# Patient Record
Sex: MALE | Race: WHITE | Employment: OTHER | URBAN - METROPOLITAN AREA
[De-identification: names, ages, dates, MRNs, and addresses within clinical notes are randomized per-mention and may not be internally consistent; named-entity substitution may affect disease eponyms.]

---

## 2017-03-15 ENCOUNTER — HOSPITAL ENCOUNTER (OUTPATIENT)
Dept: CT IMAGING | Facility: CLINIC | Age: 67
Discharge: HOME | End: 2017-03-15
Attending: INTERNAL MEDICINE | Admitting: INTERNAL MEDICINE
Payer: MEDICARE

## 2017-03-15 DIAGNOSIS — R91.1 SOLITARY PULMONARY NODULE: ICD-10-CM

## 2017-03-15 PROCEDURE — 71250 CT THORAX DX C-: CPT

## 2017-03-20 ENCOUNTER — OFFICE VISIT (OUTPATIENT)
Dept: FAMILY MEDICINE | Facility: CLINIC | Age: 67
End: 2017-03-20
Payer: COMMERCIAL

## 2017-03-20 VITALS
OXYGEN SATURATION: 97 % | WEIGHT: 184 LBS | HEART RATE: 73 BPM | BODY MASS INDEX: 24.92 KG/M2 | HEIGHT: 72 IN | TEMPERATURE: 98.2 F | DIASTOLIC BLOOD PRESSURE: 62 MMHG | SYSTOLIC BLOOD PRESSURE: 108 MMHG

## 2017-03-20 DIAGNOSIS — M67.40 GANGLION CYST: Primary | ICD-10-CM

## 2017-03-20 DIAGNOSIS — Z12.5 ENCOUNTER FOR SCREENING FOR MALIGNANT NEOPLASM OF PROSTATE: ICD-10-CM

## 2017-03-20 DIAGNOSIS — H91.93 DECREASED HEARING OF BOTH EARS: ICD-10-CM

## 2017-03-20 DIAGNOSIS — F69 BEHAVIOR PROBLEM, ADULT: ICD-10-CM

## 2017-03-20 DIAGNOSIS — N40.1 BENIGN PROSTATIC HYPERPLASIA WITH LOWER URINARY TRACT SYMPTOMS, UNSPECIFIED MORPHOLOGY: ICD-10-CM

## 2017-03-20 LAB — PSA SERPL-ACNC: 3.51 UG/L (ref 0–4)

## 2017-03-20 PROCEDURE — 99214 OFFICE O/P EST MOD 30 MIN: CPT | Performed by: FAMILY MEDICINE

## 2017-03-20 PROCEDURE — G0103 PSA SCREENING: HCPCS | Performed by: FAMILY MEDICINE

## 2017-03-20 RX ORDER — CALCIUM POLYCARBOPHIL 625 MG 625 MG/1
2 TABLET ORAL DAILY
COMMUNITY

## 2017-03-20 NOTE — PROGRESS NOTES
Mr. Kirkpatrick,    -PSA (prostate specific antigen) test is normal.    If you have further questions about the interpretation of your labs, [email or web address].[email or web address] is a good website to check out for further information.    Please contact the clinic if you have additional questions.  Thank you.    Sincerely,    Yoni Sarmiento MD

## 2017-03-20 NOTE — MR AVS SNAPSHOT
After Visit Summary   3/20/2017    Kentrell Kirkpatrick    MRN: [id number]           Patient Information     Date Of Birth          1950        Visit Information        Provider Department      3/20/2017 10:00 AM Yoni Sarmiento Jr., [facility]        Today's Diagnoses     Ganglion cyst    -  1    Decreased hearing of both ears        Behavior problem, adult           Follow-ups after your visit        Additional Services     MENTAL HEALTH REFERRAL       Your provider has referred you to: [facility] Providers Intake Scheduling [phone]   [email or web address]    All scheduling is subject to the client's specific insurance plan & benefits, provider/location availability, and provider clinical specialities.  Please arrive 15 minutes early for your first appointment and bring your completed paperwork.    Please be aware that coverage of these services is subject to the terms and limitations of your health insurance plan.  Call member services at your health plan with any benefit or coverage questions.            OTOLARYNGOLOGY REFERRAL       Your provider has referred you to: [facility]: [place] [facility] [phone]   [email or web address]    Please be aware that coverage of these services is subject to the terms and limitations of your health insurance plan.  Call member services at your health plan with any benefit or coverage questions.      Please bring the following with you to your appointment:    (1) Any X-Rays, CTs or MRIs which have been performed.  Contact the facility where they were done to arrange for  prior to your scheduled appointment.   (2) List of current medications  (3) This referral request   (4) Any documents/labs given to you for this referral                  Follow-up notes from your care team     Return if symptoms worsen or fail to improve.      Who to contact     If you have questions or need  follow up information about today's clinic visit or your schedule please contact [facility] directly at [phone].  Normal or non-critical lab and imaging results will be communicated to you by MyChart, letter or phone within 4 business days after the clinic has received the results. If you do not hear from us within 7 days, please contact the clinic through [employer] or phone. If you have a critical or abnormal lab result, we will notify you by phone as soon as possible.  Submit refill requests through [employer] or call your pharmacy and they will forward the refill request to us. Please allow 3 business days for your refill to be completed.          Additional Information About Your Visit        [employer] Information     [employer] gives you secure access to your electronic health record. If you see a primary care provider, you can also send messages to your care team and make appointments. If you have questions, please call your primary care clinic.  If you do not have a primary care provider, please call [phone] and they will assist you.        Care EveryWhere ID     This is your Care EveryWhere ID. This could be used by other organizations to access your [place] medical records  [id number]        Your Vitals Were     Pulse Temperature Height Pulse Oximetry BMI (Body Mass Index)       73 98.2  F (36.8  C) (Oral) 6' (1.829 m) 97% 24.95 kg/m2        Blood Pressure from Last 3 Encounters:   03/20/17 108/62   12/22/16 95/69   12/05/16 96/60    Weight from Last 3 Encounters:   03/20/17 184 lb (83.5 kg)   12/05/16 183 lb (83 kg)   03/14/16 184 lb (83.5 kg)              We Performed the Following     MENTAL HEALTH REFERRAL     OTOLARYNGOLOGY REFERRAL        Primary Care Provider Office Phone # Fax #    Yoni Sarmiento Jr., [phone]       [facility] [address]  [place] [address]        Thank you!     Thank you for choosing [facility]  for your care. Our  goal is always to provide you with excellent care. Hearing back from our patients is one way we can continue to improve our services. Please take a few minutes to complete the written survey that you may receive in the mail after your visit with us. Thank you!             Your Updated Medication List - Protect others around you: Learn how to safely use, store and throw away your medicines at www.disposemymeds.org.          This list is accurate as of: 3/20/17 10:56 AM.  Always use your most recent med list.                   Brand Name Dispense Instructions for use    ASPIRIN PO      Take 81 mg by mouth       calcium polycarbophil 625 MG tablet    FIBERCON     Take 2 tablets by mouth daily       MULTI vitamin  MENS Tabs      1 TABLET DAILY       tamsulosin 0.4 MG capsule    FLOMAX    90 capsule    Take 1 capsule (0.4 mg) by mouth daily

## 2017-03-20 NOTE — PROGRESS NOTES
SUBJECTIVE:                                                    Kentrell Kirkpatrick is a 66 year old male who presents to clinic today for the following health issues:    Pt would like referral for hearing     Concern - mass     Onset: 12 wks     Description:   Lump on the top of rt foot     Intensity: mild    Progression of Symptoms:  same    Accompanying Signs & Symptoms: none       Previous history of similar problem:   none    Precipitating factors:   Worsened by: walking occasionally     Alleviating factors:  Improved by: nothing tried        Therapies Tried and outcome: nothing tried    Pain is primarily on dorsum of 1st MTP joint        Notes decreased hearing in both ears.  Specifically, he notes if he is in an environment with a lot of ambient noise (like a restaurant) or women's voices on TV he really struggles to hear.    Lastly, he reports some behavioral issues of getting easily agitated/short fused.  He recently retired and reports he's really noticed his irritability has increased since that time.  He tells a story of a recent vacation to [place] with another couple where most of the party wanted to do wine tasting and he wanted to go to a classic car museum and how much it irritated him that no one wanted to go to the car museum.    Lastly, is due for check of PSA.  Discussed controversies surrounding PSA.  We've elected to do PSA this year after discussing these controversies.     Problem list and histories reviewed & adjusted, as indicated.  Additional history: as documented    Labs reviewed in EPIC    Reviewed and updated as needed this visit by clinical staff  Tobacco  Allergies  Meds  Med Hx  Surg Hx  Fam Hx  Soc Hx      Reviewed and updated as needed this visit by Provider         ROS:  Constitutional, HEENT, cardiovascular, pulmonary, gi and gu systems are negative, except as otherwise noted.    OBJECTIVE:                                                    /62  Pulse 73  Temp  98.2  F (36.8  C) (Oral)  Ht 6' (1.829 m)  Wt 184 lb (83.5 kg)  SpO2 97%  BMI 24.95 kg/m2  Body mass index is 24.95 kg/(m^2).  GENERAL: healthy, alert and no distress  EYES: Eyes grossly normal to inspection, PERRL and conjunctivae and sclerae normal  HENT: ear canals and TM's normal, nose and mouth without ulcers or lesions  NECK: no adenopathy, no asymmetry, masses, or scars and thyroid normal to palpation  MS: RLE exam shows normal strength and muscle mass and compressible 1 cm mass that is non-tender to palpation over the dorsum of the 1st MTP joint.     Diagnostic Test Results:  none      ASSESSMENT/PLAN:                                                            1. Ganglion cyst  Advised that this is the likely diagnosis of the mass on his right foot.  Advised that this typically is not visible on x-ray.  Discussed that we could refer to podiatry for excision or that this may resolve on it's own.  He'd prefer to observe this for now.    2. Decreased hearing of both ears  Referred to ENT for further evaluation.  - OTOLARYNGOLOGY REFERRAL    3. Behavior problem, adult  Advised that his symptoms do not sound like overt depression or anxiety.  As such, I advised that he would not likely see meaningful improvement with medications.  I instead encouraged meeting with a psychologist with which he agrees.  - MENTAL HEALTH REFERRAL    4. Benign prostatic hyperplasia with lower urinary tract symptoms, unspecified morphology  See above.  Check PSA.  - PSA, screen    5. Encounter for screening for malignant neoplasm of prostate   As above.  - PSA, screen    See Patient Instructions.  Over 25 minutes spent with patient today, greater than 50% in face to face counseling.     Jr Yoni Sarmiento MD  [facility]

## 2017-03-20 NOTE — NURSING NOTE
Chief Complaint   Patient presents with     Mass       Initial /62  Pulse 73  Temp 98.2  F (36.8  C) (Oral)  Ht 6' (1.829 m)  Wt 184 lb (83.5 kg)  SpO2 97%  BMI 24.95 kg/m2 Estimated body mass index is 24.95 kg/(m^2) as calculated from the following:    Height as of this encounter: 6' (1.829 m).    Weight as of this encounter: 184 lb (83.5 kg).  Medication Reconciliation: complete

## 2017-03-27 ENCOUNTER — TRANSFERRED RECORDS (OUTPATIENT)
Dept: HEALTH INFORMATION MANAGEMENT | Facility: CLINIC | Age: 67
End: 2017-03-27

## 2017-05-08 ENCOUNTER — OFFICE VISIT (OUTPATIENT)
Dept: PSYCHOLOGY | Facility: CLINIC | Age: 67
End: 2017-05-08
Attending: FAMILY MEDICINE
Payer: COMMERCIAL

## 2017-05-08 DIAGNOSIS — F43.23 ADJUSTMENT DISORDER WITH MIXED ANXIETY AND DEPRESSED MOOD: Primary | ICD-10-CM

## 2017-05-08 PROCEDURE — 90791 PSYCH DIAGNOSTIC EVALUATION: CPT | Performed by: MARRIAGE & FAMILY THERAPIST

## 2017-05-08 ASSESSMENT — ANXIETY QUESTIONNAIRES
GAD7 TOTAL SCORE: 5
7. FEELING AFRAID AS IF SOMETHING AWFUL MIGHT HAPPEN: NOT AT ALL
1. FEELING NERVOUS, ANXIOUS, OR ON EDGE: NOT AT ALL
6. BECOMING EASILY ANNOYED OR IRRITABLE: SEVERAL DAYS
IF YOU CHECKED OFF ANY PROBLEMS ON THIS QUESTIONNAIRE, HOW DIFFICULT HAVE THESE PROBLEMS MADE IT FOR YOU TO DO YOUR WORK, TAKE CARE OF THINGS AT HOME, OR GET ALONG WITH OTHER PEOPLE: NOT DIFFICULT AT ALL
2. NOT BEING ABLE TO STOP OR CONTROL WORRYING: SEVERAL DAYS
5. BEING SO RESTLESS THAT IT IS HARD TO SIT STILL: MORE THAN HALF THE DAYS
3. WORRYING TOO MUCH ABOUT DIFFERENT THINGS: SEVERAL DAYS

## 2017-05-08 ASSESSMENT — PATIENT HEALTH QUESTIONNAIRE - PHQ9: 5. POOR APPETITE OR OVEREATING: NOT AT ALL

## 2017-05-08 NOTE — MR AVS SNAPSHOT
MRN:[id number]                      After Visit Summary   5/8/2017    Kentrell Kirkpatrick    MRN: [id number]           Visit Information        Provider Department      5/8/2017 10:30 AM Cary Harsha, [facility]      Your next 10 appointments already scheduled     May 24, 2017  9:00 AM CDT   Return Visit with Harsha Townsend [facility]    [address] [facility] [address] [address] [phone]            Jun 01, 2017  8:00 AM CDT   Return Visit with Harsha Townsend [facility]    [address] [facility] [address] [phone]              [employer] Information     [employer] gives you secure access to your electronic health record. If you see a primary care provider, you can also send messages to your care team and make appointments. If you have questions, please call your primary care clinic.  If you do not have a primary care provider, please call [phone] and they will assist you.        Care EveryWhere ID     This is your Care EveryWhere ID. This could be used by other organizations to access your [place] medical records  [id number]

## 2017-05-08 NOTE — Clinical Note
Hi,  Pt attended his intake therapy session today at [facility]. Dx of Adjustment Disorder with anxiety and depressed mood as a result of struggles transitioning to long-term life. His next scheduled session is for 5/24/17.  Lets collaborate throughout treatment as needed. Regards Harsha Townsend MA, LMFT, LICSW

## 2017-05-08 NOTE — PROGRESS NOTES
"                                                                                                                                                                        Adult Intake Structured Interview  Standard Diagnostic Assessment      CLIENT'S NAME: Kentrell Kirkpatrick  MRN:   [id number]  :   1950  ACCT. NUMBER: [id number]  DATE OF SERVICE: 17      Identifying Information:  Client is a 66 year old, ,  male. Client was referred for counseling by self, Yoni Sarmiento at [facility] and family. Client is currently retired. Client attended the session alone.       Client's Statement of Presenting Concern:  Client reports the reason for seeking therapy at this time for his recent problems having a \"short temper, holding in anger, not happy.\" He reports having increased irritability since retiring last year. Client describes himself as a \"workaholic all my life.\" Now he has \"too much time on my hands\" and has been experiencing boredom. He feels happiest when he is working with his tools. He is struggling adjusting to alf. 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 Client stated that his symptoms have resulted in the following functional impairments: relationship(s), self-care and social interactions.      History of Presenting Concern:  Client reports that these problem(s) began 1 year ago, 2016, when he retired from [employer] . Client has attempted to resolve these concerns in the past through self-control. Client reports that other professional(s) are involved in providing support / services: talking with PCP.      Social History:  Client reported he grew up in [place], [place]. They were the third born of 3 children. This is an intact family and parents remain . Client reported that his childhood was \"Fantastic! Parents encouraged all of us to " "explore most anything. Great family camping vacations.\" Client described his current relationships with family of origin as \"great\" with wife. \"Good\" with oldest brother. Brother Cipriano does not stay in touch.    Client reported a history of 2 committed relationships or marriages. Client has been  for 10 years. Client reported having no children. Client identified some stable and meaningful social connections. Client reported that he has not been involved with the legal system. Client's highest education level was some college. Client did identify the following learning problems: hearing. There are no ethnic, cultural or Caodaism factors that may be relevant for therapy. Client identified his preferred language to be English. Client reported he does not need the assistance of an  or other support involved in therapy. Modifications will not be used to assist communication in therapy. Client did not serve in the .     Client reports family history includes HEART DISEASE in his father; Prostate Cancer in his brother and another family member.    Mental Health History:  Client reported no family history of mental health issues.  Client has not been previously diagnosed with a mental health diagnosis.  Client has not received mental health services in the past.  Hospitalizations: None.  Client is not currently receiving any mental health services.      Chemical Health History:  Client reported no family history of chemical health issues. Client has not received chemical dependency treatment in the past. Client is not currently receiving any chemical dependency treatment. Client reports no problems as a result of their drinking / drug use.      Client Reports:  Client reports using alcohol 1 times per day and has 1 beers and mixed drinks at a time. Client first started drinking at age \"20+ yrs ago\".  Client denies using tobacco.  Client denies using marijuana.  Client reports using caffeine 1 times " "per day and drinks 8 at a time. Client started using caffeine at age \"29+ years\".  Client denies using street drugs.  Client denies the non-medical use of prescription or over the counter drugs.    CAGE: None of the patient's responses to the CAGE screening were positive / Negative CAGE score   Based on the negative Cage-Aid score and clinical interview there  are not indications of drug or alcohol abuse.    Discussed the general effects of drugs and alcohol on health and well-being. Therapist gave client printed information about the effects of chemical use on his health and well being.      Significant Losses / Trauma / Abuse / Neglect Issues:  There are indications or report of significant loss, trauma, abuse or neglect issues related to: death of 1st wife of 28 years due to brain aneurysm .    Issues of possible neglect are not present.      Medical Issues:  Client has had a physical exam to rule out medical causes for current symptoms. Date of last physical exam was within the past year. Client was encouraged to follow up with PCP if symptoms were to develop. The client has a [facility] Primary Care Provider, who is named Yoni Sarmiento Jr... The client reports not having a psychiatrist. Client reports no current medical concerns. The client denies the presence of chronic or episodic pain. There are not significant nutritional concerns.     Client reports current meds as:   Current Outpatient Prescriptions   Medication Sig     calcium polycarbophil (FIBERCON) 625 MG tablet Take 2 tablets by mouth daily     ASPIRIN PO Take 81 mg by mouth     tamsulosin (FLOMAX) 0.4 MG capsule Take 1 capsule (0.4 mg) by mouth daily     MULTI VITAMIN MENS OR TABS 1 TABLET DAILY     No current facility-administered medications for this visit.        Client Allergies:  No Known Allergies      Medical History:  Past Medical History:   Diagnosis Date     Colon polyps          Medication Adherence:  Client reports taking prescribed " medications as prescribed.    Client was provided recommendation to follow-up with prescribing physician.    Mental Status Assessment:  Appearance:   Appropriate   Eye Contact:   Good   Psychomotor Behavior: Normal   Attitude:   Cooperative   Orientation:   All  Speech   Rate / Production: Normal    Volume:  Normal   Mood:    Irritable  Sad   Affect:    Appropriate  Bright  Worrisome   Thought Content:  Clear  Rumination   Thought Form:  Coherent  Goal Directed  Logical   Insight:    Fair       Review of Symptoms:  Depression: Interest Energy Psychomotor slowing or agitation Ruminations Irritability  Ladan:  No symptoms  Psychosis: No symptoms  Anxiety: Worries Restless  Panic:  No symptoms  Post Traumatic Stress Disorder: Trauma (Death of 1st wife)  Obsessive Compulsive Disorder: No symptoms  Eating Disorder: No symptoms  Oppositional Defiant Disorder: No symptoms  ADD / ADHD: No symptoms  Conduct Disorder: No symptoms        Safety Issues and Plan for Safety and Risk Management:  Client denies a history of suicidal ideation, suicide attempts, self-injurious behavior, homicidal ideation, homicidal behavior and and other safety concerns  Client denies current fears or concerns for personal safety.  Client denies current or recent suicidal ideation or behaviors.  Client denies current or recent homicidal ideation or behaviors.  Client denies current or recent self injurious behavior or ideation.  Client denies other safety concerns.  Client reports there are no firearms in the house.  A safety and risk management plan has not been developed at this time, however client was given the after-hours number / 911 should there be a change in any of these risk factors.    Client's Strengths and Limitations:  Client identified the following strengths or resources that will help her succeed in counseling: commitment to health and well being, friends / good social support and intelligence. Client identified the following  supports: wife. Things that may interfere with the clients success in counseling include: none reported.      Diagnostic Criteria:  A. The development of emotional or behavioral symptoms in response to an identifiable stressor(s) occurring within 3 months of the onset of the stressor(s)  B. These symptoms or behaviors are clinically significant, as evidenced by one or both of the following:       - Marked distress that is out of proportion to the severity/intensity of the stressor (with consideration for external context & culture)       - Significant impairment in social, occupational, or other important areas of functioning  C. The stress-related disturbance does not meet criteria for another disorder & is not not an exacerbation of another mental disorder  D. The symptoms do not represent normal bereavement  E. Once the stressor or its consequences have terminated, the symptoms do not persist for more than an additional 6 months       * Adjustment Disorder with Mixed Anxiety and Depressed Mood: The predominant manifestation is a combination of depression and anxiety      Functional Status:  Client's symptoms are causing reduced functional status in the following areas: Activities of Daily Living - worry, loss of interest, low energy, self-care, boredom, irritability, lost sense of purpose/identity  Social / Relational - irritability, resentment towards others      DSM5 Diagnoses: (Sustained by DSM5 Criteria Listed Above)  Diagnoses: Adjustment Disorders  309.28 (F43.23) With mixed anxiety and depressed mood  Psychosocial & Contextual Factors: Client is experiencing symptoms of anxiety and depression as he struggles adjusting to his life in [facility]. Client has always found gerardo in his work and has yet to find something to fill that need in [facility]. It has started to affect his mood and affecting his social functioning.   WHODAS 2.0 (12 item) 2.08% on 5/8/2017            This questionnaire asks about  difficulties due to health conditions. Health conditions  include  disease or illnesses, other health problems that may be short or long lasting,  injuries, mental health or emotional problems, and problems with alcohol or drugs.                     Think back over the past 30 days and answer these questions, thinking about how much  difficulty you had doing the following activities. For each question, please [place] only  one response.    S1 Standing for long periods such as 30 minutes? None =         1   S2 Taking care of household responsibilities? None =         1   S3 Learning a new task, for example, learning how to get to a new place? None =         1   S4 How much of a problem do you have joining community activities (for example, festivals, Zoroastrianism or other activities) in the same way as anyone else can? None =         1   S5 How much have you been emotionally affected by your health problems? None =         1     In the past 30 days, how much difficulty did you have in:   S6 Concentrating on doing something for ten minutes? None =         1   S7 Walking a long distance such as a kilometer (or equivalent)? None =         1   S8 Washing your whole body? None =         1   S9 Getting dressed? None =         1   S10 Dealing with people you do not know? Mild =           2   S11 Maintaining a friendship? None =         1   S12 Your day to day work? None =         1     H1 Overall, in the past 30 days, how many days were these difficulties present? Record number of days 1   H2 In the past 30 days, for how many days were you totally unable to carry out your usual activities or work because of any health condition? Record number of days  0   H3 In the past 30 days, not counting the days that you were totally unable, for how many days did you cut back or reduce your usual activities or work because of any health condition? Record number of days 0     Attendance Agreement:  Client has signed Attendance  Agreement:Yes      Preliminary Treatment Plan:  The client reports no currently identified Holiness, ethnic or cultural issues relevant to therapy.     services are not indicated.    Modifications to assist communication are not indicated.    The concerns identified by the client will be addressed in therapy.    Initial Treatment will focus on: Adjustment Difficulties related to: snf  Relational Problems related to: Conflict or difficulties with others in situations client has been passive.    As a preliminary treatment goal, client will develop coping/problem-solving skills to facilitate more adaptive adjustment and will address relationship difficulties in a more adaptive manner.    The focus of initial interventions will be to alleviate anxiety, alleviate depressed mood, increase ability to function adaptively, increase coping skills, process losses, provide homework to reinforce skill development, teach CBT skills, teach mindfulness skills and teach social skills.    The client is receiving treatment / structured support from the following professional(s) / service and treatment. Collaboration will be initiated with: primary care physician.    Referral to another professional/service is not indicated at this time..    A Release of Information is not needed at this time.    Report to child / adult protection services was NA.    Client will have access to their [facility]' medical record.    BROOKE Lopez, LICSW  May 8, 2017

## 2017-05-09 ASSESSMENT — PATIENT HEALTH QUESTIONNAIRE - PHQ9: SUM OF ALL RESPONSES TO PHQ QUESTIONS 1-9: 4

## 2017-05-09 ASSESSMENT — ANXIETY QUESTIONNAIRES: GAD7 TOTAL SCORE: 5

## 2017-05-24 ENCOUNTER — OFFICE VISIT (OUTPATIENT)
Dept: PSYCHOLOGY | Facility: CLINIC | Age: 67
End: 2017-05-24
Payer: COMMERCIAL

## 2017-05-24 DIAGNOSIS — F43.23 ADJUSTMENT DISORDER WITH MIXED ANXIETY AND DEPRESSED MOOD: Primary | ICD-10-CM

## 2017-05-24 PROCEDURE — 90834 PSYTX W PT 45 MINUTES: CPT | Performed by: MARRIAGE & FAMILY THERAPIST

## 2017-05-24 NOTE — PROGRESS NOTES
Progress Note    Client Name: Kentrell Kirkpatrick  Date: 5/24/2017         Service Type: Individual      Session Start Time: 9am  Session End Time: 9:45am      Session Length: 45 minutes     Session #: 2     Attendees: Client attended alone    Treatment Plan Last Reviewed: developing Tx plan  PHQ-9 / TONI-7 : 5/8/17     DATA      Progress Since Last Session (Related to Symptoms / Goals / Homework):   Symptoms: Stable    Homework: Achieved / completed to satisfaction      Episode of Care Goals: Satisfactory progress - PREPARATION (Decided to change - considering how); Intervened by negotiating a change plan and determining options / strategies for behavior change, identifying triggers, exploring social supports, and working towards setting a date to begin behavior change     Current / Ongoing Stressors and Concerns:   - Sx of anxiety adjusting to life in [facility], finding new purpose/identity, irritability     - Sx of depression adjusting to [facility], low sense of self-worth without work identity     Treatment Objective(s) Addressed in This Session:   identify 2 stressors which contribute to feelings of depression  identify 2 initial signs or symptoms of anxiety  compile a list of boundaries that they would like to set with others. friend, family and self     Intervention:   CBT: behavioral chain analysis  Solution Focused: miracle question, finding gerardo in [facility], pleasant social connections  Psycho-education: mindfulness, living in the present   Role play: effective communication skills        ASSESSMENT: Current Emotional / Mental Status (status of significant symptoms):   Risk status (Self / Other harm or suicidal ideation)   Client denies current fears or concerns for personal safety.   Client denies current or recent suicidal ideation or behaviors.   Client denies current or recent homicidal ideation or behaviors.   Client denies current or recent self injurious  behavior or ideation.   Client denies other safety concerns.   A safety and risk management plan has not been developed at this time, however client was given the after-hours number / 911 should there be a change in any of these risk factors.     Appearance:   Appropriate    Eye Contact:   Good    Psychomotor Behavior: Normal    Attitude:   Cooperative    Orientation:   All   Speech    Rate / Production: Normal     Volume:  Normal    Mood:    Anxious  Irritable  Sad    Affect:    Appropriate  Bright  Worrisome    Thought Content:  Clear  Rumination    Thought Form:  Coherent  Goal Directed  Logical    Insight:    Fair      Medication Review:   No current psychiatric medications prescribed     Medication Compliance:   Yes     Changes in Health Issues:   None reported     Chemical Use Review:   Substance Use: Chemical use reviewed, no active concerns identified      Tobacco Use: No current tobacco use.       Collateral Reports Completed:   Not Applicable    PLAN: (Client Tasks / Therapist Tasks / Other)  Client will increase self awareness of escalation in social interactions and start to utilize communication skills and patience for harmony. He will have trust in himself regarding decisions from the past to help him be in the present.         Harsha Townsend LMFT, LICSW                                                         ________________________________________________________________________    Treatment Plan    Client's Name: Kentrell Kirkpatrick  YOB: 1950    Date: 5/24/2017    DSM-V Diagnoses: Adjustment Disorders  309.28 (F43.23) With mixed anxiety and depressed mood  Psychosocial & Contextual Factors: Client is experiencing symptoms of anxiety and depression as he struggles adjusting to his life in [place]. Client has always found gerardo in his work and has yet to find something to fill that need in [place]. It has started to affect his mood and affecting his social functioning.   WHODAS 2.0 (12  "item) 2.08% on 5/8/2017    Referral / Collaboration:  Referral to another professional/service is not indicated at this time.    Anticipated number of session or this episode of care: 8      MeasurableTreatment Goal(s) related to diagnosis / functional impairment(s)  Goal 1: Client's symptoms of anxiety and depression will reduce from above a 5 out of 10 to below a 4 out of 10 for a minimum of 4 weeks.   Goal: \"Short temper. Give up on obsessing about past wrongs.\"     Objective #A (Client Action)   Client will decrease frequency and intensity of feeling down, depressed, hopeless  Client will increase self-awareness of symptom onset/escalation  Status: New - Date: TBD    Intervention(s)  Therapist will assign homework track symptoms, patterns and triggers  provide psycho-education on depression  Therapist will teach CBT strategies for attending to negative self-talk and cognitive distortions.  ACT: experiential exercises and mindfulness practices, how to live with life barriers    Objective #B  Client will Increase interest, engagement, and pleasure in doing things  Identify negative self-talk and behaviors: challenge core beliefs, myths, and actions  Status: New - Date: TBD    Intervention(s)  Therapist will assign homework to practice using coping skills outside the therapy encounter, worksheets to challenge negative thoughts  teach distraction skills. explore and tailor enjoyable activities, increase social activities, strengthen social supports  ACT: life values and being mindful of values for goals and daily living    Objective #B  Client will use at least 4 coping skills for anxiety management in the next 8 weeks.   Status: New - Date: TBD    Intervention(s)  Therapist will assign homework worksheet to challenge anxious thoughts, values and barriers, rational counter-statements  teach emotional regulation skills. deep breathing, grounding, progressive muscle relaxation, TIP skills    Objective #C  Client will " "use thought-stopping strategy daily to reduce intrusive thoughts.  Status: New - Date: TBD    Intervention(s)  provide safe space to address hx of presenting problem and root cause  Teach mindfulness practices, silent observer, ACT metaphors and managing stressors  [place]tray: exercise to stage presenting problem, reflect, process and problem solve.      Goal 2: Client will improve interactions in his inter-personal relationships establishing healthy and respectful communication to be kept 90% of the time for a minimum of 4 weeks.     Goal: \"Give more input when making decisions with others.\"    Objective #A (Client Action)      Client will compile a list of boundaries that they would like to set with others.              Status: New - Date: TBD     Intervention(s)  Therapist will teach about healthy boundaries. structure, saying \"no\", advocating, identifying and establishing appropriate roles, rules, expectations.    Objective #B  Client will practice setting boundaries daily times in the next 8 weeks.                    Status: New - Date: TBD    Intervention(s)  Therapist will assign homework to track the use of positive interactions and outcomes to establishing desired relationships   role-play effective communication skills and conflict management    Objective #C  Client will learn & utilize at least 3 assertive communication skills weekly.  Status: New - Date: TBD    Intervention(s)  Therapist will role-play assertiveness skills  teach assertiveness skills. aggressive/passive/assertive, impulsive control, reactive vs sensitive, exposure to uncomfortable conversation.      Client has not reviewed nor agreed to the above plan.      BROOKE Lopez, Cary Medical CenterSW  May 24, 2017  "

## 2017-06-01 ENCOUNTER — OFFICE VISIT (OUTPATIENT)
Dept: PSYCHOLOGY | Facility: CLINIC | Age: 67
End: 2017-06-01
Payer: COMMERCIAL

## 2017-06-01 DIAGNOSIS — F43.23 ADJUSTMENT DISORDER WITH MIXED ANXIETY AND DEPRESSED MOOD: Primary | ICD-10-CM

## 2017-06-01 PROCEDURE — 90834 PSYTX W PT 45 MINUTES: CPT | Performed by: MARRIAGE & FAMILY THERAPIST

## 2017-06-01 ASSESSMENT — ANXIETY QUESTIONNAIRES
3. WORRYING TOO MUCH ABOUT DIFFERENT THINGS: SEVERAL DAYS
7. FEELING AFRAID AS IF SOMETHING AWFUL MIGHT HAPPEN: NOT AT ALL
2. NOT BEING ABLE TO STOP OR CONTROL WORRYING: SEVERAL DAYS
5. BEING SO RESTLESS THAT IT IS HARD TO SIT STILL: SEVERAL DAYS
GAD7 TOTAL SCORE: 7
6. BECOMING EASILY ANNOYED OR IRRITABLE: NOT AT ALL
1. FEELING NERVOUS, ANXIOUS, OR ON EDGE: NEARLY EVERY DAY
IF YOU CHECKED OFF ANY PROBLEMS ON THIS QUESTIONNAIRE, HOW DIFFICULT HAVE THESE PROBLEMS MADE IT FOR YOU TO DO YOUR WORK, TAKE CARE OF THINGS AT HOME, OR GET ALONG WITH OTHER PEOPLE: NOT DIFFICULT AT ALL

## 2017-06-01 ASSESSMENT — PATIENT HEALTH QUESTIONNAIRE - PHQ9: 5. POOR APPETITE OR OVEREATING: SEVERAL DAYS

## 2017-06-01 NOTE — PROGRESS NOTES
Progress Note    Client Name: Kentrell Kirkpatrick  Date: 6/1/2017         Service Type: Individual      Session Start Time: 8am  Session End Time: 8:45am      Session Length: 45 minutes     Session #: 3     Attendees: Client attended alone    Treatment Plan Last Reviewed: 6/1/2017  PHQ-9 / TONI-7 : 6/1/2017     DATA      Progress Since Last Session (Related to Symptoms / Goals / Homework):   Symptoms: Stable    Homework: Achieved / completed to satisfaction      Episode of Care Goals: Satisfactory progress - ACTION (Actively working towards change); Intervened by reinforcing change plan / affirming steps taken     Current / Ongoing Stressors and Concerns:   - Sx of anxiety adjusting to life in long-term, finding new purpose/identity, irritability     - Sx of depression adjusting to long-term, low sense of self-worth without work identity  Client reports he has been more present in his interactions with others. In one specific situation, at his volunteer job, he let someone else lead and client was able to enjoy not taking on the all the responsibility. Today the client discussed what matters in his life and where he learned his work constructs. He shared that being outdoors with his wife is what he most enjoys. He did the values exercise which highlighted his philosophy of life. Client will be mindful of making his long-term about his values to find fulfillment.      Treatment Objective(s) Addressed in This Session:   identify 2 strategies to more effectively address stressors  Increase interest, engagement, and pleasure in doing things  Improve concentration, focus, and mindfulness in daily activities      Intervention:   CBT: behavioral chain analysis  Solution Focused: miracle question, finding gerardo in long-term, pleasant social connections  ACT: value cards         ASSESSMENT: Current Emotional / Mental Status (status of significant symptoms):   Risk status (Self /  Other harm or suicidal ideation)   Client denies current fears or concerns for personal safety.   Client denies current or recent suicidal ideation or behaviors.   Client denies current or recent homicidal ideation or behaviors.   Client denies current or recent self injurious behavior or ideation.   Client denies other safety concerns.   A safety and risk management plan has not been developed at this time, however client was given the after-hours number / 911 should there be a change in any of these risk factors.     Appearance:   Appropriate    Eye Contact:   Good    Psychomotor Behavior: Normal    Attitude:   Cooperative    Orientation:   All   Speech    Rate / Production: Normal     Volume:  Normal    Mood:    Anxious  Sad    Affect:    Appropriate  Bright    Thought Content:  Clear  Rumination    Thought Form:  Coherent  Goal Directed  Logical    Insight:    Fair      Medication Review:   No current psychiatric medications prescribed     Medication Compliance:   Yes     Changes in Health Issues:   None reported     Chemical Use Review:   Substance Use: Chemical use reviewed, no active concerns identified      Tobacco Use: No current tobacco use.       Collateral Reports Completed:   Not Applicable    PLAN: (Client Tasks / Therapist Tasks / Other)  Client will complete the values worksheet. He will be mindful of his values in daily living and mindful of language as he finds his identity in long-term.       Harsha Townsend, MARGIEFT, LICSW                                                      ________________________________________________________________________    Treatment Plan    Client's Name: Kentrell Kirkpatrick  YOB: 1950    Date: 5/24/2017    DSM-V Diagnoses: Adjustment Disorders  309.28 (F43.23) With mixed anxiety and depressed mood  Psychosocial & Contextual Factors: Client is experiencing symptoms of anxiety and depression as he struggles adjusting to his life in long-term. Client has always  "found gerardo in his work and has yet to find something to fill that need in [employer]. It has started to affect his mood and affecting his social functioning.   WHODAS 2.0 (12 item) 2.08% on 5/8/2017    Referral / Collaboration:  Referral to another professional/service is not indicated at this time.    Anticipated number of session or this episode of care: 8      MeasurableTreatment Goal(s) related to diagnosis / functional impairment(s)  Goal 1: Client's symptoms of anxiety and depression will reduce from above a 5 out of 10 to below a 4 out of 10 for a minimum of 4 weeks.   Goal: \"Short temper. Give up on obsessing about past wrongs.\"     Objective #A (Client Action)   Client will decrease frequency and intensity of feeling down, depressed, hopeless  Client will increase self-awareness of symptom onset/escalation  Status: New - Date: 6/1/2017    Intervention(s)  Therapist will assign homework track symptoms, patterns and triggers  provide psycho-education on depression  Therapist will teach CBT strategies for attending to negative self-talk and cognitive distortions.  ACT: experiential exercises and mindfulness practices, how to live with life barriers    Objective #B  Client will Increase interest, engagement, and pleasure in doing things  Identify negative self-talk and behaviors: challenge core beliefs, myths, and actions  Status: New - Date: 6/1/2017    Intervention(s)  Therapist will assign homework to practice using coping skills outside the therapy encounter, worksheets to challenge negative thoughts  teach distraction skills. explore and tailor enjoyable activities, increase social activities, strengthen social supports  ACT: life values and being mindful of values for goals and daily living    Objective #C  Client will use at least 4 coping skills for anxiety management in the next 8 weeks.   Status: New - Date: 6/1/2017    Intervention(s)  Therapist will assign homework worksheet to challenge anxious " "thoughts, values and barriers, rational counter-statements  teach emotional regulation skills. deep breathing, grounding, progressive muscle relaxation, TIP skills    Objective #D  Client will use thought-stopping strategy daily to reduce intrusive thoughts.  Status: New - Date: 6/1/2017    Intervention(s)  provide safe space to address hx of presenting problem and root cause  Teach mindfulness practices, silent observer, ACT metaphors and managing stressors  Sandtray: exercise to stage presenting problem, reflect, process and problem solve.      Goal 2: Client will improve interactions in his inter-personal relationships establishing healthy and respectful communication to be kept 90% of the time for a minimum of 4 weeks.     Goal: \"Give more input when making decisions with others.\"    Objective #A (Client Action)      Client will compile a list of boundaries that they would like to set with others.  Status: New - Date: 6/1/2017     Intervention(s)  Therapist will teach about healthy boundaries. structure, saying \"no\", advocating, identifying and establishing appropriate roles, rules, expectations.    Objective #B  Client will practice setting boundaries daily times in the next 8 weeks.                    Status: New - Date: 6/1/2017    Intervention(s)  Therapist will assign homework to track the use of positive interactions and outcomes to establishing desired relationships   role-play effective communication skills and conflict management    Objective #C  Client will learn & utilize at least 3 assertive communication skills weekly.  Status: New - Date: 6/1/2017    Intervention(s)  Therapist will role-play assertiveness skills  teach assertiveness skills. aggressive/passive/assertive, impulsive control, reactive vs sensitive, exposure to uncomfortable conversation.      Client has reviewed and agreed to the above plan.      BROOKE Lopez, [facility]  June 1, 2017  "

## 2017-06-02 ASSESSMENT — PATIENT HEALTH QUESTIONNAIRE - PHQ9: SUM OF ALL RESPONSES TO PHQ QUESTIONS 1-9: 5

## 2017-06-02 ASSESSMENT — ANXIETY QUESTIONNAIRES: GAD7 TOTAL SCORE: 7

## 2017-06-12 ENCOUNTER — OFFICE VISIT (OUTPATIENT)
Dept: PSYCHOLOGY | Facility: CLINIC | Age: 67
End: 2017-06-12
Payer: COMMERCIAL

## 2017-06-12 DIAGNOSIS — F43.23 ADJUSTMENT DISORDER WITH MIXED ANXIETY AND DEPRESSED MOOD: Primary | ICD-10-CM

## 2017-06-12 PROCEDURE — 90834 PSYTX W PT 45 MINUTES: CPT | Performed by: MARRIAGE & FAMILY THERAPIST

## 2017-06-12 NOTE — MR AVS SNAPSHOT
MRN:[id number]                      After Visit Summary   6/12/2017    Kentrell Kirkpatrick    MRN: [id number]           Visit Information        Provider Department      6/12/2017 8:30 AM Cary Harsha, [facility]      Your next 10 appointments already scheduled     Jun 21, 2017  8:00 AM CDT   Return Visit with Harsha Townsend [facility]    [address] [address] [phone]            Jun 28, 2017  8:00 AM CDT   Return Visit with Harsha Townsend [facility]    [address] [address] [phone]              [employer] Information     [employer] gives you secure access to your electronic health record. If you see a primary care provider, you can also send messages to your care team and make appointments. If you have questions, please call your primary care clinic.  If you do not have a primary care provider, please call [phone] and they will assist you.        Care EveryWhere ID     This is your Care EveryWhere ID. This could be used by other organizations to access your [place] medical records  [id number]

## 2017-06-12 NOTE — PROGRESS NOTES
Progress Note    Client Name: Kentrell Kirkpatrick  Date: 6/12/2017         Service Type: Individual      Session Start Time: 8:30am  Session End Time: 9:15am      Session Length: 45 minutes     Session #: 4     Attendees: Client attended alone    Treatment Plan Last Reviewed: 6/1/2017  PHQ-9 / TONI-7 : 6/1/2017     DATA      Progress Since Last Session (Related to Symptoms / Goals / Homework):   Symptoms: Stable    Homework: Achieved / completed to satisfaction values worksheet      Episode of Care Goals: Satisfactory progress - ACTION (Actively working towards change); Intervened by reinforcing change plan / affirming steps taken     Current / Ongoing Stressors and Concerns:   - Sx of anxiety adjusting to life in care home, finding new purpose/identity, irritability     - Sx of depression adjusting to care home, low sense of self-worth without work identity  Client reports he has been using the feelings wheel to identify specific language for how he has been feeling in care home. Client's constructs surrounding work and productivity vs the judgement of not being productive leaves him feeling insecure with his current identity. In addition, his format for socializing has also changed leaving him feeling lonely. Today client discussed his values and what matters most in his life. He will get back to activities that bring him a new type of productivity. He will re-frame productivity to fit life in care home. Client will also be proactive socially with his friends making more of an effort to plan social events.      Treatment Objective(s) Addressed in This Session:   Increase interest, engagement, and pleasure in doing things  Identify negative self-talk and behaviors: challenge core beliefs, myths, and actions  Improve concentration, focus, and mindfulness in daily activities   learn & utilize at least 3 assertive communication skills weekly  identify two areas of life that  you would like to have improved functioning     Intervention:   CBT: behavioral chain analysis  Solution Focused: miracle question, finding gerardo in [employer], pleasant social connections  ACT: values, intentions, purpose, mindfulness   Narrative: emotional language, re-framing, feelings wheel         ASSESSMENT: Current Emotional / Mental Status (status of significant symptoms):   Risk status (Self / Other harm or suicidal ideation)   Client denies current fears or concerns for personal safety.   Client denies current or recent suicidal ideation or behaviors.   Client denies current or recent homicidal ideation or behaviors.   Client denies current or recent self injurious behavior or ideation.   Client denies other safety concerns.   A safety and risk management plan has not been developed at this time, however client was given the after-hours number / 911 should there be a change in any of these risk factors.     Appearance:   Appropriate    Eye Contact:   Good    Psychomotor Behavior: Normal    Attitude:   Cooperative    Orientation:   All   Speech    Rate / Production: Normal     Volume:  Normal    Mood:    Anxious  Sad    Affect:    Appropriate  Bright    Thought Content:  Clear  Rumination    Thought Form:  Coherent  Goal Directed  Logical    Insight:    Fair      Medication Review:   No current psychiatric medications prescribed     Medication Compliance:   Yes     Changes in Health Issues:   None reported     Chemical Use Review:   Substance Use: Chemical use reviewed, no active concerns identified      Tobacco Use: No current tobacco use.       Collateral Reports Completed:   Not Applicable    PLAN: (Client Tasks / Therapist Tasks / Other)  Client will start using a daily planner for self accountability and increase effort for social enjoyable activities. Client will be mindful in re-framing the label of productivity towards his overall health.   Next session will do first sandtray and discuss social  "skills.         Harsha Townsend, LMFT, LICSW                                                      ________________________________________________________________________    Treatment Plan    Client's Name: Kentrell Kirkpatrick  YOB: 1950    Date: 5/24/2017    DSM-V Diagnoses: Adjustment Disorders  309.28 (F43.23) With mixed anxiety and depressed mood  Psychosocial & Contextual Factors: Client is experiencing symptoms of anxiety and depression as he struggles adjusting to his life in senior care. Client has always found gerardo in his work and has yet to find something to fill that need in senior care. It has started to affect his mood and affecting his social functioning.   WHODAS 2.0 (12 item) 2.08% on 5/8/2017    Referral / Collaboration:  Referral to another professional/service is not indicated at this time.    Anticipated number of session or this episode of care: 8      MeasurableTreatment Goal(s) related to diagnosis / functional impairment(s)  Goal 1: Client's symptoms of anxiety and depression will reduce from above a 5 out of 10 to below a 4 out of 10 for a minimum of 4 weeks.   Goal: \"Short temper. Give up on obsessing about past wrongs.\"     Objective #A (Client Action)   Client will decrease frequency and intensity of feeling down, depressed, hopeless  Client will increase self-awareness of symptom onset/escalation  Status: New - Date: 6/1/2017    Intervention(s)  Therapist will assign homework track symptoms, patterns and triggers  provide psycho-education on depression  Therapist will teach CBT strategies for attending to negative self-talk and cognitive distortions.  ACT: experiential exercises and mindfulness practices, how to live with life barriers    Objective #B  Client will Increase interest, engagement, and pleasure in doing things  Identify negative self-talk and behaviors: challenge core beliefs, myths, and actions  Status: New - Date: 6/1/2017    Intervention(s)  Therapist will assign " "homework to practice using coping skills outside the therapy encounter, worksheets to challenge negative thoughts  teach distraction skills. explore and tailor enjoyable activities, increase social activities, strengthen social supports  ACT: life values and being mindful of values for goals and daily living    Objective #C  Client will use at least 4 coping skills for anxiety management in the next 8 weeks.   Status: New - Date: 6/1/2017    Intervention(s)  Therapist will assign homework worksheet to challenge anxious thoughts, values and barriers, rational counter-statements  teach emotional regulation skills. deep breathing, grounding, progressive muscle relaxation, TIP skills    Objective #D  Client will use thought-stopping strategy daily to reduce intrusive thoughts.  Status: New - Date: 6/1/2017    Intervention(s)  provide safe space to address hx of presenting problem and root cause  Teach mindfulness practices, silent observer, ACT metaphors and managing stressors  Sandtray: exercise to stage presenting problem, reflect, process and problem solve.      Goal 2: Client will improve interactions in his inter-personal relationships establishing healthy and respectful communication to be kept 90% of the time for a minimum of 4 weeks.     Goal: \"Give more input when making decisions with others.\"    Objective #A (Client Action)      Client will compile a list of boundaries that they would like to set with others.  Status: New - Date: 6/1/2017     Intervention(s)  Therapist will teach about healthy boundaries. structure, saying \"no\", advocating, identifying and establishing appropriate roles, rules, expectations.    Objective #B  Client will practice setting boundaries daily times in the next 8 weeks.                    Status: New - Date: 6/1/2017    Intervention(s)  Therapist will assign homework to track the use of positive interactions and outcomes to establishing desired relationships   role-play effective " communication skills and conflict management    Objective #C  Client will learn & utilize at least 3 assertive communication skills weekly.  Status: New - Date: 6/1/2017    Intervention(s)  Therapist will role-play assertiveness skills  teach assertiveness skills. aggressive/passive/assertive, impulsive control, reactive vs sensitive, exposure to uncomfortable conversation.      Client has reviewed and agreed to the above plan.      BROOKE Lopez, [facility]  June 12, 2017

## 2017-06-13 DIAGNOSIS — N40.1 BENIGN NODULAR PROSTATIC HYPERPLASIA WITH LOWER URINARY TRACT SYMPTOMS: ICD-10-CM

## 2017-06-13 RX ORDER — TAMSULOSIN HYDROCHLORIDE 0.4 MG/1
0.4 CAPSULE ORAL DAILY
Qty: 90 CAPSULE | Refills: 1 | Status: SHIPPED | OUTPATIENT
Start: 2017-06-13 | End: 2017-12-26

## 2017-06-13 NOTE — TELEPHONE ENCOUNTER
Reason for Call:  Medication or medication refill:    Do you use a [facility]?  Name of the pharmacy and phone number for the current request:  [facility]    Name of the medication requested: tamsulosin 0.4 mg    Other request: Please refill asap. Today if possible. Pt is going out of town    Can we leave a detailed message on this number? YES    Phone number patient can be reached at: Cell number on file:    Telephone Information:   Mobile [phone]       Best Time: y      Call taken on 6/13/2017 at 9:22 AM by Keira Snell

## 2017-06-21 ENCOUNTER — OFFICE VISIT (OUTPATIENT)
Dept: PSYCHOLOGY | Facility: CLINIC | Age: 67
End: 2017-06-21
Payer: COMMERCIAL

## 2017-06-21 DIAGNOSIS — F43.23 ADJUSTMENT DISORDER WITH MIXED ANXIETY AND DEPRESSED MOOD: Primary | ICD-10-CM

## 2017-06-21 PROCEDURE — 90834 PSYTX W PT 45 MINUTES: CPT | Performed by: MARRIAGE & FAMILY THERAPIST

## 2017-06-21 ASSESSMENT — ANXIETY QUESTIONNAIRES
6. BECOMING EASILY ANNOYED OR IRRITABLE: NOT AT ALL
3. WORRYING TOO MUCH ABOUT DIFFERENT THINGS: NOT AT ALL
GAD7 TOTAL SCORE: 1
2. NOT BEING ABLE TO STOP OR CONTROL WORRYING: SEVERAL DAYS
5. BEING SO RESTLESS THAT IT IS HARD TO SIT STILL: NOT AT ALL
1. FEELING NERVOUS, ANXIOUS, OR ON EDGE: NOT AT ALL
7. FEELING AFRAID AS IF SOMETHING AWFUL MIGHT HAPPEN: NOT AT ALL
IF YOU CHECKED OFF ANY PROBLEMS ON THIS QUESTIONNAIRE, HOW DIFFICULT HAVE THESE PROBLEMS MADE IT FOR YOU TO DO YOUR WORK, TAKE CARE OF THINGS AT HOME, OR GET ALONG WITH OTHER PEOPLE: NOT DIFFICULT AT ALL

## 2017-06-21 ASSESSMENT — PATIENT HEALTH QUESTIONNAIRE - PHQ9: 5. POOR APPETITE OR OVEREATING: NOT AT ALL

## 2017-06-21 NOTE — MR AVS SNAPSHOT
MRN:[id number]                      After Visit Summary   6/21/2017    Kentrell Kirkpatrick    MRN: [id number]           Visit Information        Provider Department      6/21/2017 8:00 AM Harsha Townsend [facility] Generic      Your next 10 appointments already scheduled     Jun 28, 2017  8:00 AM CDT   Return Visit with Harsha Townsend [facility]    [address] [address]   [phone]            Jul 05, 2017  8:00 AM CDT   Return Visit with Harsha Townsend [facility]    [address] [address]   [phone]            Jul 19, 2017  8:00 AM CDT   Return Visit with Harsha Townsend [facility]    [address] [address]   [phone]              MyChart Information     [employer] gives you secure access to your electronic health record. If you see a primary care provider, you can also send messages to your care team and make appointments. If you have questions, please call your primary care clinic.  If you do not have a primary care provider, please call [phone] and they will assist you.        Care EveryWhere ID     This is your Care EveryWhere ID. This could be used by other organizations to access your [place] medical records  [id number]        Equal Access to Services     MIHIR GREGORIO AH: Hadii pauly eden hadtessieo Soyonoali, waaxda luqadaha, qaybta kaalmada adeegyada, [place] adederrick sánchez. So [facility] [phone].    ATENCIÓN: Si habla español, tiene a perez disposición servicios gratuitos de asistencia lingüística. Llame al [phone].    We comply with applicable federal civil rights laws and [place] laws. We do not discriminate on the basis of race, color, national origin, age, disability sex, sexual orientation or gender identity.

## 2017-06-21 NOTE — PROGRESS NOTES
"                                             Progress Note    Client Name: Kentrell Kirkpatrick  Date: 6/21/2017         Service Type: Individual      Session Start Time: 8:10am  Session End Time: 9am      Session Length: 50 minutes     Session #: 5     Attendees: Client attended alone    Treatment Plan Last Reviewed: 6/1/2017  PHQ-9 / TONI-7 : 6/21/2017     DATA      Progress Since Last Session (Related to Symptoms / Goals / Homework):   Symptoms: Improved PHQ9/GAD7    Homework: Achieved / completed to satisfaction       Episode of Care Goals: Satisfactory progress - ACTION (Actively working towards change); Intervened by reinforcing change plan / affirming steps taken     Current / Ongoing Stressors and Concerns:   - Sx of anxiety adjusting to life in longterm, finding new purpose/identity, irritability     - Sx of depression adjusting to longterm, low sense of self-worth without work identity  Client reports he has started using a daily planner again and it has helped. He has started planning out his time and gotten tasks done that he was procrastinating, which makes him feel good. He also has started looking forward to events and a sense of accomplishment once something from the planner is completed. Today client staged his first sandtray which he titled \"The Good Things In My Life.\" He used miniatures to represent his love of all things aquatic, his love for cars, outdoor vehicles and space. He also showed growing-up and fond memories of the outdoors. On the top right corner he had his danica and a chest to represent the legacy of his life he would like to leave. He described it as leaving the world a better place than he left it. His personal figure was Superman. He discussed the importance of the many ways one can help others and how much positive impact he is still able to make in longterm.       Treatment Objective(s) Addressed in This Session:   Increase interest, engagement, and pleasure in doing " things  Improve concentration, focus, and mindfulness in daily activities   learn & utilize at least 3 assertive communication skills weekly  identify two areas of life that you would like to have improved functioning     Intervention:   CBT: behavioral chain analysis  Solution Focused: miracle question, finding gerardo in intermediate, pleasant social connections  ACT: values, intentions, purpose, mindfulness   Sandtray: client staged, reflected and processed presenting problems         ASSESSMENT: Current Emotional / Mental Status (status of significant symptoms):   Risk status (Self / Other harm or suicidal ideation)   Client denies current fears or concerns for personal safety.   Client denies current or recent suicidal ideation or behaviors.   Client denies current or recent homicidal ideation or behaviors.   Client denies current or recent self injurious behavior or ideation.   Client denies other safety concerns.   A safety and risk management plan has not been developed at this time, however client was given the after-hours number / 911 should there be a change in any of these risk factors.     Appearance:   Appropriate    Eye Contact:   Good    Psychomotor Behavior: Normal    Attitude:   Cooperative    Orientation:   All   Speech    Rate / Production: Normal     Volume:  Normal    Mood:    Anxious  Sad    Affect:    Appropriate  Bright    Thought Content:  Clear    Thought Form:  Coherent  Goal Directed  Logical    Insight:    Fair      Medication Review:   No current psychiatric medications prescribed     Medication Compliance:   Yes     Changes in Health Issues:   None reported     Chemical Use Review:   Substance Use: Chemical use reviewed, no active concerns identified      Tobacco Use: No current tobacco use.       Collateral Reports Completed:   Not Applicable    PLAN: (Client Tasks / Therapist Tasks / Other)  Client will reflect on today's sandtray and continue to review next session. He will consider  "maintaining a good balance of what it means to help others. Client will continue to use his planner to schedule social activities and tasks for the near future.   Next session discuss social skills.         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 Client has always found gerardo in his work and has yet to find something to fill that need in custodial. It has started to affect his mood and affecting his social functioning.   WHODAS 2.0 (12 item) 2.08% on 5/8/2017    Referral / Collaboration:  Referral to another professional/service is not indicated at this time.    Anticipated number of session or this episode of care: 8      MeasurableTreatment Goal(s) related to diagnosis / functional impairment(s)  Goal 1: Client's symptoms of anxiety and depression will reduce from above a 5 out of 10 to below a 4 out of 10 for a minimum of 4 weeks.   Goal: \"Short temper. Give up on obsessing about past wrongs.\"     Objective #A (Client Action)   Client will decrease frequency and intensity of feeling down, depressed, hopeless  Client will increase self-awareness of symptom onset/escalation  Status: New - Date: 6/1/2017    Intervention(s)  Therapist will assign homework track symptoms, patterns and triggers  provide psycho-education on depression  Therapist will teach CBT strategies for attending to negative self-talk and cognitive distortions.  ACT: experiential exercises and mindfulness practices, how to live with life barriers    Objective #B  Client will Increase interest, engagement, " "and pleasure in doing things  Identify negative self-talk and behaviors: challenge core beliefs, myths, and actions  Status: New - Date: 6/1/2017    Intervention(s)  Therapist will assign homework to practice using coping skills outside the therapy encounter, worksheets to challenge negative thoughts  teach distraction skills. explore and tailor enjoyable activities, increase social activities, strengthen social supports  ACT: life values and being mindful of values for goals and daily living    Objective #C  Client will use at least 4 coping skills for anxiety management in the next 8 weeks.   Status: New - Date: 6/1/2017    Intervention(s)  Therapist will assign homework worksheet to challenge anxious thoughts, values and barriers, rational counter-statements  teach emotional regulation skills. deep breathing, grounding, progressive muscle relaxation, TIP skills    Objective #D  Client will use thought-stopping strategy daily to reduce intrusive thoughts.  Status: New - Date: 6/1/2017    Intervention(s)  provide safe space to address hx of presenting problem and root cause  Teach mindfulness practices, silent observer, ACT metaphors and managing stressors  Sandtray: exercise to stage presenting problem, reflect, process and problem solve.      Goal 2: Client will improve interactions in his inter-personal relationships establishing healthy and respectful communication to be kept 90% of the time for a minimum of 4 weeks.     Goal: \"Give more input when making decisions with others.\"    Objective #A (Client Action)      Client will compile a list of boundaries that they would like to set with others.  Status: New - Date: 6/1/2017     Intervention(s)  Therapist will teach about healthy boundaries. structure, saying \"no\", advocating, identifying and establishing appropriate roles, rules, expectations.    Objective #B  Client will practice setting boundaries daily times in the next 8 weeks.                    Status: " New - Date: 6/1/2017    Intervention(s)  Therapist will assign homework to track the use of positive interactions and outcomes to establishing desired relationships   role-play effective communication skills and conflict management    Objective #C  Client will learn & utilize at least 3 assertive communication skills weekly.  Status: New - Date: 6/1/2017    Intervention(s)  Therapist will role-play assertiveness skills  teach assertiveness skills. aggressive/passive/assertive, impulsive control, reactive vs sensitive, exposure to uncomfortable conversation.      Client has reviewed and agreed to the above plan.      BROOKE Lopez, LICSW  June 21, 2017

## 2017-06-22 ASSESSMENT — ANXIETY QUESTIONNAIRES: GAD7 TOTAL SCORE: 1

## 2017-06-22 ASSESSMENT — PATIENT HEALTH QUESTIONNAIRE - PHQ9: SUM OF ALL RESPONSES TO PHQ QUESTIONS 1-9: 1

## 2017-06-28 ENCOUNTER — OFFICE VISIT (OUTPATIENT)
Dept: PSYCHOLOGY | Facility: CLINIC | Age: 67
End: 2017-06-28
Payer: COMMERCIAL

## 2017-06-28 DIAGNOSIS — F43.23 ADJUSTMENT DISORDER WITH MIXED ANXIETY AND DEPRESSED MOOD: Primary | ICD-10-CM

## 2017-06-28 PROCEDURE — 90834 PSYTX W PT 45 MINUTES: CPT | Performed by: MARRIAGE & FAMILY THERAPIST

## 2017-06-28 NOTE — MR AVS SNAPSHOT
MRN:[id number]                      After Visit Summary   6/28/2017    Kentrell Kirkpatrick    MRN: [id number]           Visit Information        Provider Department      6/28/2017 8:00 AM Harsha Townsend [facility] Generic      Your next 10 appointments already scheduled     Jul 05, 2017  8:00 AM CDT   Return Visit with Harsha Townsend  ([facility])    [address] [facility] [address] [address] [phone]            Jul 19, 2017  8:00 AM CDT   Return Visit with Harsha Townsend  ([facility])    [address] [facility] [address] [facility] [address] [phone]              MyChart Information     [facility] gives you secure access to your electronic health record. If you see a primary care provider, you can also send messages to your care team and make appointments. If you have questions, please call your primary care clinic.  If you do not have a primary care provider, please call [phone] and they will assist you.        Care EveryWhere ID     This is your Care EveryWhere ID. This could be used by other organizations to access your [place] medical records  [id number]        Equal Access to Services     MIHIR GREGORIO : Lian pascualo Sodylon, waaxda luqadaha, qaybta kaalmada adeegyada, north sánchez. So [facility] [phone].    ATENCIÓN: Si habla español, tiene a perez disposición servicios gratlizzyos de asistencia lingüística. Llame al [phone].    We comply with applicable federal civil rights laws and [place] laws. We do not discriminate on the basis of race, color, national origin, age, disability sex, sexual orientation or gender identity.

## 2017-06-28 NOTE — PROGRESS NOTES
Progress Note    Client Name: Kentrell Kirkpatrick  Date: 6/28/2017         Service Type: Individual      Session Start Time: 8:05am  Session End Time: 8:50am      Session Length: 50 minutes     Session #: 6     Attendees: Client attended alone    Treatment Plan Last Reviewed: 6/1/2017  PHQ-9 / TONI-7 : 6/21/2017     DATA      Progress Since Last Session (Related to Symptoms / Goals / Homework):   Symptoms: Stable     Homework: Achieved / completed to satisfaction       Episode of Care Goals: Satisfactory progress - ACTION (Actively working towards change); Intervened by reinforcing change plan / affirming steps taken     Current / Ongoing Stressors and Concerns:   - Sx of anxiety adjusting to life in custodial, finding new purpose/identity, irritability     - Sx of depression adjusting to custodial, low sense of self-worth without work identity  Client reports he has been doing well and has started to embrace custodial. He gave a couple of examples. He was able to leave a project for later as he realized he didn't have to rush, and this brought him calm. He was also able to take time to sit and enjoy the water as he watched people launching their boats. He also reports he has been more patient and less aggressive in social situations, being mindful of choosing his battles and keeping harmony as the main priority.        Treatment Objective(s) Addressed in This Session:   Increase interest, engagement, and pleasure in doing things  Improve concentration, focus, and mindfulness in daily activities   track and record at least 1 daily pleasant exchanges with anyone he incounters in his personal life     Intervention:   CBT: behavioral chain analysis  Solution Focused: miracle question, finding gerardo in custodial, pleasant social connections  ACT: values, intentions, purpose, mindfulness, paced breathing skill        ASSESSMENT: Current Emotional / Mental Status (status of  significant symptoms):   Risk status (Self / Other harm or suicidal ideation)   Client denies current fears or concerns for personal safety.   Client denies current or recent suicidal ideation or behaviors.   Client denies current or recent homicidal ideation or behaviors.   Client denies current or recent self injurious behavior or ideation.   Client denies other safety concerns.   A safety and risk management plan has not been developed at this time, however client was given the after-hours number / 911 should there be a change in any of these risk factors.     Appearance:   Appropriate    Eye Contact:   Good    Psychomotor Behavior: Normal    Attitude:   Cooperative    Orientation:   All   Speech    Rate / Production: Normal     Volume:  Normal    Mood:    Anxious    Affect:    Appropriate  Bright    Thought Content:  Clear    Thought Form:  Coherent  Goal Directed  Logical    Insight:    Fair      Medication Review:   No current psychiatric medications prescribed     Medication Compliance:   Yes     Changes in Health Issues:   None reported     Chemical Use Review:   Substance Use: Chemical use reviewed, no active concerns identified      Tobacco Use: No current tobacco use.       Collateral Reports Completed:   Not Applicable    PLAN: (Client Tasks / Therapist Tasks / Other)  Client will practice 4-4-4 paced breathing and social skills to strengthen relationships through curiosity questions.       BROOKE Lopez, LICSW                                                      ________________________________________________________________________    Treatment Plan    Client's Name: Kentrell Kirkpatrick  YOB: 1950    Date: 5/24/2017    DSM-V Diagnoses: Adjustment Disorders  309.28 (F43.23) With mixed anxiety and depressed mood  Psychosocial & Contextual Factors: Client is experiencing symptoms of anxiety and depression as he struggles adjusting to his life in [facility]. Client has always found gerardo in  "his work and has yet to find something to fill that need in longterm. It has started to affect his mood and affecting his social functioning.   WHODAS 2.0 (12 item) 2.08% on 5/8/2017    Referral / Collaboration:  Referral to another professional/service is not indicated at this time.    Anticipated number of session or this episode of care: 8      MeasurableTreatment Goal(s) related to diagnosis / functional impairment(s)  Goal 1: Client's symptoms of anxiety and depression will reduce from above a 5 out of 10 to below a 4 out of 10 for a minimum of 4 weeks.   Goal: \"Short temper. Give up on obsessing about past wrongs.\"     Objective #A (Client Action)   Client will decrease frequency and intensity of feeling down, depressed, hopeless  Client will increase self-awareness of symptom onset/escalation  Status: New - Date: 6/1/2017    Intervention(s)  Therapist will assign homework track symptoms, patterns and triggers  provide psycho-education on depression  Therapist will teach CBT strategies for attending to negative self-talk and cognitive distortions.  ACT: experiential exercises and mindfulness practices, how to live with life barriers    Objective #B  Client will Increase interest, engagement, and pleasure in doing things  Identify negative self-talk and behaviors: challenge core beliefs, myths, and actions  Status: New - Date: 6/1/2017    Intervention(s)  Therapist will assign homework to practice using coping skills outside the therapy encounter, worksheets to challenge negative thoughts  teach distraction skills. explore and tailor enjoyable activities, increase social activities, strengthen social supports  ACT: life values and being mindful of values for goals and daily living    Objective #C  Client will use at least 4 coping skills for anxiety management in the next 8 weeks.   Status: New - Date: 6/1/2017    Intervention(s)  Therapist will assign homework worksheet to challenge anxious thoughts, values " "and barriers, rational counter-statements  teach emotional regulation skills. deep breathing, grounding, progressive muscle relaxation, TIP skills    Objective #D  Client will use thought-stopping strategy daily to reduce intrusive thoughts.  Status: New - Date: 6/1/2017    Intervention(s)  provide safe space to address hx of presenting problem and root cause  Teach mindfulness practices, silent observer, ACT metaphors and managing stressors  Sandtray: exercise to stage presenting problem, reflect, process and problem solve.      Goal 2: Client will improve interactions in his inter-personal relationships establishing healthy and respectful communication to be kept 90% of the time for a minimum of 4 weeks.     Goal: \"Give more input when making decisions with others.\"    Objective #A (Client Action)      Client will compile a list of boundaries that they would like to set with others.  Status: New - Date: 6/1/2017     Intervention(s)  Therapist will teach about healthy boundaries. structure, saying \"no\", advocating, identifying and establishing appropriate roles, rules, expectations.    Objective #B  Client will practice setting boundaries daily times in the next 8 weeks.                    Status: New - Date: 6/1/2017    Intervention(s)  Therapist will assign homework to track the use of positive interactions and outcomes to establishing desired relationships   role-play effective communication skills and conflict management    Objective #C  Client will learn & utilize at least 3 assertive communication skills weekly.  Status: New - Date: 6/1/2017    Intervention(s)  Therapist will role-play assertiveness skills  teach assertiveness skills. aggressive/passive/assertive, impulsive control, reactive vs sensitive, exposure to uncomfortable conversation.      Client has reviewed and agreed to the above plan.      BROOKE Lopez, [facility]  June 28, 2017  "

## 2017-07-11 ENCOUNTER — MYC MEDICAL ADVICE (OUTPATIENT)
Dept: FAMILY MEDICINE | Facility: CLINIC | Age: 67
End: 2017-07-11

## 2017-07-11 DIAGNOSIS — N40.1 BENIGN PROSTATIC HYPERPLASIA WITH LOWER URINARY TRACT SYMPTOMS: Primary | ICD-10-CM

## 2017-07-11 DIAGNOSIS — N40.1 BENIGN NODULAR PROSTATIC HYPERPLASIA WITH LOWER URINARY TRACT SYMPTOMS: ICD-10-CM

## 2017-07-11 RX ORDER — TAMSULOSIN HYDROCHLORIDE 0.4 MG/1
0.4 CAPSULE ORAL DAILY
Qty: 7 CAPSULE | Refills: 0 | Status: SHIPPED | OUTPATIENT
Start: 2017-07-11 | End: 2017-10-31 | Stop reason: DRUGHIGH

## 2017-07-11 RX ORDER — TAMSULOSIN HYDROCHLORIDE 0.4 MG/1
0.4 CAPSULE ORAL DAILY
Qty: 90 CAPSULE | Refills: 1 | Status: CANCELLED | OUTPATIENT
Start: 2017-07-11

## 2017-07-11 NOTE — TELEPHONE ENCOUNTER
Reason for Call:  Medication or medication refill:    Do you use a [facility]?  Name of the pharmacy and phone number for the current request:  [facility]    Name of the medication requested: Flomax    Other request: Please fill today. Please call when filled.      Can we leave a detailed message on this number? YES    Phone number patient can be reached at: Cell number on file:    Telephone Information:   Mobile [phone]       Best Time: any    Call taken on 7/11/2017 at 12:57 PM by Keira Snell

## 2017-07-12 NOTE — TELEPHONE ENCOUNTER
tamsulosin (FLOMAX) 0.4 MG capsule  This is a duplicate refill request.  Please decline and close.

## 2017-07-19 ENCOUNTER — OFFICE VISIT (OUTPATIENT)
Dept: PSYCHOLOGY | Facility: CLINIC | Age: 67
End: 2017-07-19
Payer: COMMERCIAL

## 2017-07-19 DIAGNOSIS — F43.23 ADJUSTMENT DISORDER WITH MIXED ANXIETY AND DEPRESSED MOOD: Primary | ICD-10-CM

## 2017-07-19 PROCEDURE — 90834 PSYTX W PT 45 MINUTES: CPT | Performed by: MARRIAGE & FAMILY THERAPIST

## 2017-07-19 ASSESSMENT — ANXIETY QUESTIONNAIRES
5. BEING SO RESTLESS THAT IT IS HARD TO SIT STILL: SEVERAL DAYS
1. FEELING NERVOUS, ANXIOUS, OR ON EDGE: SEVERAL DAYS
IF YOU CHECKED OFF ANY PROBLEMS ON THIS QUESTIONNAIRE, HOW DIFFICULT HAVE THESE PROBLEMS MADE IT FOR YOU TO DO YOUR WORK, TAKE CARE OF THINGS AT HOME, OR GET ALONG WITH OTHER PEOPLE: SOMEWHAT DIFFICULT
GAD7 TOTAL SCORE: 4
7. FEELING AFRAID AS IF SOMETHING AWFUL MIGHT HAPPEN: NOT AT ALL
2. NOT BEING ABLE TO STOP OR CONTROL WORRYING: SEVERAL DAYS
6. BECOMING EASILY ANNOYED OR IRRITABLE: SEVERAL DAYS
3. WORRYING TOO MUCH ABOUT DIFFERENT THINGS: NOT AT ALL

## 2017-07-19 ASSESSMENT — PATIENT HEALTH QUESTIONNAIRE - PHQ9: 5. POOR APPETITE OR OVEREATING: NOT AT ALL

## 2017-07-19 NOTE — Clinical Note
Hi,  Patient has met therapy goals and completed treatment. He was discharged from counseling 7/19/17.  He can return to counseling with me at [facility] in the future as needed. Please contact me if you have any questions.  Regards,  Harsha Townsend MA, LMFT, LICSW

## 2017-07-19 NOTE — PROGRESS NOTES
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     Medication Adherence:  Yes    Chemical Use:  NA    Assessment: Current Emotional / Mental Status (status of significant symptoms):    Risk status (Self / Other harm or suicidal ideation)  Client denies current fears or concerns for personal safety.  Client denies current or recent suicidal ideation or behaviors.  Client denies current or recent homicidal ideation or behaviors.  Client denies current or recent self injurious behavior or ideation.  Client denies other safety concerns.  A safety and risk management plan has not been developed at this time, however client was given the after-hours number should there be a change in any of these risk factors.    Appearance:   Appropriate   Eye Contact:   Good   Psychomotor Behavior: Normal   Attitude:   Cooperative   Orientation:   All  Speech   Rate / Production: Normal    Volume:  Normal   Mood:    Normal  Affect:    Appropriate   Thought Content:  Clear   Thought Form:  Coherent  Goal Directed  Logical   Insight:   Good     DSM5 Diagnoses: (Sustained by DSM5 Criteria Listed Above)  Diagnoses: Adjustment Disorders  309.28 (F43.23) With mixed anxiety and  depressed mood  Psychosocial & Contextual Factors: Client is experiencing symptoms of anxiety and depression as he struggles adjusting to his life in custodial. Client has always found gerardo in his work and has yet to find something to fill that need in custodial. It has started to affect his mood and affecting his social functioning.     Reason for Discharge:  Goals completed and is satisfied with progress      Aftercare Plan:  Client may resume counseling services at any time in the future by calling the [facility] Intake Office, [phone].      BROOKE Lopez, LICSW          July 19, 2017

## 2017-07-20 ASSESSMENT — ANXIETY QUESTIONNAIRES: GAD7 TOTAL SCORE: 4

## 2017-07-20 ASSESSMENT — PATIENT HEALTH QUESTIONNAIRE - PHQ9: SUM OF ALL RESPONSES TO PHQ QUESTIONS 1-9: 0

## 2017-07-24 PROBLEM — F43.23 ADJUSTMENT DISORDER WITH MIXED ANXIETY AND DEPRESSED MOOD: Status: RESOLVED | Noted: 2017-05-08 | Resolved: 2017-07-19

## 2017-10-19 ENCOUNTER — DOCUMENTATION ONLY (OUTPATIENT)
Dept: OTHER | Facility: CLINIC | Age: 67
End: 2017-10-19

## 2017-10-19 DIAGNOSIS — Z71.89 ADVANCED DIRECTIVES, COUNSELING/DISCUSSION: Chronic | ICD-10-CM

## 2017-10-31 ENCOUNTER — OFFICE VISIT (OUTPATIENT)
Dept: FAMILY MEDICINE | Facility: CLINIC | Age: 67
End: 2017-10-31
Payer: COMMERCIAL

## 2017-10-31 VITALS
WEIGHT: 175 LBS | BODY MASS INDEX: 23.7 KG/M2 | TEMPERATURE: 98 F | SYSTOLIC BLOOD PRESSURE: 94 MMHG | DIASTOLIC BLOOD PRESSURE: 68 MMHG | HEIGHT: 72 IN | OXYGEN SATURATION: 97 % | HEART RATE: 80 BPM

## 2017-10-31 DIAGNOSIS — J01.90 ACUTE SINUSITIS WITH SYMPTOMS > 10 DAYS: Primary | ICD-10-CM

## 2017-10-31 PROCEDURE — 99213 OFFICE O/P EST LOW 20 MIN: CPT | Performed by: FAMILY MEDICINE

## 2017-10-31 NOTE — MR AVS SNAPSHOT
After Visit Summary   10/31/2017    Kentrell Kirkpatrick    MRN: [id number]           Patient Information     Date Of Birth          1950        Visit Information        Provider Department      10/31/2017 10:40 AM Donald Shell, DO [facility]        Today's Diagnoses     Acute sinusitis with symptoms > 10 days    -  1       Follow-ups after your visit        Follow-up notes from your care team     Return in about 2 weeks (around 11/14/2017), or if symptoms worsen or fail to improve.      Who to contact     If you have questions or need follow up information about today's clinic visit or your schedule please contact [facility] directly at [phone].  Normal or non-critical lab and imaging results will be communicated to you by MyChart, letter or phone within 4 business days after the clinic has received the results. If you do not hear from us within 7 days, please contact the clinic through [employer] or phone. If you have a critical or abnormal lab result, we will notify you by phone as soon as possible.  Submit refill requests through [employer] or call your pharmacy and they will forward the refill request to us. Please allow 3 business days for your refill to be completed.          Additional Information About Your Visit        MyChart Information     [employer] gives you secure access to your electronic health record. If you see a primary care provider, you can also send messages to your care team and make appointments. If you have questions, please call your primary care clinic.  If you do not have a primary care provider, please call [phone] and they will assist you.        Care EveryWhere ID     This is your Care EveryWhere ID. This could be used by other organizations to access your [place] medical records  [id number]        Your Vitals Were     Pulse Temperature Height Pulse Oximetry BMI (Body Mass Index)       80 98  F (36.7  C) (Oral) 6' (1.829 m) 97% 23.73  kg/m2        Blood Pressure from Last 3 Encounters:   10/31/17 94/68   03/20/17 108/62   12/22/16 95/69    Weight from Last 3 Encounters:   10/31/17 175 lb (79.4 kg)   03/20/17 184 lb (83.5 kg)   12/05/16 183 lb (83 kg)              Today, you had the following     No orders found for display         Today's Medication Changes          These changes are accurate as of: 10/31/17 11:10 AM.  If you have any questions, ask your nurse or doctor.               Start taking these medicines.        Dose/Directions    amoxicillin-clavulanate 875-125 MG per tablet   Commonly known as:  AUGMENTIN   Used for:  Acute sinusitis with symptoms > 10 days   Started by:  Donald Shell DO        Dose:  1 tablet   Take 1 tablet by mouth 2 times daily   Quantity:  20 tablet   Refills:  0         These medicines have changed or have updated prescriptions.        Dose/Directions    tamsulosin 0.4 MG capsule   Commonly known as:  FLOMAX   This may have changed:  Another medication with the same name was removed. Continue taking this medication, and follow the directions you see here.   Used for:  Benign nodular prostatic hyperplasia with lower urinary tract symptoms   Changed by:  Yoni Sarmiento Jr., MD        Dose:  0.4 mg   Take 1 capsule (0.4 mg) by mouth daily   Quantity:  90 capsule   Refills:  1            Where to get your medicines      These medications were sent to [facility] [address]  [address] [place] [address]     Phone:  [phone]     amoxicillin-clavulanate 875-125 MG per tablet                Primary Care Provider Office Phone # Fax #    Yoni Sarmiento Jr., [phone] [address]  [place] [address]        Equal Access to Services     [facility] DARLINE AH: Lian Lynch, waphyllisda luqadaha, qaybta kaalmada praveen, north sánchez. So [facility] [phone].    ATENCIÓN: Kelsy nowak  español, tiene a perez disposición servicios gratuitos de asistencia lingüística. Leeroy nash [phone].    We comply with applicable federal civil rights laws and [place] laws. We do not discriminate on the basis of race, color, national origin, age, disability, sex, sexual orientation, or gender identity.            Thank you!     Thank you for choosing [facility] [place]  for your care. Our goal is always to provide you with excellent care. Hearing back from our patients is one way we can continue to improve our services. Please take a few minutes to complete the written survey that you may receive in the mail after your visit with us. Thank you!             Your Updated Medication List - Protect others around you: Learn how to safely use, store and throw away your medicines at www.disposemymeds.org.          This list is accurate as of: 10/31/17 11:10 AM.  Always use your most recent med list.                   Brand Name Dispense Instructions for use Diagnosis    amoxicillin-clavulanate 875-125 MG per tablet    AUGMENTIN    20 tablet    Take 1 tablet by mouth 2 times daily    Acute sinusitis with symptoms > 10 days       ASPIRIN PO      Take 81 mg by mouth        calcium polycarbophil 625 MG tablet    FIBERCON     Take 2 tablets by mouth daily        MULTI vitamin  MENS Tabs      1 TABLET DAILY        tamsulosin 0.4 MG capsule    FLOMAX    90 capsule    Take 1 capsule (0.4 mg) by mouth daily    Benign nodular prostatic hyperplasia with lower urinary tract symptoms

## 2017-10-31 NOTE — PROGRESS NOTES
SUBJECTIVE:                                                    Kentrell Kirkpatrick is a 66 year old male who presents to clinic today for the following health issues:      Acute Illness   Acute illness concerns: cold symptoms  Onset: 3 weeks    Fever: no    Chills/Sweats: no    Headache (location?): YES    Sinus Pressure:YES    Conjunctivitis:  no    Ear Pain: pressure not pain, bilaterally    Rhinorrhea: no    Congestion: YES sinus     Sore Throat: no     Cough: YES-productive of thick sputum    Wheeze: no    Decreased Appetite: no    Nausea: no    Vomiting: no    Diarrhea:  no    Dysuria/Freq.: no    Fatigue/Achiness: no    Sick/Strep Exposure: no     Therapies Tried and outcome:  flonase nasal spray, mucinex with no improvement       Edy states that he will typically get sick twice per year -- in the spring and in the fall. He was sick with cold symptoms earlier in the fall and got better. However, a few weeks ago, started getting congestion in his nose and chest again and it has not gotten better despite using Flonase and Mucinex. Denies any fevers or chills. Cough is productive of white mucus but no blood or other colorful phlegm. Feels sinus pressure but sinuses not necessarily tender to the touch.      Problem list and histories reviewed & adjusted, as indicated.  Additional history: as documented    Patient Active Problem List   Diagnosis     Back pain     CARDIOVASCULAR SCREENING; LDL GOAL LESS THAN 160     Advance Care Planning     Benign prostatic hyperplasia with lower urinary tract symptoms     Benign neoplasm of transverse colon     10 year risk of MI or stroke 7.5% or greater     Past Surgical History:   Procedure Laterality Date     COLONOSCOPY       COLONOSCOPY N/A 12/22/2016    Procedure: COMBINED COLONOSCOPY, SINGLE OR MULTIPLE BIOPSY/POLYPECTOMY BY BIOPSY;  Surgeon: Jeremi Kramer MD;  Location:  GI       Social History   Substance Use Topics     Smoking status: Former Smoker      Packs/day: 1.50     Years: 43.00     Types: Cigarettes     Quit date: 12/16/2007     Smokeless tobacco: Never Used     Alcohol use Yes      Comment: socially     Family History   Problem Relation Age of Onset     HEART DISEASE Father      Prostate Cancer Other      Prostate Cancer Brother              ROS:  Constitutional, HEENT, cardiovascular, pulmonary, gi and gu systems are negative, except as otherwise noted.      OBJECTIVE:   BP 94/68 (BP Location: Right arm, Patient Position: Chair, Cuff Size: Adult Large)  Pulse 80  Temp 98  F (36.7  C) (Oral)  Ht 6' (1.829 m)  Wt 175 lb (79.4 kg)  SpO2 97%  BMI 23.73 kg/m2  Body mass index is 23.73 kg/(m^2).  GENERAL: healthy, alert and no distress  EYES: Eyes grossly normal to inspection, PERRL and conjunctivae and sclerae normal  HENT: normal cephalic/atraumatic, ear canals and TM's normal, nose and mouth without ulcers or lesions, nasal mucosa edematous , oropharynx clear and oral mucous membranes moist  NECK: no adenopathy, no asymmetry, masses, or scars and thyroid normal to palpation  RESP: lungs clear to auscultation - no rales, rhonchi or wheezes  CV: regular rate and rhythm, normal S1 S2, no S3 or S4, no murmur, click or rub, no peripheral edema and peripheral pulses strong  ABDOMEN: soft, nontender, no hepatosplenomegaly, no masses and bowel sounds normal  MS: no gross musculoskeletal defects noted, no edema    Diagnostic Test Results:  none     ASSESSMENT/PLAN:   1. Acute sinusitis with symptoms > 10 days: symptoms persisting longer than expected duration of viral illness. Will opt to treat with course of Augmentin. Continue Flonase to help with congestion. If not better over the course of the next couple weeks, recommend returning to clinic for further evaluation   - amoxicillin-clavulanate (AUGMENTIN) 875-125 MG per tablet; Take 1 tablet by mouth 2 times daily  Dispense: 20 tablet; Refill: 0    Donald Shell DO  [facility] [place]

## 2017-10-31 NOTE — NURSING NOTE
Chief Complaint   Patient presents with     Cold Symptoms     Initial BP 94/68 (BP Location: Right arm, Patient Position: Chair, Cuff Size: Adult Large)  Pulse 80  Temp 98  F (36.7  C) (Oral)  Ht 6' (1.829 m)  Wt 175 lb (79.4 kg)  SpO2 97%  BMI 23.73 kg/m2 Estimated body mass index is 23.73 kg/(m^2) as calculated from the following:    Height as of this encounter: 6' (1.829 m).    Weight as of this encounter: 175 lb (79.4 kg).  BP completed using cuff size large right Arm  [facility]

## 2017-12-26 DIAGNOSIS — N40.1 BENIGN NODULAR PROSTATIC HYPERPLASIA WITH LOWER URINARY TRACT SYMPTOMS: ICD-10-CM

## 2017-12-28 RX ORDER — TAMSULOSIN HYDROCHLORIDE 0.4 MG/1
0.4 CAPSULE ORAL DAILY
Qty: 90 CAPSULE | Refills: 0 | Status: SHIPPED | OUTPATIENT
Start: 2017-12-28 | End: 2018-03-13

## 2017-12-28 NOTE — TELEPHONE ENCOUNTER
Requested Prescriptions   Pending Prescriptions Disp Refills     tamsulosin (FLOMAX) 0.4 MG capsule  Last Written Prescription Date:  6/13/17  Last Fill Quantity: 90,  # refills: 1   Last Office Visit with [facility], [facility] or [facility] prescribing provider:  10/31/17   Future Office Visit:      90 capsule 1     Sig: Take 1 capsule (0.4 mg) by mouth daily    Alpha Blockers Passed    12/26/2017  9:27 AM       Passed - BP is less than 140/90    BP Readings from Last 3 Encounters:   10/31/17 94/68   03/20/17 108/62   12/22/16 95/69                Passed - Recent or future visit with authorizing provider's specialty    Patient had office visit in the last year or has a visit in the next 30 days with authorizing provider.  See chart review.              Passed - Patient does not have Tadalafil, Vardenafil, or Sildenafil on their medication list       Passed - Patient is 18 years of age or older        Prescription approved per [facility] Refill Protocol.  Mayela Shultz, RN, BSN  [facility]

## 2018-01-20 ENCOUNTER — OFFICE VISIT (OUTPATIENT)
Dept: FAMILY MEDICINE | Facility: CLINIC | Age: 68
End: 2018-01-20
Payer: COMMERCIAL

## 2018-01-20 VITALS
HEIGHT: 72 IN | OXYGEN SATURATION: 99 % | BODY MASS INDEX: 24.83 KG/M2 | WEIGHT: 183.3 LBS | SYSTOLIC BLOOD PRESSURE: 95 MMHG | DIASTOLIC BLOOD PRESSURE: 55 MMHG | TEMPERATURE: 97.1 F | HEART RATE: 77 BPM

## 2018-01-20 DIAGNOSIS — B96.89 ACUTE BACTERIAL SINUSITIS: Primary | ICD-10-CM

## 2018-01-20 DIAGNOSIS — J01.90 ACUTE BACTERIAL SINUSITIS: Primary | ICD-10-CM

## 2018-01-20 PROCEDURE — 99213 OFFICE O/P EST LOW 20 MIN: CPT | Performed by: FAMILY MEDICINE

## 2018-01-20 RX ORDER — LEVOFLOXACIN 750 MG/1
750 TABLET, FILM COATED ORAL DAILY
Qty: 10 TABLET | Refills: 0 | Status: SHIPPED | OUTPATIENT
Start: 2018-01-20 | End: 2018-03-13

## 2018-01-20 NOTE — PROGRESS NOTES
SUBJECTIVE:   Kentrell Kirkpatrick is a 67 year old male who presents to clinic today for the following health issues:      Acute Illness   Acute illness concerns: Sinus problem   Onset: x 2 months     Fever: no    Chills/Sweats: no    Headache (location?): no    Sinus Pressure:YES- post-nasal drainage and teeth pain    Conjunctivitis:  no    Ear Pain: no    Rhinorrhea: YES    Congestion: YES    Sore Throat: no     Cough: YES productive of yellow sputum    Wheeze: no    Decreased Appetite: no    Nausea: no    Vomiting: no    Diarrhea:  no    Dysuria/Freq.: no    Fatigue/Achiness: no    Sick/Strep Exposure: no     Therapies Tried and outcome: Mucinex - shows mild improvement   Airbrone chewable -     Was put on 10 day course of Augmentin on 10/31/17 which cleared his symptoms for 5 days or so but they then returned about 5 days later.  He continues to have this pattern of good for five days then bad for a week since then.  Uses Flonase intermittently.  Has not been on antibiotics since the course in October, but has been using Sudafed.          Problem list and histories reviewed & adjusted, as indicated.  Additional history: as documented    Labs reviewed in EPIC    Reviewed and updated as needed this visit by clinical staff     Reviewed and updated as needed this visit by Provider         ROS:  Constitutional, HEENT, cardiovascular, pulmonary, gi and gu systems are negative, except as otherwise noted.      OBJECTIVE:   BP 95/55 (BP Location: Left arm, Patient Position: Chair, Cuff Size: Adult Regular)  Pulse 77  Temp 97.1  F (36.2  C) (Tympanic)  Ht 6' (1.829 m)  Wt 183 lb 4.8 oz (83.1 kg)  SpO2 99%  BMI 24.86 kg/m2  Body mass index is 24.86 kg/(m^2).  GENERAL: healthy, alert and no distress  EYES: Eyes grossly normal to inspection, PERRL and conjunctivae and sclerae normal  HENT: normal cephalic/atraumatic, ear canals and TM's normal, nose and mouth without ulcers or lesions, oropharynx clear, oral mucous  membranes moist and sinuses: not tender  NECK: no adenopathy, no asymmetry, masses, or scars and thyroid normal to palpation  RESP: lungs clear to auscultation - no rales, rhonchi or wheezes  CV: regular rate and rhythm, normal S1 S2, no S3 or S4, no murmur, click or rub, no peripheral edema and peripheral pulses strong  ABDOMEN: soft, nontender, no hepatosplenomegaly, no masses and bowel sounds normal  MS: no gross musculoskeletal defects noted, no edema    Diagnostic Test Results:  none     ASSESSMENT/PLAN:             1. Acute bacterial sinusitis  Second course of antibiotics today.  Advised Edy that if today's round of antibiotics did not offer him lasting improvement from his symptoms that we should see him back for a re-examination and consider ordering a CT of his sinuses as our next diagnostic step.  If this is abnormal - consider ENT referral.  If the CT is normal - consider allergy referral.  - levofloxacin (LEVAQUIN) 750 MG tablet; Take 1 tablet (750 mg) by mouth daily  Dispense: 10 tablet; Refill: 0    See Patient Instructions    Yoni Sarmiento Jr, MD  [facility]

## 2018-01-20 NOTE — NURSING NOTE
Chief Complaint   Patient presents with     Sinus Problem       Initial BP 95/55 (BP Location: Left arm, Patient Position: Chair, Cuff Size: Adult Regular)  Pulse 77  Temp 97.1  F (36.2  C) (Tympanic)  Ht 6' (1.829 m)  Wt 183 lb 4.8 oz (83.1 kg)  SpO2 99%  BMI 24.86 kg/m2 Estimated body mass index is 24.86 kg/(m^2) as calculated from the following:    Height as of this encounter: 6' (1.829 m).    Weight as of this encounter: 183 lb 4.8 oz (83.1 kg).  Medication Reconciliation: complete     Marta Mc MA

## 2018-01-20 NOTE — MR AVS SNAPSHOT
After Visit Summary   1/20/2018    Kentrell Kirkpatrick    MRN: [id number]           Patient Information     Date Of Birth          1950        Visit Information        Provider Department      1/20/2018 8:20 AM Yoni Sarmiento Jr., MD [facility]        Today's Diagnoses     Acute bacterial sinusitis    -  1       Follow-ups after your visit        Follow-up notes from your care team     Return in about 4 weeks (around 2/17/2018), or if symptoms worsen or fail to improve.      Your next 10 appointments already scheduled     Mar 19, 2018  8:20 AM CDT   PHYSICAL with Yoni Sarmiento Jr., MD   [facility] [place] [facility]    [address] [facility] [address] [phone]              Who to contact     If you have questions or need follow up information about today's clinic visit or your schedule please contact [facility] directly at [phone].  Normal or non-critical lab and imaging results will be communicated to you by MyChart, letter or phone within 4 business days after the clinic has received the results. If you do not hear from us within 7 days, please contact the clinic through [employer] or phone. If you have a critical or abnormal lab result, we will notify you by phone as soon as possible.  Submit refill requests through [employer] or call your pharmacy and they will forward the refill request to us. Please allow 3 business days for your refill to be completed.          Additional Information About Your Visit        MyChart Information     [employer] gives you secure access to your electronic health record. If you see a primary care provider, you can also send messages to your care team and make appointments. If you have questions, please call your primary care clinic.  If you do not have a primary care provider, please call [phone] and they will assist you.        Care EveryWhere ID     This is your Care EveryWhere ID. This  could be used by other organizations to access your [place] medical records  [id number]        Your Vitals Were     Pulse Temperature Height Pulse Oximetry BMI (Body Mass Index)       77 97.1  F (36.2  C) (Tympanic) 6' (1.829 m) 99% 24.86 kg/m2        Blood Pressure from Last 3 Encounters:   01/20/18 95/55   10/31/17 94/68   03/20/17 108/62    Weight from Last 3 Encounters:   01/20/18 183 lb 4.8 oz (83.1 kg)   10/31/17 175 lb (79.4 kg)   03/20/17 184 lb (83.5 kg)              Today, you had the following     No orders found for display         Today's Medication Changes          These changes are accurate as of: 1/20/18  8:39 AM.  If you have any questions, ask your nurse or doctor.               Start taking these medicines.        Dose/Directions    levofloxacin 750 MG tablet   Commonly known as:  LEVAQUIN   Used for:  Acute bacterial sinusitis   Started by:  Yoni Sarmiento Jr., MD        Dose:  750 mg   Take 1 tablet (750 mg) by mouth daily   Quantity:  10 tablet   Refills:  0            Where to get your medicines      These medications were sent to [facility] [place] [facility] - [address], [place] [id number]     Phone:  [phone]     levofloxacin 750 MG tablet                Primary Care Provider Office Phone # Fax #    Yoni Sarmiento Jr., [phone] [address]  [place] [address]        Equal Access to Services     LORETO GREGORIO AH: Hadii aad ku hadasho Soomaali, waaxda luqadaha, qaybta kaalmada adeegyada, waxay adelita sánchez. So [facility] [phone].    ATENCIÓN: Si habla español, tiene a perez disposición servicios gratuitos de asistencia lingüística. Llame al [phone].    We comply with applicable federal civil rights laws and [place] laws. We do not discriminate on the basis of race, color, national origin, age, disability, sex, sexual orientation, or gender identity.            Thank  you!     Thank you for choosing [facility]  for your care. Our goal is always to provide you with excellent care. Hearing back from our patients is one way we can continue to improve our services. Please take a few minutes to complete the written survey that you may receive in the mail after your visit with us. Thank you!             Your Updated Medication List - Protect others around you: Learn how to safely use, store and throw away your medicines at www.disposemymeds.org.          This list is accurate as of: 1/20/18  8:39 AM.  Always use your most recent med list.                   Brand Name Dispense Instructions for use Diagnosis    ASPIRIN PO      Take 81 mg by mouth        calcium polycarbophil 625 MG tablet    FIBERCON     Take 2 tablets by mouth daily        levofloxacin 750 MG tablet    LEVAQUIN    10 tablet    Take 1 tablet (750 mg) by mouth daily    Acute bacterial sinusitis       MULTI vitamin  MENS Tabs      1 TABLET DAILY        tamsulosin 0.4 MG capsule    FLOMAX    90 capsule    Take 1 capsule (0.4 mg) by mouth daily    Benign nodular prostatic hyperplasia with lower urinary tract symptoms

## 2018-02-12 ENCOUNTER — MYC MEDICAL ADVICE (OUTPATIENT)
Dept: FAMILY MEDICINE | Facility: CLINIC | Age: 68
End: 2018-02-12

## 2018-03-13 ENCOUNTER — OFFICE VISIT (OUTPATIENT)
Dept: FAMILY MEDICINE | Facility: CLINIC | Age: 68
End: 2018-03-13
Payer: COMMERCIAL

## 2018-03-13 VITALS
OXYGEN SATURATION: 98 % | WEIGHT: 180 LBS | SYSTOLIC BLOOD PRESSURE: 100 MMHG | BODY MASS INDEX: 24.38 KG/M2 | DIASTOLIC BLOOD PRESSURE: 64 MMHG | TEMPERATURE: 97.8 F | HEIGHT: 72 IN | HEART RATE: 80 BPM

## 2018-03-13 DIAGNOSIS — R06.83 SNORING: ICD-10-CM

## 2018-03-13 DIAGNOSIS — Z91.89 10 YEAR RISK OF MI OR STROKE 7.5% OR GREATER: ICD-10-CM

## 2018-03-13 DIAGNOSIS — N40.1 BENIGN NODULAR PROSTATIC HYPERPLASIA WITH LOWER URINARY TRACT SYMPTOMS: ICD-10-CM

## 2018-03-13 DIAGNOSIS — Z00.01 ENCOUNTER FOR ROUTINE ADULT HEALTH EXAMINATION WITH ABNORMAL FINDINGS: Primary | ICD-10-CM

## 2018-03-13 LAB
CHOLEST SERPL-MCNC: 195 MG/DL
GLUCOSE SERPL-MCNC: 91 MG/DL (ref 70–99)
HDLC SERPL-MCNC: 57 MG/DL
LDLC SERPL CALC-MCNC: 124 MG/DL
NONHDLC SERPL-MCNC: 138 MG/DL
PSA SERPL-ACNC: 2.8 UG/L (ref 0–4)
TRIGL SERPL-MCNC: 69 MG/DL

## 2018-03-13 PROCEDURE — G0103 PSA SCREENING: HCPCS | Performed by: FAMILY MEDICINE

## 2018-03-13 PROCEDURE — 90714 TD VACC NO PRESV 7 YRS+ IM: CPT | Performed by: FAMILY MEDICINE

## 2018-03-13 PROCEDURE — 80061 LIPID PANEL: CPT | Performed by: FAMILY MEDICINE

## 2018-03-13 PROCEDURE — G0439 PPPS, SUBSEQ VISIT: HCPCS | Performed by: FAMILY MEDICINE

## 2018-03-13 PROCEDURE — 82947 ASSAY GLUCOSE BLOOD QUANT: CPT | Performed by: FAMILY MEDICINE

## 2018-03-13 PROCEDURE — 36415 COLL VENOUS BLD VENIPUNCTURE: CPT | Performed by: FAMILY MEDICINE

## 2018-03-13 PROCEDURE — 90471 IMMUNIZATION ADMIN: CPT | Performed by: FAMILY MEDICINE

## 2018-03-13 RX ORDER — TAMSULOSIN HYDROCHLORIDE 0.4 MG/1
0.4 CAPSULE ORAL DAILY
Qty: 90 CAPSULE | Refills: 3 | Status: SHIPPED | OUTPATIENT
Start: 2018-03-13 | End: 2019-02-05

## 2018-03-13 RX ORDER — TAMSULOSIN HYDROCHLORIDE 0.4 MG/1
0.4 CAPSULE ORAL DAILY
Qty: 30 CAPSULE | Refills: 0 | Status: SHIPPED | OUTPATIENT
Start: 2018-03-13 | End: 2018-10-11

## 2018-03-13 NOTE — PROGRESS NOTES
SUBJECTIVE:   Kentrell Kirkpatrick is a 67 year old male who presents for Preventive Visit.    Are you in the first 12 months of your Medicare Part B coverage?  No    Healthy Habits:    Do you get at least three servings of calcium containing foods daily (dairy, green leafy vegetables, etc.)? 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 yes    Do you see a dentist twice per year? yes    Do you have sleep apnea, excessive snoring or daytime drowsiness?snoring has tried breath right strips which helped      Ability to successfully perform activities of daily living: Yes, no assistance needed    Home safety:  lack of grab bars in the bathroom     Hearing impairment: No    Fall risk:  Fallen 2 or more times in the past year?: No  Any fall with injury in the past year?: No      COGNITIVE SCREEN  1) Repeat 3 items (Banana, Sunrise, Chair)    2) Clock draw:   NORMAL  3) 3 item recall:   Recalls 3 objects  Results: 3 items recalled: COGNITIVE IMPAIRMENT LESS LIKELY    Mini-CogTM Copyright S Loni. Licensed by the author for use in [facility]; reprinted with permission (socindy@[facility]). All rights reserved.      The 10-year ASCVD risk score (Wesly ARTEM Jr, et al., 2013) is: 9.3%    Values used to calculate the score:      Age: 67 years      Sex: Male      Is Non- : No      Diabetic: No      Tobacco smoker: No      Systolic Blood Pressure: 100 mmHg      Is BP treated: No      HDL Cholesterol: 66 mg/dL      Total Cholesterol: 204 mg/dL      Preventative measures- Pt due for tetanus. He is not fasting today, but agreed to recheck his lipids. Pt due for diabetes screening.    Snoring- Pt notes Breathe Rite strips help but do not eliminate his snoring. He notes his wife wakes him up in middle of night due to pt  snoring excessively.    Hearing- Pt had hearing test done, did not recommend hearing aids for pt. He has hard time with conversations and has hard time hearing TV. He notes he has harder time hearing female voices due to the high pitch.    Prostate- His wife wants pt to do [facility]; he only wants 1 month refill of his Flomax.    No  concerns; no testicular lumps, dysuria, or urethral discharge.      Reviewed and updated as needed this visit by clinical staff  Allergies  Meds  Problems  Med Hx       Reviewed and updated as needed this visit by Provider  Allergies  Meds  Problems       Social History   Substance Use Topics     Smoking status: Former Smoker     Packs/day: 1.50     Years: 43.00     Types: Cigarettes     Quit date: 12/16/2007     Smokeless tobacco: Never Used     Alcohol use Yes      Comment: socially     If you drink alcohol do you typically have >3 drinks per day or >7 drinks per week? No                        Today's PHQ-2 Score:   PHQ-2 ( 1999 Pfizer) 3/13/2018 12/5/2016   Q1: Little interest or pleasure in doing things 0 0   Q2: Feeling down, depressed or hopeless 0 0   PHQ-2 Score 0 0   Q1: Little interest or pleasure in doing things - -   Q2: Feeling down, depressed or hopeless - -   PHQ-2 Score - -     Do you feel safe in your environment - YES    Do you have a Health Care Directive?: No: Advance care planning reviewed with patient; information given to patient to review.    Current providers sharing in care for this patient include:   Patient Care Team:  Yoni Sarmiento Jr., MD as PCP - General (Family Practice)    The following health maintenance items are reviewed in Epic and correct as of today:  Health Maintenance   Topic Date Due     LIPID MONITORING Q1 YEAR  09/22/2017     TETANUS IMMUNIZATION (SYSTEM ASSIGNED)  12/05/2017     PSA Q1 YR  03/20/2018     INFLUENZA VACCINE (SYSTEM ASSIGNED)  09/01/2018     FALL RISK ASSESSMENT  01/20/2019     COLONOSCOPY Q3 YR  12/22/2019      ADVANCE DIRECTIVE PLANNING Q5 YRS  10/19/2022     PNEUMOCOCCAL  Completed     AORTIC ANEURYSM SCREENING (SYSTEM ASSIGNED)  Completed     HEPATITIS C SCREENING  Completed     Pneumonia Vaccine: Completed.    ROS:  Constitutional, HEENT, cardiovascular, pulmonary, gi and gu systems are negative, except as otherwise noted.    This document serves as a record of the services and decisions personally performed and made by Yoni Sarmiento MD. It was created on his behalf by Jarocho Perez, a trained medical scribe. The creation of this document is based on the provider's statements to the medical scribe.  Jarocho Perez 8:34 AM March 13, 2018    OBJECTIVE:   /64  Pulse 80  Temp 97.8  F (36.6  C) (Oral)  Ht 1.829 m (6')  Wt 81.6 kg (180 lb)  SpO2 98%  BMI 24.41 kg/m2 Estimated body mass index is 24.41 kg/(m^2) as calculated from the following:    Height as of this encounter: 1.829 m (6').    Weight as of this encounter: 81.6 kg (180 lb).  EXAM:   GENERAL: healthy, alert and no distress  EYES: Eyes grossly normal to inspection, PERRL and conjunctivae and sclerae normal  HENT: ear canals and TM's normal, nose and mouth without ulcers or lesions  NECK: no adenopathy, no asymmetry, masses, or scars and thyroid normal to palpation  RESP: lungs clear to auscultation - no rales, rhonchi or wheezes  CV: regular rate and rhythm, normal S1 S2, no S3 or S4, no murmur, click or rub, no peripheral edema and peripheral pulses strong  ABDOMEN: soft, nontender, no hepatosplenomegaly, no masses and bowel sounds normal  MS: no gross musculoskeletal defects noted, no edema  SKIN: no suspicious lesions or rashes  NEURO: Normal strength and tone, mentation intact and speech normal  PSYCH: mentation appears normal, affect normal/bright    No results found for this or any previous visit (from the past 24 hour(s)).    ASSESSMENT / PLAN:     (Z00.00) Encounter for routine adult health examination without abnormal findings   (primary encounter diagnosis)  Comment: Pt agreed to update tetanus, and agreed to re-screen for diabetes today. Pt UTD on all other preventative measures except as listed below. Pt requesting DOT physical; recommended pt see Dr. 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  Plan: TD PRESERV FREE >=7 YRS ADS IM, Glucose        Follow up based on labs. Next physical due 3/13/19.    (N40.1) Benign nodular prostatic hyperplasia with lower urinary tract symptoms  Comment: Discussed with pt that we typically do annual PSA from ages 55-69; he agreed to recheck PSA today. Will also refill his Flomax and have him continue as prescribed.  Plan: tamsulosin (FLOMAX) 0.4 MG capsule, PSA,         screen, tamsulosin (FLOMAX) 0.4 MG capsule        Follow up based on labs.    (Z91.89) 10 year risk of MI or stroke 7.5% or greater  Comment: Pt was not fasting today but agreed to recheck lipid panel for further evaluation. Will have him continue with his daily 81 mg aspirin. Depending on lipid panel results, may recommend statin in future, especially given his ASCVD is 9.3%.  Plan: Lipid panel reflex to direct LDL Non-fasting        Follow up based on labs.    (R06.83) Snoring  Comment: Discussed with pt that we see higher risk for heart disease with SKYLER. Therefore, given his excessive snoring, I recommended pt see sleep specialist to do sleep study. If sleep study shows pt has SKYLER, discussed with pt that CPAP mask is often times the best treatment, other times surgery may recommended. However, discussed with pt that it is ultimately his decision to go ahead and pursue treatment plan if sleep study indeed shows he has SKYLER.  Plan: SLEEP EVALUATION & MANAGEMENT REFERRAL - [facility]         -[facility]          [phone] (Age 18 and up)        Follow up if needed.      End of Life Planning:  Patient currently has an advanced directive: Yes.  Practitioner is  supportive of decision.    COUNSELING:  Reviewed preventive health counseling, as reflected in patient instructions       Regular exercise       Healthy diet/nutrition       Immunizations    Vaccinated for: Td           Colon cancer screening       Prostate cancer screening    Estimated body mass index is 24.41 kg/(m^2) as calculated from the following:    Height as of this encounter: 1.829 m (6').    Weight as of this encounter: 81.6 kg (180 lb).     reports that he quit smoking about 10 years ago. His smoking use included Cigarettes. He has a 64.50 pack-year smoking history. He has never used smokeless tobacco.    Appropriate preventive services were discussed with this patient, including applicable screening as appropriate for cardiovascular disease, diabetes, osteopenia/osteoporosis, and glaucoma.  As appropriate for age/gender, discussed screening for colorectal cancer, prostate cancer, breast cancer, and cervical cancer. Checklist reviewing preventive services available has been given to the patient.    Reviewed patients plan of care and provided an AVS. The Basic Care Plan (routine screening as documented in Health Maintenance) for Kentrell meets the Care Plan requirement. This Care Plan has been established and reviewed with the Patient.    Counseling Resources:  ATP IV Guidelines  Pooled Cohorts Equation Calculator  Breast Cancer Risk Calculator  FRAX Risk Assessment  ICSI Preventive Guidelines  Dietary Guidelines for Americans, 2010  USDA's MyPlate  ASA Prophylaxis  Lung CA Screening    The information in this document, created by the medical scribe for me, accurately reflects the services I personally performed and the decisions made by me. I have reviewed and approved this document for accuracy prior to leaving the patient care area.  March 13, 2018 8:35 AM    Yoni Sarmiento Jr, MD  [facility]

## 2018-03-13 NOTE — MR AVS SNAPSHOT
After Visit Summary   3/13/2018    Kentrell Kirkpatrick    MRN: [id number]           Patient Information     Date Of Birth          1950        Visit Information        Provider Department      3/13/2018 8:20 AM Yoni Sarmiento Jr., MD [facility]        Today's Diagnoses     Encounter for routine adult health examination without abnormal findings    -  1    Benign nodular prostatic hyperplasia with lower urinary tract symptoms        10 year risk of MI or stroke 7.5% or greater        Snoring          Care Instructions      Preventive Health Recommendations:       Male Ages 65 and over    Yearly exam:             See your health care provider every year in order to  o   Review health changes.   o   Discuss preventive care.    o   Review your medicines if your doctor has prescribed any.    Talk with your health care provider about whether you should have a test to screen for prostate cancer (PSA).    Every 3 years, have a diabetes test (fasting glucose). If you are at risk for diabetes, you should have this test more often.    Every 5 years, have a cholesterol test. Have this test more often if you are at risk for high cholesterol or heart disease.     Every 10 years, have a colonoscopy. Or, have a yearly FIT test (stool test). These exams will check for colon cancer.    Talk to with your health care provider about screening for Abdominal Aortic Aneurysm if you have a family history of AAA or have a history of smoking.  Shots:     Get a flu shot each year.     Get a tetanus shot every 10 years.     Talk to your doctor about your pneumonia vaccines. There are now two you should receive - Pneumovax (PPSV 23) and Prevnar (PCV 13).    Talk to your doctor about a shingles vaccine.     Talk to your doctor about the hepatitis B vaccine.  Nutrition:     Eat at least 5 servings of fruits and vegetables each day.     Eat whole-grain bread, whole-wheat pasta and brown rice instead of white grains  and rice.     Talk to your doctor about Calcium and Vitamin D.   Lifestyle    Exercise for at least 150 minutes a week (30 minutes a day, 5 days a week). This will help you control your weight and prevent disease.     Limit alcohol to one drink per day.     No smoking.     Wear sunscreen to prevent skin cancer.     See your dentist every six months for an exam and cleaning.     See your eye doctor every 1 to 2 years to screen for conditions such as glaucoma, macular degeneration and cataracts.          Follow-ups after your visit        Additional Services     SLEEP EVALUATION & MANAGEMENT REFERRAL - [facility]  [phone] (Age 18 and up)       Please be aware that coverage of these services is subject to the terms and limitations of your health insurance plan.  Call member services at your health plan with any benefit or coverage questions.      Please bring the following to your appointment:    >>   List of current medications   >>   This referral request   >>   Any documents/labs given to you for this referral                      Follow-up notes from your care team     Return in about 1 year (around 3/13/2019) for Preventative Visit.      Future tests that were ordered for you today     Open Future Orders        Priority Expected Expires Ordered    SLEEP EVALUATION & MANAGEMENT REFERRAL - [facility]  [phone] (Age 18 and up) Routine  3/13/2019 3/13/2018            Who to contact     If you have questions or need follow up information about today's clinic visit or your schedule please contact [facility] [place] directly at [phone].  Normal or non-critical lab and imaging results will be communicated to you by MyChart, letter or phone within 4 business days after the clinic has received the results. If you do not hear from us within 7 days, please contact the clinic through MyChart or phone. If you have a critical or abnormal lab  result, we will notify you by phone as soon as possible.  Submit refill requests through [employer] or call your pharmacy and they will forward the refill request to us. Please allow 3 business days for your refill to be completed.          Additional Information About Your Visit        [employer] Information     [employer] gives you secure access to your electronic health record. If you see a primary care provider, you can also send messages to your care team and make appointments. If you have questions, please call your primary care clinic.  If you do not have a primary care provider, please call [phone] and they will assist you.        Care EveryWhere ID     This is your Care EveryWhere ID. This could be used by other organizations to access your [place] medical records  [id number]        Your Vitals Were     Pulse Temperature Height Pulse Oximetry BMI (Body Mass Index)       80 97.8  F (36.6  C) (Oral) 6' (1.829 m) 98% 24.41 kg/m2        Blood Pressure from Last 3 Encounters:   03/13/18 100/64   01/20/18 95/55   10/31/17 94/68    Weight from Last 3 Encounters:   03/13/18 180 lb (81.6 kg)   01/20/18 183 lb 4.8 oz (83.1 kg)   10/31/17 175 lb (79.4 kg)              We Performed the Following     Glucose     Lipid panel reflex to direct LDL Non-fasting     PSA, screen     TD PRESERV FREE >=7 YRS ADS IM          Today's Medication Changes          These changes are accurate as of 3/13/18  8:50 AM.  If you have any questions, ask your nurse or doctor.               These medicines have changed or have updated prescriptions.        Dose/Directions    * tamsulosin 0.4 MG capsule   Commonly known as:  FLOMAX   This may have changed:  Another medication with the same name was added. Make sure you understand how and when to take each.   Used for:  Benign nodular prostatic hyperplasia with lower urinary tract symptoms   Changed by:  Yoni Sarmiento Jr., MD        Dose:  0.4 mg   Take 1 capsule (0.4 mg) by mouth daily    Quantity:  90 capsule   Refills:  3       * tamsulosin 0.4 MG capsule   Commonly known as:  FLOMAX   This may have changed:  You were already taking a medication with the same name, and this prescription was added. Make sure you understand how and when to take each.   Used for:  Benign nodular prostatic hyperplasia with lower urinary tract symptoms   Changed by:  Yoni Sarmiento Jr., MD        Dose:  0.4 mg   Take 1 capsule (0.4 mg) by mouth daily   Quantity:  30 capsule   Refills:  0       * Notice:  This list has 2 medication(s) that are the same as other medications prescribed for you. Read the directions carefully, and ask your doctor or other care provider to review them with you.         Where to get your medicines      These medications were sent to [facility] - [address] [address]     Phone:  [phone]     tamsulosin 0.4 MG capsule         These medications were sent to [facility] - [address] [address]     Phone:  [phone]     tamsulosin 0.4 MG capsule                Primary Care Provider Office Phone # Fax #    Yoni Sarmiento Jr., [phone] [address]  [place] [address]        Equal Access to Services     MIHIR GREGORIO AH: Hadii aad ku hadasho Soomaali, waaxda luqadaha, qaybta kaalmada adeegyada, waxay idiin hayaan adederrick kharash margaret sánchez. So [facility] [phone].    ATENCIÓN: Si habla español, tiene a perez disposición servicios gratuitos de asistencia lingüística. Llame al [phone].    We comply with applicable federal civil rights laws and [place] laws. We do not discriminate on the basis of race, color, national origin, age, disability, sex, sexual orientation, or gender identity.            Thank you!     Thank you for choosing [facility]  for your care. Our goal is always to  provide you with excellent care. Hearing back from our patients is one way we can continue to improve our services. Please take a few minutes to complete the written survey that you may receive in the mail after your visit with us. Thank you!             Your Updated Medication List - Protect others around you: Learn how to safely use, store and throw away your medicines at www.disposemymeds.org.          This list is accurate as of 3/13/18  8:50 AM.  Always use your most recent med list.                   Brand Name Dispense Instructions for use Diagnosis    ASPIRIN PO      Take 81 mg by mouth        calcium polycarbophil 625 MG tablet    FIBERCON     Take 2 tablets by mouth daily        MULTI vitamin  MENS Tabs      1 TABLET DAILY        * tamsulosin 0.4 MG capsule    FLOMAX    90 capsule    Take 1 capsule (0.4 mg) by mouth daily    Benign nodular prostatic hyperplasia with lower urinary tract symptoms       * tamsulosin 0.4 MG capsule    FLOMAX    30 capsule    Take 1 capsule (0.4 mg) by mouth daily    Benign nodular prostatic hyperplasia with lower urinary tract symptoms       * Notice:  This list has 2 medication(s) that are the same as other medications prescribed for you. Read the directions carefully, and ask your doctor or other care provider to review them with you.

## 2018-03-13 NOTE — NURSING NOTE
Chief Complaint   Patient presents with     Medicare Visit       Initial /64  Pulse 80  Temp 97.8  F (36.6  C) (Oral)  Ht 6' (1.829 m)  Wt 180 lb (81.6 kg)  SpO2 98%  BMI 24.41 kg/m2 Estimated body mass index is 24.41 kg/(m^2) as calculated from the following:    Height as of this encounter: 6' (1.829 m).    Weight as of this encounter: 180 lb (81.6 kg).  Medication Reconciliation: complete

## 2018-03-14 NOTE — PROGRESS NOTES
Mr. Kirkpatrick,    -Cholesterol levels (LDL,HDL, Triglycerides) are normal.  ADVISE: rechecking in 1 year.  -The 10-year ASCVD risk score (Weslymiriam MCGILL Jr, et al., 2013) is: 9.7%.  Normal is less than 10%.  We should check this again next year.    Values used to calculate the score:      Age: 67 years      Sex: Male      Is Non- : No      Diabetic: No      Tobacco smoker: No      Systolic Blood Pressure: 100 mmHg      Is BP treated: No      HDL Cholesterol: 57 mg/dL      Total Cholesterol: 195 mg/dL   -PSA (prostate specific antigen) test is normal.  This indicates a low likelihood of prostate cancer.  ADVISE: yearly recheck.   -Glucose (diabetic screening test) is normal.    If you have further questions about the interpretation of your labs, labtestsonline.org is a good website to check out for further information.    Please contact the clinic if you have additional questions.  Thank you.    Sincerely,    Yoni Sarmiento MD

## 2018-04-27 ENCOUNTER — OFFICE VISIT (OUTPATIENT)
Dept: SLEEP MEDICINE | Facility: CLINIC | Age: 68
End: 2018-04-27
Payer: COMMERCIAL

## 2018-04-27 VITALS
DIASTOLIC BLOOD PRESSURE: 59 MMHG | OXYGEN SATURATION: 95 % | SYSTOLIC BLOOD PRESSURE: 97 MMHG | HEART RATE: 66 BPM | HEIGHT: 73 IN | RESPIRATION RATE: 18 BRPM | WEIGHT: 178 LBS | BODY MASS INDEX: 23.59 KG/M2

## 2018-04-27 DIAGNOSIS — G47.19 EXCESSIVE DAYTIME SLEEPINESS: ICD-10-CM

## 2018-04-27 DIAGNOSIS — R06.83 SNORING: Primary | ICD-10-CM

## 2018-04-27 PROCEDURE — 99205 OFFICE O/P NEW HI 60 MIN: CPT | Performed by: FAMILY MEDICINE

## 2018-04-27 RX ORDER — ZOLPIDEM TARTRATE 5 MG/1
TABLET ORAL
Qty: 1 TABLET | Refills: 0 | Status: SHIPPED | OUTPATIENT
Start: 2018-04-27 | End: 2018-12-03

## 2018-04-27 NOTE — MR AVS SNAPSHOT
After Visit Summary   4/27/2018    Kentrell Kirkpatrick    MRN: [id number]           Patient Information     Date Of Birth          1950        Visit Information        Provider Department      4/27/2018 10:30 AM Mirza Her MD [facility]        Today's Diagnoses     Snoring    -  1    Excessive daytime sleepiness          Care Instructions          Your BMI is Body mass index is 23.48 kg/(m^2).  Weight management is a personal decision.  If you are interested in exploring weight loss strategies, the following discussion covers the approaches that may be successful. Body mass index (BMI) is one way to tell whether you are at a healthy weight, overweight, or obese. It measures your weight in relation to your height.  A BMI of 18.5 to 24.9 is in the healthy range. A person with a BMI of 25 to 29.9 is considered overweight, and someone with a BMI of 30 or greater is considered obese. More than two-thirds of American adults are considered overweight or obese.  Being overweight or obese increases the risk for further weight gain. Excess weight may lead to heart disease and diabetes.  Creating and following plans for healthy eating and physical activity may help you improve your health.  Weight control is part of healthy lifestyle and includes exercise, emotional health, and healthy eating habits. Careful eating habits lifelong are the mainstay of weight control. Though there are significant health benefits from weight loss, long-term weight loss with diet alone may be very difficult to achieve- studies show long-term success with dietary management in less than 10% of people. Attaining a healthy weight may be especially difficult to achieve in those with severe obesity. In some cases, medications, devices and surgical management might be considered.  What can you do?  If you are overweight or obese and are interested in methods for weight loss, you should discuss this with  your provider.     Consider reducing daily calorie intake by 500 calories.     Keep a food journal.     Avoiding skipping meals, consider cutting portions instead.    Diet combined with exercise helps maintain muscle while optimizing fat loss. Strength training is particularly important for building and maintaining muscle mass. Exercise helps reduce stress, increase energy, and improves fitness. Increasing exercise without diet control, however, may not burn enough calories to loose weight.       Start walking three days a week 10-20 minutes at a time    Work towards walking thirty minutes five days a week     Eventually, increase the speed of your walking for 1-2 minutes at time    In addition, we recommend that you review healthy lifestyles and methods for weight loss available through the National Institutes of Health patient information sites:  http://win.niddk.nih.gov/publications/index.htm    And look into health and wellness programs that may be available through your health insurance provider, employer, local community center, or sukhi club.    Your blood pressure was checked while you were in clinic today.  Please read the guidelines below about what these numbers mean and what you should do about them.  Your systolic blood pressure is the top number.  This is the pressure when the heart is pumping.  Your diastolic blood pressure is the bottom number.  This is the pressure in between beats.  If your systolic blood pressure is less than 120 and your diastolic blood pressure is less than 80, then your blood pressure is normal. There is nothing more that you need to do about it  If your systolic blood pressure is 120-139 or your diastolic blood pressure is 80-89, your blood pressure may be higher than it should be.  You should have your blood pressure re-checked within a year by a primary care provider.  If your systolic blood pressure is 140 or greater or your diastolic blood pressure is 90 or greater, you  "may have high blood pressure.  High blood pressure is treatable, but if left untreated over time it can put you at risk for heart attack, stroke, or kidney failure.  You should have your blood pressure re-checked by a primary care provider within the next four weeks.        MY TREATMENT INFORMATION FOR SLEEP APNEA -  Kentrell Kirkpatrick    DOCTOR: Mirza Her MD, MPH  SLEEP CENTER : [facility]         If I haven't had a sleep study yet, what can I expect?  A personal story from [employer]  https://www.[employer].com/watch?v=AxPLmlRpnCs      Am I having a home sleep study?  Here is a video in case you get home and want to make sure you have done it correctly  https://www.[employer].com/watch?v=RZY2Z1kGqy2&feature=youtu.be      Frequently asked questions:  1. What is Obstructive Sleep Apnea (SKYLER)? SKYLER is the most common type of sleep apnea. Apnea literally means, \"without breath.\" It is characterized by repetitive pauses in breathing, despite continued effort to breathe, and is usually associated with a reduction in blood oxygen saturation. Apneas can last 10 to over 60 seconds. It is caused by narrowing or collapse of the upper airway as muscles relax during sleep. Severity of sleep apnea is determined by frequency of breathing events and their effect on your sleep and oxygen levels determined during sleep testing.     2. What are the consequences of SKYLER? Symptoms include: daytime sleepiness- possibly increasing the risk of falling asleep while driving, unrefreshing/restless sleep, snoring, insomnia, waking frequently to urinate, waking with heartburn or reflux, reduced concentration and memory, and morning headaches. Other health consequences may include development of high blood pressure and other cardiovascular disease in persons who are susceptible. Untreated SKYLER  can contribute to heart disease, stroke and diabetes.     3. What are the treatment options? In most situations, sleep apnea is a lifelong " disease that must be managed with daily therapy. Medications are not effective for sleep apnea and surgery is generally not performed until other therapies have been tried. Therapy is usually tailored to the individual patient based on many factors including your wishes as well as severity of sleep apnea and severity of obesity. Continuous Positive Airway (CPAP) is the most reliable treatment. An oral device to hold your jaw forward is usually the next most reliable option. Other options include postioning devices (to keep you off your back), weight loss, and surgery including a tongue pacing device. There is more detail about some of these options below.            1. CPAP-  WHAT DOES IT DO AND HOW CAN I LEARN TO WEAR IT?                               BEFORE I START, CAN I WATCH A MOVIE TO GET A PLAN ON HOW TO USE CPAP?  https://www.[employer].com/watch?q=e6Z70tv002N      Continuous positive airway pressure, or CPAP, is the most effective treatment for obstructive sleep apnea. It works by blowing room air, through a mask, to hold your throat open. A decision to use CPAP is a major step forward in the pursuit of a healthier life. The successful use of CPAP will help you breathe easier, sleep better and live healthier. You can choose CPAP equipment from any durable medical equipment provider that meets your needs.  Using CPAP can be a positive experience if you keep these patterson points in mind:  1. Commitment  CPAP is not a quick fix for your problem. It involves a long-term commitment to improve your sleep and your health.    2. Communication  Stay in close communication with both your sleep doctor and your CPAP supplier. Ask lots of questions and seek help when you need it.    3. Consistency  Use CPAP all night, every night and for every nap. You will receive the maximum health benefits from CPAP when you use it every time that you sleep. This will also make it easier for your body to adjust to the  "treatment.    4. Correction  The first machine and mask that you try may not be the best ones for you. Work with your sleep doctor and your CPAP supplier to make corrections to your equipment selection. Ask about trying a different type of machine or mask if you have ongoing problems. Make sure that your mask is a good fit and learn to use your equipment properly.    5. Challenge  Tell a family member or close friend to ask you each morning if you used your CPAP the previous night. Have someone to challenge you to give it your best effort.    6. Connection   Your adjustment to CPAP will be easier if you are able to connect with others who use the same treatment. Ask your sleep doctor if there is a support group in your area for people who have sleep apnea, or look for one on the Internet.  7. Comfort   Increase your level of comfort by using a saline spray, decongestant or heated humidifier if CPAP irritates your nose, mouth or throat. Use your unit's \"ramp\" setting to slowly get used to the air pressure level. There may be soft pads you can buy that will fit over your mask straps. Look on www.CPAP.com for accessories that can help make CPAP use more comfortable.  8. Cleaning   Clean your mask, tubing and headgear on a regular basis. Put this time in your schedule so that you don't forget to do it. Check and replace the filters for your CPAP unit and humidifier.    9. Completion   Although you are never finished with CPAP therapy, you should reward yourself by celebrating the completion of your first month of treatment. Expect this first month to be your hardest period of adjustment. It will involve some trial and error as you find the machine, mask and pressure settings that are right for you.    10. Continuation  After your first month of treatment, continue to make a daily commitment to use your CPAP all night, every night and for every nap.    CPAP-Tips to starting with success:  Begin using your CPAP for short " periods of time during the day while you watch TV or read.    Use CPAP every night and for every nap. Using it less often reduces the health benefits and makes it harder for your body to get used to it.    Make small adjustments to your mask, tubing, straps and headgear until you get the right fit. Tightening the mask may actually worsen the leak.  If it leaves significant marks on your face or irritates the bridge of your nose, it may not be the best mask for you.  Speak with the person who supplied the mask and consider trying other masks. Insurances will allow you to try different masks during the first month of starting CPAP.  Insurance also covers a new mask, hose and filter about every 6 months.    Use a saline nasal spray to ease mild nasal congestion. Neti-Pot or saline nasal rinses may also help. Nasal gel sprays can help reduce nasal dryness.  Biotene mouthwash can be helpful to protect your teeth if you experience frequent dry mouth.  Dry mouth may be a sign of air escaping out of your mouth or out of the mask in the case of a full face mask.  Speak with your provider if you expect that is the case.     Take a nasal decongestant to relieve more severe nasal or sinus congestion.  Do not use Afrin (oxymetazoline) nasal spray more than 3 days in a row.  Speak with your sleep doctor if your nasal congestion is chronic.    Use a heated humidifier that fits your CPAP model to enhance your breathing comfort. Adjust the heat setting up if you get a dry nose or throat, down if you get condensation in the hose or mask.  Position the CPAP lower than you so that any condensation in the hose drains back into the machine rather than towards the mask.    Try a system that uses nasal pillows if traditional masks give you problems.    Clean your mask, tubing and headgear once a week. Make sure the equipment dries fully.    Regularly check and replace the filters for your CPAP unit and humidifier.    Work closely with your  sleep provider and your CPAP supplier to make sure that you have the machine, mask and air pressure setting that works best for you. It is better to stop using it and call your provider to solve problems than to lay awake all night frustrated with the device.      BESIDES CPAP, WHAT OTHER THERAPIES ARE THERE?        Positioning Device  Positioning devices are generally used when sleep apnea is mild and only occurs on your back.This example shows a pillow that straps around the waist. It may be appropriate for those whose sleep study shows milder sleep apnea that occurs primarily when lying flat on one's back. Preliminary studies have shown benefit but effectiveness at home may need to be verified by a home sleep test. These devices are generally not covered by medical insurance.                        Oral Appliance  What is oral appliance therapy?  An oral appliance is a small acrylic device that fits over the upper and lower teeth or tongue (similar to an orthodontic retainer or a mouth guard). This device slightly advances the lower jaw or tongue, which moves the base of the tongue forward, opens the airway, improves breathing and can effectively treat snoring and obstructive sleep apnea sleep apnea. The appliance is fabricated and customized by a qualified dentist with experience in treating snoring and sleep apnea. Oral appliances are usually well tolerated and have relatively high compliance by patients1, 2, 3.  When is an oral appliance indicated?  Oral appliance therapy is recommended as a first-line treatment for patients with primary snoring, mild sleep apnea, and for patients with moderate sleep apnea who prefer appliance therapy to use of CPAP4, 5. Severity of sleep apnea is determined by sleep testing and is based on the number of respiratory events per hour of sleep.   How successful is oral appliance therapy?  The success rate of oral appliance therapy in patients with mild sleep apnea is 75-80% while  in patients with moderate sleep apnea it is 50-70%. The chance of success in patients with severe sleep apnea is 40-50%. The research also shows that oral appliances have a beneficial effect on the cardiovascular health of SKYLER patients at the same magnitude as CPAP therapy7.  Oral appliances should be a second-line treatment in cases of severe sleep apnea, but if not completely successful then a combination therapy utilizing CPAP plus oral appliance therapy may be effective. Oral appliances tend to be effective in a broad range of patients although studies show that the patients who have the highest success are females, younger patients, those with milder disease, and less severe obesity. 3, 6.   The chances of success are lower in patients who have more severe SKYLER, are older, and those who are morbidly obese.     Example of an oral appliance   Finding a dentist that practices dental sleep medicine  Specific training is available through the American Academy of Dental Sleep Medicine for dentists interested in working in the field of sleep. To find a dentist who is educated in the field of sleep and the use of oral appliances, near you, visit the Web site of the American Academy of Dental Sleep Medicine; also see   http://www.accpstorage.org/newOrganization/patients/oralAppliances.pdf  To search for a dentist certified in these practices:  Http://aadsm.org/FindADentist.aspx?1  1. Bijal et al. Objectively measured vs self-reported compliance during oral appliance therapy for sleep-disordered breathing. Chest 2013; 144(5): 1216-7141.  2. Lianna, et al. Objective measurement of compliance during oral appliance therapy for sleep-disordered breathing. Thorax 2013; 68(1): 91-96.  3. Kayleigh et al. Mandibular advancement devices in 620 men and women with SKYLER and snoring: tolerability and predictors of treatment success. Chest 2004; 125: 2162-1910.  4. Saud et al. Oral appliances for snoring and SKYLER: a  review. Sleep 2006; 29: 244-262.  5. Micheal et al. Oral appliance treatment for SKYLER: an update. J Clin Sleep Med 2014; 10(2): 215-227.  6. jacy Simons al. Predictors of OSAH treatment outcome. J Dent Res 2007; 86: 4175-5762.      Weight Loss:    Weight management is a personal decision.  If you are interested in exploring weight loss strategies, the following discussion covers the impact on weight loss on sleep apnea and the approaches that may be successful.    Weight loss decreases severity of sleep apnea in most people with obesity. For those with mild obesity who have developed snoring with weight gain, even 15-30 pound weight loss can improve and occasionally eliminate sleep apnea.  Structured and life-long dietary and health habits are necessary to lose weight and keep healthier weight levels.     Though there may be significant health benefits from weight loss, long-term weight loss is very difficult to achieve- studies show success with dietary management in less than 10% of people. In addition, substantial weight loss may require years of dietary control and may be difficult if patients have severe obesity. In these cases, surgical management may be considered.  Finally, older individuals who have tolerated obesity without health complications may be less likely to benefit from weight loss strategies.    Your BMI is Body mass index is 23.48 kg/(m^2).  Body mass index (BMI) is one way to tell whether you are at a healthy weight, overweight, or obese. It measures your weight in relation to your height.  A BMI of 18.5 to 24.9 is in the healthy range. A person with a BMI of 25 to 29.9 is considered overweight, and someone with a BMI of 30 or greater is considered obese. More than two-thirds of American adults are considered overweight or obese.  Being overweight or obese increases the risk for further weight gain. Excess weight may lead to heart disease and diabetes.  Creating and following plans for  healthy eating and physical activity may help you improve your health.  Weight control is part of healthy lifestyle and includes exercise, emotional health, and healthy eating habits. Careful eating habits lifelong are the mainstay of weight control. Though there are significant health benefits from weight loss, long-term weight loss with diet alone may be very difficult to achieve- studies show long-term success with dietary management in less than 10% of people. Attaining a healthy weight may be especially difficult to achieve in those with severe obesity. In some cases, medications, devices and surgical management might be considered.  What can you do?  If you are overweight or obese and are interested in methods for weight loss, you should discuss this with your provider.     Consider reducing daily calorie intake by 500 calories.     Keep a food journal.     Avoiding skipping meals, consider cutting portions instead.    Diet combined with exercise helps maintain muscle while optimizing fat loss. Strength training is particularly important for building and maintaining muscle mass. Exercise helps reduce stress, increase energy, and improves fitness. Increasing exercise without diet control, however, may not burn enough calories to loose weight.       Start walking three days a week 10-20 minutes at a time    Work towards walking thirty minutes five days a week     Eventually, increase the speed of your walking for 1-2 minutes at time    In addition, we recommend that you review healthy lifestyles and methods for weight loss available through the National Institutes of Health patient information sites:  http://win.niddk.nih.gov/publications/index.htm    And look into health and wellness programs that may be available through your health insurance provider, employer, local community center, or sukhi club.    Surgery:    Upper Airway Surgery for SKYLER  Surgery for SKYLER is a second-line treatment option in the  management of sleep apnea.  Surgery should be considered for patients who are having a difficult time tolerating CPAP.    Surgery for SKYLER is directed at areas that are responsible for narrowing or complete obstruction of the airway during sleep.  There are a wide range of procedures available to enlarge and/or stabilize the airway to prevent blockage of breathing in the three major areas where it can occur: the palate, tongue, and nasal regions.  Successful surgical treatment depends on the accurate identification of the factors responsible for obstructive sleep apnea in each person.  A personalized approach is required because there is no single treatment that works well for everyone.  Because of anatomic variation, consultation with an examination by a sleep surgeon is a critical first step in determining what surgical options are best for each patient.  In some cases, examination during sedation may be recommended in order to guide the selection of procedures.  Patients will be counseled about risks and benefits as well as the typical recovery course after surgery. Surgery is typically not a cure for a person s SKYLER.  However, surgery will often significantly improve one s SKYLER severity (termed  success rate ).  Even in the absence of a cure, surgery will decrease the cardiovascular risk associated with OSA7; improve overall quality of life8 (sleepiness, functionality, sleep quality, etc).          Palate Procedures:  Patients with SKYLER often have narrowing of their airway in the region of their tonsils and uvula.  The goals of palate procedures are to widen the airway in this region as well as to help the tissues resist collapse.  Modern palate procedure techniques focus on tissue conservation and soft tissue rearrangement, rather than tissue removal.  Often the uvula is preserved in this procedure. Residual sleep apnea is common in patient after pharyngoplasty with an average reduction in sleep apnea events of  33%2.      Tongue Procedures:  While patients are awake, the muscles that surround the throat are active and keep this region open for breathing. These muscles relax during sleep, allowing the tongue and other structures to collapse and block breathing.  There are several different tongue procedures available.  Selection of a tongue base procedure depends on characteristics seen on physical exam.  Generally, procedures are aimed at removing bulky tissues in this area or preventing the back of the tongue from falling back during sleep.  Success rates for tongue surgery range from 50-62%3.    Hypoglossal Nerve Stimulation:  Hypoglossal nerve stimulation has recently received approval from the [place] Food and Drug Administration for the treatment of obstructive sleep apnea.  This is based on research showing that the system was safe and effective in treating sleep apnea6.  Results showed that the median AHI score decreased 68%, from 29.3 to 9.0. This therapy uses an implant system that senses breathing patterns and delivers mild stimulation to airway muscles, which keeps the airway open during sleep.  The system consists of three fully implanted components: a small generator (similar in size to a pacemaker), a breathing sensor, and a stimulation lead.  Using a small handheld remote, a patient turns the therapy on before bed and off upon awakening.    Candidates for this device must be greater than 22 years of age, have moderate to severe SKYLER (AHI between 20-65), BMI less than 32, have tried CPAP/oral appliance without success, and have appropriate upper airway anatomy (determined by a sleep endoscopy performed by Dr. Kendrick).    Hypoglossal Nerve Stimulation Pathway:    The sleep surgeon s office will work with the patient through the insurance prior-authorization process (including communications and appeals).    Nasal Procedures:  Nasal obstruction can interfere with nasal breathing during the day and night.   Studies have shown that relief of nasal obstruction can improve the ability of some patients to tolerate positive airway pressure therapy for obstructive sleep apnea1.  Treatment options include medications such as nasal saline, topical corticosteroid and antihistamine sprays, and oral medications such as antihistamines or decongestants. Non-surgical treatments can include external nasal dilators for selected patients. If these are not successful by themselves, surgery can improve the nasal airway either alone or in combination with these other options.    Combination Procedures:  Combination of surgical procedures and other treatments may be recommended, particularly if patients have more than one area of narrowing or persistent positional disease.  The success rate of combination surgery ranges from 66-80%2,3.      1. Sedrick PORTER. The Role of the Nose in Snoring and Obstructive Sleep Apnoea: An Update.  Eur Arch Otorhinolaryngol. 2011; 268: 1365-73.  2.  Srinivas SM; Jovana JA; Farida JR; Pallanch JF; Eleni MB; Maryanne SG; Cassy DAWSON. Surgical modifications of the upper airway for obstructive sleep apnea in adults: a systematic review and meta-analysis. SLEEP 2010;33(10):1067-2737. Pricila SANCHEZ. Hypopharyngeal surgery in obstructive sleep apnea: an evidence-based medicine review.  Arch Otolaryngol Head Neck Surg. 2006 Feb;132(2):206-13.  3. Bakari YH1, Leonie Y, Maldonado KAITLIN. The efficacy of anatomically based multilevel surgery for obstructive sleep apnea. Otolaryngol Head Neck Surg. 2003 Oct;129(4):327-35.  4. Pricila SANCHEZ, Goldberg A. Hypopharyngeal Surgery in Obstructive Sleep Apnea: An Evidence-Based Medicine Review. Arch Otolaryngol Head Neck Surg. 2006 Feb;132(2):206-13.  5. Miguel PJ et al. Upper-Airway Stimulation for Obstructive Sleep Apnea.  N Engl J Med. 2014 Jan 9;370(2):139-49.  6. Deana Y et al. Increased Incidence of Cardiovascular Disease in Middle-aged Men with Obstructive Sleep Apnea. Am J Respir Crit Care  Med; 2002 166: 159-165  7. Valeriy MOORE et al. Studying Life Effects and Effectiveness of Palatopharyngoplasty (SLEEP) study: Subjective Outcomes of Isolated Uvulopalatopharyngoplasty. Otolaryngol Head Neck Surg. 2011; 144: 623-631.  Please, schedule sleep study and follow up visit with the nurse (she will coming into the room and meet with you). Use sleeping aid only if needed during the night of the study.    Thank you!    Mirza Her MD, MPH  Clinical Sleep and Occupational / Environmental Medicine            Follow-ups after your visit        Future tests that were ordered for you today     Open Future Orders        Priority Expected Expires Ordered    HST-Home Sleep Apnea Test Routine  10/27/2018 4/27/2018            Who to contact     If you have questions or need follow up information about today's clinic visit or your schedule please contact [facility] directly at [phone].  Normal or non-critical lab and imaging results will be communicated to you by [employer], letter or phone within 4 business days after the clinic has received the results. If you do not hear from us within 7 days, please contact the clinic through [employer] or phone. If you have a critical or abnormal lab result, we will notify you by phone as soon as possible.  Submit refill requests through [employer] or call your pharmacy and they will forward the refill request to us. Please allow 3 business days for your refill to be completed.          Additional Information About Your Visit        [employer] Information     [employer] gives you secure access to your electronic health record. If you see a primary care provider, you can also send messages to your care team and make appointments. If you have questions, please call your primary care clinic.  If you do not have a primary care provider, please call [phone] and they will assist you.        Care EveryWhere ID     This is your Care EveryWhere ID. This could be used by  "other organizations to access your [facility] medical records  [id number]        Your Vitals Were     Pulse Respirations Height Pulse Oximetry BMI (Body Mass Index)       66 18 1.854 m (6' 1\") 95% 23.48 kg/m2        Blood Pressure from Last 3 Encounters:   04/27/18 97/59   03/13/18 100/64   01/20/18 95/55    Weight from Last 3 Encounters:   04/27/18 80.7 kg (178 lb)   03/13/18 81.6 kg (180 lb)   01/20/18 83.1 kg (183 lb 4.8 oz)                 Today's Medication Changes          These changes are accurate as of 4/27/18 11:16 AM.  If you have any questions, ask your nurse or doctor.               Start taking these medicines.        Dose/Directions    zolpidem 5 MG tablet   Commonly known as:  AMBIEN   Used for:  Excessive daytime sleepiness, Snoring        Take tablet by mouth 15 minutes prior to sleep, for Sleep Study   Quantity:  1 tablet   Refills:  0            Where to get your medicines      Some of these will need a paper prescription and others can be bought over the counter.  Ask your nurse if you have questions.     Bring a paper prescription for each of these medications     zolpidem 5 MG tablet                Primary Care Provider Office Phone # Fax #    Yoni Sarmiento Jr., [phone] [address]  [place] [address]        Equal Access to Services     MIHIR GREGORIO AH: Hadii pauly ku hadasho Soomaali, waaxda luqadaha, qaybta kaalmada adeegyada, north sánchez. So [facility] [phone].    ATENCIÓN: Si habla español, tiene a perez disposición servicios gratuitos de asistencia lingüística. Leeroy al [phone].    We comply with applicable federal civil rights laws and [place] laws. We do not discriminate on the basis of race, color, national origin, age, disability, sex, sexual orientation, or gender identity.            Thank you!     Thank you for choosing [facility]  for your care. Our goal is always to provide you with excellent " care. Hearing back from our patients is one way we can continue to improve our services. Please take a few minutes to complete the written survey that you may receive in the mail after your visit with us. Thank you!             Your Updated Medication List - Protect others around you: Learn how to safely use, store and throw away your medicines at www.disposemymeds.org.          This list is accurate as of 4/27/18 11:16 AM.  Always use your most recent med list.                   Brand Name Dispense Instructions for use Diagnosis    ASPIRIN PO      Take 81 mg by mouth        calcium polycarbophil 625 MG tablet    FIBERCON     Take 2 tablets by mouth daily        MULTI vitamin  MENS Tabs      1 TABLET DAILY        * tamsulosin 0.4 MG capsule    FLOMAX    90 capsule    Take 1 capsule (0.4 mg) by mouth daily    Benign nodular prostatic hyperplasia with lower urinary tract symptoms       * tamsulosin 0.4 MG capsule    FLOMAX    30 capsule    Take 1 capsule (0.4 mg) by mouth daily    Benign nodular prostatic hyperplasia with lower urinary tract symptoms       zolpidem 5 MG tablet    AMBIEN    1 tablet    Take tablet by mouth 15 minutes prior to sleep, for Sleep Study    Excessive daytime sleepiness, Snoring       * Notice:  This list has 2 medication(s) that are the same as other medications prescribed for you. Read the directions carefully, and ask your doctor or other care provider to review them with you.

## 2018-04-27 NOTE — PATIENT INSTRUCTIONS
Your BMI is Body mass index is 23.48 kg/(m^2).  Weight management is a personal decision.  If you are interested in exploring weight loss strategies, the following discussion covers the approaches that may be successful. Body mass index (BMI) is one way to tell whether you are at a healthy weight, overweight, or obese. It measures your weight in relation to your height.  A BMI of 18.5 to 24.9 is in the healthy range. A person with a BMI of 25 to 29.9 is considered overweight, and someone with a BMI of 30 or greater is considered obese. More than two-thirds of American adults are considered overweight or obese.  Being overweight or obese increases the risk for further weight gain. Excess weight may lead to heart disease and diabetes.  Creating and following plans for healthy eating and physical activity may help you improve your health.  Weight control is part of healthy lifestyle and includes exercise, emotional health, and healthy eating habits. Careful eating habits lifelong are the mainstay of weight control. Though there are significant health benefits from weight loss, long-term weight loss with diet alone may be very difficult to achieve- studies show long-term success with dietary management in less than 10% of people. Attaining a healthy weight may be especially difficult to achieve in those with severe obesity. In some cases, medications, devices and surgical management might be considered.  What can you do?  If you are overweight or obese and are interested in methods for weight loss, you should discuss this with your provider.     Consider reducing daily calorie intake by 500 calories.     Keep a food journal.     Avoiding skipping meals, consider cutting portions instead.    Diet combined with exercise helps maintain muscle while optimizing fat loss. Strength training is particularly important for building and maintaining muscle mass. Exercise helps reduce stress, increase energy, and improves  fitness. Increasing exercise without diet control, however, may not burn enough calories to loose weight.       Start walking three days a week 10-20 minutes at a time    Work towards walking thirty minutes five days a week     Eventually, increase the speed of your walking for 1-2 minutes at time    In addition, we recommend that you review healthy lifestyles and methods for weight loss available through the National Institutes of Health patient information sites:  http://win.niddk.nih.gov/publications/index.htm    And look into health and wellness programs that may be available through your health insurance provider, employer, local community center, or sukhi club.    Your blood pressure was checked while you were in clinic today.  Please read the guidelines below about what these numbers mean and what you should do about them.  Your systolic blood pressure is the top number.  This is the pressure when the heart is pumping.  Your diastolic blood pressure is the bottom number.  This is the pressure in between beats.  If your systolic blood pressure is less than 120 and your diastolic blood pressure is less than 80, then your blood pressure is normal. There is nothing more that you need to do about it  If your systolic blood pressure is 120-139 or your diastolic blood pressure is 80-89, your blood pressure may be higher than it should be.  You should have your blood pressure re-checked within a year by a primary care provider.  If your systolic blood pressure is 140 or greater or your diastolic blood pressure is 90 or greater, you may have high blood pressure.  High blood pressure is treatable, but if left untreated over time it can put you at risk for heart attack, stroke, or kidney failure.  You should have your blood pressure re-checked by a primary care provider within the next four weeks.        MY TREATMENT INFORMATION FOR SLEEP APNEA -  Kentrell Kirkpatrick    DOCTOR: Mirza Her MD, MPH  SLEEP CENTER :  "[facility]         If I haven't had a sleep study yet, what can I expect?  A personal story from [employer]  https://www.youtube.com/watch?v=AxPLmlRpnCs      Am I having a home sleep study?  Here is a video in case you get home and want to make sure you have done it correctly  https://www.Postifyube.com/watch?v=KZN9W5sTio7&feature=youtu.be      Frequently asked questions:  1. What is Obstructive Sleep Apnea (SKYLER)? SKYLER is the most common type of sleep apnea. Apnea literally means, \"without breath.\" It is characterized by repetitive pauses in breathing, despite continued effort to breathe, and is usually associated with a reduction in blood oxygen saturation. Apneas can last 10 to over 60 seconds. It is caused by narrowing or collapse of the upper airway as muscles relax during sleep. Severity of sleep apnea is determined by frequency of breathing events and their effect on your sleep and oxygen levels determined during sleep testing.     2. What are the consequences of SKYLER? Symptoms include: daytime sleepiness- possibly increasing the risk of falling asleep while driving, unrefreshing/restless sleep, snoring, insomnia, waking frequently to urinate, waking with heartburn or reflux, reduced concentration and memory, and morning headaches. Other health consequences may include development of high blood pressure and other cardiovascular disease in persons who are susceptible. Untreated SKYLER  can contribute to heart disease, stroke and diabetes.     3. What are the treatment options? In most situations, sleep apnea is a lifelong disease that must be managed with daily therapy. Medications are not effective for sleep apnea and surgery is generally not performed until other therapies have been tried. Therapy is usually tailored to the individual patient based on many factors including your wishes as well as severity of sleep apnea and severity of obesity. Continuous Positive Airway (CPAP) is the most reliable " treatment. An oral device to hold your jaw forward is usually the next most reliable option. Other options include postioning devices (to keep you off your back), weight loss, and surgery including a tongue pacing device. There is more detail about some of these options below.            1. CPAP-  WHAT DOES IT DO AND HOW CAN I LEARN TO WEAR IT?                               BEFORE I START, CAN I WATCH A MOVIE TO GET A PLAN ON HOW TO USE CPAP?  https://www.Jike Xueyuan.com/watch?e=k7Y29yd243W      Continuous positive airway pressure, or CPAP, is the most effective treatment for obstructive sleep apnea. It works by blowing room air, through a mask, to hold your throat open. A decision to use CPAP is a major step forward in the pursuit of a healthier life. The successful use of CPAP will help you breathe easier, sleep better and live healthier. You can choose CPAP equipment from any durable medical equipment provider that meets your needs.  Using CPAP can be a positive experience if you keep these patterson points in mind:  1. Commitment  CPAP is not a quick fix for your problem. It involves a long-term commitment to improve your sleep and your health.    2. Communication  Stay in close communication with both your sleep doctor and your CPAP supplier. Ask lots of questions and seek help when you need it.    3. Consistency  Use CPAP all night, every night and for every nap. You will receive the maximum health benefits from CPAP when you use it every time that you sleep. This will also make it easier for your body to adjust to the treatment.    4. Correction  The first machine and mask that you try may not be the best ones for you. Work with your sleep doctor and your CPAP supplier to make corrections to your equipment selection. Ask about trying a different type of machine or mask if you have ongoing problems. Make sure that your mask is a good fit and learn to use your equipment properly.    5. Challenge  Tell a family member or  "close friend to ask you each morning if you used your CPAP the previous night. Have someone to challenge you to give it your best effort.    6. Connection   Your adjustment to CPAP will be easier if you are able to connect with others who use the same treatment. Ask your sleep doctor if there is a support group in your area for people who have sleep apnea, or look for one on the Internet.  7. Comfort   Increase your level of comfort by using a saline spray, decongestant or heated humidifier if CPAP irritates your nose, mouth or throat. Use your unit's \"ramp\" setting to slowly get used to the air pressure level. There may be soft pads you can buy that will fit over your mask straps. Look on www.CPAP.com for accessories that can help make CPAP use more comfortable.  8. Cleaning   Clean your mask, tubing and headgear on a regular basis. Put this time in your schedule so that you don't forget to do it. Check and replace the filters for your CPAP unit and humidifier.    9. Completion   Although you are never finished with CPAP therapy, you should reward yourself by celebrating the completion of your first month of treatment. Expect this first month to be your hardest period of adjustment. It will involve some trial and error as you find the machine, mask and pressure settings that are right for you.    10. Continuation  After your first month of treatment, continue to make a daily commitment to use your CPAP all night, every night and for every nap.    CPAP-Tips to starting with success:  Begin using your CPAP for short periods of time during the day while you watch TV or read.    Use CPAP every night and for every nap. Using it less often reduces the health benefits and makes it harder for your body to get used to it.    Make small adjustments to your mask, tubing, straps and headgear until you get the right fit. Tightening the mask may actually worsen the leak.  If it leaves significant marks on your face or irritates " the bridge of your nose, it may not be the best mask for you.  Speak with the person who supplied the mask and consider trying other masks. Insurances will allow you to try different masks during the first month of starting CPAP.  Insurance also covers a new mask, hose and filter about every 6 months.    Use a saline nasal spray to ease mild nasal congestion. Neti-Pot or saline nasal rinses may also help. Nasal gel sprays can help reduce nasal dryness.  Biotene mouthwash can be helpful to protect your teeth if you experience frequent dry mouth.  Dry mouth may be a sign of air escaping out of your mouth or out of the mask in the case of a full face mask.  Speak with your provider if you expect that is the case.     Take a nasal decongestant to relieve more severe nasal or sinus congestion.  Do not use Afrin (oxymetazoline) nasal spray more than 3 days in a row.  Speak with your sleep doctor if your nasal congestion is chronic.    Use a heated humidifier that fits your CPAP model to enhance your breathing comfort. Adjust the heat setting up if you get a dry nose or throat, down if you get condensation in the hose or mask.  Position the CPAP lower than you so that any condensation in the hose drains back into the machine rather than towards the mask.    Try a system that uses nasal pillows if traditional masks give you problems.    Clean your mask, tubing and headgear once a week. Make sure the equipment dries fully.    Regularly check and replace the filters for your CPAP unit and humidifier.    Work closely with your sleep provider and your CPAP supplier to make sure that you have the machine, mask and air pressure setting that works best for you. It is better to stop using it and call your provider to solve problems than to lay awake all night frustrated with the device.      BESIDES CPAP, WHAT OTHER THERAPIES ARE THERE?        Positioning Device  Positioning devices are generally used when sleep apnea is mild and  only occurs on your back.This example shows a pillow that straps around the waist. It may be appropriate for those whose sleep study shows milder sleep apnea that occurs primarily when lying flat on one's back. Preliminary studies have shown benefit but effectiveness at home may need to be verified by a home sleep test. These devices are generally not covered by medical insurance.                        Oral Appliance  What is oral appliance therapy?  An oral appliance is a small acrylic device that fits over the upper and lower teeth or tongue (similar to an orthodontic retainer or a mouth guard). This device slightly advances the lower jaw or tongue, which moves the base of the tongue forward, opens the airway, improves breathing and can effectively treat snoring and obstructive sleep apnea sleep apnea. The appliance is fabricated and customized by a qualified dentist with experience in treating snoring and sleep apnea. Oral appliances are usually well tolerated and have relatively high compliance by patients1, 2, 3.  When is an oral appliance indicated?  Oral appliance therapy is recommended as a first-line treatment for patients with primary snoring, mild sleep apnea, and for patients with moderate sleep apnea who prefer appliance therapy to use of CPAP4, 5. Severity of sleep apnea is determined by sleep testing and is based on the number of respiratory events per hour of sleep.   How successful is oral appliance therapy?  The success rate of oral appliance therapy in patients with mild sleep apnea is 75-80% while in patients with moderate sleep apnea it is 50-70%. The chance of success in patients with severe sleep apnea is 40-50%. The research also shows that oral appliances have a beneficial effect on the cardiovascular health of SKYLER patients at the same magnitude as CPAP therapy7.  Oral appliances should be a second-line treatment in cases of severe sleep apnea, but if not completely successful then a  combination therapy utilizing CPAP plus oral appliance therapy may be effective. Oral appliances tend to be effective in a broad range of patients although studies show that the patients who have the highest success are females, younger patients, those with milder disease, and less severe obesity. 3, 6.   The chances of success are lower in patients who have more severe SKYLER, are older, and those who are morbidly obese.     Example of an oral appliance   Finding a dentist that practices dental sleep medicine  Specific training is available through the American Academy of Dental Sleep Medicine for dentists interested in working in the field of sleep. To find a dentist who is educated in the field of sleep and the use of oral appliances, near you, visit the Web site of the American Academy of Dental Sleep Medicine; also see   http://www.accpstorage.org/newOrganization/patients/oralAppliances.pdf  To search for a dentist certified in these practices:  Http://aadsm.org/FindADentist.aspx?1  1. Bijal, et al. Objectively measured vs self-reported compliance during oral appliance therapy for sleep-disordered breathing. Chest 2013; 144(5): 1455-8505.  2. Lianna, et al. Objective measurement of compliance during oral appliance therapy for sleep-disordered breathing. Thorax 2013; 68(1): 91-96.  3. Kayleigh et al. Mandibular advancement devices in 620 men and women with SKYLER and snoring: tolerability and predictors of treatment success. Chest 2004; 125: 2310-8375.  4. Saud, et al. Oral appliances for snoring and SKYLER: a review. Sleep 2006; 29: 244-262.  5. Micheal et al. Oral appliance treatment for SKYLER: an update. J Clin Sleep Med 2014; 10(2): 215-227.  6. Russellekema, et al. Predictors of OSAH treatment outcome. J Dent Res 2007; 86: 8078-4970.      Weight Loss:    Weight management is a personal decision.  If you are interested in exploring weight loss strategies, the following discussion covers the impact on  weight loss on sleep apnea and the approaches that may be successful.    Weight loss decreases severity of sleep apnea in most people with obesity. For those with mild obesity who have developed snoring with weight gain, even 15-30 pound weight loss can improve and occasionally eliminate sleep apnea.  Structured and life-long dietary and health habits are necessary to lose weight and keep healthier weight levels.     Though there may be significant health benefits from weight loss, long-term weight loss is very difficult to achieve- studies show success with dietary management in less than 10% of people. In addition, substantial weight loss may require years of dietary control and may be difficult if patients have severe obesity. In these cases, surgical management may be considered.  Finally, older individuals who have tolerated obesity without health complications may be less likely to benefit from weight loss strategies.    Your BMI is Body mass index is 23.48 kg/(m^2).  Body mass index (BMI) is one way to tell whether you are at a healthy weight, overweight, or obese. It measures your weight in relation to your height.  A BMI of 18.5 to 24.9 is in the healthy range. A person with a BMI of 25 to 29.9 is considered overweight, and someone with a BMI of 30 or greater is considered obese. More than two-thirds of American adults are considered overweight or obese.  Being overweight or obese increases the risk for further weight gain. Excess weight may lead to heart disease and diabetes.  Creating and following plans for healthy eating and physical activity may help you improve your health.  Weight control is part of healthy lifestyle and includes exercise, emotional health, and healthy eating habits. Careful eating habits lifelong are the mainstay of weight control. Though there are significant health benefits from weight loss, long-term weight loss with diet alone may be very difficult to achieve- studies show  long-term success with dietary management in less than 10% of people. Attaining a healthy weight may be especially difficult to achieve in those with severe obesity. In some cases, medications, devices and surgical management might be considered.  What can you do?  If you are overweight or obese and are interested in methods for weight loss, you should discuss this with your provider.     Consider reducing daily calorie intake by 500 calories.     Keep a food journal.     Avoiding skipping meals, consider cutting portions instead.    Diet combined with exercise helps maintain muscle while optimizing fat loss. Strength training is particularly important for building and maintaining muscle mass. Exercise helps reduce stress, increase energy, and improves fitness. Increasing exercise without diet control, however, may not burn enough calories to loose weight.       Start walking three days a week 10-20 minutes at a time    Work towards walking thirty minutes five days a week     Eventually, increase the speed of your walking for 1-2 minutes at time    In addition, we recommend that you review healthy lifestyles and methods for weight loss available through the National Institutes of Health patient information sites:  http://win.niddk.nih.gov/publications/index.htm    And look into health and wellness programs that may be available through your health insurance provider, employer, local community center, or suhki club.    Surgery:    Upper Airway Surgery for SKYLER  Surgery for SKYLER is a second-line treatment option in the management of sleep apnea.  Surgery should be considered for patients who are having a difficult time tolerating CPAP.    Surgery for SKYLER is directed at areas that are responsible for narrowing or complete obstruction of the airway during sleep.  There are a wide range of procedures available to enlarge and/or stabilize the airway to prevent blockage of breathing in the three major areas where it can  occur: the palate, tongue, and nasal regions.  Successful surgical treatment depends on the accurate identification of the factors responsible for obstructive sleep apnea in each person.  A personalized approach is required because there is no single treatment that works well for everyone.  Because of anatomic variation, consultation with an examination by a sleep surgeon is a critical first step in determining what surgical options are best for each patient.  In some cases, examination during sedation may be recommended in order to guide the selection of procedures.  Patients will be counseled about risks and benefits as well as the typical recovery course after surgery. Surgery is typically not a cure for a person s SKYLER.  However, surgery will often significantly improve one s SKYLER severity (termed  success rate ).  Even in the absence of a cure, surgery will decrease the cardiovascular risk associated with OSA7; improve overall quality of life8 (sleepiness, functionality, sleep quality, etc).          Palate Procedures:  Patients with SKYLER often have narrowing of their airway in the region of their tonsils and uvula.  The goals of palate procedures are to widen the airway in this region as well as to help the tissues resist collapse.  Modern palate procedure techniques focus on tissue conservation and soft tissue rearrangement, rather than tissue removal.  Often the uvula is preserved in this procedure. Residual sleep apnea is common in patient after pharyngoplasty with an average reduction in sleep apnea events of 33%2.      Tongue Procedures:  While patients are awake, the muscles that surround the throat are active and keep this region open for breathing. These muscles relax during sleep, allowing the tongue and other structures to collapse and block breathing.  There are several different tongue procedures available.  Selection of a tongue base procedure depends on characteristics seen on physical exam.   Generally, procedures are aimed at removing bulky tissues in this area or preventing the back of the tongue from falling back during sleep.  Success rates for tongue surgery range from 50-62%3.    Hypoglossal Nerve Stimulation:  Hypoglossal nerve stimulation has recently received approval from the [place] Food and Drug Administration for the treatment of obstructive sleep apnea.  This is based on research showing that the system was safe and effective in treating sleep apnea6.  Results showed that the median AHI score decreased 68%, from 29.3 to 9.0. This therapy uses an implant system that senses breathing patterns and delivers mild stimulation to airway muscles, which keeps the airway open during sleep.  The system consists of three fully implanted components: a small generator (similar in size to a pacemaker), a breathing sensor, and a stimulation lead.  Using a small handheld remote, a patient turns the therapy on before bed and off upon awakening.    Candidates for this device must be greater than 22 years of age, have moderate to severe SKYLER (AHI between 20-65), BMI less than 32, have tried CPAP/oral appliance without success, and have appropriate upper airway anatomy (determined by a sleep endoscopy performed by Dr. Kendrick).    Hypoglossal Nerve Stimulation Pathway:    The sleep surgeon s office will work with the patient through the insurance prior-authorization process (including communications and appeals).    Nasal Procedures:  Nasal obstruction can interfere with nasal breathing during the day and night.  Studies have shown that relief of nasal obstruction can improve the ability of some patients to tolerate positive airway pressure therapy for obstructive sleep apnea1.  Treatment options include medications such as nasal saline, topical corticosteroid and antihistamine sprays, and oral medications such as antihistamines or decongestants. Non-surgical treatments can include external nasal dilators for  selected patients. If these are not successful by themselves, surgery can improve the nasal airway either alone or in combination with these other options.    Combination Procedures:  Combination of surgical procedures and other treatments may be recommended, particularly if patients have more than one area of narrowing or persistent positional disease.  The success rate of combination surgery ranges from 66-80%2,3.      1. Sedrick PORTER. The Role of the Nose in Snoring and Obstructive Sleep Apnoea: An Update.  Eur Arch Otorhinolaryngol. 2011; 268: 1365-73.  2.  Srinivas SM; Jovana JA; Farida JR; Pallanch JF; Eleni MB; Maryanne SG; Cassy DAWSON. Surgical modifications of the upper airway for obstructive sleep apnea in adults: a systematic review and meta-analysis. SLEEP 2010;33(10):0066-5011. Pricila SANCHEZ. Hypopharyngeal surgery in obstructive sleep apnea: an evidence-based medicine review.  Arch Otolaryngol Head Neck Surg. 2006 Feb;132(2):206-13.  3. Bakari YH1, Leonie Y, Maldonado KAITLIN. The efficacy of anatomically based multilevel surgery for obstructive sleep apnea. Otolaryngol Head Neck Surg. 2003 Oct;129(4):327-35.  4. Pricila SANCHEZ, Goldberg A. Hypopharyngeal Surgery in Obstructive Sleep Apnea: An Evidence-Based Medicine Review. Arch Otolaryngol Head Neck Surg. 2006 Feb;132(2):206-13.  5. Miguel PJ et al. Upper-Airway Stimulation for Obstructive Sleep Apnea.  N Engl J Med. 2014 Jan 9;370(2):139-49.  6. Deana Y et al. Increased Incidence of Cardiovascular Disease in Middle-aged Men with Obstructive Sleep Apnea. Am J Respir Crit Care Med; 2002 166: 159-165  7. Valeriy EM et al. Studying Life Effects and Effectiveness of Palatopharyngoplasty (SLEEP) study: Subjective Outcomes of Isolated Uvulopalatopharyngoplasty. Otolaryngol Head Neck Surg. 2011; 144: 623-631.  Please, schedule sleep study and follow up visit with the nurse (she will coming into the room and meet with you). Use sleeping aid only if needed during the night of the  study.    Thank you!    Mirza Her MD, MPH  Clinical Sleep and Occupational / Environmental Medicine

## 2018-04-27 NOTE — PROGRESS NOTES
"Sleep Center [facility]  Outpatient Sleep Medicine Consultation  April 27, 2018    Name: Kentrell Kirkpatrick MRN# [id number]   Age: 67 year old YOB: 1950     Date of Consultation: April 27, 2018  Consultation is requested by: Yoni Sarmiento Jr., MD  [address] [place] [address]  Primary care provider: Yoni Sarmiento Jr.    Patient is accompanied by: Patient presents alone today       Reason for Sleep Consult:     Kentrell Kirkpatrick is a 67 year old male patient that presents here for an initial evaluation due to \"snoring.\"         Assessment and Plan:     Summary Sleep Diagnoses:    (1) Snoring  (2) EDS    Summary Recommendations / Discussion:    This patient has sxs, hx, and physical findings that suggest the diagnosis of a sleep disordered breathing. In addition, he has an intermediate pre-test probability of SKYLER. Given that he does not have any contraindications, a portable HST sleep study will be performed. Furthermore, based on the patient's history and physical examination, there is low suspicion for hypoventilation and, therefore, no TCM monitoring and/or ABG would be necessary at this point. Today, the nature and pathophysiology of SKYLER were discussed. The different treatment options for SKYLER were also reviewed and explained today. The patient was given zolpidem 5 mg to use only during the night of the study and only if needed (medication side effects and possible complications discussed). Lifestyle recommendations including healthy dietary and exercising habits were discussed. Pt will follow up with me after having completed a portable HST sleep study.    Coding:  (R06.83) Snoring  (primary encounter diagnosis)  (G47.19) Excessive daytime sleepiness    Counseling included a comprehensive review of diagnostic and therapeutic strategies as well as risks of inadequate therapy.    Educational materials provided in instructions. The patient was instructed to avoid driving or operating " any heavy machinery when experiencing drowsiness.    All questions and concerns were addressed today. Pt agrees and understands the assessment and plan.         History of Present Illness:   Kentrell Kirkpatrick is a 67 year old  LHD male pt with PMH of back pain and benign prostatic hyperplasia presents for an initial evaluation today due to snoring.    This patient reports daily nocturnal loud snoring without any gasping / choking for air or witnessed apneas (snoring cannot be heard from a different room). The patient also endorses some non-restorative sleep with only mild sleep inertia. He also admits having some mild EDS with no inadvertent naps. This patient admits having some xerostomia. The patient denies any morning cephalgia, morning myalgias, CP, SOB, N/V, diarrhea, nocturia, nocturnal coughing spells, positional dyspnea, orthopnea, or GERD sxs. The patient sleeps with one pillow under his head without any elevation of the head of the bed. Lastly, this patient denies any drowsiness when driving with no near accidents.     PREVIOUS IN- LAB or HOME SLEEP STUDIES: None Reported    SLEEP-WAKE SCHEDULE:     Kentrell Kirkpatrick      -Describes himself as a morning person; prefers to go to sleep at 9:00 PM and wakes up at 6:00 AM.      -Pt takes no naps during the week; takes no inadvertent naps.      -ON WEEKDAYS, goes to sleep at 9:00 PM during the week; awakens 5:00 AM with an alarm; falls asleep in 10 minutes; denies difficulty falling asleep.      -ON WEEKENDS, goes to sleep at 9:00 PM and wakes up at 5:00 AM with an alarm; falls asleep in 10 minutes.        -Awakens 1-2 times a night for 5 minutes before falling back to sleep; awakens to go to the bathroom.      BEDTIME ACTIVITIES AND SHIFT WORK:    Kentrell Kirkpatrick     -Bedtime Activities and Other Sleeping Information: Pt lives wife. Pt sleeps with wife on a yoandy size bed. Dog sleeps on the bed. Pt sleeps on his back and sides. Pt reads in bed, but  otherwise no electronic use reported.     -Occupation: Retired (construction)     SCALES        -SLEEP APNEA: Stopbang score: 4 / 8        -SLEEPINESS: Camden sleepiness scale (ESS):  5 / 24    Drowsy driving / near accidents: Denies any near accidents    Consequences: Mild non-restorative sleep and mild EDS    SLEEP COMPLAINTS:  Cardio-respiratory     -Snoring: Significant snoring reported    -Dyspnea: Pt denies having any witnessed apneas or dyspnea   -Morning headaches or confusion: Denies any morning cephalgia   -Coexisting Lung disease: Denies diagnosed or known lung disease at this time     -Coexisting Heart disease: Denies diagnosed or known cardiovascular disease at this time     -Does patient have a bed partner: Patient sleeps with spouse   -Has bed partner been sleeping separately because of snoring:  No            RLS Screen:    -When you try to relax in the evening or sleep at night, do you ever have unpleasant, restless feelings in your legs that can be relieved by walking or movement? None Reported     -Periodic limb movement: None Reported    Narcolepsy:     - Denies sudden urges of sleep attacks   - Denies cataplexy   - Denies sleep paralysis    - Denies hallucinations    - Admits feeling refreshed after a nap.    Sleep Behaviors:   - Denies leg symptoms/movements   - Denies motor restlessness   - Denies night terrors   - Denies bruxism   - Denies automatic behaviors    Other Subjective Complaints:   - Denies anxiety or rumination    - Denies pain and discomfort at night   - Denies waking up with heart pounding or racing   - Denies GERD or aspiration         Parasomnia:    -NREM - Denies recurrent persistent confusional arousal, night eating, sleep walking or sleep terrors.      -REM - Denies dream enactment or injuries.     -Driving Accident or Near Accidents: None Reported         Medications:     Current Outpatient Prescriptions   Medication Sig     ASPIRIN PO Take 81 mg by mouth     calcium  polycarbophil (FIBERCON) 625 MG tablet Take 2 tablets by mouth daily     MULTI VITAMIN MENS OR TABS 1 TABLET DAILY     tamsulosin (FLOMAX) 0.4 MG capsule Take 1 capsule (0.4 mg) by mouth daily     tamsulosin (FLOMAX) 0.4 MG capsule Take 1 capsule (0.4 mg) by mouth daily     No current facility-administered medications for this visit.       No Known Allergies         Past Medical History:     Denies needing any 02 supplement at night.    Past Medical History:   Diagnosis Date     Colon polyps            Past Surgical History:    Denies previous upper airway surgery.     Past Surgical History:   Procedure Laterality Date     COLONOSCOPY       COLONOSCOPY N/A 12/22/2016    Procedure: COMBINED COLONOSCOPY, SINGLE OR MULTIPLE BIOPSY/POLYPECTOMY BY BIOPSY;  Surgeon: Jeremi Kramer MD;  Location:  GI          Social History:     Social History   Substance Use Topics     Smoking status: Former Smoker     Packs/day: 1.50     Years: 43.00     Types: Cigarettes     Quit date: 12/16/2007     Smokeless tobacco: Never Used     Alcohol use Yes      Comment: socially     Chemical History:     Tobacco: Quit smoking about 11 yrs ago     Uses 10 cups/day of coffee pt drinks occasionally one soda a day. Last caffeine intake is usually before 10 AM.    Supplements for wakefulness: Patient does not use any supplements to stay awake    EtOH: 5 to 8 drinks a week  Recreational Drugs: Patient denies using any recreational drugs     Psych Hx:   Current dangers to self or others: No. Pt denies any SI / HI, hallucinations, or delusions         Family History:     Family History   Problem Relation Age of Onset     HEART DISEASE Father      Prostate Cancer Other      Prostate Cancer Brother       Sleep Family Hx:       RLS - None reported   SKYLER - None reported  Insomnia - None reported  Parasomnia - None reported         Review of Systems:     A complete 10 point review of systems was negative other than HPI or as commented below:  "    CONSTITUTIONAL: NEGATIVE for weight gain/loss, fever, chills, sweats or night sweats, drug allergies.  EYES: NEGATIVE for changes in vision, blind spots, double vision.  ENT: NEGATIVE for ear pain, sore throat, sinus pain, post-nasal drip, runny nose, bloody nose  CARDIAC: NEGATIVE for fast heartbeats or fluttering in chest, chest pain or pressure, breathlessness when lying flat, swollen legs or swollen feet.  NEUROLOGIC: NEGATIVE headaches, weakness or numbness in the arms or legs.  DERMATOLOGIC: NEGATIVE for rashes, new moles or change in mole(s)  PULMONARY: NEGATIVE SOB at rest, SOB with activity, dry cough, productive cough, coughing up blood, wheezing or whistling when breathing.    GASTROINTESTINAL: NEGATIVE for nausea or vomitting, loose or watery stools, fat or grease in stools, constipation, abdominal pain, bowel movements black in color or blood noted.  GENITOURINARY: NEGATIVE for pain during urination, blood in urine, urinating more frequently than usual, irregular menstrual periods.  MUSCULOSKELETAL: NEGATIVE for muscle pain, bone or joint pain, swollen joints.  ENDOCRINE: NEGATIVE for increased thirst or urination, diabetes.  LYMPHATIC: NEGATIVE for swollen lymph nodes, lumps or bumps in the breasts or nipple discharge.         Physical Examination:   BP 97/59  Pulse 66  Resp 18  Ht 1.854 m (6' 1\")  Wt 80.7 kg (178 lb)  SpO2 95%  BMI 23.48 kg/m2     VS: Reviewed and normal.  General: Alert, oriented, not in distress. Dressed casually; Good eye contact; Comfortably sitting in a chair; in no apparent distress  HEENT: Normocephalic and atraumatic; NL TM x 2; pupils are isocoric and equally responsive to the light. PERRLA. EOMI. Normal fundoscopic examination; Nasal turbinates are normal with a normal septal alignment;  Mallampati score: Grade IV; Tonsillar hypertrophy: 1  hidden by pillars; Pharynx with no erythema or exudates.  NECK: Neck supple; symmetrical; no lymphadenopathy; no thyromegaly, " bruit, JVD noted. Neck circumference of 15.5 inches (39 cm).  Lungs: Both hemithoraces are symmetrical, normal to palpation, no dullness to percussion, auscultation of lungs revealed normal breath sounds with no expirium prolongation, wheezing, rhonci and crackles.  CVS: Normal S1 and S2 heart sounds with no extra heart sounds. No murmur, rubs, or clicks. Normal peripheral pulses throughout with no obvious peripheral edema.  Abdomen: Bowel sounds present. Abdomen is soft, non-tender, and non-distended. No organomegaly, ascitis, or obvious masses noted. Negative CVA tenderness.  Extremities/musculoskeletal: No deformity, cyanosis, or clubbing noted.  Neurology: Awake, alert, and oriented x 3. No obvious gross motor / sensorial deficits with normal strength in all extremities at 5/5 and normal sensation throughout. Cranial nerves are grossly intact with normal II to XII CN functions. Negative Romberg's test with normal gait. DTR are symmetric and normal at 2+/4.  Integumentary: No obvious skin rash.  Psychiatry: Mood and affect are appropriate. Euthymic with affect congruent with full range and intensity. No SI/HI with adequate insight and judgement.          Data: All pertinent previous laboratory data reviewed     No results found for: PH, PHARTERIAL, PO2, [id number], SAT, PCO2, HCO3, BASEEXCESS, ROSETTE, BEB  Lab Results   Component Value Date    TSH 1.790@ 12/05/2007     Lab Results   Component Value Date    GLC 91 03/13/2018    GLC 90 02/22/2016     Lab Results   Component Value Date    HGB 15.0 02/22/2016    HGB 14.6 12/09/2014     Lab Results   Component Value Date    BUN 15 02/22/2016    BUN 18 12/09/2014    CR 0.97 02/22/2016    CR 1.02 12/09/2014     Echocardiology:    -Echocardiogram obtained in 2016 showed LV with normal size and wall thickness. The LV systolic function was also normal with a LVEF estimated at 60 - 65%. RV was normal in size and function. The atria were also normal in size. Trace mitral valve  regurgitation noted. Trace to mild tricuspid regurgitation also noted. Aortic valve showed mild trileaflet aortic sclerosis.    Chest x-ray: None Available    PFT: None Available    Laboratory Studies:   Component Value Flag Ref Range Units Status Collected Lab   WBC 7.7  4.0 - 11.0 10e9/L Final 02/22/2016  2:45 PM 79   RBC Count 4.86  4.4 - 5.9 10e12/L Final 02/22/2016  2:45 PM 79   Hemoglobin 15.0  13.3 - 17.7 g/dL Final 02/22/2016  2:45 PM 79   Hematocrit 44.8  40.0 - 53.0 % Final 02/22/2016  2:45 PM 79   MCV 92  78 - 100 fl Final 02/22/2016  2:45 PM 79   MCH 30.9  26.5 - 33.0 pg Final 02/22/2016  2:45 PM 79   MCHC 33.5  31.5 - 36.5 g/dL Final 02/22/2016  2:45 PM 79   RDW 12.8  10.0 - 15.0 % Final 02/22/2016  2:45 PM 79   Platelet Count 230  150 - 450 10e9/L Final 02/22/2016  2:45 PM 79   Diff Method     Final 02/22/2016  2:45 PM 79   Automated Method   % Neutrophils 58.1   % Final 02/22/2016  2:45 PM 79   % Lymphocytes 29.7   % Final 02/22/2016  2:45 PM 79   % Monocytes 9.6   % Final 02/22/2016  2:45 PM 79   % Eosinophils 2.3   % Final 02/22/2016  2:45 PM 79   % Basophils 0.3   % Final 02/22/2016  2:45 PM 79   Absolute Neutrophil 4.5  1.6 - 8.3 10e9/L Final 02/22/2016  2:45 PM 79   Absolute Lymphocytes 2.3  0.8 - 5.3 10e9/L Final 02/22/2016  2:45 PM 79   Absolute Monocytes 0.7  0.0 - 1.3 10e9/L Final 02/22/2016  2:45 PM 79   Absolute Eosinophils 0.2  0.0 - 0.7 10e9/L Final 02/22/2016  2:45 PM 79   Absolute Basophils 0.0  0.0 - 0.2 10e9/L Final 02/22/2016  2:45 PM 79     Component Value Flag Ref Range Units Status Collected Lab   Sodium 142  133 - 144 mmol/L Final 02/22/2016  2:45 PM 65   Potassium 4.2  3.4 - 5.3 mmol/L Final 02/22/2016  2:45 PM 65   Chloride 106  94 - 109 mmol/L Final 02/22/2016  2:45 PM 65   Carbon Dioxide 25  20 - 32 mmol/L Final 02/22/2016  2:45 PM 65   Anion Gap 11  3 - 14 mmol/L Final 02/22/2016  2:45 PM 65   Glucose 90  70 - 99 mg/dL Final 02/22/2016  2:45 PM 65   Urea Nitrogen 15  7 - 30  mg/dL Final 02/22/2016  2:45 PM 65   Creatinine 0.97  0.66 - 1.25 mg/dL Final 02/22/2016  2:45 PM 65   GFR Estimate 78  >60 mL/min/1.7m2 Final 02/22/2016  2:45 PM 65   Comment:   Non  GFR Calc   GFR Estimate If Black   >60 mL/min/1.7m2 Final 02/22/2016  2:45 PM 65   >90    GFR Calc      Calcium 9.2  8.5 - 10.1 mg/dL Final 02/22/2016  2:45 PM 65   Bilirubin Total 1.5 (H) 0.2 - 1.3 mg/dL Final 02/22/2016  2:45 PM 65   Albumin 4.4  3.4 - 5.0 g/dL Final 02/22/2016  2:45 PM 65   Protein Total 7.1  6.8 - 8.8 g/dL Final 02/22/2016  2:45 PM 65   Alkaline Phosphatase 55  40 - 150 U/L Final 02/22/2016  2:45 PM 65   ALT 24  0 - 70 U/L Final 02/22/2016  2:45 PM 65   AST 17  0 - 45 U/L Final 02/22/2016  2:45 PM 65     Mirza Her MD, MPH  Clinical Sleep Medicine    Total time spent with patient: 60 min. Over >50% of the time was spent for face to face counseling, education, and evaluation.

## 2018-05-14 ENCOUNTER — TELEPHONE (OUTPATIENT)
Dept: FAMILY MEDICINE | Facility: CLINIC | Age: 68
End: 2018-05-14

## 2018-05-14 DIAGNOSIS — M54.41 BILATERAL LOW BACK PAIN WITH RIGHT-SIDED SCIATICA, UNSPECIFIED CHRONICITY: Primary | ICD-10-CM

## 2018-05-14 NOTE — TELEPHONE ENCOUNTER
Reason for Call:  Kentrell has been experiencing some right low back pain that hurts down to his knee when he is sitting. He has seen his chiropractor a few times and it hasn't helped. His Chiropractor Dr Leland Silverio([phone]) suggested he get an MRI. Kentrell scheduled one at [facility] tomorrow 5/15/18 at 3:00. [facility] said [facility] won't accept a referral from a chiropractor and he would need one from his primary.    Kentrell is wondering if you would be able to place a referral for him. Please call with questions or once the referral is completed.    Best phone number to reach pt at is: [phone]  Ok to leave a message with medical info? yes    Clover Calzada  Patient Representative

## 2018-05-15 ENCOUNTER — OFFICE VISIT (OUTPATIENT)
Dept: SLEEP MEDICINE | Facility: CLINIC | Age: 68
End: 2018-05-15
Payer: COMMERCIAL

## 2018-05-15 ENCOUNTER — TRANSFERRED RECORDS (OUTPATIENT)
Dept: HEALTH INFORMATION MANAGEMENT | Facility: CLINIC | Age: 68
End: 2018-05-15

## 2018-05-15 DIAGNOSIS — G47.19 EXCESSIVE DAYTIME SLEEPINESS: ICD-10-CM

## 2018-05-15 DIAGNOSIS — R06.83 SNORING: ICD-10-CM

## 2018-05-15 PROCEDURE — G0399 HOME SLEEP TEST/TYPE 3 PORTA: HCPCS | Performed by: FAMILY MEDICINE

## 2018-05-15 NOTE — TELEPHONE ENCOUNTER
Mayela can you have Dr Sarmiento look at this ASAP so we can get back to patient. I sent him a message this morning and marked urgent. Thanks    Candy Carreon  Referral Coordinator

## 2018-05-15 NOTE — TELEPHONE ENCOUNTER
Edy came in to clinic today and I referred him to [facility] for his scan.  He has it scheduled for 3 pm today.    Yoni Sarmiento MD

## 2018-05-15 NOTE — TELEPHONE ENCOUNTER
Patient calling back to check on status of this request. He has an appointment for MRI at 3:00pm today and needs to know if he should cancel or reschedule.  Mayela Shultz RN, BSN  [facility]

## 2018-05-15 NOTE — MR AVS SNAPSHOT
After Visit Summary   5/15/2018    Kentrell Kirkpatrick    MRN: [id number]           Patient Information     Date Of Birth          1950        Visit Information        Provider Department      5/15/2018 4:30 PM [facility]        Today's Diagnoses     Excessive daytime sleepiness        Snoring           Follow-ups after your visit        Your next 10 appointments already scheduled     May 16, 2018  9:00 AM CDT   HST Drop Off with [facility]   [facility] [place]    [address]  [place] [address] [phone]            May 25, 2018  8:30 AM CDT   Return Sleep Patient with Mirza Her MD   [facility] [place]    [address]  [place] [id number] [phone]              Future tests that were ordered for you today     Open Future Orders        Priority Expected Expires Ordered    MR Lumbar Spine w/o Contrast Routine  5/15/2019 5/15/2018            Who to contact     If you have questions or need follow up information about today's clinic visit or your schedule please contact [facility] directly at [phone].  Normal or non-critical lab and imaging results will be communicated to you by MyChart, letter or phone within 4 business days after the clinic has received the results. If you do not hear from us within 7 days, please contact the clinic through [facility] or phone. If you have a critical or abnormal lab result, we will notify you by phone as soon as possible.  Submit refill requests through [employer] or call your pharmacy and they will forward the refill request to us. Please allow 3 business days for your refill to be completed.          Additional Information About Your Visit        [facility] Information     [facility] gives you secure access to your electronic health record.  If you see a primary care provider, you can also send messages to your care team and make appointments. If you have questions, please call your primary care clinic.  If you do not have a primary care provider, please call [phone] and they will assist you.        Care EveryWhere ID     This is your Care EveryWhere ID. This could be used by other organizations to access your [facility] medical records  [id number]         Blood Pressure from Last 3 Encounters:   04/27/18 97/59   03/13/18 100/64   01/20/18 95/55    Weight from Last 3 Encounters:   04/27/18 80.7 kg (178 lb)   03/13/18 81.6 kg (180 lb)   01/20/18 83.1 kg (183 lb 4.8 oz)              We Performed the Following     HST-Home Sleep Apnea Test        Primary Care Provider Office Phone # Fax #    Yoni Sarmiento Jr., [phone] [address]  [place] [address]        Equal Access to Services     MIHIR GREGORIO : Hadii aad ku hadasho Soomaali, waaxda luqadaha, qaybta kaalmada adeegyada, waxay idiin hayaan adeeg kharashaan lakarlos . So [facility] [phone].    ATENCIÓN: Si habla español, tiene a perez disposición servicios gratuitos de asistencia lingüística. [facility] [phone].    We comply with applicable federal civil rights laws and [place] laws. We do not discriminate on the basis of race, color, national origin, age, disability, sex, sexual orientation, or gender identity.            Thank you!     Thank you for choosing [facility]  for your care. Our goal is always to provide you with excellent care. Hearing back from our patients is one way we can continue to improve our services. Please take a few minutes to complete the written survey that you may receive in the mail after your visit with us. Thank you!             Your Updated Medication List - Protect others around you: Learn how to safely use, store and throw away your medicines at www.disposemymeds.org.          This list is accurate as of 5/15/18   6:13 PM.  Always use your most recent med list.                   Brand Name Dispense Instructions for use Diagnosis    ASPIRIN PO      Take 81 mg by mouth        calcium polycarbophil 625 MG tablet    FIBERCON     Take 2 tablets by mouth daily        MULTI vitamin  MENS Tabs      1 TABLET DAILY        * tamsulosin 0.4 MG capsule    FLOMAX    90 capsule    Take 1 capsule (0.4 mg) by mouth daily    Benign nodular prostatic hyperplasia with lower urinary tract symptoms       * tamsulosin 0.4 MG capsule    FLOMAX    30 capsule    Take 1 capsule (0.4 mg) by mouth daily    Benign nodular prostatic hyperplasia with lower urinary tract symptoms       zolpidem 5 MG tablet    AMBIEN    1 tablet    Take tablet by mouth 15 minutes prior to sleep, for Sleep Study    Excessive daytime sleepiness, Snoring       * Notice:  This list has 2 medication(s) that are the same as other medications prescribed for you. Read the directions carefully, and ask your doctor or other care provider to review them with you.

## 2018-05-16 ENCOUNTER — MYC MEDICAL ADVICE (OUTPATIENT)
Dept: FAMILY MEDICINE | Facility: CLINIC | Age: 68
End: 2018-05-16

## 2018-05-16 ENCOUNTER — DOCUMENTATION ONLY (OUTPATIENT)
Dept: SLEEP MEDICINE | Facility: CLINIC | Age: 68
End: 2018-05-16
Payer: COMMERCIAL

## 2018-05-16 DIAGNOSIS — M54.16 LUMBAR RADICULOPATHY: Primary | ICD-10-CM

## 2018-05-16 NOTE — PROGRESS NOTES
This HST performed using a Noxturnal T3 device which recorded snore, sound, movement activity, body position, nasal pressure, oronasal thermal airflow, pulse, oximetry and both chest and abdominal respiratory effort. HST data was confined to the time patients states they were in bed.   Patient was score using 4% rules. Patient respiratory events showed an AHI 33.0 with loud snoring. Patient SP02 below 89% was 11.0 minutes. Overall signal quality was good.    Pt will follow up with sleep provider to determine appropriate therapy.     Ordering Arden GEIGER Demian, MD

## 2018-05-16 NOTE — PROCEDURES
HOME SLEEP STUDY INTERPRETATION    Patient: Kentrell Kirkpatrick  MRN: [id number]  YOB: 1950  Study Date: 5/15/2018  Ordering Provider: Mirza Her MD, MPH     Indications for Home Study: Kentrell Kirkpatrick is a 67 year-old male with a past medical history of back pain and benign prostatic hyperplasia who presents with symptoms suggestive of obstructive sleep apnea.    Patient's Characteristics:   Weight: 178.0 kg   Height: 73.0 cm   BMI: 23.5 kg/m2   Age: 67 years old   Grenola Score: 5    Data: A full night home sleep study was performed recording the standard physiologic parameters including body position, movement, sound, nasal pressure, thermal oral airflow, chest and abdominal movements with respiratory inductance plethysmography, and oxygen saturation by pulse oximetry. Pulse rate was estimated by oximetry recording. This study was considered adequate based on > 4 hours of quality oximetry and respiratory recording.     Recording Information:  Analysis Time: 599.9 minutes  Time in Bed: 349.2 minutes   Estimated sleep efficiency: 97.7%.   Time spent in supine position: 100.0% of total bed time (349.2 minutes)  Time spent in non-supine positions: 0.0% of total bed time (0.0 minutes)    Oximetry Analysis:   Sleep Associated Hypoxemia: Sustained hypoxemia was not present. Baseline oxygen saturation was 91.4%. Time with saturation less than or equal to 88% was 3.8 minutes. The lowest oxygen saturation was 80.0%.     Respiratory Analysis  Snoring: Mild snoring with heavy inspiratory and expiratory phases was noted.  Respiratory events: The home study revealed a presence of 19.8 obstructive apneas and 1.7 mixed events, and 2.6 central apneas. There were 52 hypopneas resulting in a combined apnea / hypopnea index (AHI) of 33.0 events per hour. The AHI supine was 33.0 events per hour, whereas the AHI non-supine was NaN events per hour. The overall respiratory disturbance index (RDI) was 33.033.0. The supine  RDI was 33.0 events per hour and the non-supine RDI was NaN events per hour.    Pattern: Excluding events noted above, respiratory rate and pattern was not normal.    Position: Percent of time spent: Supine- 100.0%; Prone- 0.0%; On left side- 0.0%; On right side- 0.0%.    Cardiac Analysis:  Maximum Pulse Rate: 117.0 beats per minute (bpm)  Minimum Pulse Rate: 58.0 beats per minute (bpm)  Average Pulse Rate: 74.4 beats per minute (bpm)  Time Spent Above 100 bpm: 1.9 minutes  Time Spent Below 40 bpm: 0.0 minutes    Heart Rate: By pulse oximetry, normal rate was not noted (normal pulse rate during sleep between 40 bpm and 90 bpm). Some tachycardia was observed.    Assessment:    (1) Severe obstructive sleep apnea.   (2) Sleep associated hypoxemia was not present.    Recommendations:   1. Based on the presence of severe obstructive sleep apnea and excessive daytime sleepiness, treatment could be empirically initiated with an auto-titrating PAP therapy with a range of 5 - 15 cmH2O. Recommend clinical follow up with sleep management team.  2. Patient may be a candidate for dental appliance through referral to Sleep Dentistry for the treatment of obstructive sleep apnea and/or socially disruptive snoring.  3. If devices are not acceptable or effective, patient may benefit from evaluation of possible surgical options.  If he is interested, would recommend referral to specialized ENT-Sleep provider.  4. Advise regarding the risks of drowsy driving.  5. Suggest optimizing sleep schedule and avoiding sleep deprivation.  6. Avoid sedating medications, including narcotics and alcohol, as these may exacerbate sleep apnea and/or underlying respiratory disorders.  7. Tachycardia was most likely secondary to severe obstructive sleep apnea. Baseline EKG may be considered.    Diagnosis Code(s):    (1) Obstructive Sleep Apnea G47.33   (2) Hypoxemia G47.36    It is important to note that portable HST sleep studies may tend to  underestimate the true severity of the condition. Also, it is important to highlight that an in-lab titration PSG sleep study or even another in-lab PSG sleep study may be necessary to adequately understand or treat the condition.    Mirza Her MD, MPH, 05/16/2018  Diplomate, American Board of Family Medicine, Sleep Medicine

## 2018-05-17 ENCOUNTER — TELEPHONE (OUTPATIENT)
Dept: PALLIATIVE MEDICINE | Facility: CLINIC | Age: 68
End: 2018-05-17

## 2018-05-17 NOTE — TELEPHONE ENCOUNTER
Pre-screening Questions for Radiology Injections:    Injection to be done at which interventional clinic site? [facility], instruct patient to arrive 30 minutes before the scheduled appointment time.     Procedure ordered by Yoni Sarmiento    Procedure ordered? Lumbar Epidural Steroid Injection    What insurance would patient like us to bill for this procedure? ucare      Worker's comp or MVA (motor vehicle accident) -Any injection DO NOT SCHEDULE and route to Emily Huerta.      [employer] - For SI joint injections, DO NOT SCHEDULE and route Asia Simms. [employer] FREEDOM NO PA REQUIRED EFFECTIVE 11/1/2017      [employer]- MBB's must be scheduled at LEAST two weeks apart      [employer] - Any injection besides hip/shoulder/knee joint DO NOT SCHEDULE and route to Asia Mendez. She will obtain PA and call pt back to schedule procedure or notify pt of denial.       [employer]-Route to Asia for review    Any chance of pregnancy? Not Applicable   If YES, do NOT schedule and route to RN pool    Is an  needed? No     Patient has a drive home? (mandatory) YES:    Is patient taking any blood thinners (plavix, coumadin, jantoven, warfarin, heparin, pradaxa or dabigatran )? No   If hold needed, do NOT schedule, route to RN pool     Is patient taking any aspirin products? Yes - Pt takes 81mg daily; instructed to hold 0 day(s) prior to procedure.      If more than 325mg/day do NOT schedule; route to RN pool     For CERVICAL procedures, hold all aspirin products for 6 days.      Does the patient have a bleeding or clotting disorder? No     If YES, okay to schedule AND route to RN nurse pool    **For any patients with platelet count <100, must be forwarded to provider**    Is patient diabetic?  No  If YES, have them bring their glucometer.    Does patient have an active infection or treated for one within the past week? No     Is patient currently taking any antibiotics?   No     For patients on chronic, preventative, or prophylactic antibiotics, procedures may be scheduled.     For patients on antibiotics for active or recent infection:    Evaristo Connelly Burton, Snitzer-antibiotic course must have been completed for 4 days    Is patient currently taking any steroid medications? (i.e. Prednisone, Medrol)  No     For patients on steroid medications:    Evaristo Connelly Burton, Snitzer-steroid course must have been completed for 4 days    Reviewed with patient:  If you are started on any steroids or antibiotics between now and your appointment, you must contact us because it may affect our ability to perform your procedure.  Yes    Is patient actively being treated for cancer or immunocompromised? No  If YES, do NOT schedule and route to RN pool     Are you able to get on and off an exam table with minimal or no assistance? Yes  If NO, do NOT schedule and route to RN pool    Are you able to roll over and lay on your stomach with minimal or no assistance? Yes  If NO, do NOT schedule and route to RN pool     Any allergies to contrast dye, iodine, shellfish, or numbing and steroid medications? No  If YES, route to RN pool AND add allergy information to appointment notes    Allergies: Review of patient's allergies indicates no known allergies.      Has the patient had a flu shot or any other vaccinations within 7 days before or after the procedure.  No     Does patient have an MRI/CT?  YES:   (SI joint, hip injections, lumbar sympathetic blocks, and stellate ganglion blocks do not require an MRI)    Was the MRI done w/in the last 3 years?  Yes    Was MRI done at [facility]? No    If not, where was it done? [facility]      If MRI was not done at [facility], [facility] or [facility] do NOT schedule and route to nursing.  If pt has an imaging disc, the injection may be scheduled but pt has to bring disc to appt. If they show up w/out disc the injection cannot be done    Reminders  (please tell patient if applicable):       Instructed pt to arrive 30 minutes early for IV start if this is for a cervical procedure, ALL sympathetic (stellate ganglion, hypogastric, or lumbar sympathetic block) and all sedation procedures (RFA, spinal cord stimulation trials).  Not Applicable   -IVs are not routinely placed for Dr. Mcintyre cervical cases   -Dr. Enriquez: IVs for cervical ESIs and cervical TBDs (not CMBBs/facet inj)      If NPO for sedation, informed patient that it is okay to take medications with sips of water (except if they are to hold blood thinners).  Not Applicable   *DO take blood pressure medication if it is prescribed*      If this is for a cervical LEO, informed patient that aspirin needs to be held for 6 days.   Not Applicable      For all patients not having spinal cord stimulator (SCS) trials or radiofrequency ablations (RFAs), informed patient:    IV sedation is not provided for this procedure.  If you feel that an oral anti-anxiety medication is needed, you can discuss this further with your referring provider or primary care provider.  The Pain Clinic provider will discuss specifics of what the procedure includes at your appointment.  Most procedures last 10-20 minutes.  We use numbing medications to help with any discomfort during the procedure.  Not Applicable      Do not schedule procedures requiring IV placement in the first appointment of the day or first appointment after lunch. Do NOT schedule at 0745, 0815 or 1245.       For patients 85 or older we recommend having an adult stay w/ them for the remainder of the day.       Does the patient have any questions?  NO  Jevon Gorman  [facility]

## 2018-05-17 NOTE — TELEPHONE ENCOUNTER
MARGIE for patient to schedule Lumbar LEO      Gloria Kraft    [facility]

## 2018-05-17 NOTE — TELEPHONE ENCOUNTER
Spoke with patient who reported that he has reviewed his MyChart message from Dr. Sarmiento and reported that his wife is attempting to make patient an appointment with Dr. Sarmiento for further discussion.  No additional questions or concerns at this time.     Shivani Castaneda RN [facility]

## 2018-05-17 NOTE — TELEPHONE ENCOUNTER
Reason for Call:  Same Day Appointment, Requested Provider:  [facility] - [facility], [place] - [facility]    PCP: Yoni Sarmiento Jr.    Reason for visit: pt would like to get workin to discuss recent mri    Duration of symptoms: na    Have you been treated for this in the past? No    Additional comments: na    Can we leave a detailed message on this number? YES    Phone number patient can be reached at: Home number on file [phone] (home)    Best Time: any    Call taken on 5/17/2018 at 8:25 AM by Ilsa Rivera

## 2018-05-22 ENCOUNTER — RADIANT APPOINTMENT (OUTPATIENT)
Dept: GENERAL RADIOLOGY | Facility: CLINIC | Age: 68
End: 2018-05-22
Attending: PAIN MEDICINE
Payer: COMMERCIAL

## 2018-05-22 ENCOUNTER — RADIOLOGY INJECTION OFFICE VISIT (OUTPATIENT)
Dept: PALLIATIVE MEDICINE | Facility: CLINIC | Age: 68
End: 2018-05-22
Payer: COMMERCIAL

## 2018-05-22 VITALS — SYSTOLIC BLOOD PRESSURE: 117 MMHG | HEART RATE: 70 BPM | OXYGEN SATURATION: 98 % | DIASTOLIC BLOOD PRESSURE: 86 MMHG

## 2018-05-22 DIAGNOSIS — M54.16 LUMBAR RADICULOPATHY: ICD-10-CM

## 2018-05-22 DIAGNOSIS — M54.16 LUMBAR RADICULOPATHY: Primary | ICD-10-CM

## 2018-05-22 PROCEDURE — 64483 NJX AA&/STRD TFRM EPI L/S 1: CPT | Mod: RT | Performed by: PAIN MEDICINE

## 2018-05-22 PROCEDURE — 64484 NJX AA&/STRD TFRM EPI L/S EA: CPT | Mod: RT | Performed by: PAIN MEDICINE

## 2018-05-22 NOTE — PROGRESS NOTES
Pre procedure Diagnosis: lumbar radiculopathy, lumbar degenerative disc disease   Post procedure Diagnosis: Same  Procedure performed: lumbar transforaminal epidural steroid injection at right L4-5, L5-S1, fluoroscopically guided, contrast controlled  Anesthesia: none  Complications: none  Operators: Alton Enriquez MD     Indications:   Kentrell Kirkpatrick is a 67 year old male.  They have a history of RLBP radiating to her LE. The pain is intermittent sharp shooting She denies progressive weakness. She denies incontinence Exam shows neg SLR and they have tried conservative treatment including meds.    MRI   Reviewed  Options/alternatives, benefits and risks were discussed with the patient including bleeding, infection, tissue trauma, numbness, weakness, paralysis, spinal cord injury, radiation exposure, headache and reaction to medications. Questions were answered to his satisfaction and he agrees to proceed. Voluntary informed consent was obtained and signed.     Vitals were reviewed: Yes  /75  Pulse 83  SpO2 98%  Allergies were reviewed:  Yes   Medications were reviewed:  Yes   Pre-procedure pain score: 5/10    Procedure:  After getting informed consent, patient was brought into the procedure suite and was placed in a prone position on the procedure table.   A Pause for the Cause was performed.  Patient was prepped and draped in sterile fashion.     After identifying the right L4-5, L5-S1 neuroforamen, the C-arm was rotated to a right lateral oblique angle.  A total of 5ml of Lidocaine 1% was used to anesthetize the skin and the needle track at a skin entry site coaxial with the fluoroscopy beam, and overriding the superior aspect of the neuroforamen.  A 22 gauge 3.5 inch spinal needle was advanced under intermittent fluoroscopy until it entered the foramen superiorly.    The position was then inspected from anteroposterior and lateral views, and the needle adjusted appropriately.  A total of 1ml of  Omnipaque-300 was injected, confirming appropriate position, with spread into the nerve root sheath and the epidural space, with no intravascular uptake. 9ml was wasted    Then, after repeated negative aspiration, a combination of Decadron 10 mg, 0.25% bupivacaine 2 ml, diluted with 3ml of normal saline was injected.     Hemostasis was achieved, the area was cleaned, and bandaids were placed when appropriate.  The patient tolerated the procedure well, and was taken to the recovery room.    Images were saved to PACS.    Post-procedure pain score: 4/10  Follow-up includes:   -f/u phone call in one week  -f/u with referring provider    Alton Enriquez MD  [facility]

## 2018-05-22 NOTE — MR AVS SNAPSHOT
After Visit Summary   5/22/2018    Kentrell Kirkpatrick    MRN: [id number]           Patient Information     Date Of Birth          1950        Visit Information        Provider Department      5/22/2018 8:45 AM Alton Enriquez MD [facility]        Care Instructions    [facility] Procedure Discharge Instructions    Today you saw:       Dr. Alton Enriquez    Your procedure: Lumbar Epidural steroid injection     Medications used:    Bupivacaine (anesthetic)   Dexamethasone (steroid)   Omnipaque (contrast)             Be cautious when walking as numbness and/or weakness in the legs may occur up to 6-8 hours after the procedure due to effect of the local anesthetic    Do not drive for 6 hours. The effect of the local anesthetic could slow your reflexes.     Avoid strenuous activity for the first 24 hours. You may resume your regular activities after that.     You may shower, however avoid swimming, tub baths or hot tubs for 24 hours following your procedure    You may have a mild to moderate increase in pain for several days following the injection.      You may use ice packs for 10-15 minutes, 3 to 4 times a day at the injection site for comfort    Do not use heat to painful areas for 6 to 8 hours. This will give the local anesthetic time to wear off and prevent you from accidentally burning your skin.    You may use anti-inflammatory medications (such as Ibuprofen/Advil or Aleve) or Tylenol for pain control if necessary    With diabetes, check your blood sugar more frequently than usual as your blood sugar may be higher than normal for 10-14 days following a steroid injection. Contact your doctor who manages your diabetes if your blood sugar is higher than usual    It may take up to 14 days for the steroid medication to start working although you may feel the effect as early as a few days after the procedure.     Follow up with your referring provider in 2-3  weeks      If you experience any of the following, call the pain center line during work hours at [phone] or on-call physician after hours at [phone]:  -Fever over 100 degree F  -Swelling, bleeding, redness, drainage, warmth at the injection site  -Progressive weakness or numbness in your legs or arms  -Loss of bowel or bladder function  -Unusual headache that is not relieved by Tylenol or your regular headache medication  -Unusual new onset of pain that is not improving    Phone #s:  Nurse triage line for general questions: [phone]          Follow-ups after your visit        Your next 10 appointments already scheduled     May 25, 2018  8:30 AM CDT   Return Sleep Patient with Mirza Her MD   [facility] ([facility])    [address]  [facility] [address] [phone]              Who to contact     If you have questions or need follow up information about today's clinic visit or your schedule please contact [facility] directly at [phone].  Normal or non-critical lab and imaging results will be communicated to you by [employer], letter or phone within 4 business days after the clinic has received the results. If you do not hear from us within 7 days, please contact the clinic through [employer] or phone. If you have a critical or abnormal lab result, we will notify you by phone as soon as possible.  Submit refill requests through [employer] or call your pharmacy and they will forward the refill request to us. Please allow 3 business days for your refill to be completed.          Additional Information About Your Visit        [employer] Information     [employer] gives you secure access to your electronic health record. If you see a primary care provider, you can also send messages to your care team and make appointments. If you have questions, please call your primary care clinic.  If you do not have a primary care  provider, please call [phone] and they will assist you.        Care EveryWhere ID     This is your Care EveryWhere ID. This could be used by other organizations to access your [facility] medical records  [id number]        Your Vitals Were     Pulse Pulse Oximetry                83 98%           Blood Pressure from Last 3 Encounters:   05/22/18 115/75   04/27/18 97/59   03/13/18 100/64    Weight from Last 3 Encounters:   04/27/18 80.7 kg (178 lb)   03/13/18 81.6 kg (180 lb)   01/20/18 83.1 kg (183 lb 4.8 oz)              Today, you had the following     No orders found for display       Primary Care Provider Office Phone # Fax #    Yoni Sarmiento Jr., [phone] [address]  [place] [address]        Equal Access to Services     : Hadii aad ku hadasho Soomaali, waaxda luqadaha, qaybta kaalmada adeegyada, north ureña hayyamil robles . So [facility] [phone].    ATENCIÓN: Si habla español, tiene a perez disposición servicios gratuitos de asistencia lingüística. Gladisame al [phone].    We comply with applicable federal civil rights laws and [place] laws. We do not discriminate on the basis of race, color, national origin, age, disability, sex, sexual orientation, or gender identity.            Thank you!     Thank you for choosing [facility]  for your care. Our goal is always to provide you with excellent care. Hearing back from our patients is one way we can continue to improve our services. Please take a few minutes to complete the written survey that you may receive in the mail after your visit with us. Thank you!             Your Updated Medication List - Protect others around you: Learn how to safely use, store and throw away your medicines at www.disposemymeds.org.          This list is accurate as of 5/22/18  8:56 AM.  Always use your most recent med list.                   Brand Name Dispense Instructions for use Diagnosis    ASPIRIN PO       Take 81 mg by mouth        calcium polycarbophil 625 MG tablet    FIBERCON     Take 2 tablets by mouth daily        MULTI vitamin  MENS Tabs      1 TABLET DAILY        * tamsulosin 0.4 MG capsule    FLOMAX    90 capsule    Take 1 capsule (0.4 mg) by mouth daily    Benign nodular prostatic hyperplasia with lower urinary tract symptoms       * tamsulosin 0.4 MG capsule    FLOMAX    30 capsule    Take 1 capsule (0.4 mg) by mouth daily    Benign nodular prostatic hyperplasia with lower urinary tract symptoms       zolpidem 5 MG tablet    AMBIEN    1 tablet    Take tablet by mouth 15 minutes prior to sleep, for Sleep Study    Excessive daytime sleepiness, Snoring       * Notice:  This list has 2 medication(s) that are the same as other medications prescribed for you. Read the directions carefully, and ask your doctor or other care provider to review them with you.

## 2018-05-22 NOTE — NURSING NOTE
Pre-procedure Intake    Have you been fasting? No     If yes, for how long?     Are you taking a prescribed blood thinner such as coumadin, Plavix, Xarelto?    Yes -   ASA    If yes, when did you take your last dose? Baby ASA    Do you take aspirin?  Yes -   ASA    If cervical procedure, have you held aspirin for 6 days?   NA    Do you have any allergies to contrast dye, iodine, steroid and/or numbing medications?  NO    Are you currently taking antibiotics or have an active infection?  NO    Have you had a fever/elevated temperature within the past week? NO    Are you currently taking oral steroids? NO    Do you have a ? Yes       Are you pregnant or breastfeeding?  Not Applicable    Are the vital signs normal?  Yes

## 2018-05-22 NOTE — PATIENT INSTRUCTIONS
[facility] Procedure Discharge Instructions    Today you saw:       Dr. Alton Enriquez    Your procedure: Lumbar Epidural steroid injection     Medications used:    Bupivacaine (anesthetic)   Dexamethasone (steroid)   Omnipaque (contrast)             Be cautious when walking as numbness and/or weakness in the legs may occur up to 6-8 hours after the procedure due to effect of the local anesthetic    Do not drive for 6 hours. The effect of the local anesthetic could slow your reflexes.     Avoid strenuous activity for the first 24 hours. You may resume your regular activities after that.     You may shower, however avoid swimming, tub baths or hot tubs for 24 hours following your procedure    You may have a mild to moderate increase in pain for several days following the injection.      You may use ice packs for 10-15 minutes, 3 to 4 times a day at the injection site for comfort    Do not use heat to painful areas for 6 to 8 hours. This will give the local anesthetic time to wear off and prevent you from accidentally burning your skin.    You may use anti-inflammatory medications (such as Ibuprofen/Advil or Aleve) or Tylenol for pain control if necessary    With diabetes, check your blood sugar more frequently than usual as your blood sugar may be higher than normal for 10-14 days following a steroid injection. Contact your doctor who manages your diabetes if your blood sugar is higher than usual    It may take up to 14 days for the steroid medication to start working although you may feel the effect as early as a few days after the procedure.     Follow up with your referring provider in 2-3 weeks      If you experience any of the following, call the pain center line during work hours at [phone] or on-call physician after hours at [phone]:  -Fever over 100 degree F  -Swelling, bleeding, redness, drainage, warmth at the injection site  -Progressive weakness or numbness in your legs or  arms  -Loss of bowel or bladder function  -Unusual headache that is not relieved by Tylenol or your regular headache medication  -Unusual new onset of pain that is not improving    Phone #s:  Nurse triage line for general questions: [phone]

## 2018-05-22 NOTE — NURSING NOTE
Discharge Information    IV Discontiued Time:  NA    Amount of Fluid Infused:  NA    Discharge Criteria = When patient returns to baseline or as per MD order    Consciousness:  Pt is fully awake    Circulation:  BP +/- 20% of pre-procedure level    Respiration:  Patient is able to breathe deeply    O2 Sat:  Patient is able to maintain O2 Sat >92% on room air    Activity:  Moves 4 extremities on command    Ambulation:  Patient is able to stand and walk or stand and pivot into wheelchair    Dressing:  Clean/dry or No Dressing    Notes:   Discharge instructions and AVS given to patient    Patient meets criteria for discharge?  YES    Admitted to PCU?  No    Responsible adult present to accompany patient home?  Yes    Signature/Title:    Clair Shane RN Care Coordinator  [facility]

## 2018-05-25 ENCOUNTER — OFFICE VISIT (OUTPATIENT)
Dept: SLEEP MEDICINE | Facility: CLINIC | Age: 68
End: 2018-05-25
Payer: COMMERCIAL

## 2018-05-25 ENCOUNTER — MYC MEDICAL ADVICE (OUTPATIENT)
Dept: FAMILY MEDICINE | Facility: CLINIC | Age: 68
End: 2018-05-25

## 2018-05-25 VITALS
DIASTOLIC BLOOD PRESSURE: 63 MMHG | BODY MASS INDEX: 23.19 KG/M2 | HEIGHT: 73 IN | OXYGEN SATURATION: 95 % | WEIGHT: 175 LBS | RESPIRATION RATE: 12 BRPM | SYSTOLIC BLOOD PRESSURE: 107 MMHG | HEART RATE: 67 BPM

## 2018-05-25 DIAGNOSIS — G47.33 OSA (OBSTRUCTIVE SLEEP APNEA): Primary | ICD-10-CM

## 2018-05-25 DIAGNOSIS — G47.19 EXCESSIVE DAYTIME SLEEPINESS: ICD-10-CM

## 2018-05-25 PROCEDURE — 99214 OFFICE O/P EST MOD 30 MIN: CPT | Performed by: FAMILY MEDICINE

## 2018-05-25 NOTE — MR AVS SNAPSHOT
After Visit Summary   5/25/2018    Kentrell Kirkpatrick    MRN: [id number]           Patient Information     Date Of Birth          1950        Visit Information        Provider Department      5/25/2018 8:30 AM Mirza Her MD [facility]        Today's Diagnoses     SKYLER (obstructive sleep apnea)    -  1    Excessive daytime sleepiness          Care Instructions    Your blood pressure was checked while you were in clinic today.  Please read the guidelines below about what these numbers mean and what you should do about them.  Your systolic blood pressure is the top number.  This is the pressure when the heart is pumping.  Your diastolic blood pressure is the bottom number.  This is the pressure in between beats.  If your systolic blood pressure is less than 120 and your diastolic blood pressure is less than 80, then your blood pressure is normal. There is nothing more that you need to do about it  If your systolic blood pressure is 120-139 or your diastolic blood pressure is 80-89, your blood pressure may be higher than it should be.  You should have your blood pressure re-checked within a year by a primary care provider.  If your systolic blood pressure is 140 or greater or your diastolic blood pressure is 90 or greater, you may have high blood pressure.  High blood pressure is treatable, but if left untreated over time it can put you at risk for heart attack, stroke, or kidney failure.  You should have your blood pressure re-checked by a primary care provider within the next four weeks.  Your BMI is There is no height or weight on file to calculate BMI.  Weight management is a personal decision.  If you are interested in exploring weight loss strategies, the following discussion covers the approaches that may be successful. Body mass index (BMI) is one way to tell whether you are at a healthy weight, overweight, or obese. It measures your weight in relation to your  height.  A BMI of 18.5 to 24.9 is in the healthy range. A person with a BMI of 25 to 29.9 is considered overweight, and someone with a BMI of 30 or greater is considered obese. More than two-thirds of American adults are considered overweight or obese.  Being overweight or obese increases the risk for further weight gain. Excess weight may lead to heart disease and diabetes.  Creating and following plans for healthy eating and physical activity may help you improve your health.  Weight control is part of healthy lifestyle and includes exercise, emotional health, and healthy eating habits. Careful eating habits lifelong are the mainstay of weight control. Though there are significant health benefits from weight loss, long-term weight loss with diet alone may be very difficult to achieve- studies show long-term success with dietary management in less than 10% of people. Attaining a healthy weight may be especially difficult to achieve in those with severe obesity. In some cases, medications, devices and surgical management might be considered.  What can you do?  If you are overweight or obese and are interested in methods for weight loss, you should discuss this with your provider.     Consider reducing daily calorie intake by 500 calories.     Keep a food journal.     Avoiding skipping meals, consider cutting portions instead.    Diet combined with exercise helps maintain muscle while optimizing fat loss. Strength training is particularly important for building and maintaining muscle mass. Exercise helps reduce stress, increase energy, and improves fitness. Increasing exercise without diet control, however, may not burn enough calories to loose weight.       Start walking three days a week 10-20 minutes at a time    Work towards walking thirty minutes five days a week     Eventually, increase the speed of your walking for 1-2 minutes at time    In addition, we recommend that you review healthy lifestyles and methods  for weight loss available through the National Institutes of Health patient information sites:  http://win.niddk.nih.gov/publications/index.htm    And look into health and wellness programs that may be available through your health insurance provider, employer, local community center, or sukhi club.      1. CPAP-  WHAT DOES IT DO AND HOW CAN I LEARN TO WEAR IT?                               BEFORE I START, CAN I WATCH A MOVIE TO GET A PLAN ON HOW TO USE CPAP?  https://www.RefferedAgent.com.com/watch?z=t5H13yp802F      Continuous positive airway pressure, or CPAP, is the most effective treatment for obstructive sleep apnea. It works by blowing room air, through a mask, to hold your throat open. A decision to use CPAP is a major step forward in the pursuit of a healthier life. The successful use of CPAP will help you breathe easier, sleep better and live healthier. You can choose CPAP equipment from any durable medical equipment provider that meets your needs.  Using CPAP can be a positive experience if you keep these patterson points in mind:  1. Commitment  CPAP is not a quick fix for your problem. It involves a long-term commitment to improve your sleep and your health.    2. Communication  Stay in close communication with both your sleep doctor and your CPAP supplier. Ask lots of questions and seek help when you need it.    3. Consistency  Use CPAP all night, every night and for every nap. You will receive the maximum health benefits from CPAP when you use it every time that you sleep. This will also make it easier for your body to adjust to the treatment.    4. Correction  The first machine and mask that you try may not be the best ones for you. Work with your sleep doctor and your CPAP supplier to make corrections to your equipment selection. Ask about trying a different type of machine or mask if you have ongoing problems. Make sure that your mask is a good fit and learn to use your equipment properly.    5. Challenge  Tell a  "family member or close friend to ask you each morning if you used your CPAP the previous night. Have someone to challenge you to give it your best effort.    6. Connection   Your adjustment to CPAP will be easier if you are able to connect with others who use the same treatment. Ask your sleep doctor if there is a support group in your area for people who have sleep apnea, or look for one on the Internet.  7. Comfort   Increase your level of comfort by using a saline spray, decongestant or heated humidifier if CPAP irritates your nose, mouth or throat. Use your unit's \"ramp\" setting to slowly get used to the air pressure level. There may be soft pads you can buy that will fit over your mask straps. Look on www.CPAP.com for accessories that can help make CPAP use more comfortable.  8. Cleaning   Clean your mask, tubing and headgear on a regular basis. Put this time in your schedule so that you don't forget to do it. Check and replace the filters for your CPAP unit and humidifier.    9. Completion   Although you are never finished with CPAP therapy, you should reward yourself by celebrating the completion of your first month of treatment. Expect this first month to be your hardest period of adjustment. It will involve some trial and error as you find the machine, mask and pressure settings that are right for you.    10. Continuation  After your first month of treatment, continue to make a daily commitment to use your CPAP all night, every night and for every nap.    CPAP-Tips to starting with success:  Begin using your CPAP for short periods of time during the day while you watch TV or read.    Use CPAP every night and for every nap. Using it less often reduces the health benefits and makes it harder for your body to get used to it.    Make small adjustments to your mask, tubing, straps and headgear until you get the right fit. Tightening the mask may actually worsen the leak.  If it leaves significant marks on your " face or irritates the bridge of your nose, it may not be the best mask for you.  Speak with the person who supplied the mask and consider trying other masks. Insurances will allow you to try different masks during the first month of starting CPAP.  Insurance also covers a new mask, hose and filter about every 6 months.    Use a saline nasal spray to ease mild nasal congestion. Neti-Pot or saline nasal rinses may also help. Nasal gel sprays can help reduce nasal dryness.  Biotene mouthwash can be helpful to protect your teeth if you experience frequent dry mouth.  Dry mouth may be a sign of air escaping out of your mouth or out of the mask in the case of a full face mask.  Speak with your provider if you expect that is the case.     Take a nasal decongestant to relieve more severe nasal or sinus congestion.  Do not use Afrin (oxymetazoline) nasal spray more than 3 days in a row.  Speak with your sleep doctor if your nasal congestion is chronic.    Use a heated humidifier that fits your CPAP model to enhance your breathing comfort. Adjust the heat setting up if you get a dry nose or throat, down if you get condensation in the hose or mask.  Position the CPAP lower than you so that any condensation in the hose drains back into the machine rather than towards the mask.    Try a system that uses nasal pillows if traditional masks give you problems.    Clean your mask, tubing and headgear once a week. Make sure the equipment dries fully.    Regularly check and replace the filters for your CPAP unit and humidifier.    Work closely with your sleep provider and your CPAP supplier to make sure that you have the machine, mask and air pressure setting that works best for you. It is better to stop using it and call your provider to solve problems than to lay awake all night frustrated with the device.    Daytime naps are not advised, but use the PAP device if taking naps. Many insurances require that we prove you are using the  PAP device at least 4 hours on at least 70% of nights over a 30 day period. We have 90 days to meet those criteria.    You can get new supplies (mask, hose and filter) for your device every 3-6 months (covered by insurance). You do not need to get supplies that often, but they are available if you would like them. You may exchange the mask once within the first month if you feel the initial mask does not fit well. Please, contact your medical equipment provider for equipment issues.    Please let me know if you have any snoring, daytime sleepiness, or poor sleep quality. We will want to make sure your PAP device is adequately treating your condition.    There is a website called CPAP.com that has accessories that may make CPAP use easier. Please visit it at your convenience.    Please, schedule follow up visit with the nurse (she will coming into the room and meet with you).    Thank you!    Mirza Her MD, MPH  Clinical Sleep and Occupational / Environmental Medicine                Follow-ups after your visit        Who to contact     If you have questions or need follow up information about today's clinic visit or your schedule please contact [facility] directly at [phone].  Normal or non-critical lab and imaging results will be communicated to you by MyChart, letter or phone within 4 business days after the clinic has received the results. If you do not hear from us within 7 days, please contact the clinic through Mythoshart or phone. If you have a critical or abnormal lab result, we will notify you by phone as soon as possible.  Submit refill requests through Shooger or call your pharmacy and they will forward the refill request to us. Please allow 3 business days for your refill to be completed.          Additional Information About Your Visit        MythosharOration Information     [employer]er gives you secure access to your electronic health record. If you see a primary care provider, you  "can also send messages to your care team and make appointments. If you have questions, please call your primary care clinic.  If you do not have a primary care provider, please call [phone] and they will assist you.        Care EveryWhere ID     This is your Care EveryWhere ID. This could be used by other organizations to access your [place] medical records  [id number]        Your Vitals Were     Pulse Respirations Height Pulse Oximetry BMI (Body Mass Index)       67 12 1.854 m (6' 0.99\") 95% 23.09 kg/m2        Blood Pressure from Last 3 Encounters:   05/25/18 107/63   05/22/18 117/86   04/27/18 97/59    Weight from Last 3 Encounters:   05/25/18 79.4 kg (175 lb)   04/27/18 80.7 kg (178 lb)   03/13/18 81.6 kg (180 lb)              We Performed the Following     Comprehensive DME        Primary Care Provider Office Phone # Fax #    Yoni Sarmiento Jr., [phone] [address]  [place] [address]        Equal Access to Services     [facility]: Hadii aad ku hadasho Soomaali, waaxda luqadaha, qaybta kaalmada adeegyada, [place] . So [facility] [place] [phone].    ATENCIÓN: Si habla español, tiene a perez disposición servicios gratuitos de asistencia lingüística. Leeroy al [phone].    We comply with applicable federal civil rights laws and [place] laws. We do not discriminate on the basis of race, color, national origin, age, disability, sex, sexual orientation, or gender identity.            Thank you!     Thank you for choosing [facility]  for your care. Our goal is always to provide you with excellent care. Hearing back from our patients is one way we can continue to improve our services. Please take a few minutes to complete the written survey that you may receive in the mail after your visit with us. Thank you!             Your Updated Medication List - Protect others around you: Learn how to safely use, store and throw away " your medicines at www.disposemymeds.org.          This list is accurate as of 5/25/18  8:49 AM.  Always use your most recent med list.                   Brand Name Dispense Instructions for use Diagnosis    ASPIRIN PO      Take 81 mg by mouth        calcium polycarbophil 625 MG tablet    FIBERCON     Take 2 tablets by mouth daily        MULTI vitamin  MENS Tabs      1 TABLET DAILY        * tamsulosin 0.4 MG capsule    FLOMAX    90 capsule    Take 1 capsule (0.4 mg) by mouth daily    Benign nodular prostatic hyperplasia with lower urinary tract symptoms       * tamsulosin 0.4 MG capsule    FLOMAX    30 capsule    Take 1 capsule (0.4 mg) by mouth daily    Benign nodular prostatic hyperplasia with lower urinary tract symptoms       zolpidem 5 MG tablet    AMBIEN    1 tablet    Take tablet by mouth 15 minutes prior to sleep, for Sleep Study    Excessive daytime sleepiness, Snoring       * Notice:  This list has 2 medication(s) that are the same as other medications prescribed for you. Read the directions carefully, and ask your doctor or other care provider to review them with you.

## 2018-05-25 NOTE — NURSING NOTE
"Chief Complaint   Patient presents with     RECHECK     f/u hst 5/15       Initial /63  Pulse 67  Resp 12  Ht 1.854 m (6' 0.99\")  Wt 79.4 kg (175 lb)  SpO2 95%  BMI 23.09 kg/m2 Estimated body mass index is 23.09 kg/(m^2) as calculated from the following:    Height as of this encounter: 1.854 m (6' 0.99\").    Weight as of this encounter: 79.4 kg (175 lb).    Medication Reconciliation: complete      Olga Taveras, Sleep clinic specialist  "

## 2018-05-25 NOTE — PROGRESS NOTES
[facility]  Outpatient Sleep Medicine Follow Up Visit  May 25, 2018    Name: Kentrell Kirkpatrick MRN# [id number]   Age: 67 year old YOB: 1950     Date of Follow Up Visit: May 25, 2018  Consultation is requested by: Yoni Sarmiento Jr., MD  [address] [place] [address]  Primary care provider: Yoni Sarmiento Jr.    Patient is accompanied by: Patient presents alone today.       Reason for Sleep Consult:     Kentrell Kirkpatrick is a 67 year old male patient that presents here for a follow up evaluation after completing a portable HST sleep study.         Assessment and Plan:     Summary Sleep Diagnoses:    (1) Severe SKYLER (AHI of 33.0 events per hour)  (2) EDS    Summary Recommendations / Discussion:    During the initial visit, this patient had sxs, hx, and physical findings that suggested the diagnosis of a sleep disordered breathing. In addition, he had an intermediate pre-test probability of SKYLER. Given that he did not have any contraindications, a portable HST sleep study was performed. Furthermore, based on the patient's history and physical examination, there was low suspicion for hypoventilation and, therefore, no TCM monitoring and/or ABG was necessary. Today, the study results were carefully reviewed, explained, and discussed. Once again, the nature and pathophysiology of SKYLER were discussed. The different treatment options for SKYLER were also reviewed and explained again today. After much discussion, the patient opted to start PAP therapy (instead of MAD therapy). As such, he will be started on auto-titrating therapy with minimum pressure of 5 and maximum pressure of 15 cmH2O (patient has no contraindications for APAP therapy). PAP compliance was encouraged and discussed. Lifestyle recommendations including healthy dietary and exercising habits were discussed. Pt will follow up with me after having completed 6 weeks of PAP therapy. Pt will be enrolled in [facility]  program.    Coding:  (G47.33) SKYLER (obstructive sleep apnea)  (primary encounter diagnosis)  (G47.19) Excessive daytime sleepiness    Counseling included a comprehensive review of diagnostic and therapeutic strategies as well as risks of inadequate therapy.    Educational materials provided in instructions. The patient was instructed to avoid driving or operating any heavy machinery when experiencing drowsiness.    All questions and concerns were addressed today. Pt agrees and understands the assessment and plan.         History of Present Illness:   Kentrell Kirkpatrick is a 67 year old  LHD male pt with PMH of back pain and benign prostatic hyperplasia presents for a follow up evaluation today after completing a portable HST sleep study due to snoring.    During the initial visit, this patient reported daily nocturnal loud snoring without any gasping / choking for air or witnessed apneas (snoring cannot be heard from a different room). The patient also endorsed some non-restorative sleep with only mild sleep inertia. He also admitted having some mild EDS with no inadvertent naps. This patient admitted having some xerostomia. The patient denied any morning cephalgia, morning myalgias, CP, SOB, N/V, diarrhea, nocturia, nocturnal coughing spells, positional dyspnea, orthopnea, or GERD sxs. The patient sleeps with one pillow under his head without any elevation of the head of the bed. Lastly, this patient denied any drowsiness when driving with no near accidents.    After the initial visit, the patient completed a portable HST sleep study on 05/15/2018 and this demonstrated severe SKYLER without any sleep associated hypoxemia (AHI was 33.0 events per hour). The patient slept supine the entire night.    Today, the patient denies any new sxs or concerns.    PREVIOUS IN- LAB or HOME SLEEP STUDIES: None Reported    SLEEP-WAKE SCHEDULE:     Kentrell Kirkpatrick      -Describes himself as a morning person; prefers to go to  sleep at 9:00 PM and wakes up at 6:00 AM.      -Pt takes no naps during the week; takes no inadvertent naps.      -ON WEEKDAYS, goes to sleep at 9:00 PM during the week; awakens 5:00 AM with an alarm; falls asleep in 10 minutes; denies difficulty falling asleep.      -ON WEEKENDS, goes to sleep at 9:00 PM and wakes up at 5:00 AM with an alarm; falls asleep in 10 minutes.        -Awakens 1-2 times a night for 5 minutes before falling back to sleep; awakens to go to the bathroom.      BEDTIME ACTIVITIES AND SHIFT WORK:    Kentrell Kirkpatrick     -Bedtime Activities and Other Sleeping Information: Pt lives wife. Pt sleeps with wife on a yoandy size bed. Dog sleeps on the bed. Pt sleeps on his back and sides. Pt reads in bed, but otherwise no electronic use reported.     -Occupation: Retired (construction)     SCALES        -SLEEP APNEA: [employer]bang score: 4 / 8        -SLEEPINESS: [place] sleepiness scale (ESS):  5 / 24 (initial score)    Drowsy driving / near accidents: Denies any near accidents    Consequences: Mild non-restorative sleep and mild EDS    SLEEP COMPLAINTS:  Cardio-respiratory     -Snoring: Significant snoring reported    -Dyspnea: Pt denies having any witnessed apneas or dyspnea   -Morning headaches or confusion: Denies any morning cephalgia   -Coexisting Lung disease: Denies diagnosed or known lung disease at this time     -Coexisting Heart disease: Denies diagnosed or known cardiovascular disease at this time     -Does patient have a bed partner: Patient sleeps with spouse   -Has bed partner been sleeping separately because of snoring:  No            RLS Screen:    -When you try to relax in the evening or sleep at night, do you ever have unpleasant, restless feelings in your legs that can be relieved by walking or movement? None Reported     -Periodic limb movement: None Reported    Narcolepsy:     - Denies sudden urges of sleep attacks   - Denies cataplexy   - Denies sleep paralysis    - Denies  hallucinations    - Admits feeling refreshed after a nap.    Sleep Behaviors:   - Denies leg symptoms/movements   - Denies motor restlessness   - Denies night terrors   - Denies bruxism   - Denies automatic behaviors    Other Subjective Complaints:   - Denies anxiety or rumination    - Denies pain and discomfort at night   - Denies waking up with heart pounding or racing   - Denies GERD or aspiration         Parasomnia:    -NREM - Denies recurrent persistent confusional arousal, night eating, sleep walking or sleep terrors.      -REM - Denies dream enactment or injuries.     -Driving Accident or Near Accidents: None Reported         Medications:     Current Outpatient Prescriptions   Medication Sig     ASPIRIN PO Take 81 mg by mouth     calcium polycarbophil (FIBERCON) 625 MG tablet Take 2 tablets by mouth daily     MULTI VITAMIN MENS OR TABS 1 TABLET DAILY     tamsulosin (FLOMAX) 0.4 MG capsule Take 1 capsule (0.4 mg) by mouth daily     tamsulosin (FLOMAX) 0.4 MG capsule Take 1 capsule (0.4 mg) by mouth daily     zolpidem (AMBIEN) 5 MG tablet Take tablet by mouth 15 minutes prior to sleep, for Sleep Study (Patient not taking: Reported on 5/25/2018)     No current facility-administered medications for this visit.       No Known Allergies         Past Medical History:     Denies needing any 02 supplement at night.    Past Medical History:   Diagnosis Date     Colon polyps            Past Surgical History:    Denies previous upper airway surgery.     Past Surgical History:   Procedure Laterality Date     COLONOSCOPY       COLONOSCOPY N/A 12/22/2016    Procedure: COMBINED COLONOSCOPY, SINGLE OR MULTIPLE BIOPSY/POLYPECTOMY BY BIOPSY;  Surgeon: Jeremi Kramer MD;  Location:  GI          Social History:     Social History   Substance Use Topics     Smoking status: Former Smoker     Packs/day: 1.50     Years: 43.00     Types: Cigarettes     Quit date: 12/16/2007     Smokeless tobacco: Never Used     Alcohol use  Yes      Comment: socially     Chemical History:     Tobacco: Quit smoking about 11 yrs ago     Uses 10 cups/day of coffee pt drinks occasionally one soda a day. Last caffeine intake is usually before 10 AM.    Supplements for wakefulness: Patient does not use any supplements to stay awake    EtOH: 5 to 8 drinks a week  Recreational Drugs: Patient denies using any recreational drugs     Psych Hx:   Current dangers to self or others: No. Pt denies any SI / HI, hallucinations, or delusions         Family History:     Family History   Problem Relation Age of Onset     HEART DISEASE Father      Prostate Cancer Other      Prostate Cancer Brother       Sleep Family Hx:       RLS - None reported   SKYLER - None reported  Insomnia - None reported  Parasomnia - None reported         Review of Systems:     A complete 10 point review of systems was negative other than HPI or as commented below:     CONSTITUTIONAL: NEGATIVE for weight gain/loss, fever, chills, sweats or night sweats, drug allergies.  EYES: NEGATIVE for changes in vision, blind spots, double vision.  ENT: NEGATIVE for ear pain, sore throat, sinus pain, post-nasal drip, runny nose, bloody nose  CARDIAC: NEGATIVE for fast heartbeats or fluttering in chest, chest pain or pressure, breathlessness when lying flat, swollen legs or swollen feet.  NEUROLOGIC: NEGATIVE headaches, weakness or numbness in the arms or legs.  DERMATOLOGIC: NEGATIVE for rashes, new moles or change in mole(s)  PULMONARY: NEGATIVE SOB at rest, SOB with activity, dry cough, productive cough, coughing up blood, wheezing or whistling when breathing.    GASTROINTESTINAL: NEGATIVE for nausea or vomitting, loose or watery stools, fat or grease in stools, constipation, abdominal pain, bowel movements black in color or blood noted.  GENITOURINARY: NEGATIVE for pain during urination, blood in urine, urinating more frequently than usual, irregular menstrual periods.  MUSCULOSKELETAL: NEGATIVE for muscle  "pain, bone or joint pain, swollen joints.  ENDOCRINE: NEGATIVE for increased thirst or urination, diabetes.  LYMPHATIC: NEGATIVE for swollen lymph nodes, lumps or bumps in the breasts or nipple discharge.         Physical Examination:   /63  Pulse 67  Resp 12  Ht 1.854 m (6' 0.99\")  Wt 79.4 kg (175 lb)  SpO2 95%  BMI 23.09 kg/m2     VS: Reviewed and normal.  General: Alert, oriented, not in distress. Dressed casually; Good eye contact; Comfortably sitting in a chair; in no apparent distress  Lungs: Both hemithoraces are symmetrical, normal to palpation, no dullness to percussion, auscultation of lungs revealed normal breath sounds with no expirium prolongation, wheezing, rhonci and crackles.  CVS: Normal S1 and S2 heart sounds with no extra heart sounds. No murmur, rubs, or clicks. Normal peripheral pulses throughout with no obvious peripheral edema.  Psychiatry: Mood and affect are appropriate. Euthymic with affect congruent with full range and intensity. No SI/HI with adequate insight and judgement.          Data: All pertinent previous laboratory data reviewed     No results found for: PH, PHARTERIAL, PO2, [id number], SAT, PCO2, HCO3, BASEEXCESS, ROSETTE, BEB  Lab Results   Component Value Date    TSH 1.790@ 12/05/2007     Lab Results   Component Value Date    GLC 91 03/13/2018    GLC 90 02/22/2016     Lab Results   Component Value Date    HGB 15.0 02/22/2016    HGB 14.6 12/09/2014     Lab Results   Component Value Date    BUN 15 02/22/2016    BUN 18 12/09/2014    CR 0.97 02/22/2016    CR 1.02 12/09/2014     Echocardiology:    -Echocardiogram obtained in 2016 showed LV with normal size and wall thickness. The LV systolic function was also normal with a LVEF estimated at 60 - 65%. RV was normal in size and function. The atria were also normal in size. Trace mitral valve regurgitation noted. Trace to mild tricuspid regurgitation also noted. Aortic valve showed mild trileaflet aortic sclerosis.    Chest " x-ray: None Available    PFT: None Available    Laboratory Studies:   Component Value Flag Ref Range Units Status Collected Lab   WBC 7.7  4.0 - 11.0 10e9/L Final 02/22/2016  2:45 PM 79   RBC Count 4.86  4.4 - 5.9 10e12/L Final 02/22/2016  2:45 PM 79   Hemoglobin 15.0  13.3 - 17.7 g/dL Final 02/22/2016  2:45 PM 79   Hematocrit 44.8  40.0 - 53.0 % Final 02/22/2016  2:45 PM 79   MCV 92  78 - 100 fl Final 02/22/2016  2:45 PM 79   MCH 30.9  26.5 - 33.0 pg Final 02/22/2016  2:45 PM 79   MCHC 33.5  31.5 - 36.5 g/dL Final 02/22/2016  2:45 PM 79   RDW 12.8  10.0 - 15.0 % Final 02/22/2016  2:45 PM 79   Platelet Count 230  150 - 450 10e9/L Final 02/22/2016  2:45 PM 79   Diff Method     Final 02/22/2016  2:45 PM 79   Automated Method   % Neutrophils 58.1   % Final 02/22/2016  2:45 PM 79   % Lymphocytes 29.7   % Final 02/22/2016  2:45 PM 79   % Monocytes 9.6   % Final 02/22/2016  2:45 PM 79   % Eosinophils 2.3   % Final 02/22/2016  2:45 PM 79   % Basophils 0.3   % Final 02/22/2016  2:45 PM 79   Absolute Neutrophil 4.5  1.6 - 8.3 10e9/L Final 02/22/2016  2:45 PM 79   Absolute Lymphocytes 2.3  0.8 - 5.3 10e9/L Final 02/22/2016  2:45 PM 79   Absolute Monocytes 0.7  0.0 - 1.3 10e9/L Final 02/22/2016  2:45 PM 79   Absolute Eosinophils 0.2  0.0 - 0.7 10e9/L Final 02/22/2016  2:45 PM 79   Absolute Basophils 0.0  0.0 - 0.2 10e9/L Final 02/22/2016  2:45 PM 79     Component Value Flag Ref Range Units Status Collected Lab   Sodium 142  133 - 144 mmol/L Final 02/22/2016  2:45 PM 65   Potassium 4.2  3.4 - 5.3 mmol/L Final 02/22/2016  2:45 PM 65   Chloride 106  94 - 109 mmol/L Final 02/22/2016  2:45 PM 65   Carbon Dioxide 25  20 - 32 mmol/L Final 02/22/2016  2:45 PM 65   Anion Gap 11  3 - 14 mmol/L Final 02/22/2016  2:45 PM 65   Glucose 90  70 - 99 mg/dL Final 02/22/2016  2:45 PM 65   Urea Nitrogen 15  7 - 30 mg/dL Final 02/22/2016  2:45 PM 65   Creatinine 0.97  0.66 - 1.25 mg/dL Final 02/22/2016  2:45 PM 65   GFR Estimate 78  >60  mL/min/1.7m2 Final 02/22/2016  2:45 PM 65   Comment:   Non  GFR Calc   GFR Estimate If Black   >60 mL/min/1.7m2 Final 02/22/2016  2:45 PM 65   >90    GFR Calc      Calcium 9.2  8.5 - 10.1 mg/dL Final 02/22/2016  2:45 PM 65   Bilirubin Total 1.5 (H) 0.2 - 1.3 mg/dL Final 02/22/2016  2:45 PM 65   Albumin 4.4  3.4 - 5.0 g/dL Final 02/22/2016  2:45 PM 65   Protein Total 7.1  6.8 - 8.8 g/dL Final 02/22/2016  2:45 PM 65   Alkaline Phosphatase 55  40 - 150 U/L Final 02/22/2016  2:45 PM 65   ALT 24  0 - 70 U/L Final 02/22/2016  2:45 PM 65   AST 17  0 - 45 U/L Final 02/22/2016  2:45 PM 65     Mirza Her MD, MPH  Clinical Sleep Medicine    Total time spent with patient: 30 min. Over >50% of the time was spent for face to face counseling, education, and evaluation.

## 2018-05-25 NOTE — PATIENT INSTRUCTIONS
Your blood pressure was checked while you were in clinic today.  Please read the guidelines below about what these numbers mean and what you should do about them.  Your systolic blood pressure is the top number.  This is the pressure when the heart is pumping.  Your diastolic blood pressure is the bottom number.  This is the pressure in between beats.  If your systolic blood pressure is less than 120 and your diastolic blood pressure is less than 80, then your blood pressure is normal. There is nothing more that you need to do about it  If your systolic blood pressure is 120-139 or your diastolic blood pressure is 80-89, your blood pressure may be higher than it should be.  You should have your blood pressure re-checked within a year by a primary care provider.  If your systolic blood pressure is 140 or greater or your diastolic blood pressure is 90 or greater, you may have high blood pressure.  High blood pressure is treatable, but if left untreated over time it can put you at risk for heart attack, stroke, or kidney failure.  You should have your blood pressure re-checked by a primary care provider within the next four weeks.  Your BMI is There is no height or weight on file to calculate BMI.  Weight management is a personal decision.  If you are interested in exploring weight loss strategies, the following discussion covers the approaches that may be successful. Body mass index (BMI) is one way to tell whether you are at a healthy weight, overweight, or obese. It measures your weight in relation to your height.  A BMI of 18.5 to 24.9 is in the healthy range. A person with a BMI of 25 to 29.9 is considered overweight, and someone with a BMI of 30 or greater is considered obese. More than two-thirds of American adults are considered overweight or obese.  Being overweight or obese increases the risk for further weight gain. Excess weight may lead to heart disease and diabetes.  Creating and following plans for  healthy eating and physical activity may help you improve your health.  Weight control is part of healthy lifestyle and includes exercise, emotional health, and healthy eating habits. Careful eating habits lifelong are the mainstay of weight control. Though there are significant health benefits from weight loss, long-term weight loss with diet alone may be very difficult to achieve- studies show long-term success with dietary management in less than 10% of people. Attaining a healthy weight may be especially difficult to achieve in those with severe obesity. In some cases, medications, devices and surgical management might be considered.  What can you do?  If you are overweight or obese and are interested in methods for weight loss, you should discuss this with your provider.     Consider reducing daily calorie intake by 500 calories.     Keep a food journal.     Avoiding skipping meals, consider cutting portions instead.    Diet combined with exercise helps maintain muscle while optimizing fat loss. Strength training is particularly important for building and maintaining muscle mass. Exercise helps reduce stress, increase energy, and improves fitness. Increasing exercise without diet control, however, may not burn enough calories to loose weight.       Start walking three days a week 10-20 minutes at a time    Work towards walking thirty minutes five days a week     Eventually, increase the speed of your walking for 1-2 minutes at time    In addition, we recommend that you review healthy lifestyles and methods for weight loss available through the National Institutes of Health patient information sites:  http://win.niddk.nih.gov/publications/index.htm    And look into health and wellness programs that may be available through your health insurance provider, employer, local community center, or sukhi club.      1. CPAP-  WHAT DOES IT DO AND HOW CAN I LEARN TO WEAR IT?                               BEFORE I START,  CAN I WATCH A MOVIE TO GET A PLAN ON HOW TO USE CPAP?  https://www.youThe Backscratchers.com/watch?j=w6E44ty746M      Continuous positive airway pressure, or CPAP, is the most effective treatment for obstructive sleep apnea. It works by blowing room air, through a mask, to hold your throat open. A decision to use CPAP is a major step forward in the pursuit of a healthier life. The successful use of CPAP will help you breathe easier, sleep better and live healthier. You can choose CPAP equipment from any durable medical equipment provider that meets your needs.  Using CPAP can be a positive experience if you keep these patterson points in mind:  1. Commitment  CPAP is not a quick fix for your problem. It involves a long-term commitment to improve your sleep and your health.    2. Communication  Stay in close communication with both your sleep doctor and your CPAP supplier. Ask lots of questions and seek help when you need it.    3. Consistency  Use CPAP all night, every night and for every nap. You will receive the maximum health benefits from CPAP when you use it every time that you sleep. This will also make it easier for your body to adjust to the treatment.    4. Correction  The first machine and mask that you try may not be the best ones for you. Work with your sleep doctor and your CPAP supplier to make corrections to your equipment selection. Ask about trying a different type of machine or mask if you have ongoing problems. Make sure that your mask is a good fit and learn to use your equipment properly.    5. Challenge  Tell a family member or close friend to ask you each morning if you used your CPAP the previous night. Have someone to challenge you to give it your best effort.    6. Connection   Your adjustment to CPAP will be easier if you are able to connect with others who use the same treatment. Ask your sleep doctor if there is a support group in your area for people who have sleep apnea, or look for one on the  "Internet.  7. Comfort   Increase your level of comfort by using a saline spray, decongestant or heated humidifier if CPAP irritates your nose, mouth or throat. Use your unit's \"ramp\" setting to slowly get used to the air pressure level. There may be soft pads you can buy that will fit over your mask straps. Look on www.CPAP.com for accessories that can help make CPAP use more comfortable.  8. Cleaning   Clean your mask, tubing and headgear on a regular basis. Put this time in your schedule so that you don't forget to do it. Check and replace the filters for your CPAP unit and humidifier.    9. Completion   Although you are never finished with CPAP therapy, you should reward yourself by celebrating the completion of your first month of treatment. Expect this first month to be your hardest period of adjustment. It will involve some trial and error as you find the machine, mask and pressure settings that are right for you.    10. Continuation  After your first month of treatment, continue to make a daily commitment to use your CPAP all night, every night and for every nap.    CPAP-Tips to starting with success:  Begin using your CPAP for short periods of time during the day while you watch TV or read.    Use CPAP every night and for every nap. Using it less often reduces the health benefits and makes it harder for your body to get used to it.    Make small adjustments to your mask, tubing, straps and headgear until you get the right fit. Tightening the mask may actually worsen the leak.  If it leaves significant marks on your face or irritates the bridge of your nose, it may not be the best mask for you.  Speak with the person who supplied the mask and consider trying other masks. Insurances will allow you to try different masks during the first month of starting CPAP.  Insurance also covers a new mask, hose and filter about every 6 months.    Use a saline nasal spray to ease mild nasal congestion. Neti-Pot or saline " nasal rinses may also help. Nasal gel sprays can help reduce nasal dryness.  Biotene mouthwash can be helpful to protect your teeth if you experience frequent dry mouth.  Dry mouth may be a sign of air escaping out of your mouth or out of the mask in the case of a full face mask.  Speak with your provider if you expect that is the case.     Take a nasal decongestant to relieve more severe nasal or sinus congestion.  Do not use Afrin (oxymetazoline) nasal spray more than 3 days in a row.  Speak with your sleep doctor if your nasal congestion is chronic.    Use a heated humidifier that fits your CPAP model to enhance your breathing comfort. Adjust the heat setting up if you get a dry nose or throat, down if you get condensation in the hose or mask.  Position the CPAP lower than you so that any condensation in the hose drains back into the machine rather than towards the mask.    Try a system that uses nasal pillows if traditional masks give you problems.    Clean your mask, tubing and headgear once a week. Make sure the equipment dries fully.    Regularly check and replace the filters for your CPAP unit and humidifier.    Work closely with your sleep provider and your CPAP supplier to make sure that you have the machine, mask and air pressure setting that works best for you. It is better to stop using it and call your provider to solve problems than to lay awake all night frustrated with the device.    Daytime naps are not advised, but use the PAP device if taking naps. Many insurances require that we prove you are using the PAP device at least 4 hours on at least 70% of nights over a 30 day period. We have 90 days to meet those criteria.    You can get new supplies (mask, hose and filter) for your device every 3-6 months (covered by insurance). You do not need to get supplies that often, but they are available if you would like them. You may exchange the mask once within the first month if you feel the initial mask  does not fit well. Please, contact your medical equipment provider for equipment issues.    Please let me know if you have any snoring, daytime sleepiness, or poor sleep quality. We will want to make sure your PAP device is adequately treating your condition.    There is a website called CPAP.com that has accessories that may make CPAP use easier. Please visit it at your convenience.    Please, schedule follow up visit with the nurse (she will coming into the room and meet with you).    Thank you!    Mirza Her MD, MPH  Clinical Sleep and Occupational / Environmental Medicine

## 2018-05-29 NOTE — TELEPHONE ENCOUNTER
Routing to TC/MA pool to assist with scheduling. Please see patient's MyChart response. Thank you.  Mayela Shultz RN, BSN  [facility]

## 2018-05-30 ENCOUNTER — DOCUMENTATION ONLY (OUTPATIENT)
Dept: SLEEP MEDICINE | Facility: CLINIC | Age: 68
End: 2018-05-30

## 2018-05-30 NOTE — PROGRESS NOTES
Called patient to see if he would like to schedule a CPAP setup appointment with [facility]. Patient was busy and stated he would like to call back to schedule appointment. Gave patient [facility] phone number and our [facility] main Customer Service phone number to call back and schedule appointment.

## 2018-06-04 ENCOUNTER — MYC MEDICAL ADVICE (OUTPATIENT)
Dept: FAMILY MEDICINE | Facility: CLINIC | Age: 68
End: 2018-06-04

## 2018-06-04 ENCOUNTER — OFFICE VISIT (OUTPATIENT)
Dept: FAMILY MEDICINE | Facility: CLINIC | Age: 68
End: 2018-06-04
Payer: COMMERCIAL

## 2018-06-04 VITALS
TEMPERATURE: 99.4 F | HEART RATE: 89 BPM | DIASTOLIC BLOOD PRESSURE: 60 MMHG | WEIGHT: 177 LBS | HEIGHT: 73 IN | OXYGEN SATURATION: 97 % | SYSTOLIC BLOOD PRESSURE: 102 MMHG | BODY MASS INDEX: 23.46 KG/M2

## 2018-06-04 DIAGNOSIS — M54.17 LUMBOSACRAL RADICULOPATHY AT L5: Primary | ICD-10-CM

## 2018-06-04 DIAGNOSIS — I71.21 ASCENDING AORTIC ANEURYSM (H): ICD-10-CM

## 2018-06-04 DIAGNOSIS — M54.41 RIGHT-SIDED LOW BACK PAIN WITH RIGHT-SIDED SCIATICA, UNSPECIFIED CHRONICITY: Primary | ICD-10-CM

## 2018-06-04 PROCEDURE — 99214 OFFICE O/P EST MOD 30 MIN: CPT | Performed by: FAMILY MEDICINE

## 2018-06-04 NOTE — MR AVS SNAPSHOT
After Visit Summary   6/4/2018    Kentrell Kirkpatrick    MRN: [id number]           Patient Information     Date Of Birth          1950        Visit Information        Provider Department      6/4/2018 9:20 AM Yoni Sarmiento Jr., MD [facility]        Today's Diagnoses     Lumbosacral radiculopathy at L5    -  1    Ascending aortic aneurysm (H)           Follow-ups after your visit        Follow-up notes from your care team     Return in about 3 months (around 9/4/2018), or if symptoms worsen or fail to improve.      Who to contact     If you have questions or need follow up information about today's clinic visit or your schedule please contact [facility] [place] directly at [phone].  Normal or non-critical lab and imaging results will be communicated to you by MyChart, letter or phone within 4 business days after the clinic has received the results. If you do not hear from us within 7 days, please contact the clinic through Bybanhart or phone. If you have a critical or abnormal lab result, we will notify you by phone as soon as possible.  Submit refill requests through LiveClips or call your pharmacy and they will forward the refill request to us. Please allow 3 business days for your refill to be completed.          Additional Information About Your Visit        MyChart Information     LiveClips gives you secure access to your electronic health record. If you see a primary care provider, you can also send messages to your care team and make appointments. If you have questions, please call your primary care clinic.  If you do not have a primary care provider, please call [phone] and they will assist you.        Care EveryWhere ID     This is your Care EveryWhere ID. This could be used by other organizations to access your [facility] medical records  [id number]        Your Vitals Were     Pulse Temperature Height Pulse Oximetry BMI (Body Mass Index)       89 99.4  F (37.4  C)  "(Oral) 6' 0.99\" (1.854 m) 97% 23.36 kg/m2        Blood Pressure from Last 3 Encounters:   06/04/18 102/60   05/25/18 107/63   05/22/18 117/86    Weight from Last 3 Encounters:   06/04/18 177 lb (80.3 kg)   05/25/18 175 lb (79.4 kg)   04/27/18 178 lb (80.7 kg)              Today, you had the following     No orders found for display       Primary Care Provider Office Phone # Fax #    Yoni Sarmiento Jr., [phone] [address]  [place] [address]        Equal Access to Services     [facility] : Hadii pauly eden hadtessieo Sodylon, waaxda luqadaha, qaybta kaalmada adeegyada, [place] . So [facility] [phone].    ATENCIÓN: Si habla español, tiene a perez disposición servicios gratuitos de asistencia lingüística. Llame al [phone].    We comply with applicable federal civil rights laws and [place] laws. We do not discriminate on the basis of race, color, national origin, age, disability, sex, sexual orientation, or gender identity.            Thank you!     Thank you for choosing [facility]  for your care. Our goal is always to provide you with excellent care. Hearing back from our patients is one way we can continue to improve our services. Please take a few minutes to complete the written survey that you may receive in the mail after your visit with us. Thank you!             Your Updated Medication List - Protect others around you: Learn how to safely use, store and throw away your medicines at www.disposemymeds.org.          This list is accurate as of 6/4/18 10:08 AM.  Always use your most recent med list.                   Brand Name Dispense Instructions for use Diagnosis    ASPIRIN PO      Take 81 mg by mouth        calcium polycarbophil 625 MG tablet    FIBERCON     Take 2 tablets by mouth daily        MULTI vitamin  MENS Tabs      1 TABLET DAILY        * tamsulosin 0.4 MG capsule    FLOMAX    90 capsule    Take 1 capsule (0.4 mg) by mouth " daily    Benign nodular prostatic hyperplasia with lower urinary tract symptoms       * tamsulosin 0.4 MG capsule    FLOMAX    30 capsule    Take 1 capsule (0.4 mg) by mouth daily    Benign nodular prostatic hyperplasia with lower urinary tract symptoms       zolpidem 5 MG tablet    AMBIEN    1 tablet    Take tablet by mouth 15 minutes prior to sleep, for Sleep Study    Excessive daytime sleepiness, Snoring       * Notice:  This list has 2 medication(s) that are the same as other medications prescribed for you. Read the directions carefully, and ask your doctor or other care provider to review them with you.

## 2018-06-04 NOTE — Clinical Note
Dr. Cabral - please review your last note on this patient.  I'm looking for recommendations on surveilance of this ascending aortic aneurysm.  Annual CT angiography?  Echo?  Does he require a statin?  Beta-blockade?  I'm not clear on your plan for follow up from your consultation, but would be happy to execute the follow up once it's been clarified.  Thank you.  -- Raymond

## 2018-06-04 NOTE — PROGRESS NOTES
SUBJECTIVE:   Kentrell Kirkpatrick is a 67 year old male who presents to clinic today for the following health issues:      Back Pain Follow Up      Description:   Location of pain:  Bulging disk T3 and T4  Character of pain: sharp  Pain radiation: into right leg  Since last visit, pain is:  It's changed from the top of right leg to back of knee into the calf now, a little better but still there, right leg  feels like its got a lot of electricity in it  New numbness or weakness in legs, not attributed to pain:  no     Intensity: Currently 7/10, its when he sits that it hurts but when he's up and about feels ok    History:   Pain interferes with job: Not applicable  Therapies tried without relief: nothing  Therapies tried with relief: Had a Epidural injection about 2 weeks is waiting to see some results knows it might take a while to see any effect.       Accompanying Signs & Symptoms:  Risk of Fracture:  None  Risk of Cauda Equina:  None  Risk of Infection:  None  Risk of Cancer:  None      Amount of exercise or physical activity: None    Problems taking medications regularly: No    Medication side effects: none    Diet: regular (no restrictions)        Back pain- Pt had epidural steroid injection with Dr. Enriquez on 5/22/18 and notes his pain was good the day after but is still troubled by his pain. His pain is currently behind his right knee, outside of right calf, and feels he has numbness from lateral side of his right calf radiating down to his right foot frequently. He notes his right hip has been improved but is exacerbated when he is sitting, and is alleviated when he is standing and walking around. He thinks his pain is not lessened, and feels it simply has moved to a different location in his body.     Ascending aortic aneurysm- Pt had follow up echocardiogram to evaluate pulmonary nodule, and an aortic aneurysm shown from this, without rupture from 9/22/16. However, it is unclear what the follow up  "instructions were.      Problem list and histories reviewed & adjusted, as indicated.  Additional history: as documented    Reviewed and updated as needed this visit by clinical staff  Tobacco  Allergies  Meds  Problems       Reviewed and updated as needed this visit by Provider  Allergies  Meds  Problems       ROS:  Constitutional, HEENT, cardiovascular, pulmonary, gi and gu systems are negative, except as otherwise noted.    This document serves as a record of the services and decisions personally performed and made by Yoni Sarmiento MD. It was created on his behalf by Jarocho Perez, a trained medical scribe. The creation of this document is based on the provider's statements to the medical scribe.  Jarocho Perez 9:44 AM June 4, 2018    OBJECTIVE:     /60 (BP Location: Right arm, Patient Position: Sitting, Cuff Size: Adult Regular)  Pulse 89  Temp 99.4  F (37.4  C) (Oral)  Ht 1.854 m (6' 0.99\")  Wt 80.3 kg (177 lb)  SpO2 97%  BMI 23.36 kg/m2  Body mass index is 23.36 kg/(m^2).  GENERAL APPEARANCE: healthy, alert and no distress  Comprehensive back pain exam:  full range of motion in all modalities; able to rock up on toes, back on heels and do squat. 5/5 sensation L3-S1 dermatomes that is symmetric.  2+ DTR's at patella, Achilles.  straight leg-raise negative bilaterally.     Diagnostic Test Results:  No results found for this or any previous visit (from the past 24 hour(s)).    ASSESSMENT/PLAN:     (M54.17) Lumbosacral radiculopathy at L5  (primary encounter diagnosis)  Comment: Discussed with pt that we want to manage his lower back pain with lower risk treatments. Given he has only had one steroid injection, and is not having associated symptoms such as foot drop, I do not see the need for surgery referral at this time. Therefore, given he has been able to get some pain relief from his 1st epidural steroid injection 2 weeks ago, discussed with pt that we can do up to 3 of these in 1 " year. Furthermore, PT may also be able to help us manage his back pain. Discussed with pt that Gabapentin is also an option if we want to pursue pharmaceuticals. With all of these options, I will reach out to Dr. Enriquez to see how he wants to proceed with managing patient's back pain.  Plan: Will reach out to pt when I hear back from Dr. Enriquez with his recommendations.    (I71.2) Ascending aortic aneurysm (H)  Comment: Echocardiogram from 9/22/16 showed the presence of an aortic aneurysm, but it was unclear what the follow up instructions were supposed to be. Therefore, I discussed with pt that I will reach out to cardiology to see if we should proceed with yealry Echocardiogram or CT angiogram to follow up his aortic anyuerism.  Plan: Will reach out to pt when I hear back from cardiology.    The information in this document, created by the medical scribe for me, accurately reflects the services I personally performed and the decisions made by me. I have reviewed and approved this document for accuracy prior to leaving the patient care area.  June 4, 2018 9:44 AM    Yoni Sarmiento Jr, MD  [facility]

## 2018-06-05 NOTE — TELEPHONE ENCOUNTER
----- Message from Yoni Sarmiento Jr., MD sent at 6/4/2018  9:06 PM CDT -----  Regarding: Timing for a second LEO?  [place],    You recently did an epidural steroid injection on this patient about three weeks ago. It's helped, but has not yet eliminated the pain. See my clinic note from 6/4/18. Your thoughts on physical therapy here in [place] vs a second epidural steroid injection vs gabapentin?  Patient is open to anything.  Let me know your thoughts and I'll set Edy up appropriately.  Thanks for your insights.    Raymond

## 2018-06-05 NOTE — TELEPHONE ENCOUNTER
I am comfortable repeating the epidural injection.  Patient will need order for procedure.  I would highly recommend the patient starting PT as soon as possible.  Pending results of the repeat injection, I think it would be reasonable to start gabapentin.

## 2018-06-07 ENCOUNTER — TELEPHONE (OUTPATIENT)
Dept: PALLIATIVE MEDICINE | Facility: CLINIC | Age: 68
End: 2018-06-07

## 2018-06-07 NOTE — TELEPHONE ENCOUNTER
Called patient to schedule Lumbar LEO. Patient was busy and didn't have his calendar and will call back to schedule. Advised phone number.      Gloria Kraft    [facility]

## 2018-06-07 NOTE — TELEPHONE ENCOUNTER
Pre-screening Questions for Radiology Injections:    Injection to be done at which interventional clinic site? [facility], instruct patient to arrive 30 minutes before the scheduled appointment time.     Procedure ordered by Dr. Sarmiento    Procedure ordered? Lumbar Epidural Steroid Injection    What insurance would patient like us to bill for this procedure? UCARE and Medicare      Worker's comp or MVA (motor vehicle accident) -Any injection DO NOT SCHEDULE and route to Emiyl Huerta.      [employer] - For SI joint injections, DO NOT SCHEDULE and route Asia Simms. [employer] NO PA REQUIRED EFFECTIVE 11/1/2017      [employer]- MBB's must be scheduled at LEAST two weeks apart      [employer] - Any injection besides hip/shoulder/knee joint DO NOT SCHEDULE and route to Asia Simms. She will obtain PA and call pt back to schedule procedure or notify pt of denial.       [employer]-Route to Asia for review    Any chance of pregnancy? Not Applicable   If YES, do NOT schedule and route to RN pool    Is an  needed? No     Patient has a drive home? (mandatory) YES:     Is patient taking any blood thinners (plavix, coumadin, jantoven, warfarin, heparin, pradaxa or dabigatran )? No   If hold needed, do NOT schedule, route to RN pool     Is patient taking any aspirin products? Yes - Pt takes 81 mg daily; instructed to hold 0 day(s) prior to procedure.      If more than 325mg/day do NOT schedule; route to RN pool     For CERVICAL procedures, hold all aspirin products for 6 days.      Does the patient have a bleeding or clotting disorder? No     If YES, okay to schedule AND route to RN nurse pool    **For any patients with platelet count <100, must be forwarded to provider**    Is patient diabetic?  No  If YES, have them bring their glucometer.    Does patient have an active infection or treated for one within the past week? No     Is patient currently taking any  antibiotics?  No     For patients on chronic, preventative, or prophylactic antibiotics, procedures may be scheduled.     For patients on antibiotics for active or recent infection:    Evaristo Connelly Burton, Snitzer-antibiotic course must have been completed for 4 days    Is patient currently taking any steroid medications? (i.e. Prednisone, Medrol)  No     For patients on steroid medications:    Evaristo Connelly Burton, Snitzer-steroid course must have been completed for 4 days    Reviewed with patient:  If you are started on any steroids or antibiotics between now and your appointment, you must contact us because it may affect our ability to perform your procedure.  Yes    Is patient actively being treated for cancer or immunocompromised? No  If YES, do NOT schedule and route to RN pool     Are you able to get on and off an exam table with minimal or no assistance? Yes  If NO, do NOT schedule and route to RN pool    Are you able to roll over and lay on your stomach with minimal or no assistance? Yes  If NO, do NOT schedule and route to RN pool     Any allergies to contrast dye, iodine, shellfish, or numbing and steroid medications? No  If YES, route to RN pool AND add allergy information to appointment notes    Allergies: Review of patient's allergies indicates no known allergies.      Has the patient had a flu shot or any other vaccinations within 7 days before or after the procedure.  No     Does patient have an MRI/CT?  YES: MRI  (SI joint, hip injections, lumbar sympathetic blocks, and stellate ganglion blocks do not require an MRI)    Was the MRI done w/in the last 3 years?  Yes    Was MRI done at [facility]? No      If not, [facility] it done[facility]       If MRI was not done at [facility], [facility] or [facility] do NOT schedule and route to nursing.  If pt has an imaging disc, the injection may be scheduled but pt has to bring disc to appt. If they show up w/out disc the injection cannot be  done    Reminders (please tell patient if applicable):       Instructed pt to arrive 30 minutes early for IV start if this is for a cervical procedure, ALL sympathetic (stellate ganglion, hypogastric, or lumbar sympathetic block) and all sedation procedures (RFA, spinal cord stimulation trials).  Not Applicable   -IVs are not routinely placed for Dr. Mcintyre cervical cases   -Dr. Enriquez: IVs for cervical ESIs and cervical TBDs (not CMBBs/facet inj)      If NPO for sedation, informed patient that it is okay to take medications with sips of water (except if they are to hold blood thinners).  Not Applicable   *DO take blood pressure medication if it is prescribed*      If this is for a cervical LEO, informed patient that aspirin needs to be held for 6 days.   Not Applicable      For all patients not having spinal cord stimulator (SCS) trials or radiofrequency ablations (RFAs), informed patient:    IV sedation is not provided for this procedure.  If you feel that an oral anti-anxiety medication is needed, you can discuss this further with your referring provider or primary care provider.  The Pain Clinic provider will discuss specifics of what the procedure includes at your appointment.  Most procedures last 10-20 minutes.  We use numbing medications to help with any discomfort during the procedure.  NO      Do not schedule procedures requiring IV placement in the first appointment of the day or first appointment after lunch. Do NOT schedule at 0745, 0815 or 1245.       For patients 85 or older we recommend having an adult stay w/ them for the remainder of the day.       Does the patient have any questions?  NO  Emily Huerta  [facility]

## 2018-06-11 ENCOUNTER — MYC MEDICAL ADVICE (OUTPATIENT)
Dept: FAMILY MEDICINE | Facility: CLINIC | Age: 68
End: 2018-06-11

## 2018-06-11 ENCOUNTER — DOCUMENTATION ONLY (OUTPATIENT)
Dept: SLEEP MEDICINE | Facility: CLINIC | Age: 68
End: 2018-06-11
Payer: COMMERCIAL

## 2018-06-11 NOTE — PROGRESS NOTES
Patient was offered choice of [facility].  Patient Kentrell Kirkpatrick was set up at [place] on June 11, 2018. Patient received a [employer] DreamStation Auto. Pressures were set at 5-15 cm H2O.   Patient s ramp is 5 cm H2O for 30 min and FLEX/EPR is A Flex of 3.  Patient received a [employer] Simplus  Full Face mask Size Medium, heated tubing and heated humidifier.  Patient is enrolled in the STM Program and does need to meet compliance. Patient has a follow up to be determined.   FREDRICK SMITH

## 2018-06-12 ENCOUNTER — THERAPY VISIT (OUTPATIENT)
Dept: PHYSICAL THERAPY | Facility: CLINIC | Age: 68
End: 2018-06-12
Payer: COMMERCIAL

## 2018-06-12 DIAGNOSIS — M54.41 RIGHT-SIDED LOW BACK PAIN WITH RIGHT-SIDED SCIATICA: Primary | ICD-10-CM

## 2018-06-12 PROCEDURE — 97110 THERAPEUTIC EXERCISES: CPT | Mod: GP | Performed by: PHYSICAL THERAPIST

## 2018-06-12 PROCEDURE — 97161 PT EVAL LOW COMPLEX 20 MIN: CPT | Mod: GP | Performed by: PHYSICAL THERAPIST

## 2018-06-12 PROCEDURE — G8982 BODY POS GOAL STATUS: HCPCS | Mod: GP | Performed by: PHYSICAL THERAPIST

## 2018-06-12 PROCEDURE — G8981 BODY POS CURRENT STATUS: HCPCS | Mod: GP | Performed by: PHYSICAL THERAPIST

## 2018-06-12 NOTE — PROGRESS NOTES
[facility] Initial Evaluation  Kentrell Kirkpatrick is a 67 year old  male referred to physical therapy by Dr. Sarmiento for treatment of right sided low back pain with radiculopathy with Precautions/Restrictions/MD instructions eval and treat     Physical Therapy Initial Evaluation: Subjective History      Injury/Condition Details:  Presenting Complaint Right sided low back pain with radiculopathy   Onset Timing/Date May 2018   Mechanism Insidious      Symptom Behavior Details    Primary Pain Symptoms Location: Right sided low back pain with radiating pain into posterior and lateral LE  Quality: ache sometimes sharp, numbness/tingling   Frequency: Intermittent   Worst Pain 8/10   Best Pain 0/10   Symptom Provocators Sitting, driving   Symptom Relievers Standing, working, walking   Time of day dependent? None   Recent symptom change? None      Prior Testing/Intervention for current condition:  Prior Tests MRI of lumbar spine indicating posterior lateral disc protrusion L4-L5   Prior Treatment Chiropractic care with minimal benefit      Lifestyle & General Medical History:  General Health Reported by Patient excellent   Employment Retired   Orthopaedic history none   Notable medical history Sleep apnea     HPI                    Objective:  System         Lumbar/SI Evaluation  ROM:    AROM Lumbar:   Flexion:          50%, painfree  Ext:                    66%, painfree   Side Bend:        Left:  90%, painfree    Right:  75%, mild pain  Rotation:           Left:     Right:   Side Glide:        Left:     Right:           Lumbar Myotomes:  normal                  Neural Tension/Mobility:      Left side:SLR or Slump  negative.   Right side:   Slump and SLR positive.        Spinal Segmental Conclusions:     Level: Hypo noted at L2, L3, L4 and L5                                                   General     ROS    Assessment/Plan:    Patient is a 67 year old male with lumbar complaints.    Patient has the  following significant findings with corresponding treatment plan.                Diagnosis 1:  Right sided low back pain with radiculopathy  Pain -  mechanical traction, manual therapy, splint/taping/bracing/orthotics, self management, education, directional preference exercise and home program  Decreased joint mobility - manual therapy, therapeutic exercise, therapeutic activity and home program  Decreased function - therapeutic activities and home program    Therapy Evaluation Codes:   1) History comprised of:   Personal factors that impact the plan of care:      None.    Comorbidity factors that impact the plan of care are:      None.     Medications impacting care: Steroids.  2) Examination of Body Systems comprised of:   Body structures and functions that impact the plan of care:      Lumbar spine.   Activity limitations that impact the plan of care are:      Bending, Driving, Lifting, Sitting and Working.  3) Clinical presentation characteristics are:   Stable/Uncomplicated.  4) Decision-Making    Low complexity using standardized patient assessment instrument and/or measureable assessment of functional outcome.  Cumulative Therapy Evaluation is: Low complexity.    Previous and current functional limitations:  (See Goal Flow Sheet for this information)    Short term and Long term goals: (See Goal Flow Sheet for this information)     Communication ability:  Patient appears to be able to clearly communicate and understand verbal and written communication and follow directions correctly.  Treatment Explanation - The following has been discussed with the patient:   RX ordered/plan of care  Anticipated outcomes  Possible risks and side effects  This patient would benefit from PT intervention to resume normal activities.   Rehab potential is excellent.    Frequency:  1 X week, once daily  Duration:  for 6 weeks  Discharge Plan:  Achieve all LTG.  Independent in home treatment program.  Reach maximal therapeutic  benefit.    Please refer to the daily flowsheet for treatment today, total treatment time and time spent performing 1:1 timed codes.

## 2018-06-12 NOTE — MR AVS SNAPSHOT
After Visit Summary   6/12/2018    Kentrell Kirkpatrick    MRN: [id number]           Patient Information     Date Of Birth          1950        Visit Information        Provider Department      6/12/2018 9:00 AM Alton Lyons PT [facility] [place]        Today's Diagnoses     Right-sided low back pain with right-sided sciatica    -  1       Follow-ups after your visit        Your next 10 appointments already scheduled     Jun 14, 2018  2:45 PM CDT   Radiology Injections with Funmilayo Mcintyre MD   [facility] ([facility])    [address]  [address]  [place] [address]   [phone]            Jun 14, 2018  2:45 PM CDT   XR LUMBAR EPIDURAL INJECTION with [email or web address]   [facility] ([facility])    [address]  [address]  [place] [address]   [phone]           For nerve root injection, please send or bring copies of any MRIs or other scans you have had.  Bring a list of your current medicines to your exam. (Include vitamins, minerals and over-the-counter medicines.) Leave your valuables at home.  Plan to have someone drive you home afterward.  Stop taking the following medicines (but talk to your doctor first):   If you take blood thinners, you may need to stop taking them a few days before treatment. Talk to your doctor before stopping these medicines.Stop taking Coumadin (warfarin) 3 days before treatment. Restart the day after treatment.   If you take Plavix, Ticlid, Pletal or Persantine, please ask your doctor if you should stop these medicines. You may need extra tests on the morning of your scan.   If you take Xarelto (Rivaroxaban), you may need to stop taking it 24 hours before treatment. Talk to your doctor before stopping this medicine. Restart the day after treatment.  You may take your other medicines as normal.  Stop all food and drink (including water) 3 hours  before your test or treatment.  Please tell the doctor:   If you are allergic to X-ray dye (contrast fluid).   If you may be pregnant.  Injections take about 30 to 45 minutes. Most people spend up to 2 hours in the clinic or hospital.  Please call the Imaging Department at your exam site with any questions.            Jun 19, 2018  9:00 AM CDT   NISHA Spine with Alton Lyons PT   [facility] ([facility] [place])    [address]  [place] [address]   [phone]            Jun 26, 2018  8:20 AM CDT   NISHA Spine with Alton Lyons PT   [facility] [place] ([facility] [place])    [address] [facility] [address]   [phone]              Future tests that were ordered for you today     Open Future Orders        Priority Expected Expires Ordered    XR Lumbar Epidural Injection Routine 6/12/2018 6/12/2019 6/12/2018            Who to contact     If you have questions or need follow up information about today's clinic visit or your schedule please contact [facility] [place] directly at [phone].  Normal or non-critical lab and imaging results will be communicated to you by MyChart, letter or phone within 4 business days after the clinic has received the results. If you do not hear from us within 7 days, please contact the clinic through MyoKardiahart or phone. If you have a critical or abnormal lab result, we will notify you by phone as soon as possible.  Submit refill requests through Clinical Data or call your pharmacy and they will forward the refill request to us. Please allow 3 business days for your refill to be completed.          Additional Information About Your Visit        MyoKardiaharKontiki Information     [employer] Data gives you secure access to your electronic health record. If you see a primary care provider, you can also send messages to your care team and make appointments. If you have questions, please call your primary care clinic.  If you do not have a primary  care provider, please call [phone] and they will assist you.        Care EveryWhere ID     This is your Care EveryWhere ID. This could be used by other organizations to access your [facility] medical records  [id number]         Blood Pressure from Last 3 Encounters:   06/04/18 102/60   05/25/18 107/63   05/22/18 117/86    Weight from Last 3 Encounters:   06/04/18 80.3 kg (177 lb)   05/25/18 79.4 kg (175 lb)   04/27/18 80.7 kg (178 lb)              We Performed the Following     HC PT EVAL, LOW COMPLEXITY     NISHA INITIAL EVAL REPORT     THERAPEUTIC EXERCISES        Primary Care Provider Office Phone # Fax #    Yoni Sarmiento Jr., [phone] [address]  [place] [address]        Equal Access to Services     [facility]: Hadii aad ku hadasho Soomaali, waaxda luqadaha, qaybta kaalmada adeegyada, [place] hayyamil robles . So [facility] [phone].    ATENCIÓN: Si habla español, tiene a perez disposición servicios gratuitos de asistencia lingüística. Leeroy al [phone].    We comply with applicable federal civil rights laws and [place] laws. We do not discriminate on the basis of race, color, national origin, age, disability, sex, sexual orientation, or gender identity.            Thank you!     Thank you for choosing [facility] [place]  for your care. Our goal is always to provide you with excellent care. Hearing back from our patients is one way we can continue to improve our services. Please take a few minutes to complete the written survey that you may receive in the mail after your visit with us. Thank you!             Your Updated Medication List - Protect others around you: Learn how to safely use, store and throw away your medicines at www.disposemymeds.org.          This list is accurate as of 6/12/18 11:59 PM.  Always use your most recent med list.                   Brand Name Dispense Instructions for use Diagnosis    ASPIRIN PO      Take  81 mg by mouth        calcium polycarbophil 625 MG tablet    FIBERCON     Take 2 tablets by mouth daily        MULTI vitamin  MENS Tabs      1 TABLET DAILY        * tamsulosin 0.4 MG capsule    FLOMAX    90 capsule    Take 1 capsule (0.4 mg) by mouth daily    Benign nodular prostatic hyperplasia with lower urinary tract symptoms       * tamsulosin 0.4 MG capsule    FLOMAX    30 capsule    Take 1 capsule (0.4 mg) by mouth daily    Benign nodular prostatic hyperplasia with lower urinary tract symptoms       zolpidem 5 MG tablet    AMBIEN    1 tablet    Take tablet by mouth 15 minutes prior to sleep, for Sleep Study    Excessive daytime sleepiness, Snoring       * Notice:  This list has 2 medication(s) that are the same as other medications prescribed for you. Read the directions carefully, and ask your doctor or other care provider to review them with you.

## 2018-06-13 PROBLEM — M54.41 RIGHT-SIDED LOW BACK PAIN WITH RIGHT-SIDED SCIATICA: Status: ACTIVE | Noted: 2018-06-13

## 2018-06-14 ENCOUNTER — DOCUMENTATION ONLY (OUTPATIENT)
Dept: SLEEP MEDICINE | Facility: CLINIC | Age: 68
End: 2018-06-14
Payer: COMMERCIAL

## 2018-06-14 ENCOUNTER — RADIOLOGY INJECTION OFFICE VISIT (OUTPATIENT)
Dept: PALLIATIVE MEDICINE | Facility: CLINIC | Age: 68
End: 2018-06-14
Payer: COMMERCIAL

## 2018-06-14 DIAGNOSIS — Z53.9 ERRONEOUS ENCOUNTER--DISREGARD: Primary | ICD-10-CM

## 2018-06-14 NOTE — MR AVS SNAPSHOT
After Visit Summary   6/14/2018    Kentrell Kirkpatrick    MRN: [id number]           Patient Information     Date Of Birth          1950        Visit Information        Provider Department      6/14/2018 2:45 PM Funmilayo Mcintyre MD [facility]        Care Instructions    [facility] Procedure Discharge Instructions    Today you saw:   Dr. Funmilayo Mcintyre          Your procedure:  Epidural steroid injection       Medications use: Lidocaine (anesthetic)  Kenalog (steroid), Normal Saline Omnipaque (contrast)           Be cautious when walking as numbness and/or weakness in the legs may occur up to 6-8 hours after the procedure due to effect of the local anesthetic    Do not drive for 6 hours. The effect of the local anesthetic could slow your reflexes.     Avoid strenuous activity for the first 24 hours. You may resume your regular activities after that.     You may shower, however avoid swimming, tub baths or hot tubs for 24 hours following your procedure    You may have a mild to moderate increase in pain for several days following the injection.      You may use ice packs for 10-15 minutes, 3 to 4 times a day at the injection site for comfort    Do not use heat to painful areas for 6 to 8 hours. This will give the local anesthetic time to wear off and prevent you from accidentally burning your skin.    You may use anti-inflammatory medications (such as Ibuprofen/Advil or Aleve) or Tylenol for pain control if necessary    With diabetes, check your blood sugar more frequently than usual as your blood sugar may be higher than normal for 10-14 days following a steroid injection. Contact your doctor who manages your diabetes if your blood sugar is higher than usual    It may take up to 14 days for the steroid medication to start working although you may feel the effect as early as a few days after the procedure.     Follow up with your referring provider in 2-3 weeks      If you  experience any of the following, call the pain center line during work hours at [phone] or on-call physician after hours at [phone]:  -Fever over 100 degree F  -Swelling, bleeding, redness, drainage, warmth at the injection site  -Progressive weakness or numbness in your legs or arms  -Loss of bowel or bladder function  -Unusual headache that is not relieved by Tylenol or your regular headache medication  -Unusual new onset of pain that is not improving    Phone #s:  Nurse triage line for general questions: [phone]            Follow-ups after your visit        Your next 10 appointments already scheduled     Jun 14, 2018  2:45 PM CDT   Radiology Injections with Funmilayo Mcintyre MD   [facility] ([facility])    [address]  [address]  [facility] [id number] [phone]            Jun 14, 2018  2:45 PM CDT   XR LUMBAR EPIDURAL INJECTION with [email or web address]   [facility] X[facility] ([facility])    [address]  [address]  [facility] [address] [phone]           For nerve root injection, please send or bring copies of any MRIs or other scans you have had.  Bring a list of your current medicines to your exam. (Include vitamins, minerals and over-the-counter medicines.) Leave your valuables at home.  Plan to have someone drive you home afterward.  Stop taking the following medicines (but talk to your doctor first):   If you take blood thinners, you may need to stop taking them a few days before treatment. Talk to your doctor before stopping these medicines.Stop taking Coumadin (warfarin) 3 days before treatment. Restart the day after treatment.   If you take Plavix, Ticlid, Pletal or Persantine, please ask your doctor if you should stop these medicines. You may need extra tests on the morning of your scan.   If you take Xarelto (Rivaroxaban), you may need to stop taking it 24 hours before treatment. Talk to your  doctor before stopping this medicine. Restart the day after treatment.  You may take your other medicines as normal.  Stop all food and drink (including water) 3 hours before your test or treatment.  Please tell the doctor:   If you are allergic to X-ray dye (contrast fluid).   If you may be pregnant.  Injections take about 30 to 45 minutes. Most people spend up to 2 hours in the clinic or hospital.  Please call the Imaging Department at your exam site with any questions.            Jun 19, 2018  9:00 AM CDT   NISHA Spine with Alton Lyons PT   [facility] [place] ([facility])    [address]  [place] [id number] [phone]            Jun 26, 2018  8:20 AM CDT   NISHA Spine with Alton Lyons PT   [facility] [place] ([place])    [address] [facility] [id number] [phone]              Who to contact     If you have questions or need follow up information about today's clinic visit or your schedule please contact [facility] directly at [phone].  Normal or non-critical lab and imaging results will be communicated to you by yubackhart, letter or phone within 4 business days after the clinic has received the results. If you do not hear from us within 7 days, please contact the clinic through [employer] or phone. If you have a critical or abnormal lab result, we will notify you by phone as soon as possible.  Submit refill requests through [employer] or call your pharmacy and they will forward the refill request to us. Please allow 3 business days for your refill to be completed.          Additional Information About Your Visit        [employer]ubackharMobiTV Information     [employer] gives you secure access to your electronic health record. If you see a primary care provider, you can also send messages to your care team and make appointments. If you have questions, please call your primary care clinic.  If you do not have a primary care provider, please call  [phone] and they will assist you.        Care EveryWhere ID     This is your Care EveryWhere ID. This could be used by other organizations to access your [place] medical records  [id number]         Blood Pressure from Last 3 Encounters:   06/04/18 102/60   05/25/18 107/63   05/22/18 117/86    Weight from Last 3 Encounters:   06/04/18 80.3 kg (177 lb)   05/25/18 79.4 kg (175 lb)   04/27/18 80.7 kg (178 lb)              Today, you had the following     No orders found for display       Primary Care Provider Office Phone # Fax #    Yoni Sarmiento Jr., [phone] [address]  [place] [address]        Equal Access to Services     MIHIR GREGORIO : Hadii pauly eden hadasho Soomaali, waaxda luqadaha, qaybta kaalmada adeegyada, waxeveline ureña haytalhan chester robles . So [facility] [phone].    ATENCIÓN: Si habla español, tiene a perez disposición servicios gratuitos de asistencia lingüística. Llame al [phone].    We comply with applicable federal civil rights laws and [place] laws. We do not discriminate on the basis of race, color, national origin, age, disability, sex, sexual orientation, or gender identity.            Thank you!     Thank you for choosing [facility]  for your care. Our goal is always to provide you with excellent care. Hearing back from our patients is one way we can continue to improve our services. Please take a few minutes to complete the written survey that you may receive in the mail after your visit with us. Thank you!             Your Updated Medication List - Protect others around you: Learn how to safely use, store and throw away your medicines at www.disposemymeds.org.          This list is accurate as of 6/14/18  2:43 PM.  Always use your most recent med list.                   Brand Name Dispense Instructions for use Diagnosis    ASPIRIN PO      Take 81 mg by mouth        calcium polycarbophil 625 MG tablet    FIBERCON     Take 2 tablets by  mouth daily        MULTI vitamin  MENS Tabs      1 TABLET DAILY        * tamsulosin 0.4 MG capsule    FLOMAX    90 capsule    Take 1 capsule (0.4 mg) by mouth daily    Benign nodular prostatic hyperplasia with lower urinary tract symptoms       * tamsulosin 0.4 MG capsule    FLOMAX    30 capsule    Take 1 capsule (0.4 mg) by mouth daily    Benign nodular prostatic hyperplasia with lower urinary tract symptoms       zolpidem 5 MG tablet    AMBIEN    1 tablet    Take tablet by mouth 15 minutes prior to sleep, for Sleep Study    Excessive daytime sleepiness, Snoring       * Notice:  This list has 2 medication(s) that are the same as other medications prescribed for you. Read the directions carefully, and ask your doctor or other care provider to review them with you.

## 2018-06-14 NOTE — PROGRESS NOTES
The patient obtained 80% pain relief from his injection 3 weeks ago and continues to improve with physical therapy.  The decision was made to cancel the injection as he is doing so well.     Funmilayo Mcintyre MD     This encounter was opened in error. Please disregard.

## 2018-06-14 NOTE — PROGRESS NOTES
3 DAY STM VISIT    Diagnostic AHI:  33.0    Patient contacted for 3 day STM visit  Message left for patient to return call.     Device type: Auto-CPAP  PAP settings from order::  CPAP min 5 cm  H20       CPAP max 15 cm  H20    Mask type:    Per patient choice (All Options)     Device settings from machine      Min CPAP 5            Max CPAP 15        Assessment: Nightly usage, most nights under four hours.  Action plan: Pt to have f/u 14 day STM visit.

## 2018-06-14 NOTE — PROGRESS NOTES
Patient returned call.     Subjective measures: Pt states that the pressure is hard to exhale.  I adjusted his A-flex and changed his ramp to start at 4cm H2O to see if that makes a difference.  He's also going to practice using it while he's awake to see if that helps him get used to it. Patient failing following subjective benchmarks: pressure issues    Action plan:pt to have 14 day [facility] visit.

## 2018-06-15 ENCOUNTER — TELEPHONE (OUTPATIENT)
Dept: CARDIOLOGY | Facility: CLINIC | Age: 68
End: 2018-06-15

## 2018-06-15 DIAGNOSIS — I71.9 AORTA ANEURYSM (H): Primary | ICD-10-CM

## 2018-06-15 NOTE — TELEPHONE ENCOUNTER
Called patient to inform him Dr. Cabral would like him to have a repeat CT scan and a follow-up with him.  Patient did not answer. Left a message to call back with the direct number.

## 2018-06-15 NOTE — TELEPHONE ENCOUNTER
Patient called back. He stated he had a recent MRI that he believed showed the aortic aneurysm.  Informed him that writer would reviewed the chart and call him back to let him know if he would need the CT Chest. He states his understanding. No further questions.    Reviewed the chart. In May 2018, patient had a Lumbar MRI with [facility]. After reviewing the MRI it does not mention the aneurysm.  Called patient back but he did not answer. Left a message to call back with the direct number.

## 2018-06-19 ENCOUNTER — THERAPY VISIT (OUTPATIENT)
Dept: PHYSICAL THERAPY | Facility: CLINIC | Age: 68
End: 2018-06-19
Payer: COMMERCIAL

## 2018-06-19 ENCOUNTER — MYC MEDICAL ADVICE (OUTPATIENT)
Dept: SLEEP MEDICINE | Facility: CLINIC | Age: 68
End: 2018-06-19

## 2018-06-19 DIAGNOSIS — G47.33 OSA (OBSTRUCTIVE SLEEP APNEA): Primary | ICD-10-CM

## 2018-06-19 DIAGNOSIS — M54.41 RIGHT-SIDED LOW BACK PAIN WITH RIGHT-SIDED SCIATICA: ICD-10-CM

## 2018-06-19 PROCEDURE — 97112 NEUROMUSCULAR REEDUCATION: CPT | Mod: GP | Performed by: PHYSICAL THERAPIST

## 2018-06-19 PROCEDURE — 97110 THERAPEUTIC EXERCISES: CPT | Mod: GP | Performed by: PHYSICAL THERAPIST

## 2018-06-19 NOTE — TELEPHONE ENCOUNTER
Spoke to the patient. Explained to him that the lumbar MRI does not make note of the aortic aneurysm. Patient states his understanding. He agrees to the CT chest and follow-up with Dr. Cabral. He states he is at work but will call back tomorrow to schedule.

## 2018-06-19 NOTE — TELEPHONE ENCOUNTER
Called patient to discuss ordering a CT chest as well as review his recent [employer] message. He did not answer. Left a message with the direct number,

## 2018-06-25 DIAGNOSIS — G47.33 OSA (OBSTRUCTIVE SLEEP APNEA): Primary | ICD-10-CM

## 2018-06-26 ENCOUNTER — DOCUMENTATION ONLY (OUTPATIENT)
Dept: SLEEP MEDICINE | Facility: CLINIC | Age: 68
End: 2018-06-26

## 2018-06-26 ENCOUNTER — THERAPY VISIT (OUTPATIENT)
Dept: PHYSICAL THERAPY | Facility: CLINIC | Age: 68
End: 2018-06-26
Payer: COMMERCIAL

## 2018-06-26 DIAGNOSIS — M54.41 RIGHT-SIDED LOW BACK PAIN WITH RIGHT-SIDED SCIATICA: ICD-10-CM

## 2018-06-26 PROCEDURE — 97112 NEUROMUSCULAR REEDUCATION: CPT | Mod: GP | Performed by: PHYSICAL THERAPIST

## 2018-06-26 PROCEDURE — 97110 THERAPEUTIC EXERCISES: CPT | Mod: GP | Performed by: PHYSICAL THERAPIST

## 2018-06-26 NOTE — PROGRESS NOTES
14 DAY STM VISIT    Diagnostic AHI: 33.0     Subjective measures:   Pt is struggling with his mask and the pressure.  He is having a hard time exhaling against the pressure.  I adjusted his A-flex to 3 via the modem.  I also explained to him that in order for the pressure to go down to 4cm H2O, he has to hit his ramp button.  He also doesn't like the mask.  He would like to try a nasal pillow mask.     Assessment: Pt not meeting objective benchmarks for compliance  Patient failing following subjective benchmarks: pressure issues and mask discomfort   Action plan: schedule mask fit appointment and pt to have 30 day STM visit.   Device type: Auto-CPAP  PAP settings: CPAP min 5 cm  H20     CPAP max 15 cm  H20     90th % pressure5.5 cm  H20    Mask type:   Full Face Mask     Objective measures: 14 day rolling measures         Compliance  0 %      % of night spent in large leak  1 % last  upload      AHI 6.33   last  upload      Average number of minutes 11     Average hours of usage 0.2        Objective measure goal  Compliance   Goal >70%  Leak   Goal < 10%  AHI  Goal < 5  Usage  Goal >240

## 2018-06-27 ENCOUNTER — HOSPITAL ENCOUNTER (OUTPATIENT)
Dept: CT IMAGING | Facility: CLINIC | Age: 68
Discharge: HOME | End: 2018-06-27
Attending: INTERNAL MEDICINE | Admitting: INTERNAL MEDICINE
Payer: COMMERCIAL

## 2018-06-27 DIAGNOSIS — I71.9 AORTA ANEURYSM (H): ICD-10-CM

## 2018-06-27 PROCEDURE — 71260 CT THORAX DX C+: CPT

## 2018-06-27 PROCEDURE — 25000128 H RX IP 250 OP 636: Performed by: INTERNAL MEDICINE

## 2018-06-27 RX ORDER — IOPAMIDOL 755 MG/ML
500 INJECTION, SOLUTION INTRAVASCULAR ONCE
Status: COMPLETED | OUTPATIENT
Start: 2018-06-27 | End: 2018-06-27

## 2018-06-27 RX ADMIN — SODIUM CHLORIDE 60 ML: 9 INJECTION, SOLUTION INTRAVENOUS at 07:12

## 2018-06-27 RX ADMIN — IOPAMIDOL 80 ML: 755 INJECTION, SOLUTION INTRAVENOUS at 07:12

## 2018-07-09 ENCOUNTER — TELEPHONE (OUTPATIENT)
Dept: SLEEP MEDICINE | Facility: CLINIC | Age: 68
End: 2018-07-09

## 2018-07-09 NOTE — TELEPHONE ENCOUNTER
Patient states that he can't use his CPAP at all.  He would like to return it.  He is having issues with the pressure and is feeling claustrophobic with the mask. He wants to return his CPAP and go with a dental appliance.  Dr. Hre put in a dental referral and he would like that sent to him.

## 2018-07-09 NOTE — TELEPHONE ENCOUNTER
Patient called and left a  on 7/6/2018 at 9:36 am requesting a call back from someone regarding his CPAP.  He is having problems using it.

## 2018-07-12 ENCOUNTER — DOCUMENTATION ONLY (OUTPATIENT)
Dept: SLEEP MEDICINE | Facility: CLINIC | Age: 68
End: 2018-07-12

## 2018-07-12 NOTE — PROGRESS NOTES
30 DAY STM VISIT    Diagnostic AHI:   33.0       Subjective measures:   Pt is transitioning to a dental device.  He will contact us with any questions after he receives device.      Assessment: Pt not meeting objective benchmarks for compliance .  Unable to tolerate PAP   Action plan:Pt to call back with any questions or concerns.   Device type: Auto-CPAP  PAP settings: CPAP min 5 cm  H20     CPAP max 15 cm  H20    CPAP fixed 10 cm  H20     90th % pressure5.2 cm  H20    Mask type:  Full Face Mask    Objective measures: 14 day rolling measures         Compliance  0 %     % of night spent in large leak  2 % last  upload      AHI 8.9   last  upload      Average number of minutes 3          Objective measure goal  Compliance   Goal >70%  Leak   Goal < 10%  AHI  Goal < 5  Usage  Goal >240

## 2018-07-18 ENCOUNTER — DOCUMENTATION ONLY (OUTPATIENT)
Dept: SLEEP MEDICINE | Facility: CLINIC | Age: 68
End: 2018-07-18

## 2018-07-18 NOTE — PROGRESS NOTES
This nurse faxed Sleep study and Dental referral to Dr Alton Arroyo DDS on 7/18/18 as per  Kentrell Kirkpatrick's request.  Fax# [phone]            Jacqui Langley LPN/PADMA

## 2018-07-19 NOTE — PROGRESS NOTES
Received call from Joyce at Dr Quintanilla.  They had trouble with the fax yesterday and request it be refaxed to [phone].  Faxed sleep study and the dental referral today 7/19/18

## 2018-07-25 ENCOUNTER — TRANSFERRED RECORDS (OUTPATIENT)
Dept: HEALTH INFORMATION MANAGEMENT | Facility: CLINIC | Age: 68
End: 2018-07-25

## 2018-08-28 ENCOUNTER — APPOINTMENT (OUTPATIENT)
Dept: GENERAL RADIOLOGY | Facility: CLINIC | Age: 68
End: 2018-08-28
Attending: EMERGENCY MEDICINE
Payer: COMMERCIAL

## 2018-08-28 ENCOUNTER — HOSPITAL ENCOUNTER (EMERGENCY)
Facility: CLINIC | Age: 68
Discharge: HOME | End: 2018-08-28
Attending: EMERGENCY MEDICINE | Admitting: EMERGENCY MEDICINE
Payer: COMMERCIAL

## 2018-08-28 VITALS
TEMPERATURE: 98.1 F | OXYGEN SATURATION: 97 % | RESPIRATION RATE: 12 BRPM | WEIGHT: 175 LBS | HEART RATE: 61 BPM | DIASTOLIC BLOOD PRESSURE: 71 MMHG | SYSTOLIC BLOOD PRESSURE: 115 MMHG | HEIGHT: 73 IN | BODY MASS INDEX: 23.19 KG/M2

## 2018-08-28 DIAGNOSIS — S61.313A LACERATION OF LEFT MIDDLE FINGER WITHOUT FOREIGN BODY WITH DAMAGE TO NAIL, INITIAL ENCOUNTER: ICD-10-CM

## 2018-08-28 PROCEDURE — 99283 EMERGENCY DEPT VISIT LOW MDM: CPT

## 2018-08-28 PROCEDURE — 12002 RPR S/N/AX/GEN/TRNK2.6-7.5CM: CPT

## 2018-08-28 PROCEDURE — 25000132 ZZH RX MED GY IP 250 OP 250 PS 637: Performed by: EMERGENCY MEDICINE

## 2018-08-28 PROCEDURE — 73140 X-RAY EXAM OF FINGER(S): CPT | Mod: LT

## 2018-08-28 RX ORDER — HYDROCODONE BITARTRATE AND ACETAMINOPHEN 5; 325 MG/1; MG/1
2 TABLET ORAL ONCE
Status: COMPLETED | OUTPATIENT
Start: 2018-08-28 | End: 2018-08-28

## 2018-08-28 RX ORDER — BUPIVACAINE HYDROCHLORIDE 5 MG/ML
INJECTION, SOLUTION PERINEURAL
Status: DISCONTINUED
Start: 2018-08-28 | End: 2018-08-28 | Stop reason: HOSPADM

## 2018-08-28 RX ORDER — ACETAMINOPHEN AND CODEINE PHOSPHATE 300; 30 MG/1; MG/1
1-2 TABLET ORAL EVERY 6 HOURS PRN
Qty: 12 TABLET | Refills: 0 | Status: SHIPPED | OUTPATIENT
Start: 2018-08-28 | End: 2018-10-11

## 2018-08-28 RX ADMIN — HYDROCODONE BITARTRATE AND ACETAMINOPHEN 2 TABLET: 5; 325 TABLET ORAL at 10:27

## 2018-08-28 ASSESSMENT — ENCOUNTER SYMPTOMS: WOUND: 1

## 2018-08-28 NOTE — ED AVS SNAPSHOT
[facility]    [address]    [facility] [address]    Phone:  [phone]    Fax:  [phone]                                       Kentrell Kirkpatrick   MRN: [id number]    Department:  [facility]   Date of Visit:  8/28/2018           Patient Information     Date Of Birth          1950        Your diagnoses for this visit were:     Laceration of left middle finger without foreign body with damage to nail, initial encounter        You were seen by Mark Alonzo MD.      Follow-up Information     Follow up with [facility]. Schedule an appointment as soon as possible for a visit in 3 days.    Contact information:    [address], [facility] [address]  [facility] [address]  [phone]          Discharge Instructions       Discharge Instructions  Laceration (Cut)    You were seen today for a laceration (cut).  Your doctor examined your laceration for any problems such a buried foreign body (like glass, a splinter, or gravel), or injury to blood vessels, tendons, and nerves.  Your doctor may have also rinsed and/or scrubbed your laceration to help prevent an infection.  Your laceration may have been closed with glue, staples or sutures (stitches).      It may not be possible to find all problems with your laceration on the first visit, and we can't always prevent infections.  Antibiotics are only given when the benefit is more than the risk, and don't prevent all infections. Some lacerations are too high risk to close, and are left open to heal.  All lacerations, no matter how expertly repaired, will cause scarring.    Return to the Emergency Department right away if:    You have more redness, swelling, pain, drainage (pus), a bad smell, or red streaking from your laceration.      You have a fever of 101oF or more.    You have bleeding that you can t stop at home. If your cut starts to bleed, hold pressure  on the bleeding area with a clean cloth or put pressure over the bandage.  If the bleeding doesn t stop after using constant pressure for 30 minutes, you should return to the Emergency Department for further treatment.    An area past the laceration is cool, pale, or blue compared with the other side, or has a slower return of color when squeezed.    Your dressing seems too tight or starts to get uncomfortable or painful.    You have loss of normal function or use of an area, such as being unable to straighten or bend a finger normally.    You have a numb area past the laceration.    Return to the Emergency Department or see your regular doctor if:    The laceration starts to come open.     You have something coming out of the cut or a feeling that there is something in the laceration.    Your wound will not heal, or keeps breaking open. There can always be glass, wood, dirt or other things in any wound.  They won t always show up, even on x-rays.  If a wound doesn t heal, this may be why, and it is important to follow-up with your regular doctor.    Home Care:    Take your dressing off in 12 hours, or as instructed by your doctor, to check your laceration. Remove the dressing sooner if it seems too tight or painful, or if it is getting numb, tingly, or pale past the dressing.    Gently wash your laceration 2 times a day with clean cloth and soap.     It is okay to shower, but do not let the laceration soak in water.      If your laceration was closed with wound adhesive or strips: pat it dry and leave it open to the air.     For all other repairs: after you wash your laceration, or at least 2 times a day, apply bacitracin or other antibiotic ointment to the laceration, then cover it with a Band-Aid  or gauze.    Keep the laceration clean. Wear gloves or other protective clothing if you are around dirt.    Follow-up:    You need to follow-up with ortho in 3 days.      Scars:  To help minimize scarring:    Wear  "sunscreen over the healed laceration when out in the sun.    Massage the area regularly.    You may use Vitamin E oil.    Wait a year.  Most scars will start to fade within a year.    Probiotics: If you have been given an antibiotic, you may want to also take a probiotic pill or eat yogurt with live cultures. Probiotics have \"good bacteria\" to help your intestines stay healthy. Studies have shown that probiotics help prevent diarrhea and other intestine problems (including C. diff infection) when you take antibiotics. You can buy these without a prescription in the pharmacy section of the store.     If you were given a prescription for medicine here today, be sure to read all of the information (including the package insert) that comes with your prescription.  This will include important information about the medicine, its side effects, and any warnings that you need to know about.  The pharmacist who fills the prescription can provide more information and answer questions you may have about the medicine.  If you have questions or concerns that the pharmacist cannot address, please call or return to the Emergency Department.     Opioid Medication Information    Pain medications are among the most commonly prescribed medicines, so we are including this information for all our patients. If you did not receive pain medication or get a prescription for pain medicine, you can ignore it.     You may have been given a prescription for an opioid (narcotic) pain medicine and/or have received a pain medicine while here in the Emergency Department. These medicines can make you drowsy or impaired. You must not drive, operate dangerous equipment, or engage in any other dangerous activities while taking these medications. If you drive while taking these medications, you could be arrested for DUI, or driving under the influence. Do not drink any alcohol while you are taking these medications.     Opioid pain medications can cause " addiction. If you have a history of chemical dependency of any type, you are at a higher risk of becoming addicted to pain medications.  Only take these prescribed medications to treat your pain when all other options have been tried. Take it for as short a time and as few doses as possible. Store your pain pills in a secure place, as they are frequently stolen and provide a dangerous opportunity for children or visitors in your house to start abusing these powerful medications. We will not replace any lost or stolen medicine.  As soon as your pain is better, you should flush all your remaining medication.     Many prescription pain medications contain Tylenol  (acetaminophen), including Vicodin , Tylenol #3 , Norco , Lortab , and Percocet .  You should not take any extra pills of Tylenol  if you are using these prescription medications or you can get very sick.  Do not ever take more than 3000 mg of acetaminophen in any 24 hour period.    All opioids tend to cause constipation. Drink plenty of water and eat foods that have a lot of fiber, such as fruits, vegetables, prune juice, apple juice and high fiber cereal.  Take a laxative if you don t move your bowels at least every other day. Miralax , Milk of Magnesia, Colace , or Senna  can be used to keep you regular.      Remember that you can always come back to the Emergency Department if you are not able to see your regular doctor in the amount of time listed above, if you get any new symptoms, or if there is anything that worries you.        Your next 10 appointments already scheduled     Oct 11, 2018  1:45 PM CDT   Return Visit with Max Cabral MD   [facility] ([facility])    [address] [place] [address] [phone] OPT 2              24 Hour Appointment Hotline       To make an appointment at any [facility], call [phone] ([phone]). If you don't have a family  doctor or clinic, we will help you find one. [facility] are conveniently located to serve the needs of you and your family.             Review of your medicines      START taking        Dose / Directions Last dose taken    acetaminophen-codeine 300-30 MG per tablet   Commonly known as:  TYLENOL WITH CODEINE #3   Dose:  1-2 tablet   Quantity:  12 tablet        Take 1-2 tablets by mouth every 6 hours as needed for pain   Refills:  0        amoxicillin-clavulanate 875-125 MG per tablet   Commonly known as:  AUGMENTIN   Dose:  1 tablet   Quantity:  14 tablet        Take 1 tablet by mouth 2 times daily for 7 days   Refills:  0          Our records show that you are taking the medicines listed below. If these are incorrect, please call your family doctor or clinic.        Dose / Directions Last dose taken    ASPIRIN PO   Dose:  81 mg        Take 81 mg by mouth   Refills:  0        calcium polycarbophil 625 MG tablet   Commonly known as:  FIBERCON   Dose:  2 tablet        Take 2 tablets by mouth daily   Refills:  0        MULTI vitamin  MENS Tabs        1 TABLET DAILY   Refills:  0        * tamsulosin 0.4 MG capsule   Commonly known as:  FLOMAX   Dose:  0.4 mg   Quantity:  90 capsule        Take 1 capsule (0.4 mg) by mouth daily   Refills:  3        * tamsulosin 0.4 MG capsule   Commonly known as:  FLOMAX   Dose:  0.4 mg   Quantity:  30 capsule        Take 1 capsule (0.4 mg) by mouth daily   Refills:  0        zolpidem 5 MG tablet   Commonly known as:  AMBIEN   Quantity:  1 tablet        Take tablet by mouth 15 minutes prior to sleep, for Sleep Study   Refills:  0        * Notice:  This list has 2 medication(s) that are the same as other medications prescribed for you. Read the directions carefully, and ask your doctor or other care provider to review them with you.            Information about OPIOIDS     PRESCRIPTION OPIOIDS: WHAT YOU NEED TO KNOW   We gave you an opioid (narcotic) pain medicine. It is important to  manage your pain, but opioids are not always the best choice. You should first try all the other options your care team gave you. Take this medicine for as short a time (and as few doses) as possible.    Some activities can increase your pain, such as bandage changes or therapy sessions. It may help to take your pain medicine 30 to 60 minutes before these activities. Reduce your stress by getting enough sleep, working on hobbies you enjoy and practicing relaxation or meditation. Talk to your care team about ways to manage your pain beyond prescription opioids.    These medicines have risks:    DO NOT drive when on new or higher doses of pain medicine. These medicines can affect your alertness and reaction times, and you could be arrested for driving under the influence (DUI). If you need to use opioids long-term, talk to your care team about driving.    DO NOT operate heavy machinery    DO NOT do any other dangerous activities while taking these medicines.    DO NOT drink any alcohol while taking these medicines.     If the opioid prescribed includes acetaminophen, DO NOT take with any other medicines that contain acetaminophen. Read all labels carefully. Look for the word  acetaminophen  or  Tylenol.  Ask your pharmacist if you have questions or are unsure.    You can get addicted to pain medicines, especially if you have a history of addiction (chemical, alcohol or substance dependence). Talk to your care team about ways to reduce this risk.    All opioids tend to cause constipation. Drink plenty of water and eat foods that have a lot of fiber, such as fruits, vegetables, prune juice, apple juice and high-fiber cereal. Take a laxative (Miralax, milk of magnesia, Colace, Senna) if you don t move your bowels at least every other day. Other side effects include upset stomach, sleepiness, dizziness, throwing up, tolerance (needing more of the medicine to have the same effect), physical dependence and slowed  breathing.    Store your pills in a secure place, locked if possible. We will not replace any lost or stolen medicine. If you don t finish your medicine, please throw away (dispose) as directed by your pharmacist. The [place] [employer] has more information about safe disposal: https://www.pca.state.[place]/living-green/managing-unwanted-medications        Prescriptions were sent or printed at these locations (2 Prescriptions)                   Other Prescriptions                Printed at Department/Unit printer (2 of 2)         acetaminophen-codeine (TYLENOL WITH CODEINE #3) 300-30 MG per tablet               amoxicillin-clavulanate (AUGMENTIN) 875-125 MG per tablet                Procedures and tests performed during your visit     Fingers XR, 2-3 views, left      Orders Needing Specimen Collection     None      Pending Results     No orders found from 8/26/2018 to 8/29/2018.            Pending Culture Results     No orders found from 8/26/2018 to 8/29/2018.            Pending Results Instructions     If you had any lab results that were not finalized at the time of your Discharge, you can call the ED Lab Result RN at [phone]. You will be contacted by this team for any positive Lab results or changes in treatment. The nurses are available 7 days a week from 10A to 6:30P.  You can leave a message 24 hours per day and they will return your call.        Test Results From Your Hospital Stay        8/28/2018 12:37 PM      Narrative     XR FINGER LT G/E 2 VW 8/28/2018 12:35 PM    HISTORY: laceration with table saw;         Impression     IMPRESSION: Bony injury to the distal tuft of the middle finger.    XOCHITL CHOUDHURY MD                Clinical Quality Measure: Blood Pressure Screening     Your blood pressure was checked while you were in the emergency department today. The last reading we obtained was  BP: 115/71 . Please read the guidelines below about what these numbers mean and what you should  do about them.  If your systolic blood pressure (the top number) is less than 120 and your diastolic blood pressure (the bottom number) is less than 80, then your blood pressure is normal. There is nothing more that you need to do about it.  If your systolic blood pressure (the top number) is 120-139 or your diastolic blood pressure (the bottom number) is 80-89, your blood pressure may be higher than it should be. You should have your blood pressure rechecked within a year by a primary care provider.  If your systolic blood pressure (the top number) is 140 or greater or your diastolic blood pressure (the bottom number) is 90 or greater, you may have high blood pressure. High blood pressure is treatable, but if left untreated over time it can put you at risk for heart attack, stroke, or kidney failure. You should have your blood pressure rechecked by a primary care provider within the next 4 weeks.  If your provider in the emergency department today gave you specific instructions to follow-up with your doctor or provider even sooner than that, you should follow that instruction and not wait for up to 4 weeks for your follow-up visit.        Thank you for choosing [facility]       Thank you for choosing [facility] for your care. Our goal is always to provide you with excellent care. Hearing back from our patients is one way we can continue to improve our services. Please take a few minutes to complete the written survey that you may receive in the mail after you visit with us. Thank you!        [employer] Information     [employer] gives you secure access to your electronic health record. If you see a primary care provider, you can also send messages to your care team and make appointments. If you have questions, please call your primary care clinic.  If you do not have a primary care provider, please call [phone] and they will assist you.        Care EveryWhere ID     This is your Care EveryWhere ID. This could be used by  other organizations to access your [facility] medical records  [id number]        Equal Access to Services     MIHIR GREGORIO : Lian Lynch, marcelino canseco, [place]. So [facility] [phone].    ATENCIÓN: Si habla español, tiene a perez disposición servicios gratuitos de asistencia lingüística. Llame al [phone].    We comply with applicable federal civil rights laws and [place] laws. We do not discriminate on the basis of race, color, national origin, age, disability, sex, sexual orientation, or gender identity.            After Visit Summary       This is your record. Keep this with you and show to your community pharmacist(s) and doctor(s) at your next visit.

## 2018-08-28 NOTE — ED AVS SNAPSHOT
[facility] Emergency Department    [address]    [facility] [address]    Phone:  [phone]    Fax:  [phone]                                       Kentrell Kirkpatrick   MRN: [id number]    Department:  [facility] Emergency Department   Date of Visit:  8/28/2018           After Visit Summary Signature Page     I have received my discharge instructions, and my questions have been answered. I have discussed any challenges I see with this plan with the nurse or doctor.    ..........................................................................................................................................  Patient/Patient Representative Signature      ..........................................................................................................................................  Patient Representative Print Name and Relationship to Patient    ..................................................               ................................................  Date                                            Time    ..........................................................................................................................................  Reviewed by Signature/Title    ...................................................              ..............................................  Date                                                            Time          22EPIC Rev 08/18

## 2018-08-28 NOTE — DISCHARGE INSTRUCTIONS

## 2018-08-28 NOTE — ED PROVIDER NOTES
"  History     Chief Complaint:  Laceration    HPI   Kentrell Kirkpatrick is a 67 year old male who presents to the emergency department for evaluation of a laceration. The patient reports he was cutting a board with a table saw around 1 hour ago when he sustained lacerations to his left second finger and left middle finger. He was not wearing any gloves at the time. He is right handed but writes with his left. He notes the wounds were not bleeding heavily. The patient's last tetanus was in March of 2018.    Allergies:  NKDA     Medications:    Aspirin  Fibercon  Flomax  Ambien     Past Medical History:    Colon polyps    Past Surgical History:    Colonoscopy    Family History:    Heart disease  Prostate cancer    Social History:  Presents alone.  Former smoker, quit 2007.  Positive for alcohol use.   Marital Status:   [2]     Review of Systems   Skin: Positive for wound.   All other systems reviewed and are negative.    Physical Exam     Patient Vitals for the past 24 hrs:   BP Temp Temp src Pulse Heart Rate Resp SpO2 Height Weight   08/28/18 1456 115/71 - - 61 - 12 97 % - -   08/28/18 0941 (!) 139/96 98.1  F (36.7  C) Temporal - 84 20 94 % 1.854 m (6' 1\") 79.4 kg (175 lb)       Physical Exam  General:  No respiratory distress    Cardiovascular: Good cap refill.    Respiratory: Breathing non labored.     Musculoskeletal: No tenderness. No bony deformity. Laceration through the left third finger extending distal nail through lateral mid nail. On the second finger laceration to the lateral distal surface to the nail.    Skin: No rashes or petechiae     Neurologic: non focal      Psychiatric: Appropriate     Emergency Department Course     Imaging:  Radiographic findings were communicated with the patient who voiced understanding of the findings.    XR Fingers, 2-3 Views, Left:  Bony injury to the distal tuft of the middle finger. As per radiology.    Procedures:      Narrative: Procedure: Laceration Repair  "       LACERATION:  A simple clean 1 cm laceration.      LOCATION:  Left second finger      ANESTHESIA:  Digital block using Bupivacaine 0.5%        PREPARATION:  Irrigation and Scrubbing with Normal Saline and Shur Clens      DEBRIDEMENT:  Debridement of the nail      CLOSURE:  Wound was closed with One Layer.  Skin closed with 2 x 4.0 Vicryl Sutures using interrupted sutures.      Narrative: Procedure: Laceration Repair        LACERATION:  A simple clean 2 cm laceration.      LOCATION:  Left third finger      ANESTHESIA:  Local using Bupivacaine 0.5%       PREPARATION:  Irrigation and Scrubbing with Normal Saline and Shur Clens      DEBRIDEMENT:  Debridement of the nail      CLOSURE:  Wound was closed with One Layer.  Skin closed with 5 x 4.0 Vicryl Sutures using interrupted sutures and a dressing.    Interventions:  1027 Norco 5-325 mg per tablet 2 tablet PO    Emergency Department Course:  Nursing notes and vitals reviewed. 1020 I performed an exam of the patient as documented above.     IV inserted. Medicine administered as documented above. Blood drawn. This was sent to the lab for further testing, results above.    The patient was sent for a XR Finger while in the emergency department, findings above.     1350 I performed a laceration repair, details above. I discussed the results of his workup thus far.     Findings and plan explained to the Patient. Patient discharged home with instructions regarding supportive care, medications, and reasons to return. The importance of close follow-up was reviewed. The patient was prescribed Acetaminophen-Codeine, Augmentin.    Impression & Plan      Medical Decision Making:  Kentrell Kirkpatrick is a 67 year old male who was using a table saw when the wood caught. He pushed forward and suffered a laceration to his left second and third fingers. 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 There was signs of injury under the  nailbed. I did remove portions of both the nails to allow suturing. There was a very tangential cut on the index finger which I was able to tack down, but he did tolerate the procedure well. I started him on antibiotics and pain medication. The wounds were dressed and he was discharged home with good condition.    Diagnosis:    ICD-10-CM   1. Laceration of left middle finger without foreign body with damage to nail, initial encounter S61.313A       Disposition:  discharged to home    Discharge Medications:  New Prescriptions    ACETAMINOPHEN-CODEINE (TYLENOL WITH CODEINE #3) 300-30 MG PER TABLET    Take 1-2 tablets by mouth every 6 hours as needed for pain    AMOXICILLIN-CLAVULANATE (AUGMENTIN) 875-125 MG PER TABLET    Take 1 tablet by mouth 2 times daily for 7 days     IKeyshawn, am serving as a scribe on 8/28/2018 at 12:33 PM to personally document services performed by Mark Alonzo MD based on my observations and the provider's statements to me.     Keyshawn Espinal  8/28/2018   [facility] EMERGENCY DEPARTMENT       Mark Alonzo MD  08/28/18 7556

## 2018-08-29 ENCOUNTER — HOSPITAL ENCOUNTER (EMERGENCY)
Facility: CLINIC | Age: 68
Discharge: HOME | End: 2018-08-29
Attending: NURSE PRACTITIONER | Admitting: NURSE PRACTITIONER
Payer: COMMERCIAL

## 2018-09-01 ENCOUNTER — TELEPHONE (OUTPATIENT)
Dept: EMERGENCY MEDICINE | Facility: CLINIC | Age: 68
End: 2018-09-01

## 2018-09-01 NOTE — TELEPHONE ENCOUNTER
[facility] Emergency Department      Patient/parent Name  Kentrell     Reason for call  Inquiring if sutures are absorbable or not    Recommendations/Instructions  Vicryl was the suture that was used and this is absorbable  Encouraged to have him f/u with ortho as directed by ED provider    PCP follow-up Questions asked: NO    [RN Name]  Santosh Sweeney RN  [facility] RN  Lung Nodule and ED Lab Result RN  Epic pool (ED late result f/u RN): [id number]  FV INCIDENTAL RADIOLOGY F/U NURSES: P [id number]  # [phone]

## 2018-10-11 ENCOUNTER — OFFICE VISIT (OUTPATIENT)
Dept: CARDIOLOGY | Facility: CLINIC | Age: 68
End: 2018-10-11
Attending: INTERNAL MEDICINE
Payer: COMMERCIAL

## 2018-10-11 VITALS
DIASTOLIC BLOOD PRESSURE: 60 MMHG | HEART RATE: 84 BPM | SYSTOLIC BLOOD PRESSURE: 100 MMHG | WEIGHT: 178 LBS | BODY MASS INDEX: 23.59 KG/M2 | HEIGHT: 73 IN

## 2018-10-11 DIAGNOSIS — I71.9 AORTIC ANEURYSM WITHOUT RUPTURE, UNSPECIFIED PORTION OF AORTA (H): ICD-10-CM

## 2018-10-11 DIAGNOSIS — I71.21 ASCENDING AORTIC ANEURYSM (H): Primary | ICD-10-CM

## 2018-10-11 PROCEDURE — 99214 OFFICE O/P EST MOD 30 MIN: CPT | Performed by: INTERNAL MEDICINE

## 2018-10-11 NOTE — MR AVS SNAPSHOT
After Visit Summary   10/11/2018    Kentrell Kirkpatrick    MRN: [id number]           Patient Information     Date Of Birth          1950        Visit Information        Provider Department      10/11/2018 1:45 PM Max Cabral MD [facility]        Today's Diagnoses     Ascending aortic aneurysm (H)    -  1    Aortic aneurysm without rupture, unspecified portion of aorta (H)           Follow-ups after your visit        Additional Services     Follow-Up with Cardiologist                 Future tests that were ordered for you today     Open Future Orders        Priority Expected Expires Ordered    Follow-Up with Cardiologist Routine 10/10/2020 10/30/2020 10/11/2018            Who to contact     If you have questions or need follow up information about today's clinic visit or your schedule please contact [facility] directly at [phone].  Normal or non-critical lab and imaging results will be communicated to you by Productifyhart, letter or phone within 4 business days after the clinic has received the results. If you do not hear from us within 7 days, please contact the clinic through Productifyhart or phone. If you have a critical or abnormal lab result, we will notify you by phone as soon as possible.  Submit refill requests through Product Hunt or call your pharmacy and they will forward the refill request to us. Please allow 3 business days for your refill to be completed.          Additional Information About Your Visit        MyChart Information     Product Hunt gives you secure access to your electronic health record. If you see a primary care provider, you can also send messages to your care team and make appointments. If you have questions, please call your primary care clinic.  If you do not have a primary care provider, please call [phone] and they will assist you.        Care EveryWhere ID     This is your Care  "EveryWhere ID. This could be used by other organizations to access your [facility] medical records  [id number]        Your Vitals Were     Pulse Height BMI (Body Mass Index)             84 1.854 m (6' 0.99\") 23.49 kg/m2          Blood Pressure from Last 3 Encounters:   10/11/18 100/60   08/28/18 115/71   06/04/18 102/60    Weight from Last 3 Encounters:   10/11/18 80.7 kg (178 lb)   08/28/18 79.4 kg (175 lb)   06/04/18 80.3 kg (177 lb)              We Performed the Following     Follow-Up with Cardiologist        Primary Care Provider Office Phone # Fax #    Yoni Sarmiento Jr., [phone] [address]  [place] [address]        Equal Access to Services     [facility] : Hadii pauly eden hadasho Soomaali, waaxda luqadaha, qaybta kaalmada adeegyada, [place] . So [facility] [phone].    ATENCIÓN: Si habla español, tiene a perez disposición servicios gratuitos de asistencia lingüística. Leeroy al [phone].    We comply with applicable federal civil rights laws and [place] laws. We do not discriminate on the basis of race, color, national origin, age, disability, sex, sexual orientation, or gender identity.            Thank you!     Thank you for choosing [facility]  for your care. Our goal is always to provide you with excellent care. Hearing back from our patients is one way we can continue to improve our services. Please take a few minutes to complete the written survey that you may receive in the mail after your visit with us. Thank you!             Your Updated Medication List - Protect others around you: Learn how to safely use, store and throw away your medicines at www.disposemymeds.org.          This list is accurate as of 10/11/18  1:56 PM.  Always use your most recent med list.                   Brand Name Dispense Instructions for use Diagnosis    ASPIRIN PO      Take 81 mg by mouth        calcium polycarbophil " 625 MG tablet    FIBERCON     Take 2 tablets by mouth daily        MULTI vitamin  MENS Tabs      1 TABLET DAILY        tamsulosin 0.4 MG capsule    FLOMAX    90 capsule    Take 1 capsule (0.4 mg) by mouth daily    Benign nodular prostatic hyperplasia with lower urinary tract symptoms       zolpidem 5 MG tablet    AMBIEN    1 tablet    Take tablet by mouth 15 minutes prior to sleep, for Sleep Study    Excessive daytime sleepiness, Snoring

## 2018-10-11 NOTE — PROGRESS NOTES
"        I had the pleasure to follow up with your patient, Mr. Kentrell Kirkpatrick, in the Cardiovascular Medicine Clinic.  As you recall, this is a very pleasant 67-year-old gentleman who was referred to the Vascular Clinic at [facility] for an incidental finding of a 4.0 cm ascending aortic aneurysm.  The CT scan was done to follow up a pulmonary nodule and this was an incidental finding.  The patient has never had hypertension or any other cardiac-related concerns.  He states that he recently retired from an active job.  He was an apartment/.  He has gone up several flights of stairs, does a tremendous amount of work and from a cardiovascular fitness standpoint, has never felt limited in any shape or form.  He has never had any chest pain, PND, orthopnea, syncope or any other cardiac-related concerns.  He has a remote history of smoking and fortunately he has quit smoking several years ago.  Due to his finding of a 4.0 cm ascending aortic aneurysm, he is referred to the Cardiovascular Clinic for evaluation.      F/U study again shows 4.0 cm ascending aortic aneurysm.  No new symptoms.  Here for routine f/u.    PAST MEDICAL HISTORY:   1.  Former smoker.   2.  Hyperlipidemia.   3.  A 4.0 cm ascending aortic aneurysm.   4.  BPH.   5.  SKYLER        ALLERGIES:  No known drug allergies.       SOCIAL HISTORY:  Currently retired, lives with his wife, rare social alcohol intake.  Former smoker, quit in 2007.       FAMILY HISTORY:  Negative for premature coronary artery disease.       PHYSICAL EXAMINATION:   VITAL SIGNS:  Blood pressure 100/60, pulse 84, height 1.854 m (6' 0.99\"), weight 80.7 kg (178 lb).  GENERAL:  The patient is alert, oriented, in no apparent distress.   HEENT:  Oropharynx is clear.  No sinus tenderness.   NECK:  No JVP, no lymphadenopathy, no carotid bruits.   CARDIOVASCULAR:  Regular rate and rhythm, normal S1, S2, no murmurs, gallops or rubs.   LUNGS:  Coarse but clear.   ABDOMEN: "  Soft, nontender, nondistended.   EXTREMITIES:  No clubbing, cyanosis or edema.          ASSESSMENT:   1.  A 4.0 cm ascending aortic aneurysm.   2.  Normotensive.   3.  Former smoker.   4.  Hyperlipidemia.    5.  SKYLER    Recommendations    Patient is doing well.  No new changes in his health since our last visit in 2016.  We reviewed recent CT scan in 2018.  No new changes were noted.  Let us continue with conservative management and we will order an echocardiogram in 2 years timeframe.  He will return to clinic in 2020 or earlier should any new issues arise.      Max Cabral MD

## 2018-10-11 NOTE — LETTER
"10/11/2018    Yoni Sarmiento Jr, MD  [address] [place] [address]    RE: Kentrell Kirkpatrick       Dear Colleague,    I had the pleasure of seeing Kentrell Kirkpatrick in the [facility].            I had the pleasure to follow up with your patient, Mr. Kentrell Kirkpatrick, in the Cardiovascular Medicine Clinic.  As you recall, this is a very pleasant 67-year-old gentleman who was referred to the Vascular Clinic at [facility] for an incidental finding of a 4.0 cm ascending aortic aneurysm.  The CT scan was done to follow up a pulmonary nodule and this was an incidental finding.  The patient has never had hypertension or any other cardiac-related concerns.  He states that he recently retired from an active job.  He was an apartment/.  He has gone up several flights of stairs, does a tremendous amount of work and from a cardiovascular fitness standpoint, has never felt limited in any shape or form.  He has never had any chest pain, PND, orthopnea, syncope or any other cardiac-related concerns.  He has a remote history of smoking and fortunately he has quit smoking several years ago.  Due to his finding of a 4.0 cm ascending aortic aneurysm, he is referred to the Cardiovascular Clinic for evaluation.      F/U study again shows 4.0 cm ascending aortic aneurysm.  No new symptoms.  Here for routine f/u.    PAST MEDICAL HISTORY:   1.  Former smoker.   2.  Hyperlipidemia.   3.  A 4.0 cm ascending aortic aneurysm.   4.  BPH.    5.  SKYLER        ALLERGIES:  No known drug allergies.       SOCIAL HISTORY:  Currently retired, lives with his wife, rare social alcohol intake.  Former smoker, quit in 2007.       FAMILY HISTORY:  Negative for premature coronary artery disease.       PHYSICAL EXAMINATION:   VITAL SIGNS:  Blood pressure 100/60, pulse 84, height 1.854 m (6' 0.99\"), weight 80.7 kg (178 lb).  GENERAL:  The patient is alert, oriented, in no apparent distress.   HEENT:  " Oropharynx is clear.  No sinus tenderness.   NECK:  No JVP, no lymphadenopathy, no carotid bruits.   CARDIOVASCULAR:  Regular rate and rhythm, normal S1, S2, no murmurs, gallops or rubs.   LUNGS:  Coarse but clear.   ABDOMEN:  Soft, nontender, nondistended.   EXTREMITIES:  No clubbing, cyanosis or edema.          ASSESSMENT:   1.  A 4.0 cm ascending aortic aneurysm.   2.  Normotensive.   3.  Former smoker.   4.  Hyperlipidemia.     5.  SKYLER    Recommendations    Patient is doing well.  No new changes in his health since our last visit in 2016.  We reviewed recent CT scan in 2018.  No new changes were noted.  Let us continue with conservative management and we will order an echocardiogram in 2 years timeframe.  He will return to clinic in 2020 or earlier should any new issues arise.        Thank you for allowing me to participate in the care of your patient.    Sincerely,     Max Cabral MD     [facility]

## 2018-10-11 NOTE — LETTER
"10/11/2018    Yoni Sarmiento Jr, MD  [address] [place] [address]    RE: Kentrell Kirkpatrick       Dear Colleague,    I had the pleasure of seeing Kentrell Kirkpatrick in the [facility].            I had the pleasure to follow up with your patient, Mr. Kentrell Kirkpatrick, in the Cardiovascular Medicine Clinic.  As you recall, this is a very pleasant 67-year-old gentleman who was referred to the Vascular Clinic at [facility] for an incidental finding of a 4.0 cm ascending aortic aneurysm.  The CT scan was done to follow up a pulmonary nodule and this was an incidental finding.  The patient has never had hypertension or any other cardiac-related concerns.  He states that he recently retired from an active job.  He was an apartment/.  He has gone up several flights of stairs, does a tremendous amount of work and from a cardiovascular fitness standpoint, has never felt limited in any shape or form.  He has never had any chest pain, PND, orthopnea, syncope or any other cardiac-related concerns.  He has a remote history of smoking and fortunately he has quit smoking several years ago.  Due to his finding of a 4.0 cm ascending aortic aneurysm, he is referred to the Cardiovascular Clinic for evaluation.      F/U study again shows 4.0 cm ascending aortic aneurysm.  No new symptoms.  Here for routine f/u.    PAST MEDICAL HISTORY:   1.  Former smoker.   2.  Hyperlipidemia.   3.  A 4.0 cm ascending aortic aneurysm.   4.  BPH.    5.  SKYLER        ALLERGIES:  No known drug allergies.       SOCIAL HISTORY:  Currently retired, lives with his wife, rare social alcohol intake.  Former smoker, quit in 2007.       FAMILY HISTORY:  Negative for premature coronary artery disease.       PHYSICAL EXAMINATION:   VITAL SIGNS:  Blood pressure 100/60, pulse 84, height 1.854 m (6' 0.99\"), weight 80.7 kg (178 lb).  GENERAL:  The patient is alert, oriented, in no apparent distress.   HEENT:  " Oropharynx is clear.  No sinus tenderness.   NECK:  No JVP, no lymphadenopathy, no carotid bruits.   CARDIOVASCULAR:  Regular rate and rhythm, normal S1, S2, no murmurs, gallops or rubs.   LUNGS:  Coarse but clear.   ABDOMEN:  Soft, nontender, nondistended.   EXTREMITIES:  No clubbing, cyanosis or edema.          ASSESSMENT:   1.  A 4.0 cm ascending aortic aneurysm.   2.  Normotensive.   3.  Former smoker.   4.  Hyperlipidemia.     5.  SKYLER    Recommendations    Patient is doing well.  No new changes in his health since our last visit in 2016.  We reviewed recent CT scan in 2018.  No new changes were noted.  Let us continue with conservative management and we will order an echocardiogram in 2 years timeframe.  He will return to clinic in 2020 or earlier should any new issues arise.      Max Cabral MD      Thank you for allowing me to participate in the care of your patient.      Sincerely,     Max Cabral MD     [facility]    cc:   Max Cabral MD  [address] [address]

## 2018-10-31 ENCOUNTER — TRANSFERRED RECORDS (OUTPATIENT)
Dept: HEALTH INFORMATION MANAGEMENT | Facility: CLINIC | Age: 68
End: 2018-10-31

## 2018-12-03 ENCOUNTER — OFFICE VISIT (OUTPATIENT)
Dept: FAMILY MEDICINE | Facility: CLINIC | Age: 68
End: 2018-12-03
Payer: COMMERCIAL

## 2018-12-03 VITALS
WEIGHT: 181 LBS | TEMPERATURE: 97.8 F | DIASTOLIC BLOOD PRESSURE: 76 MMHG | HEART RATE: 80 BPM | SYSTOLIC BLOOD PRESSURE: 112 MMHG | BODY MASS INDEX: 23.89 KG/M2 | OXYGEN SATURATION: 99 %

## 2018-12-03 DIAGNOSIS — J01.90 ACUTE SINUSITIS WITH SYMPTOMS > 10 DAYS: Primary | ICD-10-CM

## 2018-12-03 PROCEDURE — 99213 OFFICE O/P EST LOW 20 MIN: CPT | Performed by: FAMILY MEDICINE

## 2018-12-03 RX ORDER — BENZONATATE 200 MG/1
200 CAPSULE ORAL 3 TIMES DAILY PRN
Qty: 21 CAPSULE | Refills: 0 | Status: SHIPPED | OUTPATIENT
Start: 2018-12-03 | End: 2018-12-03

## 2018-12-03 RX ORDER — LEVOFLOXACIN 750 MG/1
750 TABLET, FILM COATED ORAL DAILY
Qty: 10 TABLET | Refills: 0 | Status: SHIPPED | OUTPATIENT
Start: 2018-12-03 | End: 2019-01-21

## 2018-12-03 RX ORDER — BENZONATATE 200 MG/1
200 CAPSULE ORAL 3 TIMES DAILY PRN
Qty: 21 CAPSULE | Refills: 0 | Status: SHIPPED | OUTPATIENT
Start: 2018-12-03 | End: 2019-01-21

## 2018-12-03 RX ORDER — LEVOFLOXACIN 750 MG/1
750 TABLET, FILM COATED ORAL DAILY
Qty: 10 TABLET | Refills: 0 | Status: SHIPPED | OUTPATIENT
Start: 2018-12-03 | End: 2018-12-03

## 2018-12-03 NOTE — MR AVS SNAPSHOT
After Visit Summary   12/3/2018    Kentrell Kirkpatrick    MRN: [id number]           Patient Information     Date Of Birth          1950        Visit Information        Provider Department      12/3/2018 10:20 AM Donald Shell, DO [facility]        Today's Diagnoses     Acute sinusitis with symptoms > 10 days    -  1       Follow-ups after your visit        Follow-up notes from your care team     Return in about 2 weeks (around 12/17/2018), or if symptoms worsen or fail to improve.      Who to contact     If you have questions or need follow up information about today's clinic visit or your schedule please contact [facility] [place] directly at [phone].  Normal or non-critical lab and imaging results will be communicated to you by MyChart, letter or phone within 4 business days after the clinic has received the results. If you do not hear from us within 7 days, please contact the clinic through Surikatehart or phone. If you have a critical or abnormal lab result, we will notify you by phone as soon as possible.  Submit refill requests through Social Shopping Network Â® or call your pharmacy and they will forward the refill request to us. Please allow 3 business days for your refill to be completed.          Additional Information About Your Visit        MyChart Information     Social Sho[employer] Network Â® gives you secure access to your electronic health record. If you see a primary care provider, you can also send messages to your care team and make appointments. If you have questions, please call your primary care clinic.  If you do not have a primary care provider, please call [phone] and they will assist you.        Care EveryWhere ID     This is your Care EveryWhere ID. This could be used by other organizations to access your [facility] medical records  [id number]        Your Vitals Were     Pulse Temperature Pulse Oximetry BMI (Body Mass Index)          80 97.8  F (36.6  C) (Oral) 99% 23.89 kg/m2          Blood Pressure from Last 3 Encounters:   12/03/18 112/76   10/11/18 100/60   08/28/18 115/71    Weight from Last 3 Encounters:   12/03/18 181 lb (82.1 kg)   10/11/18 178 lb (80.7 kg)   08/28/18 175 lb (79.4 kg)              Today, you had the following     No orders found for display         Today's Medication Changes          These changes are accurate as of 12/3/18 10:55 AM.  If you have any questions, ask your nurse or doctor.               Start taking these medicines.        Dose/Directions    benzonatate 200 MG capsule   Commonly known as:  TESSALON   Used for:  Acute sinusitis with symptoms > 10 days   Started by:  Donald Shell DO        Dose:  200 mg   Take 1 capsule (200 mg) by mouth 3 times daily as needed for cough   Quantity:  21 capsule   Refills:  0       levofloxacin 750 MG tablet   Commonly known as:  LEVAQUIN   Used for:  Acute sinusitis with symptoms > 10 days   Started by:  Donald Shell DO        Dose:  750 mg   Take 1 tablet (750 mg) by mouth daily   Quantity:  10 tablet   Refills:  0            Where to get your medicines      These medications were sent to [facility] [address] [place] [address]     Phone:  [phone]     benzonatate 200 MG capsule    levofloxacin 750 MG tablet                Primary Care Provider Office Phone # Fax #    Yoni Sarmiento Jr., [phone] [address]  [place] [address]        Equal Access to Services     LORETO GREGORIO AH: Hadii aad ku hadasho Soomaali, waaxda luqadaha, qaybta kaalmada adeegyada, waxay adelita sánchez. So [facility] [phone].    ATENCIÓN: Si habla español, tiene a perez disposición servicios gratuitos de asistencia lingüística. Llame al [phone].    We comply with applicable federal civil rights laws and [place] laws. We do not discriminate on the basis of race, color, national origin, age, disability, sex,  sexual orientation, or gender identity.            Thank you!     Thank you for choosing [facility] [place]  for your care. Our goal is always to provide you with excellent care. Hearing back from our patients is one way we can continue to improve our services. Please take a few minutes to complete the written survey that you may receive in the mail after your visit with us. Thank you!             Your Updated Medication List - Protect others around you: Learn how to safely use, store and throw away your medicines at www.disposemymeds.org.          This list is accurate as of 12/3/18 10:55 AM.  Always use your most recent med list.                   Brand Name Dispense Instructions for use Diagnosis    ASPIRIN PO      Take 81 mg by mouth        benzonatate 200 MG capsule    TESSALON    21 capsule    Take 1 capsule (200 mg) by mouth 3 times daily as needed for cough    Acute sinusitis with symptoms > 10 days       calcium polycarbophil 625 MG tablet    FIBERCON     Take 2 tablets by mouth daily        levofloxacin 750 MG tablet    LEVAQUIN    10 tablet    Take 1 tablet (750 mg) by mouth daily    Acute sinusitis with symptoms > 10 days       MULTI vitamin  MENS Tabs      1 TABLET DAILY        tamsulosin 0.4 MG capsule    FLOMAX    90 capsule    Take 1 capsule (0.4 mg) by mouth daily    Benign nodular prostatic hyperplasia with lower urinary tract symptoms

## 2018-12-03 NOTE — PROGRESS NOTES
SUBJECTIVE:                                                    Kentrell Kirkpatrick is a 68 year old male who presents to clinic today for the following health issues:        Acute Illness   Acute illness concerns: URI  Onset: 3-4 weeks    Fever: no    Chills/Sweats: no    Headache (location?): no    Sinus Pressure:YES    Conjunctivitis:  no    Ear Pain: no    Rhinorrhea: YES    Congestion: YES    Sore Throat: no     Cough: YES-productive of yellow sputum    Wheeze: YES    Decreased Appetite: no    Nausea: no    Vomiting: no    Diarrhea:  no    Dysuria/Freq.: no    Fatigue/Achiness: no    Sick/Strep Exposure: YES- wife was sick      Therapies Tried and outcome: sudafed, airborne      Problem list and histories reviewed & adjusted, as indicated.  Additional history: as documented    Patient Active Problem List   Diagnosis     Back pain     CARDIOVASCULAR SCREENING; LDL GOAL LESS THAN 160     Advance Care Planning     Benign prostatic hyperplasia with lower urinary tract symptoms     Benign neoplasm of transverse colon     10 year risk of MI or stroke 7.5% or greater     Ascending aortic aneurysm (H)     Right-sided low back pain with right-sided sciatica     Past Surgical History:   Procedure Laterality Date     COLONOSCOPY       COLONOSCOPY N/A 12/22/2016    Procedure: COMBINED COLONOSCOPY, SINGLE OR MULTIPLE BIOPSY/POLYPECTOMY BY BIOPSY;  Surgeon: Jeremi Kramer MD;  Location:  GI       Social History   Substance Use Topics     Smoking status: Former Smoker     Packs/day: 1.50     Years: 43.00     Types: Cigarettes     Quit date: 12/16/2007     Smokeless tobacco: Never Used     Alcohol use Yes      Comment: socially     Family History   Problem Relation Age of Onset     Heart Disease Father      Prostate Cancer Other      Prostate Cancer Brother            ROS:  Constitutional, HEENT, cardiovascular, pulmonary, gi and gu systems are negative, except as otherwise noted.    OBJECTIVE:     /76  Pulse  80  Temp 97.8  F (36.6  C) (Oral)  Wt 181 lb (82.1 kg)  SpO2 99%  BMI 23.89 kg/m2  Body mass index is 23.89 kg/(m^2).  GENERAL: healthy, alert and no distress  EYES: Eyes grossly normal to inspection, PERRL and conjunctivae and sclerae normal  HENT: normal cephalic/atraumatic, ear canals and TM's normal, nose and mouth without ulcers or lesions, oropharynx clear, oral mucous membranes moist and sinuses: not tender  NECK: no adenopathy and no asymmetry, masses, or scars  RESP: lungs clear to auscultation - no rales, rhonchi or wheezes  CV: regular rate and rhythm, normal S1 S2, no S3 or S4, no murmur, click or rub, no peripheral edema and peripheral pulses strong  ABDOMEN: soft, nontender, no hepatosplenomegaly, no masses and bowel sounds normal  MS: no gross musculoskeletal defects noted, no edema    Diagnostic Test Results:  none     ASSESSMENT/PLAN:   1. Acute sinusitis with symptoms > 10 days: with history of prolonged symptoms, will treat with antibiotics. Use tessalon for cough. Follow up in 2 weeks if not improving.  - benzonatate (TESSALON) 200 MG capsule; Take 1 capsule (200 mg) by mouth 3 times daily as needed for cough  Dispense: 21 capsule; Refill: 0  - levofloxacin (LEVAQUIN) 750 MG tablet; Take 1 tablet (750 mg) by mouth daily  Dispense: 10 tablet; Refill: 0    Donald Shell DO  [facility]

## 2019-01-01 ENCOUNTER — ONCOLOGY VISIT (OUTPATIENT)
Dept: ONCOLOGY | Facility: CLINIC | Age: 69
End: 2019-01-01
Attending: PHYSICIAN ASSISTANT
Payer: COMMERCIAL

## 2019-01-01 ENCOUNTER — HEALTH MAINTENANCE LETTER (OUTPATIENT)
Age: 69
End: 2019-01-01

## 2019-01-01 ENCOUNTER — HOSPITAL ENCOUNTER (OUTPATIENT)
Dept: CT IMAGING | Facility: CLINIC | Age: 69
Discharge: HOME | End: 2019-11-06
Attending: PHYSICIAN ASSISTANT | Admitting: PHYSICIAN ASSISTANT
Payer: COMMERCIAL

## 2019-01-01 ENCOUNTER — APPOINTMENT (OUTPATIENT)
Dept: INTERVENTIONAL RADIOLOGY/VASCULAR | Facility: CLINIC | Age: 69
End: 2019-01-01
Attending: INTERNAL MEDICINE
Payer: COMMERCIAL

## 2019-01-01 ENCOUNTER — APPOINTMENT (OUTPATIENT)
Dept: CT IMAGING | Facility: CLINIC | Age: 69
End: 2019-01-01
Attending: EMERGENCY MEDICINE
Payer: COMMERCIAL

## 2019-01-01 ENCOUNTER — ONCOLOGY VISIT (OUTPATIENT)
Dept: ONCOLOGY | Facility: CLINIC | Age: 69
End: 2019-01-01
Attending: INTERNAL MEDICINE
Payer: COMMERCIAL

## 2019-01-01 ENCOUNTER — OFFICE VISIT (OUTPATIENT)
Dept: FAMILY MEDICINE | Facility: CLINIC | Age: 69
End: 2019-01-01
Payer: COMMERCIAL

## 2019-01-01 ENCOUNTER — HOSPITAL ENCOUNTER (OUTPATIENT)
Facility: CLINIC | Age: 69
Setting detail: SPECIMEN
End: 2019-11-06
Attending: PHYSICIAN ASSISTANT
Payer: COMMERCIAL

## 2019-01-01 ENCOUNTER — PRE VISIT (OUTPATIENT)
Dept: ONCOLOGY | Facility: CLINIC | Age: 69
End: 2019-01-01

## 2019-01-01 ENCOUNTER — HOSPITAL ENCOUNTER (OUTPATIENT)
Facility: CLINIC | Age: 69
Setting detail: SPECIMEN
End: 2019-11-11
Attending: INTERNAL MEDICINE
Payer: COMMERCIAL

## 2019-01-01 ENCOUNTER — HOSPITAL ENCOUNTER (OUTPATIENT)
Facility: CLINIC | Age: 69
Setting detail: SPECIMEN
End: 2019-11-27
Attending: PHYSICIAN ASSISTANT
Payer: COMMERCIAL

## 2019-01-01 ENCOUNTER — APPOINTMENT (OUTPATIENT)
Dept: GENERAL RADIOLOGY | Facility: CLINIC | Age: 69
End: 2019-01-01
Attending: EMERGENCY MEDICINE
Payer: COMMERCIAL

## 2019-01-01 ENCOUNTER — TELEPHONE (OUTPATIENT)
Dept: ONCOLOGY | Facility: CLINIC | Age: 69
End: 2019-01-01

## 2019-01-01 ENCOUNTER — INFUSION THERAPY VISIT (OUTPATIENT)
Dept: INFUSION THERAPY | Facility: CLINIC | Age: 69
End: 2019-01-01
Attending: PHYSICIAN ASSISTANT
Payer: COMMERCIAL

## 2019-01-01 ENCOUNTER — PRE VISIT (OUTPATIENT)
Dept: PULMONOLOGY | Facility: CLINIC | Age: 69
End: 2019-01-01

## 2019-01-01 ENCOUNTER — HOSPITAL ENCOUNTER (OUTPATIENT)
Dept: CT IMAGING | Facility: CLINIC | Age: 69
Discharge: HOME | End: 2019-10-21
Attending: INTERNAL MEDICINE | Admitting: INTERNAL MEDICINE
Payer: COMMERCIAL

## 2019-01-01 ENCOUNTER — HOSPITAL ENCOUNTER (EMERGENCY)
Facility: CLINIC | Age: 69
Discharge: HOME | End: 2019-12-15
Attending: EMERGENCY MEDICINE | Admitting: EMERGENCY MEDICINE
Payer: COMMERCIAL

## 2019-01-01 ENCOUNTER — HOSPITAL ENCOUNTER (OUTPATIENT)
Dept: CT IMAGING | Facility: CLINIC | Age: 69
Discharge: HOME | End: 2019-12-16
Attending: PHYSICIAN ASSISTANT | Admitting: PHYSICIAN ASSISTANT
Payer: COMMERCIAL

## 2019-01-01 ENCOUNTER — HOSPITAL ENCOUNTER (OUTPATIENT)
Facility: CLINIC | Age: 69
Setting detail: SPECIMEN
End: 2019-11-29
Attending: PHYSICIAN ASSISTANT
Payer: COMMERCIAL

## 2019-01-01 ENCOUNTER — APPOINTMENT (OUTPATIENT)
Dept: LAB | Facility: CLINIC | Age: 69
End: 2019-01-01
Attending: INTERNAL MEDICINE
Payer: COMMERCIAL

## 2019-01-01 ENCOUNTER — HOSPITAL ENCOUNTER (OUTPATIENT)
Facility: CLINIC | Age: 69
Setting detail: SPECIMEN
Discharge: HOME | End: 2019-12-09
Attending: PHYSICIAN ASSISTANT | Admitting: INTERNAL MEDICINE
Payer: COMMERCIAL

## 2019-01-01 ENCOUNTER — HOSPITAL ENCOUNTER (OUTPATIENT)
Facility: CLINIC | Age: 69
Discharge: HOME | End: 2019-10-17
Attending: RADIOLOGY | Admitting: RADIOLOGY
Payer: COMMERCIAL

## 2019-01-01 VITALS
SYSTOLIC BLOOD PRESSURE: 104 MMHG | BODY MASS INDEX: 23.46 KG/M2 | OXYGEN SATURATION: 98 % | DIASTOLIC BLOOD PRESSURE: 60 MMHG | WEIGHT: 173 LBS | TEMPERATURE: 97.5 F | HEART RATE: 90 BPM

## 2019-01-01 VITALS
RESPIRATION RATE: 16 BRPM | WEIGHT: 172 LBS | OXYGEN SATURATION: 91 % | HEART RATE: 64 BPM | BODY MASS INDEX: 23.33 KG/M2 | SYSTOLIC BLOOD PRESSURE: 104 MMHG | TEMPERATURE: 97 F | DIASTOLIC BLOOD PRESSURE: 72 MMHG

## 2019-01-01 VITALS
BODY MASS INDEX: 24.38 KG/M2 | RESPIRATION RATE: 16 BRPM | HEART RATE: 88 BPM | SYSTOLIC BLOOD PRESSURE: 124 MMHG | OXYGEN SATURATION: 98 % | DIASTOLIC BLOOD PRESSURE: 65 MMHG | HEIGHT: 72 IN | WEIGHT: 180 LBS | TEMPERATURE: 97 F

## 2019-01-01 VITALS
RESPIRATION RATE: 14 BRPM | SYSTOLIC BLOOD PRESSURE: 104 MMHG | DIASTOLIC BLOOD PRESSURE: 63 MMHG | HEART RATE: 83 BPM | OXYGEN SATURATION: 98 % | TEMPERATURE: 98.4 F

## 2019-01-01 VITALS
TEMPERATURE: 97 F | DIASTOLIC BLOOD PRESSURE: 66 MMHG | WEIGHT: 176.3 LBS | OXYGEN SATURATION: 96 % | HEART RATE: 86 BPM | BODY MASS INDEX: 23.91 KG/M2 | SYSTOLIC BLOOD PRESSURE: 114 MMHG | RESPIRATION RATE: 16 BRPM

## 2019-01-01 VITALS
DIASTOLIC BLOOD PRESSURE: 58 MMHG | OXYGEN SATURATION: 96 % | TEMPERATURE: 98.2 F | HEIGHT: 72 IN | WEIGHT: 177 LBS | SYSTOLIC BLOOD PRESSURE: 104 MMHG | BODY MASS INDEX: 23.98 KG/M2 | HEART RATE: 110 BPM

## 2019-01-01 VITALS
RESPIRATION RATE: 16 BRPM | DIASTOLIC BLOOD PRESSURE: 71 MMHG | WEIGHT: 176 LBS | SYSTOLIC BLOOD PRESSURE: 109 MMHG | BODY MASS INDEX: 23.87 KG/M2 | OXYGEN SATURATION: 95 % | HEART RATE: 75 BPM

## 2019-01-01 VITALS
SYSTOLIC BLOOD PRESSURE: 118 MMHG | BODY MASS INDEX: 23.57 KG/M2 | DIASTOLIC BLOOD PRESSURE: 68 MMHG | HEART RATE: 77 BPM | OXYGEN SATURATION: 95 % | RESPIRATION RATE: 16 BRPM | HEIGHT: 72 IN | WEIGHT: 174 LBS | TEMPERATURE: 98 F

## 2019-01-01 VITALS
OXYGEN SATURATION: 92 % | RESPIRATION RATE: 20 BRPM | BODY MASS INDEX: 23.57 KG/M2 | DIASTOLIC BLOOD PRESSURE: 65 MMHG | HEIGHT: 72 IN | TEMPERATURE: 97.6 F | SYSTOLIC BLOOD PRESSURE: 99 MMHG | WEIGHT: 174 LBS | HEART RATE: 85 BPM

## 2019-01-01 VITALS
TEMPERATURE: 97 F | RESPIRATION RATE: 16 BRPM | DIASTOLIC BLOOD PRESSURE: 72 MMHG | OXYGEN SATURATION: 98 % | SYSTOLIC BLOOD PRESSURE: 104 MMHG | WEIGHT: 172 LBS | HEART RATE: 64 BPM | BODY MASS INDEX: 23.33 KG/M2

## 2019-01-01 VITALS
SYSTOLIC BLOOD PRESSURE: 105 MMHG | DIASTOLIC BLOOD PRESSURE: 64 MMHG | TEMPERATURE: 99.1 F | OXYGEN SATURATION: 94 % | RESPIRATION RATE: 18 BRPM | HEART RATE: 105 BPM

## 2019-01-01 VITALS
HEART RATE: 79 BPM | OXYGEN SATURATION: 97 % | RESPIRATION RATE: 16 BRPM | TEMPERATURE: 97.6 F | DIASTOLIC BLOOD PRESSURE: 79 MMHG | SYSTOLIC BLOOD PRESSURE: 137 MMHG

## 2019-01-01 DIAGNOSIS — C34.92 NON-SMALL CELL LUNG CANCER, LEFT (H): Primary | ICD-10-CM

## 2019-01-01 DIAGNOSIS — J43.1 PANLOBULAR EMPHYSEMA (H): ICD-10-CM

## 2019-01-01 DIAGNOSIS — J44.9 CHRONIC OBSTRUCTIVE PULMONARY DISEASE, UNSPECIFIED COPD TYPE (H): Primary | ICD-10-CM

## 2019-01-01 DIAGNOSIS — C34.92 NON-SMALL CELL LUNG CANCER, LEFT (H): ICD-10-CM

## 2019-01-01 DIAGNOSIS — G89.3 CANCER RELATED PAIN: ICD-10-CM

## 2019-01-01 DIAGNOSIS — R05.9 COUGH: ICD-10-CM

## 2019-01-01 DIAGNOSIS — F41.9 ANXIETY: ICD-10-CM

## 2019-01-01 DIAGNOSIS — J44.9 CHRONIC OBSTRUCTIVE PULMONARY DISEASE, UNSPECIFIED COPD TYPE (H): ICD-10-CM

## 2019-01-01 DIAGNOSIS — Z13.29 SCREENING FOR HYPOTHYROIDISM: ICD-10-CM

## 2019-01-01 DIAGNOSIS — R05.9 COUGH: Primary | ICD-10-CM

## 2019-01-01 DIAGNOSIS — N40.1 BENIGN NODULAR PROSTATIC HYPERPLASIA WITH LOWER URINARY TRACT SYMPTOMS: ICD-10-CM

## 2019-01-01 DIAGNOSIS — J98.4 ACUTE PNEUMONITIS: ICD-10-CM

## 2019-01-01 DIAGNOSIS — C34.32 MALIGNANT NEOPLASM OF LOWER LOBE OF LEFT LUNG (H): ICD-10-CM

## 2019-01-01 DIAGNOSIS — J20.9 ACUTE BRONCHITIS, UNSPECIFIED ORGANISM: ICD-10-CM

## 2019-01-01 DIAGNOSIS — R68.83 CHILLS: ICD-10-CM

## 2019-01-01 DIAGNOSIS — J40 BRONCHITIS: ICD-10-CM

## 2019-01-01 DIAGNOSIS — J06.9 RECURRENT URI (UPPER RESPIRATORY INFECTION): Primary | ICD-10-CM

## 2019-01-01 LAB
ALBUMIN SERPL-MCNC: 3.2 G/DL (ref 3.4–5)
ALBUMIN SERPL-MCNC: 3.5 G/DL (ref 3.4–5)
ALBUMIN SERPL-MCNC: 3.6 G/DL (ref 3.4–5)
ALBUMIN SERPL-MCNC: 3.6 G/DL (ref 3.4–5)
ALBUMIN UR-MCNC: NEGATIVE MG/DL
ALBUMIN UR-MCNC: NEGATIVE MG/DL
ALP SERPL-CCNC: 63 U/L (ref 40–150)
ALP SERPL-CCNC: 65 U/L (ref 40–150)
ALP SERPL-CCNC: 68 U/L (ref 40–150)
ALP SERPL-CCNC: 75 U/L (ref 40–150)
ALT SERPL W P-5'-P-CCNC: 23 U/L (ref 0–70)
ALT SERPL W P-5'-P-CCNC: 24 U/L (ref 0–70)
ANION GAP SERPL CALCULATED.3IONS-SCNC: 4 MMOL/L (ref 3–14)
ANION GAP SERPL CALCULATED.3IONS-SCNC: 5 MMOL/L (ref 3–14)
ANION GAP SERPL CALCULATED.3IONS-SCNC: 6 MMOL/L (ref 3–14)
AST SERPL W P-5'-P-CCNC: 18 U/L (ref 0–45)
AST SERPL W P-5'-P-CCNC: 20 U/L (ref 0–45)
AST SERPL W P-5'-P-CCNC: 27 U/L (ref 0–45)
AST SERPL W P-5'-P-CCNC: 30 U/L (ref 0–45)
BACTERIA SPEC CULT: NO GROWTH
BACTERIA SPEC CULT: NO GROWTH
BASOPHILS # BLD AUTO: 0.1 10E9/L (ref 0–0.2)
BASOPHILS NFR BLD AUTO: 0.9 %
BASOPHILS NFR BLD AUTO: 0.9 %
BASOPHILS NFR BLD AUTO: 1.2 %
BILIRUB DIRECT SERPL-MCNC: 0.2 MG/DL (ref 0–0.2)
BILIRUB SERPL-MCNC: 0.7 MG/DL (ref 0.2–1.3)
BILIRUB SERPL-MCNC: 0.8 MG/DL (ref 0.2–1.3)
BILIRUB SERPL-MCNC: 0.8 MG/DL (ref 0.2–1.3)
BILIRUB SERPL-MCNC: 1.1 MG/DL (ref 0.2–1.3)
BUN SERPL-MCNC: 14 MG/DL (ref 7–30)
BUN SERPL-MCNC: 17 MG/DL (ref 7–30)
BUN SERPL-MCNC: 26 MG/DL (ref 7–30)
CALCIUM SERPL-MCNC: 8.6 MG/DL (ref 8.5–10.1)
CALCIUM SERPL-MCNC: 8.6 MG/DL (ref 8.5–10.1)
CALCIUM SERPL-MCNC: 8.7 MG/DL (ref 8.5–10.1)
CHLORIDE SERPL-SCNC: 104 MMOL/L (ref 94–109)
CHLORIDE SERPL-SCNC: 107 MMOL/L (ref 94–109)
CHLORIDE SERPL-SCNC: 109 MMOL/L (ref 94–109)
CO2 BLDCOV-SCNC: 24 MMOL/L (ref 21–28)
CO2 SERPL-SCNC: 26 MMOL/L (ref 20–32)
CO2 SERPL-SCNC: 27 MMOL/L (ref 20–32)
CO2 SERPL-SCNC: 28 MMOL/L (ref 20–32)
CREAT SERPL-MCNC: 0.98 MG/DL (ref 0.66–1.25)
CREAT SERPL-MCNC: 1.18 MG/DL (ref 0.66–1.25)
CREAT SERPL-MCNC: 1.23 MG/DL (ref 0.66–1.25)
DIFFERENTIAL METHOD BLD: ABNORMAL
EOSINOPHIL # BLD AUTO: 0 10E9/L (ref 0–0.7)
EOSINOPHIL # BLD AUTO: 0.2 10E9/L (ref 0–0.7)
EOSINOPHIL # BLD AUTO: 0.3 10E9/L (ref 0–0.7)
EOSINOPHIL NFR BLD AUTO: 0.5 %
EOSINOPHIL NFR BLD AUTO: 2.7 %
EOSINOPHIL NFR BLD AUTO: 4.4 %
ERYTHROCYTE [DISTWIDTH] IN BLOOD BY AUTOMATED COUNT: 16.1 % (ref 10–15)
ERYTHROCYTE [DISTWIDTH] IN BLOOD BY AUTOMATED COUNT: 16.4 % (ref 10–15)
ERYTHROCYTE [DISTWIDTH] IN BLOOD BY AUTOMATED COUNT: 16.4 % (ref 10–15)
FLUAV+FLUBV AG SPEC QL: NEGATIVE
FLUAV+FLUBV AG SPEC QL: NEGATIVE
GFR SERPL CREATININE-BSD FRML MDRD: 59 ML/MIN/{1.73_M2}
GFR SERPL CREATININE-BSD FRML MDRD: 62 ML/MIN/{1.73_M2}
GFR SERPL CREATININE-BSD FRML MDRD: 78 ML/MIN/{1.73_M2}
GLUCOSE SERPL-MCNC: 79 MG/DL (ref 70–99)
GLUCOSE SERPL-MCNC: 93 MG/DL (ref 70–99)
GLUCOSE SERPL-MCNC: 97 MG/DL (ref 70–99)
HCT VFR BLD AUTO: 35.6 % (ref 40–53)
HCT VFR BLD AUTO: 39.2 % (ref 40–53)
HCT VFR BLD AUTO: 39.3 % (ref 40–53)
HGB BLD-MCNC: 11.5 G/DL (ref 13.3–17.7)
HGB BLD-MCNC: 11.9 G/DL (ref 13.3–17.7)
HGB BLD-MCNC: 12.2 G/DL (ref 13.3–17.7)
IMM GRANULOCYTES # BLD: 0 10E9/L (ref 0–0.4)
IMM GRANULOCYTES # BLD: 0 10E9/L (ref 0–0.4)
IMM GRANULOCYTES # BLD: 0.1 10E9/L (ref 0–0.4)
IMM GRANULOCYTES NFR BLD: 0.1 %
IMM GRANULOCYTES NFR BLD: 0.3 %
IMM GRANULOCYTES NFR BLD: 1.7 %
INTERPRETATION ECG - MUSE: NORMAL
LACTATE BLD-SCNC: 1.3 MMOL/L (ref 0.7–2.1)
LYMPHOCYTES # BLD AUTO: 0.5 10E9/L (ref 0.8–5.3)
LYMPHOCYTES # BLD AUTO: 1.5 10E9/L (ref 0.8–5.3)
LYMPHOCYTES # BLD AUTO: 1.5 10E9/L (ref 0.8–5.3)
LYMPHOCYTES NFR BLD AUTO: 21.4 %
LYMPHOCYTES NFR BLD AUTO: 21.5 %
LYMPHOCYTES NFR BLD AUTO: 6.8 %
Lab: NORMAL
Lab: NORMAL
MCH RBC QN AUTO: 28.1 PG (ref 26.5–33)
MCH RBC QN AUTO: 29 PG (ref 26.5–33)
MCH RBC QN AUTO: 29 PG (ref 26.5–33)
MCHC RBC AUTO-ENTMCNC: 30.4 G/DL (ref 31.5–36.5)
MCHC RBC AUTO-ENTMCNC: 31 G/DL (ref 31.5–36.5)
MCHC RBC AUTO-ENTMCNC: 32.3 G/DL (ref 31.5–36.5)
MCV RBC AUTO: 90 FL (ref 78–100)
MCV RBC AUTO: 93 FL (ref 78–100)
MCV RBC AUTO: 94 FL (ref 78–100)
MONOCYTES # BLD AUTO: 0.1 10E9/L (ref 0–1.3)
MONOCYTES # BLD AUTO: 0.8 10E9/L (ref 0–1.3)
MONOCYTES # BLD AUTO: 1.2 10E9/L (ref 0–1.3)
MONOCYTES NFR BLD AUTO: 1.3 %
MONOCYTES NFR BLD AUTO: 11.8 %
MONOCYTES NFR BLD AUTO: 16.8 %
NEUTROPHILS # BLD AUTO: 4 10E9/L (ref 1.6–8.3)
NEUTROPHILS # BLD AUTO: 4.4 10E9/L (ref 1.6–8.3)
NEUTROPHILS # BLD AUTO: 6.7 10E9/L (ref 1.6–8.3)
NEUTROPHILS NFR BLD AUTO: 56.1 %
NEUTROPHILS NFR BLD AUTO: 62.8 %
NEUTROPHILS NFR BLD AUTO: 88.8 %
NRBC # BLD AUTO: 0 10*3/UL
NRBC BLD AUTO-RTO: 0 /100
PCO2 BLDV: 34 MM HG (ref 40–50)
PH BLDV: 7.44 PH (ref 7.32–7.43)
PLATELET # BLD AUTO: 208 10E9/L (ref 150–450)
PLATELET # BLD AUTO: 267 10E9/L (ref 150–450)
PLATELET # BLD AUTO: 268 10E9/L (ref 150–450)
PO2 BLDV: 27 MM HG (ref 25–47)
POTASSIUM SERPL-SCNC: 4 MMOL/L (ref 3.4–5.3)
POTASSIUM SERPL-SCNC: 4.2 MMOL/L (ref 3.4–5.3)
POTASSIUM SERPL-SCNC: 4.6 MMOL/L (ref 3.4–5.3)
PROT SERPL-MCNC: 6.6 G/DL (ref 6.8–8.8)
PROT SERPL-MCNC: 6.7 G/DL (ref 6.8–8.8)
PROT SERPL-MCNC: 6.9 G/DL (ref 6.8–8.8)
PROT SERPL-MCNC: 7.2 G/DL (ref 6.8–8.8)
RBC # BLD AUTO: 3.97 10E12/L (ref 4.4–5.9)
RBC # BLD AUTO: 4.2 10E12/L (ref 4.4–5.9)
RBC # BLD AUTO: 4.23 10E12/L (ref 4.4–5.9)
SAO2 % BLDV FROM PO2: 53 %
SODIUM SERPL-SCNC: 136 MMOL/L (ref 133–144)
SODIUM SERPL-SCNC: 139 MMOL/L (ref 133–144)
SODIUM SERPL-SCNC: 141 MMOL/L (ref 133–144)
SPECIMEN SOURCE: NORMAL
TSH SERPL DL<=0.005 MIU/L-ACNC: 3.74 MU/L (ref 0.4–4)
WBC # BLD AUTO: 6.9 10E9/L (ref 4–11)
WBC # BLD AUTO: 7.2 10E9/L (ref 4–11)
WBC # BLD AUTO: 7.5 10E9/L (ref 4–11)

## 2019-01-01 PROCEDURE — 25000128 H RX IP 250 OP 636: Performed by: INTERNAL MEDICINE

## 2019-01-01 PROCEDURE — 99213 OFFICE O/P EST LOW 20 MIN: CPT | Performed by: PHYSICIAN ASSISTANT

## 2019-01-01 PROCEDURE — 25000125 ZZHC RX 250: Performed by: RADIOLOGY

## 2019-01-01 PROCEDURE — G0463 HOSPITAL OUTPT CLINIC VISIT: HCPCS | Mod: ZF

## 2019-01-01 PROCEDURE — G0463 HOSPITAL OUTPT CLINIC VISIT: HCPCS

## 2019-01-01 PROCEDURE — 85025 COMPLETE CBC W/AUTO DIFF WBC: CPT | Performed by: INTERNAL MEDICINE

## 2019-01-01 PROCEDURE — 80076 HEPATIC FUNCTION PANEL: CPT | Performed by: INTERNAL MEDICINE

## 2019-01-01 PROCEDURE — 81003 URINALYSIS AUTO W/O SCOPE: CPT | Performed by: PHYSICIAN ASSISTANT

## 2019-01-01 PROCEDURE — 71275 CT ANGIOGRAPHY CHEST: CPT

## 2019-01-01 PROCEDURE — 25000128 H RX IP 250 OP 636: Performed by: EMERGENCY MEDICINE

## 2019-01-01 PROCEDURE — 96361 HYDRATE IV INFUSION ADD-ON: CPT

## 2019-01-01 PROCEDURE — 99215 OFFICE O/P EST HI 40 MIN: CPT | Mod: ZP | Performed by: INTERNAL MEDICINE

## 2019-01-01 PROCEDURE — 25000132 ZZH RX MED GY IP 250 OP 250 PS 637: Performed by: PHYSICIAN ASSISTANT

## 2019-01-01 PROCEDURE — C1788 PORT, INDWELLING, IMP: HCPCS

## 2019-01-01 PROCEDURE — 25800030 ZZH RX IP 258 OP 636: Performed by: INTERNAL MEDICINE

## 2019-01-01 PROCEDURE — 25800030 ZZH RX IP 258 OP 636: Performed by: EMERGENCY MEDICINE

## 2019-01-01 PROCEDURE — 99214 OFFICE O/P EST MOD 30 MIN: CPT | Performed by: PHYSICIAN ASSISTANT

## 2019-01-01 PROCEDURE — 25000128 H RX IP 250 OP 636: Performed by: RADIOLOGY

## 2019-01-01 PROCEDURE — 80053 COMPREHEN METABOLIC PANEL: CPT | Performed by: INTERNAL MEDICINE

## 2019-01-01 PROCEDURE — 96417 CHEMO IV INFUS EACH ADDL SEQ: CPT

## 2019-01-01 PROCEDURE — 36591 DRAW BLOOD OFF VENOUS DEVICE: CPT

## 2019-01-01 PROCEDURE — 96413 CHEMO IV INFUSION 1 HR: CPT

## 2019-01-01 PROCEDURE — 99152 MOD SED SAME PHYS/QHP 5/>YRS: CPT

## 2019-01-01 PROCEDURE — 25000125 ZZHC RX 250: Performed by: EMERGENCY MEDICINE

## 2019-01-01 PROCEDURE — 25000128 H RX IP 250 OP 636

## 2019-01-01 PROCEDURE — 74177 CT ABD & PELVIS W/CONTRAST: CPT

## 2019-01-01 PROCEDURE — 25000128 H RX IP 250 OP 636: Performed by: PHYSICIAN ASSISTANT

## 2019-01-01 PROCEDURE — 99213 OFFICE O/P EST LOW 20 MIN: CPT | Performed by: FAMILY MEDICINE

## 2019-01-01 PROCEDURE — 25000128 H RX IP 250 OP 636: Mod: ZF | Performed by: INTERNAL MEDICINE

## 2019-01-01 PROCEDURE — 81003 URINALYSIS AUTO W/O SCOPE: CPT | Performed by: INTERNAL MEDICINE

## 2019-01-01 PROCEDURE — 71260 CT THORAX DX C+: CPT

## 2019-01-01 PROCEDURE — 71250 CT THORAX DX C-: CPT

## 2019-01-01 PROCEDURE — 77001 FLUOROGUIDE FOR VEIN DEVICE: CPT

## 2019-01-01 PROCEDURE — 93005 ELECTROCARDIOGRAM TRACING: CPT

## 2019-01-01 PROCEDURE — 96375 TX/PRO/DX INJ NEW DRUG ADDON: CPT

## 2019-01-01 PROCEDURE — 85025 COMPLETE CBC W/AUTO DIFF WBC: CPT | Performed by: EMERGENCY MEDICINE

## 2019-01-01 PROCEDURE — C1769 GUIDE WIRE: HCPCS

## 2019-01-01 PROCEDURE — 36561 INSERT TUNNELED CV CATH: CPT

## 2019-01-01 PROCEDURE — 99285 EMERGENCY DEPT VISIT HI MDM: CPT | Mod: 25

## 2019-01-01 PROCEDURE — 80053 COMPREHEN METABOLIC PANEL: CPT | Performed by: EMERGENCY MEDICINE

## 2019-01-01 PROCEDURE — 83605 ASSAY OF LACTIC ACID: CPT

## 2019-01-01 PROCEDURE — 27210820 ZZH WOUND GLUE CR3

## 2019-01-01 PROCEDURE — 87804 INFLUENZA ASSAY W/OPTIC: CPT | Performed by: EMERGENCY MEDICINE

## 2019-01-01 PROCEDURE — 25000128 H RX IP 250 OP 636: Mod: JW | Performed by: PHYSICIAN ASSISTANT

## 2019-01-01 PROCEDURE — 76937 US GUIDE VASCULAR ACCESS: CPT

## 2019-01-01 PROCEDURE — 25800030 ZZH RX IP 258 OP 636: Performed by: PHYSICIAN ASSISTANT

## 2019-01-01 PROCEDURE — 25000125 ZZHC RX 250: Performed by: INTERNAL MEDICINE

## 2019-01-01 PROCEDURE — 84443 ASSAY THYROID STIM HORMONE: CPT | Performed by: INTERNAL MEDICINE

## 2019-01-01 PROCEDURE — 27210908 ZZH NEEDLE CR4

## 2019-01-01 PROCEDURE — 82803 BLOOD GASES ANY COMBINATION: CPT

## 2019-01-01 PROCEDURE — 94640 AIRWAY INHALATION TREATMENT: CPT

## 2019-01-01 PROCEDURE — 71046 X-RAY EXAM CHEST 2 VIEWS: CPT

## 2019-01-01 PROCEDURE — 36415 COLL VENOUS BLD VENIPUNCTURE: CPT | Performed by: EMERGENCY MEDICINE

## 2019-01-01 PROCEDURE — 96374 THER/PROPH/DIAG INJ IV PUSH: CPT | Mod: 59

## 2019-01-01 PROCEDURE — 87040 BLOOD CULTURE FOR BACTERIA: CPT | Performed by: EMERGENCY MEDICINE

## 2019-01-01 RX ORDER — METHYLPREDNISOLONE SODIUM SUCCINATE 125 MG/2ML
125 INJECTION, POWDER, LYOPHILIZED, FOR SOLUTION INTRAMUSCULAR; INTRAVENOUS
Status: CANCELLED
Start: 2019-01-01

## 2019-01-01 RX ORDER — LORAZEPAM 2 MG/ML
0.5 INJECTION INTRAMUSCULAR EVERY 4 HOURS PRN
Status: CANCELLED
Start: 2019-01-01

## 2019-01-01 RX ORDER — QUETIAPINE FUMARATE 25 MG/1
TABLET, FILM COATED ORAL
Qty: 45 TABLET | Refills: 2 | OUTPATIENT
Start: 2019-01-01

## 2019-01-01 RX ORDER — HEPARIN SODIUM,PORCINE 10 UNIT/ML
5 VIAL (ML) INTRAVENOUS
Status: CANCELLED | OUTPATIENT
Start: 2019-01-01

## 2019-01-01 RX ORDER — HEPARIN SODIUM (PORCINE) LOCK FLUSH IV SOLN 100 UNIT/ML 100 UNIT/ML
5 SOLUTION INTRAVENOUS
Status: DISCONTINUED | OUTPATIENT
Start: 2019-01-01 | End: 2019-01-01 | Stop reason: HOSPADM

## 2019-01-01 RX ORDER — ALBUTEROL SULFATE 90 UG/1
1-2 AEROSOL, METERED RESPIRATORY (INHALATION)
Status: CANCELLED
Start: 2019-01-01

## 2019-01-01 RX ORDER — NICOTINE POLACRILEX 4 MG
15-30 LOZENGE BUCCAL
Status: DISCONTINUED | OUTPATIENT
Start: 2019-01-01 | End: 2019-01-01 | Stop reason: HOSPADM

## 2019-01-01 RX ORDER — FLUMAZENIL 0.1 MG/ML
0.2 INJECTION, SOLUTION INTRAVENOUS
Status: DISCONTINUED | OUTPATIENT
Start: 2019-01-01 | End: 2019-01-01 | Stop reason: HOSPADM

## 2019-01-01 RX ORDER — HEPARIN SODIUM (PORCINE) LOCK FLUSH IV SOLN 100 UNIT/ML 100 UNIT/ML
500 SOLUTION INTRAVENOUS ONCE
Status: COMPLETED | OUTPATIENT
Start: 2019-01-01 | End: 2019-01-01

## 2019-01-01 RX ORDER — LIDOCAINE/PRILOCAINE 2.5 %-2.5%
CREAM (GRAM) TOPICAL PRN
Qty: 30 G | Refills: 11 | Status: SHIPPED | OUTPATIENT
Start: 2019-01-01 | End: 2020-01-01

## 2019-01-01 RX ORDER — CEFAZOLIN SODIUM 2 G/100ML
INJECTION, SOLUTION INTRAVENOUS
Status: DISCONTINUED
Start: 2019-01-01 | End: 2019-01-01 | Stop reason: HOSPADM

## 2019-01-01 RX ORDER — EPINEPHRINE 0.3 MG/.3ML
0.3 INJECTION SUBCUTANEOUS EVERY 5 MIN PRN
Status: CANCELLED | OUTPATIENT
Start: 2020-01-01

## 2019-01-01 RX ORDER — DIPHENHYDRAMINE HYDROCHLORIDE 50 MG/ML
50 INJECTION INTRAMUSCULAR; INTRAVENOUS
Status: CANCELLED
Start: 2019-01-01

## 2019-01-01 RX ORDER — IOPAMIDOL 755 MG/ML
500 INJECTION, SOLUTION INTRAVASCULAR ONCE
Status: COMPLETED | OUTPATIENT
Start: 2019-01-01 | End: 2019-01-01

## 2019-01-01 RX ORDER — LIDOCAINE 40 MG/G
CREAM TOPICAL
Status: DISCONTINUED | OUTPATIENT
Start: 2019-01-01 | End: 2019-01-01 | Stop reason: HOSPADM

## 2019-01-01 RX ORDER — DEXTROSE MONOHYDRATE 25 G/50ML
25-50 INJECTION, SOLUTION INTRAVENOUS
Status: CANCELLED | OUTPATIENT
Start: 2019-01-01

## 2019-01-01 RX ORDER — EPINEPHRINE 0.3 MG/.3ML
0.3 INJECTION SUBCUTANEOUS EVERY 5 MIN PRN
Status: CANCELLED | OUTPATIENT
Start: 2019-01-01

## 2019-01-01 RX ORDER — MEPERIDINE HYDROCHLORIDE 25 MG/ML
25 INJECTION INTRAMUSCULAR; INTRAVENOUS; SUBCUTANEOUS EVERY 30 MIN PRN
Status: CANCELLED | OUTPATIENT
Start: 2019-01-01

## 2019-01-01 RX ORDER — NALOXONE HYDROCHLORIDE 0.4 MG/ML
.1-.4 INJECTION, SOLUTION INTRAMUSCULAR; INTRAVENOUS; SUBCUTANEOUS
Status: CANCELLED | OUTPATIENT
Start: 2019-01-01

## 2019-01-01 RX ORDER — DEXTROSE MONOHYDRATE 25 G/50ML
25-50 INJECTION, SOLUTION INTRAVENOUS
Status: DISCONTINUED | OUTPATIENT
Start: 2019-01-01 | End: 2019-01-01 | Stop reason: HOSPADM

## 2019-01-01 RX ORDER — METHYLPREDNISOLONE SODIUM SUCCINATE 125 MG/2ML
125 INJECTION, POWDER, LYOPHILIZED, FOR SOLUTION INTRAMUSCULAR; INTRAVENOUS
Status: CANCELLED
Start: 2020-01-01

## 2019-01-01 RX ORDER — HEPARIN SODIUM (PORCINE) LOCK FLUSH IV SOLN 100 UNIT/ML 100 UNIT/ML
SOLUTION INTRAVENOUS
Status: COMPLETED
Start: 2019-01-01 | End: 2019-01-01

## 2019-01-01 RX ORDER — HEPARIN SODIUM 200 [USP'U]/100ML
500 INJECTION, SOLUTION INTRAVENOUS CONTINUOUS PRN
Status: DISCONTINUED | OUTPATIENT
Start: 2019-01-01 | End: 2019-01-01 | Stop reason: HOSPADM

## 2019-01-01 RX ORDER — FENTANYL CITRATE 50 UG/ML
INJECTION, SOLUTION INTRAMUSCULAR; INTRAVENOUS
Status: DISCONTINUED
Start: 2019-01-01 | End: 2019-01-01 | Stop reason: HOSPADM

## 2019-01-01 RX ORDER — ALBUTEROL SULFATE 0.83 MG/ML
2.5 SOLUTION RESPIRATORY (INHALATION) EVERY 4 HOURS PRN
Qty: 100 VIAL | Refills: 11 | Status: SHIPPED | OUTPATIENT
Start: 2019-01-01

## 2019-01-01 RX ORDER — BENZONATATE 200 MG/1
200 CAPSULE ORAL 3 TIMES DAILY PRN
Qty: 21 CAPSULE | Refills: 0 | Status: SHIPPED | OUTPATIENT
Start: 2019-01-01 | End: 2019-01-01

## 2019-01-01 RX ORDER — HEPARIN SODIUM (PORCINE) LOCK FLUSH IV SOLN 100 UNIT/ML 100 UNIT/ML
5 SOLUTION INTRAVENOUS
Status: CANCELLED | OUTPATIENT
Start: 2019-01-01

## 2019-01-01 RX ORDER — CEFAZOLIN SODIUM 2 G/100ML
2 INJECTION, SOLUTION INTRAVENOUS
Status: DISCONTINUED | OUTPATIENT
Start: 2019-01-01 | End: 2019-01-01 | Stop reason: HOSPADM

## 2019-01-01 RX ORDER — NALOXONE HYDROCHLORIDE 0.4 MG/ML
.1-.4 INJECTION, SOLUTION INTRAMUSCULAR; INTRAVENOUS; SUBCUTANEOUS
Status: CANCELLED | OUTPATIENT
Start: 2020-01-01

## 2019-01-01 RX ORDER — IPRATROPIUM BROMIDE AND ALBUTEROL SULFATE 2.5; .5 MG/3ML; MG/3ML
6 SOLUTION RESPIRATORY (INHALATION) ONCE
Status: COMPLETED | OUTPATIENT
Start: 2019-01-01 | End: 2019-01-01

## 2019-01-01 RX ORDER — ALBUTEROL SULFATE 0.83 MG/ML
2.5 SOLUTION RESPIRATORY (INHALATION)
Status: CANCELLED | OUTPATIENT
Start: 2020-01-01

## 2019-01-01 RX ORDER — PREDNISONE 20 MG/1
40 TABLET ORAL DAILY
Qty: 10 TABLET | Refills: 0 | Status: CANCELLED | OUTPATIENT
Start: 2019-01-01 | End: 2019-01-01

## 2019-01-01 RX ORDER — HEPARIN SODIUM 1000 [USP'U]/ML
INJECTION, SOLUTION INTRAVENOUS; SUBCUTANEOUS
Status: COMPLETED
Start: 2019-01-01 | End: 2019-01-01

## 2019-01-01 RX ORDER — MORPHINE SULFATE 15 MG/1
15 TABLET ORAL EVERY 4 HOURS PRN
Qty: 30 TABLET | Refills: 0 | Status: SHIPPED | OUTPATIENT
Start: 2019-01-01 | End: 2020-01-01

## 2019-01-01 RX ORDER — DIPHENHYDRAMINE HCL 25 MG
50 CAPSULE ORAL ONCE
Status: CANCELLED
Start: 2020-01-01

## 2019-01-01 RX ORDER — NICOTINE POLACRILEX 4 MG
15-30 LOZENGE BUCCAL
Status: CANCELLED | OUTPATIENT
Start: 2019-01-01

## 2019-01-01 RX ORDER — FINASTERIDE 5 MG/1
TABLET, FILM COATED ORAL
Qty: 90 TABLET | Refills: 1 | Status: SHIPPED | OUTPATIENT
Start: 2019-01-01 | End: 2020-01-01

## 2019-01-01 RX ORDER — DEXAMETHASONE 4 MG/1
TABLET ORAL
Qty: 6 TABLET | Refills: 11 | Status: SHIPPED | OUTPATIENT
Start: 2019-01-01 | End: 2020-01-01

## 2019-01-01 RX ORDER — EPINEPHRINE 1 MG/ML
0.3 INJECTION, SOLUTION INTRAMUSCULAR; SUBCUTANEOUS EVERY 5 MIN PRN
Status: CANCELLED | OUTPATIENT
Start: 2019-01-01

## 2019-01-01 RX ORDER — LORAZEPAM 2 MG/ML
0.5 INJECTION INTRAMUSCULAR EVERY 4 HOURS PRN
Status: CANCELLED
Start: 2020-01-01

## 2019-01-01 RX ORDER — CODEINE PHOSPHATE/GUAIFENESIN 10-100MG/5
5 LIQUID (ML) ORAL EVERY 4 HOURS PRN
Qty: 236 ML | Refills: 3 | Status: SHIPPED | OUTPATIENT
Start: 2019-01-01 | End: 2019-01-01

## 2019-01-01 RX ORDER — DIPHENHYDRAMINE HCL 25 MG
50 CAPSULE ORAL ONCE
Status: CANCELLED
Start: 2019-01-01

## 2019-01-01 RX ORDER — DIPHENHYDRAMINE HCL 25 MG
50 CAPSULE ORAL ONCE
Status: COMPLETED | OUTPATIENT
Start: 2019-01-01 | End: 2019-01-01

## 2019-01-01 RX ORDER — SODIUM CHLORIDE 9 MG/ML
1000 INJECTION, SOLUTION INTRAVENOUS CONTINUOUS PRN
Status: CANCELLED
Start: 2019-01-01

## 2019-01-01 RX ORDER — DIPHENHYDRAMINE HYDROCHLORIDE 50 MG/ML
50 INJECTION INTRAMUSCULAR; INTRAVENOUS
Status: CANCELLED
Start: 2020-01-01

## 2019-01-01 RX ORDER — AZITHROMYCIN 250 MG/1
TABLET, FILM COATED ORAL
Qty: 6 TABLET | Refills: 0 | Status: SHIPPED | OUTPATIENT
Start: 2019-01-01 | End: 2019-01-01

## 2019-01-01 RX ORDER — TAMSULOSIN HYDROCHLORIDE 0.4 MG/1
CAPSULE ORAL
Qty: 90 CAPSULE | Refills: 1 | OUTPATIENT
Start: 2019-01-01

## 2019-01-01 RX ORDER — ALBUTEROL SULFATE 90 UG/1
1-2 AEROSOL, METERED RESPIRATORY (INHALATION)
Status: CANCELLED
Start: 2020-01-01

## 2019-01-01 RX ORDER — HEPARIN SODIUM (PORCINE) LOCK FLUSH IV SOLN 100 UNIT/ML 100 UNIT/ML
500 SOLUTION INTRAVENOUS EVERY 8 HOURS PRN
Status: DISCONTINUED | OUTPATIENT
Start: 2019-01-01 | End: 2019-01-01 | Stop reason: HOSPADM

## 2019-01-01 RX ORDER — NALOXONE HYDROCHLORIDE 0.4 MG/ML
.1-.4 INJECTION, SOLUTION INTRAMUSCULAR; INTRAVENOUS; SUBCUTANEOUS
Status: DISCONTINUED | OUTPATIENT
Start: 2019-01-01 | End: 2019-01-01 | Stop reason: HOSPADM

## 2019-01-01 RX ORDER — IOPAMIDOL 755 MG/ML
84 INJECTION, SOLUTION INTRAVASCULAR ONCE
Status: COMPLETED | OUTPATIENT
Start: 2019-01-01 | End: 2019-01-01

## 2019-01-01 RX ORDER — LORAZEPAM 1 MG/1
1 TABLET ORAL ONCE
Status: DISCONTINUED | OUTPATIENT
Start: 2019-01-01 | End: 2019-01-01 | Stop reason: HOSPADM

## 2019-01-01 RX ORDER — HEPARIN SODIUM (PORCINE) LOCK FLUSH IV SOLN 100 UNIT/ML 100 UNIT/ML
5 SOLUTION INTRAVENOUS ONCE
Status: COMPLETED | OUTPATIENT
Start: 2019-01-01 | End: 2019-01-01

## 2019-01-01 RX ORDER — SODIUM CHLORIDE 9 MG/ML
1000 INJECTION, SOLUTION INTRAVENOUS CONTINUOUS PRN
Status: CANCELLED
Start: 2020-01-01

## 2019-01-01 RX ORDER — LIDOCAINE HYDROCHLORIDE 10 MG/ML
INJECTION, SOLUTION EPIDURAL; INFILTRATION; INTRACAUDAL; PERINEURAL
Status: DISCONTINUED
Start: 2019-01-01 | End: 2019-01-01 | Stop reason: HOSPADM

## 2019-01-01 RX ORDER — MEPERIDINE HYDROCHLORIDE 25 MG/ML
25 INJECTION INTRAMUSCULAR; INTRAVENOUS; SUBCUTANEOUS EVERY 30 MIN PRN
Status: CANCELLED | OUTPATIENT
Start: 2020-01-01

## 2019-01-01 RX ORDER — ALBUTEROL SULFATE 0.83 MG/ML
2.5 SOLUTION RESPIRATORY (INHALATION)
Status: CANCELLED | OUTPATIENT
Start: 2019-01-01

## 2019-01-01 RX ORDER — MONTELUKAST SODIUM 10 MG/1
10 TABLET ORAL AT BEDTIME
Qty: 30 TABLET | Refills: 0 | Status: SHIPPED | OUTPATIENT
Start: 2019-01-01 | End: 2019-01-01

## 2019-01-01 RX ORDER — FENTANYL CITRATE 50 UG/ML
25-50 INJECTION, SOLUTION INTRAMUSCULAR; INTRAVENOUS EVERY 5 MIN PRN
Status: DISCONTINUED | OUTPATIENT
Start: 2019-01-01 | End: 2019-01-01 | Stop reason: HOSPADM

## 2019-01-01 RX ORDER — PREDNISONE 10 MG/1
10 TABLET ORAL 2 TIMES DAILY
Qty: 100 TABLET | Refills: 1 | Status: SHIPPED | OUTPATIENT
Start: 2019-01-01 | End: 2019-01-01

## 2019-01-01 RX ORDER — EPINEPHRINE 1 MG/ML
0.3 INJECTION, SOLUTION INTRAMUSCULAR; SUBCUTANEOUS EVERY 5 MIN PRN
Status: CANCELLED | OUTPATIENT
Start: 2020-01-01

## 2019-01-01 RX ADMIN — SODIUM CHLORIDE, PRESERVATIVE FREE 5 ML: 5 INJECTION INTRAVENOUS at 14:15

## 2019-01-01 RX ADMIN — SODIUM CHLORIDE 80 ML: 9 INJECTION, SOLUTION INTRAVENOUS at 00:43

## 2019-01-01 RX ADMIN — SODIUM CHLORIDE 1000 ML: 9 INJECTION, SOLUTION INTRAVENOUS at 00:19

## 2019-01-01 RX ADMIN — Medication 5 ML: at 10:45

## 2019-01-01 RX ADMIN — IPRATROPIUM BROMIDE AND ALBUTEROL SULFATE 6 ML: .5; 3 SOLUTION RESPIRATORY (INHALATION) at 23:25

## 2019-01-01 RX ADMIN — DIPHENHYDRAMINE HYDROCHLORIDE 50 MG: 50 INJECTION INTRAMUSCULAR; INTRAVENOUS at 08:37

## 2019-01-01 RX ADMIN — SODIUM CHLORIDE 250 ML: 9 INJECTION, SOLUTION INTRAVENOUS at 09:17

## 2019-01-01 RX ADMIN — IOPAMIDOL 86 ML: 755 INJECTION, SOLUTION INTRAVENOUS at 10:05

## 2019-01-01 RX ADMIN — SODIUM CHLORIDE, POTASSIUM CHLORIDE, SODIUM LACTATE AND CALCIUM CHLORIDE 1000 ML: 600; 310; 30; 20 INJECTION, SOLUTION INTRAVENOUS at 23:17

## 2019-01-01 RX ADMIN — DIPHENHYDRAMINE HYDROCHLORIDE 50 MG: 25 CAPSULE ORAL at 09:17

## 2019-01-01 RX ADMIN — HEPARIN SODIUM (PORCINE) LOCK FLUSH IV SOLN 100 UNIT/ML 500 UNITS: 100 SOLUTION at 10:14

## 2019-01-01 RX ADMIN — DEXAMETHASONE SODIUM PHOSPHATE 12 MG: 10 INJECTION, SOLUTION INTRAMUSCULAR; INTRAVENOUS at 09:23

## 2019-01-01 RX ADMIN — SODIUM CHLORIDE 800 MG: 9 INJECTION, SOLUTION INTRAVENOUS at 10:50

## 2019-01-01 RX ADMIN — DEXAMETHASONE SODIUM PHOSPHATE 12 MG: 10 INJECTION, SOLUTION INTRAMUSCULAR; INTRAVENOUS at 08:47

## 2019-01-01 RX ADMIN — FENTANYL CITRATE 50 MCG: 50 INJECTION INTRAMUSCULAR; INTRAVENOUS at 08:32

## 2019-01-01 RX ADMIN — HEPARIN 500 UNITS: 100 SYRINGE at 10:14

## 2019-01-01 RX ADMIN — SODIUM CHLORIDE 250 ML: 9 INJECTION, SOLUTION INTRAVENOUS at 08:37

## 2019-01-01 RX ADMIN — IOPAMIDOL 84 ML: 755 INJECTION, SOLUTION INTRAVENOUS at 13:34

## 2019-01-01 RX ADMIN — SODIUM CHLORIDE, PRESERVATIVE FREE 5 ML: 5 INJECTION INTRAVENOUS at 11:51

## 2019-01-01 RX ADMIN — DOCETAXEL 120 MG: 20 INJECTION, SOLUTION, CONCENTRATE INTRAVENOUS at 10:25

## 2019-01-01 RX ADMIN — LIDOCAINE HYDROCHLORIDE 18 ML: 10 INJECTION, SOLUTION EPIDURAL; INFILTRATION; INTRACAUDAL; PERINEURAL at 08:37

## 2019-01-01 RX ADMIN — HEPARIN SODIUM 1000 UNITS: 1000 INJECTION INTRAVENOUS; SUBCUTANEOUS at 08:42

## 2019-01-01 RX ADMIN — HEPARIN SODIUM (PORCINE) LOCK FLUSH IV SOLN 100 UNIT/ML 500 UNITS: 100 SOLUTION at 13:35

## 2019-01-01 RX ADMIN — SODIUM CHLORIDE 800 MG: 0.9 INJECTION, SOLUTION INTRAVENOUS at 09:18

## 2019-01-01 RX ADMIN — IOPAMIDOL 61 ML: 755 INJECTION, SOLUTION INTRAVENOUS at 00:43

## 2019-01-01 RX ADMIN — SODIUM CHLORIDE 62 ML: 9 INJECTION, SOLUTION INTRAVENOUS at 10:05

## 2019-01-01 RX ADMIN — HEPARIN 500 UNITS: 100 SYRINGE at 13:35

## 2019-01-01 RX ADMIN — Medication 500 UNITS: at 07:47

## 2019-01-01 RX ADMIN — HEPARIN 500 UNITS: 100 SYRINGE at 02:12

## 2019-01-01 RX ADMIN — DOCETAXEL 120 MG: 20 INJECTION, SOLUTION, CONCENTRATE INTRAVENOUS at 09:45

## 2019-01-01 RX ADMIN — MIDAZOLAM HYDROCHLORIDE 2 MG: 1 INJECTION, SOLUTION INTRAMUSCULAR; INTRAVENOUS at 08:32

## 2019-01-01 ASSESSMENT — PAIN SCALES - GENERAL
PAINLEVEL: NO PAIN (0)

## 2019-01-01 ASSESSMENT — ENCOUNTER SYMPTOMS
SORE THROAT: 0
SHORTNESS OF BREATH: 0
VOMITING: 0
ABDOMINAL PAIN: 0
COUGH: 1
DYSURIA: 0
CHILLS: 1
TREMORS: 1
DIFFICULTY URINATING: 1
NAUSEA: 0
HEMATURIA: 0

## 2019-01-01 ASSESSMENT — MIFFLIN-ST. JEOR
SCORE: 1619.47
SCORE: 1587.26
SCORE: 1592.26
SCORE: 1605.87

## 2019-01-17 ENCOUNTER — TELEPHONE (OUTPATIENT)
Dept: FAMILY MEDICINE | Facility: CLINIC | Age: 69
End: 2019-01-17

## 2019-01-17 NOTE — TELEPHONE ENCOUNTER
Patient calling to schedule OV. States he recently had a slipped disc at T6 and has been working with PT and this is improving. He has noticed recently now that when he is sitting on a soft surface like his couch he will have a sharp pain to his left shoulder/ collar bone/ back. Once he stands up it immediately resolves and it does not occur if sitting on a hard surface. He is wondering if this could be another spine/ nerve related issue. OV advised and patient scheduled for 1/21/19.   Ivy Davis RN   [facility] - Triage

## 2019-01-21 ENCOUNTER — OFFICE VISIT (OUTPATIENT)
Dept: FAMILY MEDICINE | Facility: CLINIC | Age: 69
End: 2019-01-21
Payer: COMMERCIAL

## 2019-01-21 VITALS
OXYGEN SATURATION: 98 % | TEMPERATURE: 97.6 F | SYSTOLIC BLOOD PRESSURE: 116 MMHG | HEART RATE: 78 BPM | WEIGHT: 186 LBS | DIASTOLIC BLOOD PRESSURE: 80 MMHG | BODY MASS INDEX: 24.55 KG/M2

## 2019-01-21 DIAGNOSIS — M54.9 UPPER BACK PAIN ON LEFT SIDE: Primary | ICD-10-CM

## 2019-01-21 PROCEDURE — 99213 OFFICE O/P EST LOW 20 MIN: CPT | Performed by: FAMILY MEDICINE
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
## 2019-01-21 NOTE — PATIENT INSTRUCTIONS
Patient Education     Shoulder Squeeze Exercise    To start, sit in a chair with your feet flat on the floor. Shift your weight slightly forward to avoid rounding your back. Relax. Keep your ears, shoulders, and hips aligned:    Raise your arms to shoulder height, elbows bent and palms forward.    Move your arms back, squeezing your shoulder blades together.    Hold for 10 seconds. Return to starting position.     Repeat 5 times.   For your safety, check with your healthcare provider before starting an exercise program.   Date Last Reviewed: 11/1/2017 2000-2018 [employer]. [address] [place] [address]. All rights reserved. This information is not intended as a substitute for professional medical care. Always follow your healthcare professional's instructions.           Patient Education     Reach and Hold Exercise       Do this exercise on your hands and knees. Keep your knees under your hips and your hands under your shoulders. Keep your spine in a neutral position (not arched or sagging). Keep your ears in line with your shoulders. Hold for a few seconds before starting the exercise:  1. Tighten your belly muscles and raise one arm straight in front of you, palm down. Hold for 5 seconds, then lower. Repeat 5 times.  2. Do the exercise again, this time lifting your arm to the side. Repeat 5 times.  3. Do the exercise again, this time lifting your arm backward, palm up. Repeat 5 times.  Switch sides and do each exercise with the other arm.  Date Last Reviewed: 11/1/2017 2000-2018 [employer]. [address] [place] [address]. All rights reserved. This information is not intended as a substitute for professional medical care. Always follow your healthcare professional's instructions.           Patient Education     Shoulder Shrug Exercise    To start, sit in a chair with your feet flat on the floor. Shift your weight slightly forward to avoid rounding your  back. Relax. Keep your ears, shoulders, and hips aligned:    Raise both of your shoulders as high as you can, as if you were trying to touch them to your ears. Keep your head and neck still and relaxed.    Hold for a count of 10. Release.    Repeat 5 times.  For your safety, check with your healthcare provider before starting an exercise program.   Date Last Reviewed: 11/1/2017 2000-2018 [employer]. [address] [place] [address]. All rights reserved. This information is not intended as a substitute for professional medical care. Always follow your healthcare professional's instructions.           Patient Education     Shoulder Exercises    To start, sit in a chair with your feet flat on the floor. Your weight should be slightly forward so that you re balanced evenly on your buttocks. Relax your shoulders and keep your head level. Avoid arching your back or rounding your shoulders. Using a chair with arms may help you keep your balance.    Raise your arms, elbows bent, to shoulder height.    Slowly move your forearms together. Hold for 5 seconds.    Return to starting position. Repeat 5 times.  Date Last Reviewed: 3/1/2018    1227-7730 [employer]. [address] [place] [address]. All rights reserved. This information is not intended as a substitute for professional medical care. Always follow your healthcare professional's instructions.           Patient Education     Shoulder and Upper Back Stretch  To start, stand tall with your ears, shoulders, and hips in line. Your feet should be slightly apart, positioned just under your hips. Focus your eyes directly in front of you.  this position for a few seconds before starting your exercise. This helps increase your awareness of proper posture.          Reach overhead and slightly back with both arms. Keep your shoulders and neck aligned and your elbows behind your shoulders:    With your palms facing the  ceiling, turn your fingers inward.    Take a deep breath. Breathe out, and lower your elbows toward your buttocks. Hold for 5 seconds, then return to starting position.    Repeat 3 times.  Date Last Reviewed: 11/1/2017 2000-2018 [employer]. [address] [place] [address]. All rights reserved. This information is not intended as a substitute for professional medical care. Always follow your healthcare professional's instructions.

## 2019-01-30 ENCOUNTER — OFFICE VISIT (OUTPATIENT)
Dept: SLEEP MEDICINE | Facility: CLINIC | Age: 69
End: 2019-01-30
Payer: COMMERCIAL

## 2019-01-30 VITALS
DIASTOLIC BLOOD PRESSURE: 69 MMHG | OXYGEN SATURATION: 93 % | WEIGHT: 186 LBS | BODY MASS INDEX: 24.65 KG/M2 | SYSTOLIC BLOOD PRESSURE: 106 MMHG | HEART RATE: 84 BPM | HEIGHT: 73 IN

## 2019-01-30 DIAGNOSIS — G47.33 OSA (OBSTRUCTIVE SLEEP APNEA): Primary | ICD-10-CM

## 2019-01-30 PROCEDURE — 99213 OFFICE O/P EST LOW 20 MIN: CPT | Performed by: INTERNAL MEDICINE

## 2019-01-30 ASSESSMENT — MIFFLIN-ST. JEOR: SCORE: 1662.44

## 2019-01-30 NOTE — PROGRESS NOTES
[facility]  Outpatient Sleep Medicine Follow-up  January 30, 2019      Name: Kentrell Kirkpatrick MRN# [id number]   Age: 68 year old YOB: 1950   Date of visit: January 30, 2019  Primary care provider: Yoni Sarmiento Jr.         Assessment and Plan:     1. Obstructive Sleep Apnea:  -Continue use of oral device as instructed and follow-up sleep dentist dentist.  His overnight nocturnal oximeter shows with oxygen saturation during sleep while using oral appliance.  - Patient was advised not to drive if drowsy or sleepy.  - Follow up in sleep clinic in 12 months.        No orders of the defined types were placed in this encounter.        Summary Counseling:  New sleep schedule recommendation: given    Check out [email or web address]    All questions were answered.  The patient indicates understanding of the above issues and agrees with the plan set forth.           Chief Complaint:    Routine Follow up            History of Present Illness:     Kentrell Kirkpatrick is a 68 year old male with history of obstructive sleep apnea, BPH and back pain who comes to [facility] for follow up. Please see H&P for his presenting sleep symptoms for additional details.  Patient is diagnosed sleep apnea on 5/15/18 and was prescribed auto-CPAP genesis he was not able to tolerate the machine and mask and interface. He was referred to oral appliance and fitted for oral appliance which he is using it.   He brought 3 YULY done on 12/17 to 12/19/18 while using a dental device, which showed normal with time spent below 88% was 4.1, 3 and 6.6 minutes (last with artifact).  He is using his oral device therapy and has good benefits.  He denies snoring with oral appliance use.      PREVIOUS SLEEP STUDIES:  Date: 5/15/18  AHI: 33/hr  Intervention: CPAP  Lowest O2 saturation: 80%    Jennings Sleepiness Scale score last visit: 4/24     Improvements noted with oral appliance  [+/1] waking up more  "refreshed but better with the device  [+] sleeping better with less arousals  [+] nocturia improved   [+] improved energy level during the day    SLEEP-WAKE SCHEDULE: unchanged    Other sleep problems: None     Drowsy driving / near incidents: No    Medications that affect sleep: No            Medications:     Current Outpatient Medications   Medication Sig     ASPIRIN PO Take 81 mg by mouth     calcium polycarbophil (FIBERCON) 625 MG tablet Take 2 tablets by mouth daily     MULTI VITAMIN MENS OR TABS 1 TABLET DAILY     tamsulosin (FLOMAX) 0.4 MG capsule Take 1 capsule (0.4 mg) by mouth daily     No current facility-administered medications for this visit.      Facility-Administered Medications Ordered in Other Visits   Medication     iohexol (OMNIPAQUE) 300 mg/mL injection 10 mL        No Known Allergies         Past Medical History:     Past Medical History:   Diagnosis Date     Colon polyps              Past Surgical History:      Past Surgical History:   Procedure Laterality Date     COLONOSCOPY       COLONOSCOPY N/A 2016    Procedure: COMBINED COLONOSCOPY, SINGLE OR MULTIPLE BIOPSY/POLYPECTOMY BY BIOPSY;  Surgeon: Jeremi Kramer MD;  Location:  GI       Social History     Tobacco Use     Smoking status: Former Smoker     Packs/day: 1.50     Years: 43.00     Pack years: 64.50     Types: Cigarettes     Last attempt to quit: 2007     Years since quittin.1     Smokeless tobacco: Never Used   Substance Use Topics     Alcohol use: Yes     Comment: socially       Family History   Problem Relation Age of Onset     Heart Disease Father      Prostate Cancer Other      Prostate Cancer Brother             Physical Examination:   /69   Pulse 84   Ht 1.854 m (6' 0.99\")   Wt 84.4 kg (186 lb)   SpO2 93%   BMI 24.55 kg/m              Data: All pertinent previous laboratory data reviewed     No results found for: PH, PHARTERIAL, PO2, VN8AEOLQIUP, SAT, PCO2, HCO3, BASEEXCESS, ROSETTE, BEB  Lab " Results   Component Value Date    TSH 1.790@ 12/05/2007     Lab Results   Component Value Date    GLC 91 03/13/2018    GLC 90 02/22/2016     Lab Results   Component Value Date    HGB 15.0 02/22/2016    HGB 14.6 12/09/2014     Lab Results   Component Value Date    BUN 15 02/22/2016    BUN 18 12/09/2014    CR 0.97 02/22/2016    CR 1.02 12/09/2014     No results found for: FRANCIS      He will follow up with me in about 12 month(s).         Copy to: Yoni Sarmiento Jr., MD 1/30/2019     [facility] [place] [address]       Fifteen minutes spent with patient, all of which were spent face-to-face counseling, consulting, coordinating plan of care and going over PAP download/sleep study and chart review.

## 2019-01-30 NOTE — NURSING NOTE
"Chief Complaint   Patient presents with     RECHECK     f/u dental device       Initial /69   Pulse 84   Ht 1.854 m (6' 0.99\")   Wt 84.4 kg (186 lb)   SpO2 93%   BMI 24.55 kg/m   Estimated body mass index is 24.55 kg/m  as calculated from the following:    Height as of this encounter: 1.854 m (6' 0.99\").    Weight as of this encounter: 84.4 kg (186 lb).    Medication Reconciliation: complete        DME:dental device,    Copies of YULY using dental device    Olga Taveras, Sleep Clinic specialist  "

## 2019-01-30 NOTE — PATIENT INSTRUCTIONS
Your blood pressure was checked while you were in clinic today.  Please read the guidelines below about what these numbers mean and what you should do about them.  Your systolic blood pressure is the top number.  This is the pressure when the heart is pumping.  Your diastolic blood pressure is the bottom number.  This is the pressure in between beats.  If your systolic blood pressure is less than 120 and your diastolic blood pressure is less than 80, then your blood pressure is normal. There is nothing more that you need to do about it  If your systolic blood pressure is 120-139 or your diastolic blood pressure is 80-89, your blood pressure may be higher than it should be.  You should have your blood pressure re-checked within a year by a primary care provider.  If your systolic blood pressure is 140 or greater or your diastolic blood pressure is 90 or greater, you may have high blood pressure.  High blood pressure is treatable, but if left untreated over time it can put you at risk for heart attack, stroke, or kidney failure.  You should have your blood pressure re-checked by a primary care provider within the next four weeks.  Your BMI is There is no height or weight on file to calculate BMI.  Weight management is a personal decision.  If you are interested in exploring weight loss strategies, the following discussion covers the approaches that may be successful. Body mass index (BMI) is one way to tell whether you are at a healthy weight, overweight, or obese. It measures your weight in relation to your height.  A BMI of 18.5 to 24.9 is in the healthy range. A person with a BMI of 25 to 29.9 is considered overweight, and someone with a BMI of 30 or greater is considered obese. More than two-thirds of American adults are considered overweight or obese.  Being overweight or obese increases the risk for further weight gain. Excess weight may lead to heart disease and diabetes.  Creating and following plans for  healthy eating and physical activity may help you improve your health.  Weight control is part of healthy lifestyle and includes exercise, emotional health, and healthy eating habits. Careful eating habits lifelong are the mainstay of weight control. Though there are significant health benefits from weight loss, long-term weight loss with diet alone may be very difficult to achieve- studies show long-term success with dietary management in less than 10% of people. Attaining a healthy weight may be especially difficult to achieve in those with severe obesity. In some cases, medications, devices and surgical management might be considered.  What can you do?  If you are overweight or obese and are interested in methods for weight loss, you should discuss this with your provider.     Consider reducing daily calorie intake by 500 calories.     Keep a food journal.     Avoiding skipping meals, consider cutting portions instead.    Diet combined with exercise helps maintain muscle while optimizing fat loss. Strength training is particularly important for building and maintaining muscle mass. Exercise helps reduce stress, increase energy, and improves fitness. Increasing exercise without diet control, however, may not burn enough calories to loose weight.       Start walking three days a week 10-20 minutes at a time    Work towards walking thirty minutes five days a week     Eventually, increase the speed of your walking for 1-2 minutes at time    In addition, we recommend that you review healthy lifestyles and methods for weight loss available through the National Institutes of Health patient information sites:  http://win.niddk.nih.gov/publications/index.htm    And look into health and wellness programs that may be available through your health insurance provider, employer, local community center, or sukhi club.

## 2019-02-04 ENCOUNTER — TELEPHONE (OUTPATIENT)
Dept: FAMILY MEDICINE | Facility: CLINIC | Age: 69
End: 2019-02-04

## 2019-02-04 ENCOUNTER — OFFICE VISIT (OUTPATIENT)
Dept: FAMILY MEDICINE | Facility: CLINIC | Age: 69
End: 2019-02-04
Payer: COMMERCIAL

## 2019-02-04 VITALS
DIASTOLIC BLOOD PRESSURE: 78 MMHG | BODY MASS INDEX: 24.92 KG/M2 | WEIGHT: 184 LBS | TEMPERATURE: 97.7 F | HEIGHT: 72 IN | HEART RATE: 92 BPM | SYSTOLIC BLOOD PRESSURE: 118 MMHG | OXYGEN SATURATION: 95 %

## 2019-02-04 DIAGNOSIS — N40.1 BENIGN PROSTATIC HYPERPLASIA WITH LOWER URINARY TRACT SYMPTOMS, SYMPTOM DETAILS UNSPECIFIED: ICD-10-CM

## 2019-02-04 DIAGNOSIS — I71.21 ASCENDING AORTIC ANEURYSM (H): ICD-10-CM

## 2019-02-04 DIAGNOSIS — R06.02 SOB (SHORTNESS OF BREATH): Primary | ICD-10-CM

## 2019-02-04 PROCEDURE — 93000 ELECTROCARDIOGRAM COMPLETE: CPT | Performed by: PHYSICIAN ASSISTANT

## 2019-02-04 PROCEDURE — 99214 OFFICE O/P EST MOD 30 MIN: CPT | Mod: 25 | Performed by: PHYSICIAN ASSISTANT

## 2019-02-04 PROCEDURE — 94640 AIRWAY INHALATION TREATMENT: CPT | Performed by: PHYSICIAN ASSISTANT

## 2019-02-04 RX ORDER — ALBUTEROL SULFATE 90 UG/1
2 AEROSOL, METERED RESPIRATORY (INHALATION) EVERY 4 HOURS PRN
Qty: 1 INHALER | Refills: 0 | Status: SHIPPED | OUTPATIENT
Start: 2019-02-04 | End: 2019-04-23

## 2019-02-04 ASSESSMENT — MIFFLIN-ST. JEOR: SCORE: 1637.62
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
## 2019-02-04 NOTE — PROGRESS NOTES
"    SUBJECTIVE:   Kentrell Kirkpatrick is a 68 year old male who presents to clinic today for the following health issues:      Concern - shortness of breath with exertion  Onset: the last couple of days    Description:   Shortness of breath with exertion ever since last week when he was outside blowing snow - coughing as well - dry cough.   Wife is an RN and felt it was likely due to lung irritation - pt reports he helped shoveled 4 driveways and it was \"below zero\" they whole time.   Spends a lot of time outside in the winter and usually has no issues.  Since then when exerts himself reports \"I have trouble catching my breath\" and gets dry cough. No chest pain or diaphoresis. No hx of CAD.  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  No fevers or sore throat.   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       Intensity: moderate    Progression of Symptoms:  same    Accompanying Signs & Symptoms:  Slight cough as well    Previous history of similar problem:       Precipitating factors:   Worsened by: exertion    Alleviating factors:  Improved by: just sits and rests.    Therapies Tried and outcome: none      Problem list and histories reviewed & adjusted, as indicated.  Additional history: as documented    Patient Active Problem List   Diagnosis     Back pain     CARDIOVASCULAR SCREENING; LDL GOAL LESS THAN 160     Advance Care Planning     Benign prostatic hyperplasia with lower urinary tract symptoms     Benign neoplasm of transverse colon     10 year risk of MI or stroke 7.5% or greater     Ascending aortic aneurysm (H)     Right-sided low back pain with right-sided sciatica     SKYLER (obstructive sleep apnea)     Past Surgical History:   Procedure Laterality Date     COLONOSCOPY       COLONOSCOPY N/A 12/22/2016    Procedure: COMBINED COLONOSCOPY, SINGLE OR MULTIPLE BIOPSY/POLYPECTOMY BY BIOPSY;  Surgeon: Williams " Jeremi Damico MD;  Location:  GI       Social History     Tobacco Use     Smoking status: Former Smoker     Packs/day: 1.50     Years: 43.00     Pack years: 64.50     Types: Cigarettes     Last attempt to quit: 2007     Years since quittin.1     Smokeless tobacco: Never Used   Substance Use Topics     Alcohol use: Yes     Comment: socially     Family History   Problem Relation Age of Onset     Heart Disease Father      Prostate Cancer Other      Prostate Cancer Brother          Current Outpatient Medications   Medication Sig Dispense Refill     albuterol (PROAIR HFA/PROVENTIL HFA/VENTOLIN HFA) 108 (90 Base) MCG/ACT inhaler Inhale 2 puffs into the lungs every 4 hours as needed for shortness of breath / dyspnea or wheezing 1 Inhaler 0     ASPIRIN PO Take 81 mg by mouth       calcium polycarbophil (FIBERCON) 625 MG tablet Take 2 tablets by mouth daily       MULTI VITAMIN MENS OR TABS 1 TABLET DAILY       tamsulosin (FLOMAX) 0.4 MG capsule Take 1 capsule (0.4 mg) by mouth daily 90 capsule 0     No Known Allergies    Reviewed and updated as needed this visit by clinical staff  Tobacco  Allergies  Meds  Med Hx  Surg Hx  Fam Hx  Soc Hx      Reviewed and updated as needed this visit by Provider  Tobacco  Allergies  Meds  Med Hx  Surg Hx  Fam Hx  Soc Hx        ROS:  Constitutional, HEENT, cardiovascular, pulmonary, gi and gu systems are negative, except as otherwise noted.    OBJECTIVE:     /78 (BP Location: Right arm, Cuff Size: Adult Regular)   Pulse 92   Temp 97.7  F (36.5  C) (Oral)   Ht 1.829 m (6')   Wt 83.5 kg (184 lb)   SpO2 95%   BMI 24.95 kg/m    Body mass index is 24.95 kg/m .  GENERAL: healthy, alert and no distress  EYES: Eyes grossly normal to inspection, PERRL and conjunctivae and sclerae normal  HENT: ear canals and TM's normal, nose and mouth without ulcers or lesions  NECK: no adenopathy and no asymmetry, masses, or scars  RESP: lungs clear to auscultation, but does have  protracted expiratory phase throughout.  Given albuterol neb and re-auscultation reveals improvement in overall air movement and expiratory phase no longer protracted.  No rales, rhonchi or wheezing.  We also took a walk around the clinic after neb and pt reported significant subjective improvement with breathing and no further SOB.  CV: regular rates and rhythm, no murmur, click or rub and no peripheral edema    Diagnostic Test Results:  EKG personal reading shows NSR without ST segment changes or LVH.     ASSESSMENT/PLAN:       ICD-10-CM    1. SOB (shortness of breath) R06.02 EKG 12-lead complete w/read - Clinics     INHALATION/NEBULIZER TREATMENT, INITIAL     ALBUTEROL UNIT DOSE, 1 MG     albuterol (PROAIR HFA/PROVENTIL HFA/VENTOLIN HFA) 108 (90 Base) MCG/ACT inhaler   2. Benign prostatic hyperplasia with lower urinary tract symptoms, symptom details unspecified N40.1    3. Ascending aortic aneurysm (H) I71.2    See Patient Instructions  Pt did also bring form to have refill of his flomax - temp refill provided, but he plans to establish care with Dr. Shell and is due for his physical so encouraged him to return for this and remainder of refills can be provided at that time. Copy sent to scanning.  Would also consider stress echo with any persistence especially in light of mild aortic root dilation noted on echo from 2016. This should be repeated likely just for surveillance and encouraged pt to follow-up with PCP for routine cares as planned.  Pt in agreement with plan.     Patient Instructions   History suggest SOB is more related to inflammatory response and could have been triggered by the cold weather.  Reassuring your EKG was normal and you improved after trial of albuterol.  Ok to take on your trip and use every 4 hours as needed.  Did broach potential for more sinister diagnosis like COPD given former smoking history should symptoms persist.  Would advise CXR and spirometry if you're requiring ongoing  use of albuterol.  Temp refill provided for flomax, but advised to return for routine physical/labs for ongoing prescription.     Tiera Napoles PA-C  [facility]

## 2019-02-05 ENCOUNTER — TELEPHONE (OUTPATIENT)
Dept: FAMILY MEDICINE | Facility: CLINIC | Age: 69
End: 2019-02-05

## 2019-02-05 DIAGNOSIS — N40.1 BENIGN NODULAR PROSTATIC HYPERPLASIA WITH LOWER URINARY TRACT SYMPTOMS: ICD-10-CM

## 2019-02-05 RX ORDER — TAMSULOSIN HYDROCHLORIDE 0.4 MG/1
0.4 CAPSULE ORAL DAILY
Qty: 90 CAPSULE | Refills: 0 | Status: SHIPPED | OUTPATIENT
Start: 2019-02-05 | End: 2019-07-20

## 2019-02-05 NOTE — RESULT ENCOUNTER NOTE
Result(s) was/were reviewed in the clinic with patient at time of appointment.  Electronically Signed By: Tiera Napoles PA-C

## 2019-02-05 NOTE — TELEPHONE ENCOUNTER
"Reason for Call:  Other prescription    Detailed comments: The patient is calling very upset saying \"my tamsulosin didn't get sent to the [facility] pharmacy in [place] on [facility] like the nurse told me it would\". He says he is leaving town on Friday and needs this ASAP.     Phone Number Patient can be reached at: Cell number on file:    Telephone Information:   Mobile [phone]     Best Time: Anytime    Can we leave a detailed message on this number? YES    Call taken on 2/5/2019 at 11:19 AM by Peace Maza      "

## 2019-02-05 NOTE — PATIENT INSTRUCTIONS
History suggest SOB is more related to inflammatory response and could have been triggered by the cold weather.  Reassuring your EKG was normal and you improved after trial of albuterol.  Ok to take on your trip and use every 4 hours as needed.  Did broach potential for more sinister diagnosis like COPD given former smoking history should symptoms persist.  Would advise CXR and spirometry if you're requiring ongoing use of albuterol.  Temp refill provided for flomax, but advised to return for routine physical/labs for ongoing prescription.

## 2019-02-05 NOTE — TELEPHONE ENCOUNTER
See message below. I reviewed this with LEYLA OLIVO as it appears that she saw him yesterday. She reports that patient had a form with him regarding this medication and it was to be sent to [facility] mail order which was faxed yesterday. Per LEYLA OLIVO and PADMA Nieto, there was no mention of him needing an additional refill sent to [facility].     Left a detailed voicemail for patient advising that rx has now been sent to [facility] in [place] and that [facility].  STEVE Ferrara, RN  [facility]

## 2019-02-18 ENCOUNTER — TELEPHONE (OUTPATIENT)
Dept: FAMILY MEDICINE | Facility: CLINIC | Age: 69
End: 2019-02-18

## 2019-02-18 NOTE — TELEPHONE ENCOUNTER
Pt made an appt via [facility] [employer] for the following reason:      Having trouble breathing - get tired /weak easily.    Please call pt to triage sjannet Jon

## 2019-02-18 NOTE — TELEPHONE ENCOUNTER
Patient was up [facility] and has noticed difficulty breathing with exertion.  Patient was seen in clinic on 2/4/2019.  Patient is having trouble sleeping and fell asleep while reading in a chair.  Walking a flight of steps patient has to stop to catch breath.  Patient has been inhaler every 4 hours with no relief. Persistent dry cough, feels like a lump in throat all the time. Patients wife is a retired nurse and has concerns patient either has COPD or anxiety.      Denies CP, blue tongue, clammy skin, feeling of suffocation, frothy pink or white sputum, AMS, hx of blood clots, PE, asthma, wheezing, inability to speak or swallow, drooling.    Advised patient if any of the above occur to seek emergency medical help, patient stated understanding and was agreeable with plan.  Patient has an appointment scheduled for tomorrow morning with provider.    CHANTAL JohnsonN, RN  [employer] [facility]

## 2019-02-19 ENCOUNTER — ANCILLARY PROCEDURE (OUTPATIENT)
Dept: GENERAL RADIOLOGY | Facility: CLINIC | Age: 69
End: 2019-02-19
Attending: FAMILY MEDICINE
Payer: COMMERCIAL

## 2019-02-19 ENCOUNTER — OFFICE VISIT (OUTPATIENT)
Dept: FAMILY MEDICINE | Facility: CLINIC | Age: 69
End: 2019-02-19
Payer: COMMERCIAL

## 2019-02-19 ENCOUNTER — HOSPITAL ENCOUNTER (OUTPATIENT)
Dept: CT IMAGING | Facility: CLINIC | Age: 69
Discharge: HOME | End: 2019-02-19
Attending: FAMILY MEDICINE | Admitting: FAMILY MEDICINE
Payer: COMMERCIAL

## 2019-02-19 ENCOUNTER — TELEPHONE (OUTPATIENT)
Dept: FAMILY MEDICINE | Facility: CLINIC | Age: 69
End: 2019-02-19

## 2019-02-19 VITALS
DIASTOLIC BLOOD PRESSURE: 76 MMHG | TEMPERATURE: 98.2 F | OXYGEN SATURATION: 96 % | WEIGHT: 184 LBS | HEART RATE: 99 BPM | SYSTOLIC BLOOD PRESSURE: 122 MMHG | BODY MASS INDEX: 24.95 KG/M2

## 2019-02-19 DIAGNOSIS — R06.09 DYSPNEA ON EXERTION: ICD-10-CM

## 2019-02-19 DIAGNOSIS — J90 PLEURAL EFFUSION ON LEFT: ICD-10-CM

## 2019-02-19 DIAGNOSIS — R06.09 DYSPNEA ON EXERTION: Primary | ICD-10-CM

## 2019-02-19 PROCEDURE — 25000128 H RX IP 250 OP 636: Performed by: FAMILY MEDICINE

## 2019-02-19 PROCEDURE — 25000125 ZZHC RX 250: Performed by: FAMILY MEDICINE

## 2019-02-19 PROCEDURE — 71260 CT THORAX DX C+: CPT

## 2019-02-19 PROCEDURE — 71046 X-RAY EXAM CHEST 2 VIEWS: CPT | Mod: FY

## 2019-02-19 PROCEDURE — 99213 OFFICE O/P EST LOW 20 MIN: CPT | Performed by: FAMILY MEDICINE

## 2019-02-19 RX ORDER — IOPAMIDOL 755 MG/ML
80 INJECTION, SOLUTION INTRAVASCULAR ONCE
Status: COMPLETED | OUTPATIENT
Start: 2019-02-19 | End: 2019-02-19

## 2019-02-19 RX ADMIN — IOPAMIDOL 80 ML: 755 INJECTION, SOLUTION INTRAVENOUS at 14:55

## 2019-02-19 RX ADMIN — SODIUM CHLORIDE 70 ML: 9 INJECTION, SOLUTION INTRAVENOUS at 14:55

## 2019-02-19 NOTE — TELEPHONE ENCOUNTER
Reason for Call:  Other returning call    Detailed comments: The patient is returning a call left for him by the nurse. He would like a call back.    Phone Number Patient can be reached at: Cell number on file:    Telephone Information:   Mobile [phone]     Best Time: Anytime    Can we leave a detailed message on this number? YES    Call taken on 2/19/2019 at 12:11 PM by Peace Maza

## 2019-02-19 NOTE — PROGRESS NOTES
SUBJECTIVE:                                                    Kentrell Kirkpatrick is a 68 year old male who presents to clinic today for the following health issues:      Edy is here for follow up on shortness of breath/dyspnea on exertion. He was last seen on 19. At that appointment, he was given an albuterol neb treatment that seemed to help symptoms for about 2 hours. He was given an albuterol inhaler. He states he then went on a trip to [place] and inhaler didn't seem to help. Feeling short of breath all the time with the occasional wheezing. When he lays down flat in bed, feels like he isn't getting any air. He has been sleeping in a recliner. Also notes a persistent dry cough. Denies any chest pain, palpitations, lightheadedness, dizziness. No nausea, vomiting, lower extremity swelling.      Problem list and histories reviewed & adjusted, as indicated.  Additional history: as documented    Patient Active Problem List   Diagnosis     Back pain     CARDIOVASCULAR SCREENING; LDL GOAL LESS THAN 160     Advance Care Planning     Benign prostatic hyperplasia with lower urinary tract symptoms     Benign neoplasm of transverse colon     10 year risk of MI or stroke 7.5% or greater     Ascending aortic aneurysm (H)     Right-sided low back pain with right-sided sciatica     SKYLER (obstructive sleep apnea)     Past Surgical History:   Procedure Laterality Date     COLONOSCOPY       COLONOSCOPY N/A 2016    Procedure: COMBINED COLONOSCOPY, SINGLE OR MULTIPLE BIOPSY/POLYPECTOMY BY BIOPSY;  Surgeon: Jeremi Kramer MD;  Location:  GI     THORACENTESIS Left 2019    Procedure: THORACENTESIS;  Surgeon: Matthew Rodriguez MD;  Location:  GI       Social History     Tobacco Use     Smoking status: Former Smoker     Packs/day: 1.50     Years: 43.00     Pack years: 64.50     Types: Cigarettes     Last attempt to quit: 2007     Years since quittin.2     Smokeless tobacco: Never Used    Substance Use Topics     Alcohol use: Yes     Comment: 3-4 a week     Family History   Problem Relation Age of Onset     Heart Disease Father      Prostate Cancer Other      Prostate Cancer Brother            ROS:  Constitutional, HEENT, cardiovascular, pulmonary, gi and gu systems are negative, except as otherwise noted.    OBJECTIVE:     /76   Pulse 99   Temp 98.2  F (36.8  C) (Oral)   Wt 83.5 kg (184 lb)   SpO2 96%   BMI 24.95 kg/m    Body mass index is 24.95 kg/m .  GENERAL: healthy, alert and no distress  EYES: Eyes grossly normal to inspection, PERRL and conjunctivae and sclerae normal  HENT: ear canals and TM's normal, nose and mouth without ulcers or lesions  NECK: no adenopathy and no asymmetry, masses, or scars  RESP: lungs clear to auscultation - no rales, rhonchi or wheezes and decreased breath sounds on the left  CV: regular rate and rhythm, normal S1 S2, no S3 or S4, no murmur, click or rub, no peripheral edema and peripheral pulses strong  ABDOMEN: soft, nontender, no hepatosplenomegaly, no masses and bowel sounds normal  MS: no gross musculoskeletal defects noted, no edema  SKIN: no suspicious lesions or rashes  PSYCH: mentation appears normal, affect normal/bright    Diagnostic Test Results:  CXR: large effusion on left side.    ASSESSMENT/PLAN:   1. Dyspnea on exertion: ion.  - XR Chest 2 Views; Future  - CT Chest w Contrast; Future    2. Pleural effusion on left: discussed new finding with pulmonology. Edy did have history of stable pulmonary nodule - last CT scan was from 6/2018. Will order chest CT for better visualization of effusion. Pulmonology is planning on following up these results with Edy. Will likely need throacentesis.  - CT Chest w Contrast; Future    Donald Shell DO  [facility]

## 2019-02-19 NOTE — TELEPHONE ENCOUNTER
Dr. Shell wanted to speak with patient directly. Routing message to DO. Mayela ARCOS BSN, RN  [facility]

## 2019-02-20 ENCOUNTER — APPOINTMENT (OUTPATIENT)
Dept: GENERAL RADIOLOGY | Facility: CLINIC | Age: 69
End: 2019-02-20
Attending: INTERNAL MEDICINE
Payer: COMMERCIAL

## 2019-02-20 ENCOUNTER — HOSPITAL ENCOUNTER (OUTPATIENT)
Facility: CLINIC | Age: 69
Discharge: HOME | End: 2019-02-20
Attending: INTERNAL MEDICINE | Admitting: INTERNAL MEDICINE
Payer: COMMERCIAL

## 2019-02-20 ENCOUNTER — TELEPHONE (OUTPATIENT)
Dept: PULMONOLOGY | Facility: CLINIC | Age: 69
End: 2019-02-20

## 2019-02-20 VITALS
HEART RATE: 85 BPM | SYSTOLIC BLOOD PRESSURE: 116 MMHG | OXYGEN SATURATION: 94 % | RESPIRATION RATE: 11 BRPM | DIASTOLIC BLOOD PRESSURE: 74 MMHG

## 2019-02-20 LAB
ALBUMIN FLD-MCNC: 2.9 G/DL
AMYLASE FLD-CCNC: 230 U/L
APPEARANCE FLD: NORMAL
CHOLEST FLD-MCNC: 88 MG/DL
COLOR FLD: NORMAL
EOSINOPHIL NFR FLD MANUAL: 12 %
GLUCOSE FLD-MCNC: 78 MG/DL
GRAM STN SPEC: NORMAL
LDH FLD L TO P-CCNC: 402 U/L
LIPASE FLD-CCNC: 30 U/L
LYMPHOCYTES NFR FLD MANUAL: 83 %
MONOS+MACROS NFR FLD MANUAL: 4 %
NEUTS BAND NFR FLD MANUAL: 1 %
PROT FLD-MCNC: 4.4 G/DL
SPECIMEN SOURCE FLD: NORMAL
SPECIMEN SOURCE: NORMAL
TRIGL FLD-MCNC: 22 MG/DL
WBC # FLD AUTO: 2577 /UL

## 2019-02-20 PROCEDURE — 88185 FLOWCYTOMETRY/TC ADD-ON: CPT | Performed by: INTERNAL MEDICINE

## 2019-02-20 PROCEDURE — 84478 ASSAY OF TRIGLYCERIDES: CPT | Performed by: INTERNAL MEDICINE

## 2019-02-20 PROCEDURE — 87116 MYCOBACTERIA CULTURE: CPT | Performed by: INTERNAL MEDICINE

## 2019-02-20 PROCEDURE — 81445 SO NEO GSAP 5-50DNA/DNA&RNA: CPT | Performed by: INTERNAL MEDICINE

## 2019-02-20 PROCEDURE — 88341 IMHCHEM/IMCYTCHM EA ADD ANTB: CPT | Performed by: INTERNAL MEDICINE

## 2019-02-20 PROCEDURE — 83690 ASSAY OF LIPASE: CPT | Performed by: INTERNAL MEDICINE

## 2019-02-20 PROCEDURE — 00000159 ZZHCL STATISTIC H-SEND OUTS PREP: Performed by: INTERNAL MEDICINE

## 2019-02-20 PROCEDURE — 88112 CYTOPATH CELL ENHANCE TECH: CPT | Performed by: INTERNAL MEDICINE

## 2019-02-20 PROCEDURE — 82150 ASSAY OF AMYLASE: CPT | Performed by: INTERNAL MEDICINE

## 2019-02-20 PROCEDURE — 71045 X-RAY EXAM CHEST 1 VIEW: CPT

## 2019-02-20 PROCEDURE — 87102 FUNGUS ISOLATION CULTURE: CPT | Performed by: INTERNAL MEDICINE

## 2019-02-20 PROCEDURE — 87015 SPECIMEN INFECT AGNT CONCNTJ: CPT | Performed by: INTERNAL MEDICINE

## 2019-02-20 PROCEDURE — 84157 ASSAY OF PROTEIN OTHER: CPT | Performed by: INTERNAL MEDICINE

## 2019-02-20 PROCEDURE — 00000155 ZZHCL STATISTIC H-CELL BLOCK W/STAIN: Performed by: INTERNAL MEDICINE

## 2019-02-20 PROCEDURE — 89051 BODY FLUID CELL COUNT: CPT | Performed by: INTERNAL MEDICINE

## 2019-02-20 PROCEDURE — 40001004 ZZHCL STATISTIC FLOW INT 9-15 ABY TC 88188: Performed by: INTERNAL MEDICINE

## 2019-02-20 PROCEDURE — 87206 SMEAR FLUORESCENT/ACID STAI: CPT | Performed by: INTERNAL MEDICINE

## 2019-02-20 PROCEDURE — 32554 ASPIRATE PLEURA W/O IMAGING: CPT | Performed by: INTERNAL MEDICINE

## 2019-02-20 PROCEDURE — 82465 ASSAY BLD/SERUM CHOLESTEROL: CPT | Performed by: INTERNAL MEDICINE

## 2019-02-20 PROCEDURE — 88342 IMHCHEM/IMCYTCHM 1ST ANTB: CPT | Performed by: INTERNAL MEDICINE

## 2019-02-20 PROCEDURE — 87081 CULTURE SCREEN ONLY: CPT | Performed by: INTERNAL MEDICINE

## 2019-02-20 PROCEDURE — 40000986 XR CHEST PORT 1 VW

## 2019-02-20 PROCEDURE — 88184 FLOWCYTOMETRY/ TC 1 MARKER: CPT | Performed by: INTERNAL MEDICINE

## 2019-02-20 PROCEDURE — 87205 SMEAR GRAM STAIN: CPT | Performed by: INTERNAL MEDICINE

## 2019-02-20 PROCEDURE — 88305 TISSUE EXAM BY PATHOLOGIST: CPT | Performed by: INTERNAL MEDICINE

## 2019-02-20 PROCEDURE — 00000102 ZZHCL STATISTIC CYTO WRIGHT STAIN TC: Performed by: INTERNAL MEDICINE

## 2019-02-20 PROCEDURE — 87070 CULTURE OTHR SPECIMN AEROBIC: CPT | Performed by: INTERNAL MEDICINE

## 2019-02-20 PROCEDURE — 84311 SPECTROPHOTOMETRY: CPT | Performed by: INTERNAL MEDICINE

## 2019-02-20 PROCEDURE — 82945 GLUCOSE OTHER FLUID: CPT | Performed by: INTERNAL MEDICINE

## 2019-02-20 PROCEDURE — 82042 OTHER SOURCE ALBUMIN QUAN EA: CPT | Performed by: INTERNAL MEDICINE

## 2019-02-20 PROCEDURE — 83615 LACTATE (LD) (LDH) ENZYME: CPT | Performed by: INTERNAL MEDICINE

## 2019-02-20 PROCEDURE — 82664 ELECTROPHORETIC TEST: CPT | Performed by: INTERNAL MEDICINE

## 2019-02-20 NOTE — OR NURSING
Thoracentesis completed and tolerated well with local anesthesia only.  Slightly over a liter was removed from the left lung. Patient did not have any chest pain after and said his breathing felt better since the fluid was removed. Patient will have portable chest xray at 1435 to make sure there is no pneumothorax.

## 2019-02-20 NOTE — TELEPHONE ENCOUNTER
Spoke with patient to schedule procedure with Dr. Matthew Rodriguez   Procedure was scheduled on 02/20 in ENDO    Patient confirmed date, time and location.

## 2019-02-20 NOTE — PROCEDURES
INTERVENTIONAL PULMONOLOGY       Procedure(s):      Thoracentesis (left chest)    Indication:  Pleural effusion     Attending of Record:  Matthew Rodriguez MD     Pulmonary Fellow   Andria Haywood MD    Trainees Present:   None    Medications:    5 ml 4% lidocaine    Sedation Time:   NA    Time Out:  Performed    The patient's medical record has been reviewed.  The indication for the procedure was reviewed.  The necessary history and physical examination was performed and reviewed.  The risks, benefits and alternatives of the procedure were discussed with the the patient in detail and he had the opportunity to ask questions.  I discussed in particular the potential complications including risks of minor or life-threatening bleeding and/or infection, respiratory failure, pneumothorax, and discomfort. Sedation risks were also discussed including abnormal heart rhythms, low blood pressure, and respiratory failure. All questions were answered to the best of my ability.  Verbal and written informed consent was obtained.  The proposed procedure and the patient's identification were verified prior to the procedure by the physician and the nurse.    The patient was assessed for the adequacy for the procedure and to receive medications.   Mental Status:  Alert and oriented x 3  Pulmonary:  Decreased breathsounds in left lung   CV:  RRR, no murmurs or gallops  ASA Grade:  (II)  Mild systemic disease    After clinical evaluation and reviewing the indication, risks, alternatives and benefits of the procedure the patient was deemed to be in satisfactory condition to undergo the procedure.      Immediately before administration of medications the patient was re-assessed for adequacy to receive sedatives including the heart rate, respiratory rate, mental status, oxygen saturation, blood pressure and adequacy of pulmonary ventilation. These same parameters were continuously monitored throughout the procedure.    A Tuberculosis  risk assessment was performed:  The patient has no known RISK of Tuberculosis    The procedure was performed in a negative airflow room: Yes    Maneuvers / Procedure:      Thoracentesis: Once the site was marked using US, A Yueh needle  was used to aspirate fluid. The patient's left chest was prepped in sterile fashion. A total of 5 cc 1%lidocaine was used to anesthetize the area. A finder needle was used to aspirate fluid. The tube was then advanced into the pleural space while aspirating pleural fluid. The fluid was sanguinous in color/consistency. A total of 1.18 L fluid was removed. Fluid was sent to micro and cytology for analysis     Any disposable equipment was visually inspected and deemed to be intact immediately post procedure.      Relevant Pictures      Recommendations:   --> successful left thoracentesis  --> Await cytology  -->  CXR in 30 minutes     Dr. Rodriguez was present for the entire procedure.     Andria Haywood MD  Pulmonary and Critical Care Fellow, PGY-6  Pager: [phone]     I was present and supervised the entire portion of the procedure for patient Kentrell Kirkpatrick. This includes the time-out for patient verification and procedure verification. I have reviewed the above report and agree with the findings, interpretation, and recommendations.     Matthew Rodriguez MD   of Medicine  Interventional Pulmonary  Department of Pulmonary, Allergy, Critical Care and Sleep Medicine   [facility]  Pager: [phone]

## 2019-02-20 NOTE — DISCHARGE INSTRUCTIONS
Discharge Instructions after Bronchoscopy    Activity      Diet  _x__ When you can swallow easily, you may go back to your regular diet, medicines  and light exercise.    Follow-up  _x__ We took small tissue or fluid samples to study. We will call you with the results in about 10 business days.    Call right away if you have:    Unusual chest pain    Temperature above 100.6  F (37.5  C)    Coughing that does not stop.    If you have severe pain, bleeding, or shortness of breath, go to an emergency room.    If you have questions, call:  Monday to Friday, 7 a.m. to 4:30 p.m.  Endoscopy: [phone] (We may have to call you back)    After hours:  Hospital: [phone] (Ask for the pulmonary fellow on call)

## 2019-02-21 LAB — COPATH REPORT: NORMAL

## 2019-02-23 LAB
ACID FAST STN SPEC QL: NORMAL
ADENOSINE DEAMINASE PLR-CCNC: 4.8 U/L (ref 0–9.4)
SPECIMEN SOURCE: NORMAL

## 2019-02-25 LAB
BACTERIA SPEC CULT: NO GROWTH
COPATH REPORT: NORMAL
SPECIMEN SOURCE: NORMAL

## 2019-02-26 ENCOUNTER — HOSPITAL ENCOUNTER (OUTPATIENT)
Facility: CLINIC | Age: 69
End: 2019-02-26
Attending: INTERNAL MEDICINE | Admitting: INTERNAL MEDICINE
Payer: COMMERCIAL

## 2019-02-26 ENCOUNTER — TELEPHONE (OUTPATIENT)
Dept: ONCOLOGY | Facility: CLINIC | Age: 69
End: 2019-02-26

## 2019-02-26 DIAGNOSIS — J90 RECURRENT LEFT PLEURAL EFFUSION: Primary | ICD-10-CM

## 2019-02-26 DIAGNOSIS — J90 PLEURAL EFFUSION: Primary | ICD-10-CM

## 2019-02-26 DIAGNOSIS — C34.32 MALIGNANT NEOPLASM OF LOWER LOBE, LEFT BRONCHUS OR LUNG (H): ICD-10-CM

## 2019-02-26 DIAGNOSIS — C34.92 MALIGNANT NEOPLASM OF LEFT LUNG, UNSPECIFIED PART OF LUNG (H): ICD-10-CM

## 2019-02-26 DIAGNOSIS — C34.92 MALIGNANT NEOPLASM OF LEFT LUNG, UNSPECIFIED PART OF LUNG (H): Primary | ICD-10-CM

## 2019-02-26 NOTE — TELEPHONE ENCOUNTER
ONCOLOGY INTAKE: Records Information      APPT INFORMATION: 3/1/19 at 12:00PM  Referring provider:  Dr. Matthew Rodriguez  [facility]  Reason for visit/diagnosis:  New Lung Cancer    Were the records received with the referral (via [employer])? In [facility]    Has patient been seen for any external appt for this diagnosis (enter clinic/location)? No - per pt's wife Pat, pt has not been seen or treated for this outside of [facility].

## 2019-02-26 NOTE — PROGRESS NOTES
I spoke to patient about arranging a PET scan and brain MRI in the next week.  He had just talked to Dr. Rodriguez and is being scheduled for a PleurX placement later next week.   I told him our schedulers will be in touch to arrange the imaging studies.

## 2019-02-26 NOTE — TELEPHONE ENCOUNTER
RECORDS STATUS - ALL OTHER DIAGNOSIS      RECORDS RECEIVED FROM: [facility]   DATE RECEIVED: 2/26/19   NOTES STATUS DETAILS   OFFICE NOTE from referring provider  [facility]   OFFICE NOTE from medical oncologist     DISCHARGE SUMMARY from hospital  Epic   DISCHARGE REPORT from the ER     OPERATIVE REPORT  Epic   MEDICATION LIST  [facility]   CLINICAL TRIAL TREATMENTS TO DATE     LABS     PATHOLOGY REPORTS Cytology- 2/20/19 [facility]   ANYTHING RELATED TO DIAGNOSIS  Epic   GENONOMIC TESTING     TYPE:     IMAGING (NEED IMAGES & REPORT)     CT SCANS 2/19/19 PACS   MRI Scheduled 3/7/19    MAMMO NA    ULTRASOUND NA    PET Scheduled 3/7/19    X Ray  2/20/19, 2/19/19     PACS

## 2019-02-27 LAB — CHYLO FLD QL: NORMAL

## 2019-02-28 ENCOUNTER — ONCOLOGY VISIT (OUTPATIENT)
Dept: ONCOLOGY | Facility: CLINIC | Age: 69
End: 2019-02-28
Attending: INTERNAL MEDICINE
Payer: COMMERCIAL

## 2019-02-28 ENCOUNTER — RESEARCH ENCOUNTER (OUTPATIENT)
Dept: ONCOLOGY | Facility: CLINIC | Age: 69
End: 2019-02-28

## 2019-02-28 ENCOUNTER — TRANSFERRED RECORDS (OUTPATIENT)
Dept: HEALTH INFORMATION MANAGEMENT | Facility: CLINIC | Age: 69
End: 2019-02-28

## 2019-02-28 VITALS
TEMPERATURE: 97.9 F | OXYGEN SATURATION: 94 % | DIASTOLIC BLOOD PRESSURE: 65 MMHG | BODY MASS INDEX: 24.86 KG/M2 | SYSTOLIC BLOOD PRESSURE: 106 MMHG | WEIGHT: 183.3 LBS | HEART RATE: 91 BPM

## 2019-02-28 DIAGNOSIS — J90 RECURRENT LEFT PLEURAL EFFUSION: Primary | ICD-10-CM

## 2019-02-28 DIAGNOSIS — C34.32 MALIGNANT NEOPLASM OF LOWER LOBE OF LEFT LUNG (H): ICD-10-CM

## 2019-02-28 DIAGNOSIS — F51.02 ADJUSTMENT INSOMNIA: Primary | ICD-10-CM

## 2019-02-28 LAB
ALBUMIN SERPL-MCNC: 3.7 G/DL (ref 3.4–5)
ALP SERPL-CCNC: 100 U/L (ref 40–150)
ALT SERPL W P-5'-P-CCNC: 20 U/L (ref 0–70)
ANION GAP SERPL CALCULATED.3IONS-SCNC: 6 MMOL/L (ref 3–14)
AST SERPL W P-5'-P-CCNC: 17 U/L (ref 0–45)
BASOPHILS # BLD AUTO: 0.1 10E9/L (ref 0–0.2)
BASOPHILS NFR BLD AUTO: 1 %
BILIRUB SERPL-MCNC: 0.9 MG/DL (ref 0.2–1.3)
BUN SERPL-MCNC: 21 MG/DL (ref 7–30)
CALCIUM SERPL-MCNC: 8.8 MG/DL (ref 8.5–10.1)
CHLORIDE SERPL-SCNC: 104 MMOL/L (ref 94–109)
CO2 SERPL-SCNC: 29 MMOL/L (ref 20–32)
CREAT SERPL-MCNC: 1.04 MG/DL (ref 0.66–1.25)
DIFFERENTIAL METHOD BLD: NORMAL
EOSINOPHIL # BLD AUTO: 0.4 10E9/L (ref 0–0.7)
EOSINOPHIL NFR BLD AUTO: 4.7 %
ERYTHROCYTE [DISTWIDTH] IN BLOOD BY AUTOMATED COUNT: 13 % (ref 10–15)
GFR SERPL CREATININE-BSD FRML MDRD: 73 ML/MIN/{1.73_M2}
GLUCOSE SERPL-MCNC: 86 MG/DL (ref 70–99)
HCT VFR BLD AUTO: 47.6 % (ref 40–53)
HGB BLD-MCNC: 15.2 G/DL (ref 13.3–17.7)
IMM GRANULOCYTES # BLD: 0 10E9/L (ref 0–0.4)
IMM GRANULOCYTES NFR BLD: 0.2 %
LYMPHOCYTES # BLD AUTO: 1.8 10E9/L (ref 0.8–5.3)
LYMPHOCYTES NFR BLD AUTO: 19.6 %
MCH RBC QN AUTO: 29.4 PG (ref 26.5–33)
MCHC RBC AUTO-ENTMCNC: 31.9 G/DL (ref 31.5–36.5)
MCV RBC AUTO: 92 FL (ref 78–100)
MONOCYTES # BLD AUTO: 1.1 10E9/L (ref 0–1.3)
MONOCYTES NFR BLD AUTO: 11.8 %
NEUTROPHILS # BLD AUTO: 5.8 10E9/L (ref 1.6–8.3)
NEUTROPHILS NFR BLD AUTO: 62.7 %
NRBC # BLD AUTO: 0 10*3/UL
NRBC BLD AUTO-RTO: 0 /100
PLATELET # BLD AUTO: 384 10E9/L (ref 150–450)
POTASSIUM SERPL-SCNC: 4.5 MMOL/L (ref 3.4–5.3)
PROT SERPL-MCNC: 7.6 G/DL (ref 6.8–8.8)
RBC # BLD AUTO: 5.17 10E12/L (ref 4.4–5.9)
SODIUM SERPL-SCNC: 139 MMOL/L (ref 133–144)
WBC # BLD AUTO: 9.2 10E9/L (ref 4–11)

## 2019-02-28 PROCEDURE — G0463 HOSPITAL OUTPT CLINIC VISIT: HCPCS | Mod: ZF

## 2019-02-28 PROCEDURE — 40001056 ZZHCL STATISTIC GUARDANT360 TUMOR SEQ: Performed by: INTERNAL MEDICINE

## 2019-02-28 PROCEDURE — 36415 COLL VENOUS BLD VENIPUNCTURE: CPT

## 2019-02-28 PROCEDURE — 85025 COMPLETE CBC W/AUTO DIFF WBC: CPT | Performed by: INTERNAL MEDICINE

## 2019-02-28 PROCEDURE — 99205 OFFICE O/P NEW HI 60 MIN: CPT | Mod: ZP | Performed by: INTERNAL MEDICINE

## 2019-02-28 PROCEDURE — 80053 COMPREHEN METABOLIC PANEL: CPT | Performed by: INTERNAL MEDICINE

## 2019-02-28 RX ORDER — TRAZODONE HYDROCHLORIDE 50 MG/1
25-50 TABLET, FILM COATED ORAL AT BEDTIME
Qty: 60 TABLET | Refills: 1 | Status: SHIPPED | OUTPATIENT
Start: 2019-02-28 | End: 2019-04-30

## 2019-02-28 ASSESSMENT — PAIN SCALES - GENERAL: PAINLEVEL: MILD PAIN (3)

## 2019-02-28 NOTE — LETTER
2/28/2019       RE: Kentrell Kirkpatrick  [address] [facility] [address]     Dear Colleague,    Thank you for referring your patient, Kentrell Kirkpatrick, to the [facility]. Please see a copy of my visit note below.    [facility]    NEW PATIENT VISIT NOTE    PATIENT NAME: Kentrell Kirkpatrick MRN # [id number]  DATE OF VISIT: February 28, 2019 YOB: 1950    CANCER TYPE: NSCLC, adenocarcinoma  STAGE: IV, incompletely staged  ECOG PS: 1    Referring Provider: Dr. Matthew Rodriguez    Cancer Staging  No matching staging information was found for the patient.    PD-L1: Pending  Lung panel: Pending  NGS: Guardant 360 sent 2/28/19 pending    SUMMARY  6/27/18 CT chest w/contrast to re-evaluate aortic aneurysm: 8 mm spiculated KEATON nodule, 3 mm RML nodule unchanged from 3/15/17 and 2/25/16 scans  2/4/19 Presented to PCP with fairly rapid onset of shortness of breath. Given albuterol  2/19/18 CXR and CT chest w/contrast. Large left effusion  2/20/19 L thoracentesis (Dr. Rodriguez), 1180 cc    SUBJECTIVE  Mr. Kirkpatrick is a 69 yo male who presents today to establish care with me for newly diagnosed Stage IV lung adenocarcinoma. He is accompanied by his wife. He developed shortness of breath fairly suddenly a couple of weeks ago. 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 Also had trouble blowing snow. Usually very active and had no trouble at all. Got an albuterol nebulizer through his PCP, which helped immensely. However, that benefit didn't last for more than a couple of hours. He was actually on a trip to [facility] and unfortunately couldn't participate in the home repair/building. Returned to his PCP 2/19. CXR showed an effusion, confirmed on chest CT the same day. He was referred to pulmonary. Dr. Rodriguez did a therapeutic/diagnostic thoracentesis 2/20.     Breathing improved some after thora but  has worsened again. Minimal cough. No significant weight loss. No F/C, N/V, HA, vision changes, persistent aches and pains, constipation, diarrhea, numbness/tingling, leg swelling. He's had to sleep in a recliner a few days because of the breathing.     PAST MEDICAL HISTORY  Lung adenocarcinoma as above  L5 disk herniation. Epidural injection 5/22/18. Improved dramatically with chiropracter in the past.   BPH  Ascending aortic aneurysm    CURRENT OUTPATIENT MEDICATIONS  Current Outpatient Medications   Medication Sig Dispense Refill     albuterol (PROAIR HFA/PROVENTIL HFA/VENTOLIN HFA) 108 (90 Base) MCG/ACT inhaler Inhale 2 puffs into the lungs every 4 hours as needed for shortness of breath / dyspnea or wheezing 1 Inhaler 0     calcium polycarbophil (FIBERCON) 625 MG tablet Take 2 tablets by mouth daily       MULTI VITAMIN MENS OR TABS 1 TABLET DAILY       tamsulosin (FLOMAX) 0.4 MG capsule Take 1 capsule (0.4 mg) by mouth daily 90 capsule 0     traZODone (DESYREL) 50 MG tablet Take 0.5-1 tablets (25-50 mg) by mouth At Bedtime 60 tablet 1     ASPIRIN PO Take 81 mg by mouth       ALLERGIES  No Known Allergies    SOCIAL HISTORY: . Wife     REVIEW OF SYSTEMS  As above in the HPI, o/w complete 12-point ROS was negative.    PHYSICAL EXAM  B/P: 106/65, T: 97.9, P: 91, R: 14  Wt Readings from Last 3 Encounters:   02/28/19 83.1 kg (183 lb 4.8 oz)   02/19/19 83.5 kg (184 lb)   02/04/19 83.5 kg (184 lb)     GEN: NAD  HEENT: EOMI, no icterus, injection or pallor. Oropharynx clear.  LUNGS: decreased breath sounds L lung to 3/4 way up. Confirmed by percussion  CV: regular, no murmurs, rubs, or gallops  ABDOMEN: soft, non-tender, non-distended, normal bowel sounds  EXT: warm, well perfused, no edema  NEURO: alert  SKIN: no rashes    LABORATORY AND IMAGING STUDIES  Results for MAYA ELLER (MRN [id number]) as of 2/28/2019 21:07   2/28/2019 15:20   Sodium 139   Potassium 4.5   Chloride 104   Carbon Dioxide 29   Urea  Nitrogen 21   Creatinine 1.04   GFR Estimate 73   GFR Estimate If Black 84   Calcium 8.8   Anion Gap 6   Albumin 3.7   Protein Total 7.6   Bilirubin Total 0.9   Alkaline Phosphatase 100   ALT 20   AST 17   Glucose 86   WBC 9.2   Hemoglobin 15.2   Hematocrit 47.6   Platelet Count 384   RBC Count 5.17   MCV 92   MCH 29.4   MCHC 31.9   RDW 13.0   Diff Method Automated Method   % Neutrophils 62.7   % Lymphocytes 19.6   % Monocytes 11.8   % Eosinophils 4.7   % Basophils 1.0   % Immature Granulocytes 0.2   Nucleated RBCs 0   Absolute Neutrophil 5.8   Absolute Lymphocytes 1.8   Absolute Monocytes 1.1   Absolute Eosinophils 0.4   Absolute Basophils 0.1   Abs Immature Granulocytes 0.0   Absolute Nucleated RBC 0.0     Study Result     CT OF OF THE CHEST WITH CONTRAST  2/19/2019 3:05 PM     CLINICAL HISTORY/INDICATION:  New onset left-sided pleural effusion  with associated dyspnea on exertion. Smoking history. History of  stable pulmonary nodules.     COMPARISON:  CT chest 6/27/2018     TECHNIQUE:  CT of the chest was performed following the administration  of intravenous contrast. Coronal reformats and MIPS were performed.  Radiation dose for this scan was reduced using automated exposure  control, adjustment of the mA and/or kV according to patient size, or  iterative reconstruction technique.     DOSE:  443 mGycm     FINDINGS:  Large left pleural effusion resulting in total atelectasis  of the left lower lobe and segmental atelectasis of the left upper  lobe. No mediastinal shift is appreciated. Mild tracheal deviation to  the right is present. No right pleural effusion. Emphysematous changes  are present. No new pulmonary nodules within the right lung. The  thyroid gland is unremarkable. Conventional three-vessel aortic arch  branch anatomy. The heart is not enlarged. Debris is present within  the air-distended esophagus.                                                                      IMPRESSION:  1.  Large left  pleural effusion resulting in atelectasis of the left  lower lobe and segmental atelectasis of the left upper lobe.  2.  No new pulmonary nodules within the visualized lung fields.  3.  Emphysema.  4.  Esophageal debris raises risk of aspiration.     Result communicated with Donald Shlel DO by Hernando Wise MD.     HERNANDO WISE MD     Imaging was personally reviewed     ASSESSMENT AND PLAN  NSCLC, adenocarcinoma, Stage IV: Incompletely staged but pleural effusion was malignant. We discussed the diagnosis, incurable nature of the disease, goals of treatment. Scheduled for PET/CT and brain MRI 3/7. Initially we were going to try moving that up, but he needs a dental implant addressed (see below), which will be done next week. We need PD-L1 and lung panel results. Hoping to get a large volume of pleural fluid and get an answer that way, but since there's a high chance of failure there, I recommended sending Guardant 360 today. He was agreeable. Consent obtained, blood sample drawn, and kit sent (I personally brought it to lab). I will see him most likely 3/11 to finalize a plan. If we don't have PD-L1 or Guardant 360 results yet, we'll start with carboplatin pemetrexed pembrolizumab. If PD-L1 >50%, acceptable to use cytology results, and PET/CT shows measurable disease, then he would be a candidate for the first line ALT-803 + pembro trial, but it's not quite open yet.     L malignant pleural effusion: Very short of breath, worsened again after thoracentesis. Discussed with Dr. Matthew Rodriguez. Pleurx tomorrow instead of 3/8- send large volume to cytology    Failing implant: Spoke to his dentist during our visit today. Needs to come out. Prefer doing this before starting therapy. Will be done next week    Coping: Doing well. Will discuss referral to Palliative Care at our next visit.    Biorepository: Reviewed the study, alternatives, lack of direct benefit, ability to withdraw consent for any reason, voluntary nature of  participation, study procedures. He's willing to participate. See separate research note.     Urinary hesitancy: Most likely due to diphenhydramine in sleep aid - taking 25-50 mg each night. Asked him to stop and see what happens. No problems urinating during the day    A total of 70 minutes was spent with the patient, >50% of which was spent in counseling and coordination of care.    Susan Muller MD    Hematology, Oncology and Transplantation

## 2019-02-28 NOTE — PROGRESS NOTES
[facility] CANCER CLINIC    NEW PATIENT VISIT NOTE    PATIENT NAME: Kentrell Kirkpatrick MRN # [id number]  DATE OF VISIT: February 28, 2019 YOB: 1950    CANCER TYPE: NSCLC, adenocarcinoma  STAGE: IV, incompletely staged  ECOG PS: 1    Referring Provider: Dr. Matthew Rodriguez    Cancer Staging  No matching staging information was found for the patient.    PD-L1: Pending  Lung panel: Pending  NGS: Guardant 360 sent 2/28/19 pending    SUMMARY  6/27/18 CT chest w/contrast to re-evaluate aortic aneurysm: 8 mm spiculated KEATON nodule, 3 mm RML nodule unchanged from 3/15/17 and 2/25/16 scans  2/4/19 Presented to PCP with fairly rapid onset of shortness of breath. Given albuterol  2/19/18 CXR and CT chest w/contrast. Large left effusion  2/20/19 L thoracentesis (Dr. Rodriguez), 1180 cc    SUBJECTIVE  Mr. Kirkpatrick is a 67 yo male who presents today to establish care with me for newly diagnosed Stage IV lung adenocarcinoma. He is accompanied by his wife. He developed shortness of breath fairly suddenly a couple of weeks ago. 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 Also had trouble blowing snow. Usually very active and had no trouble at all. Got an albuterol nebulizer through his PCP, which helped immensely. However, that benefit didn't last for more than a couple of hours. He was actually on a trip to [facility] and unfortunately couldn't participate in the home repair/building. Returned to his PCP 2/19. CXR showed an effusion, confirmed on chest CT the same day. He was referred to pulmonary. Dr. Rodriguez did a therapeutic/diagnostic thoracentesis 2/20.     Breathing improved some after thora but has worsened again. Minimal cough. No significant weight loss. No F/C, N/V, HA, vision changes, persistent aches and pains, constipation, diarrhea, numbness/tingling, leg swelling. He's had to sleep in a recliner a few days because of the  breathing.     PAST MEDICAL HISTORY  Lung adenocarcinoma as above  L5 disk herniation. Epidural injection 5/22/18. Improved dramatically with chiropracter in the past.   BPH  Ascending aortic aneurysm    CURRENT OUTPATIENT MEDICATIONS  Current Outpatient Medications   Medication Sig Dispense Refill     albuterol (PROAIR HFA/PROVENTIL HFA/VENTOLIN HFA) 108 (90 Base) MCG/ACT inhaler Inhale 2 puffs into the lungs every 4 hours as needed for shortness of breath / dyspnea or wheezing 1 Inhaler 0     calcium polycarbophil (FIBERCON) 625 MG tablet Take 2 tablets by mouth daily       MULTI VITAMIN MENS OR TABS 1 TABLET DAILY       tamsulosin (FLOMAX) 0.4 MG capsule Take 1 capsule (0.4 mg) by mouth daily 90 capsule 0     traZODone (DESYREL) 50 MG tablet Take 0.5-1 tablets (25-50 mg) by mouth At Bedtime 60 tablet 1     ASPIRIN PO Take 81 mg by mouth       ALLERGIES  No Known Allergies    SOCIAL HISTORY: . Wife     REVIEW OF SYSTEMS  As above in the HPI, o/w complete 12-point ROS was negative.    PHYSICAL EXAM  B/P: 106/65, T: 97.9, P: 91, R: 14  Wt Readings from Last 3 Encounters:   02/28/19 83.1 kg (183 lb 4.8 oz)   02/19/19 83.5 kg (184 lb)   02/04/19 83.5 kg (184 lb)     GEN: NAD  HEENT: EOMI, no icterus, injection or pallor. Oropharynx clear.  LUNGS: decreased breath sounds L lung to 3/4 way up. Confirmed by percussion  CV: regular, no murmurs, rubs, or gallops  ABDOMEN: soft, non-tender, non-distended, normal bowel sounds  EXT: warm, well perfused, no edema  NEURO: alert  SKIN: no rashes    LABORATORY AND IMAGING STUDIES  Results for MAYA ELLER (MRN [id number]) as of 2/28/2019 21:07   2/28/2019 15:20   Sodium 139   Potassium 4.5   Chloride 104   Carbon Dioxide 29   Urea Nitrogen 21   Creatinine 1.04   GFR Estimate 73   GFR Estimate If Black 84   Calcium 8.8   Anion Gap 6   Albumin 3.7   Protein Total 7.6   Bilirubin Total 0.9   Alkaline Phosphatase 100   ALT 20   AST 17   Glucose 86   WBC 9.2   Hemoglobin 15.2    Hematocrit 47.6   Platelet Count 384   RBC Count 5.17   MCV 92   MCH 29.4   MCHC 31.9   RDW 13.0   Diff Method Automated Method   % Neutrophils 62.7   % Lymphocytes 19.6   % Monocytes 11.8   % Eosinophils 4.7   % Basophils 1.0   % Immature Granulocytes 0.2   Nucleated RBCs 0   Absolute Neutrophil 5.8   Absolute Lymphocytes 1.8   Absolute Monocytes 1.1   Absolute Eosinophils 0.4   Absolute Basophils 0.1   Abs Immature Granulocytes 0.0   Absolute Nucleated RBC 0.0     Study Result     CT OF OF THE CHEST WITH CONTRAST  2/19/2019 3:05 PM     CLINICAL HISTORY/INDICATION:  New onset left-sided pleural effusion  with associated dyspnea on exertion. Smoking history. History of  stable pulmonary nodules.     COMPARISON:  CT chest 6/27/2018     TECHNIQUE:  CT of the chest was performed following the administration  of intravenous contrast. Coronal reformats and MIPS were performed.  Radiation dose for this scan was reduced using automated exposure  control, adjustment of the mA and/or kV according to patient size, or  iterative reconstruction technique.     DOSE:  443 mGycm     FINDINGS:  Large left pleural effusion resulting in total atelectasis  of the left lower lobe and segmental atelectasis of the left upper  lobe. No mediastinal shift is appreciated. Mild tracheal deviation to  the right is present. No right pleural effusion. Emphysematous changes  are present. No new pulmonary nodules within the right lung. The  thyroid gland is unremarkable. Conventional three-vessel aortic arch  branch anatomy. The heart is not enlarged. Debris is present within  the air-distended esophagus.                                                                      IMPRESSION:  1.  Large left pleural effusion resulting in atelectasis of the left  lower lobe and segmental atelectasis of the left upper lobe.  2.  No new pulmonary nodules within the visualized lung fields.  3.  Emphysema.  4.  Esophageal debris raises risk of  aspiration.     Result communicated with Donald Shell DO by Hernando Wise MD.     HERNANDO WISE MD     Imaging was personally reviewed     ASSESSMENT AND PLAN  NSCLC, adenocarcinoma, Stage IV: Incompletely staged but pleural effusion was malignant. We discussed the diagnosis, incurable nature of the disease, goals of treatment. Scheduled for PET/CT and brain MRI 3/7. Initially we were going to try moving that up, but he needs a dental implant addressed (see below), which will be done next week. We need PD-L1 and lung panel results. Hoping to get a large volume of pleural fluid and get an answer that way, but since there's a high chance of failure there, I recommended sending Guardant 360 today. He was agreeable. Consent obtained, blood sample drawn, and kit sent (I personally brought it to lab). I will see him most likely 3/11 to finalize a plan. If we don't have PD-L1 or Guardant 360 results yet, we'll start with carboplatin pemetrexed pembrolizumab. If PD-L1 >50%, acceptable to use cytology results, and PET/CT shows measurable disease, then he would be a candidate for the first line ALT-803 + pembro trial, but it's not quite open yet.     L malignant pleural effusion: Very short of breath, worsened again after thoracentesis. Discussed with Dr. Matthew Rodriguez. Pleurx tomorrow instead of 3/8- send large volume to cytology    Failing implant: Spoke to his dentist during our visit today. Needs to come out. Prefer doing this before starting therapy. Will be done next week    Coping: Doing well. Will discuss referral to Palliative Care at our next visit.    Biorepository: Reviewed the study, alternatives, lack of direct benefit, ability to withdraw consent for any reason, voluntary nature of participation, study procedures. He's willing to participate. See separate research note.     Urinary hesitancy: Most likely due to diphenhydramine in sleep aid - taking 25-50 mg each night. Asked him to stop and see what happens. No  problems urinating during the day    A total of 70 minutes was spent with the patient, >50% of which was spent in counseling and coordination of care.    Susan Muller MD    Hematology, Oncology and Transplantation

## 2019-02-28 NOTE — NURSING NOTE
"Oncology Rooming Note    February 28, 2019 1:29 PM   Kentrell Kirkpatrick is a 68 year old male who presents for:    Chief Complaint   Patient presents with     Oncology Clinic Visit     NEW; Lung CA     Initial Vitals: /65   Pulse 91   Temp 97.9  F (36.6  C) (Oral)   Wt 83.1 kg (183 lb 4.8 oz)   SpO2 94%   BMI 24.86 kg/m   Estimated body mass index is 24.86 kg/m  as calculated from the following:    Height as of 2/4/19: 1.829 m (6').    Weight as of this encounter: 83.1 kg (183 lb 4.8 oz). Body surface area is 2.05 meters squared.  Mild Pain (3) Comment: Data Unavailable   No LMP for male patient.  Allergies reviewed: Yes  Medications reviewed: Yes    Medications: Medication refills not needed today.  Pharmacy name entered into EPIC:    [facility] [address] [facility] [address] [place] [address] [address] AT [facility] [place] - [address] [place] [id number] [place] [facility] [address] [place] [address]/[facility][id number] - [place], [place] [facility] [address] [facility] - [place], [place] - [address] AT PORTAL TO REGISTERED [facility] SITES    Clinical concerns: Patient has been having SOB since February 3rd. Per pt, \" I can't do anything without getting winded\".  Yohana was notified.      Denisha Doss, [facility]              "

## 2019-02-28 NOTE — NURSING NOTE
Labs completed via venipuncture, patient discharged.    See flow sheets.    Josefina Walters CMA ([facility])

## 2019-02-28 NOTE — PROGRESS NOTES
[id number]: Informed Consent Note     The patient was provided with a copy of the IRB-approved consent form. The form was reviewed, and all questions were answered before the patient signed the consent form. A copy of the signed form was provided to the patient. No procedures specific to this study were performed prior to the patient signing the consent form.  The specific study procedures that were completed today were:  Questionnaire sent via email requested by Edy.   Urine collection was collected  Brushing of the teeth occurred  Oral Rinse was collected   Buccal Swabs were collected   Baseline Blood collected    The patient will be bringing back the toenail collection next visit.  All samples are being processed and stored by \[facility]\"".     Consent Version Date: [id number]  Consent obtained by: Laura Neff    Date: [id number]  HIPAA authorization signed?: yes  HIPAA authorization version date: [id number]  IRB #: [id number]    Laura Nfef    :    Susan Muller  Pager: [phone]  Clinical Research Coordinator:  Laura Neff  Phone: [phone]  Pager: [phone]

## 2019-03-01 ENCOUNTER — PRE VISIT (OUTPATIENT)
Dept: ONCOLOGY | Facility: CLINIC | Age: 69
End: 2019-03-01

## 2019-03-01 ENCOUNTER — HOSPITAL ENCOUNTER (OUTPATIENT)
Facility: CLINIC | Age: 69
Discharge: HOME | End: 2019-03-01
Attending: INTERNAL MEDICINE | Admitting: INTERNAL MEDICINE
Payer: COMMERCIAL

## 2019-03-01 ENCOUNTER — APPOINTMENT (OUTPATIENT)
Dept: GENERAL RADIOLOGY | Facility: CLINIC | Age: 69
End: 2019-03-01
Attending: INTERNAL MEDICINE
Payer: COMMERCIAL

## 2019-03-01 VITALS
SYSTOLIC BLOOD PRESSURE: 108 MMHG | HEART RATE: 82 BPM | OXYGEN SATURATION: 93 % | RESPIRATION RATE: 12 BRPM | DIASTOLIC BLOOD PRESSURE: 75 MMHG

## 2019-03-01 DIAGNOSIS — J90 RECURRENT LEFT PLEURAL EFFUSION: Primary | ICD-10-CM

## 2019-03-01 PROCEDURE — 32551 INSERTION OF CHEST TUBE: CPT | Performed by: INTERNAL MEDICINE

## 2019-03-01 PROCEDURE — 88305 TISSUE EXAM BY PATHOLOGIST: CPT | Performed by: INTERNAL MEDICINE

## 2019-03-01 PROCEDURE — 40000275 ZZH STATISTIC RCP TIME EA 10 MIN

## 2019-03-01 PROCEDURE — 00000159 ZZHCL STATISTIC H-SEND OUTS PREP: Performed by: INTERNAL MEDICINE

## 2019-03-01 PROCEDURE — 00000155 ZZHCL STATISTIC H-CELL BLOCK W/STAIN: Performed by: INTERNAL MEDICINE

## 2019-03-01 PROCEDURE — 88112 CYTOPATH CELL ENHANCE TECH: CPT | Performed by: INTERNAL MEDICINE

## 2019-03-01 PROCEDURE — 81445 SO NEO GSAP 5-50DNA/DNA&RNA: CPT | Performed by: INTERNAL MEDICINE

## 2019-03-01 PROCEDURE — 71045 X-RAY EXAM CHEST 1 VIEW: CPT

## 2019-03-01 PROCEDURE — 00000102 ZZHCL STATISTIC CYTO WRIGHT STAIN TC: Performed by: INTERNAL MEDICINE

## 2019-03-01 PROCEDURE — 40000141 ZZH STATISTIC PERIPHERAL IV START W/O US GUIDANCE

## 2019-03-01 NOTE — PROCEDURES
INTERVENTIONAL PULMONOLOGY       Procedure(s):    Tunnelled pleural catheter placement (left chest)    Indication:  Lung cancer    Attending of Record:  Matthew Rodriguez MD     Interventional Pulmonary Fellow   Eugenio Rodney MD     Trainees Present:   Andria Haywood MD     Medications:    None    Sedation Time:   None    Time Out:  Performed    The patient's medical record has been reviewed.  The indication for the procedure was reviewed.  The necessary history and physical examination was performed and reviewed.  The risks, benefits and alternatives of the procedure were discussed with the the patient in detail and he had the opportunity to ask questions.  I discussed in particular the potential complications including risks of minor or life-threatening bleeding and/or infection, respiratory failure, vocal cord trauma / paralysis, pneumothorax, and discomfort. Sedation risks were also discussed including abnormal heart rhythms, low blood pressure, and respiratory failure. All questions were answered to the best of my ability.  Verbal and written informed consent was obtained.  The proposed procedure and the patient's identification were verified prior to the procedure by the physician and the nurse, respiratory therapist, resident physician (resident / fellow).    The patient was assessed for the adequacy for the procedure and to receive medications.   Mental Status:  Alert and oriented x 3  Airway examination:  Class I (Complete visualization of soft palate)  Pulmonary:  Clear to ausculation bilaterally  CV:  RRR, no murmurs or gallops  ASA Grade:  (II)  Mild systemic disease    After clinical evaluation and reviewing the indication, risks, alternatives and benefits of the procedure the patient was deemed to be in satisfactory condition to undergo the procedure.      A Tuberculosis risk assessment was performed:  The patient has no known RISK of Tuberculosis    The procedure was performed in a negative airflow  room: Performed in neg airflow room.     Maneuvers / Procedure:      Indwelling pleural catheter insertion: Once the site was marked using US, the pleurX catheter kit was used for the procedure. The patient's left chest was prepped in sterile fashion. A total of 10 1%lidocaine used to anesthetize the area. A finder needle was used to aspirate fluid. The wire was advanced using seldinger technique. A 1cm incision was made approximately 5cm anterior to the wire and at the wire site. The tunneling device was used to make a track towards the wire and insert the chest tube. A dilator was used to enlarge the track, then the peel-away catheter was used to insert the chest tube into the pleural space. The catheter was sutured into place using 2.0silk. A sterile dressing was placed. A total of 500cc was drained   Therapeutic suctioning: 15-20min of operative time was spent clearing out the airway of debris, blood and mucous prior to the intervention.     Any disposable equipment was visually inspected and deemed to be intact immediately post procedure.      Relevant Pictures      Recommendations:     -->  Successful left sided indwelling pleural catheter placement with 500cc drainage.   -->  Sent to cytology for lung panel   -->  Instructed to drain every day for 3months.   -->  F/u clinic appt with me in 4weeks  -->  If drainage were to stop for 2 consecutive days, he was instructed to call our office.       Matthew Rodriguez MD   of Medicine  Interventional Pulmonary  Department of Pulmonary, Allergy, Critical Care and Sleep Medicine   [facility]  Pager: [phone]   Office: [phone]  Email: [email or web address]    Dona ROMERO, OCN  Clinical Nurse Specialist  Department of Thoracic Surgery  Office: [phone]  Email: [email or web address]    Kaykay Leo  Interventional Pulmonology Surgery Scheduler  Office: [phone]  Email: [email or web address]

## 2019-03-01 NOTE — PLAN OF CARE
Edy and his wife Audrey came to the [facility] for pleurx education. Audrey worked here at [facility] [employer] as an RN in IR, Vascular access and Endo. They watched the DVD on how to drain  Using the catheter and looked at all the supplies but did not feel the need to do a return demonstration today. They have 4 drainage kits to bring home and will contact [place] oncologist to have more bottles delivered.

## 2019-03-03 PROBLEM — C34.92 NON-SMALL CELL LUNG CANCER, LEFT (H): Status: ACTIVE | Noted: 2019-03-03

## 2019-03-04 ENCOUNTER — CARE COORDINATION (OUTPATIENT)
Dept: ONCOLOGY | Facility: CLINIC | Age: 69
End: 2019-03-04

## 2019-03-04 DIAGNOSIS — F41.9 ANXIETY: Primary | ICD-10-CM

## 2019-03-04 DIAGNOSIS — F40.240 CLAUSTROPHOBIA: ICD-10-CM

## 2019-03-04 DIAGNOSIS — C34.82 MALIGNANT NEOPLASM OF OVERLAPPING SITES OF LEFT BRONCHUS AND LUNG (H): ICD-10-CM

## 2019-03-04 DIAGNOSIS — C34.92 NON-SMALL CELL LUNG CANCER, LEFT (H): Primary | ICD-10-CM

## 2019-03-04 LAB — COPATH REPORT: NORMAL

## 2019-03-04 RX ORDER — QUETIAPINE FUMARATE 25 MG/1
TABLET, FILM COATED ORAL
Qty: 15 TABLET | Refills: 0
Start: 2019-03-04 | End: 2019-04-04

## 2019-03-04 RX ORDER — DIAZEPAM 5 MG
5 TABLET ORAL EVERY 6 HOURS PRN
Qty: 2 TABLET | Refills: 0
Start: 2019-03-04 | End: 2019-03-20

## 2019-03-04 NOTE — PROGRESS NOTES
Received call from Edy's wife Audrey.    They are getting out 1L daily from the plurex catheter.  Edy is out an about all day- staying active.  Audrey states when the sun goes down that Edy gets very anxious.  He has been taking trazodone 50mg daily, but that is not helping at all.   Audrey is wondering if he can have some Lorazepam.     Discussed that we should also get Edy an appt for Palliative Care SW to talk about coping with this new diagnosis.    Reviewed Edy's upcoming scans.  Audrey is also wondering about some Valium r/t claustrophobia.     Will discuss with Dr. Youssef.   Yohana ordered Valium and Seroquel for Edy.  Scripts called into local [facility].  Called Edy to discuss.  Message sent to scheduling for Palliative care appt.

## 2019-03-06 LAB
BACTERIA SPEC CULT: NORMAL
Lab: NORMAL
SPECIMEN SOURCE: NORMAL

## 2019-03-07 ENCOUNTER — HOSPITAL ENCOUNTER (OUTPATIENT)
Dept: MRI IMAGING | Facility: CLINIC | Age: 69
End: 2019-03-07
Attending: CLINICAL NURSE SPECIALIST
Payer: COMMERCIAL

## 2019-03-07 DIAGNOSIS — C34.92 MALIGNANT NEOPLASM OF LEFT LUNG, UNSPECIFIED PART OF LUNG (H): ICD-10-CM

## 2019-03-07 PROCEDURE — A9585 GADOBUTROL INJECTION: HCPCS | Performed by: RADIOLOGY

## 2019-03-07 PROCEDURE — 70553 MRI BRAIN STEM W/O & W/DYE: CPT

## 2019-03-07 PROCEDURE — 25500064 ZZH RX 255 OP 636: Performed by: RADIOLOGY

## 2019-03-07 RX ORDER — GADOBUTROL 604.72 MG/ML
10 INJECTION INTRAVENOUS ONCE
Status: COMPLETED | OUTPATIENT
Start: 2019-03-07 | End: 2019-03-07

## 2019-03-07 RX ADMIN — GADOBUTROL 8 ML: 604.72 INJECTION INTRAVENOUS at 16:17

## 2019-03-08 ENCOUNTER — ANCILLARY PROCEDURE (OUTPATIENT)
Dept: PET IMAGING | Facility: CLINIC | Age: 69
End: 2019-03-08
Attending: CLINICAL NURSE SPECIALIST
Payer: COMMERCIAL

## 2019-03-08 DIAGNOSIS — C34.82 MALIGNANT NEOPLASM OF OVERLAPPING SITES OF LEFT BRONCHUS AND LUNG (H): ICD-10-CM

## 2019-03-08 DIAGNOSIS — C34.92 NON-SMALL CELL LUNG CANCER, LEFT (H): ICD-10-CM

## 2019-03-08 LAB
COPATH REPORT: NORMAL
RADIOLOGIST FLAGS: ABNORMAL

## 2019-03-08 PROCEDURE — 78816 PET IMAGE W/CT FULL BODY: CPT | Mod: PI

## 2019-03-08 PROCEDURE — A9552 F18 FDG: HCPCS | Performed by: CLINICAL NURSE SPECIALIST

## 2019-03-08 RX ORDER — MEPERIDINE HYDROCHLORIDE 25 MG/ML
25 INJECTION INTRAMUSCULAR; INTRAVENOUS; SUBCUTANEOUS EVERY 30 MIN PRN
Status: CANCELLED | OUTPATIENT
Start: 2019-03-12

## 2019-03-08 RX ORDER — CYANOCOBALAMIN 1000 UG/ML
1000 INJECTION, SOLUTION INTRAMUSCULAR; SUBCUTANEOUS ONCE
Status: CANCELLED
Start: 2019-03-08 | End: 2019-03-08

## 2019-03-08 RX ORDER — EPINEPHRINE 1 MG/ML
0.3 INJECTION, SOLUTION INTRAMUSCULAR; SUBCUTANEOUS EVERY 5 MIN PRN
Status: CANCELLED | OUTPATIENT
Start: 2019-03-12

## 2019-03-08 RX ORDER — LORAZEPAM 2 MG/ML
0.5 INJECTION INTRAMUSCULAR EVERY 4 HOURS PRN
Status: CANCELLED
Start: 2019-03-12

## 2019-03-08 RX ORDER — SODIUM CHLORIDE 9 MG/ML
1000 INJECTION, SOLUTION INTRAVENOUS CONTINUOUS PRN
Status: CANCELLED
Start: 2019-03-12

## 2019-03-08 RX ORDER — EPINEPHRINE 0.3 MG/.3ML
0.3 INJECTION SUBCUTANEOUS EVERY 5 MIN PRN
Status: CANCELLED | OUTPATIENT
Start: 2019-03-12

## 2019-03-08 RX ORDER — ALBUTEROL SULFATE 90 UG/1
1-2 AEROSOL, METERED RESPIRATORY (INHALATION)
Status: CANCELLED
Start: 2019-03-12

## 2019-03-08 RX ORDER — ALBUTEROL SULFATE 0.83 MG/ML
2.5 SOLUTION RESPIRATORY (INHALATION)
Status: CANCELLED | OUTPATIENT
Start: 2019-03-12

## 2019-03-08 RX ORDER — DIPHENHYDRAMINE HYDROCHLORIDE 50 MG/ML
50 INJECTION INTRAMUSCULAR; INTRAVENOUS
Status: CANCELLED
Start: 2019-03-12

## 2019-03-08 RX ORDER — METHYLPREDNISOLONE SODIUM SUCCINATE 125 MG/2ML
125 INJECTION, POWDER, LYOPHILIZED, FOR SOLUTION INTRAMUSCULAR; INTRAVENOUS
Status: CANCELLED
Start: 2019-03-12

## 2019-03-11 ENCOUNTER — ONCOLOGY VISIT (OUTPATIENT)
Dept: ONCOLOGY | Facility: CLINIC | Age: 69
End: 2019-03-11
Attending: INTERNAL MEDICINE
Payer: COMMERCIAL

## 2019-03-11 VITALS
TEMPERATURE: 97 F | HEIGHT: 72 IN | DIASTOLIC BLOOD PRESSURE: 59 MMHG | RESPIRATION RATE: 14 BRPM | HEART RATE: 69 BPM | WEIGHT: 180.1 LBS | SYSTOLIC BLOOD PRESSURE: 100 MMHG | BODY MASS INDEX: 24.39 KG/M2 | OXYGEN SATURATION: 96 %

## 2019-03-11 DIAGNOSIS — C34.92 NON-SMALL CELL LUNG CANCER, LEFT (H): Primary | ICD-10-CM

## 2019-03-11 LAB — LAB SCANNED RESULT: NORMAL

## 2019-03-11 PROCEDURE — 99215 OFFICE O/P EST HI 40 MIN: CPT | Mod: ZP | Performed by: INTERNAL MEDICINE

## 2019-03-11 PROCEDURE — 25000128 H RX IP 250 OP 636: Mod: ZF | Performed by: INTERNAL MEDICINE

## 2019-03-11 PROCEDURE — 96372 THER/PROPH/DIAG INJ SC/IM: CPT

## 2019-03-11 PROCEDURE — G0463 HOSPITAL OUTPT CLINIC VISIT: HCPCS | Mod: ZF,25

## 2019-03-11 RX ORDER — LORAZEPAM 0.5 MG/1
0.5 TABLET ORAL EVERY 6 HOURS PRN
Qty: 30 TABLET | Refills: 3 | Status: CANCELLED | OUTPATIENT
Start: 2019-03-11

## 2019-03-11 RX ORDER — ONDANSETRON 8 MG/1
8 TABLET, FILM COATED ORAL EVERY 8 HOURS PRN
Qty: 30 TABLET | Refills: 11 | Status: CANCELLED | OUTPATIENT
Start: 2019-03-11

## 2019-03-11 RX ORDER — PROCHLORPERAZINE MALEATE 10 MG
10 TABLET ORAL EVERY 6 HOURS PRN
Qty: 30 TABLET | Refills: 11 | Status: CANCELLED | OUTPATIENT
Start: 2019-03-11

## 2019-03-11 RX ORDER — FOLIC ACID 1 MG/1
1000 TABLET ORAL DAILY
Qty: 90 TABLET | Refills: 11 | Status: CANCELLED | OUTPATIENT
Start: 2019-03-11

## 2019-03-11 RX ORDER — DEXAMETHASONE 4 MG/1
4 TABLET ORAL 2 TIMES DAILY WITH MEALS
Qty: 6 TABLET | Refills: 3 | Status: CANCELLED | OUTPATIENT
Start: 2019-03-17 | End: 2019-03-20

## 2019-03-11 RX ORDER — CYANOCOBALAMIN 1000 UG/ML
1000 INJECTION, SOLUTION INTRAMUSCULAR; SUBCUTANEOUS ONCE
Status: COMPLETED | OUTPATIENT
Start: 2019-03-11 | End: 2019-03-11

## 2019-03-11 RX ORDER — FOLIC ACID 1 MG/1
1 TABLET ORAL DAILY
Qty: 90 TABLET | Refills: 11 | Status: SHIPPED | OUTPATIENT
Start: 2019-03-11 | End: 2019-09-22

## 2019-03-11 RX ORDER — LORAZEPAM 0.5 MG/1
0.5 TABLET ORAL EVERY 6 HOURS PRN
Qty: 30 TABLET | Refills: 3 | Status: SHIPPED | OUTPATIENT
Start: 2019-03-11 | End: 2020-01-01

## 2019-03-11 RX ADMIN — CYANOCOBALAMIN 1000 MCG: 1000 INJECTION, SOLUTION INTRAMUSCULAR at 15:04

## 2019-03-11 ASSESSMENT — PAIN SCALES - GENERAL: PAINLEVEL: NO PAIN (0)

## 2019-03-11 ASSESSMENT — MIFFLIN-ST. JEOR: SCORE: 1620.06

## 2019-03-11 NOTE — PROGRESS NOTES
[facility] CANCER CLINIC    FOLLOW-UP VISIT NOTE    PATIENT NAME: Kentrell Kirkpatrick MRN # [id number]  DATE OF VISIT: March 11, 2019 YOB: 1950    CANCER TYPE: NSCLC, adenocarcinoma  STAGE: IV  Cancer Staging  Non-small cell lung cancer, left (H)  Staging form: Lung, AJCC 8th Edition  - Clinical stage from 3/11/2019: Stage IV (cT1c, cN3, pM1c) - Signed by Susan Muller MD on 3/11/2019    ECOG PS: 1    Referring Provider: Dr. Matthew Rodriguez    Cancer Staging  No matching staging information was found for the patient.    PD-L1: Pending  Lung panel: Pending  NGS: Guardant 360 sent 2/28/19 negative for actionable mutations. SMAD4 E538, TP53 H179R, SMO A327G    SUMMARY  6/27/18 CT chest w/contrast to re-evaluate aortic aneurysm: 8 mm spiculated KEATON nodule, 3 mm RML nodule unchanged from 3/15/17 and 2/25/16 scans  2/4/19 Presented to PCP with fairly rapid onset of shortness of breath. Given albuterol  2/19/18 CXR and CT chest w/contrast. Large left effusion  2/20/19 L thoracentesis (Dr. Rodriguez), 1180 cc  3/1/19 L pleurx (Dr. Rodriguez)    SUBJECTIVE  Mr. Kirkpatrick returns today for follow up after getting a pleurx and completing staging studies. Feeling about the same. Appetite still not great but eating. Breathing better - can lie down in bed to sleep. Was sleeping in a recliner yesterday. Hasn't challenged himself exertionally otherwise. Still having trouble sleeping. Quetiapine 12.5 mg at bedtime didn't seem to do anything. L shoulder starting to ache again and gets occasional right shoulder pain down the arm, which isn't new but is persistent. No other new sxs. Pleurx placement went ok. Drained the following    3/2 1000 cc  3/3 1000 cc  3/4 750 cc  3/5 150 cc  3/8 450 cc  3/9 450 cc  3/10 400 cc    Skipped a couple of days 3/6 ~ 3/7    PAST MEDICAL HISTORY  Lung adenocarcinoma as above  L5 disk herniation. Epidural injection 5/22/18. Improved dramatically with chiropracter in the past.  "  BPH  Ascending aortic aneurysm    CURRENT OUTPATIENT MEDICATIONS  Current Outpatient Medications   Medication Sig Dispense Refill     calcium polycarbophil (FIBERCON) 625 MG tablet Take 2 tablets by mouth daily       folic acid (FOLVITE) 1 MG tablet Take 1 tablet (1 mg) by mouth daily 90 tablet 11     MULTI VITAMIN MENS OR TABS 1 TABLET DAILY       QUEtiapine (SEROQUEL) 25 MG tablet Take 12.5mg daily. 15 tablet 0     tamsulosin (FLOMAX) 0.4 MG capsule Take 1 capsule (0.4 mg) by mouth daily 90 capsule 0     albuterol (PROAIR HFA/PROVENTIL HFA/VENTOLIN HFA) 108 (90 Base) MCG/ACT inhaler Inhale 2 puffs into the lungs every 4 hours as needed for shortness of breath / dyspnea or wheezing (Patient not taking: Reported on 3/11/2019) 1 Inhaler 0     ASPIRIN PO Take 81 mg by mouth       diazepam (VALIUM) 5 MG tablet Take 1 tablet (5 mg) by mouth every 6 hours as needed for anxiety (Patient not taking: Reported on 3/11/2019) 2 tablet 0     LORazepam (ATIVAN) 0.5 MG tablet Take 1 tablet (0.5 mg) by mouth every 6 hours as needed (Nausea/Vomiting) 30 tablet 3     traZODone (DESYREL) 50 MG tablet Take 0.5-1 tablets (25-50 mg) by mouth At Bedtime (Patient not taking: Reported on 3/11/2019) 60 tablet 1     ALLERGIES  No Known Allergies    REVIEW OF SYSTEMS  As above in the HPI, o/w complete 12-point ROS was negative.    PHYSICAL EXAM  /59 (BP Location: Right arm, Patient Position: Chair, Cuff Size: Adult Regular)   Pulse 69   Temp 97  F (36.1  C) (Tympanic)   Resp 14   Ht 1.829 m (6' 0.01\")   Wt 81.7 kg (180 lb 1.6 oz)   SpO2 96%   BMI 24.42 kg/m    Wt Readings from Last 3 Encounters:   02/28/19 83.1 kg (183 lb 4.8 oz)   02/19/19 83.5 kg (184 lb)   02/04/19 83.5 kg (184 lb)     GEN: NAD  HEENT: EOMI, no icterus, injection or pallor. Oropharynx clear. Area where implant was removed looks fine    LABORATORY AND IMAGING STUDIES  No new labs today    Results for orders placed or performed in visit on 03/08/19   PET " Oncology Whole Body     Value    Radiologist flags Pneumothorax (Urgent)    Narrative    Combined Report of:    PET and CT on  3/8/2019 8:42 AM :    1. PET of the neck, chest, abdomen, and pelvis.  2. PET CT Fusion for Attenuation Correction and Anatomical  Localization:    3. 3D MIP and PET-CT fused images were processed on an independent  workstation and archived to PACS and reviewed by a radiologist.    Technique:    1. PET: The patient received 12.46 mCi of F-18-FDG; the serum glucose  was 94 mg/dL prior to administration, body weight was 83.1 kg. Images  were evaluated in the axial, sagittal, and coronal planes as well as  the rotational whole body MIP. Images were acquired from the Vertex to  the Feet.    UPTAKE WAS MEASURED AT 60 MINUTES.     BACKGROUND:  Liver SUV max= 3.1,   Aorta Blood SUV Max: 2.8.     2. CT: CT only obtained for attenuation correction and not diagnostic  purposes.    INDICATION: Malignant neoplasm of overlapping sites of left bronchus  and lung (H); Non-small cell lung cancer, left (H)    ADDITIONAL INFORMATION OBTAINED FROM EMR: Newly diagnosed  adenocarcinoma with malignant effusion.    COMPARISON: Chest CT 2/19/2019, 6/27/2018    FINDINGS:     HEAD/NECK:  There is no suspicious FDG uptake in the neck.     CHEST:  Large left-sided hydropneumothorax with chest tube in place.  Circumferential, nodular, and hypermetabolic parietal and visceral  pleural thickening on the left. Hypermetabolic mass lesion in the  posterior segment left lower lobe with max SUV of 7.1. This lesion is  difficult to measure in dimension due to surrounding atelectasis and  lack of intravenous contrast. Additionally there is a hypermetabolic  solid nodule in the right upper lobe measuring 12 x 7 mm with max SUV  of 6.9 (series 5 image 119).    Numerous hypermetabolic left hilar, bilateral mediastinal, left  internal mammary and left supraclavicular lymph nodes. Representative  examples include a 11 mm left  supraclavicular lymph node with max SUV  of 6.0 (series 5 image 95), a 10 mm right paratracheal lymph node with  max SUV of 4.7 (image 132) and a 12 mm left cardiophrenic node with  max SUV of 7.2 (image 172).    ABDOMEN AND PELVIS:  Solid left adrenal nodule measuring 18 x 17 mm with max SUV of 5.3  (series 5 image 200). Prostatomegaly bladder wall thickening  suggestive of chronic outlet obstruction.    LOWER EXTREMITIES:   No abnormal masses or hypermetabolic lesions.    BONES:   There are no suspicious lytic or blastic osseous lesions.  There is no  abnormal FDG uptake in the skeleton.      Impression    IMPRESSION: In this patient with history of newly diagnosed  adenocarcinoma of the left lung, evidence of stage IV disease:  1. Hypermetabolic presumed primary tumor in the left lower lobe is  difficult to measure exactly due to lack of intravenous contrast and  surrounding atelectasis.  2. Diffuse left visceral and parietal pleural metastases.  3. Metastatic adenopathy involving the left supraclavicular, left  internal mammary, bilateral mediastinal and left hilar anaya chains.  4. Hypermetabolic right upper lobe nodule is new from 6/27/2018 and  are favored to be metastatic.  5. Hypermetabolic left adrenal metastasis.  6. Large left-sided hydropneumothorax with chest tube in place. The  large pneumothorax component is favored to be at least partially due  to trapped lung from extensive pleural metastases.      [Access Center: Pneumothorax]    This report will be copied to the [facility] to ensure a  provider acknowledges the finding. Access Center is available Monday  through Friday 8am-3:30 pm.      I have personally reviewed the examination and initial interpretation  and I agree with the findings.    AICHA DUNN MD     MRI BRAIN WITHOUT AND WITH CONTRAST  3/7/2019 4:27 PM     HISTORY:  New diagnosis of lung cancer on 2/25/2019. Staging exam.      TECHNIQUE:  Multiplanar, multisequence MRI of  the brain without and  with 8 mL Gadavist.     COMPARISON: None.     FINDINGS:  The brain parenchyma, ventricles and subarachnoid spaces  appear normal.  There is no evidence of hemorrhage, mass, acute  infarct, or anomaly.  There are no gadolinium enhancing lesions.  No  bone lesions are seen.     The facial structures appear normal. The arteries at the base of the  brain and the dural venous sinuses appear patent.                                                                       IMPRESSION:   Normal MRI of the brain.  No evidence of metastases.     JULIÁN WEBB MD     Imaging was personally reviewed     ASSESSMENT AND PLAN  NSCLC, Stage IV: Reviewed the results of the scan. Also reviewed [id number] results, which was negative for actionable mutations, but there's the possibility of missing something. I will look into the feasibility of getting a biopsy of the L supraclav node, and schedule a consultation with IR. Lung panel on the most recent cytology from pleural fluid is pending. I'll try to call the molecular lab tomorrow to see if there seemed to be enough cells to get an answer. If not, we'll try the L supraclav node. Regardless, I recommended going ahead with carboplatin pemetrexed and pembrolizumab, given IV once every 3 weeks. Restage after 2 cycles with CT CAP and complete 4 cycles before going to maintenance pemetrexed + pembro in the absence of toxicity and PD. Reviewed the potential toxicities. Teaching was done today. Will start whenever works for them in the next few days. Have asked him to return in a week for toxicity check. I'll see him around 4/23 with the restaging CT. B12 was given today and I sent a script for folic acid and dexamethasone to his local pharmacy.    L malignant pleural effusion: Has a bit of trapped lung. Sent inbox message to Dr. Matthew Rodriguez to see if there's anything else we might be able to do to help open it up. Otherwise will continue draining daily for the time being.  Slowing down some.    Insomnia: Can try increasing quetiapine to 25 mg at bedtime but seems to be improving some now that he can breath better.    Failing implant: Removed last week. Healing well.    A total of 40 minutes was spent with the patient, >50% of which was spent in counseling and coordination of care.    Susan Muller MD    Hematology, Oncology and Transplantation

## 2019-03-11 NOTE — NURSING NOTE
Patient had his b-12 injection in his right deltoid.   Patient tolerated injection with no complications.     Please see ASHLEY Coreas CMA ([facility])

## 2019-03-11 NOTE — LETTER
3/11/2019       RE: Kentrell Kirkpatrick  [address] [facility] [address]     Dear Colleague,    Thank you for referring your patient, Kentrell Kirkpatrick, to the [facility] CANCER [facility]. Please see a copy of my visit note below.    [facility] CANCER CLINIC    FOLLOW-UP VISIT NOTE    PATIENT NAME: Kentrell Kirkpatrick MRN # [id number]  DATE OF VISIT: March 11, 2019 YOB: 1950    CANCER TYPE: NSCLC, adenocarcinoma  STAGE: IV  Cancer Staging  Non-small cell lung cancer, left (H)  Staging form: Lung, AJCC 8th Edition  - Clinical stage from 3/11/2019: Stage IV (cT1c, cN3, pM1c) - Signed by Susan Muller MD on 3/11/2019    ECOG PS: 1    Referring Provider: Dr. Matthew Rodriguez    Cancer Staging  No matching staging information was found for the patient.    PD-L1: Pending  Lung panel: Pending  NGS: Guardant 360 sent 2/28/19 negative for actionable mutations. SMAD4 E538, TP53 H179R, SMO A327G    SUMMARY  6/27/18 CT chest w/contrast to re-evaluate aortic aneurysm: 8 mm spiculated KEATON nodule, 3 mm RML nodule unchanged from 3/15/17 and 2/25/16 scans  2/4/19 Presented to PCP with fairly rapid onset of shortness of breath. Given albuterol  2/19/18 CXR and CT chest w/contrast. Large left effusion  2/20/19 L thoracentesis (Dr. Rodriguez), 1180 cc  3/1/19 L pleurx (Dr. Rodriguez)    SUBJECTIVE  Mr. Kirkpatrick returns today for follow up after getting a pleurx and completing staging studies. Feeling about the same. Appetite still not great but eating. Breathing better - can lie down in bed to sleep. Was sleeping in a recliner yesterday. Hasn't challenged himself exertionally otherwise. Still having trouble sleeping. Quetiapine 12.5 mg at bedtime didn't seem to do anything. L shoulder starting to ache again and gets occasional right shoulder pain down the arm, which isn't new but is persistent. No other new sxs. Pleurx placement went ok. Drained the following    3/2 1000 cc  3/3 1000 cc  3/4 750  "cc  3/5 150 cc  3/8 450 cc  3/9 450 cc  3/10 400 cc    Skipped a couple of days 3/6 ~ 3/7    PAST MEDICAL HISTORY  Lung adenocarcinoma as above  L5 disk herniation. Epidural injection 5/22/18. Improved dramatically with chiropracter in the past.   BPH  Ascending aortic aneurysm    CURRENT OUTPATIENT MEDICATIONS  Current Outpatient Medications   Medication Sig Dispense Refill     calcium polycarbophil (FIBERCON) 625 MG tablet Take 2 tablets by mouth daily       folic acid (FOLVITE) 1 MG tablet Take 1 tablet (1 mg) by mouth daily 90 tablet 11     MULTI VITAMIN MENS OR TABS 1 TABLET DAILY       QUEtiapine (SEROQUEL) 25 MG tablet Take 12.5mg daily. 15 tablet 0     tamsulosin (FLOMAX) 0.4 MG capsule Take 1 capsule (0.4 mg) by mouth daily 90 capsule 0     albuterol (PROAIR HFA/PROVENTIL HFA/VENTOLIN HFA) 108 (90 Base) MCG/ACT inhaler Inhale 2 puffs into the lungs every 4 hours as needed for shortness of breath / dyspnea or wheezing (Patient not taking: Reported on 3/11/2019) 1 Inhaler 0     ASPIRIN PO Take 81 mg by mouth       diazepam (VALIUM) 5 MG tablet Take 1 tablet (5 mg) by mouth every 6 hours as needed for anxiety (Patient not taking: Reported on 3/11/2019) 2 tablet 0     LORazepam (ATIVAN) 0.5 MG tablet Take 1 tablet (0.5 mg) by mouth every 6 hours as needed (Nausea/Vomiting) 30 tablet 3     traZODone (DESYREL) 50 MG tablet Take 0.5-1 tablets (25-50 mg) by mouth At Bedtime (Patient not taking: Reported on 3/11/2019) 60 tablet 1     ALLERGIES  No Known Allergies    REVIEW OF SYSTEMS  As above in the HPI, o/w complete 12-point ROS was negative.    PHYSICAL EXAM  /59 (BP Location: Right arm, Patient Position: Chair, Cuff Size: Adult Regular)   Pulse 69   Temp 97  F (36.1  C) (Tympanic)   Resp 14   Ht 1.829 m (6' 0.01\")   Wt 81.7 kg (180 lb 1.6 oz)   SpO2 96%   BMI 24.42 kg/m     Wt Readings from Last 3 Encounters:   02/28/19 83.1 kg (183 lb 4.8 oz)   02/19/19 83.5 kg (184 lb)   02/04/19 83.5 kg (184 lb) "     GEN: NAD  HEENT: EOMI, no icterus, injection or pallor. Oropharynx clear. Area where implant was removed looks fine    LABORATORY AND IMAGING STUDIES  No new labs today    Results for orders placed or performed in visit on 03/08/19   PET Oncology Whole Body     Value    Radiologist flags Pneumothorax (Urgent)    Narrative    Combined Report of:    PET and CT on  3/8/2019 8:42 AM :    1. PET of the neck, chest, abdomen, and pelvis.  2. PET CT Fusion for Attenuation Correction and Anatomical  Localization:    3. 3D MIP and PET-CT fused images were processed on an independent  workstation and archived to PACS and reviewed by a radiologist.    Technique:    1. PET: The patient received 12.46 mCi of F-18-FDG; the serum glucose  was 94 mg/dL prior to administration, body weight was 83.1 kg. Images  were evaluated in the axial, sagittal, and coronal planes as well as  the rotational whole body MIP. Images were acquired from the Vertex to  the Feet.    UPTAKE WAS MEASURED AT 60 MINUTES.     BACKGROUND:  Liver SUV max= 3.1,   Aorta Blood SUV Max: 2.8.     2. CT: CT only obtained for attenuation correction and not diagnostic  purposes.    INDICATION: Malignant neoplasm of overlapping sites of left bronchus  and lung (H); Non-small cell lung cancer, left (H)    ADDITIONAL INFORMATION OBTAINED FROM EMR: Newly diagnosed  adenocarcinoma with malignant effusion.    COMPARISON: Chest CT 2/19/2019, 6/27/2018    FINDINGS:     HEAD/NECK:  There is no suspicious FDG uptake in the neck.     CHEST:  Large left-sided hydropneumothorax with chest tube in place.  Circumferential, nodular, and hypermetabolic parietal and visceral  pleural thickening on the left. Hypermetabolic mass lesion in the  posterior segment left lower lobe with max SUV of 7.1. This lesion is  difficult to measure in dimension due to surrounding atelectasis and  lack of intravenous contrast. Additionally there is a hypermetabolic  solid nodule in the right upper  lobe measuring 12 x 7 mm with max SUV  of 6.9 (series 5 image 119).    Numerous hypermetabolic left hilar, bilateral mediastinal, left  internal mammary and left supraclavicular lymph nodes. Representative  examples include a 11 mm left supraclavicular lymph node with max SUV  of 6.0 (series 5 image 95), a 10 mm right paratracheal lymph node with  max SUV of 4.7 (image 132) and a 12 mm left cardiophrenic node with  max SUV of 7.2 (image 172).    ABDOMEN AND PELVIS:  Solid left adrenal nodule measuring 18 x 17 mm with max SUV of 5.3  (series 5 image 200). Prostatomegaly bladder wall thickening  suggestive of chronic outlet obstruction.    LOWER EXTREMITIES:   No abnormal masses or hypermetabolic lesions.    BONES:   There are no suspicious lytic or blastic osseous lesions.  There is no  abnormal FDG uptake in the skeleton.      Impression    IMPRESSION: In this patient with history of newly diagnosed  adenocarcinoma of the left lung, evidence of stage IV disease:  1. Hypermetabolic presumed primary tumor in the left lower lobe is  difficult to measure exactly due to lack of intravenous contrast and  surrounding atelectasis.  2. Diffuse left visceral and parietal pleural metastases.  3. Metastatic adenopathy involving the left supraclavicular, left  internal mammary, bilateral mediastinal and left hilar anaya chains.  4. Hypermetabolic right upper lobe nodule is new from 6/27/2018 and  are favored to be metastatic.  5. Hypermetabolic left adrenal metastasis.  6. Large left-sided hydropneumothorax with chest tube in place. The  large pneumothorax component is favored to be at least partially due  to trapped lung from extensive pleural metastases.      [Access Center: Pneumothorax]    This report will be copied to the [facility] to ensure a  provider acknowledges the finding. Access Center is available Monday  through Friday 8am-3:30 pm.      I have personally reviewed the examination and initial  interpretation  and I agree with the findings.    AICHA DUNN MD     MRI BRAIN WITHOUT AND WITH CONTRAST  3/7/2019 4:27 PM     HISTORY:  New diagnosis of lung cancer on 2/25/2019. Staging exam.      TECHNIQUE:  Multiplanar, multisequence MRI of the brain without and  with 8 mL Gadavist.     COMPARISON: None.     FINDINGS:  The brain parenchyma, ventricles and subarachnoid spaces  appear normal.  There is no evidence of hemorrhage, mass, acute  infarct, or anomaly.  There are no gadolinium enhancing lesions.  No  bone lesions are seen.     The facial structures appear normal. The arteries at the base of the  brain and the dural venous sinuses appear patent.                                                                       IMPRESSION:   Normal MRI of the brain.  No evidence of metastases.     JULIÁN WEBB MD     Imaging was personally reviewed     ASSESSMENT AND PLAN  NSCLC, Stage IV: Reviewed the results of the scan. Also reviewed [id number] results, which was negative for actionable mutations, but there's the possibility of missing something. I will look into the feasibility of getting a biopsy of the L supraclav node, and schedule a consultation with IR. Lung panel on the most recent cytology from pleural fluid is pending. I'll try to call the molecular lab tomorrow to see if there seemed to be enough cells to get an answer. If not, we'll try the L supraclav node. Regardless, I recommended going ahead with carboplatin pemetrexed and pembrolizumab, given IV once every 3 weeks. Restage after 2 cycles with CT CAP and complete 4 cycles before going to maintenance pemetrexed + pembro in the absence of toxicity and PD. Reviewed the potential toxicities. Teaching was done today. Will start whenever works for them in the next few days. Have asked him to return in a week for toxicity check. I'll see him around 4/23 with the restaging CT. B12 was given today and I sent a script for folic acid and dexamethasone to  his local pharmacy.    L malignant pleural effusion: Has a bit of trapped lung. Sent inbox message to Dr. Matthew Rodriguez to see if there's anything else we might be able to do to help open it up. Otherwise will continue draining daily for the time being. Slowing down some.    Insomnia: Can try increasing quetiapine to 25 mg at bedtime but seems to be improving some now that he can breath better.    Failing implant: Removed last week. Healing well.    A total of 40 minutes was spent with the patient, >50% of which was spent in counseling and coordination of care.    Susan Muller MD    Hematology, Oncology and Transplantation

## 2019-03-11 NOTE — NURSING NOTE
"Oncology Rooming Note    March 11, 2019 12:57 PM   Kentrell Kirkpatrick is a 68 year old male who presents for:    Chief Complaint   Patient presents with     Oncology Clinic Visit     [facility] RETURN- LUNG CA     Initial Vitals: /59 (BP Location: Right arm, Patient Position: Chair, Cuff Size: Adult Regular)   Pulse 69   Temp 97  F (36.1  C) (Tympanic)   Resp 14   Ht 1.829 m (6' 0.01\")   Wt 81.7 kg (180 lb 1.6 oz)   SpO2 96%   BMI 24.42 kg/m   Estimated body mass index is 24.42 kg/m  as calculated from the following:    Height as of this encounter: 1.829 m (6' 0.01\").    Weight as of this encounter: 81.7 kg (180 lb 1.6 oz). Body surface area is 2.04 meters squared.  No Pain (0) Comment: Data Unavailable   No LMP for male patient.  Allergies reviewed: Yes  Medications reviewed: Yes    Medications: Medication refills not needed today.  Pharmacy name entered into EPIC:    [facility]  FOR [place], [place] - [address] [place] [address] [facility] [place], [place] - [address] [place] [address]/[facility][id number] [place], [place] [facility] [address] [facility] [place] [address] AT PORTAL TO REGISTERED [facility] SITES    Clinical concerns: No new concerns. Yohana was notified.      Wu Monae LPN            "

## 2019-03-12 ENCOUNTER — DOCUMENTATION ONLY (OUTPATIENT)
Dept: INTERVENTIONAL RADIOLOGY/VASCULAR | Facility: CLINIC | Age: 69
End: 2019-03-12

## 2019-03-12 DIAGNOSIS — C34.92 NON-SMALL CELL LUNG CANCER, LEFT (H): ICD-10-CM

## 2019-03-12 DIAGNOSIS — R59.9 ENLARGED LYMPH NODES: Primary | ICD-10-CM

## 2019-03-12 DIAGNOSIS — C34.92 NON-SMALL CELL LUNG CANCER, LEFT (H): Primary | ICD-10-CM

## 2019-03-12 RX ORDER — DEXAMETHASONE 4 MG/1
4 TABLET ORAL 2 TIMES DAILY
Qty: 6 TABLET | Refills: 0 | Status: SHIPPED | OUTPATIENT
Start: 2019-03-12 | End: 2019-03-20

## 2019-03-12 NOTE — PROGRESS NOTES
HPI:  Edy is a 69 yo recently diagnosed with NSCLC on left side, stage IV.  He has a 64.5 pk yr hx of smoking, having quit in 2007.  He was following up on his ascending aortic aneurysm in June 2018 and pulmonary nodules were noted on his chest CT, which were unchanged dating back to 2016.  In February 2019 he presented to his PCP with rapid onset of SOB.  CT scan revealed a large left pleural effusion, he had a thoracentesis with 1180 ml removed . Dr. Matthew Rodriguez placed a pleurx catheter.    The pleurx continues to drain, but with decreasing amounts each day, being able to skip some days as well.  Gu[place]dant 360 results were negative for actionable mutations.    To complete his staging work-up he had a PET Ct scan done.  There is a left supraclavicular node, 11 mm, max SUV 6.0.  Dr. Muller referred the patient for possible biopsy.  Dr. Gabino Barbosa from neuro-radiology reviewed the imaging and approved the biopsy.    PLAN:  The patient is scheduled for their biopsy on Friday, March 15.  I have called the patient, and sent instructions via My Chart.    Pt has had a CBC recently, will need an INR on the day of the biopsy  Patient does not need to stop their aspirin.    Brii Parker MS, APRN, CNS, CRN  Clinical Nurse Specialist  Interventional Radiology  [phone] (voice mail)  [phone] (pager)

## 2019-03-13 ENCOUNTER — HOSPITAL ENCOUNTER (OUTPATIENT)
Facility: CLINIC | Age: 69
Setting detail: SPECIMEN
Discharge: HOME | End: 2019-03-13
Attending: NURSE PRACTITIONER | Admitting: INTERNAL MEDICINE
Payer: COMMERCIAL

## 2019-03-13 ENCOUNTER — TELEPHONE (OUTPATIENT)
Dept: INTERVENTIONAL RADIOLOGY/VASCULAR | Facility: CLINIC | Age: 69
End: 2019-03-13

## 2019-03-13 ENCOUNTER — INFUSION THERAPY VISIT (OUTPATIENT)
Dept: INFUSION THERAPY | Facility: CLINIC | Age: 69
End: 2019-03-13
Attending: NURSE PRACTITIONER
Payer: COMMERCIAL

## 2019-03-13 VITALS
BODY MASS INDEX: 24.11 KG/M2 | WEIGHT: 177.8 LBS | OXYGEN SATURATION: 95 % | RESPIRATION RATE: 16 BRPM | SYSTOLIC BLOOD PRESSURE: 109 MMHG | DIASTOLIC BLOOD PRESSURE: 70 MMHG | TEMPERATURE: 96.9 F | HEART RATE: 81 BPM

## 2019-03-13 DIAGNOSIS — C34.92 NON-SMALL CELL LUNG CANCER, LEFT (H): Primary | ICD-10-CM

## 2019-03-13 LAB
ALBUMIN SERPL-MCNC: 3.3 G/DL (ref 3.4–5)
ALP SERPL-CCNC: 91 U/L (ref 40–150)
ALT SERPL W P-5'-P-CCNC: 24 U/L (ref 0–70)
ANION GAP SERPL CALCULATED.3IONS-SCNC: 5 MMOL/L (ref 3–14)
AST SERPL W P-5'-P-CCNC: 15 U/L (ref 0–45)
BASOPHILS # BLD AUTO: 0 10E9/L (ref 0–0.2)
BASOPHILS NFR BLD AUTO: 0.2 %
BILIRUB SERPL-MCNC: 0.9 MG/DL (ref 0.2–1.3)
BUN SERPL-MCNC: 17 MG/DL (ref 7–30)
CALCIUM SERPL-MCNC: 8.7 MG/DL (ref 8.5–10.1)
CHLORIDE SERPL-SCNC: 107 MMOL/L (ref 94–109)
CO2 SERPL-SCNC: 26 MMOL/L (ref 20–32)
COPATH REPORT: NORMAL
CREAT SERPL-MCNC: 0.86 MG/DL (ref 0.66–1.25)
DIFFERENTIAL METHOD BLD: ABNORMAL
EOSINOPHIL # BLD AUTO: 0 10E9/L (ref 0–0.7)
EOSINOPHIL NFR BLD AUTO: 0 %
ERYTHROCYTE [DISTWIDTH] IN BLOOD BY AUTOMATED COUNT: 13.1 % (ref 10–15)
GFR SERPL CREATININE-BSD FRML MDRD: 88 ML/MIN/{1.73_M2}
GLUCOSE SERPL-MCNC: 114 MG/DL (ref 70–99)
HCT VFR BLD AUTO: 44.8 % (ref 40–53)
HGB BLD-MCNC: 14.9 G/DL (ref 13.3–17.7)
IMM GRANULOCYTES # BLD: 0.1 10E9/L (ref 0–0.4)
IMM GRANULOCYTES NFR BLD: 0.4 %
LYMPHOCYTES # BLD AUTO: 0.9 10E9/L (ref 0.8–5.3)
LYMPHOCYTES NFR BLD AUTO: 7.2 %
MCH RBC QN AUTO: 30 PG (ref 26.5–33)
MCHC RBC AUTO-ENTMCNC: 33.3 G/DL (ref 31.5–36.5)
MCV RBC AUTO: 90 FL (ref 78–100)
MONOCYTES # BLD AUTO: 0.7 10E9/L (ref 0–1.3)
MONOCYTES NFR BLD AUTO: 5.6 %
NEUTROPHILS # BLD AUTO: 10.5 10E9/L (ref 1.6–8.3)
NEUTROPHILS NFR BLD AUTO: 86.6 %
NRBC # BLD AUTO: 0 10*3/UL
NRBC BLD AUTO-RTO: 0 /100
PLATELET # BLD AUTO: 376 10E9/L (ref 150–450)
POTASSIUM SERPL-SCNC: 4 MMOL/L (ref 3.4–5.3)
PROT SERPL-MCNC: 7.1 G/DL (ref 6.8–8.8)
RBC # BLD AUTO: 4.97 10E12/L (ref 4.4–5.9)
SODIUM SERPL-SCNC: 138 MMOL/L (ref 133–144)
TSH SERPL DL<=0.005 MIU/L-ACNC: 1.13 MU/L (ref 0.4–4)
WBC # BLD AUTO: 12.2 10E9/L (ref 4–11)

## 2019-03-13 PROCEDURE — 25800030 ZZH RX IP 258 OP 636: Performed by: INTERNAL MEDICINE

## 2019-03-13 PROCEDURE — 84443 ASSAY THYROID STIM HORMONE: CPT | Performed by: INTERNAL MEDICINE

## 2019-03-13 PROCEDURE — 80053 COMPREHEN METABOLIC PANEL: CPT | Performed by: INTERNAL MEDICINE

## 2019-03-13 PROCEDURE — 85025 COMPLETE CBC W/AUTO DIFF WBC: CPT | Performed by: INTERNAL MEDICINE

## 2019-03-13 PROCEDURE — 96413 CHEMO IV INFUSION 1 HR: CPT

## 2019-03-13 PROCEDURE — 96375 TX/PRO/DX INJ NEW DRUG ADDON: CPT

## 2019-03-13 PROCEDURE — 96411 CHEMO IV PUSH ADDL DRUG: CPT

## 2019-03-13 PROCEDURE — 96417 CHEMO IV INFUS EACH ADDL SEQ: CPT

## 2019-03-13 PROCEDURE — 25000128 H RX IP 250 OP 636: Performed by: INTERNAL MEDICINE

## 2019-03-13 RX ORDER — PROCHLORPERAZINE MALEATE 10 MG
10 TABLET ORAL EVERY 6 HOURS PRN
Qty: 30 TABLET | Refills: 11 | Status: CANCELLED | OUTPATIENT
Start: 2019-03-13

## 2019-03-13 RX ORDER — ONDANSETRON 8 MG/1
8 TABLET, FILM COATED ORAL EVERY 8 HOURS PRN
Qty: 30 TABLET | Refills: 11 | Status: SHIPPED | OUTPATIENT
Start: 2019-03-13 | End: 2020-01-01

## 2019-03-13 RX ORDER — ONDANSETRON 8 MG/1
8 TABLET, FILM COATED ORAL EVERY 8 HOURS PRN
Qty: 30 TABLET | Refills: 11 | Status: CANCELLED | OUTPATIENT
Start: 2019-03-13

## 2019-03-13 RX ORDER — PROCHLORPERAZINE MALEATE 10 MG
10 TABLET ORAL EVERY 6 HOURS PRN
Qty: 30 TABLET | Refills: 11 | Status: SHIPPED | OUTPATIENT
Start: 2019-03-13

## 2019-03-13 RX ADMIN — SODIUM CHLORIDE 1000 MG: 9 INJECTION, SOLUTION INTRAVENOUS at 10:07

## 2019-03-13 RX ADMIN — DEXAMETHASONE SODIUM PHOSPHATE: 10 INJECTION, SOLUTION INTRAMUSCULAR; INTRAVENOUS at 09:12

## 2019-03-13 RX ADMIN — CARBOPLATIN 600 MG: 10 INJECTION, SOLUTION INTRAVENOUS at 10:24

## 2019-03-13 RX ADMIN — SODIUM CHLORIDE 250 ML: 9 INJECTION, SOLUTION INTRAVENOUS at 09:12

## 2019-03-13 RX ADMIN — SODIUM CHLORIDE 200 MG: 9 INJECTION, SOLUTION INTRAVENOUS at 09:32

## 2019-03-13 NOTE — PROGRESS NOTES
Pharmacy Note: Antiemetic    I discussed anti-emetics with Edy and his wife including the dose, frequency and side effects.  They were told to be proactive in treating nausea and to use another antiemetic in 1 hour if the first antiemetic didn't work.  Patient should call if what he has at home isn't working as we can usually make an adjustment in the antiemetic regimen.  They were given written information with the above instructions and all questions were answered.  Dexamethasone, prochlorperazine, lorazepam and ondansetron were discussed.  Jarod Seaman [facility] March 13, 2019   [facility]  [phone]

## 2019-03-13 NOTE — PROGRESS NOTES
Infusion Nursing Note:  Kentrell Kirkpatrick presents today for C1D1 Keytruda. Alimta, Carboplatin.    Patient seen by provider today: No   present during visit today: Not Applicable.    Note: Patient new to infusion, oriented to infusion center. Patient states he already received his decadron, folic acid, and lorazepam take home mediations. Compazine and zofran released to [facility] pharmacy per patients request.    Intravenous Access:  Peripheral IV placed.    Treatment Conditions:  Lab Results   Component Value Date    HGB 14.9 03/13/2019     Lab Results   Component Value Date    WBC 12.2 03/13/2019      Lab Results   Component Value Date    ANEU 10.5 03/13/2019     Lab Results   Component Value Date     03/13/2019      Lab Results   Component Value Date     03/13/2019                   Lab Results   Component Value Date    POTASSIUM 4.0 03/13/2019           No results found for: MAG         Lab Results   Component Value Date    CR 0.86 03/13/2019                   Lab Results   Component Value Date    MALATHI 8.7 03/13/2019                Lab Results   Component Value Date    BILITOTAL 0.9 03/13/2019           Lab Results   Component Value Date    ALBUMIN 3.3 03/13/2019                    Lab Results   Component Value Date    ALT 24 03/13/2019           Lab Results   Component Value Date    AST 15 03/13/2019       Results reviewed, labs MET treatment parameters, ok to proceed with treatment.  B12 given on 3/11/19.       Post Infusion Assessment:  Patient tolerated infusion without incident.  Blood return noted pre and post infusion.  Site patent and intact, free from redness, edema or discomfort.  No evidence of extravasations.  Access discontinued per protocol.    Discharge Plan:   Prescription refills given for Compazine and Zofran.  Discharge instructions reviewed with: Patient and Family.  Patient and/or family verbalized understanding of discharge instructions and all questions answered.  Copy of  AVS reviewed with patient and/or family.  Patient will return 4/3/19 for next appointment.  Patient discharged in stable condition accompanied by: self and wife.  Departure Mode: Ambulatory.    Hyun Adame RN

## 2019-03-14 RX ORDER — SODIUM CHLORIDE 9 MG/ML
INJECTION, SOLUTION INTRAVENOUS CONTINUOUS
Status: CANCELLED | OUTPATIENT
Start: 2019-03-14

## 2019-03-14 RX ORDER — LIDOCAINE 40 MG/G
CREAM TOPICAL
Status: CANCELLED | OUTPATIENT
Start: 2019-03-14

## 2019-03-15 ENCOUNTER — APPOINTMENT (OUTPATIENT)
Dept: MEDSURG UNIT | Facility: CLINIC | Age: 69
End: 2019-03-15
Attending: RADIOLOGY
Payer: COMMERCIAL

## 2019-03-15 ENCOUNTER — HOSPITAL ENCOUNTER (OUTPATIENT)
Facility: CLINIC | Age: 69
Discharge: HOME | End: 2019-03-15
Attending: RADIOLOGY | Admitting: RADIOLOGY
Payer: COMMERCIAL

## 2019-03-15 ENCOUNTER — HOSPITAL ENCOUNTER (OUTPATIENT)
Dept: INTERVENTIONAL RADIOLOGY/VASCULAR | Facility: CLINIC | Age: 69
End: 2019-03-15
Attending: CLINICAL NURSE SPECIALIST | Admitting: RADIOLOGY
Payer: COMMERCIAL

## 2019-03-15 VITALS
SYSTOLIC BLOOD PRESSURE: 116 MMHG | DIASTOLIC BLOOD PRESSURE: 62 MMHG | TEMPERATURE: 96.8 F | OXYGEN SATURATION: 94 % | HEART RATE: 90 BPM | RESPIRATION RATE: 16 BRPM

## 2019-03-15 VITALS
TEMPERATURE: 97.8 F | HEART RATE: 79 BPM | OXYGEN SATURATION: 97 % | DIASTOLIC BLOOD PRESSURE: 75 MMHG | RESPIRATION RATE: 16 BRPM | SYSTOLIC BLOOD PRESSURE: 112 MMHG

## 2019-03-15 DIAGNOSIS — C34.92 NON-SMALL CELL LUNG CANCER, LEFT (H): ICD-10-CM

## 2019-03-15 DIAGNOSIS — R59.9 ENLARGED LYMPH NODES: ICD-10-CM

## 2019-03-15 LAB
B-HCG FREE SERPL-ACNC: 1 [IU]/L (ref 0.86–1.14)
COPATH REPORT: NORMAL
COPATH REPORT: NORMAL

## 2019-03-15 PROCEDURE — 40000803 ZZHCL STATISTIC DNA ISOL HIGH PURITY: Performed by: RADIOLOGY

## 2019-03-15 PROCEDURE — 85610 PROTHROMBIN TIME: CPT

## 2019-03-15 PROCEDURE — 25000128 H RX IP 250 OP 636: Performed by: STUDENT IN AN ORGANIZED HEALTH CARE EDUCATION/TRAINING PROGRAM

## 2019-03-15 PROCEDURE — 81445 SO NEO GSAP 5-50DNA/DNA&RNA: CPT | Performed by: RADIOLOGY

## 2019-03-15 PROCEDURE — 00000155 ZZHCL STATISTIC H-CELL BLOCK W/STAIN: Performed by: INTERNAL MEDICINE

## 2019-03-15 PROCEDURE — 88173 CYTOPATH EVAL FNA REPORT: CPT | Performed by: INTERNAL MEDICINE

## 2019-03-15 PROCEDURE — 25000125 ZZHC RX 250: Performed by: STUDENT IN AN ORGANIZED HEALTH CARE EDUCATION/TRAINING PROGRAM

## 2019-03-15 PROCEDURE — 10005 FNA BX W/US GDN 1ST LES: CPT

## 2019-03-15 PROCEDURE — 88305 TISSUE EXAM BY PATHOLOGIST: CPT | Performed by: INTERNAL MEDICINE

## 2019-03-15 PROCEDURE — 99153 MOD SED SAME PHYS/QHP EA: CPT

## 2019-03-15 PROCEDURE — 88172 CYTP DX EVAL FNA 1ST EA SITE: CPT | Performed by: INTERNAL MEDICINE

## 2019-03-15 PROCEDURE — 40000166 ZZH STATISTIC PP CARE STAGE 1

## 2019-03-15 PROCEDURE — 00000159 ZZHCL STATISTIC H-SEND OUTS PREP: Performed by: INTERNAL MEDICINE

## 2019-03-15 PROCEDURE — 88342 IMHCHEM/IMCYTCHM 1ST ANTB: CPT | Performed by: INTERNAL MEDICINE

## 2019-03-15 PROCEDURE — 25800030 ZZH RX IP 258 OP 636: Performed by: PHYSICIAN ASSISTANT

## 2019-03-15 RX ORDER — LIDOCAINE 40 MG/G
CREAM TOPICAL
Status: DISCONTINUED | OUTPATIENT
Start: 2019-03-15 | End: 2019-03-16 | Stop reason: HOSPADM

## 2019-03-15 RX ORDER — FLUMAZENIL 0.1 MG/ML
0.2 INJECTION, SOLUTION INTRAVENOUS
Status: DISCONTINUED | OUTPATIENT
Start: 2019-03-15 | End: 2019-03-16 | Stop reason: HOSPADM

## 2019-03-15 RX ORDER — FENTANYL CITRATE 50 UG/ML
25-50 INJECTION, SOLUTION INTRAMUSCULAR; INTRAVENOUS EVERY 5 MIN PRN
Status: DISCONTINUED | OUTPATIENT
Start: 2019-03-15 | End: 2019-03-16 | Stop reason: HOSPADM

## 2019-03-15 RX ORDER — SODIUM CHLORIDE 9 MG/ML
INJECTION, SOLUTION INTRAVENOUS CONTINUOUS
Status: DISCONTINUED | OUTPATIENT
Start: 2019-03-15 | End: 2019-03-16 | Stop reason: HOSPADM

## 2019-03-15 RX ORDER — NALOXONE HYDROCHLORIDE 0.4 MG/ML
.1-.4 INJECTION, SOLUTION INTRAMUSCULAR; INTRAVENOUS; SUBCUTANEOUS
Status: DISCONTINUED | OUTPATIENT
Start: 2019-03-15 | End: 2019-03-16 | Stop reason: HOSPADM

## 2019-03-15 RX ADMIN — SODIUM CHLORIDE: 9 INJECTION, SOLUTION INTRAVENOUS at 08:43

## 2019-03-15 RX ADMIN — LIDOCAINE HYDROCHLORIDE 8 ML: 10 INJECTION, SOLUTION EPIDURAL; INFILTRATION; INTRACAUDAL; PERINEURAL at 10:31

## 2019-03-15 RX ADMIN — FENTANYL CITRATE 100 MCG: 50 INJECTION INTRAMUSCULAR; INTRAVENOUS at 10:31

## 2019-03-15 RX ADMIN — MIDAZOLAM HYDROCHLORIDE 2 MG: 2 INJECTION, SOLUTION INTRAMUSCULAR; INTRAVENOUS at 10:31

## 2019-03-15 NOTE — IP AVS SNAPSHOT
[facility], [place], Interventional Radiology  [address] [address]  Phone:  [phone]                                    After Visit Summary   3/15/2019    Kentrell Kirkpatrick    MRN: [id number]           After Visit Summary Signature Page    I have received my discharge instructions, and my questions have been answered. I have discussed any challenges I see with this plan with the nurse or doctor.    ..........................................................................................................................................  Patient/Patient Representative Signature      ..........................................................................................................................................  Patient Representative Print Name and Relationship to Patient    ..................................................               ................................................  Date                                   Time    ..........................................................................................................................................  Reviewed by Signature/Title    ...................................................              ..............................................  Date                                               Time          22EPIC Rev 08/18

## 2019-03-15 NOTE — PROGRESS NOTES
Patient Name: Kentrell Kirkpatrick  Medical Record Number: [id number]  Today's Date: 3/15/2019    Procedure: Left supraclavicular lymph node biopsy  Proceduralist: Dr. Barbosa    Sedation start time: 1000  Sedation end time: 1030  Sedation medications administered: Fentanyl 100 mcg, Versed 2 mg   Total sedation time: 30 min  Sedation Notes: none     Procedure start time: 1000  Puncture time: 1009  Procedure end time: 1035    Report given to: Frannie MEDEIROS RN  : none    Other Notes: Pt arrived to IR room 7 from . Consent reviewed. Pt denies any questions or concerns regarding procedure. Pt positioned supine and monitored per protocol.  Pathology at bedside, 5 samples obtained. Pt tolerated procedure without any noted complications.  Primapore placed over left clavicular puncture site. Pt transferred back to .    Silvia Goode RN

## 2019-03-15 NOTE — DISCHARGE INSTRUCTIONS
[facility]    Interventional Radiology  Patient Instructions Following Lymph Node Biopsy    AFTER YOU GO HOME  ? If you were given sedation DO NOT drive or operate machinery at home or at work for at least 24 hours  ? DO relax and take it easy for 48 hours, no strenuous activity for 24 hours  ? DO drink plenty of fluids  ? DO resume your regular diet, unless otherwise instructed by your Primary Physician  ? Keep the dressing dry and in place for 24 hours.  ? DO NOT SMOKE FOR AT LEAST 24 HOURS, if you have been given any medications that were to help you relax or sedate you during your procedure  ? DO NOT drink alcoholic beverages the day of your procedure  ? DO NOT do any strenuous exercise or lifting (> 10 lbs) for at least 7 days following your procedure  ? DO NOT take a bath or shower for at least 12 hours following your procedure  ? Remove dressing after shower the next day. Replace with Band aid for 2 days.  Never leave a wet dressing in place.  ? DO NOT make any important or legal decisions for 24 hours following your procedure  ? There should be minimum drainage from the biopsy site    CALL THE PHYSICIAN IF:  ? You start bleeding from the procedure site.  If you do start to bleed from that site, lie down flat and hold pressure on the site for a minimum of 10 minutes.  Your physician will tell you if you need to return to the hospital  ? You develop nausea or vomiting  ? You have excessive swelling, redness, or tenderness at the site  ? You have drainage that looks like it is infected.  ? You experience severe pain  ? You develop hives or a rash or unexplained itching  ? You develop shortness of breath  ? You develop a temperature of 101 degrees F or greater      ADDITIONAL INSTRUCTIONS  If you are taking Coumadin, restart tonight.  Follow up with your Coumadin Clinic or Primary Care MD to have your INR rechecked.    [facility] INTERVENTIONAL RADIOLOGY DEPARTMENT  Procedure Physician:   Dr Barbosa                                     Date of procedure:   March 15, 2019  Telephone Numbers: [phone] Monday-Friday 8:00 am to 4:30 pm  [phone] After 4:30 pm Monday-Friday, Weekends & Holidays.   Ask for the Interventional Radiologist on call.  Someone is on call 24 hrs/day  [facility] toll free number: [phone] Monday-Friday 8:00 am to 4:30 pm  [facility] Emergency Dept: [phone]

## 2019-03-15 NOTE — PROGRESS NOTES
Pt arrives to 2A for IR Lymph node Biopsy. Denies pain. Pre procedure prep completed. Consent signed. Pt's wife Pat at bedside.

## 2019-03-15 NOTE — PROGRESS NOTES
Pt up walking post LN biopsy, pt steady on his feet; voided; tolerated PO, both food and drink. Site remains dry and intact. Denies pain. IV discontinued, catheter intact. Discharge instructions reviewed with pt and wife,  with copy to pt. Pt stated understanding. Discharged to home with family.

## 2019-03-15 NOTE — PRE-PROCEDURE
Interventional Radiology Pre-Procedure Sedation Assessment   Time of Assessment: 8:13 AM    Expected Level: Local anesthetic versus Moderate Sedation    Indication: Sedation is required for the following type of Procedure: Biopsy    Sedation and procedural consent: Risks, benefits and alternatives were discussed with Patient    PO Intake: Appropriately NPO for procedure    ASA Class: Class 3 - SEVERE SYSTEMIC DISEASE, DEFINITE FUNCTIONAL LIMITATIONS.    Mallampati: Grade 2:  Soft palate, base of uvula, tonsillar pillars, and portion of posterior pharyngeal wall visible    Lungs: Lungs Clear with good breath sounds bilaterally    Heart: Normal heart sounds and rate    History and physical reviewed and no updates needed. I have reviewed the lab findings, diagnostic data, medications, and the plan for sedation. I have determined this patient to be an appropriate candidate for the planned sedation and procedure and have reassessed the patient IMMEDIATELY PRIOR to sedation and procedure.    Faraz Gonzalez MD

## 2019-03-15 NOTE — IP AVS SNAPSHOT
MRN:[id number]                      After Visit Summary   3/15/2019    Kentrell Kirkpatrick    MRN: [id number]           Visit Information        Provider Department      3/15/2019  9:30 AM [facility] RAD; [facility], [place], Interventional Radiology           Review of your medicines      Notice    This visit is during an admission. Changes to the med list made in this visit will be reflected in the After Visit Summary of the admission.           Protect others around you: Learn how to safely use, store and throw away your medicines at www.disposemymeds.org.       Follow-ups after your visit       Your next 10 appointments already scheduled    Mar 20, 2019 12:30 PM CDT  Return Visit with KSIHA Nunez CNP  [facility] ([facility]) [facility]  [address] [place]  [address]  [facility] [address]  [phone]   Apr 02, 2019  8:45 AM CDT  XR CHEST 2 VIEWS with UCXR1  [facility] Xray ([facility]) [address] [address] [phone]   How do I prepare for my exam? (Food and drink instructions) No Food and Drink Restrictions.  How do I prepare for my exam? (Other instructions) You do not need to do anything special for this exam.  What should I wear: Wear comfortable clothes.  How long does the exam take: Most scans take less than 5 minutes.  What should I bring: Bring a list of your medicines, including vitamins, minerals and over-the-counter drugs. It is safest to leave personal items at home.  Do I need a :  No  is needed.  What do I need to tell my doctor: Tell your doctor if there s any chance you are pregnant.  What should I do after the exam: No restrictions, You may resume normal activities.  What is this test: An image of a specific body part shown in shades of black and white.  Who should I call with questions: If you have any questions, please call  the Imaging Department where you will have your exam. Directions, parking instructions, and other information is available on our website, [email or web address].   Apr 02, 2019  9:40 AM CDT  (Arrive by 9:25 AM)  NEW LUNG NODULE with Matthew Rodriguez MD  [facility] ([facility]) [address]  [address]  [facility] [address]  [phone]   Apr 03, 2019  8:30 AM CDT  Lab with Infusion with RH LAB DRAW 1  [facility] ([facility]) [facility]  [address]  [facility] [address]  [phone]   Apr 03, 2019  9:00 AM CDT  Return Visit with KISHA Nunez CNP  [facility] ([facility]) [facility]  [address]  [facility] [address]  [phone]   Apr 03, 2019 10:00 AM CDT  Level 3 with RH INFUSION CHAIR 5  [facility] ([facility]) [facility]  [address]  [facility] [address]  [phone]      Care Instructions       Further instructions from your care team       [facility]    Interventional Radiology  Patient Instructions Following Lymph Node Biopsy    AFTER YOU GO HOME  ? If you were given sedation DO NOT drive or operate machinery at home or at work for at least 24 hours  ? DO relax and take it easy for 48 hours, no strenuous activity for 24 hours  ? DO drink plenty of fluids  ? DO resume your regular diet, unless otherwise instructed by your Primary Physician  ? Keep the dressing dry and in place for 24 hours.  ? DO NOT SMOKE FOR AT LEAST 24 HOURS, if you have been given any medications that were to help you relax or sedate you during your procedure  ? DO NOT drink alcoholic beverages the day of your procedure  ? DO NOT do any strenuous exercise or lifting (> 10 lbs) for at least 7 days following  your procedure  ? DO NOT take a bath or shower for at least 12 hours following your procedure  ? Remove dressing after shower the next day. Replace with Band aid for 2 days.  Never leave a wet dressing in place.  ? DO NOT make any important or legal decisions for 24 hours following your procedure  ? There should be minimum drainage from the biopsy site    CALL THE PHYSICIAN IF:  ? You start bleeding from the procedure site.  If you do start to bleed from that site, lie down flat and hold pressure on the site for a minimum of 10 minutes.  Your physician will tell you if you need to return to the hospital  ? You develop nausea or vomiting  ? You have excessive swelling, redness, or tenderness at the site  ? You have drainage that looks like it is infected.  ? You experience severe pain  ? You develop hives or a rash or unexplained itching  ? You develop shortness of breath  ? You develop a temperature of 101 degrees F or greater      ADDITIONAL INSTRUCTIONS  If you are taking Coumadin, restart tonight.  Follow up with your Coumadin Clinic or Primary Care MD to have your INR rechecked.    [facility] INTERVENTIONAL RADIOLOGY DEPARTMENT  Procedure Physician:   Dr Barbosa                                    Date of procedure:   March 15, 2019  Telephone Numbers: [phone] Monday-Friday 8:00 am to 4:30 pm  [phone] After 4:30 pm Monday-Friday, Weekends & Holidays.   Ask for the Interventional Radiologist on call.  Someone is on call 24 hrs/day  [facility] toll free number: [phone] Monday-Friday 8:00 am to 4:30 pm  [facility] Emergency Dept: [phone]          Additional Information About Your Visit       [employer] Information    [employer] gives you secure access to your electronic health record. If you see a primary care provider, you can also send messages to your care team and make appointments. If you have questions, please call your primary care clinic.  If you do not have a primary care provider, please call [phone] and  they will assist you.       Care EveryWhere ID    This is your Care EveryWhere ID. This could be used by other organizations to access your [facility] medical records  [id number]       Your Vitals Were     Blood Pressure   116/69 (BP Location: Right arm)    Pulse   90    Temperature   96.8  F (36  C) (Oral)    Respirations   14    Pulse Oximetry   93%          Primary Care Provider Office Phone # Fax #    Yoni Sarmiento Jr., [phone]      Equal Access to Services    [facility] Center[facility]RIANNE : Hadii aad ku hadasho Soomaali, waaxda luqadaha, qaybta kaalmada adeegyada, waxay idiin hayaan adeeg kharash lakarlos . So [facility] [phone].    ATENCIÓN: Si habla español, tiene a perez disposición servicios gratuitos de asistencia lingüística. [facility] [phone].    We comply with applicable federal civil rights laws and [place] laws. We do not discriminate on the basis of race, color, national origin, age, disability, sex, sexual orientation, or gender identity.           Thank you!    Thank you for choosing [facility] for your care. Our goal is always to provide you with excellent care. Hearing back from our patients is one way we can continue to improve our services. Please take a few minutes to complete the written survey that you may receive in the mail after you visit with us. Thank you!         Medication List     Notice    This visit is during an admission. Changes to the med list made in this visit will be reflected in the After Visit Summary of the admission.

## 2019-03-18 ENCOUNTER — CARE COORDINATION (OUTPATIENT)
Dept: ONCOLOGY | Facility: CLINIC | Age: 69
End: 2019-03-18

## 2019-03-18 DIAGNOSIS — C34.92 NON-SMALL CELL LUNG CANCER, LEFT (H): Primary | ICD-10-CM

## 2019-03-18 NOTE — PROGRESS NOTES
"Edy received his first infusion of Carbo/Pemetrexed/Pembrolizumab on 3/13/19.    He reports he is doing well overall.  He states that \"that chemo stuff is pretty powerful stuff\"    Edy reports having abdominal pain for the first 4 or 5 day.  He describes it as being in her lower, central abdomen.  That has now resolved.  He does have a dull ache on his left side- a soreness.  This is also in the same area as his pleurx catheter.  He denies any redness or discharge at the site of the catheter.    Edy is regular with is BMs.  He is eating and drinking well.  He is getting out of the house.    Reviewed his plan to be seen by Saravanan Ramsey PA-C at [facility] on Wed.  He is aware of his plan for future treatment and denies any questions.   Reviewed [facility] Triage number for questions or concerns.   "

## 2019-03-19 LAB — COPATH REPORT: NORMAL

## 2019-03-20 ENCOUNTER — HOSPITAL ENCOUNTER (OUTPATIENT)
Facility: CLINIC | Age: 69
Setting detail: SPECIMEN
Discharge: HOME | End: 2019-03-20
Attending: NURSE PRACTITIONER | Admitting: INTERNAL MEDICINE
Payer: COMMERCIAL

## 2019-03-20 ENCOUNTER — ONCOLOGY VISIT (OUTPATIENT)
Dept: ONCOLOGY | Facility: CLINIC | Age: 69
End: 2019-03-20
Attending: NURSE PRACTITIONER
Payer: COMMERCIAL

## 2019-03-20 VITALS
WEIGHT: 178.2 LBS | TEMPERATURE: 97.3 F | BODY MASS INDEX: 24.14 KG/M2 | HEART RATE: 93 BPM | RESPIRATION RATE: 16 BRPM | SYSTOLIC BLOOD PRESSURE: 113 MMHG | HEIGHT: 72 IN | OXYGEN SATURATION: 97 % | DIASTOLIC BLOOD PRESSURE: 64 MMHG

## 2019-03-20 DIAGNOSIS — C34.92 NON-SMALL CELL LUNG CANCER, LEFT (H): Primary | ICD-10-CM

## 2019-03-20 LAB
ALBUMIN SERPL-MCNC: 3.1 G/DL (ref 3.4–5)
ALP SERPL-CCNC: 100 U/L (ref 40–150)
ALT SERPL W P-5'-P-CCNC: 44 U/L (ref 0–70)
ANION GAP SERPL CALCULATED.3IONS-SCNC: 5 MMOL/L (ref 3–14)
AST SERPL W P-5'-P-CCNC: 27 U/L (ref 0–45)
BACTERIA SPEC CULT: NORMAL
BASOPHILS # BLD AUTO: 0.1 10E9/L (ref 0–0.2)
BASOPHILS NFR BLD AUTO: 0.6 %
BILIRUB SERPL-MCNC: 1.2 MG/DL (ref 0.2–1.3)
BUN SERPL-MCNC: 16 MG/DL (ref 7–30)
CALCIUM SERPL-MCNC: 8.6 MG/DL (ref 8.5–10.1)
CHLORIDE SERPL-SCNC: 104 MMOL/L (ref 94–109)
CO2 SERPL-SCNC: 28 MMOL/L (ref 20–32)
CREAT SERPL-MCNC: 0.9 MG/DL (ref 0.66–1.25)
DIFFERENTIAL METHOD BLD: NORMAL
EOSINOPHIL # BLD AUTO: 0.2 10E9/L (ref 0–0.7)
EOSINOPHIL NFR BLD AUTO: 2 %
ERYTHROCYTE [DISTWIDTH] IN BLOOD BY AUTOMATED COUNT: 13 % (ref 10–15)
FUNGUS SPEC CULT: NORMAL
GFR SERPL CREATININE-BSD FRML MDRD: 87 ML/MIN/{1.73_M2}
GLUCOSE SERPL-MCNC: 125 MG/DL (ref 70–99)
HCT VFR BLD AUTO: 44.7 % (ref 40–53)
HGB BLD-MCNC: 14.7 G/DL (ref 13.3–17.7)
IMM GRANULOCYTES # BLD: 0 10E9/L (ref 0–0.4)
IMM GRANULOCYTES NFR BLD: 0.5 %
LYMPHOCYTES # BLD AUTO: 1.3 10E9/L (ref 0.8–5.3)
LYMPHOCYTES NFR BLD AUTO: 15.8 %
MCH RBC QN AUTO: 29.9 PG (ref 26.5–33)
MCHC RBC AUTO-ENTMCNC: 32.9 G/DL (ref 31.5–36.5)
MCV RBC AUTO: 91 FL (ref 78–100)
MONOCYTES # BLD AUTO: 0.9 10E9/L (ref 0–1.3)
MONOCYTES NFR BLD AUTO: 11.7 %
NEUTROPHILS # BLD AUTO: 5.5 10E9/L (ref 1.6–8.3)
NEUTROPHILS NFR BLD AUTO: 69.4 %
NRBC # BLD AUTO: 0 10*3/UL
NRBC BLD AUTO-RTO: 0 /100
PLATELET # BLD AUTO: 277 10E9/L (ref 150–450)
POTASSIUM SERPL-SCNC: 4.5 MMOL/L (ref 3.4–5.3)
PROT SERPL-MCNC: 6.8 G/DL (ref 6.8–8.8)
RBC # BLD AUTO: 4.92 10E12/L (ref 4.4–5.9)
SODIUM SERPL-SCNC: 137 MMOL/L (ref 133–144)
SPECIMEN SOURCE: NORMAL
SPECIMEN SOURCE: NORMAL
WBC # BLD AUTO: 7.9 10E9/L (ref 4–11)

## 2019-03-20 PROCEDURE — 80053 COMPREHEN METABOLIC PANEL: CPT | Performed by: INTERNAL MEDICINE

## 2019-03-20 PROCEDURE — 85025 COMPLETE CBC W/AUTO DIFF WBC: CPT | Performed by: INTERNAL MEDICINE

## 2019-03-20 PROCEDURE — 36415 COLL VENOUS BLD VENIPUNCTURE: CPT

## 2019-03-20 PROCEDURE — G0463 HOSPITAL OUTPT CLINIC VISIT: HCPCS

## 2019-03-20 PROCEDURE — 99214 OFFICE O/P EST MOD 30 MIN: CPT | Performed by: NURSE PRACTITIONER

## 2019-03-20 RX ORDER — DEXAMETHASONE 4 MG/1
4 TABLET ORAL 2 TIMES DAILY
Qty: 6 TABLET | Refills: 0 | Status: SHIPPED | OUTPATIENT
Start: 2019-03-20 | End: 2019-04-03

## 2019-03-20 ASSESSMENT — PAIN SCALES - GENERAL: PAINLEVEL: MILD PAIN (2)

## 2019-03-20 ASSESSMENT — MIFFLIN-ST. JEOR: SCORE: 1611.31

## 2019-03-20 NOTE — NURSING NOTE
Oncology Rooming Note    March 20, 2019 12:28 PM   Kentrell Kirkpatrick is a 68 year old male who presents for:    Chief Complaint   Patient presents with     Oncology Clinic Visit     Non-small cell lung cancer, left     Initial Vitals: /64   Pulse 93   Temp 97.3  F (36.3  C) (Oral)   Resp 16   Ht 1.829 m (6')   Wt 80.8 kg (178 lb 3.2 oz)   SpO2 97%   BMI 24.17 kg/m   Estimated body mass index is 24.17 kg/m  as calculated from the following:    Height as of this encounter: 1.829 m (6').    Weight as of this encounter: 80.8 kg (178 lb 3.2 oz). Body surface area is 2.03 meters squared.  Mild Pain (2) Comment: Data Unavailable   No LMP for male patient.  Allergies reviewed: Yes  Medications reviewed: Yes    Medications: MEDICATION REFILLS NEEDED TODAY. Provider was notified.  Pharmacy name entered into EPIC:    [facility]  FOR [place], [place] - [address] [place] [address] PH[address]ACY [address]/PHARMACY [address][id number] [place], [place] - [facility] PH[facility]ACY - [facility] [address] AT PORTAL TO [facility] SITES    Clinical concerns: f/u - RF decadron. C/O abd pain and heartburn after chemo.      Karen Plummer, CMA

## 2019-03-20 NOTE — LETTER
3/20/2019         RE: Kentrell Kirkpatrick  [address] [facility] [id number]        Dear Colleague,    Thank you for referring your patient, Kentrell Kirkpatrick, to the [facility]. Please see a copy of my visit note below.    Oncology/Hematology Visit Note  Mar 20, 2019    Reason for Visit: follow up of non-small cell lung cancer.  Adenocarcinoma stage IV  Patient is getting  carboplatin pemetrexed and pembrolizumab, given IV once every 3 weeks  Patient comes here for follow-up after first cycle of chemo    Interval History:    He tells me he tolerated first cycle of treatment well.  He had some lower central abdomen pain right after the chemo that lasted for about 4 days.  The pain has resolved now.  he denies nausea vomiting diarrhea.  He has normal bowel movement.   Breathing has significantly improved.  He also had some mild pain around the Pleurx catheter.  But that has improved significantly. He reports that Pleurx drainage is getting significantly less.  Today he drained only 150 cc. He denies any signs or symptoms of infection around the area.Denies fever chills sweats denies cough denies shortness of breath.   At times he gets weak but he is keeping himself active  Appetite is good    Review of Systems:  14 point ROS of systems including Constitutional, Eyes, Respiratory, Cardiovascular, Gastroenterology, Genitourinary, Integumentary, Muscularskeletal, Psychiatric were all negative except for pertinent positives noted in my HPI.      Current Outpatient Medications   Medication Sig Dispense Refill     calcium polycarbophil (FIBERCON) 625 MG tablet Take 2 tablets by mouth daily       dexamethasone (DECADRON) 4 MG tablet Take 4 mg by mouth 2 times daily To be taken day before, day of, and day after infusion.. 6 tablet 0     folic acid (FOLVITE) 1 MG tablet Take 1 tablet (1 mg) by mouth daily 90 tablet 11     LORazepam (ATIVAN) 0.5 MG tablet Take 1 tablet (0.5 mg) by mouth  every 6 hours as needed (Nausea/Vomiting) 30 tablet 3     MULTI VITAMIN MENS OR TABS 1 TABLET DAILY       ondansetron (ZOFRAN) 8 MG tablet Take 1 tablet (8 mg) by mouth every 8 hours as needed for nausea 30 tablet 11     prochlorperazine (COMPAZINE) 10 MG tablet Take 1 tablet (10 mg) by mouth every 6 hours as needed (Nausea/Vomiting) 30 tablet 11     tamsulosin (FLOMAX) 0.4 MG capsule Take 1 capsule (0.4 mg) by mouth daily 90 capsule 0     albuterol (PROAIR HFA/PROVENTIL HFA/VENTOLIN HFA) 108 (90 Base) MCG/ACT inhaler Inhale 2 puffs into the lungs every 4 hours as needed for shortness of breath / dyspnea or wheezing (Patient not taking: Reported on 3/11/2019) 1 Inhaler 0     ASPIRIN PO Take 81 mg by mouth       QUEtiapine (SEROQUEL) 25 MG tablet Take 12.5mg daily. (Patient not taking: Reported on 3/20/2019) 15 tablet 0     traZODone (DESYREL) 50 MG tablet Take 0.5-1 tablets (25-50 mg) by mouth At Bedtime (Patient not taking: Reported on 3/11/2019) 60 tablet 1       Physical Examination:  General: The patient is a pleasant male in no acute distress.  /64   Pulse 93   Temp 97.3  F (36.3  C) (Oral)   Resp 16   Ht 1.829 m (6')   Wt 80.8 kg (178 lb 3.2 oz)   SpO2 97%   BMI 24.17 kg/m     HEENT: EOMI, PERRL. Sclerae are anicteric. Oral mucosa is pink and moist with no lesions or thrush.   Lymph: Neck is supple with no lymphadenopathy in the cervical or supraclavicular areas.   Heart: Regular rate and rhythm.   Lungs: Clear to auscultation bilaterally.   GI: Bowel sounds present, soft, nontender with no palpable hepatosplenomegaly or masses.   Extremities: No lower extremity edema noted bilaterally.   Skin: No rashes, petechiae, or bruising noted on exposed skin.    Laboratory Data:  Results for orders placed or performed in visit on 03/20/19 (from the past 24 hour(s))   CBC with platelets differential   Result Value Ref Range    WBC 7.9 4.0 - 11.0 10e9/L    RBC Count 4.92 4.4 - 5.9 10e12/L    Hemoglobin 14.7  13.3 - 17.7 g/dL    Hematocrit 44.7 40.0 - 53.0 %    MCV 91 78 - 100 fl    MCH 29.9 26.5 - 33.0 pg    MCHC 32.9 31.5 - 36.5 g/dL    RDW 13.0 10.0 - 15.0 %    Platelet Count 277 150 - 450 10e9/L    Diff Method Automated Method     % Neutrophils 69.4 %    % Lymphocytes 15.8 %    % Monocytes 11.7 %    % Eosinophils 2.0 %    % Basophils 0.6 %    % Immature Granulocytes 0.5 %    Nucleated RBCs 0 0 /100    Absolute Neutrophil 5.5 1.6 - 8.3 10e9/L    Absolute Lymphocytes 1.3 0.8 - 5.3 10e9/L    Absolute Monocytes 0.9 0.0 - 1.3 10e9/L    Absolute Eosinophils 0.2 0.0 - 0.7 10e9/L    Absolute Basophils 0.1 0.0 - 0.2 10e9/L    Abs Immature Granulocytes 0.0 0 - 0.4 10e9/L    Absolute Nucleated RBC 0.0    Comprehensive metabolic panel   Result Value Ref Range    Sodium 137 133 - 144 mmol/L    Potassium 4.5 3.4 - 5.3 mmol/L    Chloride 104 94 - 109 mmol/L    Carbon Dioxide 28 20 - 32 mmol/L    Anion Gap 5 3 - 14 mmol/L    Glucose 125 (H) 70 - 99 mg/dL    Urea Nitrogen 16 7 - 30 mg/dL    Creatinine 0.90 0.66 - 1.25 mg/dL    GFR Estimate 87 >60 mL/min/[1.73_m2]    GFR Estimate If Black >90 >60 mL/min/[1.73_m2]    Calcium 8.6 8.5 - 10.1 mg/dL    Bilirubin Total 1.2 0.2 - 1.3 mg/dL    Albumin 3.1 (L) 3.4 - 5.0 g/dL    Protein Total 6.8 6.8 - 8.8 g/dL    Alkaline Phosphatase 100 40 - 150 U/L    ALT 44 0 - 70 U/L    AST 27 0 - 45 U/L         Assessment and Plan:    This is a 68-year-old male with    Non-small cell lung cancer stage IV  -Patient completed cycle 1 carboplatin pemetrexed and pembrolizumab 03/13/2019.  Unclear if the abdominal pain was related to chemo.  Abdominal pain has resolved on its own.  Will l see if he has the pain again after second cycle of chemo.  He is scheduled for cycle 2 on [id number] with me prior second cycle  -Continue with B12 injections  folic acid and dexamethasone.   Plan is for CT scan after cycle 2 for restaging and follow-up with Dr. Muller after the CT scan to discuss the results      Left  malignant pleural effusion  Pleurx drainage  Significantly less-the past few days he drained 150 ml.  04/02-chest x-ray scheduled and follow-up with KISHA Sands CNP  Hem/Onc   [facility]               Again, thank you for allowing me to participate in the care of your patient.        Sincerely,        KISHA Nunez CNP

## 2019-03-20 NOTE — PROGRESS NOTES
Oncology/Hematology Visit Note  Mar 20, 2019    Reason for Visit: follow up of non-small cell lung cancer.  Adenocarcinoma stage IV  Patient is getting  carboplatin pemetrexed and pembrolizumab, given IV once every 3 weeks  Patient comes here for follow-up after first cycle of chemo    Interval History:    He tells me he tolerated first cycle of treatment well.  He had some lower central abdomen pain right after the chemo that lasted for about 4 days.  The pain has resolved now.  he denies nausea vomiting diarrhea.  He has normal bowel movement.   Breathing has significantly improved.  He also had some mild pain around the Pleurx catheter.  But that has improved significantly. He reports that Pleurx drainage is getting significantly less.  Today he drained only 150 cc. He denies any signs or symptoms of infection around the area.Denies fever chills sweats denies cough denies shortness of breath.   At times he gets weak but he is keeping himself active  Appetite is good    Review of Systems:  14 point ROS of systems including Constitutional, Eyes, Respiratory, Cardiovascular, Gastroenterology, Genitourinary, Integumentary, Muscularskeletal, Psychiatric were all negative except for pertinent positives noted in my HPI.      Current Outpatient Medications   Medication Sig Dispense Refill     calcium polycarbophil (FIBERCON) 625 MG tablet Take 2 tablets by mouth daily       dexamethasone (DECADRON) 4 MG tablet Take 4 mg by mouth 2 times daily To be taken day before, day of, and day after infusion.. 6 tablet 0     folic acid (FOLVITE) 1 MG tablet Take 1 tablet (1 mg) by mouth daily 90 tablet 11     LORazepam (ATIVAN) 0.5 MG tablet Take 1 tablet (0.5 mg) by mouth every 6 hours as needed (Nausea/Vomiting) 30 tablet 3     MULTI VITAMIN MENS OR TABS 1 TABLET DAILY       ondansetron (ZOFRAN) 8 MG tablet Take 1 tablet (8 mg) by mouth every 8 hours as needed for nausea 30 tablet 11     prochlorperazine (COMPAZINE) 10 MG tablet  Take 1 tablet (10 mg) by mouth every 6 hours as needed (Nausea/Vomiting) 30 tablet 11     tamsulosin (FLOMAX) 0.4 MG capsule Take 1 capsule (0.4 mg) by mouth daily 90 capsule 0     albuterol (PROAIR HFA/PROVENTIL HFA/VENTOLIN HFA) 108 (90 Base) MCG/ACT inhaler Inhale 2 puffs into the lungs every 4 hours as needed for shortness of breath / dyspnea or wheezing (Patient not taking: Reported on 3/11/2019) 1 Inhaler 0     ASPIRIN PO Take 81 mg by mouth       QUEtiapine (SEROQUEL) 25 MG tablet Take 12.5mg daily. (Patient not taking: Reported on 3/20/2019) 15 tablet 0     traZODone (DESYREL) 50 MG tablet Take 0.5-1 tablets (25-50 mg) by mouth At Bedtime (Patient not taking: Reported on 3/11/2019) 60 tablet 1       Physical Examination:  General: The patient is a pleasant male in no acute distress.  /64   Pulse 93   Temp 97.3  F (36.3  C) (Oral)   Resp 16   Ht 1.829 m (6')   Wt 80.8 kg (178 lb 3.2 oz)   SpO2 97%   BMI 24.17 kg/m    HEENT: EOMI, PERRL. Sclerae are anicteric. Oral mucosa is pink and moist with no lesions or thrush.   Lymph: Neck is supple with no lymphadenopathy in the cervical or supraclavicular areas.   Heart: Regular rate and rhythm.   Lungs: Clear to auscultation bilaterally.   GI: Bowel sounds present, soft, nontender with no palpable hepatosplenomegaly or masses.   Extremities: No lower extremity edema noted bilaterally.   Skin: No rashes, petechiae, or bruising noted on exposed skin.    Laboratory Data:  Results for orders placed or performed in visit on 03/20/19 (from the past 24 hour(s))   CBC with platelets differential   Result Value Ref Range    WBC 7.9 4.0 - 11.0 10e9/L    RBC Count 4.92 4.4 - 5.9 10e12/L    Hemoglobin 14.7 13.3 - 17.7 g/dL    Hematocrit 44.7 40.0 - 53.0 %    MCV 91 78 - 100 fl    MCH 29.9 26.5 - 33.0 pg    MCHC 32.9 31.5 - 36.5 g/dL    RDW 13.0 10.0 - 15.0 %    Platelet Count 277 150 - 450 10e9/L    Diff Method Automated Method     % Neutrophils 69.4 %    %  Lymphocytes 15.8 %    % Monocytes 11.7 %    % Eosinophils 2.0 %    % Basophils 0.6 %    % Immature Granulocytes 0.5 %    Nucleated RBCs 0 0 /100    Absolute Neutrophil 5.5 1.6 - 8.3 10e9/L    Absolute Lymphocytes 1.3 0.8 - 5.3 10e9/L    Absolute Monocytes 0.9 0.0 - 1.3 10e9/L    Absolute Eosinophils 0.2 0.0 - 0.7 10e9/L    Absolute Basophils 0.1 0.0 - 0.2 10e9/L    Abs Immature Granulocytes 0.0 0 - 0.4 10e9/L    Absolute Nucleated RBC 0.0    Comprehensive metabolic panel   Result Value Ref Range    Sodium 137 133 - 144 mmol/L    Potassium 4.5 3.4 - 5.3 mmol/L    Chloride 104 94 - 109 mmol/L    Carbon Dioxide 28 20 - 32 mmol/L    Anion Gap 5 3 - 14 mmol/L    Glucose 125 (H) 70 - 99 mg/dL    Urea Nitrogen 16 7 - 30 mg/dL    Creatinine 0.90 0.66 - 1.25 mg/dL    GFR Estimate 87 >60 mL/min/[1.73_m2]    GFR Estimate If Black >90 >60 mL/min/[1.73_m2]    Calcium 8.6 8.5 - 10.1 mg/dL    Bilirubin Total 1.2 0.2 - 1.3 mg/dL    Albumin 3.1 (L) 3.4 - 5.0 g/dL    Protein Total 6.8 6.8 - 8.8 g/dL    Alkaline Phosphatase 100 40 - 150 U/L    ALT 44 0 - 70 U/L    AST 27 0 - 45 U/L         Assessment and Plan:    This is a 68-year-old male with    Non-small cell lung cancer stage IV  -Patient completed cycle 1 carboplatin pemetrexed and pembrolizumab 03/13/2019.  Unclear if the abdominal pain was related to chemo.  Abdominal pain has resolved on its own.  Will l see if he has the pain again after second cycle of chemo.  He is scheduled for cycle 2 on 04/0223-[id number] with me prior second cycle  -Continue with B12 injections  folic acid and dexamethasone.   Plan is for CT scan after cycle 2 for restaging and follow-up with Dr. Muller after the CT scan to discuss the results      Left malignant pleural effusion  Pleurx drainage  Significantly less-the past few days he drained 150 ml.  04/02-chest x-ray scheduled and follow-up with KISHA Sands CNP  Hem/Onc   [facility]

## 2019-03-28 LAB — COPATH REPORT: NORMAL

## 2019-03-28 NOTE — TELEPHONE ENCOUNTER
RECORDS STATUS - ALL OTHER DIAGNOSIS      RECORDS RECEIVED FROM: [facility]   DATE RECEIVED: 3/28/19   NOTES STATUS DETAILS   OFFICE NOTE from referring provider  Epic   OFFICE NOTE from medical oncologist  Epic   DISCHARGE SUMMARY from hospital  Epic   DISCHARGE REPORT from the ER NA    OPERATIVE REPORT  Epic   MEDICATION LIST  [facility]   CLINICAL TRIAL TREATMENTS TO DATE     LABS     PATHOLOGY REPORTS  Epic/Complete   ANYTHING RELATED TO DIAGNOSIS  Epic   GENONOMIC TESTING     TYPE: 3/15/19: Next Generation Sequencing Epic   IMAGING (NEED IMAGES & REPORT)     CT SCANS  PACS   MRI  PACS   MAMMO NA    ULTRASOUND NA    PET  PACS   X Ray        PACS

## 2019-03-29 ENCOUNTER — DOCUMENTATION ONLY (OUTPATIENT)
Dept: ONCOLOGY | Facility: CLINIC | Age: 69
End: 2019-03-29

## 2019-03-29 LAB — COPATH REPORT: NORMAL

## 2019-03-30 ASSESSMENT — ENCOUNTER SYMPTOMS
INSOMNIA: 1
DIFFICULTY URINATING: 1
BACK PAIN: 1
DYSPNEA ON EXERTION: 1
SHORTNESS OF BREATH: 1

## 2019-03-30 NOTE — PROGRESS NOTES
Dr. Muller completed an attending physician form for [facility] and wrote a letter.  Both were emailed to [email or web address]' on 3/22/19 as requested

## 2019-03-31 DIAGNOSIS — C34.92 NON-SMALL CELL LUNG CANCER, LEFT (H): ICD-10-CM

## 2019-03-31 RX ORDER — METHYLPREDNISOLONE SODIUM SUCCINATE 125 MG/2ML
125 INJECTION, POWDER, LYOPHILIZED, FOR SOLUTION INTRAMUSCULAR; INTRAVENOUS
Status: CANCELLED
Start: 2019-04-03

## 2019-03-31 RX ORDER — EPINEPHRINE 1 MG/ML
0.3 INJECTION, SOLUTION INTRAMUSCULAR; SUBCUTANEOUS EVERY 5 MIN PRN
Status: CANCELLED | OUTPATIENT
Start: 2019-04-03

## 2019-03-31 RX ORDER — ALBUTEROL SULFATE 90 UG/1
1-2 AEROSOL, METERED RESPIRATORY (INHALATION)
Status: CANCELLED
Start: 2019-04-03

## 2019-03-31 RX ORDER — DIPHENHYDRAMINE HYDROCHLORIDE 50 MG/ML
50 INJECTION INTRAMUSCULAR; INTRAVENOUS
Status: CANCELLED
Start: 2019-04-03

## 2019-03-31 RX ORDER — ALBUTEROL SULFATE 0.83 MG/ML
2.5 SOLUTION RESPIRATORY (INHALATION)
Status: CANCELLED | OUTPATIENT
Start: 2019-04-03

## 2019-03-31 RX ORDER — SODIUM CHLORIDE 9 MG/ML
1000 INJECTION, SOLUTION INTRAVENOUS CONTINUOUS PRN
Status: CANCELLED
Start: 2019-04-03

## 2019-03-31 RX ORDER — EPINEPHRINE 0.3 MG/.3ML
0.3 INJECTION SUBCUTANEOUS EVERY 5 MIN PRN
Status: CANCELLED | OUTPATIENT
Start: 2019-04-03

## 2019-03-31 RX ORDER — LORAZEPAM 2 MG/ML
0.5 INJECTION INTRAMUSCULAR EVERY 4 HOURS PRN
Status: CANCELLED
Start: 2019-04-03

## 2019-03-31 RX ORDER — MEPERIDINE HYDROCHLORIDE 25 MG/ML
25 INJECTION INTRAMUSCULAR; INTRAVENOUS; SUBCUTANEOUS EVERY 30 MIN PRN
Status: CANCELLED | OUTPATIENT
Start: 2019-04-03

## 2019-04-02 ENCOUNTER — OFFICE VISIT (OUTPATIENT)
Dept: PULMONOLOGY | Facility: CLINIC | Age: 69
End: 2019-04-02
Attending: INTERNAL MEDICINE
Payer: COMMERCIAL

## 2019-04-02 ENCOUNTER — PRE VISIT (OUTPATIENT)
Dept: PULMONOLOGY | Facility: CLINIC | Age: 69
End: 2019-04-02

## 2019-04-02 ENCOUNTER — ANCILLARY PROCEDURE (OUTPATIENT)
Dept: GENERAL RADIOLOGY | Facility: CLINIC | Age: 69
End: 2019-04-02
Attending: CLINICAL NURSE SPECIALIST
Payer: COMMERCIAL

## 2019-04-02 VITALS
OXYGEN SATURATION: 95 % | BODY MASS INDEX: 24.11 KG/M2 | SYSTOLIC BLOOD PRESSURE: 112 MMHG | TEMPERATURE: 97.7 F | HEIGHT: 72 IN | WEIGHT: 178 LBS | DIASTOLIC BLOOD PRESSURE: 71 MMHG | HEART RATE: 86 BPM

## 2019-04-02 DIAGNOSIS — R06.00 DYSPNEA, UNSPECIFIED TYPE: Primary | ICD-10-CM

## 2019-04-02 DIAGNOSIS — J90 RECURRENT LEFT PLEURAL EFFUSION: ICD-10-CM

## 2019-04-02 PROCEDURE — G0463 HOSPITAL OUTPT CLINIC VISIT: HCPCS | Mod: ZF

## 2019-04-02 ASSESSMENT — MIFFLIN-ST. JEOR: SCORE: 1610.53

## 2019-04-02 ASSESSMENT — PAIN SCALES - GENERAL: PAINLEVEL: NO PAIN (0)

## 2019-04-02 NOTE — PROGRESS NOTES
LUNG NODULE & INTERVENTIONAL PULMONARY CLINIC  CLINICS & SURGERY CENTER, [facility]     Kentrell Kirkpatrick MRN# [id number]   Age: 68 year old YOB: 1950     Reason for Consultation: lung abnormality     Requesting Physician: Matthew Rodriguez MD  [address] [place] [address]       Assessment and Plan:    1. Left recurrent malignant pleural effusion s/p Pleur-X. Currently draining daily. Will plan daily for 3mo which is early June. We discussed that approx 60% of patients can have successful removal if done daily for 3mo vs every other day. Also discussed that if there is zero drainage for 2 consecutive days to call our office. At this point, they are doing an excellent job with pleurx management.     2. RANKIN. Plan PFT for evaluation.     3. SKYLER. using dental device.     Billing: The patient was seen and examined by me and the findings, assessment, and plan as documented was explained to the patient/family who expressed understand.       Matthew Rodriguez MD   of Medicine  Interventional Pulmonology  Department of Pulmonary, Allergy, Critical Care and Sleep Medicine   [facility]  Pager: [phone]          History:     Kentrell Kirkpatrick is a 68 year old male with sig h/o for NSCLC who is here for evaluation/followup of TPC.    - No new resp sx or complaints. Denies dyspnea or cough.   - Had positive left pleural effusion for adenocarcinoma. PET-CT shows M1b disease. Had PleurX placed 3/2 and recently draining 50-350cc/d. On average it is decreasing from early March.   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     - Tobacco hx: Past Smoker: 1.5ppd for 47years. Quit 2005.   - My interpretation of the images relevant for this visit includes: left pleurx in place    - My interpretation of the PFT's relevant for this visit  includes: None    Other active medical problems include:   - NSCLC stage 4. Receiving chemotherapy. Pleurx draining as above.           Past Medical History:      Past Medical History:   Diagnosis Date     Colon polyps            Past Surgical History:      Past Surgical History:   Procedure Laterality Date     COLONOSCOPY       COLONOSCOPY N/A 2016    Procedure: COMBINED COLONOSCOPY, SINGLE OR MULTIPLE BIOPSY/POLYPECTOMY BY BIOPSY;  Surgeon: Jeremi Kramer MD;  Location:  GI     INSERT CHEST TUBE Left 3/1/2019    Procedure: INSERT CHEST TUBE;  Surgeon: Matthew Rodriguez MD;  Location: [facility] GI     IR LYMPH NODE BIOPSY  3/15/2019     THORACENTESIS Left 2019    Procedure: THORACENTESIS;  Surgeon: Matthew Rodriguez MD;  Location: [facility]          Social History:     Social History     Tobacco Use     Smoking status: Former Smoker     Packs/day: 1.50     Years: 43.00     Pack years: 64.50     Types: Cigarettes     Last attempt to quit: 2007     Years since quittin.3     Smokeless tobacco: Never Used   Substance Use Topics     Alcohol use: Yes     Comment: 3-4 a week          Family History:     Family History   Problem Relation Age of Onset     Heart Disease Father      Prostate Cancer Other      Prostate Cancer Brother      Colon Cancer No family hx of            Allergies:    No Known Allergies       Medications:     Current Outpatient Medications   Medication Sig     calcium polycarbophil (FIBERCON) 625 MG tablet Take 2 tablets by mouth daily     dexamethasone (DECADRON) 4 MG tablet Take 4 mg by mouth 2 times daily To be taken day before, day of, and day after infusion..     folic acid (FOLVITE) 1 MG tablet Take 1 tablet (1 mg) by mouth daily     LORazepam (ATIVAN) 0.5 MG tablet Take 1 tablet (0.5 mg) by mouth every 6 hours as needed (Nausea/Vomiting)     MULTI VITAMIN MENS OR TABS 1 TABLET DAILY     ondansetron (ZOFRAN) 8 MG tablet Take 1 tablet (8 mg) by mouth every 8 hours as needed for  nausea     prochlorperazine (COMPAZINE) 10 MG tablet Take 1 tablet (10 mg) by mouth every 6 hours as needed (Nausea/Vomiting)     tamsulosin (FLOMAX) 0.4 MG capsule Take 1 capsule (0.4 mg) by mouth daily     albuterol (PROAIR HFA/PROVENTIL HFA/VENTOLIN HFA) 108 (90 Base) MCG/ACT inhaler Inhale 2 puffs into the lungs every 4 hours as needed for shortness of breath / dyspnea or wheezing (Patient not taking: Reported on 4/2/2019)     ASPIRIN PO Take 81 mg by mouth     QUEtiapine (SEROQUEL) 25 MG tablet Take 12.5mg daily. (Patient not taking: Reported on 3/20/2019)     traZODone (DESYREL) 50 MG tablet Take 0.5-1 tablets (25-50 mg) by mouth At Bedtime (Patient not taking: Reported on 3/11/2019)     No current facility-administered medications for this visit.      Facility-Administered Medications Ordered in Other Visits   Medication     iohexol (OMNIPAQUE) 300 mg/mL injection 10 mL          Review of Systems:     CONSTITUTIONAL: negative for fever, chills, change in weight  INTEGUMENTARY/SKIN: no rash or obvious new lesions  ENT/MOUTH: no sore throat, new sinus pain or nasal drainage  RESP: see interval history  CV: negative for chest pain, palpitations or peripheral edema  GI: no nausea, vomiting, change in stools  : no dysuria  MUSCULOSKELETAL: no myalgias, arthralgias  ENDOCRINE: blood sugars with adequate control  PSYCHIATRIC: mood stable  LYMPHATIC: no new lymphadenopathy  HEME: no bleeding or easy bruisability  NEURO: no numbness, weakness, headaches         Physical Exam:     Temp:  [97.7  F (36.5  C)] 97.7  F (36.5  C)  Pulse:  [86] 86  BP: (112)/(71) 112/71  SpO2:  [95 %] 95 %  Wt Readings from Last 4 Encounters:   04/02/19 80.7 kg (178 lb)   03/20/19 80.8 kg (178 lb 3.2 oz)   03/13/19 80.6 kg (177 lb 12.8 oz)   03/11/19 81.7 kg (180 lb 1.6 oz)     Constitutional:   Awake, alert and in no apparent distress     Eyes:   Nonicteric, MARY     ENT:    Trachea is midline. No gross neck abnormalities      Neck:    Supple without supraclavicular or cervical lymphadenopathy     Lungs:   Good air flow.  No crackles. No rhonchi.  No wheezes.  Left pleurx c/d/i     Cardiovascular:   Normal S1 and S2.  RRR.  No murmur, gallop or rub.  Radial, DP and PT pulses normal and symmetric     Abdomen:   NABS, soft, nontender, nondistended.  No HSM.     Musculoskeletal:   No edema.      Neurologic:   Alert and conversant. Cranial nerves  intact.       Skin:   Warm, dry.  No rash on limited exam.           Current Laboratory Data:   All laboratory and imaging data reviewed.             Previous Cardiology Imaging   No results found for this or any previous visit (from the past 8760 hour(s)).             Answers for HPI/ROS submitted by the patient on 3/30/2019   General Symptoms: No  Skin Symptoms: No  HENT Symptoms: No  EYE SYMPTOMS: No  HEART SYMPTOMS: No  LUNG SYMPTOMS: Yes  INTESTINAL SYMPTOMS: No  URINARY SYMPTOMS: Yes  REPRODUCTIVE SYMPTOMS: No  SKELETAL SYMPTOMS: Yes  BLOOD SYMPTOMS: No  NERVOUS SYSTEM SYMPTOMS: No  MENTAL HEALTH SYMPTOMS: Yes  Difficulty breating or shortness of breath: Yes  Difficulty breathing on exertion: Yes  Increased frequency of urination: Yes  Frequent nighttime urination: Yes  Difficulty emptying bladder: Yes  Back pain: Yes  Bone pain: Yes  Trouble sleeping: Yes

## 2019-04-02 NOTE — NURSING NOTE
"Oncology Rooming Note    April 2, 2019 9:03 AM   Kentrell Kirkpatrick is a 68 year old male who presents for:    Chief Complaint   Patient presents with     Oncology Clinic Visit     NEW; Lung Nodule      Initial Vitals: /71   Pulse 86   Temp 97.7  F (36.5  C) (Oral)   Ht 1.829 m (6' 0.01\")   Wt 80.7 kg (178 lb)   SpO2 95%   BMI 24.14 kg/m   Estimated body mass index is 24.14 kg/m  as calculated from the following:    Height as of this encounter: 1.829 m (6' 0.01\").    Weight as of this encounter: 80.7 kg (178 lb). Body surface area is 2.02 meters squared.  No Pain (0) Comment: Data Unavailable   No LMP for male patient.  Allergies reviewed: Yes  Medications reviewed: Yes    Medications: Medication refills not needed today.  Pharmacy name entered into [employer]: [id number] - [place], [place] - [address]    Clinical concerns: No Concerns [facility] was not notified.      Denisha Doss CMA              "

## 2019-04-02 NOTE — LETTER
4/2/2019       RE: Kentrell Kirkpatrick  [address] [address]  [facility] [address]     Dear Colleague,    Thank you for referring your patient, Kentrell Kirkpatrick, to the [facility] at [facility]. Please see a copy of my visit note below.    LUNG NODULE & INTERVENTIONAL PULMONARY CLINIC  CLINICS & SURGERY [place], [facility], [facility]     Kentrell Kirkpatrick MRN# [id number]   Age: 68 year old YOB: 1950     Reason for Consultation: lung abnormality     Requesting Physician: Matthew Rodriguez MD  [address] [place] [address]       Assessment and Plan:    1. Left recurrent malignant pleural effusion s/p Pleur-X. Currently draining daily. Will plan daily for 3mo which is early June. We discussed that approx 60% of patients can have successful removal if done daily for 3mo vs every other day. Also discussed that if there is zero drainage for 2 consecutive days to call our office. At this point, they are doing an excellent job with pleurx management.     2. RANKIN. Plan PFT for evaluation.     3. SKYLER. using dental device.     Billing: The patient was seen and examined by me and the findings, assessment, and plan as documented was explained to the patient/family who expressed understand.       Matthew Rodriguez MD   of Medicine  Interventional Pulmonology  Department of Pulmonary, Allergy, Critical Care and Sleep Medicine   [facility]  Pager: [phone]          History:     Kentrell Kirkpatrick is a 68 year old male with sig h/o for NSCLC who is here for evaluation/followup of TPC.    - No new resp sx or complaints. Denies dyspnea or cough.   - Had positive left pleural effusion for adenocarcinoma. PET-CT shows M1b disease. Had PleurX placed 3/2 and recently draining 50-350cc/d. On average it is decreasing from early March.   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     - Tobacco hx: Past Smoker: 1.5ppd for 47years. Quit .   - My interpretation of the images relevant for this visit includes: left pleurx in place    - My interpretation of the PFT's relevant for this visit includes: None    Other active medical problems include:   - NSCLC stage 4. Receiving chemotherapy. Pleurx draining as above.           Past Medical History:      Past Medical History:   Diagnosis Date     Colon polyps            Past Surgical History:      Past Surgical History:   Procedure Laterality Date     COLONOSCOPY       COLONOSCOPY N/A 2016    Procedure: COMBINED COLONOSCOPY, SINGLE OR MULTIPLE BIOPSY/POLYPECTOMY BY BIOPSY;  Surgeon: Jeremi Kramer MD;  Location:  GI     INSERT CHEST TUBE Left 3/1/2019    Procedure: INSERT CHEST TUBE;  Surgeon: Matthew Rodriguez MD;  Location:  GI     IR LYMPH NODE BIOPSY  3/15/2019     THORACENTESIS Left 2019    Procedure: THORACENTESIS;  Surgeon: Matthew Rodriguez MD;  Location:  GI          Social History:     Social History     Tobacco Use     Smoking status: Former Smoker     Packs/day: 1.50     Years: 43.00     Pack years: 64.50     Types: Cigarettes     Last attempt to quit: 2007     Years since quittin.3     Smokeless tobacco: Never Used   Substance Use Topics     Alcohol use: Yes     Comment: 3-4 a week          Family History:     Family History   Problem Relation Age of Onset     Heart Disease Father      Prostate Cancer Other      Prostate Cancer Brother      Colon Cancer No family hx of            Allergies:    No Known Allergies       Medications:     Current Outpatient Medications   Medication Sig     calcium polycarbophil (FIBERCON) 625 MG tablet Take 2 tablets by mouth daily     dexamethasone (DECADRON) 4 MG tablet Take 4 mg by mouth 2 times daily To be taken day before, day of, and day after infusion..     folic acid (FOLVITE) 1 MG  tablet Take 1 tablet (1 mg) by mouth daily     LORazepam (ATIVAN) 0.5 MG tablet Take 1 tablet (0.5 mg) by mouth every 6 hours as needed (Nausea/Vomiting)     MULTI VITAMIN MENS OR TABS 1 TABLET DAILY     ondansetron (ZOFRAN) 8 MG tablet Take 1 tablet (8 mg) by mouth every 8 hours as needed for nausea     prochlorperazine (COMPAZINE) 10 MG tablet Take 1 tablet (10 mg) by mouth every 6 hours as needed (Nausea/Vomiting)     tamsulosin (FLOMAX) 0.4 MG capsule Take 1 capsule (0.4 mg) by mouth daily     albuterol (PROAIR HFA/PROVENTIL HFA/VENTOLIN HFA) 108 (90 Base) MCG/ACT inhaler Inhale 2 puffs into the lungs every 4 hours as needed for shortness of breath / dyspnea or wheezing (Patient not taking: Reported on 4/2/2019)     ASPIRIN PO Take 81 mg by mouth     QUEtiapine (SEROQUEL) 25 MG tablet Take 12.5mg daily. (Patient not taking: Reported on 3/20/2019)     traZODone (DESYREL) 50 MG tablet Take 0.5-1 tablets (25-50 mg) by mouth At Bedtime (Patient not taking: Reported on 3/11/2019)     No current facility-administered medications for this visit.      Facility-Administered Medications Ordered in Other Visits   Medication     iohexol (OMNIPAQUE) 300 mg/mL injection 10 mL          Review of Systems:     CONSTITUTIONAL: negative for fever, chills, change in weight  INTEGUMENTARY/SKIN: no rash or obvious new lesions  ENT/MOUTH: no sore throat, new sinus pain or nasal drainage  RESP: see interval history  CV: negative for chest pain, palpitations or peripheral edema  GI: no nausea, vomiting, change in stools  : no dysuria  MUSCULOSKELETAL: no myalgias, arthralgias  ENDOCRINE: blood sugars with adequate control  PSYCHIATRIC: mood stable  LYMPHATIC: no new lymphadenopathy  HEME: no bleeding or easy bruisability  NEURO: no numbness, weakness, headaches         Physical Exam:     Temp:  [97.7  F (36.5  C)] 97.7  F (36.5  C)  Pulse:  [86] 86  BP: (112)/(71) 112/71  SpO2:  [95 %] 95 %  Wt Readings from Last 4 Encounters:    04/02/19 80.7 kg (178 lb)   03/20/19 80.8 kg (178 lb 3.2 oz)   03/13/19 80.6 kg (177 lb 12.8 oz)   03/11/19 81.7 kg (180 lb 1.6 oz)     Constitutional:   Awake, alert and in no apparent distress     Eyes:   Nonicteric, MARY     ENT:    Trachea is midline. No gross neck abnormalities      Neck:   Supple without supraclavicular or cervical lymphadenopathy     Lungs:   Good air flow.  No crackles. No rhonchi.  No wheezes.  Left pleurx c/d/i     Cardiovascular:   Normal S1 and S2.  RRR.  No murmur, gallop or rub.  Radial, DP and PT pulses normal and symmetric     Abdomen:   NABS, soft, nontender, nondistended.  No HSM.     Musculoskeletal:   No edema.      Neurologic:   Alert and conversant. Cranial nerves  intact.       Skin:   Warm, dry.  No rash on limited exam.           Current Laboratory Data:   All laboratory and imaging data reviewed.             Previous Cardiology Imaging   No results found for this or any previous visit (from the past 8760 hour(s)).             Answers for HPI/ROS submitted by the patient on 3/30/2019   General Symptoms: No  Skin Symptoms: No  HENT Symptoms: No  EYE SYMPTOMS: No  HEART SYMPTOMS: No  LUNG SYMPTOMS: Yes  INTESTINAL SYMPTOMS: No  URINARY SYMPTOMS: Yes  REPRODUCTIVE SYMPTOMS: No  SKELETAL SYMPTOMS: Yes  BLOOD SYMPTOMS: No  NERVOUS SYSTEM SYMPTOMS: No  MENTAL HEALTH SYMPTOMS: Yes  Difficulty breating or shortness of breath: Yes  Difficulty breathing on exertion: Yes  Increased frequency of urination: Yes  Frequent nighttime urination: Yes  Difficulty emptying bladder: Yes  Back pain: Yes  Bone pain: Yes  Trouble sleeping: Yes      Again, thank you for allowing me to participate in the care of your patient.      Sincerely,    Matthew Rodriguez MD

## 2019-04-03 ENCOUNTER — INFUSION THERAPY VISIT (OUTPATIENT)
Dept: INFUSION THERAPY | Facility: CLINIC | Age: 69
End: 2019-04-03
Attending: NURSE PRACTITIONER
Payer: COMMERCIAL

## 2019-04-03 ENCOUNTER — HOSPITAL ENCOUNTER (OUTPATIENT)
Facility: CLINIC | Age: 69
Setting detail: SPECIMEN
Discharge: HOME | End: 2019-04-03
Attending: NURSE PRACTITIONER | Admitting: INTERNAL MEDICINE
Payer: COMMERCIAL

## 2019-04-03 ENCOUNTER — ONCOLOGY VISIT (OUTPATIENT)
Dept: ONCOLOGY | Facility: CLINIC | Age: 69
End: 2019-04-03
Attending: NURSE PRACTITIONER
Payer: COMMERCIAL

## 2019-04-03 VITALS
HEIGHT: 72 IN | HEART RATE: 74 BPM | WEIGHT: 177.4 LBS | TEMPERATURE: 96.6 F | RESPIRATION RATE: 20 BRPM | OXYGEN SATURATION: 96 % | SYSTOLIC BLOOD PRESSURE: 116 MMHG | DIASTOLIC BLOOD PRESSURE: 80 MMHG | BODY MASS INDEX: 24.03 KG/M2

## 2019-04-03 DIAGNOSIS — Z79.899 ENCOUNTER FOR LONG-TERM (CURRENT) USE OF MEDICATIONS: ICD-10-CM

## 2019-04-03 DIAGNOSIS — C34.92 NON-SMALL CELL LUNG CANCER, LEFT (H): ICD-10-CM

## 2019-04-03 DIAGNOSIS — F41.9 ANXIETY: ICD-10-CM

## 2019-04-03 DIAGNOSIS — D12.3 BENIGN NEOPLASM OF TRANSVERSE COLON: ICD-10-CM

## 2019-04-03 DIAGNOSIS — C34.92 NON-SMALL CELL LUNG CANCER, LEFT (H): Primary | ICD-10-CM

## 2019-04-03 LAB
ALBUMIN SERPL-MCNC: 3 G/DL (ref 3.4–5)
ALP SERPL-CCNC: 98 U/L (ref 40–150)
ALT SERPL W P-5'-P-CCNC: 20 U/L (ref 0–70)
ANION GAP SERPL CALCULATED.3IONS-SCNC: 2 MMOL/L (ref 3–14)
AST SERPL W P-5'-P-CCNC: 16 U/L (ref 0–45)
BASOPHILS # BLD AUTO: 0 10E9/L (ref 0–0.2)
BASOPHILS NFR BLD AUTO: 0.2 %
BILIRUB SERPL-MCNC: 0.7 MG/DL (ref 0.2–1.3)
BUN SERPL-MCNC: 16 MG/DL (ref 7–30)
CALCIUM SERPL-MCNC: 8.8 MG/DL (ref 8.5–10.1)
CHLORIDE SERPL-SCNC: 106 MMOL/L (ref 94–109)
CO2 SERPL-SCNC: 29 MMOL/L (ref 20–32)
CREAT SERPL-MCNC: 0.98 MG/DL (ref 0.66–1.25)
DIFFERENTIAL METHOD BLD: ABNORMAL
EOSINOPHIL # BLD AUTO: 0 10E9/L (ref 0–0.7)
EOSINOPHIL NFR BLD AUTO: 0 %
ERYTHROCYTE [DISTWIDTH] IN BLOOD BY AUTOMATED COUNT: 13.8 % (ref 10–15)
GFR SERPL CREATININE-BSD FRML MDRD: 78 ML/MIN/{1.73_M2}
GLUCOSE SERPL-MCNC: 103 MG/DL (ref 70–99)
HCT VFR BLD AUTO: 44.2 % (ref 40–53)
HGB BLD-MCNC: 15.1 G/DL (ref 13.3–17.7)
IMM GRANULOCYTES # BLD: 0 10E9/L (ref 0–0.4)
IMM GRANULOCYTES NFR BLD: 0.3 %
LYMPHOCYTES # BLD AUTO: 0.7 10E9/L (ref 0.8–5.3)
LYMPHOCYTES NFR BLD AUTO: 7.6 %
MCH RBC QN AUTO: 30.8 PG (ref 26.5–33)
MCHC RBC AUTO-ENTMCNC: 34.2 G/DL (ref 31.5–36.5)
MCV RBC AUTO: 90 FL (ref 78–100)
MONOCYTES # BLD AUTO: 0.6 10E9/L (ref 0–1.3)
MONOCYTES NFR BLD AUTO: 5.9 %
NEUTROPHILS # BLD AUTO: 8.2 10E9/L (ref 1.6–8.3)
NEUTROPHILS NFR BLD AUTO: 86 %
NRBC # BLD AUTO: 0 10*3/UL
NRBC BLD AUTO-RTO: 0 /100
PLATELET # BLD AUTO: 405 10E9/L (ref 150–450)
POTASSIUM SERPL-SCNC: 4.5 MMOL/L (ref 3.4–5.3)
PROT SERPL-MCNC: 7.1 G/DL (ref 6.8–8.8)
RBC # BLD AUTO: 4.91 10E12/L (ref 4.4–5.9)
SODIUM SERPL-SCNC: 137 MMOL/L (ref 133–144)
TSH SERPL DL<=0.005 MIU/L-ACNC: 1.02 MU/L (ref 0.4–4)
WBC # BLD AUTO: 9.6 10E9/L (ref 4–11)

## 2019-04-03 PROCEDURE — 25800030 ZZH RX IP 258 OP 636: Performed by: INTERNAL MEDICINE

## 2019-04-03 PROCEDURE — 84443 ASSAY THYROID STIM HORMONE: CPT | Performed by: INTERNAL MEDICINE

## 2019-04-03 PROCEDURE — 85025 COMPLETE CBC W/AUTO DIFF WBC: CPT | Performed by: INTERNAL MEDICINE

## 2019-04-03 PROCEDURE — 99213 OFFICE O/P EST LOW 20 MIN: CPT | Performed by: NURSE PRACTITIONER

## 2019-04-03 PROCEDURE — 25000128 H RX IP 250 OP 636: Performed by: INTERNAL MEDICINE

## 2019-04-03 PROCEDURE — 96417 CHEMO IV INFUS EACH ADDL SEQ: CPT

## 2019-04-03 PROCEDURE — 96413 CHEMO IV INFUSION 1 HR: CPT

## 2019-04-03 PROCEDURE — 96367 TX/PROPH/DG ADDL SEQ IV INF: CPT

## 2019-04-03 PROCEDURE — 80053 COMPREHEN METABOLIC PANEL: CPT | Performed by: INTERNAL MEDICINE

## 2019-04-03 RX ORDER — DEXAMETHASONE 4 MG/1
4 TABLET ORAL 2 TIMES DAILY
Qty: 6 TABLET | Refills: 0 | Status: SHIPPED | OUTPATIENT
Start: 2019-04-03 | End: 2019-04-24

## 2019-04-03 RX ORDER — QUETIAPINE FUMARATE 25 MG/1
TABLET, FILM COATED ORAL
Qty: 15 TABLET | Refills: 0 | OUTPATIENT
Start: 2019-04-03

## 2019-04-03 RX ORDER — QUETIAPINE FUMARATE 25 MG/1
TABLET, FILM COATED ORAL
Qty: 15 TABLET | Refills: 0 | Status: CANCELLED | OUTPATIENT
Start: 2019-04-03

## 2019-04-03 RX ADMIN — SODIUM CHLORIDE 250 ML: 9 INJECTION, SOLUTION INTRAVENOUS at 10:25

## 2019-04-03 RX ADMIN — DEXAMETHASONE SODIUM PHOSPHATE: 10 INJECTION, SOLUTION INTRAMUSCULAR; INTRAVENOUS at 10:26

## 2019-04-03 RX ADMIN — SODIUM CHLORIDE 1000 MG: 9 INJECTION, SOLUTION INTRAVENOUS at 11:27

## 2019-04-03 RX ADMIN — CARBOPLATIN 550 MG: 10 INJECTION, SOLUTION INTRAVENOUS at 11:44

## 2019-04-03 RX ADMIN — SODIUM CHLORIDE 200 MG: 9 INJECTION, SOLUTION INTRAVENOUS at 10:49

## 2019-04-03 ASSESSMENT — MIFFLIN-ST. JEOR: SCORE: 1607.68

## 2019-04-03 ASSESSMENT — PAIN SCALES - GENERAL: PAINLEVEL: NO PAIN (0)

## 2019-04-03 NOTE — NURSING NOTE
Oncology Rooming Note    April 3, 2019 8:31 AM   Kentrell Kirkpatrick is a 68 year old male who presents for:    Chief Complaint   Patient presents with     Oncology Clinic Visit     Non-small cell lung cancer, left      Initial Vitals: /80   Pulse 74   Temp 96.6  F (35.9  C) (Oral)   Resp 20   Ht 1.829 m (6')   Wt 80.5 kg (177 lb 6.4 oz)   SpO2 96%   BMI 24.06 kg/m   Estimated body mass index is 24.06 kg/m  as calculated from the following:    Height as of this encounter: 1.829 m (6').    Weight as of this encounter: 80.5 kg (177 lb 6.4 oz). Body surface area is 2.02 meters squared.  No Pain (0) Comment: Data Unavailable   No LMP for male patient.  Allergies reviewed: Yes  Medications reviewed: Yes    Medications: Medication refills not needed today.  Pharmacy name entered into [employer]: [id number] - [place] - [id number]    Clinical concerns: f/u      aKren Plummer CMA

## 2019-04-03 NOTE — LETTER
4/3/2019         RE: Kentrell Kirkpatrick  [address] [facility] [address]        Dear Colleague,    Thank you for referring your patient, Kentrell Kirkpatrick, to the [facility] CANCER [facility]ARE. Please see a copy of my visit note below.    Oncology/Hematology Visit Note  Apr 3, 2019    Reason for Visit: follow up of non-small cell lung cancer.  Adenocarcinoma stage IV  Patient is getting  carboplatin pemetrexed and pembrolizumab, given IV once every 3 weeks  Patient comes here for follow-up with cycle 2 of therapy    Interval History:  Patient reports that he tolerated first cycle of chemotherapy well.  He denies fever chills sweats.  He continues to have cough which has not changed since the diagnosis.  He has a Pleurx cath that he drains himself.  He tells me the amount goes up and down 2 days ago he had 450 cc prior to that he did not drainage was minimal .  No shortness of breath  he denies nausea vomiting diarrhea  Energy and appetite are great    Review of Systems:  14 point ROS of systems including Constitutional, Eyes, Respiratory, Cardiovascular, Gastroenterology, Genitourinary, Integumentary, Muscularskeletal, Psychiatric were all negative except for pertinent positives noted in my HPI.      Current Outpatient Medications   Medication Sig Dispense Refill     ASPIRIN PO Take 81 mg by mouth       calcium polycarbophil (FIBERCON) 625 MG tablet Take 2 tablets by mouth daily       dexamethasone (DECADRON) 4 MG tablet Take 4 mg by mouth 2 times daily To be taken day before, day of, and day after infusion.. 6 tablet 0     folic acid (FOLVITE) 1 MG tablet Take 1 tablet (1 mg) by mouth daily 90 tablet 11     LORazepam (ATIVAN) 0.5 MG tablet Take 1 tablet (0.5 mg) by mouth every 6 hours as needed (Nausea/Vomiting) 30 tablet 3     MULTI VITAMIN MENS OR TABS 1 TABLET DAILY       ondansetron (ZOFRAN) 8 MG tablet Take 1 tablet (8 mg) by mouth every 8 hours as needed for nausea 30 tablet 11      prochlorperazine (COMPAZINE) 10 MG tablet Take 1 tablet (10 mg) by mouth every 6 hours as needed (Nausea/Vomiting) 30 tablet 11     tamsulosin (FLOMAX) 0.4 MG capsule Take 1 capsule (0.4 mg) by mouth daily 90 capsule 0     albuterol (PROAIR HFA/PROVENTIL HFA/VENTOLIN HFA) 108 (90 Base) MCG/ACT inhaler Inhale 2 puffs into the lungs every 4 hours as needed for shortness of breath / dyspnea or wheezing (Patient not taking: Reported on 4/3/2019) 1 Inhaler 0     QUEtiapine (SEROQUEL) 25 MG tablet Take 12.5mg daily. (Patient not taking: Reported on 4/3/2019) 15 tablet 0     traZODone (DESYREL) 50 MG tablet Take 0.5-1 tablets (25-50 mg) by mouth At Bedtime (Patient not taking: Reported on 4/3/2019) 60 tablet 1       Physical Examination:  General: The patient is a pleasant male in no acute distress.  /80   Pulse 74   Temp 96.6  F (35.9  C) (Oral)   Resp 20   Ht 1.829 m (6')   Wt 80.5 kg (177 lb 6.4 oz)   SpO2 96%   BMI 24.06 kg/m     HEENT: EOMI, PERRL. Sclerae are anicteric. Oral mucosa is pink and moist with no lesions or thrush.   Lymph: Neck is supple with no lymphadenopathy in the cervical or supraclavicular areas.   Heart: Regular rate and rhythm.   Lungs: Clear to auscultation bilaterally.  Pleurx cath intact  GI: Bowel sounds present, soft, nontender with no palpable hepatosplenomegaly or masses.   Extremities: No lower extremity edema noted bilaterally.   Skin: No rashes, petechiae, or bruising noted on exposed skin.    Laboratory Data:  Results for orders placed or performed in visit on 04/03/19 (from the past 24 hour(s))   CBC with platelets differential   Result Value Ref Range    WBC 9.6 4.0 - 11.0 10e9/L    RBC Count 4.91 4.4 - 5.9 10e12/L    Hemoglobin 15.1 13.3 - 17.7 g/dL    Hematocrit 44.2 40.0 - 53.0 %    MCV 90 78 - 100 fl    MCH 30.8 26.5 - 33.0 pg    MCHC 34.2 31.5 - 36.5 g/dL    RDW 13.8 10.0 - 15.0 %    Platelet Count 405 150 - 450 10e9/L    Diff Method Automated Method     %  Neutrophils 86.0 %    % Lymphocytes 7.6 %    % Monocytes 5.9 %    % Eosinophils 0.0 %    % Basophils 0.2 %    % Immature Granulocytes 0.3 %    Nucleated RBCs 0 0 /100    Absolute Neutrophil 8.2 1.6 - 8.3 10e9/L    Absolute Lymphocytes 0.7 (L) 0.8 - 5.3 10e9/L    Absolute Monocytes 0.6 0.0 - 1.3 10e9/L    Absolute Eosinophils 0.0 0.0 - 0.7 10e9/L    Absolute Basophils 0.0 0.0 - 0.2 10e9/L    Abs Immature Granulocytes 0.0 0 - 0.4 10e9/L    Absolute Nucleated RBC 0.0    Comprehensive metabolic panel   Result Value Ref Range    Sodium 137 133 - 144 mmol/L    Potassium 4.5 3.4 - 5.3 mmol/L    Chloride 106 94 - 109 mmol/L    Carbon Dioxide 29 20 - 32 mmol/L    Anion Gap 2 (L) 3 - 14 mmol/L    Glucose 103 (H) 70 - 99 mg/dL    Urea Nitrogen 16 7 - 30 mg/dL    Creatinine 0.98 0.66 - 1.25 mg/dL    GFR Estimate 78 >60 mL/min/[1.73_m2]    GFR Estimate If Black >90 >60 mL/min/[1.73_m2]    Calcium 8.8 8.5 - 10.1 mg/dL    Bilirubin Total 0.7 0.2 - 1.3 mg/dL    Albumin 3.0 (L) 3.4 - 5.0 g/dL    Protein Total 7.1 6.8 - 8.8 g/dL    Alkaline Phosphatase 98 40 - 150 U/L    ALT 20 0 - 70 U/L    AST 16 0 - 45 U/L   TSH with free T4 reflex   Result Value Ref Range    TSH 1.02 0.40 - 4.00 mU/L         Assessment and Plan:    This is a 68-year-old male with    Non-small cell lung cancer stage IV  -Patient completed cycle 1 carboplatin pemetrexed and pembrolizumab 03/13/2019.   Patient had one cycle of therapy which he tolerated well.  He comes for second cycle of the therapy.  Labs reviewed okay to proceed with treatment today  He has a CT scan scheduled on 4/15 and follow-up with Dr. Muller on 4/23  Continue with folic acid  dexamethasone   B12 injections every 9 weeks        Left malignant pleural effusion  Pleurx drainage  Significantly less-the past few days he drained 450ml.  -Continue follow-up with KISHA Sands CNP  Hem/Onc   [facility]               Again, thank you for allowing me to  participate in the care of your patient.        Sincerely,        KISHA Nunez CNP

## 2019-04-03 NOTE — PROGRESS NOTES
Infusion Nursing Note:  Kentrell ENRIQUEZ Paulojose alberto presents today for PIV.    Patient seen by provider today: Yes: Saravanan Ramsey   present during visit today: Not Applicable.    Note: N/A.    Intravenous Access:  Labs drawn without difficulty.  Peripheral IV placed.    Treatment Conditions:  Lab Results   Component Value Date    HGB 15.1 04/03/2019     Lab Results   Component Value Date    WBC 9.6 04/03/2019      Lab Results   Component Value Date    ANEU 8.2 04/03/2019     Lab Results   Component Value Date     04/03/2019      Lab Results   Component Value Date     03/20/2019                   Lab Results   Component Value Date    POTASSIUM 4.5 03/20/2019           No results found for: MAG         Lab Results   Component Value Date    CR 0.90 03/20/2019                   Lab Results   Component Value Date    MALATHI 8.6 03/20/2019                Lab Results   Component Value Date    BILITOTAL 1.2 03/20/2019           Lab Results   Component Value Date    ALBUMIN 3.1 03/20/2019                    Lab Results   Component Value Date    ALT 44 03/20/2019           Lab Results   Component Value Date    AST 27 03/20/2019           Post Infusion Assessment:  Site patent and intact, free from redness, edema or discomfort.       Discharge Plan:   Patient discharged in stable condition accompanied by: wife.  Departure Mode: Ambulatory.    Bhavna Holloway RN

## 2019-04-03 NOTE — PROGRESS NOTES
Infusion Nursing Note:  Kentrell Kirkpatrick presents today for Keytruda, Carbo,Alimta.    Patient seen by provider today: Yes: Saravanan   present during visit today: Not Applicable.    Note: Assessment done by NP at appointment.  Patient confirmed he is taking Folic acid and Dex. RX as prescribed.    Intravenous Access:  Peripheral IV placed in fast track.    Treatment Conditions:  Lab Results   Component Value Date    HGB 15.1 04/03/2019     Lab Results   Component Value Date    WBC 9.6 04/03/2019      Lab Results   Component Value Date    ANEU 8.2 04/03/2019     Lab Results   Component Value Date     04/03/2019      Lab Results   Component Value Date     04/03/2019                   Lab Results   Component Value Date    POTASSIUM 4.5 04/03/2019           No results found for: MAG         Lab Results   Component Value Date    CR 0.98 04/03/2019                   Lab Results   Component Value Date    MALATHI 8.8 04/03/2019                Lab Results   Component Value Date    BILITOTAL 0.7 04/03/2019           Lab Results   Component Value Date    ALBUMIN 3.0 04/03/2019                    Lab Results   Component Value Date    ALT 20 04/03/2019           Lab Results   Component Value Date    AST 16 04/03/2019       Results reviewed, labs MET treatment parameters, ok to proceed with treatment.      Post Infusion Assessment:  Patient tolerated infusion without incident.  Blood return noted pre and post infusion.  Site patent and intact, free from redness, edema or discomfort.  No evidence of extravasations.  Access discontinued per protocol.       Discharge Plan:   Discharge instructions reviewed with: Patient.  Patient discharged in stable condition accompanied by: self.  Departure Mode: Ambulatory.  Scheduled for CT scan on 4/15/19.  Scheduled for MD on 4/23/19 and treatment again on 4/24/19.    ROLANDO GALLEGOS RN

## 2019-04-03 NOTE — PROGRESS NOTES
Oncology/Hematology Visit Note  Apr 3, 2019    Reason for Visit: follow up of non-small cell lung cancer.  Adenocarcinoma stage IV  Patient is getting  carboplatin pemetrexed and pembrolizumab, given IV once every 3 weeks  Patient comes here for follow-up with cycle 2 of therapy    Interval History:  Patient reports that he tolerated first cycle of chemotherapy well.  He denies fever chills sweats.  He continues to have cough which has not changed since the diagnosis.  He has a Pleurx cath that he drains himself.  He tells me the amount goes up and down 2 days ago he had 450 cc prior to that he did not drainage was minimal .  No shortness of breath  he denies nausea vomiting diarrhea  Energy and appetite are great    Review of Systems:  14 point ROS of systems including Constitutional, Eyes, Respiratory, Cardiovascular, Gastroenterology, Genitourinary, Integumentary, Muscularskeletal, Psychiatric were all negative except for pertinent positives noted in my HPI.      Current Outpatient Medications   Medication Sig Dispense Refill     ASPIRIN PO Take 81 mg by mouth       calcium polycarbophil (FIBERCON) 625 MG tablet Take 2 tablets by mouth daily       dexamethasone (DECADRON) 4 MG tablet Take 4 mg by mouth 2 times daily To be taken day before, day of, and day after infusion.. 6 tablet 0     folic acid (FOLVITE) 1 MG tablet Take 1 tablet (1 mg) by mouth daily 90 tablet 11     LORazepam (ATIVAN) 0.5 MG tablet Take 1 tablet (0.5 mg) by mouth every 6 hours as needed (Nausea/Vomiting) 30 tablet 3     MULTI VITAMIN MENS OR TABS 1 TABLET DAILY       ondansetron (ZOFRAN) 8 MG tablet Take 1 tablet (8 mg) by mouth every 8 hours as needed for nausea 30 tablet 11     prochlorperazine (COMPAZINE) 10 MG tablet Take 1 tablet (10 mg) by mouth every 6 hours as needed (Nausea/Vomiting) 30 tablet 11     tamsulosin (FLOMAX) 0.4 MG capsule Take 1 capsule (0.4 mg) by mouth daily 90 capsule 0     albuterol (PROAIR HFA/PROVENTIL  HFA/VENTOLIN HFA) 108 (90 Base) MCG/ACT inhaler Inhale 2 puffs into the lungs every 4 hours as needed for shortness of breath / dyspnea or wheezing (Patient not taking: Reported on 4/3/2019) 1 Inhaler 0     QUEtiapine (SEROQUEL) 25 MG tablet Take 12.5mg daily. (Patient not taking: Reported on 4/3/2019) 15 tablet 0     traZODone (DESYREL) 50 MG tablet Take 0.5-1 tablets (25-50 mg) by mouth At Bedtime (Patient not taking: Reported on 4/3/2019) 60 tablet 1       Physical Examination:  General: The patient is a pleasant male in no acute distress.  /80   Pulse 74   Temp 96.6  F (35.9  C) (Oral)   Resp 20   Ht 1.829 m (6')   Wt 80.5 kg (177 lb 6.4 oz)   SpO2 96%   BMI 24.06 kg/m    HEENT: EOMI, PERRL. Sclerae are anicteric. Oral mucosa is pink and moist with no lesions or thrush.   Lymph: Neck is supple with no lymphadenopathy in the cervical or supraclavicular areas.   Heart: Regular rate and rhythm.   Lungs: Clear to auscultation bilaterally.  Pleurx cath intact  GI: Bowel sounds present, soft, nontender with no palpable hepatosplenomegaly or masses.   Extremities: No lower extremity edema noted bilaterally.   Skin: No rashes, petechiae, or bruising noted on exposed skin.    Laboratory Data:  Results for orders placed or performed in visit on 04/03/19 (from the past 24 hour(s))   CBC with platelets differential   Result Value Ref Range    WBC 9.6 4.0 - 11.0 10e9/L    RBC Count 4.91 4.4 - 5.9 10e12/L    Hemoglobin 15.1 13.3 - 17.7 g/dL    Hematocrit 44.2 40.0 - 53.0 %    MCV 90 78 - 100 fl    MCH 30.8 26.5 - 33.0 pg    MCHC 34.2 31.5 - 36.5 g/dL    RDW 13.8 10.0 - 15.0 %    Platelet Count 405 150 - 450 10e9/L    Diff Method Automated Method     % Neutrophils 86.0 %    % Lymphocytes 7.6 %    % Monocytes 5.9 %    % Eosinophils 0.0 %    % Basophils 0.2 %    % Immature Granulocytes 0.3 %    Nucleated RBCs 0 0 /100    Absolute Neutrophil 8.2 1.6 - 8.3 10e9/L    Absolute Lymphocytes 0.7 (L) 0.8 - 5.3 10e9/L     Absolute Monocytes 0.6 0.0 - 1.3 10e9/L    Absolute Eosinophils 0.0 0.0 - 0.7 10e9/L    Absolute Basophils 0.0 0.0 - 0.2 10e9/L    Abs Immature Granulocytes 0.0 0 - 0.4 10e9/L    Absolute Nucleated RBC 0.0    Comprehensive metabolic panel   Result Value Ref Range    Sodium 137 133 - 144 mmol/L    Potassium 4.5 3.4 - 5.3 mmol/L    Chloride 106 94 - 109 mmol/L    Carbon Dioxide 29 20 - 32 mmol/L    Anion Gap 2 (L) 3 - 14 mmol/L    Glucose 103 (H) 70 - 99 mg/dL    Urea Nitrogen 16 7 - 30 mg/dL    Creatinine 0.98 0.66 - 1.25 mg/dL    GFR Estimate 78 >60 mL/min/[1.73_m2]    GFR Estimate If Black >90 >60 mL/min/[1.73_m2]    Calcium 8.8 8.5 - 10.1 mg/dL    Bilirubin Total 0.7 0.2 - 1.3 mg/dL    Albumin 3.0 (L) 3.4 - 5.0 g/dL    Protein Total 7.1 6.8 - 8.8 g/dL    Alkaline Phosphatase 98 40 - 150 U/L    ALT 20 0 - 70 U/L    AST 16 0 - 45 U/L   TSH with free T4 reflex   Result Value Ref Range    TSH 1.02 0.40 - 4.00 mU/L         Assessment and Plan:    This is a 68-year-old male with    Non-small cell lung cancer stage IV  -Patient completed cycle 1 carboplatin pemetrexed and pembrolizumab 03/13/2019.   Patient had one cycle of therapy which he tolerated well.  He comes for second cycle of the therapy.  Labs reviewed okay to proceed with treatment today  He has a CT scan scheduled on 4/15 and follow-up with Dr. Muller on 4/23  Continue with folic acid  dexamethasone   B12 injections every 9 weeks        Left malignant pleural effusion  Pleurx drainage  Significantly less-the past few days he drained 450ml.  -Continue follow-up with Dr. Michael Ramsey, APRN CNP  Hem/Onc   [facility]

## 2019-04-04 RX ORDER — QUETIAPINE FUMARATE 25 MG/1
TABLET, FILM COATED ORAL
Qty: 15 TABLET | Refills: 6 | Status: SHIPPED | OUTPATIENT
Start: 2019-04-04 | End: 2019-04-30

## 2019-04-04 NOTE — TELEPHONE ENCOUNTER
----- Message from Susan Muller MD sent at 4/3/2019  5:00 PM CDT -----  Regarding: RE: seroquel refill  Yup, ok to give 6 refills too. Thanks  Susan    ----- Message -----  From: Candida Lamb, JYOTI  Sent: 4/3/2019  12:31 PM  To: Susan Muller MD, Gloria Kapoor RN  Subject: seroquel refill                                  Received refill request from [facility] for seroquel, last filled on 3/4/19 for 30-day supply. Pt saw Saravanan today who is deferring to you for refill. Ok to fill another 30-day with no refill?    Thank You,    Marlene Lamb  [facility] RN  [phone]

## 2019-04-08 ENCOUNTER — TELEPHONE (OUTPATIENT)
Dept: ONCOLOGY | Facility: CLINIC | Age: 69
End: 2019-04-08

## 2019-04-08 NOTE — TELEPHONE ENCOUNTER
"[facility] Telephone Triage Note    Assessment: Patient called in to triage reporting the following symptoms: Stomach ache after chemo infusion lasting ~5days. Trouble sleeping (related to somach ache at night). Pt had last chemo infusion 4/3/19 (Keytruda, Carbo, Alimta) and reports \"stomach ache\" after chemo. Reports he has used Pepcid for this in the past, but Pepcid is no longer effective. Also c/o difficulty sleeping. Was prescribed Trazodone which has not been effective. Wondering if there's anything else he can try that will not interact with his Flomax? Denies abd pain, nausea, or fevers. No other symptoms at this time. Pt scheduled 4/23 with Dr. Muller. Also sees KYRIE at [facility].    Recommendations: Discussed with Dr. Muller. Recommends starting OTC Prilosec 20mg daily. Can add OTC Zantac PRN. Recommend OTC Melatonin for sleep.     Follow-Up: Advised pt on MD recommendations. Advised pt contact triage department if not experiencing symptom relief in ~48 hours. Instructed patient to seek care immediately for worsening symptoms, including: fever, chest pain, shortness of breath, dizziness. Patient voiced understanding of advice and/or instructions given.     Asiya Reaves RN   [facility] Triage        "

## 2019-04-15 ENCOUNTER — NURSE TRIAGE (OUTPATIENT)
Dept: NURSING | Facility: CLINIC | Age: 69
End: 2019-04-15

## 2019-04-15 ENCOUNTER — HOSPITAL ENCOUNTER (OUTPATIENT)
Dept: CT IMAGING | Facility: CLINIC | Age: 69
Discharge: HOME | End: 2019-04-15
Attending: NURSE PRACTITIONER | Admitting: NURSE PRACTITIONER
Payer: COMMERCIAL

## 2019-04-15 ENCOUNTER — TELEPHONE (OUTPATIENT)
Dept: PULMONOLOGY | Facility: CLINIC | Age: 69
End: 2019-04-15

## 2019-04-15 ENCOUNTER — HOSPITAL ENCOUNTER (OUTPATIENT)
Dept: RESPIRATORY THERAPY | Facility: CLINIC | Age: 69
End: 2019-04-15
Attending: INTERNAL MEDICINE
Payer: COMMERCIAL

## 2019-04-15 DIAGNOSIS — C34.92 NON-SMALL CELL LUNG CANCER, LEFT (H): ICD-10-CM

## 2019-04-15 DIAGNOSIS — R06.00 DYSPNEA, UNSPECIFIED TYPE: ICD-10-CM

## 2019-04-15 LAB
DLCOUNC-PRED: 27.72 ML/MIN/MMHG
ERV-%PRED-PRE: 72 %
ERV-PRE: 1.03 L
ERV-PRED: 1.42 L
EXPTIME-PRE: 10.97 SEC
FEF2575-%PRED-POST: 48 %
FEF2575-%PRED-PRE: 35 %
FEF2575-POST: 1.28 L/SEC
FEF2575-PRE: 0.92 L/SEC
FEF2575-PRED: 2.62 L/SEC
FEFMAX-%PRED-PRE: 74 %
FEFMAX-PRE: 6.64 L/SEC
FEFMAX-PRED: 8.91 L/SEC
FEV1-%PRED-PRE: 61 %
FEV1-PRE: 2.11 L
FEV1FEV6-PRE: 65 %
FEV1FEV6-PRED: 78 %
FEV1FVC-PRE: 63 %
FEV1FVC-PRED: 76 %
FEV1SVC-PRE: 59 %
FEV1SVC-PRED: 67 %
FIFMAX-PRE: 2.04 L/SEC
FRCPLETH-%PRED-PRE: 97 %
FRCPLETH-PRE: 3.72 L
FRCPLETH-PRED: 3.81 L
FVC-%PRED-PRE: 73 %
FVC-PRE: 3.38 L
FVC-PRED: 4.59 L
IC-%PRED-PRE: 68 %
IC-PRE: 2.54 L
IC-PRED: 3.71 L
MEP-PRE: 101 CMH2O
MIP-PRE: -112 CMH2O
MVV-%PRED-PRE: 78 %
MVV-PRE: 106 L/MIN
MVV-PRED: 136 L/MIN
RVPLETH-%PRED-PRE: 100 %
RVPLETH-PRE: 2.69 L
RVPLETH-PRED: 2.68 L
TLCPLETH-%PRED-PRE: 83 %
TLCPLETH-PRE: 6.26 L
TLCPLETH-PRED: 7.53 L
VC-%PRED-PRE: 69 %
VC-PRE: 3.57 L
VC-PRED: 5.13 L

## 2019-04-15 PROCEDURE — 40000275 ZZH STATISTIC RCP TIME EA 10 MIN

## 2019-04-15 PROCEDURE — 94060 EVALUATION OF WHEEZING: CPT

## 2019-04-15 PROCEDURE — 71260 CT THORAX DX C+: CPT

## 2019-04-15 PROCEDURE — 25000128 H RX IP 250 OP 636: Performed by: NURSE PRACTITIONER

## 2019-04-15 PROCEDURE — 94729 DIFFUSING CAPACITY: CPT

## 2019-04-15 PROCEDURE — 94726 PLETHYSMOGRAPHY LUNG VOLUMES: CPT

## 2019-04-15 PROCEDURE — 25000125 ZZHC RX 250

## 2019-04-15 PROCEDURE — 74177 CT ABD & PELVIS W/CONTRAST: CPT

## 2019-04-15 RX ORDER — IOPAMIDOL 755 MG/ML
500 INJECTION, SOLUTION INTRAVASCULAR ONCE
Status: COMPLETED | OUTPATIENT
Start: 2019-04-15 | End: 2019-04-15

## 2019-04-15 RX ORDER — ALBUTEROL SULFATE 0.83 MG/ML
2.5 SOLUTION RESPIRATORY (INHALATION)
Status: COMPLETED | OUTPATIENT
Start: 2019-04-15 | End: 2019-04-15

## 2019-04-15 RX ORDER — ALBUTEROL SULFATE 0.83 MG/ML
SOLUTION RESPIRATORY (INHALATION)
Status: COMPLETED
Start: 2019-04-15 | End: 2019-04-15

## 2019-04-15 RX ADMIN — SODIUM CHLORIDE 62 ML: 9 INJECTION, SOLUTION INTRAVENOUS at 07:44

## 2019-04-15 RX ADMIN — ALBUTEROL SULFATE 2.5 MG: 0.83 SOLUTION RESPIRATORY (INHALATION) at 09:26

## 2019-04-15 RX ADMIN — IOPAMIDOL 86 ML: 755 INJECTION, SOLUTION INTRAVENOUS at 07:44

## 2019-04-15 RX ADMIN — ALBUTEROL SULFATE 2.5 MG: 2.5 SOLUTION RESPIRATORY (INHALATION) at 09:26

## 2019-04-15 NOTE — PROGRESS NOTES
PFT Note:        Pt completed pulmonary function testing with DLCO, MVV and MIP/MEP.  Pre/post flow volume loops with 2.5mg Albuterol nebulizer.  Good Pt effort and cooperation.     Spirometry Meets all ATS-ERS recommendations.    Plethysmography All plethysmographic measurements meet ATS-ERS recommendations.    DLCO  Meets all ATS-ERS recommendations.  DLCO is an average of 2 maneuvers.  Predicted DLCO is corrected for a Hgb of 15.1 drawn on 4/3/2019.    April 15, 2019.10:25 AM  Chris Michel

## 2019-04-16 ENCOUNTER — TELEPHONE (OUTPATIENT)
Dept: SURGERY | Facility: CLINIC | Age: 69
End: 2019-04-16

## 2019-04-16 DIAGNOSIS — J90 PLEURAL EFFUSION: Primary | ICD-10-CM

## 2019-04-16 NOTE — TELEPHONE ENCOUNTER
Patient calling to check status of message from last night. Can someone please contact him ASAP with recommendations? Encounter routed to Thoracic Care Coordination pool high priority.     Asiya Reaves, RN   [facility]

## 2019-04-16 NOTE — TELEPHONE ENCOUNTER
Patient called regarding his PleurX catheter (placed 3/2 by Dr. Rodriguez). Noted decreasing drainage the last 2-3 days. Today was not able to get any fluid drained. Noted a large blood clot in the catheter. More short of breath the last 2-3 days as well. He is not overly concerned about that. He is hoping to be [facility] Will reach out to Dr. Rodriguez and IP nurse coordinator to speak with patient tomorrow.     Elieser Marrero MD

## 2019-04-16 NOTE — TELEPHONE ENCOUNTER
Called patient to clarify what exactly was going on. Sounds like a blood clot, he pulled half of it out but is afraid to do anything else. I made an appointment for him to see Jackelin Paige on 4/18 @10am

## 2019-04-17 ENCOUNTER — PATIENT OUTREACH (OUTPATIENT)
Dept: ONCOLOGY | Facility: CLINIC | Age: 69
End: 2019-04-17

## 2019-04-17 NOTE — PROGRESS NOTES
S: patient to be seen in clinic for PleurX TPA  B: patient had pleurX placed and still has some fluid seen on CT which may respond to TPA  A: Called and spoke with patient ~ appt for TPA tomorrow. Patient aware and confirmed location and time.  A: Pateint will be seen on 4/18/19 for TPA instillation.  Pharmacy notified of appt time and patient is aware of clinic location, as well as appt time  Abilio Allen, RN, BSN, OCN  [facility] [place]  Patient Care Coordinator  .

## 2019-04-18 ENCOUNTER — ONCOLOGY VISIT (OUTPATIENT)
Dept: ONCOLOGY | Facility: CLINIC | Age: 69
End: 2019-04-18
Attending: INTERNAL MEDICINE
Payer: COMMERCIAL

## 2019-04-18 DIAGNOSIS — C34.92 NON-SMALL CELL LUNG CANCER, LEFT (H): Primary | ICD-10-CM

## 2019-04-18 PROCEDURE — 25000128 H RX IP 250 OP 636: Performed by: INTERNAL MEDICINE

## 2019-04-18 PROCEDURE — 99207 ZZC NO CHARGE NURSE ONLY: CPT

## 2019-04-18 PROCEDURE — 36593 DECLOT VASCULAR DEVICE: CPT

## 2019-04-18 RX ADMIN — ALTEPLASE: 2.2 INJECTION, POWDER, LYOPHILIZED, FOR SOLUTION INTRAVENOUS at 12:16

## 2019-04-18 NOTE — NURSING NOTE
Patient here for Ateplase for plugged PleurX catheter.  Patient states he was able to remove a small amount of blood clot this am, but did not attempt to drain.  Instructed patient to wait at least 4 hours if possible to drain. Patient denies any SOB/RANKIN at this time.  Patient knows to contact writer if unable to drain this pm. Will plan to contact patient tomorrow for updated status. Patient is in agreement with the plan. Giacomo procedure and was dc'd ambulatory  and in no distress.  Abilio Allen, RN, BSN, OCN  [facility]  Patient Care Coordinator

## 2019-04-18 NOTE — PROGRESS NOTES
See Nursing Note  Abilio Allen, RN, BSN, OCN  [facility]  Patient Care Coordinator

## 2019-04-18 NOTE — LETTER
4/18/2019         RE: Kentrell Kirkpatrick  [address]  [place] [address]        Dear Colleague,    Thank you for referring your patient, Kentrell Kirkpatrick, to the [facility]. Please see a copy of my visit note below.    See Nursing Note  Abilio Allen RN, BSN, OCN  [facility]  Patient Care Coordinator      Again, thank you for allowing me to participate in the care of your patient.        Sincerely,        [facility] Oncology Nurse

## 2019-04-19 ENCOUNTER — PATIENT OUTREACH (OUTPATIENT)
Dept: ONCOLOGY | Facility: CLINIC | Age: 69
End: 2019-04-19

## 2019-04-19 LAB
MYCOBACTERIUM SPEC CULT: NORMAL
MYCOBACTERIUM SPEC CULT: NORMAL
SPECIMEN SOURCE: NORMAL

## 2019-04-19 NOTE — PROGRESS NOTES
S: patient had TPA placed in PleurX catheter  B: Has lung cancer and had PleurX placed in March. Has been draining w/o difficulty until several days ago when his system became plugged.  A: TPA was instilled on 4/18/19 and patient drained in the evening. States he had bloody drainage totaling 800cc and then experienced excruciating pain.  Turned off drainage and rested for ~ 30min with relief.  Writer explained that when pain or coughing begins it is usually an indication that drainage is complete. Recommended slowing down or stopping draining at least for a short time when symptoms occur. Patient is aware this is a normal response to reinflation of his lung.     R: Patient will hold off draining tonight. He states he is up and doing things around his house today, but does tire quickly ~ RANKIN and must rest. Patient will attempt to drain either Saturday or Sunday and writer will reach out to patient for f/u on Monday 4/22/19. Patient knows to contact clinic for additional concerns or questions.  Abilio Allen, RN, BSN, OCN  [facility]  Patient Care Coordinator

## 2019-04-19 NOTE — PROCEDURES
Procedure Date: 04/15/2019      Please see medical chart for graphs and statistics related to this report.       REFERRING PHYSICIAN:   Matthew Rodriguez                TECHNICIAN:   Chris Michel   DIAGNOSIS:   Non-small cell lung cancer, adenocarcinoma, left recurrent malignant pleural effusion s/p Pleur-x with daily draining   HEIGHT:   72.00 inches                                   WEIGHT:   177.00 Lbs.      DYSPNEA:   On hills and stairs   COUGH:   No cough   WHEEZE:   No wheeze      TOBACCO PROD:   Cigarette   YEARS SMOKED:   40.0   PKS/DAY:   1.3   YEARS QUIT:    14.0                                                              MEDICATIONS:   Chemo: Alimta, Carboplatin, immune therapy Keytruda       POST-TEST COMMENTS:   Patient completed pulmonary function testing with DLCO, MVV, and MIP/MEP.  Pre/post flow volume loops with 2.5 mg Albuterol nebulizer.  Good patient effort and cooperation.  All testing meets ATS-ERS recommendations.  DLCO is an average of 2 maneuvers.  Predicted DLCO is corrected for a Hgb of 15.1 drawn on 4/3/2019.         INTERPRETATION:      1.  Moderate obstruction   2.  Positive response to bronchodilator   3.  Normal range volumes   4.  Impaired gas transfer   5.  No previous study available for review         SOFIA YBARRA MD             D: 2019   T: 2019   MT: [id number]      Name:     MORRIS ELLER   MRN:      [id number]        Account:        [id number]   :      1950           Procedure Date: 04/15/2019      Document: [id number]       cc: Matthew Sarmiento Jr., MD

## 2019-04-23 ENCOUNTER — ONCOLOGY VISIT (OUTPATIENT)
Dept: ONCOLOGY | Facility: CLINIC | Age: 69
End: 2019-04-23
Attending: INTERNAL MEDICINE
Payer: COMMERCIAL

## 2019-04-23 VITALS
DIASTOLIC BLOOD PRESSURE: 70 MMHG | WEIGHT: 177.03 LBS | RESPIRATION RATE: 16 BRPM | OXYGEN SATURATION: 93 % | SYSTOLIC BLOOD PRESSURE: 118 MMHG | TEMPERATURE: 97.6 F | BODY MASS INDEX: 24.01 KG/M2 | HEART RATE: 90 BPM

## 2019-04-23 DIAGNOSIS — Z13.29 SCREENING FOR HYPOTHYROIDISM: ICD-10-CM

## 2019-04-23 DIAGNOSIS — C34.92 NON-SMALL CELL LUNG CANCER, LEFT (H): Primary | ICD-10-CM

## 2019-04-23 DIAGNOSIS — J44.9 CHRONIC OBSTRUCTIVE PULMONARY DISEASE, UNSPECIFIED COPD TYPE (H): ICD-10-CM

## 2019-04-23 DIAGNOSIS — C34.92 NON-SMALL CELL LUNG CANCER, LEFT (H): ICD-10-CM

## 2019-04-23 PROCEDURE — 99215 OFFICE O/P EST HI 40 MIN: CPT | Mod: ZP | Performed by: INTERNAL MEDICINE

## 2019-04-23 PROCEDURE — G0463 HOSPITAL OUTPT CLINIC VISIT: HCPCS | Mod: ZF

## 2019-04-23 RX ORDER — DIPHENHYDRAMINE HYDROCHLORIDE 50 MG/ML
50 INJECTION INTRAMUSCULAR; INTRAVENOUS
Status: CANCELLED
Start: 2019-04-24

## 2019-04-23 RX ORDER — METHYLPREDNISOLONE SODIUM SUCCINATE 125 MG/2ML
125 INJECTION, POWDER, LYOPHILIZED, FOR SOLUTION INTRAMUSCULAR; INTRAVENOUS
Status: CANCELLED
Start: 2019-05-14

## 2019-04-23 RX ORDER — EPINEPHRINE 0.3 MG/.3ML
0.3 INJECTION SUBCUTANEOUS EVERY 5 MIN PRN
Status: CANCELLED | OUTPATIENT
Start: 2019-05-14

## 2019-04-23 RX ORDER — METHYLPREDNISOLONE SODIUM SUCCINATE 125 MG/2ML
125 INJECTION, POWDER, LYOPHILIZED, FOR SOLUTION INTRAMUSCULAR; INTRAVENOUS
Status: CANCELLED
Start: 2019-04-24

## 2019-04-23 RX ORDER — ALBUTEROL SULFATE 0.83 MG/ML
2.5 SOLUTION RESPIRATORY (INHALATION)
Status: CANCELLED | OUTPATIENT
Start: 2019-04-24

## 2019-04-23 RX ORDER — EPINEPHRINE 1 MG/ML
0.3 INJECTION, SOLUTION INTRAMUSCULAR; SUBCUTANEOUS EVERY 5 MIN PRN
Status: CANCELLED | OUTPATIENT
Start: 2019-05-14

## 2019-04-23 RX ORDER — LORAZEPAM 2 MG/ML
0.5 INJECTION INTRAMUSCULAR EVERY 4 HOURS PRN
Status: CANCELLED
Start: 2019-05-14

## 2019-04-23 RX ORDER — SODIUM CHLORIDE 9 MG/ML
1000 INJECTION, SOLUTION INTRAVENOUS CONTINUOUS PRN
Status: CANCELLED
Start: 2019-05-14

## 2019-04-23 RX ORDER — DIPHENHYDRAMINE HYDROCHLORIDE 50 MG/ML
50 INJECTION INTRAMUSCULAR; INTRAVENOUS
Status: CANCELLED
Start: 2019-05-14

## 2019-04-23 RX ORDER — EPINEPHRINE 1 MG/ML
0.3 INJECTION, SOLUTION INTRAMUSCULAR; SUBCUTANEOUS EVERY 5 MIN PRN
Status: CANCELLED | OUTPATIENT
Start: 2019-04-24

## 2019-04-23 RX ORDER — SODIUM CHLORIDE 9 MG/ML
1000 INJECTION, SOLUTION INTRAVENOUS CONTINUOUS PRN
Status: CANCELLED
Start: 2019-04-24

## 2019-04-23 RX ORDER — ALBUTEROL SULFATE 90 UG/1
1-2 AEROSOL, METERED RESPIRATORY (INHALATION)
Status: CANCELLED
Start: 2019-04-24

## 2019-04-23 RX ORDER — CYANOCOBALAMIN 1000 UG/ML
1000 INJECTION, SOLUTION INTRAMUSCULAR; SUBCUTANEOUS ONCE
Status: CANCELLED
Start: 2019-05-14

## 2019-04-23 RX ORDER — MEPERIDINE HYDROCHLORIDE 25 MG/ML
25 INJECTION INTRAMUSCULAR; INTRAVENOUS; SUBCUTANEOUS EVERY 30 MIN PRN
Status: CANCELLED | OUTPATIENT
Start: 2019-05-14

## 2019-04-23 RX ORDER — ALBUTEROL SULFATE 0.83 MG/ML
2.5 SOLUTION RESPIRATORY (INHALATION)
Status: CANCELLED | OUTPATIENT
Start: 2019-05-14

## 2019-04-23 RX ORDER — LORAZEPAM 2 MG/ML
0.5 INJECTION INTRAMUSCULAR EVERY 4 HOURS PRN
Status: CANCELLED
Start: 2019-04-24

## 2019-04-23 RX ORDER — ALBUTEROL SULFATE 90 UG/1
1-2 AEROSOL, METERED RESPIRATORY (INHALATION)
Status: CANCELLED
Start: 2019-05-14

## 2019-04-23 RX ORDER — EPINEPHRINE 0.3 MG/.3ML
0.3 INJECTION SUBCUTANEOUS EVERY 5 MIN PRN
Status: CANCELLED | OUTPATIENT
Start: 2019-04-24

## 2019-04-23 RX ORDER — MEPERIDINE HYDROCHLORIDE 25 MG/ML
25 INJECTION INTRAMUSCULAR; INTRAVENOUS; SUBCUTANEOUS EVERY 30 MIN PRN
Status: CANCELLED | OUTPATIENT
Start: 2019-04-24

## 2019-04-23 ASSESSMENT — PAIN SCALES - GENERAL: PAINLEVEL: NO PAIN (0)

## 2019-04-23 NOTE — PROGRESS NOTES
[facility]    FOLLOW-UP VISIT NOTE    PATIENT NAME: Kentrell Kirkpatrick MRN # [id number]  DATE OF VISIT: April 23, 2019 YOB: 1950    CANCER TYPE: NSCLC, adenocarcinoma  STAGE: IV  ECOG PS: 1    Cancer Staging  Non-small cell lung cancer, left (H)  Staging form: Lung, AJCC 8th Edition  - Clinical stage from 3/11/2019: Stage IV (cT1c, cN3, pM1c) - Signed by Susan Muller MD on 3/11/2019    PD-L1: <1%  Lung panel: 3/1/19 on AN45-434 A1 and K12-2079 A1. Negative  NGS: [employer] 360 sent 2/28/19 negative for actionable mutations. SMAD4 E538, TP53 H179R, SMO A327G. MSI not high    SUMMARY  6/27/18 CT chest w/contrast to re-evaluate aortic aneurysm: 8 mm spiculated KEATON nodule, 3 mm RML nodule unchanged from 3/15/17 and 2/25/16 scans  2/4/19 Presented to PCP with fairly rapid onset of shortness of breath. Given albuterol  2/19/18 CXR and CT chest w/contrast. Large left effusion  2/20/19 L thoracentesis (Dr. Rodriguez), 1180 cc  3/1/19 L pleurx (Dr. Rodriguez)  3/13/19 C1 carboplatin pemetrexed pembrolizumab  3/15/19 L supraclavicular LN bx (IR, FNA). Path: lung adenocarcinoma  4/3/19 C2 carboplatin pemetrexed pembrolizumab  4/15/19 FEV1 2.11 (61%), FVC 3.38 (73%), DLCO 27.7, 67% (59%)  4/18/19 TPA. Drained 900 cc after it got unclotted.     SUBJECTIVE  Mr. Kirkpatrick returns today for follow up of metastatic lung adenocarcinoma after 2 cycles of carboplatin pemetrexed pembrolizumab. Doing ok overall. 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 Still gets very winded to the point he has to sit down when walking up his stairs. No change from before. No improvement with ongoing drainage from pleurx. Appetite ok. No weight loss. No HA, vision changes, N/V. Food doesn't taste the same, but managing to eat. No cough, chest pain/pressure, diarrhea, constipation, bleeding, numbness/tingling. Still  taking diphenhydramine at night for insomnia. Still waking up a few times per night to urinate. Feels like has to work at emptying his bladder completely.     4/15 plugged  4/18 tPA. Drained 900 cc afterward, c/b severe pain  4/19 300 cc  4/20 100 cc  4/21 50 cc, drips only  4/22 50 cc after 4 minutes    PAST MEDICAL HISTORY  Lung adenocarcinoma as above  L5 disk herniation. Epidural injection 5/22/18. Improved dramatically with chiropracter in the past.   BPH  Ascending aortic aneurysm    CURRENT OUTPATIENT MEDICATIONS  Current Outpatient Medications   Medication Sig Dispense Refill     calcium polycarbophil (FIBERCON) 625 MG tablet Take 2 tablets by mouth daily       dexamethasone (DECADRON) 4 MG tablet Take 4 mg by mouth 2 times daily To be taken day before, day of, and day after infusion.. 6 tablet 0     folic acid (FOLVITE) 1 MG tablet Take 1 tablet (1 mg) by mouth daily 90 tablet 11     LORazepam (ATIVAN) 0.5 MG tablet Take 1 tablet (0.5 mg) by mouth every 6 hours as needed (Nausea/Vomiting) 30 tablet 3     MULTI VITAMIN MENS OR TABS 1 TABLET DAILY       ondansetron (ZOFRAN) 8 MG tablet Take 1 tablet (8 mg) by mouth every 8 hours as needed for nausea 30 tablet 11     prochlorperazine (COMPAZINE) 10 MG tablet Take 1 tablet (10 mg) by mouth every 6 hours as needed (Nausea/Vomiting) 30 tablet 11     tamsulosin (FLOMAX) 0.4 MG capsule Take 1 capsule (0.4 mg) by mouth daily 90 capsule 0     ASPIRIN PO Take 81 mg by mouth       QUEtiapine (SEROQUEL) 25 MG tablet Take 12.5mg daily. (Patient not taking: Reported on 4/23/2019) 15 tablet 6     traZODone (DESYREL) 50 MG tablet Take 0.5-1 tablets (25-50 mg) by mouth At Bedtime (Patient not taking: Reported on 4/3/2019) 60 tablet 1     ALLERGIES  No Known Allergies    REVIEW OF SYSTEMS  As above in the HPI, o/w complete 12-point ROS was negative.    PHYSICAL EXAM  /70   Pulse 90   Temp 97.6  F (36.4  C) (Oral)   Resp 16   Wt 80.3 kg (177 lb 0.5 oz)   SpO2 93%    BMI 24.01 kg/m    Wt Readings from Last 3 Encounters:   04/23/19 80.3 kg (177 lb 0.5 oz)   04/03/19 80.5 kg (177 lb 6.4 oz)   04/02/19 80.7 kg (178 lb)     GEN: NAD  HEENT: EOMI, no icterus, injection or pallor  LUNGS: clear bilaterally  CV: regular, no murmurs, rubs, or gallops  ABDOMEN: soft, non-tender, non-distended  EXT: warm, no edema  NEURO: alert      LABORATORY AND IMAGING STUDIES  Results for MAYA ELLER (MRN [id number]) as of 4/23/2019 14:40   4/3/2019 08:12 4/3/2019 08:40 4/15/2019 07:52 4/15/2019 08:57   Sodium  137     Potassium  4.5     Chloride  106     Carbon Dioxide  29     Urea Nitrogen  16     Creatinine  0.98     GFR Estimate  78     GFR Estimate If Black  >90     Calcium  8.8     Anion Gap  2 (L)     Albumin  3.0 (L)     Protein Total  7.1     Bilirubin Total  0.7     Alkaline Phosphatase  98     ALT  20     AST  16     TSH  1.02     Glucose  103 (H)     WBC 9.6      Hemoglobin 15.1      Hematocrit 44.2      Platelet Count 405      RBC Count 4.91      MCV 90      MCH 30.8      MCHC 34.2      RDW 13.8      Diff Method Automated Method      % Neutrophils 86.0      % Lymphocytes 7.6      % Monocytes 5.9      % Eosinophils 0.0      % Basophils 0.2      % Immature Granulocytes 0.3      Nucleated RBCs 0      Absolute Neutrophil 8.2      Absolute Lymphocytes 0.7 (L)      Absolute Monocytes 0.6      Absolute Eosinophils 0.0      Absolute Basophils 0.0      Abs Immature Granulocytes 0.0      Absolute Nucleated RBC 0.0      CT CHEST/ABDOMEN/PELVIS W CONTRAST   Rpt    FVC-Pred    4.59   FVC-Pre    3.38   FVC-%Pred-Pre    73   FEV1-Pre    2.11   FEV1-%Pred-Pre    61   FEV1FVC-Pred    76   FEV1FVC-Pre    63   FEV1SVC-Pred    67   FEV1SVC-Pre    59   FEV1FEV6-Pred    78   FEV1FEV6-Pre    65   FEFMax-Pred    8.91   FEFMax-Pre    6.64   FEFMax-%Pred-Pre    74   FIFMax-Pre    2.04   ExpTime-Pre    10.97   MIP-Pre    -112   MEP-Pre    101   MVV-Pred    136   MVV-Pre    106   MVV-%Pred-Pre    78   FRCPleth-Pred     3.81   FRCPleth-Pre    3.72   FRCPleth-%Pred-Pre    97   RVPleth-Pred    2.68   RVPleth-Pre    2.69   RVPleth-%Pred-Pre    100   TLCPleth-Pred    7.53   TLCPleth-Pre    6.26   TLCPleth-%Pred-Pre    83   ERV-Pred    1.42   ERV-Pre    1.03   ERV-%Pred-Pre    72   IC-Pred    3.71   IC-Pre    2.54   IC-%Pred-Pre    68   VC-Pred    5.13   VC-Pre    3.57   VC-%Pred-Pre    69   DLCOunc-Pred    27.72     Results for orders placed or performed during the hospital encounter of 04/15/19   CT Chest/Abdomen/Pelvis w Contrast    Narrative    CT CHEST, ABDOMEN, AND PELVIS WITH CONTRAST April 15, 2019 7:52 AM     HISTORY: Lung cancer - restaging scan after two cycles of chemo.  Non-small cell lung cancer, left (H).    COMPARISON: February 19, 2019.    TECHNIQUE: Volumetric helical acquisition of CT images from the lung  apices through the symphysis pubis after the administration of 86mL  Isovue-370 intravenous contrast. Radiation dose for this scan was  reduced using automated exposure control, adjustment of the mA and/or  kV according to patient size, or iterative reconstruction technique.    FINDINGS:     Chest: New tunneled catheter placement in the left pleural space with  an improved left pleural effusion. Residual airspace disease or mass  in the anterior left lung measures 3.4 x 4.2 cm. Nodularity in the  pericardium is again noted, the largest individual nodule measures 1.5  cm today previously 1.7 cm. A few very tiny nodules along the major  and minor fissures are noted on the right not significantly changed  since comparison study. 4 mm subpleural nodule in the right middle  lobe is stable image 203 series 6.    Abdomen and pelvis: Nodule in the left adrenal gland measures 1.8 x  2.5 cm previously 1.6 x 2.2 cm. Stable low dense lesion in the  anterior left lobe of liver, no new liver lesions. Spleen, pancreas,  kidneys, and right adrenal gland unremarkable. There are no dilated  loops of small intestine or large bowel  to suggest ileus or  obstruction. No free fluid. No free air. There are no lymph nodes that  are abnormal by size criteria.     Survey of the visualized bony structures demonstrates no destructive  bony lesions.      Impression    IMPRESSION:  1. Interval placement of a left pleural catheter with improvement in  the left pleural effusion and compressive atelectasis.  2. Question some residual atelectasis versus mass in the medial left  base.  3. Slight improvement in pericardial nodularity since comparison  study.  4. Slight enlargement in the left adrenal nodule since the comparison  study.  3. No new lesions.    ROBBY PARKER MD     Imaging was personally reviewed     ASSESSMENT AND PLAN  NSCLC, PD-L1 <1%: [place] to 2 cycles of carboplatin, pemetrexed, pembrolizumab. Will complete 2 more cycles and restage with CT CAP. Will continue getting treatment down in [place]. Next cycle scheduled tomorrow.      Dyspepsia: Likely related to dex prep with chemo. Takes ranitidine after chemo - resolves     Rhinorrhea: Not sure what's driving it. Doesn't usually have allergies. Could use afrin if it's driving him nuts, but I prefer to let it be.    Insomnia: Tried quietiapine or trazodone in the past, just one dose of each. Encouraged to try a bit longer course. Discussed referral to sleep medicine to work on sleep hygeine, pinpointing real issue. He'd rather delay.    Malignant pleural effusion: Pleurx draining less. Has appt in 2 weeks with Dr. Rodriguez. Planning to keep pleurx in and drain daily until early June.     Taste change: Secondary to pemetrexed. Encouraged good PO intake    Urinary hesitancy, BPH: Worsened by diphenhydramine he's taking for insomnia. On tamsulosin.     A total of 40 minutes was spent with the patient, >50% of which was spent in counseling and coordination of care.    Susan Muller MD    Hematology, Oncology and Transplantation

## 2019-04-23 NOTE — NURSING NOTE
Oncology Rooming Note    April 23, 2019 1:18 PM   Kentrell Kirkpatrick is a 68 year old male who presents for:    Chief Complaint   Patient presents with     Oncology Clinic Visit     Lung Ca      Initial Vitals: /70   Pulse 90   Temp 97.6  F (36.4  C) (Oral)   Resp 16   Wt 80.3 kg (177 lb 0.5 oz)   SpO2 93%   BMI 24.01 kg/m   Estimated body mass index is 24.01 kg/m  as calculated from the following:    Height as of 4/3/19: 1.829 m (6').    Weight as of this encounter: 80.3 kg (177 lb 0.5 oz). Body surface area is 2.02 meters squared.  No Pain (0) Comment: Data Unavailable   No LMP for male patient.  Allergies reviewed: Yes  Medications reviewed: Yes    Medications: Medication refills not needed today.  Pharmacy name entered into [employer]: [id number] - [place], [place] - [address]    Clinical concerns: CT results  Yohana  was notified.      Abeba Vegas MA

## 2019-04-23 NOTE — LETTER
4/23/2019       RE: Kentrell Kirkpatrick  [id number] [facility] [id number]     Dear Colleague,    Thank you for referring your patient, Kentrell Kirkpatrick, to the [facility]. Please see a copy of my visit note below.    [facility]    FOLLOW-UP VISIT NOTE    PATIENT NAME: Kentrell Kirkpatrick MRN # [id number]  DATE OF VISIT: April 23, 2019 YOB: 1950    CANCER TYPE: NSCLC, adenocarcinoma  STAGE: IV  ECOG PS: 1    Cancer Staging  Non-small cell lung cancer, left (H)  Staging form: Lung, AJCC 8th Edition  - Clinical stage from 3/11/2019: Stage IV (cT1c, cN3, pM1c) - Signed by Susan Muller MD on 3/11/2019    PD-L1: <1%  Lung panel: 3/1/19 on [id number] A1 and [id number] A1. Negative  NGS: [employer] 360 sent 2/28/19 negative for actionable mutations. SMAD4 E538, TP53 H179R, SMO A327G. MSI not high    SUMMARY  6/27/18 CT chest w/contrast to re-evaluate aortic aneurysm: 8 mm spiculated KEATON nodule, 3 mm RML nodule unchanged from 3/15/17 and 2/25/16 scans  2/4/19 Presented to PCP with fairly rapid onset of shortness of breath. Given albuterol  2/19/18 CXR and CT chest w/contrast. Large left effusion  2/20/19 L thoracentesis (Dr. Rodriguez), 1180 cc  3/1/19 L pleurx (Dr. Rodriguez)  3/13/19 C1 carboplatin pemetrexed pembrolizumab  3/15/19 L supraclavicular LN bx (IR, FNA). Path: lung adenocarcinoma  4/3/19 C2 carboplatin pemetrexed pembrolizumab  4/15/19 FEV1 2.11 (61%), FVC 3.38 (73%), DLCO 27.7, 67% (59%)  4/18/19 TPA. Drained 900 cc after it got unclotted.     SUBJECTIVE  Mr. Kirkpatrick returns today for follow up of metastatic lung adenocarcinoma after 2 cycles of carboplatin pemetrexed pembrolizumab. Doing ok overall. 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 Still gets very winded to the point he has to sit down when walking up his stairs. No change from before.  No improvement with ongoing drainage from pleurx. Appetite ok. No weight loss. No HA, vision changes, N/V. Food doesn't taste the same, but managing to eat. No cough, chest pain/pressure, diarrhea, constipation, bleeding, numbness/tingling. Still taking diphenhydramine at night for insomnia. Still waking up a few times per night to urinate. Feels like has to work at emptying his bladder completely.     4/15 plugged  4/18 tPA. Drained 900 cc afterward, c/b severe pain  4/19 300 cc  4/20 100 cc  4/21 50 cc, drips only  4/22 50 cc after 4 minutes    PAST MEDICAL HISTORY  Lung adenocarcinoma as above  L5 disk herniation. Epidural injection 5/22/18. Improved dramatically with chiropracter in the past.   BPH  Ascending aortic aneurysm    CURRENT OUTPATIENT MEDICATIONS  Current Outpatient Medications   Medication Sig Dispense Refill     calcium polycarbophil (FIBERCON) 625 MG tablet Take 2 tablets by mouth daily       dexamethasone (DECADRON) 4 MG tablet Take 4 mg by mouth 2 times daily To be taken day before, day of, and day after infusion.. 6 tablet 0     folic acid (FOLVITE) 1 MG tablet Take 1 tablet (1 mg) by mouth daily 90 tablet 11     LORazepam (ATIVAN) 0.5 MG tablet Take 1 tablet (0.5 mg) by mouth every 6 hours as needed (Nausea/Vomiting) 30 tablet 3     MULTI VITAMIN MENS OR TABS 1 TABLET DAILY       ondansetron (ZOFRAN) 8 MG tablet Take 1 tablet (8 mg) by mouth every 8 hours as needed for nausea 30 tablet 11     prochlorperazine (COMPAZINE) 10 MG tablet Take 1 tablet (10 mg) by mouth every 6 hours as needed (Nausea/Vomiting) 30 tablet 11     tamsulosin (FLOMAX) 0.4 MG capsule Take 1 capsule (0.4 mg) by mouth daily 90 capsule 0     ASPIRIN PO Take 81 mg by mouth       QUEtiapine (SEROQUEL) 25 MG tablet Take 12.5mg daily. (Patient not taking: Reported on 4/23/2019) 15 tablet 6     traZODone (DESYREL) 50 MG tablet Take 0.5-1 tablets (25-50 mg) by mouth At Bedtime (Patient not taking: Reported on 4/3/2019) 60 tablet  1     ALLERGIES  No Known Allergies    REVIEW OF SYSTEMS  As above in the HPI, o/w complete 12-point ROS was negative.    PHYSICAL EXAM  /70   Pulse 90   Temp 97.6  F (36.4  C) (Oral)   Resp 16   Wt 80.3 kg (177 lb 0.5 oz)   SpO2 93%   BMI 24.01 kg/m     Wt Readings from Last 3 Encounters:   04/23/19 80.3 kg (177 lb 0.5 oz)   04/03/19 80.5 kg (177 lb 6.4 oz)   04/02/19 80.7 kg (178 lb)     GEN: NAD  HEENT: EOMI, no icterus, injection or pallor  LUNGS: clear bilaterally  CV: regular, no murmurs, rubs, or gallops  ABDOMEN: soft, non-tender, non-distended  EXT: warm, no edema  NEURO: alert      LABORATORY AND IMAGING STUDIES  Results for MAYA ELLER (MRN [id number]) as of 4/23/2019 14:40   4/3/2019 08:12 4/3/2019 08:40 4/15/2019 07:52 4/15/2019 08:57   Sodium  137     Potassium  4.5     Chloride  106     Carbon Dioxide  29     Urea Nitrogen  16     Creatinine  0.98     GFR Estimate  78     GFR Estimate If Black  >90     Calcium  8.8     Anion Gap  2 (L)     Albumin  3.0 (L)     Protein Total  7.1     Bilirubin Total  0.7     Alkaline Phosphatase  98     ALT  20     AST  16     TSH  1.02     Glucose  103 (H)     WBC 9.6      Hemoglobin 15.1      Hematocrit 44.2      Platelet Count 405      RBC Count 4.91      MCV 90      MCH 30.8      MCHC 34.2      RDW 13.8      Diff Method Automated Method      % Neutrophils 86.0      % Lymphocytes 7.6      % Monocytes 5.9      % Eosinophils 0.0      % Basophils 0.2      % Immature Granulocytes 0.3      Nucleated RBCs 0      Absolute Neutrophil 8.2      Absolute Lymphocytes 0.7 (L)      Absolute Monocytes 0.6      Absolute Eosinophils 0.0      Absolute Basophils 0.0      Abs Immature Granulocytes 0.0      Absolute Nucleated RBC 0.0      CT CHEST/ABDOMEN/PELVIS W CONTRAST   Rpt    FVC-Pred    4.59   FVC-Pre    3.38   FVC-%Pred-Pre    73   FEV1-Pre    2.11   FEV1-%Pred-Pre    61   FEV1FVC-Pred    76   FEV1FVC-Pre    63   FEV1SVC-Pred    67   FEV1SVC-Pre    59    FEV1FEV6-Pred    78   FEV1FEV6-Pre    65   FEFMax-Pred    8.91   FEFMax-Pre    6.64   FEFMax-%Pred-Pre    74   FIFMax-Pre    2.04   ExpTime-Pre    10.97   MIP-Pre    -112   MEP-Pre    101   MVV-Pred    136   MVV-Pre    106   MVV-%Pred-Pre    78   FRCPleth-Pred    3.81   FRCPleth-Pre    3.72   FRCPleth-%Pred-Pre    97   RVPleth-Pred    2.68   RVPleth-Pre    2.69   RVPleth-%Pred-Pre    100   TLCPleth-Pred    7.53   TLCPleth-Pre    6.26   TLCPleth-%Pred-Pre    83   ERV-Pred    1.42   ERV-Pre    1.03   ERV-%Pred-Pre    72   IC-Pred    3.71   IC-Pre    2.54   IC-%Pred-Pre    68   VC-Pred    5.13   VC-Pre    3.57   VC-%Pred-Pre    69   DLCOunc-Pred    27.72     Results for orders placed or performed during the hospital encounter of 04/15/19   CT Chest/Abdomen/Pelvis w Contrast    Narrative    CT CHEST, ABDOMEN, AND PELVIS WITH CONTRAST April 15, 2019 7:52 AM     HISTORY: Lung cancer - restaging scan after two cycles of chemo.  Non-small cell lung cancer, left (H).    COMPARISON: February 19, 2019.    TECHNIQUE: Volumetric helical acquisition of CT images from the lung  apices through the symphysis pubis after the administration of 86mL  Isovue-370 intravenous contrast. Radiation dose for this scan was  reduced using automated exposure control, adjustment of the mA and/or  kV according to patient size, or iterative reconstruction technique.    FINDINGS:     Chest: New tunneled catheter placement in the left pleural space with  an improved left pleural effusion. Residual airspace disease or mass  in the anterior left lung measures 3.4 x 4.2 cm. Nodularity in the  pericardium is again noted, the largest individual nodule measures 1.5  cm today previously 1.7 cm. A few very tiny nodules along the major  and minor fissures are noted on the right not significantly changed  since comparison study. 4 mm subpleural nodule in the right middle  lobe is stable image 203 series 6.    Abdomen and pelvis: Nodule in the left adrenal  gland measures 1.8 x  2.5 cm previously 1.6 x 2.2 cm. Stable low dense lesion in the  anterior left lobe of liver, no new liver lesions. Spleen, pancreas,  kidneys, and right adrenal gland unremarkable. There are no dilated  loops of small intestine or large bowel to suggest ileus or  obstruction. No free fluid. No free air. There are no lymph nodes that  are abnormal by size criteria.     Survey of the visualized bony structures demonstrates no destructive  bony lesions.      Impression    IMPRESSION:  1. Interval placement of a left pleural catheter with improvement in  the left pleural effusion and compressive atelectasis.  2. Question some residual atelectasis versus mass in the medial left  base.  3. Slight improvement in pericardial nodularity since comparison  study.  4. Slight enlargement in the left adrenal nodule since the comparison  study.  3. No new lesions.    ROBBY PARKER MD     Imaging was personally reviewed     ASSESSMENT AND PLAN  NSCLC, PD-L1 <1%: [place] to 2 cycles of carboplatin, pemetrexed, pembrolizumab. Will complete 2 more cycles and restage with CT CAP. Will continue getting treatment down in [place]. Next cycle scheduled tomorrow.      Dyspepsia: Likely related to dex prep with chemo. Takes ranitidine after chemo - resolves     Rhinorrhea: Not sure what's driving it. Doesn't usually have allergies. Could use afrin if it's driving him nuts, but I prefer to let it be.    Insomnia: Tried quietiapine or trazodone in the past, just one dose of each. Encouraged to try a bit longer course. Discussed referral to sleep medicine to work on sleep hygeine, pinpointing real issue. He'd rather delay.    Malignant pleural effusion: Pleurx draining less. Has appt in 2 weeks with Dr. Rodriguez. Planning to keep pleurx in and drain daily until early June.     Taste change: Secondary to pemetrexed. Encouraged good PO intake    Urinary hesitancy, BPH: Worsened by diphenhydramine he's taking for insomnia. On  tamsulosin.     A total of 40 minutes was spent with the patient, >50% of which was spent in counseling and coordination of care.    Susan Muller MD    Hematology, Oncology and Transplantation

## 2019-04-24 ENCOUNTER — INFUSION THERAPY VISIT (OUTPATIENT)
Dept: INFUSION THERAPY | Facility: CLINIC | Age: 69
End: 2019-04-24
Attending: INTERNAL MEDICINE
Payer: COMMERCIAL

## 2019-04-24 ENCOUNTER — HOSPITAL ENCOUNTER (OUTPATIENT)
Facility: CLINIC | Age: 69
Setting detail: SPECIMEN
Discharge: HOME | End: 2019-04-24
Attending: INTERNAL MEDICINE | Admitting: INTERNAL MEDICINE
Payer: COMMERCIAL

## 2019-04-24 VITALS
OXYGEN SATURATION: 94 % | DIASTOLIC BLOOD PRESSURE: 71 MMHG | TEMPERATURE: 97.4 F | RESPIRATION RATE: 16 BRPM | WEIGHT: 174.6 LBS | BODY MASS INDEX: 23.68 KG/M2 | HEART RATE: 91 BPM | SYSTOLIC BLOOD PRESSURE: 109 MMHG

## 2019-04-24 DIAGNOSIS — C34.92 NON-SMALL CELL LUNG CANCER, LEFT (H): Primary | ICD-10-CM

## 2019-04-24 LAB
ALBUMIN SERPL-MCNC: 2.3 G/DL (ref 3.4–5)
ALP SERPL-CCNC: 86 U/L (ref 40–150)
ALT SERPL W P-5'-P-CCNC: 17 U/L (ref 0–70)
ANION GAP SERPL CALCULATED.3IONS-SCNC: 5 MMOL/L (ref 3–14)
AST SERPL W P-5'-P-CCNC: 14 U/L (ref 0–45)
BASOPHILS # BLD AUTO: 0 10E9/L (ref 0–0.2)
BASOPHILS NFR BLD AUTO: 0.1 %
BILIRUB SERPL-MCNC: 0.6 MG/DL (ref 0.2–1.3)
BUN SERPL-MCNC: 18 MG/DL (ref 7–30)
CALCIUM SERPL-MCNC: 8.7 MG/DL (ref 8.5–10.1)
CHLORIDE SERPL-SCNC: 107 MMOL/L (ref 94–109)
CO2 SERPL-SCNC: 25 MMOL/L (ref 20–32)
CREAT SERPL-MCNC: 0.99 MG/DL (ref 0.66–1.25)
DIFFERENTIAL METHOD BLD: ABNORMAL
EOSINOPHIL # BLD AUTO: 0 10E9/L (ref 0–0.7)
EOSINOPHIL NFR BLD AUTO: 0 %
ERYTHROCYTE [DISTWIDTH] IN BLOOD BY AUTOMATED COUNT: 15.2 % (ref 10–15)
GFR SERPL CREATININE-BSD FRML MDRD: 77 ML/MIN/{1.73_M2}
GLUCOSE SERPL-MCNC: 113 MG/DL (ref 70–99)
HCT VFR BLD AUTO: 35.4 % (ref 40–53)
HGB BLD-MCNC: 11.9 G/DL (ref 13.3–17.7)
IMM GRANULOCYTES # BLD: 0.1 10E9/L (ref 0–0.4)
IMM GRANULOCYTES NFR BLD: 0.6 %
LYMPHOCYTES # BLD AUTO: 0.7 10E9/L (ref 0.8–5.3)
LYMPHOCYTES NFR BLD AUTO: 8.3 %
MCH RBC QN AUTO: 29.9 PG (ref 26.5–33)
MCHC RBC AUTO-ENTMCNC: 33.6 G/DL (ref 31.5–36.5)
MCV RBC AUTO: 89 FL (ref 78–100)
MONOCYTES # BLD AUTO: 0.7 10E9/L (ref 0–1.3)
MONOCYTES NFR BLD AUTO: 8.8 %
NEUTROPHILS # BLD AUTO: 6.7 10E9/L (ref 1.6–8.3)
NEUTROPHILS NFR BLD AUTO: 82.2 %
NRBC # BLD AUTO: 0 10*3/UL
NRBC BLD AUTO-RTO: 0 /100
PLATELET # BLD AUTO: 453 10E9/L (ref 150–450)
POTASSIUM SERPL-SCNC: 4.5 MMOL/L (ref 3.4–5.3)
PROT SERPL-MCNC: 7 G/DL (ref 6.8–8.8)
RBC # BLD AUTO: 3.98 10E12/L (ref 4.4–5.9)
SODIUM SERPL-SCNC: 137 MMOL/L (ref 133–144)
TSH SERPL DL<=0.005 MIU/L-ACNC: 1.53 MU/L (ref 0.4–4)
WBC # BLD AUTO: 8.1 10E9/L (ref 4–11)

## 2019-04-24 PROCEDURE — 84443 ASSAY THYROID STIM HORMONE: CPT | Performed by: INTERNAL MEDICINE

## 2019-04-24 PROCEDURE — 80053 COMPREHEN METABOLIC PANEL: CPT | Performed by: INTERNAL MEDICINE

## 2019-04-24 PROCEDURE — 96411 CHEMO IV PUSH ADDL DRUG: CPT

## 2019-04-24 PROCEDURE — 25000128 H RX IP 250 OP 636: Performed by: INTERNAL MEDICINE

## 2019-04-24 PROCEDURE — 25800030 ZZH RX IP 258 OP 636: Performed by: INTERNAL MEDICINE

## 2019-04-24 PROCEDURE — 96375 TX/PRO/DX INJ NEW DRUG ADDON: CPT

## 2019-04-24 PROCEDURE — 96417 CHEMO IV INFUS EACH ADDL SEQ: CPT

## 2019-04-24 PROCEDURE — 96413 CHEMO IV INFUSION 1 HR: CPT

## 2019-04-24 PROCEDURE — 85025 COMPLETE CBC W/AUTO DIFF WBC: CPT | Performed by: INTERNAL MEDICINE

## 2019-04-24 RX ORDER — DEXAMETHASONE 4 MG/1
4 TABLET ORAL 2 TIMES DAILY
Qty: 6 TABLET | Refills: 0 | Status: SHIPPED | OUTPATIENT
Start: 2019-04-24 | End: 2019-05-15

## 2019-04-24 RX ADMIN — CARBOPLATIN 550 MG: 10 INJECTION, SOLUTION INTRAVENOUS at 13:38

## 2019-04-24 RX ADMIN — SODIUM CHLORIDE 200 MG: 9 INJECTION, SOLUTION INTRAVENOUS at 12:43

## 2019-04-24 RX ADMIN — DEXAMETHASONE SODIUM PHOSPHATE: 10 INJECTION, SOLUTION INTRAMUSCULAR; INTRAVENOUS at 12:16

## 2019-04-24 RX ADMIN — SODIUM CHLORIDE 250 ML: 9 INJECTION, SOLUTION INTRAVENOUS at 12:16

## 2019-04-24 RX ADMIN — SODIUM CHLORIDE 1000 MG: 9 INJECTION, SOLUTION INTRAVENOUS at 13:24

## 2019-04-24 ASSESSMENT — PAIN SCALES - GENERAL: PAINLEVEL: NO PAIN (0)

## 2019-04-24 NOTE — PROGRESS NOTES
Infusion Nursing Note:  Kentrell Kirkpatrick presents today for C3D1 Keytruda, Alimta, Carboplatin.    Patient seen by provider today: No   present during visit today: Not Applicable.    Note: OK to refill PO Dexamethasone as previous ordered.  ROSIE JAIME/ Ankit MONTES.    Intravenous Access:  Peripheral IV placed.    Treatment Conditions:  Lab Results   Component Value Date    HGB 11.9 04/24/2019     Lab Results   Component Value Date    WBC 8.1 04/24/2019      Lab Results   Component Value Date    ANEU 6.7 04/24/2019     Lab Results   Component Value Date     04/24/2019      Lab Results   Component Value Date     04/24/2019                   Lab Results   Component Value Date    POTASSIUM 4.5 04/24/2019           No results found for: MAG         Lab Results   Component Value Date    CR 0.99 04/24/2019                   Lab Results   Component Value Date    MALATHI 8.7 04/24/2019                Lab Results   Component Value Date    BILITOTAL 0.6 04/24/2019           Lab Results   Component Value Date    ALBUMIN 2.3 04/24/2019                    Lab Results   Component Value Date    ALT 17 04/24/2019           Lab Results   Component Value Date    AST 14 04/24/2019       Results reviewed, labs MET treatment parameters, ok to proceed with treatment.    Post Infusion Assessment:  Patient tolerated infusion without incident.  Blood return noted pre and post infusion.  Site patent and intact, free from redness, edema or discomfort.  No evidence of extravasations.  Access discontinued per protocol.     Discharge Plan:   Prescription refills given for Dexamethasone.  Discharge instructions reviewed with: Patient.  Patient and/or family verbalized understanding of discharge instructions and all questions answered.  AVS to patient via [employer].  Patient will return 5/15 for next appointment.   Patient discharged in stable condition accompanied by: wife.  Departure Mode: Ambulatory.    Digna MANCINI  JYOTI Oh

## 2019-04-25 DIAGNOSIS — J44.9 STAGE 2 MODERATE CHRONIC OBSTRUCTIVE PULMONARY DISEASE BY GLOBAL INITIATIVE FOR CHRONIC OBSTRUCTIVE LUNG DISEASE CLASSIFICATION (H): Primary | ICD-10-CM

## 2019-04-30 ENCOUNTER — OFFICE VISIT (OUTPATIENT)
Dept: FAMILY MEDICINE | Facility: CLINIC | Age: 69
End: 2019-04-30
Payer: COMMERCIAL

## 2019-04-30 VITALS
HEART RATE: 99 BPM | WEIGHT: 176 LBS | OXYGEN SATURATION: 97 % | SYSTOLIC BLOOD PRESSURE: 122 MMHG | DIASTOLIC BLOOD PRESSURE: 78 MMHG | TEMPERATURE: 97.6 F | BODY MASS INDEX: 23.87 KG/M2

## 2019-04-30 DIAGNOSIS — N40.1 BENIGN NODULAR PROSTATIC HYPERPLASIA WITH LOWER URINARY TRACT SYMPTOMS: ICD-10-CM

## 2019-04-30 DIAGNOSIS — R39.89 URINARY PROBLEM: Primary | ICD-10-CM

## 2019-04-30 DIAGNOSIS — Z12.5 SCREENING FOR PROSTATE CANCER: ICD-10-CM

## 2019-04-30 LAB
ALBUMIN UR-MCNC: 100 MG/DL
APPEARANCE UR: CLEAR
BACTERIA #/AREA URNS HPF: ABNORMAL /HPF
BILIRUB UR QL STRIP: NEGATIVE
COLOR UR AUTO: YELLOW
GLUCOSE UR STRIP-MCNC: NEGATIVE MG/DL
HGB UR QL STRIP: NEGATIVE
KETONES UR STRIP-MCNC: NEGATIVE MG/DL
LEUKOCYTE ESTERASE UR QL STRIP: NEGATIVE
NITRATE UR QL: NEGATIVE
NON-SQ EPI CELLS #/AREA URNS LPF: ABNORMAL /LPF
PH UR STRIP: 7 PH (ref 5–7)
PSA SERPL-ACNC: 4.43 UG/L (ref 0–4)
RBC #/AREA URNS AUTO: ABNORMAL /HPF
SOURCE: ABNORMAL
SP GR UR STRIP: 1.02 (ref 1–1.03)
UROBILINOGEN UR STRIP-ACNC: 0.2 EU/DL (ref 0.2–1)
WBC #/AREA URNS AUTO: ABNORMAL /HPF

## 2019-04-30 PROCEDURE — 36415 COLL VENOUS BLD VENIPUNCTURE: CPT | Performed by: FAMILY MEDICINE

## 2019-04-30 PROCEDURE — 81001 URINALYSIS AUTO W/SCOPE: CPT | Performed by: FAMILY MEDICINE

## 2019-04-30 PROCEDURE — 99213 OFFICE O/P EST LOW 20 MIN: CPT | Performed by: FAMILY MEDICINE

## 2019-04-30 PROCEDURE — G0103 PSA SCREENING: HCPCS | Performed by: FAMILY MEDICINE

## 2019-04-30 RX ORDER — FINASTERIDE 5 MG/1
5 TABLET, FILM COATED ORAL DAILY
Qty: 30 TABLET | Refills: 5 | Status: SHIPPED | OUTPATIENT
Start: 2019-04-30 | End: 2019-01-01

## 2019-04-30 NOTE — PROGRESS NOTES
SUBJECTIVE:   Kentrell Kirkpatrick is a 68 year old male who presents to clinic today for the following   health issues:      Genitourinary - Male  Onset: weeks    Description:   Dysuria (painful urination): no   Hematuria (blood in urine): no   Frequency: no   Are you urinating at night : no - can't seem to urinate at night  Hesitancy (delay in urine): no   Retention (unable to empty): no   Decrease in urinary flow: no   Incontinence: no     Progression of Symptoms:  worsening    Accompanying Signs & Symptoms:  Fever: no   Back/Flank pain: no   Urethral discharge: no   Testicle lumps/masses/pain: no   Nausea and/or vomiting: no   Abdominal pain: no     History:   History of frequent UTI's: no   History of kidney stones: no   History of hernias: no   Personal or Family history of Prostate problems: YES- brother had prostate cancer  Sexually active: no     Precipitating factors:   none    Alleviating factors:  none    Has been using OTC sleeping aids - does not remember what it is, has been told that they can affect urination. Tried Seroquel without relief. Also tried Trazodone without relief. Symptoms are primarily at night. He wakes up needing to urinate but then is unable to do so. Does not have the issue during the day.    Additional history: as documented    Reviewed  and updated as needed this visit by clinical staff  Tobacco  Allergies  Meds  Problems  Med Hx  Surg Hx  Fam Hx         Reviewed and updated as needed this visit by Provider  Tobacco  Allergies  Meds  Problems  Med Hx  Surg Hx  Fam Hx         Patient Active Problem List   Diagnosis     Back pain     CARDIOVASCULAR SCREENING; LDL GOAL LESS THAN 160     Advance Care Planning     Benign prostatic hyperplasia with lower urinary tract symptoms     Benign neoplasm of transverse colon     10 year risk of MI or stroke 7.5% or greater     Ascending aortic aneurysm (H)     Right-sided low back pain with right-sided sciatica     SKYLER (obstructive  sleep apnea)     Non-small cell lung cancer, left (H)     Past Surgical History:   Procedure Laterality Date     COLONOSCOPY       COLONOSCOPY N/A 2016    Procedure: COMBINED COLONOSCOPY, SINGLE OR MULTIPLE BIOPSY/POLYPECTOMY BY BIOPSY;  Surgeon: Jeremi Kramer MD;  Location:  GI     INSERT CHEST TUBE Left 3/1/2019    Procedure: INSERT CHEST TUBE;  Surgeon: Matthew Rodriguez MD;  Location:  GI     IR LYMPH NODE BIOPSY  3/15/2019     THORACENTESIS Left 2019    Procedure: THORACENTESIS;  Surgeon: Matthew Rodriguez MD;  Location:  GI       Social History     Tobacco Use     Smoking status: Former Smoker     Packs/day: 1.50     Years: 43.00     Pack years: 64.50     Types: Cigarettes     Last attempt to quit: 2007     Years since quittin.3     Smokeless tobacco: Never Used   Substance Use Topics     Alcohol use: Yes     Comment: 3-4 a week     Family History   Problem Relation Age of Onset     Heart Disease Father      Prostate Cancer Other      Prostate Cancer Brother      Colon Cancer No family hx of            ROS:  Constitutional, HEENT, cardiovascular, pulmonary, gi and gu systems are negative, except as otherwise noted.    OBJECTIVE:     /78   Pulse 99   Temp 97.6  F (36.4  C) (Oral)   Wt 79.8 kg (176 lb)   SpO2 97%   BMI 23.87 kg/m    Body mass index is 23.87 kg/m .  GENERAL: healthy, alert and no distress  RESP: lungs clear to auscultation - no rales, rhonchi or wheezes  CV: regular rate and rhythm, normal S1 S2, no S3 or S4, no murmur, click or rub, no peripheral edema and peripheral pulses strong  PSYCH: mentation appears normal, affect normal/bright    Diagnostic Test Results:  urinalysis with some protein but otherwise normal    ASSESSMENT/PLAN:   1. Urinary problem  - *UA reflex to Microscopic and Culture ([place] [facility] (except [place] and [place])  - Urine Microscopic    2. Benign nodular prostatic hyperplasia with lower urinary tract  symptoms: start finasteride to accompany Flomax. Check PSA. If no improvement or PSA returns elevated, refer to urology.   - finasteride (PROSCAR) 5 MG tablet; Take 1 tablet (5 mg) by mouth daily  Dispense: 30 tablet; Refill: 5    3. Screening for prostate cancer  - PROSTATE SPEC ANTIGEN SCREEN    Donald Shell,   [facility]

## 2019-04-30 NOTE — PATIENT INSTRUCTIONS
Let me know how you are doing in 2 weeks with regards to urinary symptoms. If no improvement, I will put in a referral to urology.

## 2019-05-01 DIAGNOSIS — R97.20 ELEVATED PROSTATE SPECIFIC ANTIGEN (PSA): Primary | ICD-10-CM

## 2019-05-01 DIAGNOSIS — N40.1 BENIGN NODULAR PROSTATIC HYPERPLASIA WITH LOWER URINARY TRACT SYMPTOMS: ICD-10-CM

## 2019-05-07 ENCOUNTER — OFFICE VISIT (OUTPATIENT)
Dept: PULMONOLOGY | Facility: CLINIC | Age: 69
End: 2019-05-07
Attending: INTERNAL MEDICINE
Payer: COMMERCIAL

## 2019-05-07 ENCOUNTER — TELEPHONE (OUTPATIENT)
Dept: PULMONOLOGY | Facility: CLINIC | Age: 69
End: 2019-05-07

## 2019-05-07 ENCOUNTER — ANCILLARY PROCEDURE (OUTPATIENT)
Dept: CT IMAGING | Facility: CLINIC | Age: 69
End: 2019-05-07
Attending: INTERNAL MEDICINE
Payer: COMMERCIAL

## 2019-05-07 VITALS
TEMPERATURE: 97.6 F | OXYGEN SATURATION: 96 % | SYSTOLIC BLOOD PRESSURE: 115 MMHG | BODY MASS INDEX: 23.87 KG/M2 | WEIGHT: 176 LBS | DIASTOLIC BLOOD PRESSURE: 71 MMHG | HEART RATE: 84 BPM

## 2019-05-07 DIAGNOSIS — J90 PLEURAL EFFUSION: Primary | ICD-10-CM

## 2019-05-07 DIAGNOSIS — J90 PLEURAL EFFUSION: ICD-10-CM

## 2019-05-07 DIAGNOSIS — J44.9 CHRONIC OBSTRUCTIVE PULMONARY DISEASE, UNSPECIFIED COPD TYPE (H): ICD-10-CM

## 2019-05-07 PROCEDURE — 25000128 H RX IP 250 OP 636: Mod: ZF | Performed by: CLINICAL NURSE SPECIALIST

## 2019-05-07 PROCEDURE — G0009 ADMIN PNEUMOCOCCAL VACCINE: HCPCS

## 2019-05-07 PROCEDURE — G0463 HOSPITAL OUTPT CLINIC VISIT: HCPCS | Mod: 25,ZF

## 2019-05-07 PROCEDURE — G0463 HOSPITAL OUTPT CLINIC VISIT: HCPCS | Mod: ZF

## 2019-05-07 PROCEDURE — 90670 PCV13 VACCINE IM: CPT | Mod: ZF | Performed by: CLINICAL NURSE SPECIALIST

## 2019-05-07 RX ORDER — TIOTROPIUM BROMIDE 18 UG/1
18 CAPSULE ORAL; RESPIRATORY (INHALATION) DAILY
Qty: 1 CAPSULE | Refills: 11 | Status: SHIPPED | OUTPATIENT
Start: 2019-05-07 | End: 2020-01-01

## 2019-05-07 RX ORDER — ALBUTEROL SULFATE 90 UG/1
2 AEROSOL, METERED RESPIRATORY (INHALATION) EVERY 6 HOURS
Qty: 8.5 G | Refills: 3 | Status: SHIPPED | OUTPATIENT
Start: 2019-05-07 | End: 2019-10-08

## 2019-05-07 RX ORDER — PHENOL 1.4 %
10 AEROSOL, SPRAY (ML) MUCOUS MEMBRANE
COMMUNITY

## 2019-05-07 RX ADMIN — PNEUMOCOCCAL 13-VALENT CONJUGATE VACCINE 0.5 ML: 2.2; 2.2; 2.2; 2.2; 2.2; 4.4; 2.2; 2.2; 2.2; 2.2; 2.2; 2.2; 2.2 INJECTION, SUSPENSION INTRAMUSCULAR at 09:28

## 2019-05-07 ASSESSMENT — PAIN SCALES - GENERAL: PAINLEVEL: NO PAIN (0)

## 2019-05-07 NOTE — PROGRESS NOTES
LUNG NODULE & INTERVENTIONAL PULMONARY CLINIC  CLINICS & SURGERY CENTER, [facility]     Kentrell Kirkpatrick MRN# [id number]   Age: 68 year old YOB: 1950     Reason for Consultation: pleural effusion and COPD    Requesting Physician: Matthew Rodriguez MD  [address] [place] [address]       Assessment and Plan:    1. NSCLC and malignant pleural effusion s/p TPC. Since declotting with TPA, drainage has been minimal with <10cc per day. I will get CT chest today to evaluate. If no fluid, will plan to remove the TPC this wk. If there is moderate pleural effusion, will likely need to repeat the TPA.     2. COPD. Has moderate airflow obstruction and reduced DLCO. I will initiate LAMA today. He will prevnar 13 as well. Would hold off pulm rehab for now given that he has not been on controller therapy.     Billing: I spent more than 30 minutes face to face and greater than 50% of time was for counseling and coordination of care about the issues above.      Matthew Rodriguez MD   of Medicine  Interventional Pulmonology  Department of Pulmonary, Allergy, Critical Care and Sleep Medicine   [facility]  Pager: [phone]          History:     Kentrell Kirkpatrick is a 68 year old male with sig h/o for NSCLC who is here for evaluation/followup of TPC.     - No new resp sx or complaints. Denies dyspnea or cough.   - Had positive left pleural effusion for adenocarcinoma. PET-CT shows M1b disease. Had PleurX placed 3/2 and recently draining 50-350cc/d. Had TPA instilled for clogged tube in 4/20. He has not noticed any more clotting in the tube and drainage is <10cc per day since end of April.    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     - Tobacco hx: Past Smoker: 1.5ppd for 47years. Quit 2005.   - My interpretation of the images relevant  for this visit includes: left pleurx in place    - My interpretation of the PFT's relevant for this visit includes: Moderate airflow obstruction and reduced DLCO.      Other active medical problems include:   - NSCLC stage 4. Receiving chemotherapy. Pleurx draining as above.   - has COPD. No AECOPD. Up-to-date on flu vaccine but needs PNA. Maintains activity and weight.          Past Medical History:      Past Medical History:   Diagnosis Date     Colon polyps            Past Surgical History:      Past Surgical History:   Procedure Laterality Date     COLONOSCOPY       COLONOSCOPY N/A 2016    Procedure: COMBINED COLONOSCOPY, SINGLE OR MULTIPLE BIOPSY/POLYPECTOMY BY BIOPSY;  Surgeon: Jeremi Kramer MD;  Location:  GI     INSERT CHEST TUBE Left 3/1/2019    Procedure: INSERT CHEST TUBE;  Surgeon: Matthew Rodriguez MD;  [facility]: [facility]     IR LYMPH NODE BIOPSY  3/15/2019     THORACENTESIS Left 2019    Procedure: THORACENTESIS;  Surgeon: Matthew Rodriguez MD;  Location:  GI          Social History:     Social History     Tobacco Use     Smoking status: Former Smoker     Packs/day: 1.50     Years: 43.00     Pack years: 64.50     Types: Cigarettes     Last attempt to quit: 2007     Years since quittin.3     Smokeless tobacco: Never Used   Substance Use Topics     Alcohol use: Yes     Comment: 3-4 a week          Family History:     Family History   Problem Relation Age of Onset     Heart Disease Father      Prostate Cancer Other      Prostate Cancer Brother      Colon Cancer No family hx of            Allergies:    No Known Allergies       Medications:     Current Outpatient Medications   Medication Sig     calcium polycarbophil (FIBERCON) 625 MG tablet Take 2 tablets by mouth daily     dexamethasone (DECADRON) 4 MG tablet Take 4 mg by mouth 2 times daily To be taken day before, day of, and day after infusion..     finasteride (PROSCAR) 5 MG tablet Take 1 tablet (5 mg) by mouth daily      folic acid (FOLVITE) 1 MG tablet Take 1 tablet (1 mg) by mouth daily     Melatonin 10 MG TABS tablet Take 10 mg by mouth nightly as needed for sleep     MULTI VITAMIN MENS OR TABS 1 TABLET DAILY     ranitidine (ZANTAC) 75 MG tablet Take 75 mg by mouth as needed for heartburn     tamsulosin (FLOMAX) 0.4 MG capsule Take 1 capsule (0.4 mg) by mouth daily     ASPIRIN PO Take 81 mg by mouth     LORazepam (ATIVAN) 0.5 MG tablet Take 1 tablet (0.5 mg) by mouth every 6 hours as needed (Nausea/Vomiting) (Patient not taking: Reported on 5/7/2019)     ondansetron (ZOFRAN) 8 MG tablet Take 1 tablet (8 mg) by mouth every 8 hours as needed for nausea (Patient not taking: Reported on 5/7/2019)     prochlorperazine (COMPAZINE) 10 MG tablet Take 1 tablet (10 mg) by mouth every 6 hours as needed (Nausea/Vomiting) (Patient not taking: Reported on 5/7/2019)     No current facility-administered medications for this visit.      Facility-Administered Medications Ordered in Other Visits   Medication     iohexol (OMNIPAQUE) 300 mg/mL injection 10 mL          Review of Systems:     CONSTITUTIONAL: negative for fever, chills, change in weight  INTEGUMENTARY/SKIN: no rash or obvious new lesions  ENT/MOUTH: no sore throat, new sinus pain or nasal drainage  RESP: see interval history  CV: negative for chest pain, palpitations or peripheral edema  GI: no nausea, vomiting, change in stools  : no dysuria  MUSCULOSKELETAL: no myalgias, arthralgias  ENDOCRINE: blood sugars with adequate control  PSYCHIATRIC: mood stable  LYMPHATIC: no new lymphadenopathy  HEME: no bleeding or easy bruisability  NEURO: no numbness, weakness, headaches         Physical Exam:     Temp:  [97.6  F (36.4  C)] 97.6  F (36.4  C)  Pulse:  [84] 84  BP: (115)/(71) 115/71  SpO2:  [96 %] 96 %  Wt Readings from Last 4 Encounters:   05/07/19 79.8 kg (176 lb)   04/30/19 79.8 kg (176 lb)   04/24/19 79.2 kg (174 lb 9.6 oz)   04/23/19 80.3 kg (177 lb 0.5 oz)     Constitutional:    Awake, alert and in no apparent distress     Eyes:   Nonicteric, MARY     ENT:    Trachea is midline. No gross neck abnormalities      Neck:   Supple without supraclavicular or cervical lymphadenopathy     Lungs:   Good air flow.  No crackles. No rhonchi.  No wheezes.     Cardiovascular:   Normal S1 and S2.  RRR.  No murmur, gallop or rub.  Radial, DP and PT pulses normal and symmetric     Abdomen:   NABS, soft, nontender, nondistended.  No HSM.     Musculoskeletal:   No edema.      Neurologic:   Alert and conversant. Cranial nerves  intact.       Skin:   Warm, dry.  No rash on limited exam.           Current Laboratory Data:   All laboratory and imaging data reviewed.    No results found for this or any previous visit (from the past 24 hour(s)).         Previous Pulmonary Function Testing     FVC-Pred   Date Value Ref Range Status   04/15/2019 4.59 L      FVC-Pre   Date Value Ref Range Status   04/15/2019 3.38 L      FVC-%Pred-Pre   Date Value Ref Range Status   04/15/2019 73 %      FEV1-Pre   Date Value Ref Range Status   04/15/2019 2.11 L      FEV1-%Pred-Pre   Date Value Ref Range Status   04/15/2019 61 %      FEV1FVC-Pred   Date Value Ref Range Status   04/15/2019 76 %      FEV1FVC-Pre   Date Value Ref Range Status   04/15/2019 63 %      No results found for: 20029  FEFMax-Pred   Date Value Ref Range Status   04/15/2019 8.91 L/sec      FEFMax-Pre   Date Value Ref Range Status   04/15/2019 6.64 L/sec      FEFMax-%Pred-Pre   Date Value Ref Range Status   04/15/2019 74 %      ExpTime-Pre   Date Value Ref Range Status   04/15/2019 10.97 sec      FIFMax-Pre   Date Value Ref Range Status   04/15/2019 2.04 L/sec      FEV1FEV6-Pred   Date Value Ref Range Status   04/15/2019 78 %      FEV1FEV6-Pre   Date Value Ref Range Status   04/15/2019 65 %           Previous Cardiology Imaging   No results found for this or any previous visit (from the past 8760 hour(s)).

## 2019-05-07 NOTE — TELEPHONE ENCOUNTER
Spoke with patient to schedule procedure with Dr. Matthew Rodriguez   Procedure was scheduled on 05/09 in ENDO  Patient will have H&P with: no tneeded  Patient is aware a / is needed day of surgery.   Surgery letter was sent via [employer], patient has my direct contact information for any further questions.

## 2019-05-07 NOTE — LETTER
5/7/2019       RE: Kentrell Kirkpatrick  [id number] [facility] [address]     Dear Colleague,    Thank you for referring your patient, Kentrell Kirkpatrick, to the [facility] at [facility]. Please see a copy of my visit note below.    LUNG NODULE & INTERVENTIONAL PULMONARY CLINIC  CLINICS & SURGERY [place], [facility], [facility]     Kentrell Kirkpatrick MRN# [id number]   Age: 68 year old YOB: 1950     Reason for Consultation: pleural effusion and COPD    Requesting Physician: Matthew Rodriguez MD  [address] [place] [address]       Assessment and Plan:    1. NSCLC and malignant pleural effusion s/p TPC. Since declotting with TPA, drainage has been minimal with <10cc per day. I will get CT chest today to evaluate. If no fluid, will plan to remove the TPC this wk. If there is moderate pleural effusion, will likely need to repeat the TPA.     2. COPD. Has moderate airflow obstruction and reduced DLCO. I will initiate LAMA today. He will prevnar 13 as well. Would hold off pulm rehab for now given that he has not been on controller therapy.     Billing: I spent more than 30 minutes face to face and greater than 50% of time was for counseling and coordination of care about the issues above.      Matthew Rodriguez MD   of Medicine  Interventional Pulmonology  Department of Pulmonary, Allergy, Critical Care and Sleep Medicine   [facility]  Pager: [phone]          History:     Kentrell Kirkpatrick is a 68 year old male with sig h/o for NSCLC who is here for evaluation/followup of TPC.     - No new resp sx or complaints. Denies dyspnea or cough.   - Had positive left pleural effusion for adenocarcinoma. PET-CT shows M1b disease. Had PleurX placed 3/2 and recently draining 50-350cc/d. Had TPA instilled for clogged tube in 4/20. He has not noticed any more clotting in the tube  and drainage is <10cc per day since end .    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     - Tobacco hx: Past Smoker: 1.5ppd for 47years. Quit .   - My interpretation of the images relevant for this visit includes: left pleurx in place    - My interpretation of the PFT's relevant for this visit includes:  Moderate airflow obstruction and reduced DLCO.      Other active medical problems include:   - NSCLC stage 4. Receiving chemotherapy. Pleurx draining as above.   - has COPD. No AECOPD. Up-to-date on flu vaccine but needs PNA. Maintains activity and weight.          Past Medical History:      Past Medical History:   Diagnosis Date     Colon polyps            Past Surgical History:      Past Surgical History:   Procedure Laterality Date     COLONOSCOPY       COLONOSCOPY N/A 2016    Procedure: COMBINED COLONOSCOPY, SINGLE OR MULTIPLE BIOPSY/POLYPECTOMY BY BIOPSY;  Surgeon: Jeremi Kramer MD;  Location:  GI     INSERT CHEST TUBE Left 3/1/2019    Procedure: INSERT CHEST TUBE;  Surgeon: Matthew Rodriguez MD;  Location: [facility]     IR LYMPH NODE BIOPSY  3/15/2019     THORACENTESIS Left 2019    Procedure: THORACENTESIS;  Surgeon: Matthew Rodriguez MD;  Location:  GI          Social History:     Social History     Tobacco Use     Smoking status: Former Smoker     Packs/day: 1.50     Years: 43.00     Pack years: 64.50     Types: Cigarettes     Last attempt to quit: 2007     Years since quittin.3     Smokeless tobacco: Never Used   Substance Use Topics     Alcohol use: Yes     Comment: 3-4 a week          Family History:     Family History   Problem Relation Age of Onset     Heart Disease Father      Prostate Cancer Other      Prostate Cancer Brother      Colon Cancer No family hx of            Allergies:    No Known Allergies       Medications:     Current Outpatient Medications    Medication Sig     calcium polycarbophil (FIBERCON) 625 MG tablet Take 2 tablets by mouth daily     dexamethasone (DECADRON) 4 MG tablet Take 4 mg by mouth 2 times daily To be taken day before, day of, and day after infusion..     finasteride (PROSCAR) 5 MG tablet Take 1 tablet (5 mg) by mouth daily     folic acid (FOLVITE) 1 MG tablet Take 1 tablet (1 mg) by mouth daily     Melatonin 10 MG TABS tablet Take 10 mg by mouth nightly as needed for sleep     MULTI VITAMIN MENS OR TABS 1 TABLET DAILY     ranitidine (ZANTAC) 75 MG tablet Take 75 mg by mouth as needed for heartburn     tamsulosin (FLOMAX) 0.4 MG capsule Take 1 capsule (0.4 mg) by mouth daily     ASPIRIN PO Take 81 mg by mouth     LORazepam (ATIVAN) 0.5 MG tablet Take 1 tablet (0.5 mg) by mouth every 6 hours as needed (Nausea/Vomiting) (Patient not taking: Reported on 5/7/2019)     ondansetron (ZOFRAN) 8 MG tablet Take 1 tablet (8 mg) by mouth every 8 hours as needed for nausea (Patient not taking: Reported on 5/7/2019)     prochlorperazine (COMPAZINE) 10 MG tablet Take 1 tablet (10 mg) by mouth every 6 hours as needed (Nausea/Vomiting) (Patient not taking: Reported on 5/7/2019)     No current facility-administered medications for this visit.      Facility-Administered Medications Ordered in Other Visits   Medication     iohexol (OMNIPAQUE) 300 mg/mL injection 10 mL          Review of Systems:     CONSTITUTIONAL: negative for fever, chills, change in weight  INTEGUMENTARY/SKIN: no rash or obvious new lesions  ENT/MOUTH: no sore throat, new sinus pain or nasal drainage  RESP: see interval history  CV: negative for chest pain, palpitations or peripheral edema  GI: no nausea, vomiting, change in stools  : no dysuria  MUSCULOSKELETAL: no myalgias, arthralgias  ENDOCRINE: blood sugars with adequate control  PSYCHIATRIC: mood stable  LYMPHATIC: no new lymphadenopathy  HEME: no bleeding or easy bruisability  NEURO: no numbness, weakness, headaches          Physical Exam:     Temp:  [97.6  F (36.4  C)] 97.6  F (36.4  C)  Pulse:  [84] 84  BP: (115)/(71) 115/71  SpO2:  [96 %] 96 %  Wt Readings from Last 4 Encounters:   05/07/19 79.8 kg (176 lb)   04/30/19 79.8 kg (176 lb)   04/24/19 79.2 kg (174 lb 9.6 oz)   04/23/19 80.3 kg (177 lb 0.5 oz)     Constitutional:   Awake, alert and in no apparent distress     Eyes:   Nonicteric, MARY     ENT:    Trachea is midline. No gross neck abnormalities      Neck:   Supple without supraclavicular or cervical lymphadenopathy     Lungs:   Good air flow.  No crackles. No rhonchi.  No wheezes.     Cardiovascular:   Normal S1 and S2.  RRR.  No murmur, gallop or rub.  Radial, DP and PT pulses normal and symmetric     Abdomen:   NABS, soft, nontender, nondistended.  No HSM.     Musculoskeletal:   No edema.      Neurologic:   Alert and conversant. Cranial nerves  intact.       Skin:   Warm, dry.  No rash on limited exam.           Current Laboratory Data:   All laboratory and imaging data reviewed.    No results found for this or any previous visit (from the past 24 hour(s)).         Previous Pulmonary Function Testing     FVC-Pred   Date Value Ref Range Status   04/15/2019 4.59 L      FVC-Pre   Date Value Ref Range Status   04/15/2019 3.38 L      FVC-%Pred-Pre   Date Value Ref Range Status   04/15/2019 73 %      FEV1-Pre   Date Value Ref Range Status   04/15/2019 2.11 L      FEV1-%Pred-Pre   Date Value Ref Range Status   04/15/2019 61 %      FEV1FVC-Pred   Date Value Ref Range Status   04/15/2019 76 %      FEV1FVC-Pre   Date Value Ref Range Status   04/15/2019 63 %      No results found for: 20029  FEFMax-Pred   Date Value Ref Range Status   04/15/2019 8.91 L/sec      FEFMax-Pre   Date Value Ref Range Status   04/15/2019 6.64 L/sec      FEFMax-%Pred-Pre   Date Value Ref Range Status   04/15/2019 74 %      ExpTime-Pre   Date Value Ref Range Status   04/15/2019 10.97 sec      FIFMax-Pre   Date Value Ref Range Status   04/15/2019 2.04 L/sec       FEV1FEV6-Pred   Date Value Ref Range Status   04/15/2019 78 %      FEV1FEV6-Pre   Date Value Ref Range Status   04/15/2019 65 %           Previous Cardiology Imaging   No results found for this or any previous visit (from the past 8760 hour(s)).               Again, thank you for allowing me to participate in the care of your patient.      Sincerely,    Matthew Rodriguez MD

## 2019-05-07 NOTE — NURSING NOTE
Oncology Rooming Note    May 7, 2019 8:38 AM   Kentrell Kirkpatrick is a 68 year old male who presents for:    Chief Complaint   Patient presents with     Oncology Clinic Visit     Return; Left Malignant Pleural Effusion     Initial Vitals: /71   Pulse 84   Temp 97.6  F (36.4  C) (Oral)   Wt 79.8 kg (176 lb)   SpO2 96%   BMI 23.87 kg/m   Estimated body mass index is 23.87 kg/m  as calculated from the following:    Height as of 4/3/19: 1.829 m (6').    Weight as of this encounter: 79.8 kg (176 lb). Body surface area is 2.01 meters squared.  No Pain (0) Comment: Data Unavailable   No LMP for male patient.  Allergies reviewed: Yes  Medications reviewed: Yes    Medications: Medication refills not needed today.  Pharmacy name entered into [employer]: [id number] - [place], [place] - [address]    Clinical concerns: Patient states there are no new concerns to discuss with provider.  Dr Rodriguez was not notified.         Josefina Walters CMA

## 2019-05-09 ENCOUNTER — APPOINTMENT (OUTPATIENT)
Dept: GENERAL RADIOLOGY | Facility: CLINIC | Age: 69
End: 2019-05-09
Attending: INTERNAL MEDICINE
Payer: COMMERCIAL

## 2019-05-09 ENCOUNTER — HOSPITAL ENCOUNTER (OUTPATIENT)
Facility: CLINIC | Age: 69
Discharge: HOME | End: 2019-05-09
Attending: INTERNAL MEDICINE | Admitting: INTERNAL MEDICINE
Payer: COMMERCIAL

## 2019-05-09 VITALS
OXYGEN SATURATION: 99 % | SYSTOLIC BLOOD PRESSURE: 124 MMHG | RESPIRATION RATE: 16 BRPM | HEART RATE: 101 BPM | DIASTOLIC BLOOD PRESSURE: 91 MMHG

## 2019-05-09 PROCEDURE — 71045 X-RAY EXAM CHEST 1 VIEW: CPT

## 2019-05-09 PROCEDURE — 32551 INSERTION OF CHEST TUBE: CPT | Performed by: INTERNAL MEDICINE

## 2019-05-09 PROCEDURE — 32552 REMOVE LUNG CATHETER: CPT | Performed by: INTERNAL MEDICINE

## 2019-05-09 NOTE — OR NURSING
Pt is concerned about comment made by Dr. Rodriguez regarding Follow-up pulmonary appointment for COPD.  He states he does not have COPD.  Additionally, he would like to know if he may have his PCP remove his sutures when ready rather than travel back to [facility].  Plan to relay questions to Dr. Rodriguez when X-ray information called to him in OR R19 *[phone].     Inbox msg to Dr. Rodriguez with questions. Pt active on MyChart. Pt in OR at time of discharge.     Dr. Rodriguez was able to review X-ray and approved discharge. Pt aware of questions to Dr. Rodriguez via inbox msg and agrees with plan.     Della Braden RN  [facility]

## 2019-05-09 NOTE — PROCEDURES
INTERVENTIONAL PULMONOLOGY       Procedure(s):    Pleurx removal    Indication:  Minimal drainage    Attending of Record:  Matthew Rodriguez MD     Interventional Pulmonary Fellow   Eugenio Rodney MD     Trainees Present:   None    Medications:    None    Sedation Time:   None    Time Out:  Performed    The patient's medical record has been reviewed.  The indication for the procedure was reviewed.  The necessary history and physical examination was performed and reviewed.  The risks, benefits and alternatives of the procedure were discussed with the the patient in detail and he had the opportunity to ask questions.  I discussed in particular the potential complications including risks of minor or life-threatening bleeding and/or infection, respiratory failure, vocal cord trauma / paralysis, pneumothorax, and discomfort. Sedation risks were also discussed including abnormal heart rhythms, low blood pressure, and respiratory failure. All questions were answered to the best of my ability.  Verbal and written informed consent was obtained.  The proposed procedure and the patient's identification were verified prior to the procedure by the physician and the nurse, respiratory therapist, resident physician (resident / fellow).    The patient was assessed for the adequacy for the procedure and to receive medications.   Mental Status:  Alert and oriented x 3  Airway examination:  Class I (Complete visualization of soft palate)  Pulmonary:  Clear to ausculation bilaterally  CV:  RRR, no murmurs or gallops  ASA Grade:  (II)  Mild systemic disease    After clinical evaluation and reviewing the indication, risks, alternatives and benefits of the procedure the patient was deemed to be in satisfactory condition to undergo the procedure.      A Tuberculosis risk assessment was performed:  The patient has no known RISK of Tuberculosis    The procedure was performed in a negative airflow room: Yes    Maneuvers / Procedure:       Indwelling pleural catheter removal: The patient's left chest was prepped in sterile fashion. The sutures were then removed. 10cc of 1% lidocaine was used to anesthetize the area. Using the curved yazan, the track was peeled away from the catheter then removed. A cutdown was required to expose the cuff. EBL was minimal.  Therapeutic suctioning: 15-20min of operative time was spent clearing out the airway of debris, blood and mucous prior to the intervention.     Any disposable equipment was visually inspected and deemed to be intact immediately post procedure.      Relevant Pictures      Recommendations:     -->  Successful left pleurx removal  -->  Post-op cxr  -->  F/u in clinic in 10-12d for suture removal (x3)      Matthew Rodriguez MD   of Medicine  Interventional Pulmonary  Department of Pulmonary, Allergy, Critical Care and Sleep Medicine   [facility]  Pager: [phone]   Office: [phone]  Email: [email or web address]    Dona ORMERO, OCN  Clinical Nurse Specialist  Department of Thoracic Surgery  Office: [phone]  Email: [email or web address]    Kaykay Leo  Interventional Pulmonology Surgery Scheduler  Office: [phone]  Email: [email or web address]

## 2019-05-10 ENCOUNTER — TELEPHONE (OUTPATIENT)
Dept: PULMONOLOGY | Facility: CLINIC | Age: 69
End: 2019-05-10

## 2019-05-10 NOTE — TELEPHONE ENCOUNTER
Telephone call    Called Mr. Kirkpatrick today to discuss questions he had post-op  I discussed with him that he indeed has COPD based on previous PFT which is the reason I prescribed controller inhalers. He expressed understanding.   He feels well post-procedure without concerns.     Plan  1. F/u in [facility] clinic in 3mo for COPD   2. He will have sutures removed with PCP in 10days.     Matthew Rodriguez MD   of Medicine  Interventional Pulmonary  Department of Pulmonary, Allergy, Critical Care and Sleep Medicine   [facility]  Pager: [phone]   Office: [phone]  Email: [email or web address]    Dona ROMERO, OCN  Clinical Nurse Specialist  Department of Thoracic Surgery  Office: [phone]  Email: [email or web address]    Kaykay Leo  Interventional Pulmonology Surgery Scheduler  Office: [phone]  Email: [email or web address]

## 2019-05-15 ENCOUNTER — INFUSION THERAPY VISIT (OUTPATIENT)
Dept: INFUSION THERAPY | Facility: CLINIC | Age: 69
End: 2019-05-15
Attending: INTERNAL MEDICINE
Payer: COMMERCIAL

## 2019-05-15 ENCOUNTER — HOSPITAL ENCOUNTER (OUTPATIENT)
Facility: CLINIC | Age: 69
Setting detail: SPECIMEN
Discharge: HOME | End: 2019-05-15
Attending: INTERNAL MEDICINE | Admitting: INTERNAL MEDICINE
Payer: COMMERCIAL

## 2019-05-15 VITALS
TEMPERATURE: 96.8 F | OXYGEN SATURATION: 99 % | HEART RATE: 97 BPM | WEIGHT: 176 LBS | DIASTOLIC BLOOD PRESSURE: 63 MMHG | RESPIRATION RATE: 16 BRPM | SYSTOLIC BLOOD PRESSURE: 106 MMHG | BODY MASS INDEX: 23.87 KG/M2

## 2019-05-15 DIAGNOSIS — C34.92 NON-SMALL CELL LUNG CANCER, LEFT (H): Primary | ICD-10-CM

## 2019-05-15 LAB
ALBUMIN SERPL-MCNC: 2.6 G/DL (ref 3.4–5)
ALP SERPL-CCNC: 95 U/L (ref 40–150)
ALT SERPL W P-5'-P-CCNC: 19 U/L (ref 0–70)
ANION GAP SERPL CALCULATED.3IONS-SCNC: 6 MMOL/L (ref 3–14)
AST SERPL W P-5'-P-CCNC: 14 U/L (ref 0–45)
BASOPHILS # BLD AUTO: 0 10E9/L (ref 0–0.2)
BASOPHILS NFR BLD AUTO: 0.3 %
BILIRUB SERPL-MCNC: 0.7 MG/DL (ref 0.2–1.3)
BUN SERPL-MCNC: 14 MG/DL (ref 7–30)
CALCIUM SERPL-MCNC: 8.5 MG/DL (ref 8.5–10.1)
CHLORIDE SERPL-SCNC: 104 MMOL/L (ref 94–109)
CO2 SERPL-SCNC: 27 MMOL/L (ref 20–32)
CREAT SERPL-MCNC: 0.91 MG/DL (ref 0.66–1.25)
DIFFERENTIAL METHOD BLD: ABNORMAL
EOSINOPHIL # BLD AUTO: 0 10E9/L (ref 0–0.7)
EOSINOPHIL NFR BLD AUTO: 0 %
ERYTHROCYTE [DISTWIDTH] IN BLOOD BY AUTOMATED COUNT: 17 % (ref 10–15)
GFR SERPL CREATININE-BSD FRML MDRD: 85 ML/MIN/{1.73_M2}
GLUCOSE SERPL-MCNC: 122 MG/DL (ref 70–99)
HCT VFR BLD AUTO: 30.3 % (ref 40–53)
HGB BLD-MCNC: 10.1 G/DL (ref 13.3–17.7)
IMM GRANULOCYTES # BLD: 0.1 10E9/L (ref 0–0.4)
IMM GRANULOCYTES NFR BLD: 0.9 %
LYMPHOCYTES # BLD AUTO: 0.6 10E9/L (ref 0.8–5.3)
LYMPHOCYTES NFR BLD AUTO: 9 %
MCH RBC QN AUTO: 29.7 PG (ref 26.5–33)
MCHC RBC AUTO-ENTMCNC: 33.3 G/DL (ref 31.5–36.5)
MCV RBC AUTO: 89 FL (ref 78–100)
MONOCYTES # BLD AUTO: 0.7 10E9/L (ref 0–1.3)
MONOCYTES NFR BLD AUTO: 10.7 %
NEUTROPHILS # BLD AUTO: 5.1 10E9/L (ref 1.6–8.3)
NEUTROPHILS NFR BLD AUTO: 79.1 %
NRBC # BLD AUTO: 0 10*3/UL
NRBC BLD AUTO-RTO: 0 /100
PLATELET # BLD AUTO: 465 10E9/L (ref 150–450)
POTASSIUM SERPL-SCNC: 4.3 MMOL/L (ref 3.4–5.3)
PROT SERPL-MCNC: 7.5 G/DL (ref 6.8–8.8)
RBC # BLD AUTO: 3.4 10E12/L (ref 4.4–5.9)
SODIUM SERPL-SCNC: 137 MMOL/L (ref 133–144)
TSH SERPL DL<=0.005 MIU/L-ACNC: 0.95 MU/L (ref 0.4–4)
WBC # BLD AUTO: 6.5 10E9/L (ref 4–11)

## 2019-05-15 PROCEDURE — 96411 CHEMO IV PUSH ADDL DRUG: CPT

## 2019-05-15 PROCEDURE — 25000128 H RX IP 250 OP 636: Performed by: INTERNAL MEDICINE

## 2019-05-15 PROCEDURE — 96413 CHEMO IV INFUSION 1 HR: CPT

## 2019-05-15 PROCEDURE — 96417 CHEMO IV INFUS EACH ADDL SEQ: CPT

## 2019-05-15 PROCEDURE — 84443 ASSAY THYROID STIM HORMONE: CPT | Performed by: INTERNAL MEDICINE

## 2019-05-15 PROCEDURE — 96372 THER/PROPH/DIAG INJ SC/IM: CPT | Mod: XS

## 2019-05-15 PROCEDURE — 80053 COMPREHEN METABOLIC PANEL: CPT | Performed by: INTERNAL MEDICINE

## 2019-05-15 PROCEDURE — 96367 TX/PROPH/DG ADDL SEQ IV INF: CPT

## 2019-05-15 PROCEDURE — 85025 COMPLETE CBC W/AUTO DIFF WBC: CPT | Performed by: INTERNAL MEDICINE

## 2019-05-15 PROCEDURE — 25800030 ZZH RX IP 258 OP 636: Performed by: INTERNAL MEDICINE

## 2019-05-15 RX ORDER — CYANOCOBALAMIN 1000 UG/ML
1000 INJECTION, SOLUTION INTRAMUSCULAR; SUBCUTANEOUS ONCE
Status: COMPLETED | OUTPATIENT
Start: 2019-05-15 | End: 2019-05-15

## 2019-05-15 RX ORDER — DEXAMETHASONE 4 MG/1
4 TABLET ORAL 2 TIMES DAILY
Qty: 6 TABLET | Refills: 0 | Status: SHIPPED | OUTPATIENT
Start: 2019-05-15 | End: 2019-06-04

## 2019-05-15 RX ADMIN — SODIUM CHLORIDE 200 MG: 9 INJECTION, SOLUTION INTRAVENOUS at 11:44

## 2019-05-15 RX ADMIN — CYANOCOBALAMIN 1000 MCG: 1000 INJECTION, SOLUTION INTRAMUSCULAR at 11:26

## 2019-05-15 RX ADMIN — SODIUM CHLORIDE 250 ML: 9 INJECTION, SOLUTION INTRAVENOUS at 11:25

## 2019-05-15 RX ADMIN — CARBOPLATIN 550 MG: 10 INJECTION, SOLUTION INTRAVENOUS at 12:29

## 2019-05-15 RX ADMIN — DEXAMETHASONE SODIUM PHOSPHATE: 10 INJECTION, SOLUTION INTRAMUSCULAR; INTRAVENOUS at 11:25

## 2019-05-15 RX ADMIN — SODIUM CHLORIDE 1000 MG: 9 INJECTION, SOLUTION INTRAVENOUS at 12:19

## 2019-05-15 NOTE — PROGRESS NOTES
Infusion Nursing Note:  Kentrell Kirkpatrick presents today for Keytruda, carboplatin, Alimta, and B12 injection.    Patient seen by provider today: No   present during visit today: Not Applicable.    Note: Nursing communications in orders to avoid steriods was ignored due to patient getting Alimta. OK per pharmacy. MD notified.    Intravenous Access:  Labs drawn without difficulty.  Peripheral IV placed.    Treatment Conditions:  Lab Results   Component Value Date    HGB 10.1 05/15/2019     Lab Results   Component Value Date    WBC 6.5 05/15/2019      Lab Results   Component Value Date    ANEU 5.1 05/15/2019     Lab Results   Component Value Date     05/15/2019      Lab Results   Component Value Date     05/15/2019                   Lab Results   Component Value Date    POTASSIUM 4.3 05/15/2019           No results found for: MAG         Lab Results   Component Value Date    CR 0.91 05/15/2019                   Lab Results   Component Value Date    MALATHI 8.5 05/15/2019                Lab Results   Component Value Date    BILITOTAL 0.7 05/15/2019           Lab Results   Component Value Date    ALBUMIN 2.6 05/15/2019                    Lab Results   Component Value Date    ALT 19 05/15/2019           Lab Results   Component Value Date    AST 14 05/15/2019       Results reviewed, labs MET treatment parameters, ok to proceed with treatment.      Post Infusion Assessment:  Patient tolerated infusion without incident.  Blood return noted pre and post infusion.  Site patent and intact, free from redness, edema or discomfort.  No evidence of extravasations.  Access discontinued per protocol.       Discharge Plan:   Patient declined prescription refills.  Discharge instructions reviewed with: Patient.  Patient and/or family verbalized understanding of discharge instructions and all questions answered.  Patient discharged in stable condition accompanied by: wife.  Departure Mode: Ambulatory.    Bhavna  Amie, JYOTI

## 2019-05-15 NOTE — TELEPHONE ENCOUNTER
RECORDS STATUS - ALL OTHER DIAGNOSIS      RECORDS RECEIVED FROM: [facility]   DATE RECEIVED: 5/15/19   NOTES STATUS DETAILS   OFFICE NOTE from referring provider Donald Shell DO Epic   OFFICE NOTE from medical oncologist     DISCHARGE SUMMARY from hospital  [facility]   DISCHARGE REPORT from the ER     OPERATIVE REPORT  Epic   MEDICATION LIST 5/15/19 [facility]   CLINICAL TRIAL TREATMENTS TO DATE     LABS     PATHOLOGY REPORTS  [facility]   ANYTHING RELATED TO DIAGNOSIS     GENONOMIC TESTING     TYPE:     IMAGING (NEED IMAGES & REPORT)     CT SCANS  PACS   MRI  PACS   MAMMO     ULTRASOUND     PET  PACS   XR  PACS

## 2019-05-17 ENCOUNTER — OFFICE VISIT (OUTPATIENT)
Dept: URGENT CARE | Facility: URGENT CARE | Age: 69
End: 2019-05-17
Payer: COMMERCIAL

## 2019-05-17 VITALS
TEMPERATURE: 97.6 F | OXYGEN SATURATION: 96 % | HEART RATE: 99 BPM | DIASTOLIC BLOOD PRESSURE: 78 MMHG | BODY MASS INDEX: 23.87 KG/M2 | WEIGHT: 176 LBS | RESPIRATION RATE: 18 BRPM | SYSTOLIC BLOOD PRESSURE: 124 MMHG

## 2019-05-17 DIAGNOSIS — R05.9 COUGH: Primary | ICD-10-CM

## 2019-05-17 DIAGNOSIS — C34.92 NON-SMALL CELL LUNG CANCER, LEFT (H): ICD-10-CM

## 2019-05-17 DIAGNOSIS — J44.9 CHRONIC OBSTRUCTIVE PULMONARY DISEASE, UNSPECIFIED COPD TYPE (H): ICD-10-CM

## 2019-05-17 PROCEDURE — 99214 OFFICE O/P EST MOD 30 MIN: CPT | Performed by: PHYSICIAN ASSISTANT

## 2019-05-17 RX ORDER — LORAZEPAM 2 MG/ML
0.5 INJECTION INTRAMUSCULAR EVERY 4 HOURS PRN
Status: CANCELLED
Start: 2019-06-05

## 2019-05-17 RX ORDER — DIPHENHYDRAMINE HYDROCHLORIDE 50 MG/ML
50 INJECTION INTRAMUSCULAR; INTRAVENOUS
Status: CANCELLED
Start: 2019-06-05

## 2019-05-17 RX ORDER — EPINEPHRINE 1 MG/ML
0.3 INJECTION, SOLUTION INTRAMUSCULAR; SUBCUTANEOUS EVERY 5 MIN PRN
Status: CANCELLED | OUTPATIENT
Start: 2019-06-05

## 2019-05-17 RX ORDER — SODIUM CHLORIDE 9 MG/ML
1000 INJECTION, SOLUTION INTRAVENOUS CONTINUOUS PRN
Status: CANCELLED
Start: 2019-06-05

## 2019-05-17 RX ORDER — AZITHROMYCIN 250 MG/1
TABLET, FILM COATED ORAL
Qty: 6 TABLET | Refills: 0 | Status: SHIPPED | OUTPATIENT
Start: 2019-05-17 | End: 2019-05-22

## 2019-05-17 RX ORDER — ALBUTEROL SULFATE 90 UG/1
1-2 AEROSOL, METERED RESPIRATORY (INHALATION)
Status: CANCELLED
Start: 2019-06-05

## 2019-05-17 RX ORDER — EPINEPHRINE 0.3 MG/.3ML
0.3 INJECTION SUBCUTANEOUS EVERY 5 MIN PRN
Status: CANCELLED | OUTPATIENT
Start: 2019-06-05

## 2019-05-17 RX ORDER — MEPERIDINE HYDROCHLORIDE 25 MG/ML
25 INJECTION INTRAMUSCULAR; INTRAVENOUS; SUBCUTANEOUS EVERY 30 MIN PRN
Status: CANCELLED | OUTPATIENT
Start: 2019-06-05

## 2019-05-17 RX ORDER — ALBUTEROL SULFATE 0.83 MG/ML
2.5 SOLUTION RESPIRATORY (INHALATION)
Status: CANCELLED | OUTPATIENT
Start: 2019-06-05

## 2019-05-17 RX ORDER — METHYLPREDNISOLONE SODIUM SUCCINATE 125 MG/2ML
125 INJECTION, POWDER, LYOPHILIZED, FOR SOLUTION INTRAMUSCULAR; INTRAVENOUS
Status: CANCELLED
Start: 2019-06-05

## 2019-05-17 ASSESSMENT — ENCOUNTER SYMPTOMS
SHORTNESS OF BREATH: 1
FEVER: 0
COUGH: 1
SINUS PRESSURE: 0
CHILLS: 0
SORE THROAT: 0
SINUS PAIN: 0
HEADACHES: 0
DIARRHEA: 0
NAUSEA: 0
VOMITING: 0

## 2019-05-17 NOTE — PROGRESS NOTES
SUBJECTIVE:   Kentrell Kirkpatrick is a 68 year old male presenting with a chief complaint of   Chief Complaint   Patient presents with     Urgent Care     Cough     2 days cough, sinus pressure, cancer pt,        He is an established patient of [facility].    URI Adult    Onset of symptoms was 2 day(s) ago.  Course of illness is worsening.    Severity moderate  Current and Associated symptoms: cough productive  Denies fever/chills  Treatment measures tried include OTC Cough med.  Predisposing factors include HX of COPD.  Using Spiriva daily and albuterol inhaler as needed.  Patient has lung cancer currently undergoing chemo treatment.  Has some shortness of breath at baseline.  Not increased since the start of the cough.        Review of Systems   Constitutional: Negative for chills and fever.   HENT: Negative for sinus pressure, sinus pain and sore throat.    Respiratory: Positive for cough and shortness of breath.    Gastrointestinal: Negative for diarrhea, nausea and vomiting.   Neurological: Negative for headaches.       Past Medical History:   Diagnosis Date     Colon polyps      Family History   Problem Relation Age of Onset     Heart Disease Father      Prostate Cancer Other      Prostate Cancer Brother      Colon Cancer No family hx of      Current Outpatient Medications   Medication Sig Dispense Refill     azithromycin (ZITHROMAX Z-WILLIAM) 250 MG tablet Take 2 tablets today then 1 daily times 4 days 6 tablet 0     albuterol (PROAIR HFA) 108 (90 Base) MCG/ACT inhaler Inhale 2 puffs into the lungs every 6 hours 8.5 g 3     ASPIRIN PO Take 81 mg by mouth       calcium polycarbophil (FIBERCON) 625 MG tablet Take 2 tablets by mouth daily       dexamethasone (DECADRON) 4 MG tablet Take 4 mg by mouth 2 times daily To be taken day before, day of, and day after infusion.. 6 tablet 0     finasteride (PROSCAR) 5 MG tablet Take 1 tablet (5 mg) by mouth daily 30 tablet 5     folic acid (FOLVITE) 1 MG tablet Take 1 tablet (1 mg)  by mouth daily 90 tablet 11     LORazepam (ATIVAN) 0.5 MG tablet Take 1 tablet (0.5 mg) by mouth every 6 hours as needed (Nausea/Vomiting) 30 tablet 3     Melatonin 10 MG TABS tablet Take 10 mg by mouth nightly as needed for sleep       MULTI VITAMIN MENS OR TABS 1 TABLET DAILY       ondansetron (ZOFRAN) 8 MG tablet Take 1 tablet (8 mg) by mouth every 8 hours as needed for nausea 30 tablet 11     prochlorperazine (COMPAZINE) 10 MG tablet Take 1 tablet (10 mg) by mouth every 6 hours as needed (Nausea/Vomiting) 30 tablet 11     ranitidine (ZANTAC) 75 MG tablet Take 75 mg by mouth as needed for heartburn       tamsulosin (FLOMAX) 0.4 MG capsule Take 1 capsule (0.4 mg) by mouth daily 90 capsule 0     tiotropium (SPIRIVA HANDIHALER) 18 MCG inhaled capsule Inhale 1 capsule (18 mcg) into the lungs daily 1 capsule 11     Social History     Tobacco Use     Smoking status: Former Smoker     Packs/day: 1.50     Years: 43.00     Pack years: 64.50     Types: Cigarettes     Last attempt to quit: 2007     Years since quittin.4     Smokeless tobacco: Never Used   Substance Use Topics     Alcohol use: Not Currently     Comment: 3-4 a week       OBJECTIVE  /78   Pulse 99   Temp 97.6  F (36.4  C) (Oral)   Resp 18   Wt 79.8 kg (176 lb)   SpO2 96%   BMI 23.87 kg/m      Physical Exam   Constitutional: He appears well-developed and well-nourished. No distress.   HENT:   Head: Normocephalic and atraumatic.   Right Ear: Tympanic membrane normal.   Left Ear: Tympanic membrane normal.   Mouth/Throat: Oropharynx is clear and moist.   Eyes: Conjunctivae are normal.   Neck: Normal range of motion.   Cardiovascular: Regular rhythm and normal heart sounds.   Pulmonary/Chest: Effort normal and breath sounds normal. No respiratory distress. He has no wheezes. He has no rales.   Neurological: He is alert.   Skin: Skin is warm and dry.   Psychiatric: He has a normal mood and affect.   Nursing note and vitals  reviewed.      Labs:  No results found for this or any previous visit (from the past 24 hour(s)).        ASSESSMENT:      ICD-10-CM    1. Cough R05 azithromycin (ZITHROMAX Z-WILLIAM) 250 MG tablet   2. Chronic obstructive pulmonary disease, unspecified COPD type (H) J44.9    3. Non-small cell lung cancer, left (H) C34.92           PLAN:    Cough: Lungs fairly clear on exam today.  Patient currently immunosuppressed undergoing chemotherapy for lung cancer. Also has COPD.  We have elected to treat with Zithromax today. Continue Spiriva inhaler as scheduled.  Albuterol inhaler as needed.  Cough suppressant over-the-counter as needed.  Good handwashing is advised.  Follow-up with pulmonology next week as scheduled.  Sooner if any worsening symptoms.  Patient agrees with the plan.    Followup:    If not improving or if condition worsens, follow up with your Primary Care Provider

## 2019-05-21 ENCOUNTER — ALLIED HEALTH/NURSE VISIT (OUTPATIENT)
Dept: NURSING | Facility: CLINIC | Age: 69
End: 2019-05-21
Payer: COMMERCIAL

## 2019-05-21 DIAGNOSIS — Z48.02 VISIT FOR SUTURE REMOVAL: Primary | ICD-10-CM

## 2019-05-21 PROCEDURE — 99207 ZZC NO CHARGE NURSE ONLY: CPT

## 2019-05-21 NOTE — PROGRESS NOTES
Kentrell Kirkpatrick presents to the clinic today for removal of sutures.  The patient has had the sutures in place for 12 days.  There has been no history of infection or drainage.  3 sutures are seen located on the left chest.  The wound is healing well with no signs of infection.  Tetanus status is up to date.   All sutures were easily removed today.  Routine wound care discussed.  The patient will follow up as needed.  STEVE Ferrara, RN  [facility]

## 2019-05-22 ENCOUNTER — ONCOLOGY VISIT (OUTPATIENT)
Dept: ONCOLOGY | Facility: CLINIC | Age: 69
End: 2019-05-22
Attending: UROLOGY
Payer: COMMERCIAL

## 2019-05-22 ENCOUNTER — PRE VISIT (OUTPATIENT)
Dept: ONCOLOGY | Facility: CLINIC | Age: 69
End: 2019-05-22

## 2019-05-22 ENCOUNTER — HOSPITAL ENCOUNTER (OUTPATIENT)
Facility: CLINIC | Age: 69
Setting detail: SPECIMEN
End: 2019-05-22
Attending: UROLOGY
Payer: COMMERCIAL

## 2019-05-22 VITALS
DIASTOLIC BLOOD PRESSURE: 56 MMHG | TEMPERATURE: 97 F | RESPIRATION RATE: 18 BRPM | HEIGHT: 72 IN | HEART RATE: 71 BPM | SYSTOLIC BLOOD PRESSURE: 86 MMHG | BODY MASS INDEX: 24.11 KG/M2 | WEIGHT: 178 LBS | OXYGEN SATURATION: 96 %

## 2019-05-22 DIAGNOSIS — R35.1 BENIGN PROSTATIC HYPERPLASIA WITH NOCTURIA: ICD-10-CM

## 2019-05-22 DIAGNOSIS — N40.1 BENIGN PROSTATIC HYPERPLASIA WITH NOCTURIA: ICD-10-CM

## 2019-05-22 DIAGNOSIS — R97.20 ELEVATED PROSTATE SPECIFIC ANTIGEN (PSA): Primary | ICD-10-CM

## 2019-05-22 DIAGNOSIS — C34.92 NON-SMALL CELL LUNG CANCER, LEFT (H): ICD-10-CM

## 2019-05-22 PROCEDURE — 99203 OFFICE O/P NEW LOW 30 MIN: CPT | Performed by: UROLOGY

## 2019-05-22 ASSESSMENT — PAIN SCALES - GENERAL: PAINLEVEL: NO PAIN (0)

## 2019-05-22 ASSESSMENT — MIFFLIN-ST. JEOR: SCORE: 1610.4

## 2019-05-22 NOTE — PATIENT INSTRUCTIONS
Kentrell is scheduled to see Dr Norris in August along with a lab draw one week prior.      AVS printed for Pt..  Corinna Calderón

## 2019-05-22 NOTE — NURSING NOTE
Oncology Rooming Note    May 22, 2019 10:59 AM   Kentrell Kirkpatrick is a 68 year old male who presents for:    Chief Complaint   Patient presents with     Oncology Clinic Visit     New Patient Consult     Initial Vitals: BP (!) 86/56   Pulse 71   Temp 97  F (36.1  C) (Tympanic)   Resp 18   Ht 1.829 m (6')   Wt 80.7 kg (178 lb)   SpO2 96%   BMI 24.14 kg/m   Estimated body mass index is 24.14 kg/m  as calculated from the following:    Height as of this encounter: 1.829 m (6').    Weight as of this encounter: 80.7 kg (178 lb). Body surface area is 2.02 meters squared.  No Pain (0) Comment: Data Unavailable   No LMP for male patient.  Allergies reviewed: Yes  Medications reviewed: Yes    Medications: Medication refills not needed today.  Pharmacy name entered into [employer]: [id number] - [place], [place] - [address]    Clinical concerns: new patient       Karen Plummer CMA

## 2019-05-22 NOTE — PROGRESS NOTES
Chief Complaint:    Elevated PSA (Prostate Specific Antigen)         Consult or Referral:     Mr. Kentrell Kirkpatrick is a 68 year old male seen at the request of Dr. Shell.         History of Present Illness:    Kentrell Kirkpatrick is a very pleasant 68 year old male who presents with a history of elevated PSA             Past Medical History:     Past Medical History:   Diagnosis Date     Colon polyps             Past Surgical History:     Past Surgical History:   Procedure Laterality Date     COLONOSCOPY       COLONOSCOPY N/A 12/22/2016    Procedure: COMBINED COLONOSCOPY, SINGLE OR MULTIPLE BIOPSY/POLYPECTOMY BY BIOPSY;  Surgeon: Jeremi Kramer MD;  Location: [facility]     INSERT CHEST TUBE Left 3/1/2019    Procedure: INSERT CHEST TUBE;  Surgeon: Matthew Rodriguez MD;  Location: [facility]     INSERT CHEST TUBE N/A 5/9/2019    Procedure: Removal Of Pleurx Catheter;  Surgeon: Matthew Rodriguez MD;  Location: [facility]     IR LYMPH NODE BIOPSY  3/15/2019     THORACENTESIS Left 2/20/2019    Procedure: THORACENTESIS;  Surgeon: Matthew Rodriguez MD;  Location: [facility]            Medications     Current Outpatient Medications   Medication     albuterol (PROAIR HFA) 108 (90 Base) MCG/ACT inhaler     calcium polycarbophil (FIBERCON) 625 MG tablet     dexamethasone (DECADRON) 4 MG tablet     finasteride (PROSCAR) 5 MG tablet     folic acid (FOLVITE) 1 MG tablet     LORazepam (ATIVAN) 0.5 MG tablet     Melatonin 10 MG TABS tablet     MULTI VITAMIN MENS OR TABS     ranitidine (ZANTAC) 75 MG tablet     tamsulosin (FLOMAX) 0.4 MG capsule     tiotropium (SPIRIVA HANDIHALER) 18 MCG inhaled capsule     ondansetron (ZOFRAN) 8 MG tablet     prochlorperazine (COMPAZINE) 10 MG tablet     No current facility-administered medications for this visit.      Facility-Administered Medications Ordered in Other Visits   Medication     iohexol (OMNIPAQUE) 300 mg/mL injection 10 mL            Family History:     Family History   Problem Relation Age  of Onset     Heart Disease Father      Prostate Cancer Other      Prostate Cancer Brother      Colon Cancer No family hx of             Social History:     Social History     Socioeconomic History     Marital status:      Spouse name: Not on file     Number of children: Not on file     Years of education: Not on file     Highest education level: Not on file   Occupational History     Not on file   Social Needs     Financial resource strain: Not on file     Food insecurity:     Worry: Not on file     Inability: Not on file     Transportation needs:     Medical: Not on file     Non-medical: Not on file   Tobacco Use     Smoking status: Former Smoker     Packs/day: 1.50     Years: 43.00     Pack years: 64.50     Types: Cigarettes     Last attempt to quit: 2007     Years since quittin.4     Smokeless tobacco: Never Used   Substance and Sexual Activity     Alcohol use: Not Currently     Comment: 3-4 a week     Drug use: No     Sexual activity: Yes     Partners: Female   Lifestyle     Physical activity:     Days per week: Not on file     Minutes per session: Not on file     Stress: Not on file   Relationships     Social connections:     Talks on phone: Not on file     Gets together: Not on file     Attends [employer] service: Not on file     Active member of club or organization: Not on file     Attends meetings of clubs or organizations: Not on file     Relationship status: Not on file     Intimate partner violence:     Fear of current or ex partner: Not on file     Emotionally abused: Not on file     Physically abused: Not on file     Forced sexual activity: Not on file   Other Topics Concern     Parent/sibling w/ CABG, MI or angioplasty before 65F 55M? Not Asked   Social History Narrative     Not on file            Allergies:   Patient has no known allergies.         Review of Systems:  From intake questionnaire     Skin: negative  Eyes: negative  Ears/Nose/Throat: negative  Respiratory: No shortness  of breath, dyspnea on exertion, cough, or hemoptysis  Cardiovascular: No chest pain or palpitations  Gastrointestinal: negative; no nausea/vomiting, constipation or diarrhea  Genitourinary: as per HPI  Musculoskeletal: negative  Neurologic: negative  Psychiatric: negative  Hematologic/Lymphatic/Immunologic: negative  Endocrine: negative         Physical Exam:     Patient is a 68 year old  male   Vitals: Blood pressure (!) 86/56, pulse 71, temperature 97  F (36.1  C), temperature source Tympanic, resp. rate 18, height 1.829 m (6'), weight 80.7 kg (178 lb), SpO2 96 %.  Constitutional: Body mass index is 24.14 kg/m .  Alert, no acute distress, oriented, conversant  Eyes: no scleral icterus; extraocular muscles intact, moist conjunctivae  Neck: trachea midline, no thyromegaly  Ears/nose/mouth: throat/mouth:normal, good dentition  Respiratory: no respiratory distress, or pursed lip breathing  Cardiovascular: pulses strong and intact; no obvious jugular venous distension present  Gastrointestinal: soft, nontender, no organomegaly or masses,   Lymphatics: No inguinal adenopathy  Musculoskeltal: extremities normal, no peripheral edema  Skin: no suspicious lesions or rashes  Neuro: Alert, oriented, speech and mentation normal  Psych: affect and mood normal, alert and oriented to person, place and time  Gait: Normal  : {ARMIDA EXAM MALE GENITAL:683005}      Labs and Pathology:    I reviewed all applicable laboratory and pathology data and went over findings with patient  Significant for   Lab Results   Component Value Date    CR 0.91 05/15/2019    CR 0.99 04/24/2019    CR 0.98 04/03/2019    CR 0.90 03/20/2019    CR 0.86 03/13/2019    CR 1.04 02/28/2019    CR 0.97 02/22/2016    CR 1.02 12/09/2014    CR 0.98 09/10/2013    CR 1.03 07/13/2011     PSA   Date Value Ref Range Status   04/30/2019 4.43 (H) 0 - 4 ug/L Final     Comment:     Assay Method:  Chemiluminescence using Siemens Vista analyzer   03/13/2018 2.80 0 - 4 ug/L Final      Comment:     Assay Method:  Chemiluminescence using Siemens Vista analyzer   03/20/2017 3.51 0 - 4 ug/L Final     Comment:     Assay Method:  Chemiluminescence using Siemens Vista analyzer   02/22/2016 2.23 0 - 4 ug/L Final   12/09/2014 2.46 0 - 4 ug/L Final   09/10/2013 1.94 0 - 4 ug/L Final   07/13/2011 1.79 0 - 4 ug/L Final   08/10/2010 1.53 0 - 4 ug/L Final   12/04/2008 1.71 0 - 4 ug/L Final   12/05/2007 1.6@ ng/mL              Imaging:    I reviewed all applicable imaging and went over findings with patient.  Significant for ***      Outside and Past Medical records:    I spent 10 minutes reviewing outside and past medical records.       Standardized Questionnaire:      AUASS: ***/35 QOL:***  NIKI: ***/25         Assessment and Plan:     Assessment:   {Diagnoses:256894}    Plan:  ***    Orders  No orders of the defined types were placed in this encounter.      Eugenio Norris MD  Urology  [facility]  Clinic Phone [phone]

## 2019-05-22 NOTE — LETTER
5/22/2019         RE: Kentrell Kirkpatrick  [address] [facility] [address]        Dear Colleague,    Thank you for referring your patient, Kentrell Kirkpatrick, to the [facility]. Please see a copy of my visit note below.          Chief Complaint:    Elevated PSA (Prostate Specific Antigen)         Consult or Referral:     Mr. Kentrell Kirkpatrick is a 68 year old male seen at the request of Dr. Shell.         History of Present Illness:    Kentrell Kirkpatrick is a very pleasant 68 year old male who presents with a history of elevated PSA. This was found to be 4.43 in March. This has not been previously elevated. No previous prostate workup. He does have BPH with urinary symptoms and has been on tamsulosin and finasteride. Given he is on a 5-LUAN, PSA corrects to about 9.    This is in the context of recent diagnosis of lung cancer. He has non-small cell lung cancer diagnosed March of this year. Also with COPD. Is currently undergoing systemic therapy. States he has scans in the coming weeks and long-term prognosis and plans are not yet clear.    Regarding his urination, he notes he is having worsening symptoms since the start of chemo. Currently with weakening of his urinary stream, although is able to empty. Nocturia x 5. No dysuria or hematuria. States prior to chemotherapy, he was very pleased with his urination.         Past Medical History:     Past Medical History:   Diagnosis Date     Colon polyps    SKYLER  COPD  Lung cancer  Ascending aortic aneurysm  Herniated disk         Past Surgical History:     Past Surgical History:   Procedure Laterality Date     COLONOSCOPY       COLONOSCOPY N/A 12/22/2016    Procedure: COMBINED COLONOSCOPY, SINGLE OR MULTIPLE BIOPSY/POLYPECTOMY BY BIOPSY;  Surgeon: Jeremi Kramer MD;  Location:  GI     INSERT CHEST TUBE Left 3/1/2019    Procedure: INSERT CHEST TUBE;  Surgeon: Matthew Rodriguez MD;  Location:  GI     INSERT CHEST TUBE N/A  5/9/2019    Procedure: Removal Of Pleurx Catheter;  Surgeon: Matthew Rodriguez MD;  Location:  GI     IR LYMPH NODE BIOPSY  3/15/2019     THORACENTESIS Left 2/20/2019    Procedure: THORACENTESIS;  Surgeon: Matthew Rodriguez MD;  Location:  GI          Medications     Current Outpatient Medications   Medication     albuterol (PROAIR HFA) 108 (90 Base) MCG/ACT inhaler     calcium polycarbophil (FIBERCON) 625 MG tablet     dexamethasone (DECADRON) 4 MG tablet     finasteride (PROSCAR) 5 MG tablet     folic acid (FOLVITE) 1 MG tablet     LORazepam (ATIVAN) 0.5 MG tablet     Melatonin 10 MG TABS tablet     MULTI VITAMIN MENS OR TABS     ranitidine (ZANTAC) 75 MG tablet     tamsulosin (FLOMAX) 0.4 MG capsule     tiotropium (SPIRIVA HANDIHALER) 18 MCG inhaled capsule     ondansetron (ZOFRAN) 8 MG tablet     prochlorperazine (COMPAZINE) 10 MG tablet     No current facility-administered medications for this visit.      Facility-Administered Medications Ordered in Other Visits   Medication     iohexol (OMNIPAQUE) 300 mg/mL injection 10 mL          Family History:     Family History   Problem Relation Age of Onset     Heart Disease Father      Prostate Cancer Other      Prostate Cancer Brother      Colon Cancer No family hx of    Oldest brother with prostate cancer - had surgery         Social History:     Social History     Socioeconomic History     Marital status:      Spouse name: Not on file     Number of children: Not on file     Years of education: Not on file     Highest education level: Not on file   Occupational History     Not on file   Social Needs     Financial resource strain: Not on file     Food insecurity:     Worry: Not on file     Inability: Not on file     Transportation needs:     Medical: Not on file     Non-medical: Not on file   Tobacco Use     Smoking status: Former Smoker     Packs/day: 1.50     Years: 43.00     Pack years: 64.50     Types: Cigarettes     Last attempt to quit: 12/16/2007      Years since quittin.4     Smokeless tobacco: Never Used   Substance and Sexual Activity     Alcohol use: Not Currently     Comment: 3-4 a week     Drug use: No     Sexual activity: Yes     Partners: Female   Lifestyle     Physical activity:     Days per week: Not on file     Minutes per session: Not on file     Stress: Not on file   Relationships     Social connections:     Talks on phone: Not on file     Gets together: Not on file     Attends Yazidism service: Not on file     Active member of club or organization: Not on file     Attends meetings of clubs or organizations: Not on file     Relationship status: Not on file     Intimate partner violence:     Fear of current or ex partner: Not on file     Emotionally abused: Not on file     Physically abused: Not on file     Forced sexual activity: Not on file   Other Topics Concern     Parent/sibling w/ CABG, MI or angioplasty before 65F 55M? Not Asked   Social History Narrative     Not on file   Retired          Allergies:   Patient has no known allergies.         Review of Systems:  From intake questionnaire     Skin: negative  Eyes: negative  Ears/Nose/Throat: negative  Respiratory: No shortness of breath, dyspnea on exertion, cough, or hemoptysis  Cardiovascular: No chest pain or palpitations  Gastrointestinal: negative; no nausea/vomiting, constipation or diarrhea  Genitourinary: as per HPI  Musculoskeletal: negative  Neurologic: negative  Psychiatric: negative  Hematologic/Lymphatic/Immunologic: negative  Endocrine: negative         Physical Exam:     Patient is a 68 year old  male   Vitals: Blood pressure (!) 86/56, pulse 71, temperature 97  F (36.1  C), temperature source Tympanic, resp. rate 18, height 1.829 m (6'), weight 80.7 kg (178 lb), SpO2 96 %.  Constitutional: Body mass index is 24.14 kg/m .  Alert, no acute distress, oriented, conversant  Eyes: no scleral icterus; extraocular muscles intact, moist conjunctivae  Neck: trachea midline, no  thyromegaly  Ears/nose/mouth: throat/mouth:normal, good dentition  Respiratory: no respiratory distress, or pursed lip breathing; cough noted  Cardiovascular: pulses strong and intact; no obvious jugular venous distension present  Gastrointestinal: soft, nontender, no organomegaly or masses,   Lymphatics: No inguinal adenopathy  Musculoskeltal: extremities normal, no peripheral edema  Skin: no suspicious lesions or rashes  Neuro: Alert, oriented, speech and mentation normal  Psych: affect and mood normal, alert and oriented to person, place and time  Gait: Normal  : penis, scrotum, testes normal,   ATTILA anodular, symmetric      Labs and Pathology:    I reviewed all applicable laboratory and pathology data and went over findings with patient  Significant for   Lab Results   Component Value Date    CR 0.91 05/15/2019    CR 0.99 04/24/2019    CR 0.98 04/03/2019    CR 0.90 03/20/2019    CR 0.86 03/13/2019    CR 1.04 02/28/2019    CR 0.97 02/22/2016    CR 1.02 12/09/2014    CR 0.98 09/10/2013    CR 1.03 07/13/2011     PSA   Date Value Ref Range Status   04/30/2019 4.43 (H) 0 - 4 ug/L Final     Comment:     Assay Method:  Chemiluminescence using Siemens Vista analyzer   03/13/2018 2.80 0 - 4 ug/L Final     Comment:     Assay Method:  Chemiluminescence using Siemens Vista analyzer   03/20/2017 3.51 0 - 4 ug/L Final     Comment:     Assay Method:  Chemiluminescence using Siemens Vista analyzer   02/22/2016 2.23 0 - 4 ug/L Final   12/09/2014 2.46 0 - 4 ug/L Final   09/10/2013 1.94 0 - 4 ug/L Final   07/13/2011 1.79 0 - 4 ug/L Final   08/10/2010 1.53 0 - 4 ug/L Final   12/04/2008 1.71 0 - 4 ug/L Final   12/05/2007 1.6@ ng/mL        Imaging:    I reviewed all applicable imaging and went over findings with patient.    PET 3/8/2019  IMPRESSION: In this patient with history of newly diagnosed  adenocarcinoma of the left lung, evidence of stage IV disease:  1. Hypermetabolic presumed primary tumor in the left lower lobe  is  difficult to measure exactly due to lack of intravenous contrast and  surrounding atelectasis.  2. Diffuse left visceral and parietal pleural metastases.  3. Metastatic adenopathy involving the left supraclavicular, left  internal mammary, bilateral mediastinal and left hilar anaya chains.  4. Hypermetabolic right upper lobe nodule is new from 6/27/2018 and  are favored to be metastatic.  5. Hypermetabolic left adrenal metastasis.  6. Large left-sided hydropneumothorax with chest tube in place. The  large pneumothorax component is favored to be at least partially due  to trapped lung from extensive pleural metastases.      Outside and Past Medical records:    I spent 10 minutes reviewing outside and past medical records.         Assessment and Plan:     Assessment: 68 year old male with elevated PSA to 4.43 on finasteride. We had a long discussion about the utility of PSA screening.  We talked about the Pros and Cons.  We discussed the findings of the PLCO and EORTC studies.  We discussed the results of the [place] trial of treatment vs. observation in clinically detected prostate cancer as well as the results of the PIVOT trial in the context of screen-detected cancer.  We discussed the recommendations by the AUA, and USPSTF. We also discussed the significant (2-6%) and rising risk of biopsy associated sepsis.    We also discussed the emerging role of MRI in the diagnosis of prostate cancer and the potential for performing MRI-Ultrasound Fusion biopsy if suspicious lesions are noted.    In the end given the overall low PSA and normal ATTILA, we felt that there was not strong evidence that a biopsy would be beneficial at this point, understanding that there is a small chance that a biologically important cancer may be missed. This is all in the context of his current treatment for metastatic lung cancer. In this setting, there is no active role for further workup of PSA. That being said, will repeat this in a  few months to assess for interval change.    Regarding his symptoms, it sounds as though this has been exacerbated by the chemotherapy. As such, we will re-assess this upon completion of chemo. He is already on maximal medical therapy, and a TURP would not be advised during active chemo for his lung cancer. If symptoms are further exacerbated or more significant urinary retention is noted, he could consider intermittent catheterization, but would prefer to defer this for now.    Plan:  Continue tamsulosin and finasteride  PSA and clinic visit 3 months; plan for PVR and symptom review at that time    Orders  Orders Placed This Encounter   Procedures     PSA total and free [[id number]]     Eugenio Norris MD  Urology  [facility]  Clinic Phone [phone]        Again, thank you for allowing me to participate in the care of your patient.        Sincerely,        Eugenio Norris MD

## 2019-05-22 NOTE — PROGRESS NOTES
Chief Complaint:    Elevated PSA (Prostate Specific Antigen)         Consult or Referral:     Mr. Kentrell Kirkpatrick is a 68 year old male seen at the request of Dr. Shell.         History of Present Illness:    Kentrell Kirkpatrick is a very pleasant 68 year old male who presents with a history of elevated PSA. This was found to be 4.43 in March. This has not been previously elevated. No previous prostate workup. He does have BPH with urinary symptoms and has been on tamsulosin and finasteride. Given he is on a 5-LUAN, PSA corrects to about 9.    This is in the context of recent diagnosis of lung cancer. He has non-small cell lung cancer diagnosed March of this year. Also with COPD. Is currently undergoing systemic therapy. States he has scans in the coming weeks and long-term prognosis and plans are not yet clear.    Regarding his urination, he notes he is having worsening symptoms since the start of chemo. Currently with weakening of his urinary stream, although is able to empty. Nocturia x 5. No dysuria or hematuria. States prior to chemotherapy, he was very pleased with his urination.         Past Medical History:     Past Medical History:   Diagnosis Date     Colon polyps    SKYLER  COPD  Lung cancer  Ascending aortic aneurysm  Herniated disk         Past Surgical History:     Past Surgical History:   Procedure Laterality Date     COLONOSCOPY       COLONOSCOPY N/A 12/22/2016    Procedure: COMBINED COLONOSCOPY, SINGLE OR MULTIPLE BIOPSY/POLYPECTOMY BY BIOPSY;  Surgeon: Jeremi Kramer MD;  Location:  GI     INSERT CHEST TUBE Left 3/1/2019    Procedure: INSERT CHEST TUBE;  Surgeon: Matthew Rodriguez MD;  Location: [facility]     INSERT CHEST TUBE N/A 5/9/2019    Procedure: Removal Of Pleurx Catheter;  Surgeon: Matthew Rodriguez MD;  Location: [facility]     IR LYMPH NODE BIOPSY  3/15/2019     THORACENTESIS Left 2/20/2019    Procedure: THORACENTESIS;  Surgeon: Matthew Rodriguez MD;  Location: [facility]           Medications     Current Outpatient Medications   Medication     albuterol (PROAIR HFA) 108 (90 Base) MCG/ACT inhaler     calcium polycarbophil (FIBERCON) 625 MG tablet     dexamethasone (DECADRON) 4 MG tablet     finasteride (PROSCAR) 5 MG tablet     folic acid (FOLVITE) 1 MG tablet     LORazepam (ATIVAN) 0.5 MG tablet     Melatonin 10 MG TABS tablet     MULTI VITAMIN MENS OR TABS     ranitidine (ZANTAC) 75 MG tablet     tamsulosin (FLOMAX) 0.4 MG capsule     tiotropium (SPIRIVA HANDIHALER) 18 MCG inhaled capsule     ondansetron (ZOFRAN) 8 MG tablet     prochlorperazine (COMPAZINE) 10 MG tablet     No current facility-administered medications for this visit.      Facility-Administered Medications Ordered in Other Visits   Medication     iohexol (OMNIPAQUE) 300 mg/mL injection 10 mL          Family History:     Family History   Problem Relation Age of Onset     Heart Disease Father      Prostate Cancer Other      Prostate Cancer Brother      Colon Cancer No family hx of    Oldest brother with prostate cancer - had surgery         Social History:     Social History     Socioeconomic History     Marital status:      Spouse name: Not on file     Number of children: Not on file     Years of education: Not on file     Highest education level: Not on file   Occupational History     Not on file   Social Needs     Financial resource strain: Not on file     Food insecurity:     Worry: Not on file     Inability: Not on file     Transportation needs:     Medical: Not on file     Non-medical: Not on file   Tobacco Use     Smoking status: Former Smoker     Packs/day: 1.50     Years: 43.00     Pack years: 64.50     Types: Cigarettes     Last attempt to quit: 2007     Years since quittin.4     Smokeless tobacco: Never Used   Substance and Sexual Activity     Alcohol use: Not Currently     Comment: 3-4 a week     Drug use: No     Sexual activity: Yes     Partners: Female   Lifestyle     Physical activity:     Days  per week: Not on file     Minutes per session: Not on file     Stress: Not on file   Relationships     Social connections:     Talks on phone: Not on file     Gets together: Not on file     Attends Alevism service: Not on file     Active member of club or organization: Not on file     Attends meetings of clubs or organizations: Not on file     Relationship status: Not on file     Intimate partner violence:     Fear of current or ex partner: Not on file     Emotionally abused: Not on file     Physically abused: Not on file     Forced sexual activity: Not on file   Other Topics Concern     Parent/sibling w/ CABG, MI or angioplasty before 65F 55M? Not Asked   Social History Narrative     Not on file   Retired          Allergies:   Patient has no known allergies.         Review of Systems:  From intake questionnaire     Skin: negative  Eyes: negative  Ears/Nose/Throat: negative  Respiratory: No shortness of breath, dyspnea on exertion, cough, or hemoptysis  Cardiovascular: No chest pain or palpitations  Gastrointestinal: negative; no nausea/vomiting, constipation or diarrhea  Genitourinary: as per HPI  Musculoskeletal: negative  Neurologic: negative  Psychiatric: negative  Hematologic/Lymphatic/Immunologic: negative  Endocrine: negative         Physical Exam:     Patient is a 68 year old  male   Vitals: Blood pressure (!) 86/56, pulse 71, temperature 97  F (36.1  C), temperature source Tympanic, resp. rate 18, height 1.829 m (6'), weight 80.7 kg (178 lb), SpO2 96 %.  Constitutional: Body mass index is 24.14 kg/m .  Alert, no acute distress, oriented, conversant  Eyes: no scleral icterus; extraocular muscles intact, moist conjunctivae  Neck: trachea midline, no thyromegaly  Ears/nose/mouth: throat/mouth:normal, good dentition  Respiratory: no respiratory distress, or pursed lip breathing; cough noted  Cardiovascular: pulses strong and intact; no obvious jugular venous distension present  Gastrointestinal: soft,  nontender, no organomegaly or masses,   Lymphatics: No inguinal adenopathy  Musculoskeltal: extremities normal, no peripheral edema  Skin: no suspicious lesions or rashes  Neuro: Alert, oriented, speech and mentation normal  Psych: affect and mood normal, alert and oriented to person, place and time  Gait: Normal  : penis, scrotum, testes normal,   ATTILA anodular, symmetric      Labs and Pathology:    I reviewed all applicable laboratory and pathology data and went over findings with patient  Significant for   Lab Results   Component Value Date    CR 0.91 05/15/2019    CR 0.99 04/24/2019    CR 0.98 04/03/2019    CR 0.90 03/20/2019    CR 0.86 03/13/2019    CR 1.04 02/28/2019    CR 0.97 02/22/2016    CR 1.02 12/09/2014    CR 0.98 09/10/2013    CR 1.03 07/13/2011     PSA   Date Value Ref Range Status   04/30/2019 4.43 (H) 0 - 4 ug/L Final     Comment:     Assay Method:  Chemiluminescence using Siemens Vista analyzer   03/13/2018 2.80 0 - 4 ug/L Final     Comment:     Assay Method:  Chemiluminescence using Siemens Vista analyzer   03/20/2017 3.51 0 - 4 ug/L Final     Comment:     Assay Method:  Chemiluminescence using Siemens Vista analyzer   02/22/2016 2.23 0 - 4 ug/L Final   12/09/2014 2.46 0 - 4 ug/L Final   09/10/2013 1.94 0 - 4 ug/L Final   07/13/2011 1.79 0 - 4 ug/L Final   08/10/2010 1.53 0 - 4 ug/L Final   12/04/2008 1.71 0 - 4 ug/L Final   12/05/2007 1.6@ ng/mL        Imaging:    I reviewed all applicable imaging and went over findings with patient.    PET 3/8/2019  IMPRESSION: In this patient with history of newly diagnosed  adenocarcinoma of the left lung, evidence of stage IV disease:  1. Hypermetabolic presumed primary tumor in the left lower lobe is  difficult to measure exactly due to lack of intravenous contrast and  surrounding atelectasis.  2. Diffuse left visceral and parietal pleural metastases.  3. Metastatic adenopathy involving the left supraclavicular, left  internal mammary, bilateral  mediastinal and left hilar anaya chains.  4. Hypermetabolic right upper lobe nodule is new from 6/27/2018 and  are favored to be metastatic.  5. Hypermetabolic left adrenal metastasis.  6. Large left-sided hydropneumothorax with chest tube in place. The  large pneumothorax component is favored to be at least partially due  to trapped lung from extensive pleural metastases.      Outside and Past Medical records:    I spent 10 minutes reviewing outside and past medical records.         Assessment and Plan:     Assessment: 68 year old male with elevated PSA to 4.43 on finasteride. We had a long discussion about the utility of PSA screening.  We talked about the Pros and Cons.  We discussed the findings of the PLCO and EORTC studies.  We discussed the results of the [place] trial of treatment vs. observation in clinically detected prostate cancer as well as the results of the PIVOT trial in the context of screen-detected cancer.  We discussed the recommendations by the AUA, and USPSTF. We also discussed the significant (2-6%) and rising risk of biopsy associated sepsis.    We also discussed the emerging role of MRI in the diagnosis of prostate cancer and the potential for performing MRI-Ultrasound Fusion biopsy if suspicious lesions are noted.    In the end given the overall low PSA and normal ATTILA, we felt that there was not strong evidence that a biopsy would be beneficial at this point, understanding that there is a small chance that a biologically important cancer may be missed. This is all in the context of his current treatment for metastatic lung cancer. In this setting, there is no active role for further workup of PSA. That being said, will repeat this in a few months to assess for interval change.    Regarding his symptoms, it sounds as though this has been exacerbated by the chemotherapy. As such, we will re-assess this upon completion of chemo. He is already on maximal medical therapy, and a TURP would  not be advised during active chemo for his lung cancer. If symptoms are further exacerbated or more significant urinary retention is noted, he could consider intermittent catheterization, but would prefer to defer this for now.    Plan:  Continue tamsulosin and finasteride  PSA and clinic visit 3 months; plan for PVR and symptom review at that time    Orders  Orders Placed This Encounter   Procedures     PSA total and free [[id number]]     Eugenio Norris MD  Urology  [facility]  Clinic Phone [phone]

## 2019-05-23 DIAGNOSIS — C34.92 NON-SMALL CELL LUNG CANCER, LEFT (H): ICD-10-CM

## 2019-05-24 RX ORDER — DEXAMETHASONE 4 MG/1
TABLET ORAL
Qty: 6 TABLET | Refills: 0 | Status: SHIPPED | OUTPATIENT
Start: 2019-05-24 | End: 2019-06-04

## 2019-05-24 NOTE — TELEPHONE ENCOUNTER
Pending Prescriptions:                       Disp   Refills    dexamethasone (DECADRON) 4 MG tablet [Pha*6 tabl*0            Sig: TAKE 1 TABLET (4 MG) BY MOUTH 2 TIMES DAILY TO BE           TAKEN DAY BEFORE, DAY OF, AND DAY AFTER           INFUSION..          Last Written Prescription Date:  4/24/2019  Last Fill Quantity: 6,   # refills: 0  Last Office Visit: 5/22/2019  Future Office visit:    Next 5 appointments (look out 90 days)    Aug 21, 2019 11:00 AM CDT  Return Visit with  ONCOLOGY NURSE  [facility]  [address]  [facility] [address]  [phone]           Routing refill request to provider

## 2019-05-28 ENCOUNTER — HOSPITAL ENCOUNTER (OUTPATIENT)
Dept: CT IMAGING | Facility: CLINIC | Age: 69
Discharge: HOME | End: 2019-05-28
Attending: INTERNAL MEDICINE | Admitting: INTERNAL MEDICINE
Payer: COMMERCIAL

## 2019-05-28 DIAGNOSIS — C34.92 NON-SMALL CELL LUNG CANCER, LEFT (H): ICD-10-CM

## 2019-05-28 PROCEDURE — 71260 CT THORAX DX C+: CPT

## 2019-05-28 PROCEDURE — 74177 CT ABD & PELVIS W/CONTRAST: CPT

## 2019-05-28 PROCEDURE — 25000128 H RX IP 250 OP 636: Performed by: INTERNAL MEDICINE

## 2019-05-28 RX ORDER — IOPAMIDOL 755 MG/ML
500 INJECTION, SOLUTION INTRAVASCULAR ONCE
Status: COMPLETED | OUTPATIENT
Start: 2019-05-28 | End: 2019-05-28

## 2019-05-28 RX ADMIN — IOPAMIDOL 88 ML: 755 INJECTION, SOLUTION INTRAVENOUS at 08:25

## 2019-05-28 RX ADMIN — SODIUM CHLORIDE 62 ML: 9 INJECTION, SOLUTION INTRAVENOUS at 08:26

## 2019-05-28 NOTE — TELEPHONE ENCOUNTER
Signed Prescriptions:                        Disp   Refills    dexamethasone (DECADRON) 4 MG tablet       6 tabl*0        Sig: TAKE 1 TABLET (4 MG) BY MOUTH 2 TIMES DAILY TO BE           TAKEN DAY BEFORE, DAY OF, AND DAY AFTER           INFUSION..  Authorizing Provider: YOEL WEATHERS

## 2019-06-04 ENCOUNTER — ONCOLOGY VISIT (OUTPATIENT)
Dept: ONCOLOGY | Facility: CLINIC | Age: 69
End: 2019-06-04
Attending: INTERNAL MEDICINE
Payer: COMMERCIAL

## 2019-06-04 VITALS
HEART RATE: 90 BPM | RESPIRATION RATE: 18 BRPM | WEIGHT: 178.79 LBS | OXYGEN SATURATION: 92 % | SYSTOLIC BLOOD PRESSURE: 108 MMHG | BODY MASS INDEX: 24.25 KG/M2 | TEMPERATURE: 98.1 F | DIASTOLIC BLOOD PRESSURE: 69 MMHG

## 2019-06-04 DIAGNOSIS — J44.9 CHRONIC OBSTRUCTIVE PULMONARY DISEASE, UNSPECIFIED COPD TYPE (H): ICD-10-CM

## 2019-06-04 DIAGNOSIS — C34.92 NON-SMALL CELL LUNG CANCER, LEFT (H): ICD-10-CM

## 2019-06-04 DIAGNOSIS — J43.1 PANLOBULAR EMPHYSEMA (H): Primary | ICD-10-CM

## 2019-06-04 DIAGNOSIS — Z13.29 SCREENING FOR HYPOTHYROIDISM: ICD-10-CM

## 2019-06-04 PROCEDURE — G0463 HOSPITAL OUTPT CLINIC VISIT: HCPCS | Mod: ZF

## 2019-06-04 PROCEDURE — 99214 OFFICE O/P EST MOD 30 MIN: CPT | Mod: ZP | Performed by: INTERNAL MEDICINE

## 2019-06-04 RX ORDER — ALBUTEROL SULFATE 0.83 MG/ML
2.5 SOLUTION RESPIRATORY (INHALATION) EVERY 4 HOURS PRN
Status: DISCONTINUED | OUTPATIENT
Start: 2019-06-04 | End: 2019-06-04 | Stop reason: HOSPADM

## 2019-06-04 RX ORDER — DEXAMETHASONE 4 MG/1
TABLET ORAL
Qty: 6 TABLET | Refills: 11 | Status: SHIPPED | OUTPATIENT
Start: 2019-06-04 | End: 2019-01-01

## 2019-06-04 ASSESSMENT — PAIN SCALES - GENERAL: PAINLEVEL: NO PAIN (0)

## 2019-06-04 NOTE — LETTER
6/4/2019       RE: Kentrell Kirkpatrick  [address] [facility] [address]     Dear Colleague,    Thank you for referring your patient, Kentrell Kirkpatrick, to the [facility]. Please see a copy of my visit note below.    [facility]    FOLLOW-UP VISIT NOTE    PATIENT NAME: Kentrell Kirkpatrick MRN # [id number]  DATE OF VISIT: June 4, 2019 YOB: 1950    CANCER TYPE: NSCLC, adenocarcinoma  STAGE: IV  ECOG PS: 1    Cancer Staging  Non-small cell lung cancer, left (H)  Staging form: Lung, AJCC 8th Edition  - Clinical stage from 3/11/2019: Stage IV (cT1c, cN3, pM1c) - Signed by Susan Muller MD on 3/11/2019    PD-L1: <1%  Lung panel: 3/1/19 on [id number] A1 and [id number] A1. Negative  NGS: Guardant 360 sent 2/28/19 negative for actionable mutations. SMAD4 E538, TP53 H179R, SMO A327G. MSI not high    SUMMARY  6/27/18 CT chest w/contrast to re-evaluate aortic aneurysm: 8 mm spiculated KEATON nodule, 3 mm RML nodule unchanged from 3/15/17 and 2/25/16 scans  2/4/19 Presented to PCP with fairly rapid onset of shortness of breath. Given albuterol  2/19/18 CXR and CT chest w/contrast. Large left effusion  2/20/19 L thoracentesis (Dr. Rodriguez), 1180 cc  3/1/19 L pleurx (Dr. Rodriguez)  3/13/19 C1 carboplatin pemetrexed pembrolizumab  3/15/19 L supraclavicular LN bx (IR, FNA). Path: lung adenocarcinoma  4/3/19 C2 carboplatin pemetrexed pembrolizumab  4/15/19 FEV1 2.11 (61%), FVC 3.38 (73%), DLCO 27.7, 67% (59%)  4/18/19 TPA. Drained 900 cc after it got unclotted  5/9/19 Pleurx removed    SUBJECTIVE  Mr. Kirkpatrick returns today for follow up of metastatic lung adenocarcinoma after 4 cycles of carboplatin pemetrexed pembrolizumab. Doing well. Appetite good. Food is tasting better. Was able to help move a bed to an apartment. Got winded, but was able to do it. Walked about 4 miles the other day. Again, was difficult, but got through it. No HA, cough, fevers, chills, N/V,  change in shortness of breath (improved since we first met certainly), constipation, diarrhea, leg swelling, rashes.     PAST MEDICAL HISTORY  Lung adenocarcinoma as above  L5 disk herniation. Epidural injection 5/22/18. Improved dramatically with chiropracter in the past.   BPH  Ascending aortic aneurysm    CURRENT OUTPATIENT MEDICATIONS  Current Outpatient Medications   Medication Sig Dispense Refill     albuterol (PROAIR HFA) 108 (90 Base) MCG/ACT inhaler Inhale 2 puffs into the lungs every 6 hours 8.5 g 3     calcium polycarbophil (FIBERCON) 625 MG tablet Take 2 tablets by mouth daily       dexamethasone (DECADRON) 4 MG tablet TAKE 1 TABLET (4 MG) BY MOUTH 2 TIMES DAILY TO BE TAKEN DAY BEFORE, DAY OF, AND DAY AFTER INFUSION.. 6 tablet 0     dexamethasone (DECADRON) 4 MG tablet Take 4 mg by mouth 2 times daily To be taken day before, day of, and day after infusion.. 6 tablet 0     finasteride (PROSCAR) 5 MG tablet Take 1 tablet (5 mg) by mouth daily 30 tablet 5     folic acid (FOLVITE) 1 MG tablet Take 1 tablet (1 mg) by mouth daily 90 tablet 11     LORazepam (ATIVAN) 0.5 MG tablet Take 1 tablet (0.5 mg) by mouth every 6 hours as needed (Nausea/Vomiting) 30 tablet 3     Melatonin 10 MG TABS tablet Take 10 mg by mouth nightly as needed for sleep       MULTI VITAMIN MENS OR TABS 1 TABLET DAILY       ondansetron (ZOFRAN) 8 MG tablet Take 1 tablet (8 mg) by mouth every 8 hours as needed for nausea 30 tablet 11     prochlorperazine (COMPAZINE) 10 MG tablet Take 1 tablet (10 mg) by mouth every 6 hours as needed (Nausea/Vomiting) 30 tablet 11     ranitidine (ZANTAC) 75 MG tablet Take 75 mg by mouth as needed for heartburn       tamsulosin (FLOMAX) 0.4 MG capsule Take 1 capsule (0.4 mg) by mouth daily 90 capsule 0     tiotropium (SPIRIVA HANDIHALER) 18 MCG inhaled capsule Inhale 1 capsule (18 mcg) into the lungs daily 1 capsule 11     ALLERGIES  No Known Allergies    REVIEW OF SYSTEMS  As above in the HPI, o/w complete  12-point ROS was negative.    PHYSICAL EXAM    Wt Readings from Last 3 Encounters:   05/22/19 80.7 kg (178 lb)   05/17/19 79.8 kg (176 lb)   05/15/19 79.8 kg (176 lb)     GEN: NAD  EXT: no edema  NEURO: alert    LABORATORY AND IMAGING STUDIES  Labs will be done tomorrow; prior labs reviewed    Results for orders placed or performed during the hospital encounter of 05/28/19   CT Chest/Abdomen/Pelvis w Contrast    Narrative    CT CHEST, ABDOMEN AND PELVIS WITH CONTRAST  5/28/2019 8:34 AM    HISTORY: NSCLC, adenocarcinoma, restaging. On pembrolizumab,  pemetrexed and carboplatin x 4 cycles. Non-small cell lung cancer,  left (H).    TECHNIQUE: CT scan obtained of the chest, abdomen, and pelvis with  oral and IV contrast. 88 mL Isovue-370 IV injected. Radiation dose for  this scan was reduced using automated exposure control, adjustment of  the mA and/or kV according to patient size, or iterative  reconstruction technique.    COMPARISON: CT chest 5/7/2019, CT chest, abdomen, and pelvis  4/15/2019.    FINDINGS:  Chest: There are multiple stable bilateral pulmonary nodules again  identified. The dominant nodules include a spiculated 0.8 cm  posteromedial left upper lobe nodule on series 6 image 66, and an  ill-defined 0.6 cm medial posterior left upper lobe nodule on image  76. There are other examples that are stable as well. There is a focus  of consolidation or chronic atelectasis at the lingula that is stable  measuring 3.7 x 1.8 cm series 6 image 212. Small left pleural effusion  is similar in volume since the prior exam. Removal of the left chest  tube. There is a degree of pleural enhancement and slightly increased  hypodense thickening of the left pleura since the prior exam noted  diffusely. For example, at the left chest base posteriorly and  laterally this measures 0.9 cm, previously 0.5 cm at a similar level  series 2 image 57. There is pericardial nodularity versus adenopathy  again identified. This appears  similar to the prior examinations. An  example near the pulmonary artery root on the left side is 1.6 x 0.6  cm series 2 image 30, stable. There are other nearby examples. Lymph  nodes in the mediastinum appear stable. Stable left hilar mildly  prominent lymph node that is 1.3 cm series 2 image 32.    Abdomen/pelvis: Stable left adrenal nodular mass that is 1.8 x 1.5 cm  series 2 image 64. Right adrenal shows no new lesion. Stable 0.4 cm  hypodensity at the lateral left liver that may just be a cyst series 2  image 61. No new liver lesion. Gallbladder, spleen, pancreas, and  kidneys do not show any new abnormalities. Vascular calcifications  demonstrated. No acute bowel abnormality. Colonic diverticulosis  distally. No enlarged lymph nodes identified.    Bone windows of the chest, abdomen, and pelvis shows no new  destructive bony lesion.      Impression    IMPRESSION:   1. Increased hypodense thickening of the left pleura, dominantly seen  at the lower left chest. Cannot exclude progression of pleural  malignancy.  2. Multiple bilateral pulmonary nodules are stable and indeterminate.  Stable focal consolidation and/or chronic atelectasis at the lingula  that is indeterminate.  3. Stable several mediastinal and left hilar mildly enlarged lymph  nodes that may represent malignant lymph nodes. This could also  pertain to pericardial nodularity.  4. Stable volume of small left pleural fluid. Removal of the left  chest tube.  5. Stable indeterminate nodular mass at the left adrenal.    MAUREEN IBARRA MD     Imaging was personally reviewed     ASSESSMENT AND PLAN  NSCLC, PD-L1 <1%: Good NV to 4 cycles of carboplatin + pemetrexed + pembrolizumab, perhaps more than the report might lead on - feeling significantly better, pleurx out, fluid hasn't really accumulated much - has some trapped lung we knew about before. Eating better. All of these point to NV. Discussed next steps of dropping carboplatin and continuing pemetrexed +  pembro maintenance. Would do 3 cycles and restage with CT CAP. I'll see him in Aug. He doesn't necessarily need to see a provider between appointments but will call if he has new problems. Changed dex prep to 4 mg evening before, 4 mg evening of, 4 mg morning after then stop. Infusions being given in [place].    COPD: Dr. Rodriguez started tiotropium. It's pretty expensive. He also had great benefit from an albuterol inhaler. His PFTs are such that he would qualify from one and I think it would help him a lot. Scripts given.    L malignant pleural effusion: Some residual fluid, nothing accumulating, monitor    Insomnia: Seems to be doing ok. He's not interested in a sleep medicine consult. Still taking melatonin prn. No longer taking quetiapine.    Taste change: Better.    Urinary hesitancy, BPH: Worsened by diphenhydramine he's taking for insomnia. On tamsulosin, PCP added finasteride, much better.    A total of 35 minutes was spent with the patient, >50% of which was spent in counseling and coordination of care.    Susan Muller MD    Hematology, Oncology and Transplantation

## 2019-06-04 NOTE — PROGRESS NOTES
[facility]    FOLLOW-UP VISIT NOTE    PATIENT NAME: Kentrell Kirkpatrick MRN # [id number]  DATE OF VISIT: June 4, 2019 YOB: 1950    CANCER TYPE: NSCLC, adenocarcinoma  STAGE: IV  ECOG PS: 1    Cancer Staging  Non-small cell lung cancer, left (H)  Staging form: Lung, AJCC 8th Edition  - Clinical stage from 3/11/2019: Stage IV (cT1c, cN3, pM1c) - Signed by Susan Muller MD on 3/11/2019    PD-L1: <1%  Lung panel: 3/1/19 on [id number] A1 and [id number] A1. Negative  NGS: [employer]ant 360 sent 2/28/19 negative for actionable mutations. SMAD4 E538, TP53 H179R, SMO A327G. MSI not high    SUMMARY  6/27/18 CT chest w/contrast to re-evaluate aortic aneurysm: 8 mm spiculated KEATON nodule, 3 mm RML nodule unchanged from 3/15/17 and 2/25/16 scans  2/4/19 Presented to PCP with fairly rapid onset of shortness of breath. Given albuterol  2/19/18 CXR and CT chest w/contrast. Large left effusion  2/20/19 L thoracentesis (Dr. Rodriguez), 1180 cc  3/1/19 L pleurx (Dr. Rodriguez)  3/13/19 C1 carboplatin pemetrexed pembrolizumab  3/15/19 L supraclavicular LN bx (IR, FNA). Path: lung adenocarcinoma  4/3/19 C2 carboplatin pemetrexed pembrolizumab  4/15/19 FEV1 2.11 (61%), FVC 3.38 (73%), DLCO 27.7, 67% (59%)  4/18/19 TPA. Drained 900 cc after it got unclotted  5/9/19 Pleurx removed    SUBJECTIVE  Mr. Kirkpatrick returns today for follow up of metastatic lung adenocarcinoma after 4 cycles of carboplatin pemetrexed pembrolizumab. Doing well. Appetite good. Food is tasting better. Was able to help move a bed to an apartment. Got winded, but was able to do it. Walked about 4 miles the other day. Again, was difficult, but got through it. No HA, cough, fevers, chills, N/V, change in shortness of breath (improved since we first met certainly), constipation, diarrhea, leg swelling, rashes.     PAST MEDICAL HISTORY  Lung adenocarcinoma as above  L5 disk herniation. Epidural injection 5/22/18. Improved dramatically  with chiropracter in the past.   BPH  Ascending aortic aneurysm    CURRENT OUTPATIENT MEDICATIONS  Current Outpatient Medications   Medication Sig Dispense Refill     albuterol (PROAIR HFA) 108 (90 Base) MCG/ACT inhaler Inhale 2 puffs into the lungs every 6 hours 8.5 g 3     calcium polycarbophil (FIBERCON) 625 MG tablet Take 2 tablets by mouth daily       dexamethasone (DECADRON) 4 MG tablet TAKE 1 TABLET (4 MG) BY MOUTH 2 TIMES DAILY TO BE TAKEN DAY BEFORE, DAY OF, AND DAY AFTER INFUSION.. 6 tablet 0     dexamethasone (DECADRON) 4 MG tablet Take 4 mg by mouth 2 times daily To be taken day before, day of, and day after infusion.. 6 tablet 0     finasteride (PROSCAR) 5 MG tablet Take 1 tablet (5 mg) by mouth daily 30 tablet 5     folic acid (FOLVITE) 1 MG tablet Take 1 tablet (1 mg) by mouth daily 90 tablet 11     LORazepam (ATIVAN) 0.5 MG tablet Take 1 tablet (0.5 mg) by mouth every 6 hours as needed (Nausea/Vomiting) 30 tablet 3     Melatonin 10 MG TABS tablet Take 10 mg by mouth nightly as needed for sleep       MULTI VITAMIN MENS OR TABS 1 TABLET DAILY       ondansetron (ZOFRAN) 8 MG tablet Take 1 tablet (8 mg) by mouth every 8 hours as needed for nausea 30 tablet 11     prochlorperazine (COMPAZINE) 10 MG tablet Take 1 tablet (10 mg) by mouth every 6 hours as needed (Nausea/Vomiting) 30 tablet 11     ranitidine (ZANTAC) 75 MG tablet Take 75 mg by mouth as needed for heartburn       tamsulosin (FLOMAX) 0.4 MG capsule Take 1 capsule (0.4 mg) by mouth daily 90 capsule 0     tiotropium (SPIRIVA HANDIHALER) 18 MCG inhaled capsule Inhale 1 capsule (18 mcg) into the lungs daily 1 capsule 11     ALLERGIES  No Known Allergies    REVIEW OF SYSTEMS  As above in the HPI, o/w complete 12-point ROS was negative.    PHYSICAL EXAM    Wt Readings from Last 3 Encounters:   05/22/19 80.7 kg (178 lb)   05/17/19 79.8 kg (176 lb)   05/15/19 79.8 kg (176 lb)     GEN: NAD  EXT: no edema  NEURO: alert    LABORATORY AND IMAGING  STUDIES  Labs will be done tomorrow; prior labs reviewed    Results for orders placed or performed during the hospital encounter of 05/28/19   CT Chest/Abdomen/Pelvis w Contrast    Narrative    CT CHEST, ABDOMEN AND PELVIS WITH CONTRAST  5/28/2019 8:34 AM    HISTORY: NSCLC, adenocarcinoma, restaging. On pembrolizumab,  pemetrexed and carboplatin x 4 cycles. Non-small cell lung cancer,  left (H).    TECHNIQUE: CT scan obtained of the chest, abdomen, and pelvis with  oral and IV contrast. 88 mL Isovue-370 IV injected. Radiation dose for  this scan was reduced using automated exposure control, adjustment of  the mA and/or kV according to patient size, or iterative  reconstruction technique.    COMPARISON: CT chest 5/7/2019, CT chest, abdomen, and pelvis  4/15/2019.    FINDINGS:  Chest: There are multiple stable bilateral pulmonary nodules again  identified. The dominant nodules include a spiculated 0.8 cm  posteromedial left upper lobe nodule on series 6 image 66, and an  ill-defined 0.6 cm medial posterior left upper lobe nodule on image  76. There are other examples that are stable as well. There is a focus  of consolidation or chronic atelectasis at the lingula that is stable  measuring 3.7 x 1.8 cm series 6 image 212. Small left pleural effusion  is similar in volume since the prior exam. Removal of the left chest  tube. There is a degree of pleural enhancement and slightly increased  hypodense thickening of the left pleura since the prior exam noted  diffusely. For example, at the left chest base posteriorly and  laterally this measures 0.9 cm, previously 0.5 cm at a similar level  series 2 image 57. There is pericardial nodularity versus adenopathy  again identified. This appears similar to the prior examinations. An  example near the pulmonary artery root on the left side is 1.6 x 0.6  cm series 2 image 30, stable. There are other nearby examples. Lymph  nodes in the mediastinum appear stable. Stable left hilar  mildly  prominent lymph node that is 1.3 cm series 2 image 32.    Abdomen/pelvis: Stable left adrenal nodular mass that is 1.8 x 1.5 cm  series 2 image 64. Right adrenal shows no new lesion. Stable 0.4 cm  hypodensity at the lateral left liver that may just be a cyst series 2  image 61. No new liver lesion. Gallbladder, spleen, pancreas, and  kidneys do not show any new abnormalities. Vascular calcifications  demonstrated. No acute bowel abnormality. Colonic diverticulosis  distally. No enlarged lymph nodes identified.    Bone windows of the chest, abdomen, and pelvis shows no new  destructive bony lesion.      Impression    IMPRESSION:   1. Increased hypodense thickening of the left pleura, dominantly seen  at the lower left chest. Cannot exclude progression of pleural  malignancy.  2. Multiple bilateral pulmonary nodules are stable and indeterminate.  Stable focal consolidation and/or chronic atelectasis at the lingula  that is indeterminate.  3. Stable several mediastinal and left hilar mildly enlarged lymph  nodes that may represent malignant lymph nodes. This could also  pertain to pericardial nodularity.  4. Stable volume of small left pleural fluid. Removal of the left  chest tube.  5. Stable indeterminate nodular mass at the left adrenal.    MAUREEN IBARRA MD     Imaging was personally reviewed     ASSESSMENT AND PLAN  NSCLC, PD-L1 <1%: Good NE to 4 cycles of carboplatin + pemetrexed + pembrolizumab, perhaps more than the report might lead on - feeling significantly better, pleurx out, fluid hasn't really accumulated much - has some trapped lung we knew about before. Eating better. All of these point to NE. Discussed next steps of dropping carboplatin and continuing pemetrexed + pembro maintenance. Would do 3 cycles and restage with CT CAP. I'll see him in Aug. He doesn't necessarily need to see a provider between appointments but will call if he has new problems. Changed dex prep to 4 mg evening before, 4 mg  evening of, 4 mg morning after then stop. Infusions being given in [place].    COPD: Dr. Rodriguez started tiotropium. It's pretty expensive. He also had great benefit from an albuterol inhaler. His PFTs are such that he would qualify from one and I think it would help him a lot. Scripts given.    L malignant pleural effusion: Some residual fluid, nothing accumulating, monitor    Insomnia: Seems to be doing ok. He's not interested in a sleep medicine consult. Still taking melatonin prn. No longer taking quetiapine.    Taste change: Better.    Urinary hesitancy, BPH: Worsened by diphenhydramine he's taking for insomnia. On tamsulosin, PCP added finasteride, much better.    A total of 35 minutes was spent with the patient, >50% of which was spent in counseling and coordination of care.    Susan Muller MD    Hematology, Oncology and Transplantation

## 2019-06-04 NOTE — NURSING NOTE
Oncology Rooming Note    June 4, 2019 2:28 PM   Kentrell Kirkpatrick is a 68 year old male who presents for:    Chief Complaint   Patient presents with     Oncology Clinic Visit     Lung Ca , scan results      Initial Vitals: /69   Pulse 90   Temp 98.1  F (36.7  C) (Oral)   Resp 18   Wt 81.1 kg (178 lb 12.7 oz)   SpO2 92%   BMI 24.25 kg/m   Estimated body mass index is 24.25 kg/m  as calculated from the following:    Height as of 5/22/19: 1.829 m (6').    Weight as of this encounter: 81.1 kg (178 lb 12.7 oz). Body surface area is 2.03 meters squared.  No Pain (0) Comment: Data Unavailable   No LMP for male patient.  Allergies reviewed: Yes  Medications reviewed: Yes    Medications: Medication refills not needed today.  Pharmacy name entered into [employer]: [id number] - [place], [place] - [address]    Clinical concerns: scan results  Yohana  was notified.      Abeba Vegas MA

## 2019-06-05 ENCOUNTER — HOSPITAL ENCOUNTER (OUTPATIENT)
Facility: CLINIC | Age: 69
Setting detail: SPECIMEN
Discharge: HOME | End: 2019-06-05
Attending: INTERNAL MEDICINE | Admitting: INTERNAL MEDICINE
Payer: COMMERCIAL

## 2019-06-05 ENCOUNTER — INFUSION THERAPY VISIT (OUTPATIENT)
Dept: INFUSION THERAPY | Facility: CLINIC | Age: 69
End: 2019-06-05
Attending: INTERNAL MEDICINE
Payer: COMMERCIAL

## 2019-06-05 VITALS
WEIGHT: 179.3 LBS | TEMPERATURE: 97.3 F | BODY MASS INDEX: 24.32 KG/M2 | SYSTOLIC BLOOD PRESSURE: 99 MMHG | DIASTOLIC BLOOD PRESSURE: 64 MMHG | OXYGEN SATURATION: 97 % | RESPIRATION RATE: 16 BRPM

## 2019-06-05 DIAGNOSIS — C34.92 NON-SMALL CELL LUNG CANCER, LEFT (H): Primary | ICD-10-CM

## 2019-06-05 LAB
ALBUMIN SERPL-MCNC: 3.1 G/DL (ref 3.4–5)
ALP SERPL-CCNC: 99 U/L (ref 40–150)
ALT SERPL W P-5'-P-CCNC: 27 U/L (ref 0–70)
ANION GAP SERPL CALCULATED.3IONS-SCNC: 7 MMOL/L (ref 3–14)
ANISOCYTOSIS BLD QL SMEAR: ABNORMAL
AST SERPL W P-5'-P-CCNC: 24 U/L (ref 0–45)
BASOPHILS # BLD AUTO: 0 10E9/L (ref 0–0.2)
BASOPHILS NFR BLD AUTO: 0 %
BILIRUB SERPL-MCNC: 0.5 MG/DL (ref 0.2–1.3)
BUN SERPL-MCNC: 21 MG/DL (ref 7–30)
CALCIUM SERPL-MCNC: 8.8 MG/DL (ref 8.5–10.1)
CHLORIDE SERPL-SCNC: 108 MMOL/L (ref 94–109)
CO2 SERPL-SCNC: 26 MMOL/L (ref 20–32)
CREAT SERPL-MCNC: 0.99 MG/DL (ref 0.66–1.25)
DIFFERENTIAL METHOD BLD: ABNORMAL
EOSINOPHIL # BLD AUTO: 0.1 10E9/L (ref 0–0.7)
EOSINOPHIL NFR BLD AUTO: 3 %
ERYTHROCYTE [DISTWIDTH] IN BLOOD BY AUTOMATED COUNT: 21.1 % (ref 10–15)
GFR SERPL CREATININE-BSD FRML MDRD: 77 ML/MIN/{1.73_M2}
GLUCOSE SERPL-MCNC: 67 MG/DL (ref 70–99)
HCT VFR BLD AUTO: 29 % (ref 40–53)
HGB BLD-MCNC: 9.4 G/DL (ref 13.3–17.7)
LYMPHOCYTES # BLD AUTO: 1.3 10E9/L (ref 0.8–5.3)
LYMPHOCYTES NFR BLD AUTO: 30 %
MCH RBC QN AUTO: 30.8 PG (ref 26.5–33)
MCHC RBC AUTO-ENTMCNC: 32.4 G/DL (ref 31.5–36.5)
MCV RBC AUTO: 95 FL (ref 78–100)
MONOCYTES # BLD AUTO: 0.7 10E9/L (ref 0–1.3)
MONOCYTES NFR BLD AUTO: 16 %
NEUTROPHILS # BLD AUTO: 2.1 10E9/L (ref 1.6–8.3)
NEUTROPHILS NFR BLD AUTO: 51 %
PLATELET # BLD AUTO: 332 10E9/L (ref 150–450)
PLATELET # BLD EST: ABNORMAL 10*3/UL
POLYCHROMASIA BLD QL SMEAR: SLIGHT
POTASSIUM SERPL-SCNC: 3.7 MMOL/L (ref 3.4–5.3)
PROT SERPL-MCNC: 7.3 G/DL (ref 6.8–8.8)
RBC # BLD AUTO: 3.05 10E12/L (ref 4.4–5.9)
SODIUM SERPL-SCNC: 141 MMOL/L (ref 133–144)
TSH SERPL DL<=0.005 MIU/L-ACNC: 2.54 MU/L (ref 0.4–4)
WBC # BLD AUTO: 4.2 10E9/L (ref 4–11)

## 2019-06-05 PROCEDURE — 96411 CHEMO IV PUSH ADDL DRUG: CPT

## 2019-06-05 PROCEDURE — 80053 COMPREHEN METABOLIC PANEL: CPT | Performed by: INTERNAL MEDICINE

## 2019-06-05 PROCEDURE — 25800030 ZZH RX IP 258 OP 636: Performed by: INTERNAL MEDICINE

## 2019-06-05 PROCEDURE — 96413 CHEMO IV INFUSION 1 HR: CPT

## 2019-06-05 PROCEDURE — 25000128 H RX IP 250 OP 636: Performed by: INTERNAL MEDICINE

## 2019-06-05 PROCEDURE — 85025 COMPLETE CBC W/AUTO DIFF WBC: CPT | Performed by: INTERNAL MEDICINE

## 2019-06-05 PROCEDURE — 84443 ASSAY THYROID STIM HORMONE: CPT | Performed by: INTERNAL MEDICINE

## 2019-06-05 RX ADMIN — SODIUM CHLORIDE 1000 MG: 9 INJECTION, SOLUTION INTRAVENOUS at 11:01

## 2019-06-05 RX ADMIN — SODIUM CHLORIDE 200 MG: 9 INJECTION, SOLUTION INTRAVENOUS at 10:10

## 2019-06-05 NOTE — PROGRESS NOTES
Infusion Nursing Note:  Kentrell Kirkpatrick presents today for C5D1 alimta, keytruda.    Patient seen by provider today: No, saw Dr. Muller yesterday.   present during visit today: Not Applicable.    Note: Carbo discontinued and premeds discontinued.  Does take PO decadron despite being on keytruda.  PO decadron dose decreased per Dr. Muller.    Intravenous Access:  Labs drawn without difficulty.  Peripheral IV placed.    Treatment Conditions:  Lab Results   Component Value Date    HGB 9.4 06/05/2019     Lab Results   Component Value Date    WBC 4.2 06/05/2019      Lab Results   Component Value Date    ANEU 2.1 06/05/2019     Lab Results   Component Value Date     06/05/2019      Lab Results   Component Value Date     06/05/2019                   Lab Results   Component Value Date    POTASSIUM 3.7 06/05/2019           No results found for: MAG         Lab Results   Component Value Date    CR 0.99 06/05/2019                   Lab Results   Component Value Date    MALATHI 8.8 06/05/2019                Lab Results   Component Value Date    BILITOTAL 0.5 06/05/2019           Lab Results   Component Value Date    ALBUMIN 3.1 06/05/2019                    Lab Results   Component Value Date    ALT 27 06/05/2019           Lab Results   Component Value Date    AST 24 06/05/2019       Results reviewed, labs MET treatment parameters, ok to proceed with treatment.      Post Infusion Assessment:  Patient tolerated infusion without incident.  Blood return noted pre and post infusion.  Site patent and intact, free from redness, edema or discomfort.  No evidence of extravasations.  Access discontinued per protocol.       Discharge Plan:   [facility]  Patient will return 6/26/19 for next appointment.   Patient discharged in stable condition accompanied by: wife.  Departure Mode: Ambulatory.    Sarah Petersen RN

## 2019-06-06 RX ORDER — ALBUTEROL SULFATE 0.83 MG/ML
2.5 SOLUTION RESPIRATORY (INHALATION) EVERY 4 HOURS PRN
Qty: 100 VIAL | Refills: 11 | Status: SHIPPED | OUTPATIENT
Start: 2019-06-06 | End: 2019-01-01

## 2019-06-21 ENCOUNTER — TELEPHONE (OUTPATIENT)
Dept: ONCOLOGY | Facility: CLINIC | Age: 69
End: 2019-06-21

## 2019-06-21 NOTE — TELEPHONE ENCOUNTER
Pt called in to triage with URI symptoms (non-productive cough, clear nasal drainage) x3 days. His last infusion C5D1 alimta, keytruda was on 6/5. Pt denied any fevers, chills, phlegm, ear or sinus pain, sore throat, HA, dizziness, n/v/d. He has been eating and increasing fluids, using sudafed, tussin DM, Vicks and Nasacort. Has an appointment with family doctor on 6/25 and his next infusion on 6/26.    Advised pt he's doing everything we would recommend, continue all home remedies. If symptoms do not improve by Monday or Tuesday or temp becomes greater than 100.4 call back and may delay infusion after checking with provider. Pt verbalized understanding.

## 2019-06-24 DIAGNOSIS — C34.92 NON-SMALL CELL LUNG CANCER, LEFT (H): ICD-10-CM

## 2019-06-24 RX ORDER — EPINEPHRINE 1 MG/ML
0.3 INJECTION, SOLUTION, CONCENTRATE INTRAVENOUS EVERY 5 MIN PRN
Status: CANCELLED | OUTPATIENT
Start: 2019-06-26

## 2019-06-24 RX ORDER — MEPERIDINE HYDROCHLORIDE 25 MG/ML
25 INJECTION INTRAMUSCULAR; INTRAVENOUS; SUBCUTANEOUS EVERY 30 MIN PRN
Status: CANCELLED | OUTPATIENT
Start: 2019-06-26

## 2019-06-24 RX ORDER — ALBUTEROL SULFATE 0.83 MG/ML
2.5 SOLUTION RESPIRATORY (INHALATION)
Status: CANCELLED | OUTPATIENT
Start: 2019-06-26

## 2019-06-24 RX ORDER — DIPHENHYDRAMINE HYDROCHLORIDE 50 MG/ML
50 INJECTION INTRAMUSCULAR; INTRAVENOUS
Status: CANCELLED
Start: 2019-06-26

## 2019-06-24 RX ORDER — EPINEPHRINE 0.3 MG/.3ML
0.3 INJECTION SUBCUTANEOUS EVERY 5 MIN PRN
Status: CANCELLED | OUTPATIENT
Start: 2019-06-26

## 2019-06-24 RX ORDER — ALBUTEROL SULFATE 90 UG/1
1-2 AEROSOL, METERED RESPIRATORY (INHALATION)
Status: CANCELLED
Start: 2019-06-26

## 2019-06-24 RX ORDER — SODIUM CHLORIDE 9 MG/ML
1000 INJECTION, SOLUTION INTRAVENOUS CONTINUOUS PRN
Status: CANCELLED
Start: 2019-06-26

## 2019-06-24 RX ORDER — LORAZEPAM 2 MG/ML
0.5 INJECTION INTRAMUSCULAR EVERY 4 HOURS PRN
Status: CANCELLED
Start: 2019-06-26

## 2019-06-26 ENCOUNTER — INFUSION THERAPY VISIT (OUTPATIENT)
Dept: INFUSION THERAPY | Facility: CLINIC | Age: 69
End: 2019-06-26
Attending: INTERNAL MEDICINE
Payer: COMMERCIAL

## 2019-06-26 ENCOUNTER — HOSPITAL ENCOUNTER (OUTPATIENT)
Facility: CLINIC | Age: 69
Setting detail: SPECIMEN
Discharge: HOME | End: 2019-06-26
Attending: INTERNAL MEDICINE | Admitting: INTERNAL MEDICINE
Payer: COMMERCIAL

## 2019-06-26 VITALS
WEIGHT: 175 LBS | OXYGEN SATURATION: 96 % | BODY MASS INDEX: 23.73 KG/M2 | SYSTOLIC BLOOD PRESSURE: 96 MMHG | RESPIRATION RATE: 16 BRPM | DIASTOLIC BLOOD PRESSURE: 59 MMHG | TEMPERATURE: 96.9 F

## 2019-06-26 DIAGNOSIS — C34.92 NON-SMALL CELL LUNG CANCER, LEFT (H): Primary | ICD-10-CM

## 2019-06-26 DIAGNOSIS — F51.02 ADJUSTMENT INSOMNIA: Primary | ICD-10-CM

## 2019-06-26 LAB
ALBUMIN SERPL-MCNC: 3.5 G/DL (ref 3.4–5)
ALP SERPL-CCNC: 97 U/L (ref 40–150)
ALT SERPL W P-5'-P-CCNC: 25 U/L (ref 0–70)
ANION GAP SERPL CALCULATED.3IONS-SCNC: 8 MMOL/L (ref 3–14)
AST SERPL W P-5'-P-CCNC: 22 U/L (ref 0–45)
BASOPHILS # BLD AUTO: 0 10E9/L (ref 0–0.2)
BASOPHILS NFR BLD AUTO: 0.2 %
BILIRUB SERPL-MCNC: 0.7 MG/DL (ref 0.2–1.3)
BUN SERPL-MCNC: 16 MG/DL (ref 7–30)
CALCIUM SERPL-MCNC: 8.8 MG/DL (ref 8.5–10.1)
CHLORIDE SERPL-SCNC: 106 MMOL/L (ref 94–109)
CO2 SERPL-SCNC: 25 MMOL/L (ref 20–32)
CREAT SERPL-MCNC: 0.93 MG/DL (ref 0.66–1.25)
DIFFERENTIAL METHOD BLD: ABNORMAL
EOSINOPHIL # BLD AUTO: 0 10E9/L (ref 0–0.7)
EOSINOPHIL NFR BLD AUTO: 0 %
ERYTHROCYTE [DISTWIDTH] IN BLOOD BY AUTOMATED COUNT: 18.8 % (ref 10–15)
GFR SERPL CREATININE-BSD FRML MDRD: 84 ML/MIN/{1.73_M2}
GLUCOSE SERPL-MCNC: 125 MG/DL (ref 70–99)
HCT VFR BLD AUTO: 33.2 % (ref 40–53)
HGB BLD-MCNC: 10.8 G/DL (ref 13.3–17.7)
IMM GRANULOCYTES # BLD: 0 10E9/L (ref 0–0.4)
IMM GRANULOCYTES NFR BLD: 0.7 %
LYMPHOCYTES # BLD AUTO: 1 10E9/L (ref 0.8–5.3)
LYMPHOCYTES NFR BLD AUTO: 18.2 %
MCH RBC QN AUTO: 31.4 PG (ref 26.5–33)
MCHC RBC AUTO-ENTMCNC: 32.5 G/DL (ref 31.5–36.5)
MCV RBC AUTO: 97 FL (ref 78–100)
MONOCYTES # BLD AUTO: 0.6 10E9/L (ref 0–1.3)
MONOCYTES NFR BLD AUTO: 10.5 %
NEUTROPHILS # BLD AUTO: 4 10E9/L (ref 1.6–8.3)
NEUTROPHILS NFR BLD AUTO: 70.4 %
NRBC # BLD AUTO: 0 10*3/UL
NRBC BLD AUTO-RTO: 0 /100
PLATELET # BLD AUTO: 404 10E9/L (ref 150–450)
POTASSIUM SERPL-SCNC: 3.9 MMOL/L (ref 3.4–5.3)
PROT SERPL-MCNC: 7.8 G/DL (ref 6.8–8.8)
RBC # BLD AUTO: 3.44 10E12/L (ref 4.4–5.9)
SODIUM SERPL-SCNC: 139 MMOL/L (ref 133–144)
TSH SERPL DL<=0.005 MIU/L-ACNC: 1.22 MU/L (ref 0.4–4)
WBC # BLD AUTO: 5.6 10E9/L (ref 4–11)

## 2019-06-26 PROCEDURE — 85025 COMPLETE CBC W/AUTO DIFF WBC: CPT | Performed by: INTERNAL MEDICINE

## 2019-06-26 PROCEDURE — 84443 ASSAY THYROID STIM HORMONE: CPT | Performed by: INTERNAL MEDICINE

## 2019-06-26 PROCEDURE — 25800030 ZZH RX IP 258 OP 636: Performed by: INTERNAL MEDICINE

## 2019-06-26 PROCEDURE — 96411 CHEMO IV PUSH ADDL DRUG: CPT

## 2019-06-26 PROCEDURE — 36415 COLL VENOUS BLD VENIPUNCTURE: CPT

## 2019-06-26 PROCEDURE — 96413 CHEMO IV INFUSION 1 HR: CPT

## 2019-06-26 PROCEDURE — 25000128 H RX IP 250 OP 636: Performed by: INTERNAL MEDICINE

## 2019-06-26 PROCEDURE — 80053 COMPREHEN METABOLIC PANEL: CPT | Performed by: INTERNAL MEDICINE

## 2019-06-26 RX ADMIN — SODIUM CHLORIDE 1000 MG: 9 INJECTION, SOLUTION INTRAVENOUS at 10:03

## 2019-06-26 RX ADMIN — SODIUM CHLORIDE 200 MG: 9 INJECTION, SOLUTION INTRAVENOUS at 09:28

## 2019-06-26 NOTE — PROGRESS NOTES
Infusion Nursing Note:  Kentrell Kirkpatrick presents today for C6D1 Keytruda and Alimta.    Patient seen by provider today: No   present during visit today: Not Applicable.    Note: Patient does have a cough and head cold that started about a week ago.  Does feel better now.  Denies fevers.  Nasal drainage and sputum are clear in color. His provider is aware.      Intravenous Access:  Labs drawn without difficulty.  Peripheral IV placed.    Treatment Conditions:  Lab Results   Component Value Date    HGB 10.8 06/26/2019     Lab Results   Component Value Date    WBC 5.6 06/26/2019      Lab Results   Component Value Date    ANEU 4.0 06/26/2019     Lab Results   Component Value Date     06/26/2019      Lab Results   Component Value Date     06/26/2019                   Lab Results   Component Value Date    POTASSIUM 3.9 06/26/2019           No results found for: MAG         Lab Results   Component Value Date    CR 0.93 06/26/2019                   Lab Results   Component Value Date    MALATHI 8.8 06/26/2019                Lab Results   Component Value Date    BILITOTAL 0.7 06/26/2019           Lab Results   Component Value Date    ALBUMIN 3.5 06/26/2019                    Lab Results   Component Value Date    ALT 25 06/26/2019           Lab Results   Component Value Date    AST 22 06/26/2019       Results reviewed, labs MET treatment parameters, ok to proceed with treatment.      Post Infusion Assessment:  Patient tolerated infusion without incident.  Blood return noted pre and post infusion.  Site patent and intact, free from redness, edema or discomfort.  No evidence of extravasations.  Access discontinued per protocol.       Discharge Plan:   Copy of AVS reviewed with patient and/or family.  Patient will return 7/17/19 for next appointment.  Patient discharged in stable condition accompanied by: wife.  Departure Mode: Ambulatory.    Sarah Petersen RN

## 2019-06-30 RX ORDER — TRAZODONE HYDROCHLORIDE 50 MG/1
TABLET, FILM COATED ORAL
Qty: 60 TABLET | Refills: 0 | Status: SHIPPED | OUTPATIENT
Start: 2019-06-30 | End: 2019-08-06

## 2019-07-15 ENCOUNTER — OFFICE VISIT (OUTPATIENT)
Dept: FAMILY MEDICINE | Facility: CLINIC | Age: 69
End: 2019-07-15
Payer: COMMERCIAL

## 2019-07-15 VITALS
DIASTOLIC BLOOD PRESSURE: 60 MMHG | HEART RATE: 92 BPM | WEIGHT: 176 LBS | TEMPERATURE: 97.7 F | HEIGHT: 72 IN | SYSTOLIC BLOOD PRESSURE: 112 MMHG | OXYGEN SATURATION: 97 % | BODY MASS INDEX: 23.84 KG/M2

## 2019-07-15 DIAGNOSIS — C34.92 NON-SMALL CELL LUNG CANCER, LEFT (H): ICD-10-CM

## 2019-07-15 DIAGNOSIS — J01.90 ACUTE SINUSITIS WITH SYMPTOMS > 10 DAYS: Primary | ICD-10-CM

## 2019-07-15 DIAGNOSIS — G47.33 OSA (OBSTRUCTIVE SLEEP APNEA): ICD-10-CM

## 2019-07-15 DIAGNOSIS — R09.81 NASAL CONGESTION: ICD-10-CM

## 2019-07-15 DIAGNOSIS — J44.9 CHRONIC OBSTRUCTIVE PULMONARY DISEASE, UNSPECIFIED COPD TYPE (H): ICD-10-CM

## 2019-07-15 PROCEDURE — 99214 OFFICE O/P EST MOD 30 MIN: CPT | Performed by: PHYSICIAN ASSISTANT

## 2019-07-15 RX ORDER — METHYLPREDNISOLONE SODIUM SUCCINATE 125 MG/2ML
125 INJECTION, POWDER, LYOPHILIZED, FOR SOLUTION INTRAMUSCULAR; INTRAVENOUS
Status: CANCELLED
Start: 2019-07-17

## 2019-07-15 RX ORDER — LORAZEPAM 2 MG/ML
0.5 INJECTION INTRAMUSCULAR EVERY 4 HOURS PRN
Status: CANCELLED
Start: 2019-07-17

## 2019-07-15 RX ORDER — ALBUTEROL SULFATE 90 UG/1
1-2 AEROSOL, METERED RESPIRATORY (INHALATION)
Status: CANCELLED
Start: 2019-07-17

## 2019-07-15 RX ORDER — EPINEPHRINE 0.3 MG/.3ML
0.3 INJECTION SUBCUTANEOUS EVERY 5 MIN PRN
Status: CANCELLED | OUTPATIENT
Start: 2019-07-17

## 2019-07-15 RX ORDER — ALBUTEROL SULFATE 0.83 MG/ML
2.5 SOLUTION RESPIRATORY (INHALATION)
Status: CANCELLED | OUTPATIENT
Start: 2019-07-17

## 2019-07-15 RX ORDER — DIPHENHYDRAMINE HYDROCHLORIDE 50 MG/ML
50 INJECTION INTRAMUSCULAR; INTRAVENOUS
Status: CANCELLED
Start: 2019-07-17

## 2019-07-15 RX ORDER — MEPERIDINE HYDROCHLORIDE 25 MG/ML
25 INJECTION INTRAMUSCULAR; INTRAVENOUS; SUBCUTANEOUS EVERY 30 MIN PRN
Status: CANCELLED | OUTPATIENT
Start: 2019-07-17

## 2019-07-15 RX ORDER — SODIUM CHLORIDE 9 MG/ML
1000 INJECTION, SOLUTION INTRAVENOUS CONTINUOUS PRN
Status: CANCELLED
Start: 2019-07-17

## 2019-07-15 RX ORDER — EPINEPHRINE 1 MG/ML
0.3 INJECTION, SOLUTION INTRAMUSCULAR; SUBCUTANEOUS EVERY 5 MIN PRN
Status: CANCELLED | OUTPATIENT
Start: 2019-07-17

## 2019-07-15 ASSESSMENT — MIFFLIN-ST. JEOR: SCORE: 1601.33

## 2019-07-15 NOTE — PATIENT INSTRUCTIONS
Will treat for bacterial sinusitis given duration of symptoms and primarily you complain of nasal congestion and less of respiratory cough.  Reassuring lung sounds are clear as well.  May have element of allergies given mucosa appears swollen.  Refer to ENT if not improving as some question that you may have a nasal polyp.   Continue care with oncology as planned.

## 2019-07-15 NOTE — PROGRESS NOTES
"Subjective     Kentrell Kirkpatrick is a 68 year old male who presents to clinic today for the following health issues:    HPI   Acute Illness   Acute illness concerns: cough, sinus issues  PMHX significant for SKYLER for which uses dental device and works well for him, COPD and adenocarcinoma of lung.  Followed by oncology and reports he has 2 spots diminishing in L lung and around his heart after completing chemo and has 7th cycle coming up. No side effects from the chemo and feels he has tolerated this well.   Reports good compliance with spiriva in the morning. Albuterol will carry around with him and use if he is more active will find he needs to use it then.   \"I can't get rid of this sinus cold.\" Nose keeps running, sinuses so plugged he is sleeping upright in a chair because he feels so congested.  Having difficulty sleeping.   No face or teeth pain.   Not generally an allergy sufferer.     Onset: 2-3 wks    Fever: no    Chills/Sweats: no    Headache (location?): no    Sinus Pressure:YES    Conjunctivitis:  no    Ear Pain: no    Rhinorrhea: YES    Congestion: YES    Sore Throat: no     Cough: YES-productive of clear sputum. More productive of what he usually has. Will get a coughing jag that ends with a huge sneeze.     Wheeze: no    Decreased Appetite: no    Nausea: no    Vomiting: no    Diarrhea:  no    Dysuria/Freq.: no    Fatigue/Achiness: YES- not sleeping (does have sleep Apnea does use a dental devise) sleeping in a recliner helps help sinus drainage    Sick/Strep Exposure: no     Therapies Tried and outcome: Sudafed (no Relief), nasal spray Vicks and Flonase (has stop Vicks).  Using flonase just before bedtime.      Patient Active Problem List   Diagnosis     Back pain     CARDIOVASCULAR SCREENING; LDL GOAL LESS THAN 160     Advance Care Planning     Benign prostatic hyperplasia with lower urinary tract symptoms     Benign neoplasm of transverse colon     10 year risk of MI or stroke 7.5% or greater     " Ascending aortic aneurysm (H)     Right-sided low back pain with right-sided sciatica     SKYLER (obstructive sleep apnea)     Non-small cell lung cancer, left (H)     COPD (chronic obstructive pulmonary disease) (H)     Elevated prostate specific antigen (PSA)     Past Surgical History:   Procedure Laterality Date     COLONOSCOPY       COLONOSCOPY N/A 2016    Procedure: COMBINED COLONOSCOPY, SINGLE OR MULTIPLE BIOPSY/POLYPECTOMY BY BIOPSY;  Surgeon: Jeremi Kramer MD;  Location: [facility]     INSERT CHEST TUBE Left 3/1/2019    Procedure: INSERT CHEST TUBE;  Surgeon: Matthew Rodriguez MD;  Location: [facility]     INSERT CHEST TUBE N/A 2019    Procedure: Removal Of Pleurx Catheter;  Surgeon: Matthew Rodriguez MD;  Location: [facility]     IR LYMPH NODE BIOPSY  3/15/2019     THORACENTESIS Left 2019    Procedure: THORACENTESIS;  Surgeon: Matthew Rodriguez MD;  Location: [facility]       Social History     Tobacco Use     Smoking status: Former Smoker     Packs/day: 1.50     Years: 43.00     Pack years: 64.50     Types: Cigarettes     Last attempt to quit: 2007     Years since quittin.5     Smokeless tobacco: Never Used   Substance Use Topics     Alcohol use: Not Currently     Comment: 3-4 a week     Family History   Problem Relation Age of Onset     Heart Disease Father      Prostate Cancer Other      Prostate Cancer Brother      Colon Cancer No family hx of          Current Outpatient Medications   Medication Sig Dispense Refill     amoxicillin-clavulanate (AUGMENTIN) 875-125 MG tablet Take 1 tablet by mouth 2 times daily for 10 days 20 tablet 0     albuterol (PROAIR HFA) 108 (90 Base) MCG/ACT inhaler Inhale 2 puffs into the lungs every 6 hours 8.5 g 3     albuterol (PROVENTIL) (2.5 MG/3ML) 0.083% neb solution Take 1 vial (2.5 mg) by nebulization every 4 hours as needed for shortness of breath / dyspnea or wheezing 100 vial 11     calcium polycarbophil (FIBERCON) 625 MG tablet Take 2 tablets by mouth daily        dexamethasone (DECADRON) 4 MG tablet TAKE 1 TABLET (4 MG) BY MOUTH 2 TIMES DAILY TO BE TAKEN DAY BEFORE, DAY OF, AND DAY AFTER INFUSION.. 6 tablet 11     finasteride (PROSCAR) 5 MG tablet Take 1 tablet (5 mg) by mouth daily 30 tablet 5     folic acid (FOLVITE) 1 MG tablet Take 1 tablet (1 mg) by mouth daily 90 tablet 11     LORazepam (ATIVAN) 0.5 MG tablet Take 1 tablet (0.5 mg) by mouth every 6 hours as needed (Nausea/Vomiting) (Patient not taking: Reported on 6/26/2019) 30 tablet 3     Melatonin 10 MG TABS tablet Take 10 mg by mouth nightly as needed for sleep       MULTI VITAMIN MENS OR TABS 1 TABLET DAILY       ondansetron (ZOFRAN) 8 MG tablet Take 1 tablet (8 mg) by mouth every 8 hours as needed for nausea (Patient not taking: Reported on 6/26/2019) 30 tablet 11     order for DME Equipment being ordered: Nebulizer 1 Units 0     prochlorperazine (COMPAZINE) 10 MG tablet Take 1 tablet (10 mg) by mouth every 6 hours as needed (Nausea/Vomiting) (Patient not taking: Reported on 6/26/2019) 30 tablet 11     ranitidine (ZANTAC) 75 MG tablet Take 75 mg by mouth as needed for heartburn       tamsulosin (FLOMAX) 0.4 MG capsule Take 1 capsule (0.4 mg) by mouth daily 90 capsule 0     tiotropium (SPIRIVA HANDIHALER) 18 MCG inhaled capsule Inhale 1 capsule (18 mcg) into the lungs daily 1 capsule 11     traZODone (DESYREL) 50 MG tablet TAKE 0.5-1 TABLETS (25-50 MG) BY MOUTH AT BEDTIME 60 tablet 0     No Known Allergies      Reviewed and updated as needed this visit by Provider  Tobacco  Allergies  Meds  Med Hx  Surg Hx  Fam Hx  Soc Hx        Review of Systems   ROS COMP: Constitutional, HEENT, cardiovascular, pulmonary, gi and gu systems are negative, except as otherwise noted.      Objective    /60   Pulse 92   Temp 97.7  F (36.5  C) (Oral)   Ht 1.829 m (6')   Wt 79.8 kg (176 lb)   SpO2 97%   BMI 23.87 kg/m    Body mass index is 23.87 kg/m .  Physical Exam   GENERAL: healthy, alert and no  distress  EYES: Eyes grossly normal to inspection, PERRL and conjunctivae and sclerae normal  HENT: normal cephalic/atraumatic, ear canals and TM's normal, nose and mouth without ulcers or lesions, oropharynx clear and oral mucous membranes moist. Nose is congested and does have bilateral turbinate edema/bogginess. R>L and some question if he may have a polyp, but difficult to see. No sinus tenderness ot tapping.  NECK: no adenopathy and no asymmetry, masses, or scars  RESP: lungs clear to auscultation - no rales, rhonchi or wheezes  CV: regular rates and rhythm and no murmur, click or rub    Diagnostic Test Results:  Labs reviewed in Epic        Assessment & Plan       ICD-10-CM    1. Acute sinusitis with symptoms > 10 days J01.90 amoxicillin-clavulanate (AUGMENTIN) 875-125 MG tablet   2. Nasal congestion R09.81 amoxicillin-clavulanate (AUGMENTIN) 875-125 MG tablet     OTOLARYNGOLOGY REFERRAL   3. SKYLER (obstructive sleep apnea) G47.33    4. Non-small cell lung cancer, left (H) C34.92    5. Chronic obstructive pulmonary disease, unspecified COPD type (H) J44.9    Nasal congestion for past 2-3 weeks causing him to feel like he can't breathe through his nose and limiting ability to sleep at night. PMHX complicated by SKYLER, lung cancer and COPD. Pt reports though that he really doesn't feel like it's his lungs and perhaps has some increased sputum production from baseline, but really just feels more congested in his nose. Reassuringly lungs are clear and he is afebrile. O2 is WNL. On exam he does appear to have boggy turbinates and there is some question of possible polyp on the R. Given duration and risk factors will treat for potential bacterial origin with augmentin, but also reviewed how to optimize flonase and he will also give antihistamine a try.   If not improving advised ENT consult to get a better look and pt in agreement with plan. Encouraged to return with any worsening lung symptoms or concerns and would  complete CXR at that time.  See Patient Instructions  Patient in agreement with plan.     Patient Instructions   Will treat for bacterial sinusitis given duration of symptoms and primarily you complain of nasal congestion and less of respiratory cough.  Reassuring lung sounds are clear as well.  May have element of allergies given mucosa appears swollen.  Refer to ENT if not improving as some question that you may have a nasal polyp.   Continue care with oncology as planned.      Return in about 2 weeks (around 7/29/2019) for with ENT if not improved .    Tiera Napoles PA-C  [facility]

## 2019-07-17 ENCOUNTER — HOSPITAL ENCOUNTER (OUTPATIENT)
Facility: CLINIC | Age: 69
Setting detail: SPECIMEN
Discharge: HOME | End: 2019-07-17
Attending: INTERNAL MEDICINE | Admitting: INTERNAL MEDICINE
Payer: COMMERCIAL

## 2019-07-17 ENCOUNTER — INFUSION THERAPY VISIT (OUTPATIENT)
Dept: INFUSION THERAPY | Facility: CLINIC | Age: 69
End: 2019-07-17
Attending: INTERNAL MEDICINE
Payer: COMMERCIAL

## 2019-07-17 VITALS
OXYGEN SATURATION: 95 % | WEIGHT: 175.3 LBS | TEMPERATURE: 97.2 F | RESPIRATION RATE: 16 BRPM | SYSTOLIC BLOOD PRESSURE: 110 MMHG | BODY MASS INDEX: 23.77 KG/M2 | DIASTOLIC BLOOD PRESSURE: 66 MMHG

## 2019-07-17 DIAGNOSIS — C34.92 NON-SMALL CELL LUNG CANCER, LEFT (H): Primary | ICD-10-CM

## 2019-07-17 LAB
ALBUMIN SERPL-MCNC: 3.3 G/DL (ref 3.4–5)
ALP SERPL-CCNC: 89 U/L (ref 40–150)
ALT SERPL W P-5'-P-CCNC: 20 U/L (ref 0–70)
ANION GAP SERPL CALCULATED.3IONS-SCNC: 8 MMOL/L (ref 3–14)
AST SERPL W P-5'-P-CCNC: 17 U/L (ref 0–45)
BASOPHILS # BLD AUTO: 0 10E9/L (ref 0–0.2)
BASOPHILS NFR BLD AUTO: 0.1 %
BILIRUB SERPL-MCNC: 0.8 MG/DL (ref 0.2–1.3)
BUN SERPL-MCNC: 22 MG/DL (ref 7–30)
CALCIUM SERPL-MCNC: 8.8 MG/DL (ref 8.5–10.1)
CHLORIDE SERPL-SCNC: 106 MMOL/L (ref 94–109)
CO2 SERPL-SCNC: 23 MMOL/L (ref 20–32)
CREAT SERPL-MCNC: 0.97 MG/DL (ref 0.66–1.25)
DIFFERENTIAL METHOD BLD: ABNORMAL
EOSINOPHIL # BLD AUTO: 0 10E9/L (ref 0–0.7)
EOSINOPHIL NFR BLD AUTO: 0 %
ERYTHROCYTE [DISTWIDTH] IN BLOOD BY AUTOMATED COUNT: 16.2 % (ref 10–15)
GFR SERPL CREATININE-BSD FRML MDRD: 79 ML/MIN/{1.73_M2}
GLUCOSE SERPL-MCNC: 150 MG/DL (ref 70–99)
HCT VFR BLD AUTO: 33.2 % (ref 40–53)
HGB BLD-MCNC: 10.6 G/DL (ref 13.3–17.7)
IMM GRANULOCYTES # BLD: 0 10E9/L (ref 0–0.4)
IMM GRANULOCYTES NFR BLD: 0.6 %
LYMPHOCYTES # BLD AUTO: 0.8 10E9/L (ref 0.8–5.3)
LYMPHOCYTES NFR BLD AUTO: 11.2 %
MCH RBC QN AUTO: 30.9 PG (ref 26.5–33)
MCHC RBC AUTO-ENTMCNC: 31.9 G/DL (ref 31.5–36.5)
MCV RBC AUTO: 97 FL (ref 78–100)
MONOCYTES # BLD AUTO: 0.6 10E9/L (ref 0–1.3)
MONOCYTES NFR BLD AUTO: 9 %
NEUTROPHILS # BLD AUTO: 5.5 10E9/L (ref 1.6–8.3)
NEUTROPHILS NFR BLD AUTO: 79.1 %
NRBC # BLD AUTO: 0 10*3/UL
NRBC BLD AUTO-RTO: 0 /100
PLATELET # BLD AUTO: 391 10E9/L (ref 150–450)
POTASSIUM SERPL-SCNC: 3.8 MMOL/L (ref 3.4–5.3)
PROT SERPL-MCNC: 7.3 G/DL (ref 6.8–8.8)
RBC # BLD AUTO: 3.43 10E12/L (ref 4.4–5.9)
SODIUM SERPL-SCNC: 137 MMOL/L (ref 133–144)
TSH SERPL DL<=0.005 MIU/L-ACNC: 1.09 MU/L (ref 0.4–4)
WBC # BLD AUTO: 6.9 10E9/L (ref 4–11)

## 2019-07-17 PROCEDURE — 96417 CHEMO IV INFUS EACH ADDL SEQ: CPT

## 2019-07-17 PROCEDURE — 80053 COMPREHEN METABOLIC PANEL: CPT | Performed by: INTERNAL MEDICINE

## 2019-07-17 PROCEDURE — 96413 CHEMO IV INFUSION 1 HR: CPT

## 2019-07-17 PROCEDURE — 85025 COMPLETE CBC W/AUTO DIFF WBC: CPT | Performed by: INTERNAL MEDICINE

## 2019-07-17 PROCEDURE — 25800030 ZZH RX IP 258 OP 636: Performed by: INTERNAL MEDICINE

## 2019-07-17 PROCEDURE — 25000128 H RX IP 250 OP 636: Performed by: INTERNAL MEDICINE

## 2019-07-17 PROCEDURE — 84443 ASSAY THYROID STIM HORMONE: CPT | Performed by: INTERNAL MEDICINE

## 2019-07-17 RX ADMIN — SODIUM CHLORIDE 200 MG: 9 INJECTION, SOLUTION INTRAVENOUS at 09:15

## 2019-07-17 RX ADMIN — SODIUM CHLORIDE 1000 MG: 9 INJECTION, SOLUTION INTRAVENOUS at 09:46

## 2019-07-17 NOTE — PROGRESS NOTES
Infusion Nursing Note:  Kentrell Kirkpatrick presents today for Alimta and Keytruda.    Patient seen by provider today: No   present during visit today: Not Applicable.    Note: Message sent to Dr. Muller to discontinue premed for alimta/keytruda.  Patient did not want the premed today because he reports his doctor told him not to take it.    Intravenous Access:  Labs drawn without difficulty.  Peripheral IV placed.    Treatment Conditions:  Lab Results   Component Value Date    HGB 10.6 07/17/2019     Lab Results   Component Value Date    WBC 6.9 07/17/2019      Lab Results   Component Value Date    ANEU 5.5 07/17/2019     Lab Results   Component Value Date     07/17/2019      Lab Results   Component Value Date     07/17/2019                   Lab Results   Component Value Date    POTASSIUM 3.8 07/17/2019           No results found for: MAG         Lab Results   Component Value Date    CR 0.97 07/17/2019                   Lab Results   Component Value Date    MALATHI 8.8 07/17/2019                Lab Results   Component Value Date    BILITOTAL 0.8 07/17/2019           Lab Results   Component Value Date    ALBUMIN 3.3 07/17/2019                    Lab Results   Component Value Date    ALT 20 07/17/2019           Lab Results   Component Value Date    AST 17 07/17/2019       Results reviewed, labs MET treatment parameters, ok to proceed with treatment.      Post Infusion Assessment:  Patient tolerated infusion without incident.  Site patent and intact, free from redness, edema or discomfort.  No evidence of extravasations.  Access discontinued per protocol.       Discharge Plan:   AVS to patient via MYCHART.  Patient will return 8/6 for next appointment.   Patient discharged in stable condition accompanied by: self.  Departure Mode: Ambulatory.    Sarah Jang RN

## 2019-07-20 ENCOUNTER — MYC REFILL (OUTPATIENT)
Dept: FAMILY MEDICINE | Facility: CLINIC | Age: 69
End: 2019-07-20

## 2019-07-20 DIAGNOSIS — N40.1 BENIGN NODULAR PROSTATIC HYPERPLASIA WITH LOWER URINARY TRACT SYMPTOMS: ICD-10-CM

## 2019-07-22 RX ORDER — TAMSULOSIN HYDROCHLORIDE 0.4 MG/1
0.4 CAPSULE ORAL DAILY
Qty: 90 CAPSULE | Refills: 1 | Status: SHIPPED | OUTPATIENT
Start: 2019-07-22 | End: 2020-01-01

## 2019-07-22 NOTE — TELEPHONE ENCOUNTER
"Requested Prescriptions   Pending Prescriptions Disp Refills     tamsulosin (FLOMAX) 0.4 MG capsule 90 capsule 0     Sig: Take 1 capsule (0.4 mg) by mouth daily   Last Written Prescription Date:  2/5/19  Last Fill Quantity: 90,  # refills: 0   Last office visit: 7/15/2019 with prescribing provider:  Yesika   Future Office Visit:        Alpha Blockers Passed - 7/22/2019 11:50 AM        Passed - Blood pressure under 140/90 in past 12 months     BP Readings from Last 3 Encounters:   07/17/19 110/66   07/15/19 112/60   06/26/19 96/59                 Passed - Recent (12 mo) or future (30 days) visit within the authorizing provider's specialty     Patient had office visit in the last 12 months or has a visit in the next 30 days with authorizing provider or within the authorizing provider's specialty.  See \"Patient Info\" tab in inbasket, or \"Choose Columns\" in Meds & Orders section of the refill encounter.              Passed - Patient does not have Tadalafil, Vardenafil, or Sildenafil on their medication list        Passed - Medication is active on med list        Passed - Patient is 18 years of age or older        Prescription approved per [facility] Refill Protocol.  CHANTAL FerraraN, RN  [facility]    "

## 2019-08-01 ENCOUNTER — HOSPITAL ENCOUNTER (OUTPATIENT)
Dept: CT IMAGING | Facility: CLINIC | Age: 69
Discharge: HOME | End: 2019-08-01
Attending: INTERNAL MEDICINE | Admitting: INTERNAL MEDICINE
Payer: COMMERCIAL

## 2019-08-01 DIAGNOSIS — C34.92 NON-SMALL CELL LUNG CANCER, LEFT (H): ICD-10-CM

## 2019-08-01 PROCEDURE — 71260 CT THORAX DX C+: CPT

## 2019-08-01 PROCEDURE — 25000128 H RX IP 250 OP 636: Performed by: INTERNAL MEDICINE

## 2019-08-01 PROCEDURE — 74177 CT ABD & PELVIS W/CONTRAST: CPT

## 2019-08-01 RX ORDER — IOPAMIDOL 755 MG/ML
85 INJECTION, SOLUTION INTRAVASCULAR ONCE
Status: COMPLETED | OUTPATIENT
Start: 2019-08-01 | End: 2019-08-01

## 2019-08-01 RX ADMIN — IOPAMIDOL 85 ML: 755 INJECTION, SOLUTION INTRAVENOUS at 08:35

## 2019-08-05 ENCOUNTER — HOSPITAL ENCOUNTER (OUTPATIENT)
Facility: CLINIC | Age: 69
Setting detail: SPECIMEN
End: 2019-08-05
Attending: INTERNAL MEDICINE
Payer: COMMERCIAL

## 2019-08-06 ENCOUNTER — ONCOLOGY VISIT (OUTPATIENT)
Dept: ONCOLOGY | Facility: CLINIC | Age: 69
End: 2019-08-06
Attending: INTERNAL MEDICINE
Payer: COMMERCIAL

## 2019-08-06 VITALS
SYSTOLIC BLOOD PRESSURE: 110 MMHG | TEMPERATURE: 98.5 F | OXYGEN SATURATION: 97 % | DIASTOLIC BLOOD PRESSURE: 71 MMHG | BODY MASS INDEX: 23.87 KG/M2 | WEIGHT: 176 LBS | HEART RATE: 76 BPM | RESPIRATION RATE: 16 BRPM

## 2019-08-06 DIAGNOSIS — C34.92 NON-SMALL CELL LUNG CANCER, LEFT (H): Primary | ICD-10-CM

## 2019-08-06 DIAGNOSIS — C34.82 MALIGNANT NEOPLASM OF OVERLAPPING SITES OF LEFT BRONCHUS AND LUNG (H): ICD-10-CM

## 2019-08-06 PROBLEM — G47.33 OSA (OBSTRUCTIVE SLEEP APNEA): Status: RESOLVED | Noted: 2019-01-30 | Resolved: 2019-08-06

## 2019-08-06 PROBLEM — M54.41 RIGHT-SIDED LOW BACK PAIN WITH RIGHT-SIDED SCIATICA: Status: RESOLVED | Noted: 2018-06-13 | Resolved: 2019-08-06

## 2019-08-06 PROCEDURE — G0463 HOSPITAL OUTPT CLINIC VISIT: HCPCS | Mod: ZF

## 2019-08-06 PROCEDURE — 99214 OFFICE O/P EST MOD 30 MIN: CPT | Mod: ZP | Performed by: INTERNAL MEDICINE

## 2019-08-06 RX ORDER — ALBUTEROL SULFATE 0.83 MG/ML
2.5 SOLUTION RESPIRATORY (INHALATION)
Status: CANCELLED | OUTPATIENT
Start: 2019-08-07

## 2019-08-06 RX ORDER — METHYLPREDNISOLONE SODIUM SUCCINATE 125 MG/2ML
125 INJECTION, POWDER, LYOPHILIZED, FOR SOLUTION INTRAMUSCULAR; INTRAVENOUS
Status: CANCELLED
Start: 2019-08-07

## 2019-08-06 RX ORDER — EPINEPHRINE 1 MG/ML
0.3 INJECTION, SOLUTION INTRAMUSCULAR; SUBCUTANEOUS EVERY 5 MIN PRN
Status: CANCELLED | OUTPATIENT
Start: 2019-08-07

## 2019-08-06 RX ORDER — ALBUTEROL SULFATE 90 UG/1
1-2 AEROSOL, METERED RESPIRATORY (INHALATION)
Status: CANCELLED
Start: 2019-08-07

## 2019-08-06 RX ORDER — EPINEPHRINE 0.3 MG/.3ML
0.3 INJECTION SUBCUTANEOUS EVERY 5 MIN PRN
Status: CANCELLED | OUTPATIENT
Start: 2019-08-07

## 2019-08-06 RX ORDER — DIPHENHYDRAMINE HYDROCHLORIDE 50 MG/ML
50 INJECTION INTRAMUSCULAR; INTRAVENOUS
Status: CANCELLED
Start: 2019-08-07

## 2019-08-06 RX ORDER — LORAZEPAM 2 MG/ML
0.5 INJECTION INTRAMUSCULAR EVERY 4 HOURS PRN
Status: CANCELLED
Start: 2019-08-07

## 2019-08-06 RX ORDER — MEPERIDINE HYDROCHLORIDE 25 MG/ML
25 INJECTION INTRAMUSCULAR; INTRAVENOUS; SUBCUTANEOUS EVERY 30 MIN PRN
Status: CANCELLED | OUTPATIENT
Start: 2019-08-07

## 2019-08-06 RX ORDER — SODIUM CHLORIDE 9 MG/ML
1000 INJECTION, SOLUTION INTRAVENOUS CONTINUOUS PRN
Status: CANCELLED
Start: 2019-08-07

## 2019-08-06 ASSESSMENT — PAIN SCALES - GENERAL: PAINLEVEL: NO PAIN (1)

## 2019-08-06 NOTE — NURSING NOTE
Oncology Rooming Note    August 6, 2019 10:34 AM   Kentrell Kirkpatrick is a 68 year old male who presents for:    Chief Complaint   Patient presents with     Oncology Clinic Visit     Return Lung Ca     Initial Vitals: /71   Pulse 76   Temp 98.5  F (36.9  C) (Oral)   Resp 16   Wt 79.8 kg (176 lb)   SpO2 97%   BMI 23.87 kg/m   Estimated body mass index is 23.87 kg/m  as calculated from the following:    Height as of 7/15/19: 1.829 m (6').    Weight as of this encounter: 79.8 kg (176 lb). Body surface area is 2.01 meters squared.  No Pain (1) Comment: left side pain when taking deep breath   No LMP for male patient.  Allergies reviewed: Yes  Medications reviewed: Yes    Medications: Medication refills not needed today.  Pharmacy name entered into [facility]: [id number] - [place] - [phone]    Clinical concerns: left side pain; sinus congestion; food tastes bad;        Patricia Bustillos CMA

## 2019-08-06 NOTE — PROGRESS NOTES
[facility]    FOLLOW-UP VISIT NOTE    PATIENT NAME: Kentrell Kirkpatrick MRN # [id number]  DATE OF VISIT: August 6, 2019 YOB: 1950    CANCER TYPE: NSCLC, adenocarcinoma  STAGE: IV  ECOG PS: 1    Cancer Staging  Non-small cell lung cancer, left (H)  Staging form: Lung, AJCC 8th Edition  - Clinical stage from 3/11/2019: Stage IV (cT1c, cN3, pM1c) - Signed by Susan Muller MD on 3/11/2019    PD-L1: <1%  Lung panel: 3/1/19 on [id number] A1 and [id number] A1. Negative  NGS: [employer] 360 sent 2/28/19 negative for actionable mutations. SMAD4 E538, TP53 H179R, SMO A327G. MSI not high    SUMMARY  6/27/18 CT chest w/contrast to re-evaluate aortic aneurysm: 8 mm spiculated KEATON nodule, 3 mm RML nodule unchanged from 3/15/17 and 2/25/16 scans  2/4/19 Presented to PCP with fairly rapid onset of shortness of breath. Given albuterol  2/19/18 CXR and CT chest w/contrast. Large left effusion  2/20/19 L thoracentesis (Dr. Rodriguez), 1180 cc  3/1/19 L pleurx (Dr. Rodriguez)  3/13/19 C1 carboplatin pemetrexed pembrolizumab  3/15/19 L supraclavicular LN bx (IR, FNA). Path: lung adenocarcinoma  4/3/19 C2 carboplatin pemetrexed pembrolizumab  4/15/19 FEV1 2.11 (61%), FVC 3.38 (73%), DLCO 27.7, 67% (59%)  4/18/19 TPA. Drained 900 cc after it got unclotted  5/9/19 Pleurx removed  6/5~current Maintenance pemetrexed + pembrolizumab    SUBJECTIVE  Mr. Kirkpatrick returns today for follow up of metastatic lung adenocarcinoma after 4 cycles of carboplatin pemetrexed pembrolizumab. Had a bad Sinus cold end of June and GI bug. Recovered clinically. Breathing ok. Energy good - still gets tired, but is mowing the lawn, spending time with friends, etc. No bleeding, HA, N/V, increased shortness of breaht, chest pain/pressure, leg swelling, constipation, diarrhea.    PAST MEDICAL HISTORY  Lung adenocarcinoma as above  L5 disk herniation. Epidural injection 5/22/18. Improved dramatically with chiropracter in the  past.   BPH  Ascending aortic aneurysm    CURRENT OUTPATIENT MEDICATIONS  Current Outpatient Medications   Medication Sig Dispense Refill     albuterol (PROAIR HFA) 108 (90 Base) MCG/ACT inhaler Inhale 2 puffs into the lungs every 6 hours 8.5 g 3     albuterol (PROVENTIL) (2.5 MG/3ML) 0.083% neb solution Take 1 vial (2.5 mg) by nebulization every 4 hours as needed for shortness of breath / dyspnea or wheezing 100 vial 11     calcium polycarbophil (FIBERCON) 625 MG tablet Take 2 tablets by mouth daily       dexamethasone (DECADRON) 4 MG tablet TAKE 1 TABLET (4 MG) BY MOUTH 2 TIMES DAILY TO BE TAKEN DAY BEFORE, DAY OF, AND DAY AFTER INFUSION.. 6 tablet 11     finasteride (PROSCAR) 5 MG tablet Take 1 tablet (5 mg) by mouth daily 30 tablet 5     folic acid (FOLVITE) 1 MG tablet Take 1 tablet (1 mg) by mouth daily 90 tablet 11     LORazepam (ATIVAN) 0.5 MG tablet Take 1 tablet (0.5 mg) by mouth every 6 hours as needed (Nausea/Vomiting) 30 tablet 3     Melatonin 10 MG TABS tablet Take 10 mg by mouth nightly as needed for sleep       MULTI VITAMIN MENS OR TABS 1 TABLET DAILY       ondansetron (ZOFRAN) 8 MG tablet Take 1 tablet (8 mg) by mouth every 8 hours as needed for nausea 30 tablet 11     prochlorperazine (COMPAZINE) 10 MG tablet Take 1 tablet (10 mg) by mouth every 6 hours as needed (Nausea/Vomiting) 30 tablet 11     ranitidine (ZANTAC) 75 MG tablet Take 75 mg by mouth as needed for heartburn       tamsulosin (FLOMAX) 0.4 MG capsule Take 1 capsule (0.4 mg) by mouth daily 90 capsule 1     tiotropium (SPIRIVA HANDIHALER) 18 MCG inhaled capsule Inhale 1 capsule (18 mcg) into the lungs daily 1 capsule 11     ALLERGIES  No Known Allergies    REVIEW OF SYSTEMS  As above in the HPI, o/w complete 12-point ROS was negative.    PHYSICAL EXAM  /71   Pulse 76   Temp 98.5  F (36.9  C) (Oral)   Resp 16   Wt 79.8 kg (176 lb)   SpO2 97%   BMI 23.87 kg/m    Wt Readings from Last 3 Encounters:   07/17/19 79.5 kg (175 lb  4.8 oz)   07/15/19 79.8 kg (176 lb)   06/26/19 79.4 kg (175 lb)     GEN: NAD  HEENT: EOMI, no icterus, injection or pallor  LUNGS: clear bilaterally  CV: regular, no murmurs, rubs, or gallops  ABDOMEN: soft, non-tender, non-distended  EXT: no edema  NEURO: alert    LABORATORY AND IMAGING STUDIES  Results for MAYA ELLER (MRN [id number]) as of 8/7/2019 17:41   8/7/2019 12:39   Sodium 139   Potassium 4.1   Chloride 109   Carbon Dioxide 25   Urea Nitrogen 18   Creatinine 1.07   GFR Estimate 70   GFR Estimate If Black 81   Calcium 9.1   Anion Gap 5   Albumin 3.7   Protein Total 7.5   Bilirubin Total 0.9   Alkaline Phosphatase 82   ALT 42   AST 30   TSH 1.43   Glucose 131 (H)   WBC 3.8 (L)   Hemoglobin 11.8 (L)   Hematocrit 36.1 (L)   Platelet Count 371   RBC Count 3.83 (L)   MCV 94   MCH 30.8   MCHC 32.7   RDW 15.8 (H)   Diff Method Automated Method   % Neutrophils 72.2   % Lymphocytes 20.6   % Monocytes 6.6   % Eosinophils 0.0   % Basophils 0.3   % Immature Granulocytes 0.3   Nucleated RBCs 0   Absolute Neutrophil 2.7   Absolute Lymphocytes 0.8   Absolute Monocytes 0.3   Absolute Eosinophils 0.0   Absolute Basophils 0.0   Abs Immature Granulocytes 0.0   Absolute Nucleated RBC 0.0     Results for orders placed or performed during the hospital encounter of 08/01/19   CT Chest/Abdomen/Pelvis w Contrast    Narrative    CT CHEST, ABDOMEN AND PELVIS WITH CONTRAST 8/1/2019 8:47 AM     HISTORY: Restage NSCLC, on pemetrexed + pembrolizumab (immunotherapy)  x 3 months. Non-small cell lung cancer, left (H).     COMPARISON: CT chest, abdomen and pelvis 5/28/2019.    TECHNIQUE: Axial images from the thoracic inlet to the symphysis are  performed with additional coronal reformatted images. 85 mL of Isovue  370 are given intravenously.  Radiation dose for this scan was reduced  using automated exposure control, adjustment of the mA and/or kV  according to patient size, or iterative reconstruction technique.    FINDINGS:     Chest:  Indeterminate right middle lobe lung nodules are noted  measuring up to 0.4 cm on series 4, images 189 and 213. Two new areas  of nodularity are noted in the right lower lobe on image 259 measuring  0.8 x 0.5 cm and in the posterior right lower lobe measuring 1.6 x 0.9  cm. Metastatic disease is possible. Area of lingular consolidation  appears more atelectatic than masslike when compared to prior exam.  Air bronchograms are noted through this area and the area of soft  tissue measures 2.0 x 0.8 cm, previously 3.7 x 1.8 cm. Thickening and  nodularity along the left major fissure is minimally changed.  Architectural distortion and scarring along the lateral major fissure  in the superior segment left lower lobe also appears stable. 0.7 cm  subpleural nodule left upper lobe is again noted on image 79 and  appears stable. Area of curvilinear scarring anterior left upper lobe  on image 81 is also stable. Subpleural nodular density measuring 0.4  cm is unchanged on image 63. 0.2 cm left upper lobe nodule on image 71  is stable. Anterior left upper lobe subpleural scarring is unchanged.  0.3 cm anteromedial left upper lobe nodule on image 140 is stable.  Centrilobular emphysema is present.    Trace left pleural effusion is unchanged. No right pleural fluid or  pericardial fluid. Heart is normal in size. Esophagus is unremarkable.  Thyroid gland appears normal in size. Prevascular lymph node  enlargement is present on series 2, image 32, previously 1.6 x 0.6 cm  suggesting significant interval increased size. No other enlarged  axillary or intrathoracic lymph nodes.    Abdomen: Tiny low-attenuation left hepatic lobe lesion is stable. This  is most likely a cyst. Indeterminate low-attenuation lesion posterior  left hepatic lobe on series 2, image 60 measures 0.7 cm, not  appreciated on prior exam. Volume averaging along the margin of the  liver versus a metastatic liver lesion are possible etiologies. The  spleen,  gallbladder, pancreas, right adrenal gland and kidneys are  unremarkable. No hydronephrosis or obvious renal calculi. Left adrenal  gland nodule measures 1.6 x 1.3 cm, previously 1.8 x 1.5 cm which is  stable. Calcified plaque throughout the abdominal aorta is again  noted. No aortic aneurysm or dissection. No enlarged abdominal lymph  nodes. The bowel is normal in caliber without obstruction. Possible  colonic constipation.    Pelvis: Bladder wall demonstrates mild circumferential thickening  which is stable. No enlarged pelvic lymph nodes or free fluid.  Prostate gland is unremarkable. Bone window examination is within  normal limits. Osteopenia is noted. Degenerative bilateral  glenohumeral joint changes are present.      Impression    IMPRESSION:  1. Indeterminate bilateral lung nodules. There are 2 new nodules in  the right lung base and an enlarging prevascular lymph node concerning  for progression of pulmonary and lymph node metastasis. Probable  lingular atelectasis or scarring, slightly improved.  2. Stable left pleural fluid collection. Nodularity along the left  pleura appears relatively stable.  3. New indeterminate subcentimeter posterior left hepatic lobe lesion.  Due to its location near the diaphragm just below the level of the  heart this may be difficult to evaluate on liver MRI. Short-term  follow-up CT in 3 months could be performed to assess for any interval  change. Additional probable anterior left hepatic lobe cyst is  unchanged. No other liver lesions are appreciated.  4. Stable left adrenal nodule.    GABRIELA MACIEL MD     Imaging was personally reviewed     ASSESSMENT AND PLAN  NSCLC: SD after 2 cycles of maintenance pembrolizumab and pemetrexed, however there a couple of new tiny nodules in the right lung base and an enlarging prevascular LN. The left lung, and particularly the left effusion/trapped lung are stable. He feels good. The nodules could be inflammatory and the LN could be  related to his recent URI. However, there's also a tiny liver lesion. Will keep a close eye on things and restage with PET/CT after 2 more cycles of maintenance therapy and go from there.     Eyes tearing/rhinorrhea: Do not think this is immune-mediated, but monitor.    L sided discomfort: Chronic, not that bad.     L malignant pleural effusion: Some trapped lung, overall stable.     Contrast reaction: Methylpred pre meds.    COPD: Tiotropium and albuterol inhaler. Not finding nebs helpful.    Insomnia: Seems to be doing ok. He's still not interested in a sleep medicine consult. Still taking melatonin prn. No longer taking quetiapine.    Taste change: Better.    Social: Plans to travel to the [place] in [place] next summer    A total of 30 minutes was spent with the patient, >50% of which was spent in counseling and coordination of care.    Susan Muller MD    Hematology, Oncology and Transplantation

## 2019-08-06 NOTE — LETTER
8/6/2019       RE: Kentrell Kirkpatrick  [address] [facility] [address]     Dear Colleague,    Thank you for referring your patient, Kentrell Kirkpatrick, to the [facility]. Please see a copy of my visit note below.    E[facility]  [facility]    FOLLOW-UP VISIT NOTE    PATIENT NAME: Kentrell Kirkpatrick MRN # [id number]  DATE OF VISIT: August 6, 2019 YOB: 1950    CANCER TYPE: NSCLC, adenocarcinoma  STAGE: IV  ECOG PS: 1    Cancer Staging  Non-small cell lung cancer, left (H)  Staging form: Lung, AJCC 8th Edition  - Clinical stage from 3/11/2019: Stage IV (cT1c, cN3, pM1c) - Signed by Suasn Muller MD on 3/11/2019    PD-L1: <1%  Lung panel: 3/1/19 on [id number] A1 and [id number] A1. Negative  NGS: [employer] 360 sent 2/28/19 negative for actionable mutations. SMAD4 E538, TP53 H179R, SMO A327G. MSI not high    SUMMARY  6/27/18 CT chest w/contrast to re-evaluate aortic aneurysm: 8 mm spiculated KEATON nodule, 3 mm RML nodule unchanged from 3/15/17 and 2/25/16 scans  2/4/19 Presented to PCP with fairly rapid onset of shortness of breath. Given albuterol  2/19/18 CXR and CT chest w/contrast. Large left effusion  2/20/19 L thoracentesis (Dr. Rodriguez), 1180 cc  3/1/19 L pleurx (Dr. Rodriguez)  3/13/19 C1 carboplatin pemetrexed pembrolizumab  3/15/19 L supraclavicular LN bx (IR, FNA). Path: lung adenocarcinoma  4/3/19 C2 carboplatin pemetrexed pembrolizumab  4/15/19 FEV1 2.11 (61%), FVC 3.38 (73%), DLCO 27.7, 67% (59%)  4/18/19 TPA. Drained 900 cc after it got unclotted  5/9/19 Pleurx removed  6/5~current Maintenance pemetrexed + pembrolizumab    SUBJECTIVE  Mr. Kirkpatrick returns today for follow up of metastatic lung adenocarcinoma after 4 cycles of carboplatin pemetrexed pembrolizumab. Had a bad Sinus cold end of June and GI bug. Recovered clinically. Breathing ok. Energy good - still gets tired, but is mowing the lawn, spending time with friends, etc. No bleeding, HA,  N/V, increased shortness of breaht, chest pain/pressure, leg swelling, constipation, diarrhea.    PAST MEDICAL HISTORY  Lung adenocarcinoma as above  L5 disk herniation. Epidural injection 5/22/18. Improved dramatically with chiropracter in the past.   BPH  Ascending aortic aneurysm    CURRENT OUTPATIENT MEDICATIONS  Current Outpatient Medications   Medication Sig Dispense Refill     albuterol (PROAIR HFA) 108 (90 Base) MCG/ACT inhaler Inhale 2 puffs into the lungs every 6 hours 8.5 g 3     albuterol (PROVENTIL) (2.5 MG/3ML) 0.083% neb solution Take 1 vial (2.5 mg) by nebulization every 4 hours as needed for shortness of breath / dyspnea or wheezing 100 vial 11     calcium polycarbophil (FIBERCON) 625 MG tablet Take 2 tablets by mouth daily       dexamethasone (DECADRON) 4 MG tablet TAKE 1 TABLET (4 MG) BY MOUTH 2 TIMES DAILY TO BE TAKEN DAY BEFORE, DAY OF, AND DAY AFTER INFUSION.. 6 tablet 11     finasteride (PROSCAR) 5 MG tablet Take 1 tablet (5 mg) by mouth daily 30 tablet 5     folic acid (FOLVITE) 1 MG tablet Take 1 tablet (1 mg) by mouth daily 90 tablet 11     LORazepam (ATIVAN) 0.5 MG tablet Take 1 tablet (0.5 mg) by mouth every 6 hours as needed (Nausea/Vomiting) 30 tablet 3     Melatonin 10 MG TABS tablet Take 10 mg by mouth nightly as needed for sleep       MULTI VITAMIN MENS OR TABS 1 TABLET DAILY       ondansetron (ZOFRAN) 8 MG tablet Take 1 tablet (8 mg) by mouth every 8 hours as needed for nausea 30 tablet 11     prochlorperazine (COMPAZINE) 10 MG tablet Take 1 tablet (10 mg) by mouth every 6 hours as needed (Nausea/Vomiting) 30 tablet 11     ranitidine (ZANTAC) 75 MG tablet Take 75 mg by mouth as needed for heartburn       tamsulosin (FLOMAX) 0.4 MG capsule Take 1 capsule (0.4 mg) by mouth daily 90 capsule 1     tiotropium (SPIRIVA HANDIHALER) 18 MCG inhaled capsule Inhale 1 capsule (18 mcg) into the lungs daily 1 capsule 11     ALLERGIES  No Known Allergies    REVIEW OF SYSTEMS  As above in the HPI,  o/w complete 12-point ROS was negative.    PHYSICAL EXAM  /71   Pulse 76   Temp 98.5  F (36.9  C) (Oral)   Resp 16   Wt 79.8 kg (176 lb)   SpO2 97%   BMI 23.87 kg/m     Wt Readings from Last 3 Encounters:   07/17/19 79.5 kg (175 lb 4.8 oz)   07/15/19 79.8 kg (176 lb)   06/26/19 79.4 kg (175 lb)     GEN: NAD  HEENT: EOMI, no icterus, injection or pallor  LUNGS: clear bilaterally  CV: regular, no murmurs, rubs, or gallops  ABDOMEN: soft, non-tender, non-distended  EXT: no edema  NEURO: alert    LABORATORY AND IMAGING STUDIES  Results for MAYA ELLER (MRN [id number]) as of 8/7/2019 17:41   8/7/2019 12:39   Sodium 139   Potassium 4.1   Chloride 109   Carbon Dioxide 25   Urea Nitrogen 18   Creatinine 1.07   GFR Estimate 70   GFR Estimate If Black 81   Calcium 9.1   Anion Gap 5   Albumin 3.7   Protein Total 7.5   Bilirubin Total 0.9   Alkaline Phosphatase 82   ALT 42   AST 30   TSH 1.43   Glucose 131 (H)   WBC 3.8 (L)   Hemoglobin 11.8 (L)   Hematocrit 36.1 (L)   Platelet Count 371   RBC Count 3.83 (L)   MCV 94   MCH 30.8   MCHC 32.7   RDW 15.8 (H)   Diff Method Automated Method   % Neutrophils 72.2   % Lymphocytes 20.6   % Monocytes 6.6   % Eosinophils 0.0   % Basophils 0.3   % Immature Granulocytes 0.3   Nucleated RBCs 0   Absolute Neutrophil 2.7   Absolute Lymphocytes 0.8   Absolute Monocytes 0.3   Absolute Eosinophils 0.0   Absolute Basophils 0.0   Abs Immature Granulocytes 0.0   Absolute Nucleated RBC 0.0     Results for orders placed or performed during the hospital encounter of 08/01/19   CT Chest/Abdomen/Pelvis w Contrast    Narrative    CT CHEST, ABDOMEN AND PELVIS WITH CONTRAST 8/1/2019 8:47 AM     HISTORY: Restage NSCLC, on pemetrexed + pembrolizumab (immunotherapy)  x 3 months. Non-small cell lung cancer, left (H).     COMPARISON: CT chest, abdomen and pelvis 5/28/2019.    TECHNIQUE: Axial images from the thoracic inlet to the symphysis are  performed with additional coronal reformatted images.  85 mL of Isovue  370 are given intravenously.  Radiation dose for this scan was reduced  using automated exposure control, adjustment of the mA and/or kV  according to patient size, or iterative reconstruction technique.    FINDINGS:     Chest: Indeterminate right middle lobe lung nodules are noted  measuring up to 0.4 cm on series 4, images 189 and 213. Two new areas  of nodularity are noted in the right lower lobe on image 259 measuring  0.8 x 0.5 cm and in the posterior right lower lobe measuring 1.6 x 0.9  cm. Metastatic disease is possible. Area of lingular consolidation  appears more atelectatic than masslike when compared to prior exam.  Air bronchograms are noted through this area and the area of soft  tissue measures 2.0 x 0.8 cm, previously 3.7 x 1.8 cm. Thickening and  nodularity along the left major fissure is minimally changed.  Architectural distortion and scarring along the lateral major fissure  in the superior segment left lower lobe also appears stable. 0.7 cm  subpleural nodule left upper lobe is again noted on image 79 and  appears stable. Area of curvilinear scarring anterior left upper lobe  on image 81 is also stable. Subpleural nodular density measuring 0.4  cm is unchanged on image 63. 0.2 cm left upper lobe nodule on image 71  is stable. Anterior left upper lobe subpleural scarring is unchanged.  0.3 cm anteromedial left upper lobe nodule on image 140 is stable.  Centrilobular emphysema is present.    Trace left pleural effusion is unchanged. No right pleural fluid or  pericardial fluid. Heart is normal in size. Esophagus is unremarkable.  Thyroid gland appears normal in size. Prevascular lymph node  enlargement is present on series 2, image 32, previously 1.6 x 0.6 cm  suggesting significant interval increased size. No other enlarged  axillary or intrathoracic lymph nodes.    Abdomen: Tiny low-attenuation left hepatic lobe lesion is stable. This  is most likely a cyst. Indeterminate  low-attenuation lesion posterior  left hepatic lobe on series 2, image 60 measures 0.7 cm, not  appreciated on prior exam. Volume averaging along the margin of the  liver versus a metastatic liver lesion are possible etiologies. The  spleen, gallbladder, pancreas, right adrenal gland and kidneys are  unremarkable. No hydronephrosis or obvious renal calculi. Left adrenal  gland nodule measures 1.6 x 1.3 cm, previously 1.8 x 1.5 cm which is  stable. Calcified plaque throughout the abdominal aorta is again  noted. No aortic aneurysm or dissection. No enlarged abdominal lymph  nodes. The bowel is normal in caliber without obstruction. Possible  colonic constipation.    Pelvis: Bladder wall demonstrates mild circumferential thickening  which is stable. No enlarged pelvic lymph nodes or free fluid.  Prostate gland is unremarkable. Bone window examination is within  normal limits. Osteopenia is noted. Degenerative bilateral  glenohumeral joint changes are present.      Impression    IMPRESSION:  1. Indeterminate bilateral lung nodules. There are 2 new nodules in  the right lung base and an enlarging prevascular lymph node concerning  for progression of pulmonary and lymph node metastasis. Probable  lingular atelectasis or scarring, slightly improved.  2. Stable left pleural fluid collection. Nodularity along the left  pleura appears relatively stable.  3. New indeterminate subcentimeter posterior left hepatic lobe lesion.  Due to its location near the diaphragm just below the level of the  heart this may be difficult to evaluate on liver MRI. Short-term  follow-up CT in 3 months could be performed to assess for any interval  change. Additional probable anterior left hepatic lobe cyst is  unchanged. No other liver lesions are appreciated.  4. Stable left adrenal nodule.    GABRIELA MACIEL MD     Imaging was personally reviewed     ASSESSMENT AND PLAN  NSCLC: SD after 2 cycles of maintenance pembrolizumab and pemetrexed, however  there a couple of new tiny nodules in the right lung base and an enlarging prevascular LN. The left lung, and particularly the left effusion/trapped lung are stable. He feels good. The nodules could be inflammatory and the LN could be related to his recent URI. However, there's also a tiny liver lesion. Will keep a close eye on things and restage with PET/CT after 2 more cycles of maintenance therapy and go from there.     Eyes tearing/rhinorrhea: Do not think this is immune-mediated, but monitor.    L sided discomfort: Chronic, not that bad.     L malignant pleural effusion: Some trapped lung, overall stable.     Contrast reaction: Methylpred pre meds.    COPD: Tiotropium and albuterol inhaler. Not finding nebs helpful.    Insomnia: Seems to be doing ok. He's still not interested in a sleep medicine consult. Still taking melatonin prn. No longer taking quetiapine.    Taste change: Better.    Social: Plans to travel to the [place] in [place] next summer    A total of 30 minutes was spent with the patient, >50% of which was spent in counseling and coordination of care.    Susan Muller MD    Hematology, Oncology and Transplantation

## 2019-08-07 ENCOUNTER — HOSPITAL ENCOUNTER (OUTPATIENT)
Facility: CLINIC | Age: 69
Setting detail: SPECIMEN
Discharge: HOME | End: 2019-08-07
Attending: INTERNAL MEDICINE | Admitting: INTERNAL MEDICINE
Payer: COMMERCIAL

## 2019-08-07 ENCOUNTER — INFUSION THERAPY VISIT (OUTPATIENT)
Dept: INFUSION THERAPY | Facility: CLINIC | Age: 69
End: 2019-08-07
Attending: INTERNAL MEDICINE
Payer: COMMERCIAL

## 2019-08-07 VITALS
OXYGEN SATURATION: 95 % | HEART RATE: 106 BPM | DIASTOLIC BLOOD PRESSURE: 43 MMHG | TEMPERATURE: 98.2 F | SYSTOLIC BLOOD PRESSURE: 80 MMHG | WEIGHT: 173.4 LBS | BODY MASS INDEX: 23.52 KG/M2 | RESPIRATION RATE: 18 BRPM

## 2019-08-07 DIAGNOSIS — C34.92 NON-SMALL CELL LUNG CANCER, LEFT (H): Primary | ICD-10-CM

## 2019-08-07 LAB
ALBUMIN SERPL-MCNC: 3.7 G/DL (ref 3.4–5)
ALP SERPL-CCNC: 82 U/L (ref 40–150)
ALT SERPL W P-5'-P-CCNC: 42 U/L (ref 0–70)
ANION GAP SERPL CALCULATED.3IONS-SCNC: 5 MMOL/L (ref 3–14)
AST SERPL W P-5'-P-CCNC: 30 U/L (ref 0–45)
BASOPHILS # BLD AUTO: 0 10E9/L (ref 0–0.2)
BASOPHILS NFR BLD AUTO: 0.3 %
BILIRUB SERPL-MCNC: 0.9 MG/DL (ref 0.2–1.3)
BUN SERPL-MCNC: 18 MG/DL (ref 7–30)
CALCIUM SERPL-MCNC: 9.1 MG/DL (ref 8.5–10.1)
CHLORIDE SERPL-SCNC: 109 MMOL/L (ref 94–109)
CO2 SERPL-SCNC: 25 MMOL/L (ref 20–32)
CREAT SERPL-MCNC: 1.07 MG/DL (ref 0.66–1.25)
DIFFERENTIAL METHOD BLD: ABNORMAL
EOSINOPHIL # BLD AUTO: 0 10E9/L (ref 0–0.7)
EOSINOPHIL NFR BLD AUTO: 0 %
ERYTHROCYTE [DISTWIDTH] IN BLOOD BY AUTOMATED COUNT: 15.8 % (ref 10–15)
GFR SERPL CREATININE-BSD FRML MDRD: 70 ML/MIN/{1.73_M2}
GLUCOSE SERPL-MCNC: 131 MG/DL (ref 70–99)
HCT VFR BLD AUTO: 36.1 % (ref 40–53)
HGB BLD-MCNC: 11.8 G/DL (ref 13.3–17.7)
IMM GRANULOCYTES # BLD: 0 10E9/L (ref 0–0.4)
IMM GRANULOCYTES NFR BLD: 0.3 %
LYMPHOCYTES # BLD AUTO: 0.8 10E9/L (ref 0.8–5.3)
LYMPHOCYTES NFR BLD AUTO: 20.6 %
MCH RBC QN AUTO: 30.8 PG (ref 26.5–33)
MCHC RBC AUTO-ENTMCNC: 32.7 G/DL (ref 31.5–36.5)
MCV RBC AUTO: 94 FL (ref 78–100)
MONOCYTES # BLD AUTO: 0.3 10E9/L (ref 0–1.3)
MONOCYTES NFR BLD AUTO: 6.6 %
NEUTROPHILS # BLD AUTO: 2.7 10E9/L (ref 1.6–8.3)
NEUTROPHILS NFR BLD AUTO: 72.2 %
NRBC # BLD AUTO: 0 10*3/UL
NRBC BLD AUTO-RTO: 0 /100
PLATELET # BLD AUTO: 371 10E9/L (ref 150–450)
POTASSIUM SERPL-SCNC: 4.1 MMOL/L (ref 3.4–5.3)
PROT SERPL-MCNC: 7.5 G/DL (ref 6.8–8.8)
RBC # BLD AUTO: 3.83 10E12/L (ref 4.4–5.9)
SODIUM SERPL-SCNC: 139 MMOL/L (ref 133–144)
TSH SERPL DL<=0.005 MIU/L-ACNC: 1.43 MU/L (ref 0.4–4)
WBC # BLD AUTO: 3.8 10E9/L (ref 4–11)

## 2019-08-07 PROCEDURE — 85025 COMPLETE CBC W/AUTO DIFF WBC: CPT | Performed by: INTERNAL MEDICINE

## 2019-08-07 PROCEDURE — 25000128 H RX IP 250 OP 636: Performed by: INTERNAL MEDICINE

## 2019-08-07 PROCEDURE — 25800030 ZZH RX IP 258 OP 636: Performed by: INTERNAL MEDICINE

## 2019-08-07 PROCEDURE — 96372 THER/PROPH/DIAG INJ SC/IM: CPT | Mod: XS

## 2019-08-07 PROCEDURE — 84443 ASSAY THYROID STIM HORMONE: CPT | Performed by: INTERNAL MEDICINE

## 2019-08-07 PROCEDURE — 96411 CHEMO IV PUSH ADDL DRUG: CPT

## 2019-08-07 PROCEDURE — 80053 COMPREHEN METABOLIC PANEL: CPT | Performed by: INTERNAL MEDICINE

## 2019-08-07 PROCEDURE — 96413 CHEMO IV INFUSION 1 HR: CPT

## 2019-08-07 RX ORDER — EPINEPHRINE 1 MG/ML
0.3 INJECTION, SOLUTION INTRAMUSCULAR; SUBCUTANEOUS EVERY 5 MIN PRN
Status: CANCELLED | OUTPATIENT
Start: 2019-08-28

## 2019-08-07 RX ORDER — SODIUM CHLORIDE 9 MG/ML
1000 INJECTION, SOLUTION INTRAVENOUS CONTINUOUS PRN
Status: CANCELLED
Start: 2019-08-28

## 2019-08-07 RX ORDER — LORAZEPAM 2 MG/ML
0.5 INJECTION INTRAMUSCULAR EVERY 4 HOURS PRN
Status: CANCELLED
Start: 2019-08-28

## 2019-08-07 RX ORDER — METHYLPREDNISOLONE SODIUM SUCCINATE 125 MG/2ML
125 INJECTION, POWDER, LYOPHILIZED, FOR SOLUTION INTRAMUSCULAR; INTRAVENOUS
Status: CANCELLED
Start: 2019-08-28

## 2019-08-07 RX ORDER — ALBUTEROL SULFATE 90 UG/1
1-2 AEROSOL, METERED RESPIRATORY (INHALATION)
Status: CANCELLED
Start: 2019-08-28

## 2019-08-07 RX ORDER — CYANOCOBALAMIN 1000 UG/ML
1000 INJECTION, SOLUTION INTRAMUSCULAR; SUBCUTANEOUS SEE ADMIN INSTRUCTIONS
Status: CANCELLED
Start: 2019-08-28

## 2019-08-07 RX ORDER — CYANOCOBALAMIN 1000 UG/ML
1000 INJECTION, SOLUTION INTRAMUSCULAR; SUBCUTANEOUS ONCE
Status: COMPLETED | OUTPATIENT
Start: 2019-08-07 | End: 2019-08-07

## 2019-08-07 RX ORDER — MEPERIDINE HYDROCHLORIDE 25 MG/ML
25 INJECTION INTRAMUSCULAR; INTRAVENOUS; SUBCUTANEOUS EVERY 30 MIN PRN
Status: CANCELLED | OUTPATIENT
Start: 2019-08-28

## 2019-08-07 RX ORDER — DIPHENHYDRAMINE HYDROCHLORIDE 50 MG/ML
50 INJECTION INTRAMUSCULAR; INTRAVENOUS
Status: CANCELLED
Start: 2019-08-28

## 2019-08-07 RX ORDER — ALBUTEROL SULFATE 0.83 MG/ML
2.5 SOLUTION RESPIRATORY (INHALATION)
Status: CANCELLED | OUTPATIENT
Start: 2019-08-28

## 2019-08-07 RX ORDER — EPINEPHRINE 0.3 MG/.3ML
0.3 INJECTION SUBCUTANEOUS EVERY 5 MIN PRN
Status: CANCELLED | OUTPATIENT
Start: 2019-08-28

## 2019-08-07 RX ADMIN — CYANOCOBALAMIN 1000 MCG: 1000 INJECTION, SOLUTION INTRAMUSCULAR at 14:45

## 2019-08-07 RX ADMIN — SODIUM CHLORIDE 250 ML: 9 INJECTION, SOLUTION INTRAVENOUS at 13:31

## 2019-08-07 RX ADMIN — SODIUM CHLORIDE 200 MG: 9 INJECTION, SOLUTION INTRAVENOUS at 13:33

## 2019-08-07 RX ADMIN — SODIUM CHLORIDE 1000 MG: 9 INJECTION, SOLUTION INTRAVENOUS at 14:13

## 2019-08-07 ASSESSMENT — PAIN SCALES - GENERAL: PAINLEVEL: NO PAIN (0)

## 2019-08-07 NOTE — PROGRESS NOTES
Infusion Nursing Note:  Kentrell ENRIQUEZ Paulojose alberto presents today for Keytruda/Alimta.    Patient seen by provider today: No   present during visit today: Not Applicable.    Note: Confirmed he is taking Folic Acid.  Last dose of Vitamin B-12 was in May.  No orders for B-12.  Called Dr. Muller and obtained an order for B-12 today.    Intravenous Access:  Lab draw site PIV, Needle type angiocath, Gauge 22.  Labs drawn without difficulty.  Peripheral IV placed.    Treatment Conditions:  Lab Results   Component Value Date    HGB 11.8 08/07/2019     Lab Results   Component Value Date    WBC 3.8 08/07/2019      Lab Results   Component Value Date    ANEU 2.7 08/07/2019     Lab Results   Component Value Date     08/07/2019      Lab Results   Component Value Date     08/07/2019                   Lab Results   Component Value Date    POTASSIUM 4.1 08/07/2019           No results found for: MAG         Lab Results   Component Value Date    CR 1.07 08/07/2019                   Lab Results   Component Value Date    MALATHI 9.1 08/07/2019                Lab Results   Component Value Date    BILITOTAL 0.9 08/07/2019           Lab Results   Component Value Date    ALBUMIN 3.7 08/07/2019                    Lab Results   Component Value Date    ALT 42 08/07/2019           Lab Results   Component Value Date    AST 30 08/07/2019       Results reviewed, labs MET treatment parameters, ok to proceed with treatment.      Post Infusion Assessment:  Patient tolerated infusion without incident.  Patient tolerated injection without incident.  Blood return noted pre and post infusion.  Site patent and intact, free from redness, edema or discomfort.  No evidence of extravasations.  Access discontinued per protocol.       Discharge Plan:   Discharge instructions reviewed with: Patient.  Patient discharged in stable condition accompanied by: wife.  Departure Mode: Ambulatory.  Next labs and treatment scheduled for 8/28/19.    ROSY  JYOTI MARSHALL

## 2019-08-13 ENCOUNTER — OFFICE VISIT (OUTPATIENT)
Dept: PULMONOLOGY | Facility: CLINIC | Age: 69
End: 2019-08-13
Attending: INTERNAL MEDICINE
Payer: COMMERCIAL

## 2019-08-13 VITALS
DIASTOLIC BLOOD PRESSURE: 61 MMHG | TEMPERATURE: 97.8 F | BODY MASS INDEX: 23.44 KG/M2 | HEART RATE: 82 BPM | RESPIRATION RATE: 16 BRPM | SYSTOLIC BLOOD PRESSURE: 101 MMHG | WEIGHT: 172.8 LBS | OXYGEN SATURATION: 100 %

## 2019-08-13 DIAGNOSIS — C34.92 NON-SMALL CELL LUNG CANCER, LEFT (H): Primary | ICD-10-CM

## 2019-08-13 PROCEDURE — G0463 HOSPITAL OUTPT CLINIC VISIT: HCPCS | Mod: ZF

## 2019-08-13 ASSESSMENT — PAIN SCALES - GENERAL: PAINLEVEL: NO PAIN (0)

## 2019-08-13 NOTE — LETTER
8/13/2019       RE: Kentrell Kirkpatrick  [address]  [facility] [address]     Dear Colleague,    Thank you for referring your patient, Kentrell Kirkpatrick, to the [facility] at [facility]. Please see a copy of my visit note below.    LUNG NODULE & INTERVENTIONAL PULMONARY CLINIC  CLINICS & SURGERY [facility], [facility]     Kentrell Kirkpatrick MRN# [id number]   Age: 68 year old YOB: 1950     Reason for Consultation: lung nodule(s) and lung abnormality     Requesting Physician: self-referred      Assessment and Plan:    1. COPD. Moderate obstruction with some improvement post-bronchodilator. On spiriva and prn albuterol. Up-to-date on vaccinations. No previous hospitalizations for AECOPD. At this point, he could f/u with PCP for COPD given stable regimen. Happy to see him back for any new/acute issues.     2. NSCLC. Stage 4 and managed by oncology. Has pleurx removed and CT chest shows stable pleural effusion. No need for further intervention. CT also noted enlarged LN and 2 new 4mm nodules. Will discuss with oncology.     Billing: I spent more than 30 minutes face to face and greater than 50% of time was for counseling and coordination of care about the issues above.    Matthew Rodriguez MD   of Medicine  Interventional Pulmonology  Department of Pulmonary, Allergy, Critical Care and Sleep Medicine   [facility]  Pager: [phone]          History:     Kentrell Kirkpatrick is a 68 year old male with sig h/o for NSCLC, COPD, BPH who is here for evaluation/followup of COPD.     - No new resp sx or complaints. Denies dyspnea or cough.   - Here for COPD management.   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     - Tobacco hx: Past Smoker: 1.5ppd for 47years. Quit 2005.   - My  interpretation of the images relevant for this visit includes: stable left pleural effusion.     - My interpretation of the PFT's relevant for this visit includes: Moderate airflow obstruction and reduced DLCO.      Other active medical problems include:   - NSCLC stage 4. Receiving chemotherapy. Has had pleurx removed. Doing well.   - has COPD. No AECOPD. Up-to-date on flu vaccine but needs PNA. Maintains activity and weight.  On spiriva and prn albuterol.          Past Medical History:      Past Medical History:   Diagnosis Date     Colon polyps            Past Surgical History:      Past Surgical History:   Procedure Laterality Date     COLONOSCOPY       COLONOSCOPY N/A 2016    Procedure: COMBINED COLONOSCOPY, SINGLE OR MULTIPLE BIOPSY/POLYPECTOMY BY BIOPSY;  Surgeon: Jeremi Kramer MD;  Location:  GI     INSERT CHEST TUBE Left 3/1/2019    Procedure: INSERT CHEST TUBE;  Surgeon: Matthew Rodriguez MD;  Location: [facility]     INSERT CHEST TUBE N/A 2019    Procedure: Removal Of Pleurx Catheter;  Surgeon: Matthew Rodriguez MD;  [facility]     IR LYMPH NODE BIOPSY  3/15/2019     THORACENTESIS Left 2019    Procedure: THORACENTESIS;  Surgeon: Matthew Rodriguez MD;  [facility]          Social History:     Social History     Tobacco Use     Smoking status: Former Smoker     Packs/day: 1.50     Years: 43.00     Pack years: 64.50     Types: Cigarettes     Last attempt to quit: 2007     Years since quittin.6     Smokeless tobacco: Never Used   Substance Use Topics     Alcohol use: Not Currently     Comment: 3-4 a week          Family History:     Family History   Problem Relation Age of Onset     Heart Disease Father      Prostate Cancer Other      Prostate Cancer Brother      Colon Cancer No family hx of            Allergies:    No Known Allergies       Medications:     Current Outpatient Medications   Medication Sig     albuterol (PROAIR HFA) 108 (90 Base) MCG/ACT inhaler Inhale 2  puffs into the lungs every 6 hours     albuterol (PROVENTIL) (2.5 MG/3ML) 0.083% neb solution Take 1 vial (2.5 mg) by nebulization every 4 hours as needed for shortness of breath / dyspnea or wheezing     calcium polycarbophil (FIBERCON) 625 MG tablet Take 2 tablets by mouth daily     dexamethasone (DECADRON) 4 MG tablet TAKE 1 TABLET (4 MG) BY MOUTH 2 TIMES DAILY TO BE TAKEN DAY BEFORE, DAY OF, AND DAY AFTER INFUSION..     finasteride (PROSCAR) 5 MG tablet Take 1 tablet (5 mg) by mouth daily     folic acid (FOLVITE) 1 MG tablet Take 1 tablet (1 mg) by mouth daily     LORazepam (ATIVAN) 0.5 MG tablet Take 1 tablet (0.5 mg) by mouth every 6 hours as needed (Nausea/Vomiting)     Melatonin 10 MG TABS tablet Take 10 mg by mouth nightly as needed for sleep     MULTI VITAMIN MENS OR TABS 1 TABLET DAILY     ondansetron (ZOFRAN) 8 MG tablet Take 1 tablet (8 mg) by mouth every 8 hours as needed for nausea     prochlorperazine (COMPAZINE) 10 MG tablet Take 1 tablet (10 mg) by mouth every 6 hours as needed (Nausea/Vomiting)     ranitidine (ZANTAC) 75 MG tablet Take 75 mg by mouth as needed for heartburn     tamsulosin (FLOMAX) 0.4 MG capsule Take 1 capsule (0.4 mg) by mouth daily     tiotropium (SPIRIVA HANDIHALER) 18 MCG inhaled capsule Inhale 1 capsule (18 mcg) into the lungs daily     No current facility-administered medications for this visit.      Facility-Administered Medications Ordered in Other Visits   Medication     iohexol (OMNIPAQUE) 300 mg/mL injection 10 mL          Review of Systems:     CONSTITUTIONAL: negative for fever, chills, change in weight  INTEGUMENTARY/SKIN: no rash or obvious new lesions  ENT/MOUTH: no sore throat, new sinus pain or nasal drainage  RESP: see interval history  CV: negative for chest pain, palpitations or peripheral edema  GI: no nausea, vomiting, change in stools  : no dysuria  MUSCULOSKELETAL: no myalgias, arthralgias  ENDOCRINE: blood sugars with adequate control  PSYCHIATRIC: mood  stable  LYMPHATIC: no new lymphadenopathy  HEME: no bleeding or easy bruisability  NEURO: no numbness, weakness, headaches         Physical Exam:     Temp:  [97.8  F (36.6  C)] 97.8  F (36.6  C)  Pulse:  [82] 82  Resp:  [16] 16  BP: (101)/(61) 101/61  SpO2:  [100 %] 100 %  Wt Readings from Last 4 Encounters:   08/13/19 78.4 kg (172 lb 12.8 oz)   08/07/19 78.7 kg (173 lb 6.4 oz)   08/06/19 79.8 kg (176 lb)   07/17/19 79.5 kg (175 lb 4.8 oz)     Constitutional:   Awake, alert and in no apparent distress     Eyes:   Nonicteric, MARY     ENT:    Trachea is midline. No gross neck abnormalities      Neck:   Supple without supraclavicular or cervical lymphadenopathy     Lungs:   Good air flow.  No crackles. No rhonchi.  No wheezes.     Cardiovascular:   Normal S1 and S2.  RRR.  No murmur, gallop or rub.  Radial, DP and PT pulses normal and symmetric     Abdomen:   NABS, soft, nontender, nondistended.  No HSM.     Musculoskeletal:   No edema.      Neurologic:   Alert and conversant. Cranial nerves  intact.       Skin:   Warm, dry.  No rash on limited exam.           Current Laboratory Data:   All laboratory and imaging data reviewed.    No results found for this or any previous visit (from the past 24 hour(s)).         Previous Pulmonary Function Testing     FVC-Pred   Date Value Ref Range Status   04/15/2019 4.59 L      FVC-Pre   Date Value Ref Range Status   04/15/2019 3.38 L      FVC-%Pred-Pre   Date Value Ref Range Status   04/15/2019 73 %      FEV1-Pre   Date Value Ref Range Status   04/15/2019 2.11 L      FEV1-%Pred-Pre   Date Value Ref Range Status   04/15/2019 61 %      FEV1FVC-Pred   Date Value Ref Range Status   04/15/2019 76 %      FEV1FVC-Pre   Date Value Ref Range Status   04/15/2019 63 %      No results found for: 20029  FEFMax-Pred   Date Value Ref Range Status   04/15/2019 8.91 L/sec      FEFMax-Pre   Date Value Ref Range Status   04/15/2019 6.64 L/sec      FEFMax-%Pred-Pre   Date Value Ref Range Status    04/15/2019 74 %      ExpTime-Pre   Date Value Ref Range Status   04/15/2019 10.97 sec      FIFMax-Pre   Date Value Ref Range Status   04/15/2019 2.04 L/sec      FEV1FEV6-Pred   Date Value Ref Range Status   04/15/2019 78 %      FEV1FEV6-Pre   Date Value Ref Range Status   04/15/2019 65 %               Previous Cardiology Imaging   No results found for this or any previous visit (from the past 8760 hour(s)).               Again, thank you for allowing me to participate in the care of your patient.      Sincerely,    Matthew Rodriguez MD

## 2019-08-13 NOTE — NURSING NOTE
Oncology Rooming Note    August 13, 2019 10:38 AM   Kentrell Kirkpatrick is a 68 year old male who presents for:    Chief Complaint   Patient presents with     Oncology Clinic Visit     Non-small cell lung cancer, left- 3 month f/u     Initial Vitals: /61   Pulse 82   Temp 97.8  F (36.6  C) (Oral)   Resp 16   Wt 78.4 kg (172 lb 12.8 oz)   SpO2 100%   BMI 23.44 kg/m   Estimated body mass index is 23.44 kg/m  as calculated from the following:    Height as of 7/15/19: 1.829 m (6').    Weight as of this encounter: 78.4 kg (172 lb 12.8 oz). Body surface area is 2 meters squared.  No Pain (0) Comment: Data Unavailable   No LMP for male patient.  Allergies reviewed: Yes  Medications reviewed: Yes    Medications: Medication refills not needed today.  Pharmacy name entered into [place]: [id number] - [place], [place] - [address]    Clinical concerns: n/a       ISABELLA BECKMAN CMA

## 2019-08-13 NOTE — PROGRESS NOTES
LUNG NODULE & INTERVENTIONAL PULMONARY CLINIC  [facility], [facility], [facility]     Kentrell Kirkpatrick MRN# [id number]   Age: 68 year old YOB: 1950     Reason for Consultation: lung nodule(s) and lung abnormality     Requesting Physician: self-referred      Assessment and Plan:    1. COPD. Moderate obstruction with some improvement post-bronchodilator. On spiriva and prn albuterol. Up-to-date on vaccinations. No previous hospitalizations for AECOPD. At this point, he could f/u with PCP for COPD given stable regimen. Happy to see him back for any new/acute issues.     2. NSCLC. Stage 4 and managed by oncology. Has pleurx removed and CT chest shows stable pleural effusion. No need for further intervention. CT also noted enlarged LN and 2 new 4mm nodules. Will discuss with oncology.     Billing: I spent more than 30 minutes face to face and greater than 50% of time was for counseling and coordination of care about the issues above.    Matthew Rodriguez MD   of Medicine  Interventional Pulmonology  Department of Pulmonary, Allergy, Critical Care and Sleep Medicine   [facility]  Pager: [phone]          History:     Kentrell Kirkpatrick is a 68 year old male with sig h/o for NSCLC, COPD, BPH who is here for evaluation/followup of COPD.     - No new resp sx or complaints. Denies dyspnea or cough.   - Here for COPD management.   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     - Tobacco hx: Past Smoker: 1.5ppd for 47years. Quit 2005.   - My interpretation of the images relevant for this visit includes: stable left pleural effusion.     - My interpretation of the PFT's relevant for this visit includes: Moderate airflow obstruction and reduced DLCO.      Other active medical problems include:   - NSCLC stage 4. Receiving chemotherapy.  Has had pleurx removed. Doing well.   - has COPD. No AECOPD. Up-to-date on flu vaccine but needs PNA. Maintains activity and weight. On spiriva and prn albuterol.          Past Medical History:      Past Medical History:   Diagnosis Date     Colon polyps            Past Surgical History:      Past Surgical History:   Procedure Laterality Date     COLONOSCOPY       COLONOSCOPY N/A 2016    Procedure: COMBINED COLONOSCOPY, SINGLE OR MULTIPLE BIOPSY/POLYPECTOMY BY BIOPSY;  Surgeon: Jeremi Kramer MD;  Location: [facility]     INSERT CHEST TUBE Left 3/1/2019    Procedure: INSERT CHEST TUBE;  Surgeon: Matthew Rodriguez MD;  Location: [facility]     INSERT CHEST TUBE N/A 2019    Procedure: Removal Of Pleurx Catheter;  Surgeon: Matthew Rodriguez MD;  Location: [facility]     IR LYMPH NODE BIOPSY  3/15/2019     THORACENTESIS Left 2019    Procedure: THORACENTESIS;  Surgeon: Matthew Rodriguez MD;  Location: [facility]          Social History:     Social History     Tobacco Use     Smoking status: Former Smoker     Packs/day: 1.50     Years: 43.00     Pack years: 64.50     Types: Cigarettes     Last attempt to quit: 2007     Years since quittin.6     Smokeless tobacco: Never Used   Substance Use Topics     Alcohol use: Not Currently     Comment: 3-4 a week          Family History:     Family History   Problem Relation Age of Onset     Heart Disease Father      Prostate Cancer Other      Prostate Cancer Brother      Colon Cancer No family hx of            Allergies:    No Known Allergies       Medications:     Current Outpatient Medications   Medication Sig     albuterol (PROAIR HFA) 108 (90 Base) MCG/ACT inhaler Inhale 2 puffs into the lungs every 6 hours     albuterol (PROVENTIL) (2.5 MG/3ML) 0.083% neb solution Take 1 vial (2.5 mg) by nebulization every 4 hours as needed for shortness of breath / dyspnea or wheezing     calcium polycarbophil (FIBERCON) 625 MG tablet Take 2 tablets by mouth daily     dexamethasone  (DECADRON) 4 MG tablet TAKE 1 TABLET (4 MG) BY MOUTH 2 TIMES DAILY TO BE TAKEN DAY BEFORE, DAY OF, AND DAY AFTER INFUSION..     finasteride (PROSCAR) 5 MG tablet Take 1 tablet (5 mg) by mouth daily     folic acid (FOLVITE) 1 MG tablet Take 1 tablet (1 mg) by mouth daily     LORazepam (ATIVAN) 0.5 MG tablet Take 1 tablet (0.5 mg) by mouth every 6 hours as needed (Nausea/Vomiting)     Melatonin 10 MG TABS tablet Take 10 mg by mouth nightly as needed for sleep     MULTI VITAMIN MENS OR TABS 1 TABLET DAILY     ondansetron (ZOFRAN) 8 MG tablet Take 1 tablet (8 mg) by mouth every 8 hours as needed for nausea     prochlorperazine (COMPAZINE) 10 MG tablet Take 1 tablet (10 mg) by mouth every 6 hours as needed (Nausea/Vomiting)     ranitidine (ZANTAC) 75 MG tablet Take 75 mg by mouth as needed for heartburn     tamsulosin (FLOMAX) 0.4 MG capsule Take 1 capsule (0.4 mg) by mouth daily     tiotropium (SPIRIVA HANDIHALER) 18 MCG inhaled capsule Inhale 1 capsule (18 mcg) into the lungs daily     No current facility-administered medications for this visit.      Facility-Administered Medications Ordered in Other Visits   Medication     iohexol (OMNIPAQUE) 300 mg/mL injection 10 mL          Review of Systems:     CONSTITUTIONAL: negative for fever, chills, change in weight  INTEGUMENTARY/SKIN: no rash or obvious new lesions  ENT/MOUTH: no sore throat, new sinus pain or nasal drainage  RESP: see interval history  CV: negative for chest pain, palpitations or peripheral edema  GI: no nausea, vomiting, change in stools  : no dysuria  MUSCULOSKELETAL: no myalgias, arthralgias  ENDOCRINE: blood sugars with adequate control  PSYCHIATRIC: mood stable  LYMPHATIC: no new lymphadenopathy  HEME: no bleeding or easy bruisability  NEURO: no numbness, weakness, headaches         Physical Exam:     Temp:  [97.8  F (36.6  C)] 97.8  F (36.6  C)  Pulse:  [82] 82  Resp:  [16] 16  BP: (101)/(61) 101/61  SpO2:  [100 %] 100 %  Wt Readings from Last 4  Encounters:   08/13/19 78.4 kg (172 lb 12.8 oz)   08/07/19 78.7 kg (173 lb 6.4 oz)   08/06/19 79.8 kg (176 lb)   07/17/19 79.5 kg (175 lb 4.8 oz)     Constitutional:   Awake, alert and in no apparent distress     Eyes:   Nonicteric, MARY     ENT:    Trachea is midline. No gross neck abnormalities      Neck:   Supple without supraclavicular or cervical lymphadenopathy     Lungs:   Good air flow.  No crackles. No rhonchi.  No wheezes.     Cardiovascular:   Normal S1 and S2.  RRR.  No murmur, gallop or rub.  Radial, DP and PT pulses normal and symmetric     Abdomen:   NABS, soft, nontender, nondistended.  No HSM.     Musculoskeletal:   No edema.      Neurologic:   Alert and conversant. Cranial nerves  intact.       Skin:   Warm, dry.  No rash on limited exam.           Current Laboratory Data:   All laboratory and imaging data reviewed.    No results found for this or any previous visit (from the past 24 hour(s)).         Previous Pulmonary Function Testing     FVC-Pred   Date Value Ref Range Status   04/15/2019 4.59 L      FVC-Pre   Date Value Ref Range Status   04/15/2019 3.38 L      FVC-%Pred-Pre   Date Value Ref Range Status   04/15/2019 73 %      FEV1-Pre   Date Value Ref Range Status   04/15/2019 2.11 L      FEV1-%Pred-Pre   Date Value Ref Range Status   04/15/2019 61 %      FEV1FVC-Pred   Date Value Ref Range Status   04/15/2019 76 %      FEV1FVC-Pre   Date Value Ref Range Status   04/15/2019 63 %      No results found for: 20029  FEFMax-Pred   Date Value Ref Range Status   04/15/2019 8.91 L/sec      FEFMax-Pre   Date Value Ref Range Status   04/15/2019 6.64 L/sec      FEFMax-%Pred-Pre   Date Value Ref Range Status   04/15/2019 74 %      ExpTime-Pre   Date Value Ref Range Status   04/15/2019 10.97 sec      FIFMax-Pre   Date Value Ref Range Status   04/15/2019 2.04 L/sec      FEV1FEV6-Pred   Date Value Ref Range Status   04/15/2019 78 %      FEV1FEV6-Pre   Date Value Ref Range Status   04/15/2019 65 %                Previous Cardiology Imaging   No results found for this or any previous visit (from the past 8760 hour(s)).

## 2019-08-18 DIAGNOSIS — N40.1 BENIGN NODULAR PROSTATIC HYPERPLASIA WITH LOWER URINARY TRACT SYMPTOMS: ICD-10-CM

## 2019-08-19 RX ORDER — FINASTERIDE 5 MG/1
TABLET, FILM COATED ORAL
Qty: 90 TABLET | Refills: 1 | OUTPATIENT
Start: 2019-08-19

## 2019-08-19 NOTE — TELEPHONE ENCOUNTER
"Requested Prescriptions   Pending Prescriptions Disp Refills     finasteride (PROSCAR) 5 MG tablet [Pharmacy Med Name: FINASTERIDE 5 MG TABLET]  Medication may not be due for a refill.  Last Written Prescription Date:  4/30/2019  Last Fill Quantity: 30 tablet,  # refills: 5   Last office visit: 7/15/2019 with prescribing provider:  Yesika     Future Office Visit:       90 tablet 1     Sig: TAKE 1 TABLET BY MOUTH EVERY DAY       Alpha Blockers Passed - 8/18/2019 12:19 AM        Passed - Blood pressure under 140/90 in past 12 months     BP Readings from Last 3 Encounters:   08/13/19 101/61   08/07/19 (!) 80/43   08/06/19 110/71             Passed - Recent (12 mo) or future (30 days) visit within the authorizing provider's specialty     Patient had office visit in the last 12 months or has a visit in the next 30 days with authorizing provider or within the authorizing provider's specialty.  See \"Patient Info\" tab in inbasket, or \"Choose Columns\" in Meds & Orders section of the refill encounter.              Passed - Patient does not have Tadalafil, Vardenafil, or Sildenafil on their medication list        Passed - Medication is active on med list        Passed - Patient is 18 years of age or older        "

## 2019-08-21 ENCOUNTER — HOSPITAL ENCOUNTER (OUTPATIENT)
Facility: CLINIC | Age: 69
Setting detail: SPECIMEN
End: 2019-08-21
Attending: UROLOGY
Payer: COMMERCIAL

## 2019-08-28 ENCOUNTER — INFUSION THERAPY VISIT (OUTPATIENT)
Dept: INFUSION THERAPY | Facility: CLINIC | Age: 69
End: 2019-08-28
Attending: INTERNAL MEDICINE
Payer: COMMERCIAL

## 2019-08-28 ENCOUNTER — HOSPITAL ENCOUNTER (OUTPATIENT)
Facility: CLINIC | Age: 69
Setting detail: SPECIMEN
Discharge: HOME | End: 2019-08-28
Attending: INTERNAL MEDICINE | Admitting: UROLOGY
Payer: COMMERCIAL

## 2019-08-28 VITALS
BODY MASS INDEX: 23.8 KG/M2 | WEIGHT: 175.5 LBS | HEART RATE: 78 BPM | RESPIRATION RATE: 16 BRPM | OXYGEN SATURATION: 98 % | SYSTOLIC BLOOD PRESSURE: 123 MMHG | DIASTOLIC BLOOD PRESSURE: 77 MMHG | TEMPERATURE: 96.9 F

## 2019-08-28 DIAGNOSIS — R97.20 ELEVATED PROSTATE SPECIFIC ANTIGEN (PSA): Primary | ICD-10-CM

## 2019-08-28 DIAGNOSIS — C34.92 NON-SMALL CELL LUNG CANCER, LEFT (H): ICD-10-CM

## 2019-08-28 DIAGNOSIS — Z13.29 SCREENING FOR HYPOTHYROIDISM: ICD-10-CM

## 2019-08-28 LAB
ALBUMIN SERPL-MCNC: 4 G/DL (ref 3.4–5)
ALP SERPL-CCNC: 79 U/L (ref 40–150)
ALT SERPL W P-5'-P-CCNC: 39 U/L (ref 0–70)
ANION GAP SERPL CALCULATED.3IONS-SCNC: 7 MMOL/L (ref 3–14)
AST SERPL W P-5'-P-CCNC: 32 U/L (ref 0–45)
BASOPHILS # BLD AUTO: 0 10E9/L (ref 0–0.2)
BASOPHILS NFR BLD AUTO: 0.2 %
BILIRUB SERPL-MCNC: 1 MG/DL (ref 0.2–1.3)
BUN SERPL-MCNC: 18 MG/DL (ref 7–30)
CALCIUM SERPL-MCNC: 9.3 MG/DL (ref 8.5–10.1)
CHLORIDE SERPL-SCNC: 105 MMOL/L (ref 94–109)
CO2 SERPL-SCNC: 25 MMOL/L (ref 20–32)
CREAT SERPL-MCNC: 0.95 MG/DL (ref 0.66–1.25)
DIFFERENTIAL METHOD BLD: ABNORMAL
EOSINOPHIL # BLD AUTO: 0 10E9/L (ref 0–0.7)
EOSINOPHIL NFR BLD AUTO: 0 %
ERYTHROCYTE [DISTWIDTH] IN BLOOD BY AUTOMATED COUNT: 15.4 % (ref 10–15)
GFR SERPL CREATININE-BSD FRML MDRD: 81 ML/MIN/{1.73_M2}
GLUCOSE SERPL-MCNC: 113 MG/DL (ref 70–99)
HCT VFR BLD AUTO: 39.5 % (ref 40–53)
HGB BLD-MCNC: 12.8 G/DL (ref 13.3–17.7)
IMM GRANULOCYTES # BLD: 0 10E9/L (ref 0–0.4)
IMM GRANULOCYTES NFR BLD: 0.2 %
LYMPHOCYTES # BLD AUTO: 0.9 10E9/L (ref 0.8–5.3)
LYMPHOCYTES NFR BLD AUTO: 16.6 %
MCH RBC QN AUTO: 30.7 PG (ref 26.5–33)
MCHC RBC AUTO-ENTMCNC: 32.4 G/DL (ref 31.5–36.5)
MCV RBC AUTO: 95 FL (ref 78–100)
MONOCYTES # BLD AUTO: 0.5 10E9/L (ref 0–1.3)
MONOCYTES NFR BLD AUTO: 9.7 %
NEUTROPHILS # BLD AUTO: 3.8 10E9/L (ref 1.6–8.3)
NEUTROPHILS NFR BLD AUTO: 73.3 %
NRBC # BLD AUTO: 0 10*3/UL
NRBC BLD AUTO-RTO: 0 /100
PLATELET # BLD AUTO: 335 10E9/L (ref 150–450)
POTASSIUM SERPL-SCNC: 4.1 MMOL/L (ref 3.4–5.3)
PROT SERPL-MCNC: 7.8 G/DL (ref 6.8–8.8)
RBC # BLD AUTO: 4.17 10E12/L (ref 4.4–5.9)
SODIUM SERPL-SCNC: 137 MMOL/L (ref 133–144)
T4 FREE SERPL-MCNC: 1.07 NG/DL (ref 0.76–1.46)
TSH SERPL DL<=0.005 MIU/L-ACNC: 1.04 MU/L (ref 0.4–4)
WBC # BLD AUTO: 5.2 10E9/L (ref 4–11)

## 2019-08-28 PROCEDURE — 84154 ASSAY OF PSA FREE: CPT | Performed by: UROLOGY

## 2019-08-28 PROCEDURE — 25000128 H RX IP 250 OP 636: Performed by: INTERNAL MEDICINE

## 2019-08-28 PROCEDURE — 96413 CHEMO IV INFUSION 1 HR: CPT

## 2019-08-28 PROCEDURE — 84439 ASSAY OF FREE THYROXINE: CPT | Performed by: UROLOGY

## 2019-08-28 PROCEDURE — 84443 ASSAY THYROID STIM HORMONE: CPT | Performed by: UROLOGY

## 2019-08-28 PROCEDURE — 84153 ASSAY OF PSA TOTAL: CPT | Performed by: UROLOGY

## 2019-08-28 PROCEDURE — 96411 CHEMO IV PUSH ADDL DRUG: CPT

## 2019-08-28 PROCEDURE — 85025 COMPLETE CBC W/AUTO DIFF WBC: CPT | Performed by: UROLOGY

## 2019-08-28 PROCEDURE — 80053 COMPREHEN METABOLIC PANEL: CPT | Performed by: UROLOGY

## 2019-08-28 PROCEDURE — 25800030 ZZH RX IP 258 OP 636: Performed by: INTERNAL MEDICINE

## 2019-08-28 RX ADMIN — SODIUM CHLORIDE 1000 MG: 9 INJECTION, SOLUTION INTRAVENOUS at 10:11

## 2019-08-28 RX ADMIN — SODIUM CHLORIDE 200 MG: 9 INJECTION, SOLUTION INTRAVENOUS at 09:33

## 2019-08-28 NOTE — PROGRESS NOTES
Infusion Nursing Note:  Kentrell ENRIQUEZ Paulojose alberto presents today for Alimta, Keytruda.    Patient seen by provider today: No   present during visit today: Not Applicable.    Note: B 12 given 8/7.  Due on 10/9    Intravenous Access:  Lab draw site R arm, Needle type piv, Gauge 24.  Labs drawn without difficulty.  Peripheral IV placed.    Treatment Conditions:  Lab Results   Component Value Date    HGB 12.8 08/28/2019     Lab Results   Component Value Date    WBC 5.2 08/28/2019      Lab Results   Component Value Date    ANEU 3.8 08/28/2019     Lab Results   Component Value Date     08/28/2019      Lab Results   Component Value Date     08/28/2019                   Lab Results   Component Value Date    POTASSIUM 4.1 08/28/2019           No results found for: MAG         Lab Results   Component Value Date    CR 0.95 08/28/2019                   Lab Results   Component Value Date    MALATHI 9.3 08/28/2019                Lab Results   Component Value Date    BILITOTAL 1.0 08/28/2019           Lab Results   Component Value Date    ALBUMIN 4.0 08/28/2019                    Lab Results   Component Value Date    ALT 39 08/28/2019           Lab Results   Component Value Date    AST 32 08/28/2019       Results reviewed, labs MET treatment parameters, ok to proceed with treatment.      Post Infusion Assessment:  Patient tolerated infusion without incident.  Blood return noted pre and post infusion.  Site patent and intact, free from redness, edema or discomfort.  No evidence of extravasations.  Access discontinued per protocol.       Discharge Plan:   Discharge instructions reviewed with: Patient and Family.  Patient and/or family verbalized understanding of discharge instructions and all questions answered.  AVS to patient via [employer].  Patient will return in September to the  for next appointment.   Patient discharged in stable condition accompanied by: self and spouse  Departure Mode: Ambulatory.    Joyce LUNDY  Ton RN, RN

## 2019-08-29 LAB
PSA FREE MFR SERPL: 8 %
PSA FREE SERPL-MCNC: 0.1 NG/ML
PSA SERPL-MCNC: 1.2 NG/ML (ref 0–4)

## 2019-09-12 DIAGNOSIS — C34.92 NON-SMALL CELL LUNG CANCER, LEFT (H): Primary | ICD-10-CM

## 2019-09-19 ENCOUNTER — HOSPITAL ENCOUNTER (OUTPATIENT)
Dept: CT IMAGING | Facility: CLINIC | Age: 69
Discharge: HOME | End: 2019-09-19
Attending: INTERNAL MEDICINE | Admitting: INTERNAL MEDICINE
Payer: COMMERCIAL

## 2019-09-19 DIAGNOSIS — C34.92 NON-SMALL CELL LUNG CANCER, LEFT (H): ICD-10-CM

## 2019-09-19 PROCEDURE — 25000125 ZZHC RX 250: Performed by: INTERNAL MEDICINE

## 2019-09-19 PROCEDURE — 25000128 H RX IP 250 OP 636: Performed by: INTERNAL MEDICINE

## 2019-09-19 PROCEDURE — 40000556 ZZH STATISTIC PERIPHERAL IV START W US GUIDANCE

## 2019-09-19 PROCEDURE — 74160 CT ABDOMEN W/CONTRAST: CPT

## 2019-09-19 RX ORDER — IOPAMIDOL 755 MG/ML
500 INJECTION, SOLUTION INTRAVASCULAR ONCE
Status: COMPLETED | OUTPATIENT
Start: 2019-09-19 | End: 2019-09-19

## 2019-09-19 RX ADMIN — IOPAMIDOL 85 ML: 755 INJECTION, SOLUTION INTRAVENOUS at 10:44

## 2019-09-19 RX ADMIN — SODIUM CHLORIDE 61 ML: 9 INJECTION, SOLUTION INTRAVENOUS at 10:44

## 2019-09-20 ENCOUNTER — INFUSION THERAPY VISIT (OUTPATIENT)
Dept: ONCOLOGY | Facility: CLINIC | Age: 69
End: 2019-09-20
Attending: INTERNAL MEDICINE
Payer: COMMERCIAL

## 2019-09-20 ENCOUNTER — APPOINTMENT (OUTPATIENT)
Dept: LAB | Facility: CLINIC | Age: 69
End: 2019-09-20
Attending: INTERNAL MEDICINE
Payer: COMMERCIAL

## 2019-09-20 VITALS
WEIGHT: 172.4 LBS | SYSTOLIC BLOOD PRESSURE: 113 MMHG | HEART RATE: 76 BPM | TEMPERATURE: 97.3 F | OXYGEN SATURATION: 95 % | DIASTOLIC BLOOD PRESSURE: 71 MMHG | BODY MASS INDEX: 23.38 KG/M2

## 2019-09-20 DIAGNOSIS — C34.32 MALIGNANT NEOPLASM OF LOWER LOBE OF LEFT LUNG (H): ICD-10-CM

## 2019-09-20 DIAGNOSIS — C34.92 NON-SMALL CELL LUNG CANCER, LEFT (H): Primary | ICD-10-CM

## 2019-09-20 LAB
ALBUMIN SERPL-MCNC: 3.9 G/DL (ref 3.4–5)
ALBUMIN UR-MCNC: 10 MG/DL
ALP SERPL-CCNC: 85 U/L (ref 40–150)
ALT SERPL W P-5'-P-CCNC: 43 U/L (ref 0–70)
ANION GAP SERPL CALCULATED.3IONS-SCNC: 7 MMOL/L (ref 3–14)
AST SERPL W P-5'-P-CCNC: 24 U/L (ref 0–45)
BASOPHILS # BLD AUTO: 0 10E9/L (ref 0–0.2)
BASOPHILS NFR BLD AUTO: 0.2 %
BILIRUB SERPL-MCNC: 0.8 MG/DL (ref 0.2–1.3)
BUN SERPL-MCNC: 16 MG/DL (ref 7–30)
CALCIUM SERPL-MCNC: 9.8 MG/DL (ref 8.5–10.1)
CHLORIDE SERPL-SCNC: 104 MMOL/L (ref 94–109)
CO2 SERPL-SCNC: 24 MMOL/L (ref 20–32)
CREAT SERPL-MCNC: 1.02 MG/DL (ref 0.66–1.25)
DIFFERENTIAL METHOD BLD: ABNORMAL
EOSINOPHIL # BLD AUTO: 0 10E9/L (ref 0–0.7)
EOSINOPHIL NFR BLD AUTO: 0 %
ERYTHROCYTE [DISTWIDTH] IN BLOOD BY AUTOMATED COUNT: 14.9 % (ref 10–15)
GFR SERPL CREATININE-BSD FRML MDRD: 74 ML/MIN/{1.73_M2}
GLUCOSE SERPL-MCNC: 113 MG/DL (ref 70–99)
HCT VFR BLD AUTO: 40.4 % (ref 40–53)
HGB BLD-MCNC: 13.3 G/DL (ref 13.3–17.7)
IMM GRANULOCYTES # BLD: 0 10E9/L (ref 0–0.4)
IMM GRANULOCYTES NFR BLD: 0.6 %
LYMPHOCYTES # BLD AUTO: 0.6 10E9/L (ref 0.8–5.3)
LYMPHOCYTES NFR BLD AUTO: 10 %
MCH RBC QN AUTO: 30.5 PG (ref 26.5–33)
MCHC RBC AUTO-ENTMCNC: 32.9 G/DL (ref 31.5–36.5)
MCV RBC AUTO: 93 FL (ref 78–100)
MONOCYTES # BLD AUTO: 0.2 10E9/L (ref 0–1.3)
MONOCYTES NFR BLD AUTO: 2.9 %
NEUTROPHILS # BLD AUTO: 5.3 10E9/L (ref 1.6–8.3)
NEUTROPHILS NFR BLD AUTO: 86.3 %
NRBC # BLD AUTO: 0 10*3/UL
NRBC BLD AUTO-RTO: 0 /100
PLATELET # BLD AUTO: 373 10E9/L (ref 150–450)
POTASSIUM SERPL-SCNC: 4.5 MMOL/L (ref 3.4–5.3)
PROT SERPL-MCNC: 8.2 G/DL (ref 6.8–8.8)
RBC # BLD AUTO: 4.36 10E12/L (ref 4.4–5.9)
SODIUM SERPL-SCNC: 136 MMOL/L (ref 133–144)
WBC # BLD AUTO: 6.2 10E9/L (ref 4–11)

## 2019-09-20 PROCEDURE — 40000556 ZZH STATISTIC PERIPHERAL IV START W US GUIDANCE: Mod: ZF

## 2019-09-20 PROCEDURE — 25000132 ZZH RX MED GY IP 250 OP 250 PS 637: Mod: ZF | Performed by: INTERNAL MEDICINE

## 2019-09-20 PROCEDURE — 80053 COMPREHEN METABOLIC PANEL: CPT | Performed by: INTERNAL MEDICINE

## 2019-09-20 PROCEDURE — G0463 HOSPITAL OUTPT CLINIC VISIT: HCPCS | Mod: ZF

## 2019-09-20 PROCEDURE — 99215 OFFICE O/P EST HI 40 MIN: CPT | Mod: ZP | Performed by: INTERNAL MEDICINE

## 2019-09-20 PROCEDURE — 81003 URINALYSIS AUTO W/O SCOPE: CPT | Performed by: INTERNAL MEDICINE

## 2019-09-20 PROCEDURE — 85025 COMPLETE CBC W/AUTO DIFF WBC: CPT | Performed by: INTERNAL MEDICINE

## 2019-09-20 PROCEDURE — 96375 TX/PRO/DX INJ NEW DRUG ADDON: CPT

## 2019-09-20 PROCEDURE — 25800030 ZZH RX IP 258 OP 636: Mod: ZF | Performed by: INTERNAL MEDICINE

## 2019-09-20 PROCEDURE — 36415 COLL VENOUS BLD VENIPUNCTURE: CPT

## 2019-09-20 PROCEDURE — 25000128 H RX IP 250 OP 636: Mod: ZF | Performed by: INTERNAL MEDICINE

## 2019-09-20 PROCEDURE — 96413 CHEMO IV INFUSION 1 HR: CPT

## 2019-09-20 PROCEDURE — 96417 CHEMO IV INFUS EACH ADDL SEQ: CPT

## 2019-09-20 RX ORDER — DIPHENHYDRAMINE HCL 25 MG
50 CAPSULE ORAL ONCE
Status: CANCELLED
Start: 2019-09-20

## 2019-09-20 RX ORDER — MEPERIDINE HYDROCHLORIDE 25 MG/ML
25 INJECTION INTRAMUSCULAR; INTRAVENOUS; SUBCUTANEOUS EVERY 30 MIN PRN
Status: CANCELLED | OUTPATIENT
Start: 2019-09-20

## 2019-09-20 RX ORDER — EPINEPHRINE 1 MG/ML
0.3 INJECTION, SOLUTION INTRAMUSCULAR; SUBCUTANEOUS EVERY 5 MIN PRN
Status: CANCELLED | OUTPATIENT
Start: 2019-09-20

## 2019-09-20 RX ORDER — SODIUM CHLORIDE 9 MG/ML
1000 INJECTION, SOLUTION INTRAVENOUS CONTINUOUS PRN
Status: CANCELLED
Start: 2019-09-20

## 2019-09-20 RX ORDER — LORAZEPAM 2 MG/ML
0.5 INJECTION INTRAMUSCULAR EVERY 4 HOURS PRN
Status: CANCELLED
Start: 2019-09-20

## 2019-09-20 RX ORDER — DIPHENHYDRAMINE HYDROCHLORIDE 50 MG/ML
50 INJECTION INTRAMUSCULAR; INTRAVENOUS
Status: CANCELLED
Start: 2019-09-20

## 2019-09-20 RX ORDER — ALBUTEROL SULFATE 0.83 MG/ML
2.5 SOLUTION RESPIRATORY (INHALATION)
Status: CANCELLED | OUTPATIENT
Start: 2019-09-20

## 2019-09-20 RX ORDER — ALBUTEROL SULFATE 90 UG/1
1-2 AEROSOL, METERED RESPIRATORY (INHALATION)
Status: CANCELLED
Start: 2019-09-20

## 2019-09-20 RX ORDER — EPINEPHRINE 0.3 MG/.3ML
0.3 INJECTION SUBCUTANEOUS EVERY 5 MIN PRN
Status: CANCELLED | OUTPATIENT
Start: 2019-09-20

## 2019-09-20 RX ORDER — METHYLPREDNISOLONE SODIUM SUCCINATE 125 MG/2ML
125 INJECTION, POWDER, LYOPHILIZED, FOR SOLUTION INTRAMUSCULAR; INTRAVENOUS
Status: CANCELLED
Start: 2019-09-20

## 2019-09-20 RX ORDER — NALOXONE HYDROCHLORIDE 0.4 MG/ML
.1-.4 INJECTION, SOLUTION INTRAMUSCULAR; INTRAVENOUS; SUBCUTANEOUS
Status: CANCELLED | OUTPATIENT
Start: 2019-09-20

## 2019-09-20 RX ORDER — DIPHENHYDRAMINE HCL 25 MG
50 CAPSULE ORAL ONCE
Status: COMPLETED | OUTPATIENT
Start: 2019-09-20 | End: 2019-09-20

## 2019-09-20 RX ADMIN — SODIUM CHLORIDE 250 ML: 9 INJECTION, SOLUTION INTRAVENOUS at 14:26

## 2019-09-20 RX ADMIN — DOCETAXEL 120 MG: 80 INJECTION, SOLUTION, CONCENTRATE INTRAVENOUS at 15:57

## 2019-09-20 RX ADMIN — DIPHENHYDRAMINE HYDROCHLORIDE 50 MG: 25 CAPSULE ORAL at 14:32

## 2019-09-20 RX ADMIN — DEXAMETHASONE SODIUM PHOSPHATE 12 MG: 10 INJECTION, SOLUTION INTRAMUSCULAR; INTRAVENOUS at 14:28

## 2019-09-20 RX ADMIN — SODIUM CHLORIDE 800 MG: 9 INJECTION, SOLUTION INTRAVENOUS at 14:52

## 2019-09-20 ASSESSMENT — PAIN SCALES - GENERAL: PAINLEVEL: NO PAIN (0)

## 2019-09-20 NOTE — NURSING NOTE
Oncology Rooming Note    September 20, 2019 12:13 PM   Kentrell Kirkpatrick is a 68 year old male who presents for:    Chief Complaint   Patient presents with     Blood Draw      blood draw and vitals     Oncology Clinic Visit     return visit; lung cancer; vitals taken     Initial Vitals: /71 (BP Location: Right arm, Patient Position: Sitting, Cuff Size: Adult Large)   Pulse 76   Temp 97.3  F (36.3  C) (Oral)   Wt 78.2 kg (172 lb 6.4 oz)   SpO2 95%   BMI 23.38 kg/m   Estimated body mass index is 23.38 kg/m  as calculated from the following:    Height as of 7/15/19: 1.829 m (6').    Weight as of this encounter: 78.2 kg (172 lb 6.4 oz). Body surface area is 1.99 meters squared.  No Pain (0) Comment: Data Unavailable   No LMP for male patient.  Allergies reviewed: Yes  Medications reviewed: Yes    Medications: Medication refills not needed today.  Pharmacy name entered into [employer]: [id number] - [place], [place] - [address]    Clinical concerns: No new concerns today. Dr. Muller was NOT notified.      ANISH ASTUDILLO, CMA

## 2019-09-20 NOTE — PATIENT INSTRUCTIONS
Contact Numbers    [facility] Main Line (for Scheduling/Triage/After Hours Nurse Line): [phone]    Please call the [facility] nurse triage or the after hours nurse line if you experience a temperature greater than or equal to 100.5, shaking chills, have uncontrolled nausea, vomiting and/or diarrhea, dizziness, lightheadedness, shortness of breath, chest pain, bleeding, unexplained bruising, or if you have any other new/concerning symptoms, questions or concerns.     If you are having any concerning symptoms or wish to speak to a provider before your next infusion visit, please call your care coordinator or triage to notify them so we can adequately serve you.     If you need a refill on a narcotic prescription or other medication, please call triage before your infusion appointment.

## 2019-09-20 NOTE — NURSING NOTE
Vitals done, labs drawn by venipuncture.  See doc flow sheets for details.  Daylin Preston, PADMA September 20, 2019

## 2019-09-20 NOTE — LETTER
9/20/2019       RE: Kentrell Kirkpatrick  [address]  [place] [id number]     Dear Colleague,    Thank you for referring your patient, Kentrell Kirkpatrick, to the [facility]. Please see a copy of my visit note below.    [facility]  [facility]    FOLLOW-UP VISIT NOTE    PATIENT NAME: Kentrell Kirkpatrick MRN # [id number]  DATE OF VISIT: September 20, 2019 YOB: 1950    CANCER TYPE: NSCLC, adenocarcinoma  STAGE: IV  ECOG PS: 1    Cancer Staging  Non-small cell lung cancer, left (H)  Staging form: Lung, AJCC 8th Edition  - Clinical stage from 3/11/2019: Stage IV (cT1c, cN3, pM1c) - Signed by Susan Muller MD on 3/11/2019    PD-L1: <1%  Lung panel: 3/1/19 on [id number] A1 and [id number] A1. Negative  NGS: [employer] 360 sent 2/28/19 negative for actionable mutations. SMAD4 E538, TP53 H179R, SMO A327G. MSI not high    SUMMARY  6/27/18 CT chest w/contrast to re-evaluate aortic aneurysm: 8 mm spiculated KEATON nodule, 3 mm RML nodule unchanged from 3/15/17 and 2/25/16 scans  2/4/19 Presented to PCP with fairly rapid onset of shortness of breath. Given albuterol  2/19/18 CXR and CT chest w/contrast. Large left effusion  2/20/19 L thoracentesis (Dr. Rodriguez), 1180 cc  3/1/19 L pleurx (Dr. Rodriguez)  3/13/19 C1 carboplatin pemetrexed pembrolizumab  3/15/19 L supraclavicular LN bx (IR, FNA). Path: lung adenocarcinoma  4/3/19 C2 carboplatin pemetrexed pembrolizumab  4/15/19 FEV1 2.11 (61%), FVC 3.38 (73%), DLCO 27.7, 67% (59%)  4/18/19 TPA. Drained 900 cc after it got unclotted  5/9/19 Pleurx removed  6/5~current Maintenance pemetrexed + pembrolizumab    SUBJECTIVE  Mr. Kirkpatrick returns today for follow up of metastatic lung adenocarcinoma after 5 cycles of maintenance pemetrexed pembrolizumab.     Feeling like starting to get another cold again. A little more short of breath with exertion. No cough,fevers, chills, N/V. Energy is still pretty good. No numbness/tingling. He is  having some pain over his left nipple. A few weeks ago when he went to the [facility], he woke up the next morning and both his legs were very very sensitive to touch. No numbness/tingling at that time, no back pain, no trouble walking. Is improving some. No rashes anywhere.     PAST MEDICAL HISTORY  Lung adenocarcinoma as above  L5 disk herniation. Epidural injection 5/22/18. Improved dramatically with chiropracter in the past.   BPH  Ascending aortic aneurysm    CURRENT OUTPATIENT MEDICATIONS    ALLERGIES  No Known Allergies    REVIEW OF SYSTEMS  As above in the HPI, o/w complete 12-point ROS was negative.    PHYSICAL EXAM  /71 (BP Location: Right arm, Patient Position: Sitting, Cuff Size: Adult Large)   Pulse 76   Temp 97.3  F (36.3  C) (Oral)   Wt 78.2 kg (172 lb 6.4 oz)   SpO2 95%   BMI 23.38 kg/m     Wt Readings from Last 3 Encounters:   08/28/19 79.6 kg (175 lb 8 oz)   08/13/19 78.4 kg (172 lb 12.8 oz)   08/07/19 78.7 kg (173 lb 6.4 oz)     GEN: NAD  HEENT: EOMI, no icterus, injection or pallor  LUNGS: clear bilaterally  CV: regular, no murmurs, rubs, or gallops  EXT: warm, well perfused, no edema  NEURO: alert  SKIN: no rashes. Nipples look normal.     LABORATORY AND IMAGING STUDIES  Results for MAYA ELLER (MRN [id number]) as of 9/20/2019 13:06   9/20/2019 12:06   Sodium 136   Potassium 4.5   Chloride 104   Carbon Dioxide 24   Urea Nitrogen 16   Creatinine 1.02   GFR Estimate 74   GFR Estimate If Black 86   Calcium 9.8   Anion Gap 7   Albumin 3.9   Protein Total 8.2   Bilirubin Total 0.8   Alkaline Phosphatase 85   ALT 43   AST 24   Glucose 113 (H)   WBC 6.2   Hemoglobin 13.3   Hematocrit 40.4   Platelet Count 373   RBC Count 4.36 (L)   MCV 93   MCH 30.5   MCHC 32.9   RDW 14.9   Diff Method Automated Method   % Neutrophils 86.3   % Lymphocytes 10.0   % Monocytes 2.9   % Eosinophils 0.0   % Basophils 0.2   % Immature Granulocytes 0.6   Nucleated RBCs 0   Absolute Neutrophil 5.3   Absolute Lymphocytes  0.6 (L)   Absolute Monocytes 0.2   Absolute Eosinophils 0.0   Absolute Basophils 0.0   Abs Immature Granulocytes 0.0   Absolute Nucleated RBC 0.0     Results for orders placed or performed during the hospital encounter of 09/19/19   CT Chest Abdomen w Contrast    Narrative    CT CHEST AND ABDOMEN WITH CONTRAST   9/19/2019 10:46 AM     HISTORY: Non-small cell lung cancer restaging.    TECHNIQUE: 85 mL Isovue-370 IV were administered. After contrast  administration, volumetric helical sections were acquired from the  thoracic inlet to the iliac crests. Coronal images were also  reconstructed. Radiation dose for this scan was reduced using  automated exposure control, adjustment of the mA and/or kV according  to patient size, or iterative reconstruction technique.    COMPARISON: CT of the chest, abdomen, and pelvis performed 8/1/2019.    FINDINGS:    Chest: Mediastinal adenopathy has progressed since 8/1/2019,  consistent with progression of metastatic disease. For example, an  enlarged mediastinal lymph node along the left aspect of the main  pulmonary artery (series 2 image 33) measures 3.2 x 1.8 cm (previously  2.3 x 1.2 cm). A small left pleural effusion is not significantly  changed. No pericardial or right pleural effusion. Emphysematous  changes are noted in both upper lungs. 1.1 cm indeterminate pulmonary  nodule in the left upper lobe posteriorly and medially (series 6 image  77) has increased in size (previously 0.9 cm). An irregular nodular  opacity in the right lower lobe (series 6 image 288) has decreased in  size, and appears more scarlike on today's exam, today measuring 1.3 x  0.6 cm (previously 1.6 x 0.9 cm). Scattered smaller nodular opacities  in both lungs, most prominent along the left major fissure, are  otherwise not significantly changed.    Abdomen: Multiple hypoenhancing liver lesions are new or increased in  size since the previous exam, and are highly suspicious for metastatic  disease. For  example, a hypoenhancing liver lesion near the inferior  tip of the right hepatic lobe (series 2 image 73) measures 1.4 cm, and  is new since the previous exam. A 1.9 x 1.7 cm left adrenal nodule has  increased in size (previously 1.6 x 1.3 cm), and is suspicious for  metastatic lesion. The liver, spleen, right adrenal gland, pancreas,  and kidneys are otherwise unremarkable. No hydronephrosis. Mild  atherosclerotic aortoiliac calcification. Visualized loops of small  bowel and colon are of normal caliber. No enlarged lymph nodes are  identified in the abdomen. No aggressive-appearing bone lesions.      Impression    IMPRESSION:   1. There has been interval progression of metastatic disease involving  the liver and mediastinal lymph nodes.  2. A 1.1 cm indeterminate left upper lobe pulmonary nodule has  increased in size, also suspicious for metastatic progression.  3. A 1.9 cm left adrenal nodule has also increased in size, and is  suspicious for metastatic progression.  4. A small left pleural effusion is not significantly changed.    AGUSTIN RAMACHANDRAN MD     Imaging was personally reviewed     ASSESSMENT AND PLAN  NSCLC, adenocarcinoma: PD on maintenance pemetrexed pembrolizumab. Unfortunately not eligible for ALT-803 (no OK by RECIST) or the [facility] [employer] chemo + pembro (currently suspended). Sitravatinib + durva is closed for checkpoint inhibitor experienced. Recommended SOC second line docetaxel + ramucirumab, given every 3 weeks. Reviewed potential side effects, particularly but not limited to neuropathy, cytopenias, infection risk, bleeding, clotting, HTN, proteinuria, anaphylactic reaction. Is wanting to proceed; will start today. Will restage with CT CAP after 2 cycles. Will also get brain MRI in the next week or so. Have asked him to see Ora or one of her colleagues in about a week for tolerance check. I will see him after the restaging studies. Ok to stop folic acid.    Allodynia: [place] on [place]. Fading. Etiology not clear. No other signs or symptoms to suggest lower back or sacral nerve issue. Resolving, so will continue to monitor. I suppose it's possible it's due to pembro.     L malignant pleural effusion: Some trapped lung, no reaccumulation thus far after pleurx removed.    Contrast reaction: Methylpred pre meds.    COPD: Tiotropium and albuterol inhaler. Not finding nebs helpful.    Insomnia: Seems to be doing ok. He's still not interested in a sleep medicine consult. Still taking melatonin prn. No longer taking quetiapine.    Access: Might be needing a port soon. He doesn't really want one, but is undergoing more needle sticks each time. Will be asking for vascular access from the get go. Order for port in should he want one.     Taste change: Better.    Social: Plans to travel to the [place] in [place] next summer    A total of 45 minutes was spent with the patient, >50% of which was spent in counseling and coordination of care.    Susan Muller MD    Hematology, Oncology and Transplantation

## 2019-09-20 NOTE — PROGRESS NOTES
Scheduling will contact pt for NP f/u in two weeks, chemo in three weeks and MRI/MD prior to following cycle.  Pt aware.      Cathryn Yang RN

## 2019-09-20 NOTE — PROGRESS NOTES
Infusion Nursing Note:  Kentrell Kirkpatrick presents today for C1 D1 Taxotere/Cyramza.    Patient seen by provider today: Yes: Dr. Muller   present during visit today: Not Applicable.    Note: Patient was originally going to receive Alimta/Keytruday today, but after reviewing recent CT results, regimen now changed to Taxotere/Cyramza. Reviewed chemo/immunotherapy teaching and schedule with patient/wife. Patient knows to call triage with any questions/concerns, fever of 100.5 F or higher, SOB, severe N/V, etc.    Per [facility] Dr. Muller/Tiera Levine, RN @1318 9/20/19:  - Pt to receive Taxotere/Cyramza regimen instead today  - Pt took Dexamethasone  - Do not need to wait for urine results to proceed with Cyramza    Taxotere rates:  800ml/hr x 14ml's  10ml/hr x 5 min  25ml/hr x 5 min  50ml/hr x 5 min  100ml/hr x 5 min  256ml/hr x remainder of infusion      Intravenous Access:  Peripheral IV placed by vascular access.    Treatment Conditions:  Lab Results   Component Value Date    HGB 13.3 09/20/2019     Lab Results   Component Value Date    WBC 6.2 09/20/2019      Lab Results   Component Value Date    ANEU 5.3 09/20/2019     Lab Results   Component Value Date     09/20/2019      Results reviewed, labs MET treatment parameters, ok to proceed with treatment.  Urine collected by writer.      Post Infusion Assessment:  Patient tolerated Cyramza infusion without incident.  Blood return noted pre and post infusion.  Site patent and intact, free from redness, edema or discomfort.  No evidence of extravasations.  Access discontinued per protocol.    Discharge Plan:   Patient declined prescription refills. Patient has refill for Dex waiting on local pharmacy from previous order. No additional refills needed.  Discharge instructions reviewed with: Patient and wife.  Patient and/or family verbalized understanding of discharge instructions and all questions answered.  AVS to patient via [facility]. MD's check-out note  TBD. Patient knows to look at [facility] for future appointments. Will plan to return 10/11 for next cycle per therapy plan in the meantime. Copy of triage number with information printed, given and reviewed with patient/wife.  Patient discharged in stable condition accompanied by: wife.  Departure Mode: Ambulatory.    Tiera Levine, JYOTI    Patient left in care of staff RN to complete Taxotere infusion d/t writer's shift completion.

## 2019-09-28 ENCOUNTER — ANCILLARY PROCEDURE (OUTPATIENT)
Dept: GENERAL RADIOLOGY | Facility: CLINIC | Age: 69
End: 2019-09-28
Attending: PHYSICIAN ASSISTANT
Payer: COMMERCIAL

## 2019-09-28 ENCOUNTER — OFFICE VISIT (OUTPATIENT)
Dept: URGENT CARE | Facility: URGENT CARE | Age: 69
End: 2019-09-28
Payer: COMMERCIAL

## 2019-09-28 VITALS
OXYGEN SATURATION: 98 % | HEART RATE: 87 BPM | SYSTOLIC BLOOD PRESSURE: 92 MMHG | DIASTOLIC BLOOD PRESSURE: 60 MMHG | TEMPERATURE: 98 F

## 2019-09-28 DIAGNOSIS — J20.9 ACUTE BRONCHITIS, UNSPECIFIED ORGANISM: Primary | ICD-10-CM

## 2019-09-28 DIAGNOSIS — R05.9 COUGH: ICD-10-CM

## 2019-09-28 DIAGNOSIS — C34.92 NON-SMALL CELL LUNG CANCER, LEFT (H): ICD-10-CM

## 2019-09-28 PROCEDURE — 99214 OFFICE O/P EST MOD 30 MIN: CPT | Performed by: PHYSICIAN ASSISTANT

## 2019-09-28 PROCEDURE — 71046 X-RAY EXAM CHEST 2 VIEWS: CPT

## 2019-09-28 RX ORDER — ALBUTEROL SULFATE 90 UG/1
2 AEROSOL, METERED RESPIRATORY (INHALATION)
Qty: 1 INHALER | Refills: 0 | Status: SHIPPED | OUTPATIENT
Start: 2019-09-28

## 2019-09-28 RX ORDER — CODEINE PHOSPHATE AND GUAIFENESIN 10; 100 MG/5ML; MG/5ML
1-2 SOLUTION ORAL AT BEDTIME
Qty: 40 ML | Refills: 0 | Status: SHIPPED | OUTPATIENT
Start: 2019-09-28 | End: 2019-01-01

## 2019-09-28 NOTE — PATIENT INSTRUCTIONS
OTC Mucinex, plenty of fluids and rest    Follow up with oncology Monday      Patient Education     Acute Bronchitis  Your healthcare provider has told you that you have acute bronchitis. Bronchitis is infection or inflammation of the bronchial tubes (airways in the lungs). Normally, air moves easily in and out of the airways. Bronchitis narrows the airways, making it harder for air to flow in and out of the lungs. This causes symptoms such as shortness of breath, coughing up yellow or green mucus, and wheezing. Bronchitis can be acute or chronic. Acute means the condition comes on quickly and goes away in a short time, usually within 3 to 10 days. Chronic means a condition lasts a long time and often comes back.    What causes acute bronchitis?  Acute bronchitis almost always starts as a viral respiratory infection, such as a cold or the flu. Certain factors make it more likely for a cold or flu to turn into bronchitis. These include being very young, being elderly, having a heart or lung problem, or having a weak immune system. Cigarette smoking also makes bronchitis more likely.  When bronchitis develops, the airways become swollen. The airways may also become infected with bacteria. This is known as a secondary infection.  Diagnosing acute bronchitis  Your healthcare provider will examine you and ask about your symptoms and health history. You may also have a sputum culture to test the fluid in your lungs. Chest X-rays may be done to look for infection in the lungs.  Treating acute bronchitis  Bronchitis usually clears up as the cold or flu goes away. You can help feel better faster by doing the following:    Take medicine as directed. You may be told to take ibuprofen or other over-the-counter medicines. These help relieve inflammation in your bronchial tubes. Your healthcare provider may prescribe an inhaler to help open up the bronchial tubes. Most of the time, acute bronchitis is caused by a viral infection.  Antibiotics are usually not prescribed for viral infections.    Drink plenty of fluids, such as water, juice, or warm soup. Fluids loosen mucus so that you can cough it up. This helps you breathe more easily. Fluids also prevent dehydration.    Make sure you get plenty of rest.    Do not smoke. Do not allow anyone else to smoke in your home.  Recovery and follow-up  Follow up with your doctor as you are told. You will likely feel better in a week or two. But a dry cough can linger beyond that time. Let your doctor know if you still have symptoms (other than a dry cough) after 2 weeks, or if you re prone to getting bronchial infections. Take steps to protect yourself from future infections. These steps include stopping smoking and avoiding tobacco smoke, washing your hands often, and getting a yearly flu shot.  When to call your healthcare provider  Call the healthcare provider if you have any of the following:    Fever of 100.4 F (38.0 C) or higher, or as advised    Symptoms that get worse, or new symptoms    Trouble breathing    Symptoms that don t start to improve within a week, or within 3 days of taking antibiotics   Date Last Reviewed: 12/1/2016 2000-2018 [employer]. [address] [place] [address], [place] [address]. All rights reserved. This information is not intended as a substitute for professional medical care. Always follow your healthcare professional's instructions.

## 2019-09-28 NOTE — PROGRESS NOTES
SUBJECTIVE:  Kentrell Kirkpatrick is a 68 year old male who presents to the clinic today with a chief complaint of cough  for 2 week(s).  His cough is described as productive of yellow sputum.    The patient's symptoms are moderate and improving.  Associated symptoms include nasal congestion and rhinorrhea.      10 chemo, last time switched medication on 9/20.      Has been taking levaquin as he had left over from previous event    Past Medical History:   Diagnosis Date     Colon polyps        Current Outpatient Medications   Medication Sig Dispense Refill     albuterol (PROAIR HFA) 108 (90 Base) MCG/ACT inhaler Inhale 2 puffs into the lungs every 6 hours 8.5 g 3     albuterol (PROVENTIL) (2.5 MG/3ML) 0.083% neb solution Take 1 vial (2.5 mg) by nebulization every 4 hours as needed for shortness of breath / dyspnea or wheezing 100 vial 11     calcium polycarbophil (FIBERCON) 625 MG tablet Take 2 tablets by mouth daily       dexamethasone (DECADRON) 4 MG tablet TAKE 1 TABLET (4 MG) BY MOUTH 2 TIMES DAILY TO BE TAKEN DAY BEFORE, DAY OF, AND DAY AFTER INFUSION.. 6 tablet 11     finasteride (PROSCAR) 5 MG tablet Take 1 tablet (5 mg) by mouth daily 30 tablet 5     LORazepam (ATIVAN) 0.5 MG tablet Take 1 tablet (0.5 mg) by mouth every 6 hours as needed (Nausea/Vomiting) 30 tablet 3     Melatonin 10 MG TABS tablet Take 10 mg by mouth nightly as needed for sleep       MULTI VITAMIN MENS OR TABS 1 TABLET DAILY       ondansetron (ZOFRAN) 8 MG tablet Take 1 tablet (8 mg) by mouth every 8 hours as needed for nausea 30 tablet 11     prochlorperazine (COMPAZINE) 10 MG tablet Take 1 tablet (10 mg) by mouth every 6 hours as needed (Nausea/Vomiting) 30 tablet 11     ranitidine (ZANTAC) 75 MG tablet Take 75 mg by mouth as needed for heartburn       tamsulosin (FLOMAX) 0.4 MG capsule Take 1 capsule (0.4 mg) by mouth daily 90 capsule 1     tiotropium (SPIRIVA HANDIHALER) 18 MCG inhaled capsule Inhale 1 capsule (18 mcg) into the lungs daily  1 capsule 11       Social History     Tobacco Use     Smoking status: Former Smoker     Packs/day: 1.50     Years: 43.00     Pack years: 64.50     Types: Cigarettes     Last attempt to quit: 2007     Years since quittin.7     Smokeless tobacco: Never Used   Substance Use Topics     Alcohol use: Not Currently     Comment: 3-4 a week       ROS  10 point ROS negative except as listed above      OBJECTIVE:  BP 92/60 (BP Location: Right arm, Patient Position: Chair, Cuff Size: Adult Regular)   Pulse 87   Temp 98  F (36.7  C) (Oral)   SpO2 98%   GENERAL APPEARANCE: healthy, alert and no distress  EYES: EOMI,  PERRL, conjunctiva clear  HENT: ear canals and TM's normal.  Nose and mouth without ulcers, erythema or lesions  NECK: supple, nontender, no lymphadenopathy  RESP: lungs clear to auscultation - no rales, rhonchi or wheezes  CV: regular rates and rhythm, normal S1 S2, no murmur noted  ABDOMEN:  soft, nontender, no HSM or masses and bowel sounds normal  NEURO: Normal strength and tone, sensory exam grossly normal,  normal speech and mentation  SKIN: no suspicious lesions or rashes    X-ray image initially interpreted in clinic by me in order to rule out pneumonia, malignant changes.  No evidence appreciated.  Improved against previous      ASSESSMENT:   (J20.9) Acute bronchitis, unspecified organism  (primary encounter diagnosis)  (C34.92) Non-small cell lung cancer, left (H)  (R05) Cough  Comment: continue levaquin course  Plan: albuterol (PROAIR HFA/PROVENTIL HFA/VENTOLIN         HFA) 108 (90 Base) MCG/ACT inhaler,         guaiFENesin-codeine (ROBITUSSIN AC) 100-10         MG/5ML solution  XR Chest 2 Views, albuterol (PROAIR         HFA/PROVENTIL HFA/VENTOLIN HFA) 108 (90 Base)         MCG/ACT inhaler, guaiFENesin-codeine         (ROBITUSSIN AC) 100-10 MG/5ML solution        Patient Instructions     OTC Mucinex, plenty of fluids and rest    Follow up with oncology Monday      Patient Education     Acute  Bronchitis  Your healthcare provider has told you that you have acute bronchitis. Bronchitis is infection or inflammation of the bronchial tubes (airways in the lungs). Normally, air moves easily in and out of the airways. Bronchitis narrows the airways, making it harder for air to flow in and out of the lungs. This causes symptoms such as shortness of breath, coughing up yellow or green mucus, and wheezing. Bronchitis can be acute or chronic. Acute means the condition comes on quickly and goes away in a short time, usually within 3 to 10 days. Chronic means a condition lasts a long time and often comes back.    What causes acute bronchitis?  Acute bronchitis almost always starts as a viral respiratory infection, such as a cold or the flu. Certain factors make it more likely for a cold or flu to turn into bronchitis. These include being very young, being elderly, having a heart or lung problem, or having a weak immune system. Cigarette smoking also makes bronchitis more likely.  When bronchitis develops, the airways become swollen. The airways may also become infected with bacteria. This is known as a secondary infection.  Diagnosing acute bronchitis  Your healthcare provider will examine you and ask about your symptoms and health history. You may also have a sputum culture to test the fluid in your lungs. Chest X-rays may be done to look for infection in the lungs.  Treating acute bronchitis  Bronchitis usually clears up as the cold or flu goes away. You can help feel better faster by doing the following:    Take medicine as directed. You may be told to take ibuprofen or other over-the-counter medicines. These help relieve inflammation in your bronchial tubes. Your healthcare provider may prescribe an inhaler to help open up the bronchial tubes. Most of the time, acute bronchitis is caused by a viral infection. Antibiotics are usually not prescribed for viral infections.    Drink plenty of fluids, such as water,  juice, or warm soup. Fluids loosen mucus so that you can cough it up. This helps you breathe more easily. Fluids also prevent dehydration.    Make sure you get plenty of rest.    Do not smoke. Do not allow anyone else to smoke in your home.  Recovery and follow-up  Follow up with your doctor as you are told. You will likely feel better in a week or two. But a dry cough can linger beyond that time. Let your doctor know if you still have symptoms (other than a dry cough) after 2 weeks, or if you re prone to getting bronchial infections. Take steps to protect yourself from future infections. These steps include stopping smoking and avoiding tobacco smoke, washing your hands often, and getting a yearly flu shot.  When to call your healthcare provider  Call the healthcare provider if you have any of the following:    Fever of 100.4 F (38.0 C) or higher, or as advised    Symptoms that get worse, or new symptoms    Trouble breathing    Symptoms that don t start to improve within a week, or within 3 days of taking antibiotics   Date Last Reviewed: 12/1/2016 2000-2018 [employer]. [address] [place] [id number]. All rights reserved. This information is not intended as a substitute for professional medical care. Always follow your healthcare professional's instructions.             *

## 2019-10-02 ENCOUNTER — HOSPITAL ENCOUNTER (OUTPATIENT)
Facility: CLINIC | Age: 69
Setting detail: SPECIMEN
Discharge: HOME | End: 2019-10-02
Attending: NURSE PRACTITIONER | Admitting: NURSE PRACTITIONER
Payer: COMMERCIAL

## 2019-10-02 ENCOUNTER — ONCOLOGY VISIT (OUTPATIENT)
Dept: ONCOLOGY | Facility: CLINIC | Age: 69
End: 2019-10-02
Attending: NURSE PRACTITIONER
Payer: COMMERCIAL

## 2019-10-02 ENCOUNTER — HEALTH MAINTENANCE LETTER (OUTPATIENT)
Age: 69
End: 2019-10-02

## 2019-10-02 VITALS
BODY MASS INDEX: 23.45 KG/M2 | RESPIRATION RATE: 16 BRPM | TEMPERATURE: 97 F | SYSTOLIC BLOOD PRESSURE: 117 MMHG | HEART RATE: 72 BPM | WEIGHT: 173.13 LBS | HEIGHT: 72 IN | OXYGEN SATURATION: 97 % | DIASTOLIC BLOOD PRESSURE: 74 MMHG

## 2019-10-02 DIAGNOSIS — C34.92 NON-SMALL CELL LUNG CANCER, LEFT (H): Primary | ICD-10-CM

## 2019-10-02 LAB
ALBUMIN SERPL-MCNC: 3.7 G/DL (ref 3.4–5)
ALP SERPL-CCNC: 81 U/L (ref 40–150)
ALT SERPL W P-5'-P-CCNC: 42 U/L (ref 0–70)
ANION GAP SERPL CALCULATED.3IONS-SCNC: 1 MMOL/L (ref 3–14)
AST SERPL W P-5'-P-CCNC: 47 U/L (ref 0–45)
BASOPHILS # BLD AUTO: 0.1 10E9/L (ref 0–0.2)
BASOPHILS NFR BLD AUTO: 1.4 %
BILIRUB SERPL-MCNC: 1 MG/DL (ref 0.2–1.3)
BUN SERPL-MCNC: 12 MG/DL (ref 7–30)
CALCIUM SERPL-MCNC: 9 MG/DL (ref 8.5–10.1)
CHLORIDE SERPL-SCNC: 107 MMOL/L (ref 94–109)
CO2 SERPL-SCNC: 30 MMOL/L (ref 20–32)
CREAT SERPL-MCNC: 1.12 MG/DL (ref 0.66–1.25)
DIFFERENTIAL METHOD BLD: ABNORMAL
EOSINOPHIL # BLD AUTO: 0 10E9/L (ref 0–0.7)
EOSINOPHIL NFR BLD AUTO: 0.9 %
ERYTHROCYTE [DISTWIDTH] IN BLOOD BY AUTOMATED COUNT: 14.7 % (ref 10–15)
GFR SERPL CREATININE-BSD FRML MDRD: 66 ML/MIN/{1.73_M2}
GLUCOSE SERPL-MCNC: 93 MG/DL (ref 70–99)
HCT VFR BLD AUTO: 38.9 % (ref 40–53)
HGB BLD-MCNC: 12.8 G/DL (ref 13.3–17.7)
IMM GRANULOCYTES # BLD: 0.1 10E9/L (ref 0–0.4)
IMM GRANULOCYTES NFR BLD: 2.3 %
LYMPHOCYTES # BLD AUTO: 1.3 10E9/L (ref 0.8–5.3)
LYMPHOCYTES NFR BLD AUTO: 30.8 %
MCH RBC QN AUTO: 30.5 PG (ref 26.5–33)
MCHC RBC AUTO-ENTMCNC: 32.9 G/DL (ref 31.5–36.5)
MCV RBC AUTO: 93 FL (ref 78–100)
MONOCYTES # BLD AUTO: 1.2 10E9/L (ref 0–1.3)
MONOCYTES NFR BLD AUTO: 27.6 %
NEUTROPHILS # BLD AUTO: 1.6 10E9/L (ref 1.6–8.3)
NEUTROPHILS NFR BLD AUTO: 37 %
NRBC # BLD AUTO: 0 10*3/UL
NRBC BLD AUTO-RTO: 0 /100
OVALOCYTES BLD QL SMEAR: SLIGHT
PLATELET # BLD AUTO: 303 10E9/L (ref 150–450)
PLATELET # BLD EST: ABNORMAL 10*3/UL
POTASSIUM SERPL-SCNC: 4.6 MMOL/L (ref 3.4–5.3)
PROT SERPL-MCNC: 7.5 G/DL (ref 6.8–8.8)
RBC # BLD AUTO: 4.2 10E12/L (ref 4.4–5.9)
SODIUM SERPL-SCNC: 138 MMOL/L (ref 133–144)
WBC # BLD AUTO: 4.4 10E9/L (ref 4–11)

## 2019-10-02 PROCEDURE — 90662 IIV NO PRSV INCREASED AG IM: CPT | Performed by: NURSE PRACTITIONER

## 2019-10-02 PROCEDURE — 85025 COMPLETE CBC W/AUTO DIFF WBC: CPT | Performed by: NURSE PRACTITIONER

## 2019-10-02 PROCEDURE — G0463 HOSPITAL OUTPT CLINIC VISIT: HCPCS | Mod: 25

## 2019-10-02 PROCEDURE — G0008 ADMIN INFLUENZA VIRUS VAC: HCPCS

## 2019-10-02 PROCEDURE — 99214 OFFICE O/P EST MOD 30 MIN: CPT | Performed by: NURSE PRACTITIONER

## 2019-10-02 PROCEDURE — 25000128 H RX IP 250 OP 636: Performed by: NURSE PRACTITIONER

## 2019-10-02 PROCEDURE — 36415 COLL VENOUS BLD VENIPUNCTURE: CPT

## 2019-10-02 PROCEDURE — 80053 COMPREHEN METABOLIC PANEL: CPT | Performed by: NURSE PRACTITIONER

## 2019-10-02 RX ADMIN — INFLUENZA A VIRUS A/MICHIGAN/45/2015 X-275 (H1N1) ANTIGEN (FORMALDEHYDE INACTIVATED), INFLUENZA A VIRUS A/SINGAPORE/INFIMH-16-0019/2016 IVR-186 (H3N2) ANTIGEN (FORMALDEHYDE INACTIVATED), AND INFLUENZA B VIRUS B/MARYLAND/15/2016 BX-69A (A B/COLORADO/6/2017-LIKE VIRUS) ANTIGEN (FORMALDEHYDE INACTIVATED) 0.5 ML: 60; 60; 60 INJECTION, SUSPENSION INTRAMUSCULAR at 11:56

## 2019-10-02 ASSESSMENT — MIFFLIN-ST. JEOR: SCORE: 1588.29

## 2019-10-02 ASSESSMENT — PAIN SCALES - GENERAL: PAINLEVEL: NO PAIN (0)

## 2019-10-02 NOTE — NURSING NOTE
Oncology Rooming Note    October 2, 2019 11:16 AM   Kentrell Kirkpatrick is a 68 year old male who presents for:    Chief Complaint   Patient presents with     Oncology Clinic Visit     Non-small cell lung cancer, left     Initial Vitals: /74   Pulse 72   Temp 97  F (36.1  C) (Tympanic)   Resp 16   Ht 1.829 m (6')   Wt 78.5 kg (173 lb 2 oz)   SpO2 97%   BMI 23.48 kg/m   Estimated body mass index is 23.48 kg/m  as calculated from the following:    Height as of this encounter: 1.829 m (6').    Weight as of this encounter: 78.5 kg (173 lb 2 oz). Body surface area is 2 meters squared.  No Pain (0) Comment: Data Unavailable   No LMP for male patient.  Allergies reviewed: Yes  Medications reviewed: Yes    Medications: Medication refills not needed today.  Pharmacy name entered into [facility]: [id number] - [place], [place] - [address]    Clinical concerns: follow up       Jasmin Taylor CMA

## 2019-10-02 NOTE — PROGRESS NOTES
Oncology/Hematology Visit Note  Oct 2, 2019    Reason for Visit: follow up of non-small cell lung cancer.  Adenocarcinoma stage IV  Progressed on carboplatin pemetrexed and pembrolizumab, met with Dr. Muller therapy switched to docetaxel  plus ramucirumab-started on 09/20/2019    She comes here today for follow-up    Interval History:  Patient had cough he went to urgent care chest x-ray was done he was prescribed Levaquin he finished Levaquin 2 days ago.  He reports improvement in cough he denies fever chills sweats denies shortness of breath denies chest pain denies nausea vomiting diarrhea denies abdominal pain denies bleeding denies neuropathy      Review of Systems:  14 point ROS of systems including Constitutional, Eyes, Respiratory, Cardiovascular, Gastroenterology, Genitourinary, Integumentary, Muscularskeletal, Psychiatric were all negative except for pertinent positives noted in my HPI.      Current Outpatient Medications   Medication Sig Dispense Refill     albuterol (PROAIR HFA) 108 (90 Base) MCG/ACT inhaler Inhale 2 puffs into the lungs every 6 hours 8.5 g 3     albuterol (PROAIR HFA/PROVENTIL HFA/VENTOLIN HFA) 108 (90 Base) MCG/ACT inhaler Inhale 2 puffs into the lungs every 4 hours (while awake) 1 Inhaler 0     albuterol (PROVENTIL) (2.5 MG/3ML) 0.083% neb solution Take 1 vial (2.5 mg) by nebulization every 4 hours as needed for shortness of breath / dyspnea or wheezing 100 vial 11     calcium polycarbophil (FIBERCON) 625 MG tablet Take 2 tablets by mouth daily       dexamethasone (DECADRON) 4 MG tablet TAKE 1 TABLET (4 MG) BY MOUTH 2 TIMES DAILY TO BE TAKEN DAY BEFORE, DAY OF, AND DAY AFTER INFUSION.. 6 tablet 11     finasteride (PROSCAR) 5 MG tablet Take 1 tablet (5 mg) by mouth daily 30 tablet 5     guaiFENesin-codeine (ROBITUSSIN AC) 100-10 MG/5ML solution Take 5-10 mLs by mouth At Bedtime for 4 days 40 mL 0     LORazepam (ATIVAN) 0.5 MG tablet Take 1 tablet (0.5 mg) by mouth every 6 hours as  needed (Nausea/Vomiting) 30 tablet 3     Melatonin 10 MG TABS tablet Take 10 mg by mouth nightly as needed for sleep       MULTI VITAMIN MENS OR TABS 1 TABLET DAILY       ondansetron (ZOFRAN) 8 MG tablet Take 1 tablet (8 mg) by mouth every 8 hours as needed for nausea 30 tablet 11     prochlorperazine (COMPAZINE) 10 MG tablet Take 1 tablet (10 mg) by mouth every 6 hours as needed (Nausea/Vomiting) 30 tablet 11     ranitidine (ZANTAC) 75 MG tablet Take 75 mg by mouth as needed for heartburn       tamsulosin (FLOMAX) 0.4 MG capsule Take 1 capsule (0.4 mg) by mouth daily 90 capsule 1     tiotropium (SPIRIVA HANDIHALER) 18 MCG inhaled capsule Inhale 1 capsule (18 mcg) into the lungs daily 1 capsule 11       Physical Examination:  General: The patient is a pleasant male in no acute distress.  /74   Pulse 72   Temp 97  F (36.1  C) (Tympanic)   Resp 16   Ht 1.829 m (6')   Wt 78.5 kg (173 lb 2 oz)   SpO2 97%   BMI 23.48 kg/m    HEENT: EOMI, PERRL. Sclerae are anicteric. Oral mucosa is pink and moist with no lesions or thrush.   Lymph: Neck is supple with no lymphadenopathy in the cervical or supraclavicular areas.   Heart: Regular rate and rhythm.   Lungs: Clear to auscultation bilaterally.   GI: Bowel sounds present, soft, nontender with no palpable hepatosplenomegaly or masses.   Extremities: No lower extremity edema noted bilaterally.   Skin: No rashes, petechiae, or bruising noted on exposed skin.    Laboratory Data:  Results for orders placed or performed in visit on 10/02/19 (from the past 24 hour(s))   Comprehensive metabolic panel   Result Value Ref Range    Sodium 138 133 - 144 mmol/L    Potassium 4.6 3.4 - 5.3 mmol/L    Chloride 107 94 - 109 mmol/L    Carbon Dioxide 30 20 - 32 mmol/L    Anion Gap 1 (L) 3 - 14 mmol/L    Glucose 93 70 - 99 mg/dL    Urea Nitrogen 12 7 - 30 mg/dL    Creatinine 1.12 0.66 - 1.25 mg/dL    GFR Estimate 66 >60 mL/min/[1.73_m2]    GFR Estimate If Black 77 >60 mL/min/[1.73_m2]     Calcium 9.0 8.5 - 10.1 mg/dL    Bilirubin Total 1.0 0.2 - 1.3 mg/dL    Albumin 3.7 3.4 - 5.0 g/dL    Protein Total 7.5 6.8 - 8.8 g/dL    Alkaline Phosphatase 81 40 - 150 U/L    ALT 42 0 - 70 U/L    AST 47 (H) 0 - 45 U/L   CBC with platelets differential   Result Value Ref Range    WBC 4.4 4.0 - 11.0 10e9/L    RBC Count 4.20 (L) 4.4 - 5.9 10e12/L    Hemoglobin 12.8 (L) 13.3 - 17.7 g/dL    Hematocrit 38.9 (L) 40.0 - 53.0 %    MCV 93 78 - 100 fl    MCH 30.5 26.5 - 33.0 pg    MCHC 32.9 31.5 - 36.5 g/dL    RDW 14.7 10.0 - 15.0 %    Platelet Count 303 150 - 450 10e9/L    Diff Method Automated Method     % Neutrophils 37.0 %    % Lymphocytes 30.8 %    % Monocytes 27.6 %    % Eosinophils 0.9 %    % Basophils 1.4 %    % Immature Granulocytes 2.3 %    Nucleated RBCs 0 0 /100    Absolute Neutrophil 1.6 1.6 - 8.3 10e9/L    Absolute Lymphocytes 1.3 0.8 - 5.3 10e9/L    Absolute Monocytes 1.2 0.0 - 1.3 10e9/L    Absolute Eosinophils 0.0 0.0 - 0.7 10e9/L    Absolute Basophils 0.1 0.0 - 0.2 10e9/L    Abs Immature Granulocytes 0.1 0 - 0.4 10e9/L    Absolute Nucleated RBC 0.0     Ovalocytes Slight     Platelet Estimate       Automated count confirmed.  Platelet morphology is normal.         Assessment and Plan:    This is a 68-year-old male with    Non-small cell lung cancer stage IV  Progressed on carboplatin pemetrexed and pembrolizumab, met with Dr. Muller therapy switched to docetaxel  plus ramucirumab-started on 09/20/2019  he comes here today for follow-up  -Overall he has been tolerating treatment well  -Labs reviewed stable counts  -Scheduled for cycle 2 on 10/11-appointment with [facility]  -Scheduled for CT scan after cycle 2  and follow-up with Dr. Muller to discuss the results of CT scan  -Scheduled for MRI of the brain in the next week-  -Patient wants to get his chemotherapy at [facility] so I will schedule for cycle 3 with [facility] on 11/01      Left malignant pleural effusion  He had a Pleurx -Pleurx removed  -Advised to  call clinic in the event of cough shortness of breath       Health maintenance  Give flu shot today      Patient advised to call clinic or go to ER in the event of fever chills sweats cough shortness of breath nausea vomiting diarrhea abdominal pain bleeding or any changes in health condition-patient and wife verbalized understanding      KISHA Nunez CNP  Hem/Onc   [facility]

## 2019-10-04 ENCOUNTER — HOSPITAL ENCOUNTER (OUTPATIENT)
Dept: MRI IMAGING | Facility: CLINIC | Age: 69
Discharge: HOME | End: 2019-10-04
Attending: INTERNAL MEDICINE | Admitting: INTERNAL MEDICINE
Payer: COMMERCIAL

## 2019-10-04 DIAGNOSIS — C34.92 NON-SMALL CELL LUNG CANCER, LEFT (H): ICD-10-CM

## 2019-10-04 DIAGNOSIS — C34.32 MALIGNANT NEOPLASM OF LOWER LOBE OF LEFT LUNG (H): ICD-10-CM

## 2019-10-04 PROCEDURE — A9585 GADOBUTROL INJECTION: HCPCS | Performed by: INTERNAL MEDICINE

## 2019-10-04 PROCEDURE — 25500064 ZZH RX 255 OP 636: Performed by: INTERNAL MEDICINE

## 2019-10-04 PROCEDURE — 70553 MRI BRAIN STEM W/O & W/DYE: CPT

## 2019-10-04 RX ORDER — GADOBUTROL 604.72 MG/ML
7.5 INJECTION INTRAVENOUS ONCE
Status: COMPLETED | OUTPATIENT
Start: 2019-10-04 | End: 2019-10-04

## 2019-10-04 RX ADMIN — GADOBUTROL 7.5 ML: 604.72 INJECTION INTRAVENOUS at 10:05

## 2019-10-07 NOTE — PROGRESS NOTES
Subjective     Kentrell Kirkpatrick is a 68 year old male who presents to clinic today for the following health issues:    HPI   Acute Illness   Acute illness concerns: chest congestion  Onset: 1 month    Fever: no    Chills/Sweats: no    Headache (location?): no    Sinus Pressure:YES    Conjunctivitis:  no    Ear Pain: no    Rhinorrhea: YES    Congestion: YES    Sore Throat: no     Cough: YES-non-productive    Wheeze: YES - has gotten better    Decreased Appetite: no    Nausea: no    Vomiting: no    Diarrhea:  no    Dysuria/Freq.: no    Fatigue/Achiness: YES    Sick/Strep Exposure: no     Therapies Tried and outcome: Levaquin - seemed to have help clear most of it up, flonase    Using Spiriva in the AM. Using albuterol regularly throughout the day/     Patient Active Problem List   Diagnosis     CARDIOVASCULAR SCREENING; LDL GOAL LESS THAN 160     Advance Care Planning     Benign prostatic hyperplasia with lower urinary tract symptoms     Benign neoplasm of transverse colon     10 year risk of MI or stroke 7.5% or greater     Ascending aortic aneurysm (H)     Non-small cell lung cancer, left (H)     COPD (chronic obstructive pulmonary disease) (H)     Elevated prostate specific antigen (PSA)     Past Surgical History:   Procedure Laterality Date     COLONOSCOPY       COLONOSCOPY N/A 12/22/2016    Procedure: COMBINED COLONOSCOPY, SINGLE OR MULTIPLE BIOPSY/POLYPECTOMY BY BIOPSY;  Surgeon: Jeremi Kramer MD;  Location:  GI     INSERT CHEST TUBE Left 3/1/2019    Procedure: INSERT CHEST TUBE;  Surgeon: Matthew Rodriguez MD;  Location:  GI     INSERT CHEST TUBE N/A 5/9/2019    Procedure: Removal Of Pleurx Catheter;  Surgeon: Matthew Rodriguez MD;  Location: [facility]     IR LYMPH NODE BIOPSY  3/15/2019     THORACENTESIS Left 2/20/2019    Procedure: THORACENTESIS;  Surgeon: Matthew Rodriguez MD;  Location:  GI       Social History     Tobacco Use     Smoking status: Former Smoker     Packs/day: 1.50     Years: 43.00      Pack years: 64.50     Types: Cigarettes     Last attempt to quit: 2007     Years since quittin.8     Smokeless tobacco: Never Used   Substance Use Topics     Alcohol use: Not Currently     Comment: 3-4 a week     Family History   Problem Relation Age of Onset     Heart Disease Father      Prostate Cancer Other      Prostate Cancer Brother      Colon Cancer No family hx of            Reviewed and updated as needed this visit by Provider  Tobacco  Allergies  Meds  Problems  Med Hx  Surg Hx  Fam Hx         Review of Systems   ROS COMP: Constitutional, HEENT, cardiovascular, pulmonary, gi and gu systems are negative, except as otherwise noted.      Objective    /64   Pulse 93   Temp 97.3  F (36.3  C) (Oral)   Wt 78.9 kg (174 lb)   SpO2 96%   BMI 23.60 kg/m    Body mass index is 23.6 kg/m .  Physical Exam   GENERAL: healthy, alert and no distress  HENT: ear canals and TM's normal, nose and mouth without ulcers or lesions  NECK: no adenopathy, no asymmetry, masses, or scars and thyroid normal to palpation  RESP: lungs clear to auscultation - no rales, rhonchi or wheezes  CV: regular rate and rhythm, normal S1 S2, no S3 or S4, no murmur, click or rub, no peripheral edema and peripheral pulses strong  PSYCH: mentation appears normal, affect normal/bright    Diagnostic Test Results:  none         Assessment & Plan     1. Cough  - benzonatate (TESSALON) 200 MG capsule; Take 1 capsule (200 mg) by mouth 3 times daily as needed for cough  Dispense: 21 capsule; Refill: 0    2. Acute bronchitis, unspecified organism: continue albuterol PRN. Start tessalon to help with cough. Follow up in 1-2 weeks if symptoms not improving.  - benzonatate (TESSALON) 200 MG capsule; Take 1 capsule (200 mg) by mouth 3 times daily as needed for cough  Dispense: 21 capsule; Refill: 0       Return in about 2 weeks (around 10/22/2019) for follow up if symptoms not improving.    Donald Shell, [facility]  HORTA

## 2019-10-08 ENCOUNTER — OFFICE VISIT (OUTPATIENT)
Dept: FAMILY MEDICINE | Facility: CLINIC | Age: 69
End: 2019-10-08
Payer: COMMERCIAL

## 2019-10-08 VITALS
WEIGHT: 174 LBS | HEART RATE: 93 BPM | OXYGEN SATURATION: 96 % | BODY MASS INDEX: 23.6 KG/M2 | TEMPERATURE: 97.3 F | DIASTOLIC BLOOD PRESSURE: 64 MMHG | SYSTOLIC BLOOD PRESSURE: 100 MMHG

## 2019-10-08 DIAGNOSIS — J20.9 ACUTE BRONCHITIS, UNSPECIFIED ORGANISM: ICD-10-CM

## 2019-10-08 DIAGNOSIS — R05.9 COUGH: Primary | ICD-10-CM

## 2019-10-08 PROCEDURE — 99213 OFFICE O/P EST LOW 20 MIN: CPT | Performed by: FAMILY MEDICINE

## 2019-10-08 RX ORDER — BENZONATATE 200 MG/1
200 CAPSULE ORAL 3 TIMES DAILY PRN
Qty: 21 CAPSULE | Refills: 0 | Status: SHIPPED | OUTPATIENT
Start: 2019-10-08 | End: 2019-01-01

## 2019-10-11 ENCOUNTER — INFUSION THERAPY VISIT (OUTPATIENT)
Dept: INFUSION THERAPY | Facility: CLINIC | Age: 69
End: 2019-10-11
Attending: PHYSICIAN ASSISTANT
Payer: COMMERCIAL

## 2019-10-11 ENCOUNTER — HOSPITAL ENCOUNTER (OUTPATIENT)
Facility: CLINIC | Age: 69
Setting detail: SPECIMEN
Discharge: HOME | End: 2019-10-11
Attending: INTERNAL MEDICINE | Admitting: INTERNAL MEDICINE
Payer: COMMERCIAL

## 2019-10-11 ENCOUNTER — ONCOLOGY VISIT (OUTPATIENT)
Dept: ONCOLOGY | Facility: CLINIC | Age: 69
End: 2019-10-11
Attending: PHYSICIAN ASSISTANT
Payer: COMMERCIAL

## 2019-10-11 VITALS
HEART RATE: 83 BPM | DIASTOLIC BLOOD PRESSURE: 79 MMHG | BODY MASS INDEX: 24.07 KG/M2 | TEMPERATURE: 97.4 F | SYSTOLIC BLOOD PRESSURE: 124 MMHG | OXYGEN SATURATION: 98 % | HEIGHT: 72 IN | RESPIRATION RATE: 16 BRPM | WEIGHT: 177.7 LBS

## 2019-10-11 DIAGNOSIS — C34.92 NON-SMALL CELL LUNG CANCER, LEFT (H): Primary | ICD-10-CM

## 2019-10-11 DIAGNOSIS — C34.92 NON-SMALL CELL LUNG CANCER, LEFT (H): ICD-10-CM

## 2019-10-11 LAB
ALBUMIN SERPL-MCNC: 3.5 G/DL (ref 3.4–5)
ALBUMIN UR-MCNC: NEGATIVE MG/DL
ALP SERPL-CCNC: 79 U/L (ref 40–150)
ALT SERPL W P-5'-P-CCNC: 32 U/L (ref 0–70)
ANION GAP SERPL CALCULATED.3IONS-SCNC: 5 MMOL/L (ref 3–14)
AST SERPL W P-5'-P-CCNC: 28 U/L (ref 0–45)
BASOPHILS # BLD AUTO: 0 10E9/L (ref 0–0.2)
BASOPHILS NFR BLD AUTO: 0.4 %
BILIRUB SERPL-MCNC: 0.7 MG/DL (ref 0.2–1.3)
BUN SERPL-MCNC: 16 MG/DL (ref 7–30)
CALCIUM SERPL-MCNC: 8.8 MG/DL (ref 8.5–10.1)
CHLORIDE SERPL-SCNC: 106 MMOL/L (ref 94–109)
CO2 SERPL-SCNC: 25 MMOL/L (ref 20–32)
CREAT SERPL-MCNC: 1 MG/DL (ref 0.66–1.25)
DIFFERENTIAL METHOD BLD: ABNORMAL
EOSINOPHIL # BLD AUTO: 0 10E9/L (ref 0–0.7)
EOSINOPHIL NFR BLD AUTO: 0 %
ERYTHROCYTE [DISTWIDTH] IN BLOOD BY AUTOMATED COUNT: 15.1 % (ref 10–15)
GFR SERPL CREATININE-BSD FRML MDRD: 76 ML/MIN/{1.73_M2}
GLUCOSE SERPL-MCNC: 106 MG/DL (ref 70–99)
HCT VFR BLD AUTO: 39.2 % (ref 40–53)
HGB BLD-MCNC: 12.5 G/DL (ref 13.3–17.7)
IMM GRANULOCYTES # BLD: 0 10E9/L (ref 0–0.4)
IMM GRANULOCYTES NFR BLD: 0.4 %
LYMPHOCYTES # BLD AUTO: 1.1 10E9/L (ref 0.8–5.3)
LYMPHOCYTES NFR BLD AUTO: 14.7 %
MCH RBC QN AUTO: 29.7 PG (ref 26.5–33)
MCHC RBC AUTO-ENTMCNC: 31.9 G/DL (ref 31.5–36.5)
MCV RBC AUTO: 93 FL (ref 78–100)
MONOCYTES # BLD AUTO: 0.5 10E9/L (ref 0–1.3)
MONOCYTES NFR BLD AUTO: 6.4 %
NEUTROPHILS # BLD AUTO: 5.6 10E9/L (ref 1.6–8.3)
NEUTROPHILS NFR BLD AUTO: 78.1 %
NRBC # BLD AUTO: 0 10*3/UL
NRBC BLD AUTO-RTO: 0 /100
PLATELET # BLD AUTO: 389 10E9/L (ref 150–450)
POTASSIUM SERPL-SCNC: 4.2 MMOL/L (ref 3.4–5.3)
PROT SERPL-MCNC: 7.3 G/DL (ref 6.8–8.8)
RBC # BLD AUTO: 4.21 10E12/L (ref 4.4–5.9)
SODIUM SERPL-SCNC: 136 MMOL/L (ref 133–144)
WBC # BLD AUTO: 7.2 10E9/L (ref 4–11)

## 2019-10-11 PROCEDURE — 80053 COMPREHEN METABOLIC PANEL: CPT | Performed by: INTERNAL MEDICINE

## 2019-10-11 PROCEDURE — 40000141 ZZH STATISTIC PERIPHERAL IV START W/O US GUIDANCE

## 2019-10-11 PROCEDURE — 99214 OFFICE O/P EST MOD 30 MIN: CPT | Performed by: PHYSICIAN ASSISTANT

## 2019-10-11 PROCEDURE — 36415 COLL VENOUS BLD VENIPUNCTURE: CPT

## 2019-10-11 PROCEDURE — 81003 URINALYSIS AUTO W/O SCOPE: CPT | Performed by: PHYSICIAN ASSISTANT

## 2019-10-11 PROCEDURE — G0463 HOSPITAL OUTPT CLINIC VISIT: HCPCS | Mod: 25

## 2019-10-11 PROCEDURE — 96375 TX/PRO/DX INJ NEW DRUG ADDON: CPT

## 2019-10-11 PROCEDURE — 25000128 H RX IP 250 OP 636: Performed by: PHYSICIAN ASSISTANT

## 2019-10-11 PROCEDURE — 96413 CHEMO IV INFUSION 1 HR: CPT

## 2019-10-11 PROCEDURE — 96417 CHEMO IV INFUS EACH ADDL SEQ: CPT

## 2019-10-11 PROCEDURE — 85025 COMPLETE CBC W/AUTO DIFF WBC: CPT | Performed by: INTERNAL MEDICINE

## 2019-10-11 PROCEDURE — 25800030 ZZH RX IP 258 OP 636: Performed by: PHYSICIAN ASSISTANT

## 2019-10-11 RX ORDER — EPINEPHRINE 0.3 MG/.3ML
0.3 INJECTION SUBCUTANEOUS EVERY 5 MIN PRN
Status: CANCELLED | OUTPATIENT
Start: 2019-10-11

## 2019-10-11 RX ORDER — DIPHENHYDRAMINE HCL 25 MG
50 CAPSULE ORAL ONCE
Status: CANCELLED
Start: 2019-10-11

## 2019-10-11 RX ORDER — NALOXONE HYDROCHLORIDE 0.4 MG/ML
.1-.4 INJECTION, SOLUTION INTRAMUSCULAR; INTRAVENOUS; SUBCUTANEOUS
Status: CANCELLED | OUTPATIENT
Start: 2019-10-11

## 2019-10-11 RX ORDER — DIPHENHYDRAMINE HYDROCHLORIDE 50 MG/ML
50 INJECTION INTRAMUSCULAR; INTRAVENOUS
Status: CANCELLED
Start: 2019-10-11

## 2019-10-11 RX ORDER — EPINEPHRINE 1 MG/ML
0.3 INJECTION, SOLUTION INTRAMUSCULAR; SUBCUTANEOUS EVERY 5 MIN PRN
Status: CANCELLED | OUTPATIENT
Start: 2019-10-11

## 2019-10-11 RX ORDER — METHYLPREDNISOLONE SODIUM SUCCINATE 125 MG/2ML
125 INJECTION, POWDER, LYOPHILIZED, FOR SOLUTION INTRAMUSCULAR; INTRAVENOUS
Status: CANCELLED
Start: 2019-10-11

## 2019-10-11 RX ORDER — ALBUTEROL SULFATE 90 UG/1
1-2 AEROSOL, METERED RESPIRATORY (INHALATION)
Status: CANCELLED
Start: 2019-10-11

## 2019-10-11 RX ORDER — SODIUM CHLORIDE 9 MG/ML
1000 INJECTION, SOLUTION INTRAVENOUS CONTINUOUS PRN
Status: CANCELLED
Start: 2019-10-11

## 2019-10-11 RX ORDER — ALBUTEROL SULFATE 0.83 MG/ML
2.5 SOLUTION RESPIRATORY (INHALATION)
Status: CANCELLED | OUTPATIENT
Start: 2019-10-11

## 2019-10-11 RX ORDER — MEPERIDINE HYDROCHLORIDE 25 MG/ML
25 INJECTION INTRAMUSCULAR; INTRAVENOUS; SUBCUTANEOUS EVERY 30 MIN PRN
Status: CANCELLED | OUTPATIENT
Start: 2019-10-11

## 2019-10-11 RX ORDER — LORAZEPAM 2 MG/ML
0.5 INJECTION INTRAMUSCULAR EVERY 4 HOURS PRN
Status: CANCELLED
Start: 2019-10-11

## 2019-10-11 RX ADMIN — DIPHENHYDRAMINE HYDROCHLORIDE 50 MG: 50 INJECTION, SOLUTION INTRAMUSCULAR; INTRAVENOUS at 10:00

## 2019-10-11 RX ADMIN — DOCETAXEL 120 MG: 20 INJECTION, SOLUTION, CONCENTRATE INTRAVENOUS at 11:36

## 2019-10-11 RX ADMIN — DEXAMETHASONE SODIUM PHOSPHATE 12 MG: 10 INJECTION, SOLUTION INTRAMUSCULAR; INTRAVENOUS at 09:37

## 2019-10-11 RX ADMIN — SODIUM CHLORIDE 800 MG: 9 INJECTION, SOLUTION INTRAVENOUS at 10:36

## 2019-10-11 RX ADMIN — SODIUM CHLORIDE 250 ML: 9 INJECTION, SOLUTION INTRAVENOUS at 09:37

## 2019-10-11 ASSESSMENT — MIFFLIN-ST. JEOR: SCORE: 1609.04

## 2019-10-11 ASSESSMENT — PAIN SCALES - GENERAL: PAINLEVEL: NO PAIN (0)

## 2019-10-11 NOTE — PROGRESS NOTES
Ordering Clinic: Dr. Lopez, oncology  Procedure: port placement for (L) NSCLC  Arrival date: 10.17.19  Arrival time: 0700  NPO explained: yes                 Does patient have transportation available pre and post procedure?   yes  Check-in procedure explained: yes  Allergies reviewed: no, not with patient  Blood thinners: no, per oncology clinic  Labs: in [facility] 10.11.19  H&P:  See EPIC  Does patient require ?    no  If so, language?      services called to order ?    date requested:    arrival time requested:    Name of individual from  services assisting with scheduling appointment:    Previous sedation:  Last oral intake:    solid: ________  Liquids: _______

## 2019-10-11 NOTE — PROGRESS NOTES
Infusion Nursing Note:  Kentrell Kirkpatrick presents today for C2D1 Cyramza and Taxotere.    Patient seen by provider today: Yes: Trinidad Canales.   present during visit today: Not Applicable.    Note: Patient is getting a port.    Intravenous Access:  Lab draw site left hand, Needle type butterfly, Gauge 23.  Labs drawn without difficulty.  Peripheral IV placed by vascular access.    Treatment Conditions:  Lab Results   Component Value Date    HGB 12.5 10/11/2019     Lab Results   Component Value Date    WBC 7.2 10/11/2019      Lab Results   Component Value Date    ANEU 5.6 10/11/2019     Lab Results   Component Value Date     10/11/2019      Lab Results   Component Value Date     10/11/2019                   Lab Results   Component Value Date    POTASSIUM 4.2 10/11/2019           No results found for: MAG         Lab Results   Component Value Date    CR 1.00 10/11/2019                   Lab Results   Component Value Date    MALATHI 8.8 10/11/2019                Lab Results   Component Value Date    BILITOTAL 0.7 10/11/2019           Lab Results   Component Value Date    ALBUMIN 3.5 10/11/2019                    Lab Results   Component Value Date    ALT 32 10/11/2019           Lab Results   Component Value Date    AST 28 10/11/2019       Results reviewed, labs MET treatment parameters, ok to proceed with treatment.    Urine protein negative.      Post Infusion Assessment:  Patient tolerated infusion without incident.  Blood return noted pre and post infusion.  Site patent and intact, free from redness, edema or discomfort.  No evidence of extravasations.  Access discontinued per protocol.       Discharge Plan:   Copy of AVS reviewed with patient and/or family.  Patient will return 11/1/19 for next appointment.  Patient discharged in stable condition accompanied by: wife.  Departure Mode: Ambulatory.    Sarah Petersen, RN, RN

## 2019-10-11 NOTE — LETTER
10/11/2019         RE: Kentrell Kirkpatrick  [address] [facility] [address]        Dear Colleague,    Thank you for referring your patient, Kentrell Kirkpatrick, to the [facility]. Please see a copy of my visit note below.    Oncology/Hematology Visit Note    Oct 11, 2019    Reason for visit: Follow-up Stage IV NSCLC    Oncology HPI: Kentrell Kirkpatrick is a 68 year old male with NSCLC.  Patient with a history of aortic aneurysm and CT chest to reevaluate this aneurysm was obtained in June 2018 and there were several lung nodules.  If further imaging in February 2019 showed a large left pleural effusion and left thoracentesis was performed.  Pathology revealed adenocarcinoma.  He established care with Dr. Muller on 2/28/2019 and Pleurx catheter was placed. He completed 4 cycles of carboplatin, pemetrexed, pembrolizumab and for cycle completed on 3/13/2019.  He then completed 5 cycles of maintenance pemetrexed and pembrolizumab, last one completed on 8/28/2019, however noted to have progression of disease.  He started second line Taxotere and ramucirumab, cycle 1 on 9/20/2019.  He was in the urgent care on 9/28/2019 for cough.  He is discharged with albuterol, Robitussin and Levaquin.    He is here today for Taxotere/ramucirumab cycle 2.    Interval History: Edy is here with his wife, Audrey.  He completed 1 cycle of chemotherapy and felt it went pretty well.  He has noticed some memory decline even prior to chemotherapy, however slightly worse.  He denies fever/ chills, headache, vision changes, nausea, vomiting,, lower extremity swelling.  He has no new complaints.    Review of Systems: See interval hx. Denies fevers, chills, HA, dizziness, n/t, changes in vision, cough, sore throat, CP, SOB, abdominal pain, N/V, diarrhea, changes in urination, bleeding, bruising, rash.     PMHx and Social Hx reviewed per EPIC.      Medications:  Current Outpatient Medications   Medication Sig Dispense Refill      albuterol (PROAIR HFA/PROVENTIL HFA/VENTOLIN HFA) 108 (90 Base) MCG/ACT inhaler Inhale 2 puffs into the lungs every 4 hours (while awake) 1 Inhaler 0     albuterol (PROVENTIL) (2.5 MG/3ML) 0.083% neb solution Take 1 vial (2.5 mg) by nebulization every 4 hours as needed for shortness of breath / dyspnea or wheezing 100 vial 11     benzonatate (TESSALON) 200 MG capsule Take 1 capsule (200 mg) by mouth 3 times daily as needed for cough 21 capsule 0     calcium polycarbophil (FIBERCON) 625 MG tablet Take 2 tablets by mouth daily       dexamethasone (DECADRON) 4 MG tablet TAKE 1 TABLET (4 MG) BY MOUTH 2 TIMES DAILY TO BE TAKEN DAY BEFORE, DAY OF, AND DAY AFTER INFUSION.. 6 tablet 11     finasteride (PROSCAR) 5 MG tablet Take 1 tablet (5 mg) by mouth daily 30 tablet 5     LORazepam (ATIVAN) 0.5 MG tablet Take 1 tablet (0.5 mg) by mouth every 6 hours as needed (Nausea/Vomiting) 30 tablet 3     Melatonin 10 MG TABS tablet Take 10 mg by mouth nightly as needed for sleep       MULTI VITAMIN MENS OR TABS 1 TABLET DAILY       ondansetron (ZOFRAN) 8 MG tablet Take 1 tablet (8 mg) by mouth every 8 hours as needed for nausea 30 tablet 11     prochlorperazine (COMPAZINE) 10 MG tablet Take 1 tablet (10 mg) by mouth every 6 hours as needed (Nausea/Vomiting) 30 tablet 11     ranitidine (ZANTAC) 75 MG tablet Take 75 mg by mouth as needed for heartburn       tamsulosin (FLOMAX) 0.4 MG capsule Take 1 capsule (0.4 mg) by mouth daily 90 capsule 1     tiotropium (SPIRIVA HANDIHALER) 18 MCG inhaled capsule Inhale 1 capsule (18 mcg) into the lungs daily 1 capsule 11       No Known Allergies      EXAM:    /79   Pulse 83   Temp 97.4  F (36.3  C) (Oral)   Resp 16   Ht 1.829 m (6')   Wt 80.6 kg (177 lb 11.2 oz)   SpO2 98%   BMI 24.10 kg/m       GENERAL:   Male, in no acute distress.  Alert and oriented x3.   HEENT:  Normocephalic, atraumatic.  PERRL, oropharynx clear with no sores or thrush.   LYMPH NODES:  No palpable  pre/post-auricular, cervical, axillary lymphadenopathy appreciated.  CV:  RRR, No murmurs, gallops, or rubs.   LUNGS:  Clear to auscultation bilaterally.   ABDOMEN:  Soft, nontender and nondistended.  Bowel sounds heard x4.  No apparent hepatosplenomegaly.   EXTREMITIES:  No clubbing, cyanosis, or edema.   SKIN: No rash  PSYCH: Mood stable      Labs:   Results for MAYA ELLER (MRN [id number]) as of 10/11/2019 09:32   10/11/2019 08:24 10/11/2019 08:44   Sodium 136    Potassium 4.2    Chloride 106    Carbon Dioxide 25    Urea Nitrogen 16    Creatinine 1.00    GFR Estimate 76    GFR Estimate If Black 88    Calcium 8.8    Anion Gap 5    Albumin 3.5    Protein Total 7.3    Bilirubin Total 0.7    Alkaline Phosphatase 79    ALT 32    AST 28    Glucose 106 (H)    WBC 7.2    Hemoglobin 12.5 (L)    Hematocrit 39.2 (L)    Platelet Count 389    RBC Count 4.21 (L)    MCV 93    MCH 29.7    MCHC 31.9    RDW 15.1 (H)    Diff Method Automated Method    % Neutrophils 78.1    % Lymphocytes 14.7    % Monocytes 6.4    % Eosinophils 0.0    % Basophils 0.4    % Immature Granulocytes 0.4    Nucleated RBCs 0    Absolute Neutrophil 5.6    Absolute Lymphocytes 1.1    Absolute Monocytes 0.5    Absolute Eosinophils 0.0    Absolute Basophils 0.0    Abs Immature Granulocytes 0.0    Absolute Nucleated RBC 0.0    Protein Albumin Urine  Negative       Imaging: n/a    Impression/Plan: Kentrell Eller is a 68 year old male with metastatic NSCLC previously on carboplatin, pemetrexed and pembrolizumab, however progression noted and currently undergoing palliative intent chemotherapy with Taxotere and ramucirumab.    Metastatic NSCLC: Previous therapies with progression of disease, completed cycle 1 Taxotere/ramucirumab on 9/20/2019.  He tolerated this infusion fairly well.  Previous Pleurx catheter has been removed. Labs are within treatment parameters to proceed with cycle 2 today. We reviewed toxicities again today.  We will try to schedule for  port this week.  He will call the clinic with any questions or concerns.  --10/21 CT  --10/29 Dr. Muller    Pleural effusion: Known left malignant pleural effusion and Pleurx drain removed. Known trapped lung, no further reaccumulation. No new sx.     COPD: Currently with Tiotropium and albuterol inhalers. No new sx.    Insomnia: Using melatonin when needed, no longer on Seroquel.    Port: He was previously hesitant for port placement, however he is open to it now.  We will schedule with either Dr. Torres or IR next week.    Preoperative Physical: I have done the necessary history and physical and the laboratory tests. He does not need additional evaluation prior to the planned port placement. He is average risk for intermediate risk procedure. He does not have any HTN, CAD, DM TII that would need optimization. He is not anticoagulated and he has had previous surgeries without anesthesia complications. No history of bleeding/clotting diathesis.       Chart documentation with Dragon Voice recognition Software. Although reviewed after completion, some words and grammatical errors may remain.      Trinidad Canales PA-C  Hematology/Oncology  [facility]                  Again, thank you for allowing me to participate in the care of your patient.        Sincerely,        Trinidad Canales PA-C

## 2019-10-11 NOTE — NURSING NOTE
.Oncology Rooming Note    October 11, 2019 8:30 AM   Kentrell Kirkpatrick is a 68 year old male who presents for:    Chief Complaint   Patient presents with     Oncology Clinic Visit     Non-small cell lung cancer, left     Initial Vitals: /79   Pulse 83   Temp 97.4  F (36.3  C) (Oral)   Resp 16   Ht 1.829 m (6')   Wt 80.6 kg (177 lb 11.2 oz)   SpO2 98%   BMI 24.10 kg/m   Estimated body mass index is 24.1 kg/m  as calculated from the following:    Height as of this encounter: 1.829 m (6').    Weight as of this encounter: 80.6 kg (177 lb 11.2 oz). Body surface area is 2.02 meters squared.  No Pain (0) Comment: Data Unavailable   No LMP for male patient.  Allergies reviewed: Yes  Medications reviewed: Yes    Medications: Medication refills not needed today.  Pharmacy name entered into [employer]: [id number] - [place], [place] - [id number]    Clinical concerns: f/u       Karen Plummer, PADMA

## 2019-10-17 NOTE — IP AVS SNAPSHOT
MRN:[id number]                      After Visit Summary   10/17/2019    Kentrell Kirkpatrick    MRN: [id number]           Visit Information        Department      10/17/2019  6:57 AM [facility]          Review of your medicines      UNREVIEWED medicines. Ask your doctor about these medicines       Dose / Directions   * albuterol (2.5 MG/3ML) 0.083% neb solution  Commonly known as:  PROVENTIL  Used for:  Panlobular emphysema (H), Chronic obstructive pulmonary disease, unspecified COPD type (H)      Dose:  2.5 mg  Take 1 vial (2.5 mg) by nebulization every 4 hours as needed for shortness of breath / dyspnea or wheezing  Quantity:  100 vial  Refills:  11     * albuterol 108 (90 Base) MCG/ACT inhaler  Commonly known as:  PROAIR HFA/PROVENTIL HFA/VENTOLIN HFA  Used for:  Cough, Acute bronchitis, unspecified organism      Dose:  2 puff  Inhale 2 puffs into the lungs every 4 hours (while awake)  Quantity:  1 Inhaler  Refills:  0     benzonatate 200 MG capsule  Commonly known as:  TESSALON  Used for:  Cough, Acute bronchitis, unspecified organism      Dose:  200 mg  Take 1 capsule (200 mg) by mouth 3 times daily as needed for cough  Quantity:  21 capsule  Refills:  0     calcium polycarbophil 625 MG tablet  Commonly known as:  FIBERCON      Dose:  2 tablet  Take 2 tablets by mouth daily  Refills:  0     dexamethasone 4 MG tablet  Commonly known as:  DECADRON  Used for:  Non-small cell lung cancer, left (H)      TAKE 1 TABLET (4 MG) BY MOUTH 2 TIMES DAILY TO BE TAKEN DAY BEFORE, DAY OF, AND DAY AFTER INFUSION..  Quantity:  6 tablet  Refills:  11     finasteride 5 MG tablet  Commonly known as:  PROSCAR  Used for:  Benign nodular prostatic hyperplasia with lower urinary tract symptoms      Dose:  5 mg  Take 1 tablet (5 mg) by mouth daily  Quantity:  30 tablet  Refills:  5     guaiFENesin-codeine 100-10 MG/5ML solution  Commonly known as:  ROBITUSSIN AC  Used for:  Cough, Acute bronchitis,  unspecified organism  Ask about: Should I take this medication?      Dose:  1-2 tsp.  Take 5-10 mLs by mouth At Bedtime for 4 days  Quantity:  40 mL  Refills:  0     LORazepam 0.5 MG tablet  Commonly known as:  ATIVAN  Used for:  Non-small cell lung cancer, left (H)      Dose:  0.5 mg  Take 1 tablet (0.5 mg) by mouth every 6 hours as needed (Nausea/Vomiting)  Quantity:  30 tablet  Refills:  3     Melatonin 10 MG Tabs tablet      Dose:  10 mg  Take 10 mg by mouth nightly as needed for sleep  Refills:  0     MULTI vitamin  MENS Tabs      1 TABLET DAILY  Refills:  0     ondansetron 8 MG tablet  Commonly known as:  ZOFRAN  Used for:  Non-small cell lung cancer, left (H)      Dose:  8 mg  Take 1 tablet (8 mg) by mouth every 8 hours as needed for nausea  Quantity:  30 tablet  Refills:  11     prochlorperazine 10 MG tablet  Commonly known as:  COMPAZINE  Used for:  Non-small cell lung cancer, left (H)      Dose:  10 mg  Take 1 tablet (10 mg) by mouth every 6 hours as needed (Nausea/Vomiting)  Quantity:  30 tablet  Refills:  11     ranitidine 75 MG tablet  Commonly known as:  ZANTAC      Dose:  75 mg  Take 75 mg by mouth as needed for heartburn  Refills:  0     tamsulosin 0.4 MG capsule  Commonly known as:  FLOMAX  Used for:  Benign nodular prostatic hyperplasia with lower urinary tract symptoms      Dose:  0.4 mg  Take 1 capsule (0.4 mg) by mouth daily  Quantity:  90 capsule  Refills:  1     tiotropium 18 MCG inhaled capsule  Commonly known as:  SPIRIVA HANDIHALER  Used for:  Chronic obstructive pulmonary disease, unspecified COPD type (H)      Dose:  18 mcg  Inhale 1 capsule (18 mcg) into the lungs daily  Quantity:  1 capsule  Refills:  11         * This list has 2 medication(s) that are the same as other medications prescribed for you. Read the directions carefully, and ask your doctor or other care provider to review them with you.                  Protect others around you: Learn how to safely use, store and throw away  your medicines at www.disposemymeds.org.       Follow-ups after your visit       Your next 10 appointments already scheduled    Oct 17, 2019  8:00 AM CDT  IR CHEST PORT PLACEMENT > 5 YRS OF AGE with RHIR11, NADINE, RH IR RAD  [facility] Interventional Radiology ([facility]) [address]  [facility] [address]  [phone]   The day before the exam:    You may eat your regular diet.    You are encouraged to drink at least 8 eight ounce glasses of clear liquids.    Please wear loose clothing, such as a sweat suit or jogging clothes. Avoid snaps, zippers and other metal. We may ask you to undress and put on a hospital gown.    Please bring any scans or X-rays taken at other hospitals, if similar tests were done. Also bring a list of your medicines, including vitamins, minerals and over-the-counter drugs. It is safest to leave personal items at home.    Someone will need to drive you to and from the hospital.    Tell your doctor in advance:    If you have allergies to x-ray contrast or iodine.    If you are or may be pregnant.    If you are taking Coumadin (or any other blood thinners) 5 days prior to the exam for any special instructions.    If you are diabetic to determine if your insulin needs have to be adjusted for the exam.    Your doctor will:    Need to do a history and physical within 30 days before this procedure.    Obtain necessary laboratory tests prior to the exam (Basic Metabolic Panel, CBCP, PTT and INR)    (No labs needed if you are having a tunneled catheter exchange or removal)    If you were given sedation, you cannot drive for 24 hours after the procedure, and an adult must be with you until then.    If you have any questions, please call the Imaging Department where you will have your exam. Directions, parking instructions, and other information are available on our website, [email or web address].     Oct 21, 2019 10:20 AM CDT  CT CHEST/ABDOMEN/PELVIS W CONTRAST with  RSCCCT1  [facility] [place] ([facility]) [address]  [facility] [address] [phone]   How do I prepare for my exam? (Food and drink instructions)  To prepare: Do not eat or drink for 2 hours before your exam. If you need to take medicine, you may take it with small sips of water. (We may ask you to take liquid medicine as well.)    How do I prepare for my exam? (Other instructions)  Please arrive 30 minutes early for your CT. Once in the department you might be asked to drink water 15-20 minutes prior to your exam. If indicated you may be asked to drink an oral contrast in advance of your CT. If this is the case, the imaging team will let you know or be in contact with you prior to your appointment    Patients over 70 or patients with diabetes or kidney problems: If you haven t had a blood test (creatinine test) within the last 30 days, the Cardiologist/Radiologist may require you to get this test prior to your exam.    If you have diabetes: Continue to take your metformin medication on the day of your exam    What should I wear: Please wear loose clothing, such as a sweat suit or jogging clothes. Avoid snaps, zippers and other metal. We may ask you to undress and put on a hospital gown.    How long does the exam take: Most scans take less than 20 minutes.    What should I bring: Please bring any scans or X-rays taken at other hospitals, if similar tests were done. Also bring a list of your medicines, including vitamins, minerals and over-the-counter drugs. It is safest to leave personal items at home.    Do I need a : No  is needed.    What do I need to tell my doctor?  Be sure to tell your doctor:  * If you have any allergies.  * If there s any chance you are pregnant.  * If you are breastfeeding.    What should I do after the exam: No restrictions, You may resume normal activities.    What is this test: A CT (computed tomography) scan  is a series of pictures that allows us to look inside your body. The scanner creates images of the body in cross sections, much like slices of bread. This helps us see any problems more clearly. You may receive contrast (X-ray dye) before or during your scan. You will be asked to drink the contrast.    Who should I call with questions: If you have any questions, please call the Imaging Department where you will have your exam. Directions, parking instructions, and other information is available on our website, [email or web address].     Oct 29, 2019  8:15 AM CDT  (Arrive by 8:00 AM)  Return Visit with Susan Muller MD  [facility] ([facility] [place]) [address]  [address]  [facility] [address]  [phone]      Nov 01, 2019  8:15 AM CDT  Lab with Infusion with RH LAB DRAW 1  [facility] ([facility]) [facility]  [address]  [facility] [address]  [phone]      Nov 01, 2019  9:00 AM CDT  Return Visit with Trinidad Benjamin PA-C  [facility] ([facility]) [facility]  [address]  [facility] [address]  [phone]      Nov 01, 2019 10:00 AM CDT  Level 4 with  INFUSION CHAIR 6  [facility] ([facility]) [facility]  [address]  [facility] [address]  [phone]         Care Instructions       Further instructions from your care team         Going Home after Your Port Is Inserted  _______________________________________    Patient Name: Kentrell Kirkpatrick  Today's Date: October 17, 2019    The doctor who inserted your port was:  Felicity at [facility] ([facility])      Have your port site and/or neck wound checked on:  Next appt (date).      Go to:  Interventional Radiology    Your port may be accessed right away. A  nurse needs to flush your port every 30 days or after each use.     When you get home:    You should have an adult with you for the first 6 hours.    No driving or drinking alcohol until tomorrow. You may still have side effects from the medicine you received today (you may feel drowsy, unsteady or forgetful).     You may go back to your regular diet today.     If you take aspirin or Plavix, you may begin taking it again tomorrow. You may restart all other medicines today. Use pain medicine as directed.    Avoid heavy lifting or the overuse of your shoulder for three days.    Caring for the port site and/or neck wound:    Keep the port site clean and dry. Cover the site with plastic before taking a shower. Do this until the site has healed.     Keep the bandage on your port site for three days. Change it if it gets wet or dirty. After three days, you may use Band-Aids until the wound has healed.    For a neck wound, leave the tape on for three days. You may cover it with a Band-Aid for comfort.     If you have oozing or bleeding from the port site or from the cut in your neck:     Put direct pressure on the wound for 5 to 10 minutes with a gauze pad.  If you still have bleeding after 10 minutes, call your doctor.    If you are bleeding a lot and can't control it with direct pressure, call [phone].    Call your doctor if you have:    Bleeding from a wound after 10 minutes of direct pressure.    Swelling in your neck or over your port site.    Signs of infection: a fever over 100 F (37.8 C) under the tongue; the site is red, tender or draining.      Additional Information About Your Visit       [employer] Information    [employer] gives you secure access to your electronic health record. If you see a primary care provider, you can also send messages to your care team and make appointments. If you have questions, please call your primary care clinic.  If you do not have a primary care provider, please call [phone] and they  will assist you.       Care EveryWhere ID    This is your Care EveryWhere ID. This could be used by other organizations to access your [place] medical records  [id number]       Your Vitals Were  Most recent update: 10/17/2019  7:09 AM    Blood Pressure   117/72    Temperature   98.4  F (36.9  C) (Temporal)    Respirations   12    Pulse Oximetry   98%           Primary Care Provider Office Phone # Fax #    Donald Shell [phone]      Equal Access to Services    LORETO GREGORIO : Hadii aad ku hadasho Soomaali, waaxda luqadaha, qaybta kaalmada adeegyada, waxay idiin hayaan adeeg randi robles . So [facility] [phone].    ATENCIÓN: Si habla español, tiene a perez disposición servicios gratuitos de asistencia lingüística. Llame al [phone].    We comply with applicable federal civil rights laws and [place] laws. We do not discriminate on the basis of race, color, national origin, age, disability, sex, sexual orientation, or gender identity.           Thank you!    Thank you for choosing [facility] for your care. Our goal is always to provide you with excellent care. Hearing back from our patients is one way we can continue to improve our services. Please take a few minutes to complete the written survey that you may receive in the mail after you visit. If you would like to speak to someone directly about your visit please contact Patient Relations at [phone]. Thank you!              Medication List      ASK your doctor about these medications          Morning Afternoon Evening Bedtime As Needed    * albuterol (2.5 MG/3ML) 0.083% neb solution  Also known as:  PROVENTIL  INSTRUCTIONS:  Take 1 vial (2.5 mg) by nebulization every 4 hours as needed for shortness of breath / dyspnea or wheezing                     * albuterol 108 (90 Base) MCG/ACT inhaler  Also known as:  PROAIR HFA/PROVENTIL HFA/VENTOLIN HFA  INSTRUCTIONS:  Inhale 2 puffs into the lungs every 4 hours (while  awake)  Doctor's comments:  Pharmacy may dispense brand covered by insurance (Proair, or proventil or ventolin or generic albuterol inhaler)                     benzonatate 200 MG capsule  Also known as:  TESSALON  INSTRUCTIONS:  Take 1 capsule (200 mg) by mouth 3 times daily as needed for cough                     calcium polycarbophil 625 MG tablet  Also known as:  FIBERCON  INSTRUCTIONS:  Take 2 tablets by mouth daily                     dexamethasone 4 MG tablet  Also known as:  DECADRON  INSTRUCTIONS:  TAKE 1 TABLET (4 MG) BY MOUTH 2 TIMES DAILY TO BE TAKEN DAY BEFORE, DAY OF, AND DAY AFTER INFUSION..                     finasteride 5 MG tablet  Also known as:  PROSCAR  INSTRUCTIONS:  Take 1 tablet (5 mg) by mouth daily                     guaiFENesin-codeine 100-10 MG/5ML solution  Also known as:  ROBITUSSIN AC  INSTRUCTIONS:  Take 5-10 mLs by mouth At Bedtime for 4 days  Ask about: Should I take this medication?                     LORazepam 0.5 MG tablet  Also known as:  ATIVAN  INSTRUCTIONS:  Take 1 tablet (0.5 mg) by mouth every 6 hours as needed (Nausea/Vomiting)                     Melatonin 10 MG Tabs tablet  INSTRUCTIONS:  Take 10 mg by mouth nightly as needed for sleep                     MULTI vitamin  MENS Tabs  INSTRUCTIONS:  1 TABLET DAILY                     ondansetron 8 MG tablet  Also known as:  ZOFRAN  INSTRUCTIONS:  Take 1 tablet (8 mg) by mouth every 8 hours as needed for nausea                     prochlorperazine 10 MG tablet  Also known as:  COMPAZINE  INSTRUCTIONS:  Take 1 tablet (10 mg) by mouth every 6 hours as needed (Nausea/Vomiting)                     ranitidine 75 MG tablet  Also known as:  ZANTAC  INSTRUCTIONS:  Take 75 mg by mouth as needed for heartburn                     tamsulosin 0.4 MG capsule  Also known as:  FLOMAX  INSTRUCTIONS:  Take 1 capsule (0.4 mg) by mouth daily                     tiotropium 18 MCG inhaled capsule  Also known as:  SPIRIVA  HANDIHALER  INSTRUCTIONS:  Inhale 1 capsule (18 mcg) into the lungs daily                        * This list has 2 medication(s) that are the same as other medications prescribed for you. Read the directions carefully, and ask your doctor or other care provider to review them with you.

## 2019-10-17 NOTE — PROCEDURES
[facility]    Procedure: Port and catheter placement  Date/Time: 10/17/2019 9:11 AM  Performed by: Luis Blevins MD  Authorized by: Luis Blevins MD     UNIVERSAL PROTOCOL   Site Marked: Yes  Prior Images Obtained and Reviewed:  Yes  Required items: Required blood products, implants, devices and special equipment available    Patient identity confirmed:  Verbally with patient  Patient was reevaluated immediately before administering moderate or deep sedation or anesthesia  Confirmation Checklist:  Patient's identity using two indicators, relevant allergies, procedure was appropriate and matched the consent or emergent situation and correct equipment/implants were available  Time out: Immediately prior to the procedure a time out was called    Preparation: Patient was prepped and draped in usual sterile fashion    ESBL (mL):  8     ANESTHESIA    Local Anesthetic: Lidocaine 1% without epinephrine      SEDATION    Patient Sedated: Yes    Vital signs: Vital signs monitored during sedation    See dictated procedure note for full details.  Findings: Right Int Jug port and catheter placement.  Tip in SVC.  Heparinized.  Ready for use.  No complications.    Specimens: none    Condition: Stable    PROCEDURE   Patient Tolerance:  Patient tolerated the procedure well with no immediate complications    Time of Sedation in Minutes by Physician:  25

## 2019-10-17 NOTE — IP AVS SNAPSHOT
[facility]  [address]  [facility] [address]  Phone:  [phone]                                    After Visit Summary   10/17/2019    Kentrell Kirkpatrick    MRN: [id number]           After Visit Summary Signature Page    I have received my discharge instructions, and my questions have been answered. I have discussed any challenges I see with this plan with the nurse or doctor.    ..........................................................................................................................................  Patient/Patient Representative Signature      ..........................................................................................................................................  Patient Representative Print Name and Relationship to Patient    ..................................................               ................................................  Date                                   Time    ..........................................................................................................................................  Reviewed by Signature/Title    ...................................................              ..............................................  Date                                               Time          22EPIC Rev 08/18

## 2019-10-17 NOTE — DISCHARGE INSTRUCTIONS
Going Home after Your Port Is Inserted  _______________________________________    Patient Name: Kentrell Kirkpatrick  Today's Date: October 17, 2019    The doctor who inserted your port was:  Felicity at [facility])      Have your port site and/or neck wound checked on:  Next appt (date).      Go to:  Interventional Radiology    Your port may be accessed right away. A nurse needs to flush your port every 30 days or after each use.     When you get home:    You should have an adult with you for the first 6 hours.    No driving or drinking alcohol until tomorrow. You may still have side effects from the medicine you received today (you may feel drowsy, unsteady or forgetful).     You may go back to your regular diet today.     If you take aspirin or Plavix, you may begin taking it again tomorrow. You may restart all other medicines today. Use pain medicine as directed.    Avoid heavy lifting or the overuse of your shoulder for three days.    Caring for the port site and/or neck wound:    Keep the port site clean and dry. Cover the site with plastic before taking a shower. Do this until the site has healed.     Keep the bandage on your port site for three days. Change it if it gets wet or dirty. After three days, you may use Band-Aids until the wound has healed.    For a neck wound, leave the tape on for three days. You may cover it with a Band-Aid for comfort.     If you have oozing or bleeding from the port site or from the cut in your neck:     Put direct pressure on the wound for 5 to 10 minutes with a gauze pad.  If you still have bleeding after 10 minutes, call your doctor.    If you are bleeding a lot and can't control it with direct pressure, call 911.    Call your doctor if you have:    Bleeding from a wound after 10 minutes of direct pressure.    Swelling in your neck or over your port site.    Signs of infection: a fever over 100 F (37.8 C) under the tongue; the site is red, tender or  draining.

## 2019-10-17 NOTE — SEDATION DOCUMENTATION
Post Procedure Summary:  Prior to the start of the procedure and with procedural staff participation, I verbally confirmed the patient s identity using two indicators, relevant allergies, that the procedure was appropriate and matched the consent or emergent situation, and that the correct equipment/implants were available. Immediately prior to starting the procedure I conducted the Time Out with the procedural staff and re-confirmed the patient s name, procedure, and site/side. (The Joint Commission universal protocol was followed.)  Yes       Sedatives: Fentanyl and Midazolam (Versed)    Vital signs, airway and pulse oximetry were monitored and remained stable throughout the procedure and sedation was maintained until the procedure was complete.  The patient was monitored by staff until sedation discharge criteria were met.    Patient tolerance: Patient tolerated the procedure well with no immediate complications.    Time of sedation in minutes: 20 minutes from beginning to end of physician one to one monitoring.

## 2019-10-17 NOTE — PRE-PROCEDURE
GENERAL PRE-PROCEDURE:   Procedure:  Port and catheter placement  Date/Time:  10/17/2019 8:01 AM    Verbal consent obtained?: Yes    Written consent obtained?: Yes    Risks and benefits: Risks, benefits and alternatives were discussed    Consent given by:  Patient  Patient states understanding of procedure being performed: Yes    Patient's understanding of procedure matches consent: Yes    Procedure consent matches procedure scheduled: Yes    Expected level of sedation:  Moderate  Appropriately NPO:  Yes  ASA Class:  Class 3- Severe systemic disease, definite functional limitations  Mallampati  :  Grade 3- soft palate visible, posterior pharyngeal wall not visible  Lungs:  Lungs clear with good breath sounds bilaterally  Heart:  Normal heart sounds and rate  History & Physical reviewed:  History and physical reviewed and no updates needed  Statement of review:  I have reviewed the lab findings, diagnostic data, medications, and the plan for sedation

## 2019-10-21 NOTE — PROGRESS NOTES
Port accessed with excellent blood return.  Port flushed with Heparin per protocol post CT scan contrast injection.  Pt tolerated well.

## 2019-10-23 NOTE — TELEPHONE ENCOUNTER
Routing refill request to provider for review/approval because:  Please review for continued use and follow up.     Thank you,  Arabella Ramesh R.N.

## 2019-10-23 NOTE — TELEPHONE ENCOUNTER
"Requested Prescriptions   Pending Prescriptions Disp Refills     finasteride (PROSCAR) 5 MG tablet [Pharmacy Med Name: FINASTERIDE 5 MG TABLET]  Last Written Prescription Date:  4/30/2019  Last Fill Quantity: 30 tablet,  # refills: 5   Last office visit: 10/8/2019 with prescribing provider:  Sumaya     Future Office Visit:   Next 5 appointments (look out 90 days)    Nov 01, 2019  9:00 AM CDT  Return Visit with Trinidad Benjamin PA-C  [facility] ([facility]) [facility]  [address]  [address]  [facility] [address]  [phone]            90 tablet 1     Sig: TAKE 1 TABLET BY MOUTH EVERY DAY       Alpha Blockers Passed - 10/23/2019  1:31 AM        Passed - Blood pressure under 140/90 in past 12 months     BP Readings from Last 3 Encounters:   10/17/19 104/63   10/11/19 124/79   10/08/19 100/64             Passed - Recent (12 mo) or future (30 days) visit within the authorizing provider's specialty     Patient has had an office visit with the authorizing provider or a provider within the authorizing providers department within the previous 12 mos or has a future within next 30 days. See \"Patient Info\" tab in inbasket, or \"Choose Columns\" in Meds & Orders section of the refill encounter.              Passed - Patient does not have Tadalafil, Vardenafil, or Sildenafil on their medication list        Passed - Medication is active on med list        Passed - Patient is 18 years of age or older        "

## 2019-10-23 NOTE — PROGRESS NOTES
Subjective     Kentrell Kirkpatrick is a 68 year old male who presents to clinic today for the following health issues:    HPI   Acute Illness   Acute illness concerns: cough  Onset: over a month    Fever: no    Chills/Sweats: no    Headache (location?): no    Sinus Pressure:no    Conjunctivitis:  YES, watery eyes    Ear Pain: no    Rhinorrhea: YES    Congestion: YES    Sore Throat: no     Cough: YES-non-productive, productive of clear sputum, with shortness of breath    Wheeze: YES    Decreased Appetite: no    Nausea: no    Vomiting: no    Diarrhea:  no    Dysuria/Freq.: no    Fatigue/Achiness: no    Sick/Strep Exposure: no     Therapies Tried and outcome: mucinex    He states he feels rattling in his throat when he lays down.    Patient Active Problem List   Diagnosis     CARDIOVASCULAR SCREENING; LDL GOAL LESS THAN 160     Advance Care Planning     Benign prostatic hyperplasia with lower urinary tract symptoms     Benign neoplasm of transverse colon     10 year risk of MI or stroke 7.5% or greater     Ascending aortic aneurysm (H)     Non-small cell lung cancer, left (H)     COPD (chronic obstructive pulmonary disease) (H)     Elevated prostate specific antigen (PSA)     Past Surgical History:   Procedure Laterality Date     COLONOSCOPY       COLONOSCOPY N/A 12/22/2016    Procedure: COMBINED COLONOSCOPY, SINGLE OR MULTIPLE BIOPSY/POLYPECTOMY BY BIOPSY;  Surgeon: Jeremi Kramer MD;  Location:  GI     INSERT CHEST TUBE Left 3/1/2019    Procedure: INSERT CHEST TUBE;  Surgeon: Matthew Rodriguez MD;  Location:  GI     INSERT CHEST TUBE N/A 5/9/2019    Procedure: Removal Of Pleurx Catheter;  Surgeon: Matthew Rodriguez MD;  Location:  GI     IR CHEST PORT PLACEMENT > 5 YRS OF AGE  10/17/2019     IR LYMPH NODE BIOPSY  3/15/2019     THORACENTESIS Left 2/20/2019    Procedure: THORACENTESIS;  Surgeon: Matthew Rodriguez MD;  Location:  GI       Social History     Tobacco Use     Smoking status: Former Smoker      Packs/day: 1.50     Years: 43.00     Pack years: 64.50     Types: Cigarettes     Last attempt to quit: 2007     Years since quittin.8     Smokeless tobacco: Never Used   Substance Use Topics     Alcohol use: Not Currently     Comment: 3-4 a week     Family History   Problem Relation Age of Onset     Heart Disease Father      Prostate Cancer Other      Prostate Cancer Brother      Colon Cancer No family hx of              Reviewed and updated as needed this visit by Provider  Tobacco  Allergies  Meds  Problems  Med Hx  Surg Hx  Fam Hx         Review of Systems   ROS COMP: Constitutional, HEENT, cardiovascular, pulmonary, gi and gu systems are negative, except as otherwise noted.      Objective    /60   Pulse 90   Temp 97.5  F (36.4  C) (Oral)   Wt 78.5 kg (173 lb)   SpO2 98%   BMI 23.46 kg/m    Body mass index is 23.46 kg/m .  Physical Exam   GENERAL: healthy, alert and no distress  EYES: Eyes grossly normal to inspection, PERRL and conjunctivae and sclerae normal  HENT: normal cephalic/atraumatic, ear canals and TM's normal, nose and mouth without ulcers or lesions, rhinorrhea clear and oropharynx clear  NECK: no adenopathy and no asymmetry, masses, or scars  RESP: lungs clear to auscultation - no rales, rhonchi or wheezes  CV: regular rate and rhythm, normal S1 S2, no S3 or S4, no murmur, click or rub, no peripheral edema and peripheral pulses strong  MS: no gross musculoskeletal defects noted, no edema  PSYCH: mentation appears normal, affect normal/bright    Diagnostic Test Results:  none         Assessment & Plan     1. Cough:with prolonged cough, discussed possible bacterial etiology - start azithromycin. Continue albuterol inhaler as needed. If not improving after antibiotic, would trial Singulair vs over the counter antihistamine to treat for potential allergies given rhinorrhea, watery eye discharge, cough.   - azithromycin (ZITHROMAX) 250 MG tablet; Two tablets first day, then  one tablet daily for four days.  Dispense: 6 tablet; Refill: 0       Return in about 1 week (around 10/30/2019) for follow up if symptoms not improving.    Donald Shell,   [facility]

## 2019-10-23 NOTE — TELEPHONE ENCOUNTER
Pt calling to report constant cough and rattle in throat. Worse when lying flat. Slight clear phlegm. Denies cold sx or fever. CT on 10/21/19 unchanged.   Paged to Dr Muller. To see PCP or provider at [facility] today. Pt notified and requested he call back if unable to see someone today.

## 2019-10-29 NOTE — PROGRESS NOTES
[facility]  [facility]    FOLLOW-UP VISIT NOTE    PATIENT NAME: Kentrell Kirkpatrick MRN # [id number]  DATE OF VISIT: October 29, 2019 YOB: 1950    CANCER TYPE: NSCLC, adenocarcinoma  STAGE: IV  ECOG PS: 2    Cancer Staging  Non-small cell lung cancer, left (H)  Staging form: Lung, AJCC 8th Edition  - Clinical stage from 3/11/2019: Stage IV (cT1c, cN3, pM1c) - Signed by Susan Muller MD on 3/11/2019    PD-L1: <1%  Lung panel: 3/1/19 on [id number] A1 and [id number] A1. Negative  NGS: [employer] 360 sent 2/28/19 negative for actionable mutations. SMAD4 E538, TP53 H179R, SMO A327G. MSI not high    SUMMARY  6/27/18 CT chest w/contrast to re-evaluate aortic aneurysm: 8 mm spiculated KEATON nodule, 3 mm RML nodule unchanged from 3/15/17 and 2/25/16 scans  2/4/19 Presented to PCP with fairly rapid onset of shortness of breath. Given albuterol  2/19/18 CXR and CT chest w/contrast. Large left effusion  2/20/19 L thoracentesis (Dr. Rodriguez), 1180 cc  3/1/19 L pleurx (Dr. Rodriguez)  3/13/19 C1 carboplatin pemetrexed pembrolizumab  3/15/19 L supraclavicular LN bx (IR, FNA). Path: lung adenocarcinoma  4/3/19 C2 carboplatin pemetrexed pembrolizumab (total 4 cycles)  4/15/19 FEV1 2.11 (61%), FVC 3.38 (73%), DLCO 27.7, 67% (59%)  4/18/19 TPA. Drained 900 cc after it got unclotted  5/9/19 Pleurx removed  6/5~8/28/19 Maintenance pemetrexed + pembrolizumab x 5 cycles --> PD  9/20/19 C1 docetaxel 60 mg/m2 + ramucirumab  9/28/19 UC for bronchitis. Given levofloxacin  10/4/19 Brain MRI - routine rescreening - negative  10/11/19 C2 docetaxel + ramucirumab  10/17/19 Port  10/23/19 Recurrent cough. Saw PCP. Azithromycin    SUBJECTIVE  Mr. Kirkpatrick returns today for follow up of metastatic lung adenocarcinoma after 2 cycles of docetaxel + ramucirumab.     Rattle when lying down.  Chest cold persists - has been a month. No improvement with abx given by PCP.   More tired  More winded - walks one flight of steps and is  tired. Similar to when he was diagnosed.   No hemoptysis.   No HA, fevers, chills, N/V, constipation, diarrhea, numbness/tingling, leg swelling. Food not tasting good and less hungry than usual.     PAST MEDICAL HISTORY  Lung adenocarcinoma as above  L5 disk herniation. Epidural injection 5/22/18. Improved dramatically with chiropracter in the past.   BPH  Ascending aortic aneurysm    CURRENT OUTPATIENT MEDICATIONS  Current Outpatient Medications   Medication Sig Dispense Refill     albuterol (PROAIR HFA/PROVENTIL HFA/VENTOLIN HFA) 108 (90 Base) MCG/ACT inhaler Inhale 2 puffs into the lungs every 4 hours (while awake) 1 Inhaler 0     albuterol (PROVENTIL) (2.5 MG/3ML) 0.083% neb solution Take 1 vial (2.5 mg) by nebulization every 4 hours as needed for shortness of breath / dyspnea or wheezing 100 vial 11     calcium polycarbophil (FIBERCON) 625 MG tablet Take 2 tablets by mouth daily       dexamethasone (DECADRON) 4 MG tablet TAKE 1 TABLET (4 MG) BY MOUTH 2 TIMES DAILY TO BE TAKEN DAY BEFORE, DAY OF, AND DAY AFTER INFUSION.. 6 tablet 11     finasteride (PROSCAR) 5 MG tablet TAKE 1 TABLET BY MOUTH EVERY DAY 90 tablet 1     guaiFENesin-codeine (GUAIFENESIN AC) 100-10 MG/5ML syrup Take 5 mLs by mouth every 4 hours as needed for congestion 236 mL 3     lidocaine-prilocaine (EMLA) 2.5-2.5 % external cream Apply topically as needed for moderate pain 30 g 11     Melatonin 10 MG TABS tablet Take 10 mg by mouth nightly as needed for sleep       MULTI VITAMIN MENS OR TABS 1 TABLET DAILY       predniSONE (DELTASONE) 10 MG tablet Take 1 tablet (10 mg) by mouth 2 times daily 100 tablet 1     ranitidine (ZANTAC) 75 MG tablet Take 75 mg by mouth as needed for heartburn       tamsulosin (FLOMAX) 0.4 MG capsule Take 1 capsule (0.4 mg) by mouth daily 90 capsule 1     tiotropium (SPIRIVA HANDIHALER) 18 MCG inhaled capsule Inhale 1 capsule (18 mcg) into the lungs daily 1 capsule 11     LORazepam (ATIVAN) 0.5 MG tablet Take 1 tablet  (0.5 mg) by mouth every 6 hours as needed (Nausea/Vomiting) (Patient not taking: Reported on 10/23/2019) 30 tablet 3     ondansetron (ZOFRAN) 8 MG tablet Take 1 tablet (8 mg) by mouth every 8 hours as needed for nausea (Patient not taking: Reported on 10/23/2019) 30 tablet 11     prochlorperazine (COMPAZINE) 10 MG tablet Take 1 tablet (10 mg) by mouth every 6 hours as needed (Nausea/Vomiting) (Patient not taking: Reported on 10/23/2019) 30 tablet 11     ALLERGIES  No Known Allergies    REVIEW OF SYSTEMS  As above in the HPI, o/w complete 12-point ROS was negative.    PHYSICAL EXAM  /71   Pulse 75   Resp 16   Wt 79.8 kg (176 lb)   SpO2 95%   BMI 23.87 kg/m    Wt Readings from Last 3 Encounters:   10/23/19 78.5 kg (173 lb)   10/11/19 80.6 kg (177 lb 11.2 oz)   10/08/19 78.9 kg (174 lb)     GEN: NAD  HEENT: EOMI, no icterus, injection or pallor. Oropharynx clear - no thrush. Some mucus stuck on the posterior pharynx  NECK: no cervical or supraclavicular lymphadenopathy  LUNGS: clear bilaterally. No wheeze, no fine crackles  CV: regular, no murmurs, rubs, or gallops  ABDOMEN: soft, non-tender, non-distended, normal bowel sounds  EXT: warm, no edema  NEURO: alert  SKIN: no rashes    LABORATORY AND IMAGING STUDIES  No labs today - will be done next week. Labs between last visit with me and today were all reviewed.    CT Chest/Abdomen/Pelvis w Contrast  Narrative: CT CHEST/ABDOMEN/PELVIS WITH CONTRAST   10/21/2019 10:18 AM     HISTORY:  Restage NScLC, adenocarcinoma, restaging after 2 cycles of  ramucirumab + docetaxel. Non-small cell lung cancer, left (H).  Malignant neoplasm of lower lobe of left lung (H).    TECHNIQUE:  86 mL [id number]. CT images of the chest, abdomen, and  pelvis after nonionic intravenous contrast. Radiation dose for this  scan was reduced using automated exposure control, adjustment of the  mA and/or kV according to patient size, or iterative reconstruction  technique.    COMPARISON:  9/19/2019.    FINDINGS:   Chest: There is a right-sided Port-A-Cath with tip in the distal SVC.  The lungs are emphysematous. Small left pleural effusion is decreased  in size compared to previous. No right pleural effusion or pericardial  effusion.    Pleural-based nodule in the posterior medial left upper lobe measures  1.1 cm (series 6, image 71), unchanged. Scarring versus chronic  atelectasis in the lingula is unchanged. Numerous nodular densities  along the left major fissure again noted and not significantly  changed. Minor dependent airspace and groundglass opacities noted in  both lower lobes.    Abnormally enlarged lymph node anterior and left of the main pulmonary  artery measures 3.5 x 1.4 cm (series 2, image 31), similar to prior.  Other enlarged mediastinal lymph nodes are stable. No enlarged hilar  or axillary lymph nodes. Thyroid gland is unremarkable.    Abdomen and pelvis: Hypodense right-sided liver lesion measures 0.9 cm  (series 2, image 70), previously 1.4 cm. Another low-density lesion in  segment IV of the liver measures 0.9 cm (series 2, image 66),  previously 1.2 cm. Other liver lesions also noted, stable to slightly  smaller than prior.    The gallbladder, spleen, pancreas, and right adrenal gland are  unremarkable. Left adrenal gland measures 2.0 x 1.4 cm (series 2,  image 64), similar to prior. The kidneys appear normal.    There is colonic diverticulosis without diverticulitis. No bowel  obstruction, free air, or ascites. No enlarged abdominal or  retroperitoneal lymph nodes. No pelvic adenopathy, free fluid, or  mass.    No suspicious bone lesions.  Impression: IMPRESSION:  1. New left upper lobe pulmonary nodule on the prior study is  unchanged.  2. Small left pleural effusion has decreased slightly in size.  3. Nonspecific patchy airspace opacities in the dependent lower lobes  likely represent atelectasis.  4. Mediastinal adenopathy is not significantly changed.  5. Hypodense liver  metastases are stable to slightly smaller compared  to prior.  6. Left adrenal nodule is unchanged.  7. No evidence of progressive metastatic disease.    JOSHUA BARGER MD     Imaging was personally reviewed      ASSESSMENT AND PLAN  NSCLC, adenocarcinoma: SD after 2 cycles of docetaxel + ramucirumab. Has been pretty tough, mostly driven by the increased cough and congestion. Will delay chemo a week. Reassess next week. See below. Would continue 2 more cycles and restage with CT CAP. Doing treatments at [facility]. Talked about goals a little bit today - he really wants me to tell him this will all clear up and he'll be fine, which we've discussed several times is not the case, unfortunately. His wife suggested that he get in to see Palliative Care - had been scheduled but canceled due a conflict with another appt and wasn't rescheduled.     Cough, congestion: I'm a little worried about pneumonitis from pembro but no evidence of such on CT. It would be quite early to be pneumonitis from docetaxel but I suppose it's possible that the pembro primed his immune system somehow. Could also just be a prolonged URI leading to mild COPD. I asked him to take prednisone 40 mg once today, then 20 mg daily x 5 days, then 10 mg daily x 5 days. At that point, he'll be seen in our clinic again and we can decide if we need to continue the taper or can stop, depending on sxs. Gave another script for tessalon and also gave script for guaifenesin with codeine. Have also asked him to see Pulmonologist to help figure out etiology and recommend symptom management and optimization of COPD regimen.     Allodynia: Still present. Purely sensory. Not much better. Will monitor a bit longer and refer to Neurology if persists. Doubt paraneoplastic process but wonder if it might be due somehow to pembro.    L malignant pleural effusion: Some trapped lung, no reaccumulation thus far after pleurx removed.    Contrast reaction: Methylpred premeds.  Ok to take even if on pred at the time of the next scan as they're relatively low doses and better to stay in the habit than forget in the future.     COPD: Tiotropium and albuterol inhaler. Didn't find albuterol nebs helpful. Asked him to try again with the recurrent cough. Continue tiotropium. Sent script. Prednisone as above will treat any bronchitis/exacerbation as well.     Insomnia: Seems to be doing ok. He's still not interested in a sleep medicine consult. Still taking melatonin prn. No longer taking quetiapine.    Access: Got port. Gave script for emla cream.     Dysgeusia and anorexia: worse again    Social: Plans to travel to the [place] in [place] next summer    Extravasation: 10/11. Scar healed.     A total of 40 minutes was spent with the patient, >50% of which was spent in counseling and coordination of care.    Susan Muller MD    Hematology, Oncology and Transplantation

## 2019-10-29 NOTE — TELEPHONE ENCOUNTER
benzonatate (TESSALON) 200 MG capsule       Last Written Prescription Date:  10/8/2019  Last Fill Quantity: 21 capsule,   # refills: 0  Last Office Visit: 10/23/2019 Sumaya    Future Office visit:    Next 5 appointments (look out 90 days)    Nov 06, 2019  8:00 AM CST  Return Visit with Trinidad Benjamin PA-C  [facility] ([facility]) [facility]  [id number] [place] [address] [address]  [facility] [address]  [phone]   Nov 27, 2019  1:30 PM CST  Return Visit with Trinidad Benjamin PA-C  [facility] ([facility]) [facility]  [address]  [facility] [address]  [phone]           Routing refill request to provider for review/approval because:  Drug not on the [facility], P or [facility] refill protocol or controlled substance

## 2019-10-29 NOTE — NURSING NOTE
Oncology Rooming Note    October 29, 2019 8:34 AM   Kentrell Kirkpatrick is a 68 year old male who presents for:    Chief Complaint   Patient presents with     Oncology Clinic Visit     Return; Lung Ca     Initial Vitals: /71   Pulse 75   Resp 16   Wt 79.8 kg (176 lb)   SpO2 95%   BMI 23.87 kg/m   Estimated body mass index is 23.87 kg/m  as calculated from the following:    Height as of 10/11/19: 1.829 m (6').    Weight as of this encounter: 79.8 kg (176 lb). Body surface area is 2.01 meters squared.  No Pain (0) Comment: Data Unavailable   No LMP for male patient.  Allergies reviewed: Yes   Medications reviewed: Yes    Medications: Medication refills not needed today.  Pharmacy name entered into [email or web address]: [id number] - [place] - [address]    Clinical concerns: Patient states there are no new concerns to discuss with provider.  Dr Muller was not notified.         Josefina Walters CMA

## 2019-10-29 NOTE — LETTER
10/29/2019       RE: Kentrell Kirkpatrick  [address] [address] [facility] [address]     Dear Colleague,    Thank you for referring your patient, Kentrell Kirkpatrick, to the [facility]. Please see a copy of my visit note below.    [facility]    FOLLOW-UP VISIT NOTE    PATIENT NAME: Kentrell Kirkpatrick MRN # [id number]  DATE OF VISIT: October 29, 2019 YOB: 1950    CANCER TYPE: NSCLC, adenocarcinoma  STAGE: IV  ECOG PS: 2    Cancer Staging  Non-small cell lung cancer, left (H)  Staging form: Lung, AJCC 8th Edition  - Clinical stage from 3/11/2019: Stage IV (cT1c, cN3, pM1c) - Signed by Susan Muller MD on 3/11/2019    PD-L1: <1%  Lung panel: 3/1/19 on [id number] A1 and [id number] A1. Negative  NGS: [employer] 360 sent 2/28/19 negative for actionable mutations. SMAD4 E538, TP53 H179R, SMO A327G. MSI not high    SUMMARY  6/27/18 CT chest w/contrast to re-evaluate aortic aneurysm: 8 mm spiculated KEATON nodule, 3 mm RML nodule unchanged from 3/15/17 and 2/25/16 scans  2/4/19 Presented to PCP with fairly rapid onset of shortness of breath. Given albuterol  2/19/18 CXR and CT chest w/contrast. Large left effusion  2/20/19 L thoracentesis (Dr. Rodriguez), 1180 cc  3/1/19 L pleurx (Dr. Rodriguez)  3/13/19 C1 carboplatin pemetrexed pembrolizumab  3/15/19 L supraclavicular LN bx (IR, FNA). Path: lung adenocarcinoma  4/3/19 C2 carboplatin pemetrexed pembrolizumab (total 4 cycles)  4/15/19 FEV1 2.11 (61%), FVC 3.38 (73%), DLCO 27.7, 67% (59%)  4/18/19 TPA. Drained 900 cc after it got unclotted  5/9/19 Pleurx removed  6/5~8/28/19 Maintenance pemetrexed + pembrolizumab x 5 cycles --> PD  9/20/19 C1 docetaxel 60 mg/m2 + ramucirumab  9/28/19 UC for bronchitis. Given levofloxacin  10/4/19 Brain MRI - routine rescreening - negative  10/11/19 C2 docetaxel + ramucirumab  10/17/19 Port  10/23/19 Recurrent cough. Saw PCP. Azithromycin    SUBJECTIVE  Mr. Kirkpatrick returns today for follow  up of metastatic lung adenocarcinoma after 2 cycles of docetaxel + ramucirumab.     Rattle when lying down.  Chest cold persists - has been a month. No improvement with abx given by PCP.   More tired  More winded - walks one flight of steps and is tired. Similar to when he was diagnosed.   No hemoptysis.   No HA, fevers, chills, N/V, constipation, diarrhea, numbness/tingling, leg swelling. Food not tasting good and less hungry than usual.     PAST MEDICAL HISTORY  Lung adenocarcinoma as above  L5 disk herniation. Epidural injection 5/22/18. Improved dramatically with chiropracter in the past.   BPH  Ascending aortic aneurysm    CURRENT OUTPATIENT MEDICATIONS  Current Outpatient Medications   Medication Sig Dispense Refill     albuterol (PROAIR HFA/PROVENTIL HFA/VENTOLIN HFA) 108 (90 Base) MCG/ACT inhaler Inhale 2 puffs into the lungs every 4 hours (while awake) 1 Inhaler 0     albuterol (PROVENTIL) (2.5 MG/3ML) 0.083% neb solution Take 1 vial (2.5 mg) by nebulization every 4 hours as needed for shortness of breath / dyspnea or wheezing 100 vial 11     calcium polycarbophil (FIBERCON) 625 MG tablet Take 2 tablets by mouth daily       dexamethasone (DECADRON) 4 MG tablet TAKE 1 TABLET (4 MG) BY MOUTH 2 TIMES DAILY TO BE TAKEN DAY BEFORE, DAY OF, AND DAY AFTER INFUSION.. 6 tablet 11     finasteride (PROSCAR) 5 MG tablet TAKE 1 TABLET BY MOUTH EVERY DAY 90 tablet 1     guaiFENesin-codeine (GUAIFENESIN AC) 100-10 MG/5ML syrup Take 5 mLs by mouth every 4 hours as needed for congestion 236 mL 3     lidocaine-prilocaine (EMLA) 2.5-2.5 % external cream Apply topically as needed for moderate pain 30 g 11     Melatonin 10 MG TABS tablet Take 10 mg by mouth nightly as needed for sleep       MULTI VITAMIN MENS OR TABS 1 TABLET DAILY       predniSONE (DELTASONE) 10 MG tablet Take 1 tablet (10 mg) by mouth 2 times daily 100 tablet 1     ranitidine (ZANTAC) 75 MG tablet Take 75 mg by mouth as needed for heartburn       tamsulosin  (FLOMAX) 0.4 MG capsule Take 1 capsule (0.4 mg) by mouth daily 90 capsule 1     tiotropium (SPIRIVA HANDIHALER) 18 MCG inhaled capsule Inhale 1 capsule (18 mcg) into the lungs daily 1 capsule 11     LORazepam (ATIVAN) 0.5 MG tablet Take 1 tablet (0.5 mg) by mouth every 6 hours as needed (Nausea/Vomiting) (Patient not taking: Reported on 10/23/2019) 30 tablet 3     ondansetron (ZOFRAN) 8 MG tablet Take 1 tablet (8 mg) by mouth every 8 hours as needed for nausea (Patient not taking: Reported on 10/23/2019) 30 tablet 11     prochlorperazine (COMPAZINE) 10 MG tablet Take 1 tablet (10 mg) by mouth every 6 hours as needed (Nausea/Vomiting) (Patient not taking: Reported on 10/23/2019) 30 tablet 11     ALLERGIES  No Known Allergies    REVIEW OF SYSTEMS  As above in the HPI, o/w complete 12-point ROS was negative.    PHYSICAL EXAM  /71   Pulse 75   Resp 16   Wt 79.8 kg (176 lb)   SpO2 95%   BMI 23.87 kg/m     Wt Readings from Last 3 Encounters:   10/23/19 78.5 kg (173 lb)   10/11/19 80.6 kg (177 lb 11.2 oz)   10/08/19 78.9 kg (174 lb)     GEN: NAD  HEENT: EOMI, no icterus, injection or pallor. Oropharynx clear - no thrush. Some mucus stuck on the posterior pharynx  NECK: no cervical or supraclavicular lymphadenopathy  LUNGS: clear bilaterally. No wheeze, no fine crackles  CV: regular, no murmurs, rubs, or gallops  ABDOMEN: soft, non-tender, non-distended, normal bowel sounds  EXT: warm, no edema  NEURO: alert  SKIN: no rashes    LABORATORY AND IMAGING STUDIES  No labs today - will be done next week. Labs between last visit with me and today were all reviewed.    CT Chest/Abdomen/Pelvis w Contrast  Narrative: CT CHEST/ABDOMEN/PELVIS WITH CONTRAST   10/21/2019 10:18 AM     HISTORY:  Restage NScLC, adenocarcinoma, restaging after 2 cycles of  ramucirumab + docetaxel. Non-small cell lung cancer, left (H).  Malignant neoplasm of lower lobe of left lung (H).    TECHNIQUE:  86 mL Isovue-370. CT images of the chest,  abdomen, and  pelvis after nonionic intravenous contrast. Radiation dose for this  scan was reduced using automated exposure control, adjustment of the  mA and/or kV according to patient size, or iterative reconstruction  technique.    COMPARISON: 9/19/2019.    FINDINGS:   Chest: There is a right-sided Port-A-Cath with tip in the distal SVC.  The lungs are emphysematous. Small left pleural effusion is decreased  in size compared to previous. No right pleural effusion or pericardial  effusion.    Pleural-based nodule in the posterior medial left upper lobe measures  1.1 cm (series 6, image 71), unchanged. Scarring versus chronic  atelectasis in the lingula is unchanged. Numerous nodular densities  along the left major fissure again noted and not significantly  changed. Minor dependent airspace and groundglass opacities noted in  both lower lobes.    Abnormally enlarged lymph node anterior and left of the main pulmonary  artery measures 3.5 x 1.4 cm (series 2, image 31), similar to prior.  Other enlarged mediastinal lymph nodes are stable. No enlarged hilar  or axillary lymph nodes. Thyroid gland is unremarkable.    Abdomen and pelvis: Hypodense right-sided liver lesion measures 0.9 cm  (series 2, image 70), previously 1.4 cm. Another low-density lesion in  segment IV of the liver measures 0.9 cm (series 2, image 66),  previously 1.2 cm. Other liver lesions also noted, stable to slightly  smaller than prior.    The gallbladder, spleen, pancreas, and right adrenal gland are  unremarkable. Left adrenal gland measures 2.0 x 1.4 cm (series 2,  image 64), similar to prior. The kidneys appear normal.    There is colonic diverticulosis without diverticulitis. No bowel  obstruction, free air, or ascites. No enlarged abdominal or  retroperitoneal lymph nodes. No pelvic adenopathy, free fluid, or  mass.    No suspicious bone lesions.  Impression: IMPRESSION:  1. New left upper lobe pulmonary nodule on the prior study  is  unchanged.  2. Small left pleural effusion has decreased slightly in size.  3. Nonspecific patchy airspace opacities in the dependent lower lobes  likely represent atelectasis.  4. Mediastinal adenopathy is not significantly changed.  5. Hypodense liver metastases are stable to slightly smaller compared  to prior.  6. Left adrenal nodule is unchanged.  7. No evidence of progressive metastatic disease.    JOSHUA BARGER MD     Imaging was personally reviewed      ASSESSMENT AND PLAN  NSCLC, adenocarcinoma: SD after 2 cycles of docetaxel + ramucirumab. Has been pretty tough, mostly driven by the increased cough and congestion. Will delay chemo a week. Reassess next week. See below. Would continue 2 more cycles and restage with CT CAP. Doing [facility]. Talked about goals a little bit today - he really wants me to tell him this will all clear up and he'll be fine, which we've discussed several times is not the case, unfortunately. His wife suggested that he get in to see Palliative Care - had been scheduled but canceled due a conflict with another appt and wasn't rescheduled.     Cough, congestion: I'm a little worried about pneumonitis from pembro but no evidence of such on CT. It would be quite early to be pneumonitis from docetaxel but I suppose it's possible that the pembro primed his immune system somehow. Could also just be a prolonged URI leading to mild COPD. I asked him to take prednisone 40 mg once today, then 20 mg daily x 5 days, then 10 mg daily x 5 days. At that point, he'll be seen in our clinic again and we can decide if we need to continue the taper or can stop, depending on sxs. Gave another script for tessalon and also gave script for guaifenesin with codeine. Have also asked him to see Pulmonologist to help figure out etiology and recommend symptom management and optimization of COPD regimen.     Allodynia: Still present. Purely sensory. Not much better. Will monitor a bit longer  and refer to Neurology if persists. Doubt paraneoplastic process but wonder if it might be due somehow to pembro.    L malignant pleural effusion: Some trapped lung, no reaccumulation thus far after pleurx removed.    Contrast reaction: Methylpred premeds. Ok to take even if on pred at the time of the next scan as they're relatively low doses and better to stay in the habit than forget in the future.     COPD: Tiotropium and albuterol inhaler. Didn't find albuterol nebs helpful. Asked him to try again with the recurrent cough. Continue tiotropium. Sent script. Prednisone as above will treat any bronchitis/exacerbation as well.     Insomnia: Seems to be doing ok. He's still not interested in a sleep medicine consult. Still taking melatonin prn. No longer taking quetiapine.    Access: Got port. Gave script for emla cream.     Dysgeusia and anorexia: worse again    Social: Plans to travel to the [place] in [place] next summer    Extravasation: 10/11. Scar healed.     A total of 40 minutes was spent with the patient, >50% of which was spent in counseling and coordination of care.    Susan Muller MD    Hematology, Oncology and Transplantation

## 2019-11-01 NOTE — TELEPHONE ENCOUNTER
"ONCOLOGY INTAKE: Records Information      APPT INFORMATION: 11/11/19 - [facility] - [facility]  Referring provider:  Yohana  [facility]  Reason for visit/diagnosis:  Non-small cell lung cancer, left (H) [C34.92]  Has patient been notified of appointment date and time?: Yes    RECORDS INFORMATION:  Were the records received with the referral (via [employer])? Internal referral    Has patient been seen for any external appt for this diagnosis? No    If yes, where? NA    Has patient had any imaging or procedures outside of [facility] for this condition? No      If Yes, where? NA    ADDITIONAL INFORMATION:  Scheduled via inbasket request from Shaunna/Dr Muller ref to Michael, Bill or [facility] specifically  I called pt to schedule - he is \"pretty sure this date/time will work\" but will call me back to confirm for sure    "

## 2019-11-04 NOTE — TELEPHONE ENCOUNTER
RECORDS STATUS - ALL OTHER DIAGNOSIS      RECORDS RECEIVED FROM: Epic/CE   DATE RECEIVED: 11/11/2019    NOTES STATUS DETAILS   OFFICE NOTE from referring provider Sienna Muller   OFFICE NOTE from medical oncologist Complete EPIC   DISCHARGE SUMMARY from hospital Complete EPIC   DISCHARGE REPORT from the ER N/A    OPERATIVE REPORT Complete EPIC   MEDICATION LIST Complete [facility]   CLINICAL TRIAL TREATMENTS TO DATE N/A    LABS     PATHOLOGY REPORTS Complete [facility]   ANYTHING RELATED TO DIAGNOSIS Complete EPIC   GENONOMIC TESTING     TYPE:     IMAGING (NEED IMAGES & REPORT)     Xray Chest Complete PACS   CT SCANS Complete PACS   MRI Complete PACS   MAMMO     ULTRASOUND     PET

## 2019-11-05 NOTE — PROGRESS NOTES
Oncology/Hematology Visit Note    Nov 6, 2019    Reason for visit: Follow-up Stage IV NSCLC    Oncology HPI: Kentrell Kirkpatrick is a 68 year old male with NSCLC.  Patient with a history of aortic aneurysm and CT chest to reevaluate this aneurysm was obtained in June 2018 and there were several lung nodules.  If further imaging in February 2019 showed a large left pleural effusion and left thoracentesis was performed.  Pathology revealed adenocarcinoma.  He established care with Dr. Muller on 2/28/2019 and Pleurx catheter was placed. He completed 4 cycles of carboplatin, pemetrexed, pembrolizumab and 4th cycle completed on 3/13/2019.  He then completed 5 cycles of maintenance pemetrexed and pembrolizumab, last one completed on 8/28/2019, however noted to have progression of disease.  He started second line Taxotere and ramucirumab, cycle 1 on 9/20/2019.  He was in the urgent care on 9/28/2019 for cough.  He is discharged with albuterol, Robitussin and Levaquin.    He is here today for Taxotere/ramucirumab cycle 2.     Interval History: Edy is here with his wife, Audrey.  He continues to do fairly well.  He was seen last week with persistent cough and concern for possible pneumonitis and started on prednisone pills he currently on is on a slow taper and has a few days left of his 10 mg.  He is scheduled to see her pulmonologist next week.  He denies fever, chills, nausea, vomiting, diarrhea, urinary symptoms, rash, bleeding.  No other complaints.    Review of Systems: See interval hx. Denies fevers, chills, HA, dizziness, n/t, changes in vision, sore throat, CP, abdominal pain, N/V, diarrhea, changes in urination, bleeding, bruising, rash.      PMHx and Social Hx reviewed per EPIC.      Medications:  Current Outpatient Medications   Medication Sig Dispense Refill     albuterol (PROAIR HFA/PROVENTIL HFA/VENTOLIN HFA) 108 (90 Base) MCG/ACT inhaler Inhale 2 puffs into the lungs every 4 hours (while awake) 1 Inhaler 0      albuterol (PROVENTIL) (2.5 MG/3ML) 0.083% neb solution Take 1 vial (2.5 mg) by nebulization every 4 hours as needed for shortness of breath / dyspnea or wheezing 100 vial 11     benzonatate (TESSALON) 200 MG capsule TAKE 1 CAPSULE (200 MG) BY MOUTH 3 TIMES DAILY AS NEEDED FOR COUGH 21 capsule 0     calcium polycarbophil (FIBERCON) 625 MG tablet Take 2 tablets by mouth daily       dexamethasone (DECADRON) 4 MG tablet TAKE 1 TABLET (4 MG) BY MOUTH 2 TIMES DAILY TO BE TAKEN DAY BEFORE, DAY OF, AND DAY AFTER INFUSION.. 6 tablet 11     finasteride (PROSCAR) 5 MG tablet TAKE 1 TABLET BY MOUTH EVERY DAY 90 tablet 1     guaiFENesin-codeine (GUAIFENESIN AC) 100-10 MG/5ML syrup Take 5 mLs by mouth every 4 hours as needed for congestion 236 mL 3     lidocaine-prilocaine (EMLA) 2.5-2.5 % external cream Apply topically as needed for moderate pain 30 g 11     LORazepam (ATIVAN) 0.5 MG tablet Take 1 tablet (0.5 mg) by mouth every 6 hours as needed (Nausea/Vomiting) 30 tablet 3     Melatonin 10 MG TABS tablet Take 10 mg by mouth nightly as needed for sleep       MULTI VITAMIN MENS OR TABS 1 TABLET DAILY       ondansetron (ZOFRAN) 8 MG tablet Take 1 tablet (8 mg) by mouth every 8 hours as needed for nausea 30 tablet 11     predniSONE (DELTASONE) 10 MG tablet Take 1 tablet (10 mg) by mouth 2 times daily 100 tablet 1     prochlorperazine (COMPAZINE) 10 MG tablet Take 1 tablet (10 mg) by mouth every 6 hours as needed (Nausea/Vomiting) 30 tablet 11     ranitidine (ZANTAC) 75 MG tablet Take 75 mg by mouth as needed for heartburn       tamsulosin (FLOMAX) 0.4 MG capsule Take 1 capsule (0.4 mg) by mouth daily 90 capsule 1     tiotropium (SPIRIVA HANDIHALER) 18 MCG inhaled capsule Inhale 1 capsule (18 mcg) into the lungs daily 1 capsule 11       No Known Allergies      EXAM:    /68   Pulse 77   Temp 98  F (36.7  C) (Tympanic)   Resp 16   Ht 1.829 m (6')   Wt 78.9 kg (174 lb)   SpO2 95%   BMI 23.60 kg/m      GENERAL:  Male, in  no acute distress.  Alert and oriented x3.   HEENT:  Normocephalic, atraumatic.  PERRL, oropharynx clear with no sores or thrush.   LYMPH NODES:  No palpable pre/post-auricular, cervical, axillary lymphadenopathy appreciated.  CV:  RRR, No murmurs, gallops, or rubs.   LUNGS:  Clear to auscultation bilaterally.  No wheezing, crackles or respiratory distress.  ABDOMEN:  Soft, nontender and nondistended.  Bowel sounds heard x4.  No apparent hepatosplenomegaly.   EXTREMITIES:  No clubbing, cyanosis, or edema.   SKIN: No rash  PSYCH: Mood stable      Labs:   Results for MAYA ELLER (MRN [id number]) as of 11/6/2019 10:49   11/6/2019 07:38 11/6/2019 07:38 11/6/2019 07:45   Sodium  141    Potassium  4.2    Chloride  109    Carbon Dioxide  28    Urea Nitrogen  17    Creatinine  1.18    GFR Estimate  62    GFR Estimate If Black  72    Calcium  8.6    Anion Gap  4    Albumin 3.6 3.6    Protein Total 6.7 (L) 6.9    Bilirubin Total 0.7 0.8    Alkaline Phosphatase 65 63    ALT 24 23    AST 20 18    Bilirubin Direct 0.2     Glucose  79    WBC 6.9     Hemoglobin 12.2 (L)     Hematocrit 39.3 (L)     Platelet Count 267     RBC Count 4.20 (L)     MCV 94     MCH 29.0     MCHC 31.0 (L)     RDW 16.4 (H)     Diff Method Automated Method     % Neutrophils 62.8     % Lymphocytes 21.5     % Monocytes 11.8     % Eosinophils 2.7     % Basophils 0.9     % Immature Granulocytes 0.3     Nucleated RBCs 0     Absolute Neutrophil 4.4     Absolute Lymphocytes 1.5     Absolute Monocytes 0.8     Absolute Eosinophils 0.2     Absolute Basophils 0.1     Abs Immature Granulocytes 0.0     Absolute Nucleated RBC 0.0     Protein Albumin Urine   Negative       Imaging: n/a    Impression/Plan: Kentrell Eller is a 68 year old male with metastatic NSCLC previously on carboplatin, pemetrexed and pembrolizumab, however progression noted and currently undergoing palliative intent chemotherapy with Taxotere and ramucirumab.    Metastatic NSCLC: Previous therapies  with progression of disease, completed cycle 1 Taxotere/ramucirumab on 9/20/2019.  He tolerated this infusion fairly well, however concern he may have developed pneumonitis from ramucirumab.  He will continue prednisone taper, see below.  I spoke to Dr. Muller and she would recommend CT chest today to evaluate for any pneumonitis.  If no evidence, we will plan for cycle 3 tomorrow and he will continue tapering of the prednisone.  --11/7 cycle 3  --11/27 [place] labs  --11/29 cycle 4  --12/18 CT CAP  --12/20 Dr. Muller, labs, cycle 5    Respiratory:  --Cough: Persistent cough over the last few weeks and concern for pneumonitis from ramucirumab.  Started a prednisone taper by Dr. Muller last week and currently on 10 mg for the next few days.  He is scheduled to see Dr. Paige on 11/11/2019 and I will extend his taper and additional few days until that appointment.  His symptoms are significantly improved since starting prednisone.  See plan above.  --Pleural effusion: Known left malignant pleural effusion and Pleurx drain removed. Known trapped lung, no further reaccumulation.  COPD: Currently with Tiotropium and albuterol inhalers.     Addendum 11/7/2019: Spoke to Dr Muller later yesterday and she wanted a CT chest to evaluate for pneumonitis.  This came back with no evidence of pneumonitis.  I spoke with the patient today and we will have him continue prednisone taper, but start tapering at 5 mg over the next few days until he sees pulmonology on 11/11.  He was describing his cough again today and it sounds like it may be GERD and I spoke with pharmacy.  We will start him on omeprazole 20 mg daily and I hope this will help with some of his symptoms.  He was also instructed to come back his caffeine use, stop alcohol, spicy foods, etc.  They were both in agreement the plan and will call the clinic if any questions or concerns.    Insomnia: Using melatonin when needed, no longer on Seroquel.        Chart  documentation with Dragon Voice recognition Software. Although reviewed after completion, some words and grammatical errors may remain.      Trinidad Canales PA-C  Hematology/Oncology  [facility]

## 2019-11-06 NOTE — PROGRESS NOTES
Nursing Note:  Kentrell Kirkpatrick presents today for Port labs.    Patient seen by provider today: Yes: [place] JAIME   present during visit today: Not Applicable.    Note: Pt is scheduled to return for infusion tomorrow.    Intravenous Access:  Labs drawn without difficulty.  Implanted Port.    Discharge Plan:   Patient was sent to lobby to wait for PA appointment.    Digna Oh RN

## 2019-11-06 NOTE — NURSING NOTE
Oncology Rooming Note    November 6, 2019 8:04 AM   Kentrell Kirkpatrick is a 68 year old male who presents for:    Chief Complaint   Patient presents with     Oncology Clinic Visit     Non-small cell lung cancer, left     Initial Vitals: /68   Pulse 77   Temp 98  F (36.7  C) (Tympanic)   Resp 16   Ht 1.829 m (6')   Wt 78.9 kg (174 lb)   SpO2 95%   BMI 23.60 kg/m   Estimated body mass index is 23.6 kg/m  as calculated from the following:    Height as of this encounter: 1.829 m (6').    Weight as of this encounter: 78.9 kg (174 lb). Body surface area is 2 meters squared.  No Pain (0) Comment: Data Unavailable   No LMP for male patient.  Allergies reviewed: Yes  Medications reviewed: Yes    Medications: Medication refills not needed today.  Pharmacy name entered into IPDIA: [id number] - [place], [place] - [address]    Clinical concerns: follow up       Jasmin Taylor CMA

## 2019-11-06 NOTE — LETTER
11/6/2019         RE: Kentrell Kirkpatrick  [id number] [facility] [id number]        Dear Colleague,    Thank you for referring your patient, Kentrell Kirkpatrick, to the [facility]. Please see a copy of my visit note below.    Oncology/Hematology Visit Note    Nov 6, 2019    Reason for visit: Follow-up Stage IV NSCLC    Oncology HPI: Kentrell Kirkpatrick is a 68 year old male with NSCLC.  Patient with a history of aortic aneurysm and CT chest to reevaluate this aneurysm was obtained in June 2018 and there were several lung nodules.  If further imaging in February 2019 showed a large left pleural effusion and left thoracentesis was performed.  Pathology revealed adenocarcinoma.  He established care with Dr. Muller on 2/28/2019 and Pleurx catheter was placed. He completed 4 cycles of carboplatin, pemetrexed, pembrolizumab and 4th cycle completed on 3/13/2019.  He then completed 5 cycles of maintenance pemetrexed and pembrolizumab, last one completed on 8/28/2019, however noted to have progression of disease.  He started second line Taxotere and ramucirumab, cycle 1 on 9/20/2019.  He was in the urgent care on 9/28/2019 for cough.  He is discharged with albuterol, Robitussin and Levaquin.    He is here today for Taxotere/ramucirumab cycle 2.     Interval History: Edy is here with his wife, Audrey.  He continues to do fairly well.  He was seen last week with persistent cough and concern for possible pneumonitis and started on prednisone pills he currently on is on a slow taper and has a few days left of his 10 mg.  He is scheduled to see her pulmonologist next week.  He denies fever, chills, nausea, vomiting, diarrhea, urinary symptoms, rash, bleeding.  No other complaints.    Review of Systems: See interval hx. Denies fevers, chills, HA, dizziness, n/t, changes in vision, sore throat, CP, abdominal pain, N/V, diarrhea, changes in urination, bleeding, bruising, rash.      PMHx and Social Hx reviewed per  EPIC.      Medications:  Current Outpatient Medications   Medication Sig Dispense Refill     albuterol (PROAIR HFA/PROVENTIL HFA/VENTOLIN HFA) 108 (90 Base) MCG/ACT inhaler Inhale 2 puffs into the lungs every 4 hours (while awake) 1 Inhaler 0     albuterol (PROVENTIL) (2.5 MG/3ML) 0.083% neb solution Take 1 vial (2.5 mg) by nebulization every 4 hours as needed for shortness of breath / dyspnea or wheezing 100 vial 11     benzonatate (TESSALON) 200 MG capsule TAKE 1 CAPSULE (200 MG) BY MOUTH 3 TIMES DAILY AS NEEDED FOR COUGH 21 capsule 0     calcium polycarbophil (FIBERCON) 625 MG tablet Take 2 tablets by mouth daily       dexamethasone (DECADRON) 4 MG tablet TAKE 1 TABLET (4 MG) BY MOUTH 2 TIMES DAILY TO BE TAKEN DAY BEFORE, DAY OF, AND DAY AFTER INFUSION.. 6 tablet 11     finasteride (PROSCAR) 5 MG tablet TAKE 1 TABLET BY MOUTH EVERY DAY 90 tablet 1     guaiFENesin-codeine (GUAIFENESIN AC) 100-10 MG/5ML syrup Take 5 mLs by mouth every 4 hours as needed for congestion 236 mL 3     lidocaine-prilocaine (EMLA) 2.5-2.5 % external cream Apply topically as needed for moderate pain 30 g 11     LORazepam (ATIVAN) 0.5 MG tablet Take 1 tablet (0.5 mg) by mouth every 6 hours as needed (Nausea/Vomiting) 30 tablet 3     Melatonin 10 MG TABS tablet Take 10 mg by mouth nightly as needed for sleep       MULTI VITAMIN MENS OR TABS 1 TABLET DAILY       ondansetron (ZOFRAN) 8 MG tablet Take 1 tablet (8 mg) by mouth every 8 hours as needed for nausea 30 tablet 11     predniSONE (DELTASONE) 10 MG tablet Take 1 tablet (10 mg) by mouth 2 times daily 100 tablet 1     prochlorperazine (COMPAZINE) 10 MG tablet Take 1 tablet (10 mg) by mouth every 6 hours as needed (Nausea/Vomiting) 30 tablet 11     ranitidine (ZANTAC) 75 MG tablet Take 75 mg by mouth as needed for heartburn       tamsulosin (FLOMAX) 0.4 MG capsule Take 1 capsule (0.4 mg) by mouth daily 90 capsule 1     tiotropium (SPIRIVA HANDIHALER) 18 MCG inhaled capsule Inhale 1 capsule  (18 mcg) into the lungs daily 1 capsule 11       No Known Allergies      EXAM:    /68   Pulse 77   Temp 98  F (36.7  C) (Tympanic)   Resp 16   Ht 1.829 m (6')   Wt 78.9 kg (174 lb)   SpO2 95%   BMI 23.60 kg/m       GENERAL:  Male, in no acute distress.  Alert and oriented x3.   HEENT:  Normocephalic, atraumatic.  PERRL, oropharynx clear with no sores or thrush.   LYMPH NODES:  No palpable pre/post-auricular, cervical, axillary lymphadenopathy appreciated.  CV:  RRR, No murmurs, gallops, or rubs.   LUNGS:  Clear to auscultation bilaterally.  No wheezing, crackles or respiratory distress.  ABDOMEN:  Soft, nontender and nondistended.  Bowel sounds heard x4.  No apparent hepatosplenomegaly.   EXTREMITIES:  No clubbing, cyanosis, or edema.   SKIN: No rash  PSYCH: Mood stable      Labs:   Results for MAYA ELLER (MRN [id number]) as of 11/6/2019 10:49   11/6/2019 07:38 11/6/2019 07:38 11/6/2019 07:45   Sodium  141    Potassium  4.2    Chloride  109    Carbon Dioxide  28    Urea Nitrogen  17    Creatinine  1.18    GFR Estimate  62    GFR Estimate If Black  72    Calcium  8.6    Anion Gap  4    Albumin 3.6 3.6    Protein Total 6.7 (L) 6.9    Bilirubin Total 0.7 0.8    Alkaline Phosphatase 65 63    ALT 24 23    AST 20 18    Bilirubin Direct 0.2     Glucose  79    WBC 6.9     Hemoglobin 12.2 (L)     Hematocrit 39.3 (L)     Platelet Count 267     RBC Count 4.20 (L)     MCV 94     MCH 29.0     MCHC 31.0 (L)     RDW 16.4 (H)     Diff Method Automated Method     % Neutrophils 62.8     % Lymphocytes 21.5     % Monocytes 11.8     % Eosinophils 2.7     % Basophils 0.9     % Immature Granulocytes 0.3     Nucleated RBCs 0     Absolute Neutrophil 4.4     Absolute Lymphocytes 1.5     Absolute Monocytes 0.8     Absolute Eosinophils 0.2     Absolute Basophils 0.1     Abs Immature Granulocytes 0.0     Absolute Nucleated RBC 0.0     Protein Albumin Urine   Negative       Imaging: n/a    Impression/Plan: Kentrell Eller is a  68 year old male with metastatic NSCLC previously on carboplatin, pemetrexed and pembrolizumab, however progression noted and currently undergoing palliative intent chemotherapy with Taxotere and ramucirumab.    Metastatic NSCLC: Previous therapies with progression of disease, completed cycle 1 Taxotere/ramucirumab on 9/20/2019.  He tolerated this infusion fairly well, however concern he may have developed pneumonitis from ramucirumab.  He will continue prednisone taper, see below.  I spoke to Dr. Muller and she would recommend CT chest today to evaluate for any pneumonitis.  If no evidence, we will plan for cycle 3 tomorrow and he will continue tapering of the prednisone.  --11/7 cycle 3  --11/27 [facility] labs  --11/29 cycle 4  --12/18 CT CAP  --12/20 Dr. Muller, bob, cycle 5    Respiratory:  --Pneumonitis: Persistent cough over the last few weeks and concern for pneumonitis from ramucirumab.  Started a prednisone taper by Dr. Muller last week and currently on 10 mg for the next few days.  He is scheduled to see Dr. Paige on 11/11/2019 and I will extend his taper and additional few days until that appointment.  His symptoms are significantly improved since starting prednisone.  See plan above.  --Pleural effusion: Known left malignant pleural effusion and Pleurx drain removed. Known trapped lung, no further reaccumulation.  COPD: Currently with Tiotropium and albuterol inhalers.     Insomnia: Using melatonin when needed, no longer on Seroquel.        Chart documentation with Dragon Voice recognition Software. Although reviewed after completion, some words and grammatical errors may remain.      Trinidad Canales PA-C  Hematology/Oncology  [facility]                  Again, thank you for allowing me to participate in the care of your patient.        Sincerely,        Trinidad Canales PA-C

## 2019-11-07 NOTE — TELEPHONE ENCOUNTER
RECORDS STATUS - ALL OTHER DIAGNOSIS      RECORDS RECEIVED FROM: [facility]   DATE RECEIVED: 11/7/19   NOTES STATUS DETAILS   OFFICE NOTE from referring provider [facility]    OFFICE NOTE from medical oncologist [facility] Parker Benjamin - 11/6/19   DISCHARGE SUMMARY from hospital [facility] 10/17/19   DISCHARGE REPORT from the ER NA    PFT/Oximetry Epic 4/15/19, 3/21/19 (Oximetry)   OPERATIVE REPORT [facility] 5/9/19, 3/1/19, 2/20/19   MEDICATION LIST [facility] 10/30/19   CLINICAL TRIAL TREATMENTS TO DATE     LABS     PATHOLOGY REPORTS     ANYTHING RELATED TO DIAGNOSIS     GENONOMIC TESTING     TYPE:     IMAGING (NEED IMAGES & REPORT)     CT SCANS Scheduled 12/16/19 - [facility] PACS: 11/7/19, 10/21/19, 9/19/19, 8/1/19, 5/28/19, 5/7/19, 4/15/19   MRI PACS 10/4/19   MAMMO     ULTRASOUND     PET       
No significant past surgical history

## 2019-11-07 NOTE — PROGRESS NOTES
Infusion Nursing Note:  Kentrell Kirkpatrick presents today for cyramza/taxotere.    Patient seen by provider today: No   present during visit today: Not Applicable.    Note: patient examined by Trinidad on 11/6/19, and quickly reevaluated today by her.    Intravenous Access:  Implanted Port.    Treatment Conditions:  Lab Results   Component Value Date    HGB 12.2 11/06/2019     Lab Results   Component Value Date    WBC 6.9 11/06/2019      Lab Results   Component Value Date    ANEU 4.4 11/06/2019     Lab Results   Component Value Date     11/06/2019      Lab Results   Component Value Date     11/06/2019                   Lab Results   Component Value Date    POTASSIUM 4.2 11/06/2019           No results found for: MAG         Lab Results   Component Value Date    CR 1.18 11/06/2019                   Lab Results   Component Value Date    MALATHI 8.6 11/06/2019                Lab Results   Component Value Date    BILITOTAL 0.7 11/06/2019    BILITOTAL 0.8 11/06/2019           Lab Results   Component Value Date    ALBUMIN 3.6 11/06/2019    ALBUMIN 3.6 11/06/2019                    Lab Results   Component Value Date    ALT 24 11/06/2019    ALT 23 11/06/2019           Lab Results   Component Value Date    AST 20 11/06/2019    AST 18 11/06/2019       Results reviewed, labs MET treatment parameters, ok to proceed with treatment.      Post Infusion Assessment:  Patient tolerated infusion without incident.  Blood return noted pre and post infusion.  Site patent and intact, free from redness, edema or discomfort.  No evidence of extravasations.  Access discontinued per protocol.       Discharge Plan:   Patient declined prescription refills.  Discharge instructions reviewed with: Patient.  Patient and/or family verbalized understanding of discharge instructions and all questions answered.  Copy of AVS reviewed with patient and/or family.  Patient will return 11/11/19 for next appointment.  Patient discharged in  stable condition accompanied by: wife.  Departure Mode: Ambulatory.    Bhavna Holloway RN

## 2019-11-11 NOTE — NURSING NOTE
Oncology Rooming Note    November 11, 2019 11:56 AM   Kentrell Kirkpatrick is a 68 year old male who presents for:    Chief Complaint   Patient presents with     Lung Nodule     New Patient - consult     Initial Vitals: /65   Pulse 88   Temp 97  F (36.1  C) (Tympanic)   Resp 16   Ht 1.829 m (6')   Wt 81.6 kg (180 lb)   SpO2 98%   BMI 24.41 kg/m   Estimated body mass index is 24.41 kg/m  as calculated from the following:    Height as of this encounter: 1.829 m (6').    Weight as of this encounter: 81.6 kg (180 lb). Body surface area is 2.04 meters squared.  No Pain (0) Comment: Data Unavailable   No LMP for male patient.  Allergies reviewed: Yes  Medications reviewed: Yes    Medications: Medication refills not needed today.  Pharmacy name entered into FireStar Software: [id number] - [place] - [address]    Clinical concerns: New Patient consult       Jasmin Taylor, PADMA

## 2019-11-11 NOTE — PATIENT INSTRUCTIONS
I think you had an episode of bronchitis.   You do have chronic obstruction pulmonary disease (COPD).     I would continue Spiriva every day.     If you develop worsening of mucus, we can consider inhaled steroids and over-the-counter antihistamine.     I would use up the acid blocker that you have and then stop (about a month total).     Pt to f/u as needed. No F/U appt scheduled.   Yudy Singh RN on 11/12/2019 at 10:31 AM

## 2019-11-11 NOTE — PROGRESS NOTES
"[facility] Initial Visit    Reason for Visit  Kentrell Kirkpatrick is a 68 year old male who is referred by Dr Muller for cough  Pulmonary HPI    Here today to evaluate cough. Recent CT done to eval for pneumonitis, without much evidence of such. Started prednisone 5 mg as well as acid blocker. Describes \"something hanging\" in the back of the throat, vibrates with breathing.  Much better but still there. Feels like having a chest cold since September. He gets a chest cold every spring and fall and this is different because of the mucus. Has been taking Mucinex without improvement. Never had to take prednisone for chest cold/bronchitis, doesn't recall ever receiving prednisone for any reason.    Has been using Spiriva every morning since PFTs in April 2019. Also noted some shortness of breath that is improved. NO wheezes but some \"rattles.\" Was treated with levofloxacin and azithromycin prior to steroids.  He had a 40-20-10-5 2 week taper and done now.     Other active medical problems include:   - NSCLC favor adenocarcinoma diagnosed Feb 2019 presented with pleural effusion. PleurX placed, subsequently removed  Initially treated with carboplatin, pemetrexed and pembrolizumab --> progression noted   currently undergoing palliative intent chemotherapy with Taxotere and ramucirumab.     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 He has an oral device.       ROS Pulmonary  Dyspnea: No, Cough: Yes, Chest pain: No, Wheezing: No, Sputum Production: Yes, Hemoptysis: No  A complete ROS was otherwise negative except as noted in the HPI.  The patient was seen and examined by Jackelin Paige MD   Current Outpatient Medications   Medication     albuterol (PROAIR HFA/PROVENTIL HFA/VENTOLIN HFA) 108 (90 Base) MCG/ACT inhaler "     albuterol (PROVENTIL) (2.5 MG/3ML) 0.083% neb solution     benzonatate (TESSALON) 200 MG capsule     calcium polycarbophil (FIBERCON) 625 MG tablet     dexamethasone (DECADRON) 4 MG tablet     finasteride (PROSCAR) 5 MG tablet     guaiFENesin-codeine (GUAIFENESIN AC) 100-10 MG/5ML syrup     lidocaine-prilocaine (EMLA) 2.5-2.5 % external cream     LORazepam (ATIVAN) 0.5 MG tablet     Melatonin 10 MG TABS tablet     MULTI VITAMIN MENS OR TABS     ondansetron (ZOFRAN) 8 MG tablet     predniSONE (DELTASONE) 10 MG tablet     prochlorperazine (COMPAZINE) 10 MG tablet     ranitidine (ZANTAC) 75 MG tablet     tamsulosin (FLOMAX) 0.4 MG capsule     tiotropium (SPIRIVA HANDIHALER) 18 MCG inhaled capsule     No current facility-administered medications for this visit.      Facility-Administered Medications Ordered in Other Visits   Medication     iohexol (OMNIPAQUE) 300 mg/mL injection 10 mL     No Known Allergies  Social History     Socioeconomic History     Marital status:      Spouse name: Not on file     Number of children: Not on file     Years of education: Not on file     Highest education level: Not on file   Occupational History     Not on file   Social Needs     Financial resource strain: Not on file     Food insecurity:     Worry: Not on file     Inability: Not on file     Transportation needs:     Medical: Not on file     Non-medical: Not on file   Tobacco Use     Smoking status: Former Smoker     Packs/day: 1.50     Years: 43.00     Pack years: 64.50     Types: Cigarettes     Last attempt to quit: 2007     Years since quittin.9     Smokeless tobacco: Never Used   Substance and Sexual Activity     Alcohol use: Not Currently     Comment: 3-4 a week     Drug use: No     Sexual activity: Yes     Partners: Female   Lifestyle     Physical activity:     Days per week: Not on file     Minutes per session: Not on file     Stress: Not on file   Relationships     Social connections:     Talks on phone:  Not on file     Gets together: Not on file     Attends Restorationism service: Not on file     Active member of club or organization: Not on file     Attends meetings of clubs or organizations: Not on file     Relationship status: Not on file     Intimate partner violence:     Fear of current or ex partner: Not on file     Emotionally abused: Not on file     Physically abused: Not on file     Forced sexual activity: Not on file   Other Topics Concern     Parent/sibling w/ CABG, MI or angioplasty before 65F 55M? Not Asked   Social History Narrative     Not on file     Past Medical History:   Diagnosis Date     Colon polyps      Past Surgical History:   Procedure Laterality Date     COLONOSCOPY       COLONOSCOPY N/A 12/22/2016    Procedure: COMBINED COLONOSCOPY, SINGLE OR MULTIPLE BIOPSY/POLYPECTOMY BY BIOPSY;  Surgeon: Jeremi Kramer MD;  Location: [facility]     INSERT CHEST TUBE Left 3/1/2019    Procedure: INSERT CHEST TUBE;  Surgeon: Matthew Rodriguez MD;  Location: [facility]     INSERT CHEST TUBE N/A 5/9/2019    Procedure: Removal Of Pleurx Catheter;  Surgeon: Matthew Rodriguez MD;  Location: [facility]     IR CHEST PORT PLACEMENT > 5 YRS OF AGE  10/17/2019     IR LYMPH NODE BIOPSY  3/15/2019     THORACENTESIS Left 2/20/2019    Procedure: THORACENTESIS;  Surgeon: Matthew Rodriguez MD;  Location: [facility]     Family History   Problem Relation Age of Onset     Heart Disease Father      Prostate Cancer Other      Prostate Cancer Brother      Colon Cancer No family hx of      Exam:   /65   Pulse 88   Temp 97  F (36.1  C) (Tympanic)   Resp 16   Ht 1.829 m (6')   Wt 81.6 kg (180 lb)   SpO2 98%   BMI 24.41 kg/m    GENERAL APPEARANCE: Well developed, well nourished, alert, and in no apparent distress.  EYES: PERRL, EOMI  HENT: Nasal mucosa with no edema and no hyperemia. No nasal polyps.  EARS: Canals clear, TMs normal  MOUTH: Oral mucosa is moist, without any lesions, no tonsillar enlargement, no oropharyngeal  exudate.  NECK: supple, no masses, no thyromegaly.  LYMPHATICS: No significant axillary, cervical, or supraclavicular nodes.  RESP: normal percussion, good air flow throughout.  No crackles. No rhonchi. No wheezes.  CV: Normal S1, S2, regular rhythm, normal rate. No murmur.  No rub. No gallop. No LE edema.   ABDOMEN:  Bowel sounds normal, soft, nontender, no HSM or masses.   MS: extremities normal. No clubbing. No cyanosis.  SKIN: no rash on limited exam  NEURO: Mentation intact, speech normal, normal strength and tone, normal gait and stance  PSYCH: mentation appears normal. and affect normal/bright  Results:  - My interpretation of the images relevant for this visit includes: CT chest 11/6/19, compared to CT 10/21/19, no interstitial lung abnormalities   - My interpretation of the PFT's relevant for this visit includes: Obstructive pattern     Assessment and plan: Edy is a 69 yo male with lung cancer, cough, COPD  Cough - improved with antibiotics, steroids and PPI. He does have COPD on maintenance bronchodilators (LAMA/LABA). No indication of chronic bronchitis or frequent exacerbations. I think this represented an acute exacerbation/bronchitis episode. We discussed if recurrent, initiating inhaled corticosteroids, not currently indicated. Does not seem to be pneumonitis related to immunotherapy, CT images unremarkable. I suggested after a month he could stop the PPI since the steroids may well have been the beneficial addition.     RTC as needed

## 2019-11-11 NOTE — LETTER
"    11/11/2019         RE: Kentrell Kirkpatrick  [address] [facility] [place] [address]        Dear Colleague,    Thank you for referring your patient, Kentrell Kirkpatrick, to the [facility]. Please see a copy of my visit note below.    [facility] Initial Visit    Reason for Visit  Kentrell Kirkpatrick is a 68 year old male who is referred by Dr Muller for cough  Pulmonary HPI    Here today to evaluate cough. Recent CT done to eval for pneumonitis, without much evidence of such. Started prednisone 5 mg as well as acid blocker. Describes \"something hanging\" in the back of the throat, vibrates with breathing.  Much better but still there. Feels like having a chest cold since September. He gets a chest cold every spring and fall and this is different because of the mucus. Has been taking Mucinex without improvement. Never had to take prednisone for chest cold/bronchitis, doesn't recall ever receiving prednisone for any reason.    Has been using Spiriva every morning since PFTs in April 2019. Also noted some shortness of breath that is improved. NO wheezes but some \"rattles.\" Was treated with levofloxacin and azithromycin prior to steroids.  He had a 40-20-10-5 2 week taper and done now.     Other active medical problems include:   - NSCLC favor adenocarcinoma diagnosed Feb 2019 presented with pleural effusion. PleurX placed, subsequently removed  Initially treated with carboplatin, pemetrexed and pembrolizumab --> progression noted   currently undergoing palliative intent chemotherapy with Taxotere and ramucirumab.     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 He has an oral device.       ROS Pulmonary  Dyspnea: No, Cough: Yes, Chest pain: No, Wheezing: No, Sputum Production: Yes, Hemoptysis: No  A complete " ROS was otherwise negative except as noted in the HPI.  The patient was seen and examined by Jackelin Paige MD   Current Outpatient Medications   Medication     albuterol (PROAIR HFA/PROVENTIL HFA/VENTOLIN HFA) 108 (90 Base) MCG/ACT inhaler     albuterol (PROVENTIL) (2.5 MG/3ML) 0.083% neb solution     benzonatate (TESSALON) 200 MG capsule     calcium polycarbophil (FIBERCON) 625 MG tablet     dexamethasone (DECADRON) 4 MG tablet     finasteride (PROSCAR) 5 MG tablet     guaiFENesin-codeine (GUAIFENESIN AC) 100-10 MG/5ML syrup     lidocaine-prilocaine (EMLA) 2.5-2.5 % external cream     LORazepam (ATIVAN) 0.5 MG tablet     Melatonin 10 MG TABS tablet     MULTI VITAMIN MENS OR TABS     ondansetron (ZOFRAN) 8 MG tablet     predniSONE (DELTASONE) 10 MG tablet     prochlorperazine (COMPAZINE) 10 MG tablet     ranitidine (ZANTAC) 75 MG tablet     tamsulosin (FLOMAX) 0.4 MG capsule     tiotropium (SPIRIVA HANDIHALER) 18 MCG inhaled capsule     No current facility-administered medications for this visit.      Facility-Administered Medications Ordered in Other Visits   Medication     iohexol (OMNIPAQUE) 300 mg/mL injection 10 mL     No Known Allergies  Social History     Socioeconomic History     Marital status:      Spouse name: Not on file     Number of children: Not on file     Years of education: Not on file     Highest education level: Not on file   Occupational History     Not on file   Social Needs     Financial resource strain: Not on file     Food insecurity:     Worry: Not on file     Inability: Not on file     Transportation needs:     Medical: Not on file     Non-medical: Not on file   Tobacco Use     Smoking status: Former Smoker     Packs/day: 1.50     Years: 43.00     Pack years: 64.50     Types: Cigarettes     Last attempt to quit: 2007     Years since quittin.9     Smokeless tobacco: Never Used   Substance and Sexual Activity     Alcohol use: Not Currently     Comment: 3-4 a week      Drug use: No     Sexual activity: Yes     Partners: Female   Lifestyle     Physical activity:     Days per week: Not on file     Minutes per session: Not on file     Stress: Not on file   Relationships     Social connections:     Talks on phone: Not on file     Gets together: Not on file     Attends Bahai service: Not on file     Active member of club or organization: Not on file     Attends meetings of clubs or organizations: Not on file     Relationship status: Not on file     Intimate partner violence:     Fear of current or ex partner: Not on file     Emotionally abused: Not on file     Physically abused: Not on file     Forced sexual activity: Not on file   Other Topics Concern     Parent/sibling w/ CABG, MI or angioplasty before 65F 55M? Not Asked   Social History Narrative     Not on file     Past Medical History:   Diagnosis Date     Colon polyps      Past Surgical History:   Procedure Laterality Date     COLONOSCOPY       COLONOSCOPY N/A 12/22/2016    Procedure: COMBINED COLONOSCOPY, SINGLE OR MULTIPLE BIOPSY/POLYPECTOMY BY BIOPSY;  Surgeon: Jeremi Kramer MD;  Location: [facility]     INSERT CHEST TUBE Left 3/1/2019    Procedure: INSERT CHEST TUBE;  Surgeon: Matthew Rodriguez MD;  Location: [facility]     INSERT CHEST TUBE N/A 5/9/2019    Procedure: Removal Of Pleurx Catheter;  Surgeon: Matthew Rodriguez MD;  Location: [facility]     IR CHEST PORT PLACEMENT > 5 YRS OF AGE  10/17/2019     IR LYMPH NODE BIOPSY  3/15/2019     THORACENTESIS Left 2/20/2019    Procedure: THORACENTESIS;  Surgeon: Matthew Rodriguez MD;  Location: [facility]     Family History   Problem Relation Age of Onset     Heart Disease Father      Prostate Cancer Other      Prostate Cancer Brother      Colon Cancer No family hx of      Exam:   /65   Pulse 88   Temp 97  F (36.1  C) (Tympanic)   Resp 16   Ht 1.829 m (6')   Wt 81.6 kg (180 lb)   SpO2 98%   BMI 24.41 kg/m     GENERAL APPEARANCE: Well developed, well nourished, alert, and in  no apparent distress.  EYES: PERRL, EOMI  HENT: Nasal mucosa with no edema and no hyperemia. No nasal polyps.  EARS: Canals clear, TMs normal  MOUTH: Oral mucosa is moist, without any lesions, no tonsillar enlargement, no oropharyngeal exudate.  NECK: supple, no masses, no thyromegaly.  LYMPHATICS: No significant axillary, cervical, or supraclavicular nodes.  RESP: normal percussion, good air flow throughout.  No crackles. No rhonchi. No wheezes.  CV: Normal S1, S2, regular rhythm, normal rate. No murmur.  No rub. No gallop. No LE edema.   ABDOMEN:  Bowel sounds normal, soft, nontender, no HSM or masses.   MS: extremities normal. No clubbing. No cyanosis.  SKIN: no rash on limited exam  NEURO: Mentation intact, speech normal, normal strength and tone, normal gait and stance  PSYCH: mentation appears normal. and affect normal/bright  Results:  - My interpretation of the images relevant for this visit includes: CT chest 11/6/19, compared to CT 10/21/19, no interstitial lung abnormalities   - My interpretation of the PFT's relevant for this visit includes: Obstructive pattern     Assessment and plan: Edy is a 67 yo male with lung cancer, cough, COPD  Cough - improved with antibiotics, steroids and PPI. He does have COPD on maintenance bronchodilators (LAMA/LABA). No indication of chronic bronchitis or frequent exacerbations. I think this represented an acute exacerbation/bronchitis episode. We discussed if recurrent, initiating inhaled corticosteroids, not currently indicated. Does not seem to be pneumonitis related to immunotherapy, CT images unremarkable. I suggested after a month he could stop the PPI since the steroids may well have been the beneficial addition.     RTC as needed    Again, thank you for allowing me to participate in the care of your patient.        Sincerely,        Jackelin Paige MD

## 2019-11-20 NOTE — PATIENT INSTRUCTIONS
Stable vitals and exam findings.  Offered chest x-ray due to concerns for recurrent URI following chemo versus watchful waiting as no fevers, painful breathing or purulent sputum production.   Pt would like to take watchful waiting approach given symptoms seem most consistent with a viral cold.  He will return with any red flags and imaging can be considered.  May be a side effect of your chemo given same pattern occur after every treatment that then improves on it's own with time.   Discussed also checking back in with pulmonology to see if this would be related to underlying COPD. Pt will plan to review with his wife, but has phone number for pulmonology already.

## 2019-11-20 NOTE — PROGRESS NOTES
Subjective     Kentrell Kirkpatrick is a 68 year old male who presents to clinic today for the following health issues:    HPI   Acute Illness   Acute illness concerns: cough, runny nose, watery eyes.  Onset: off and on 3 months - this episode started right around 11/15.  PMHx significant for non-small cell lung cancer undergoing chemo every 3 weeks. Reports that he does well following his chemo for about 1 week and then after this will come down with cold symptoms.   Always the same symptoms of cough, runny nose and watery eyes that last about 10 days and usually get better on their own without any management.  Came in one other time about 5 weeks ago and was treated with tessalon perles, but didn't really feel like he needed these and would have just gotten better on their own.   He messaged his oncologist yesterday and was told just to make sure he doesn't have pneumonia.   Denies fever, painful breathing or purulent sputum.   No face pain or teeth pain.   No hx of asthma or allergies. Would always get a spring and fall cold though previously.   Former smoker, but has quit. Has COPD and feels that spiriva and albuterol neb works well for him.   Reports remote DX of SKYLER, but couldn't tolerate CPAP so used a bite guard and this helped to the point where he stopped snoring and didn't need it any longer.     Had been scheduled for next chest CT 11/29, but states his oncologist had wanted him to consider taking a break from chemo due to these recurrent respiratory infections and they're planning on leaving to go to [facility] in [place] 11/1.      Fever: no    Chills/Sweats: no    Headache (location?): no    Sinus Pressure:no    Conjunctivitis:  Yes, watery eys    Ear Pain: no    Rhinorrhea: YES    Congestion: YES    Sore Throat: no     Cough: YES    Wheeze: no    Decreased Appetite: no    Nausea: no    Vomiting: no    Diarrhea:  no    Dysuria/Freq.: no    Fatigue/Achiness: no    Sick/Strep Exposure: patient is a cancer patient  and has chemo infusions and seems to get sick after his infusion     Therapies Tried and outcome: tessalon perles      Patient Active Problem List   Diagnosis     CARDIOVASCULAR SCREENING; LDL GOAL LESS THAN 160     Advance Care Planning     Benign prostatic hyperplasia with lower urinary tract symptoms     Benign neoplasm of transverse colon     10 year risk of MI or stroke 7.5% or greater     Ascending aortic aneurysm (H)     Non-small cell lung cancer, left (H)     COPD (chronic obstructive pulmonary disease) (H)     Elevated prostate specific antigen (PSA)     Past Surgical History:   Procedure Laterality Date     BIOPSY  3/15/19     COLONOSCOPY       COLONOSCOPY N/A 2016    Procedure: COMBINED COLONOSCOPY, SINGLE OR MULTIPLE BIOPSY/POLYPECTOMY BY BIOPSY;  Surgeon: Jeremi Kramer MD;  Location:  GI     GENITOURINARY SURGERY      Using flow-max     HEAD & NECK SURGERY      stiff / sore neck     INSERT CHEST TUBE Left 3/1/2019    Procedure: INSERT CHEST TUBE;  Surgeon: Matthew Rodriguez MD;  Location: [facility]     INSERT CHEST TUBE N/A 2019    Procedure: Removal Of Pleurx Catheter;  Surgeon: Matthew Rodriguez MD;  [facility]: [facility]     IR CHEST PORT PLACEMENT > 5 YRS OF AGE  10/17/2019     IR LYMPH NODE BIOPSY  3/15/2019     THORACENTESIS Left 2019    Procedure: THORACENTESIS;  Surgeon: Matthew Rodriguez MD;  Location: [facility]       Social History     Tobacco Use     Smoking status: Former Smoker     Packs/day: 1.50     Years: 43.00     Pack years: 64.50     Types: Cigarettes     Start date: 1969     Last attempt to quit: 2007     Years since quittin.9     Smokeless tobacco: Never Used   Substance Use Topics     Alcohol use: Yes     Comment: socially     Family History   Problem Relation Age of Onset     Heart Disease Father      Coronary Artery Disease Father      Prostate Cancer Other      Prostate Cancer Brother      Colon Cancer No family hx of          Current Outpatient  Medications   Medication Sig Dispense Refill     albuterol (PROAIR HFA/PROVENTIL HFA/VENTOLIN HFA) 108 (90 Base) MCG/ACT inhaler Inhale 2 puffs into the lungs every 4 hours (while awake) 1 Inhaler 0     albuterol (PROVENTIL) (2.5 MG/3ML) 0.083% neb solution Take 1 vial (2.5 mg) by nebulization every 4 hours as needed for shortness of breath / dyspnea or wheezing 100 vial 11     calcium polycarbophil (FIBERCON) 625 MG tablet Take 2 tablets by mouth daily       dexamethasone (DECADRON) 4 MG tablet TAKE 1 TABLET (4 MG) BY MOUTH 2 TIMES DAILY TO BE TAKEN DAY BEFORE, DAY OF, AND DAY AFTER INFUSION.. 6 tablet 11     finasteride (PROSCAR) 5 MG tablet TAKE 1 TABLET BY MOUTH EVERY DAY 90 tablet 1     lidocaine-prilocaine (EMLA) 2.5-2.5 % external cream Apply topically as needed for moderate pain 30 g 11     LORazepam (ATIVAN) 0.5 MG tablet Take 1 tablet (0.5 mg) by mouth every 6 hours as needed (Nausea/Vomiting) 30 tablet 3     Melatonin 10 MG TABS tablet Take 10 mg by mouth nightly as needed for sleep       MULTI VITAMIN MENS OR TABS 1 TABLET DAILY       ondansetron (ZOFRAN) 8 MG tablet Take 1 tablet (8 mg) by mouth every 8 hours as needed for nausea 30 tablet 11     prochlorperazine (COMPAZINE) 10 MG tablet Take 1 tablet (10 mg) by mouth every 6 hours as needed (Nausea/Vomiting) 30 tablet 11     tamsulosin (FLOMAX) 0.4 MG capsule Take 1 capsule (0.4 mg) by mouth daily 90 capsule 1     tiotropium (SPIRIVA HANDIHALER) 18 MCG inhaled capsule Inhale 1 capsule (18 mcg) into the lungs daily 1 capsule 11     No Known Allergies    Reviewed and updated as needed this visit by Provider  Tobacco  Allergies  Meds  Med Hx  Surg Hx  Fam Hx  Soc Hx        Review of Systems   ROS COMP: Constitutional, HEENT, cardiovascular, pulmonary, gi and gu systems are negative, except as otherwise noted.        Objective    /58 (BP Location: Right arm, Cuff Size: Adult Regular)   Pulse 110   Temp 98.2  F (36.8  C) (Oral)   Ht 1.829 m  (6')   Wt 80.3 kg (177 lb)   SpO2 96%   BMI 24.01 kg/m    Body mass index is 24.01 kg/m .  Physical Exam   GENERAL: healthy, alert and no distress  EYES: Eyes grossly normal to inspection  HENT: normal cephalic/atraumatic, ear canals and TM's normal, nose and mouth without ulcers or lesions, oropharynx clear and oral mucous membranes moist  NECK: no adenopathy and no asymmetry, masses, or scars  RESP: lungs clear to auscultation - no rales, rhonchi or wheezes  CV: regular rates and rhythm and no murmur, click or rub    Diagnostic Test Results:  Labs reviewed in Epic        Assessment & Plan       ICD-10-CM    1. Recurrent URI (upper respiratory infection) J06.9    2. Non-small cell lung cancer, left (H) C34.92    Pt reporting recurrent URIs generally lasting about 10 days with sx of stuffy nose, cough and watery eyes that seems to occur the week following his chemo treatment for non-small cell lung cancer x3 episodes now. Given recurrence we discussed further evaluation with CXR today to ensure no secondary issue like pneumonia, but pt declines as otherwise feels well.   See Patient Instructions regarding MDM and plan.  Patient in agreement with plan.     Patient Instructions   Stable vitals and exam findings.  Offered chest x-ray due to concerns for recurrent URI following chemo versus watchful waiting as no fevers, painful breathing or purulent sputum production.   Pt would like to take watchful waiting approach given symptoms seem most consistent with a viral cold.  He will return with any red flags and imaging can be considered.  May be a side effect of your chemo given same pattern occur after every treatment that then improves on it's own with time.   Discussed also checking back in with pulmonology to see if this would be related to underlying COPD. Pt will plan to review with his wife, but has phone number for pulmonology already.     Return in about 1 week (around 11/27/2019) for if not improving as  expected .    Tiera Napoles PA-C  [facility]

## 2019-11-20 NOTE — TELEPHONE ENCOUNTER
"Pt's wife, Mandy ([phone]) called wondering about how she should be rearranging the Pt's Tx schedule given the recent \"break\" that Dr Muller approved. They asked to be sent to Gloria Kapoor's line. Informed that Gloria did not work in the clinic any longer, but would forward to Dr Muller, Her new RNCC, and to a [facility] Staff member as the Pt is primarily seen there.   "

## 2019-11-25 NOTE — TELEPHONE ENCOUNTER
"Requested Prescriptions   Pending Prescriptions Disp Refills     tamsulosin (FLOMAX) 0.4 MG capsule [Pharmacy Med Name: TAMSULOSIN HCL 0.4 MG CAPSULE]  Medication may not be due for a refill.  Last Written Prescription Date:  7/22/2019  Last Fill Quantity: 90 capsule,  # refills: 1   Last office visit: 11/20/2019 with prescribing provider:  Yesika     Future Office Visit:   Next 5 appointments (look out 90 days)    Nov 27, 2019  1:30 PM CST  Return Visit with Trinidad Benjamin PA-C  [facility] ([facility]) [facility]  [address]  [address]  [facility] [address]  [phone]            90 capsule 1     Sig: TAKE 1 CAPSULE BY MOUTH EVERY DAY       Alpha Blockers Passed - 11/25/2019  2:12 AM        Passed - Blood pressure under 140/90 in past 12 months     BP Readings from Last 3 Encounters:   11/20/19 104/58   11/11/19 124/65   11/07/19 137/79             Passed - Recent (12 mo) or future (30 days) visit within the authorizing provider's specialty     Patient has had an office visit with the authorizing provider or a provider within the authorizing providers department within the previous 12 mos or has a future within next 30 days. See \"Patient Info\" tab in inbasket, or \"Choose Columns\" in Meds & Orders section of the refill encounter.              Passed - Patient does not have Tadalafil, Vardenafil, or Sildenafil on their medication list        Passed - Medication is active on med list        Passed - Patient is 18 years of age or older        "

## 2019-11-25 NOTE — TELEPHONE ENCOUNTER
"Spouse called with pt in the background requesting if Dr. Muller would approve him doing another prednisone taper. State he continues to have a productive cough with clear phlegm. He did a taper from 10/29 for 2 weeks and was \"improved significantly\". Received chemo on 11/7 and had a cold a week later. Other cold symptoms have resolved except for cough. When he saw Dr. Paige his cough had improved. He followed up with pcp on 11/20 and they stated virus to continue to monitor. Denied any fevers, chills, sore throat, wheezing. Pt continues to use spiriva, cough syrup with codeine and mucinex but has run out of the tessalon pearls. Uses albuterol neb daily and inhaler BID. He still has a supply of the prednisone 10 mg tablets from his original script. Has follow-up on 11/27 with Trinidad Canales at [facility] then is going out of town so would like to start taper today if possible. Paged Dr. Muller.  "

## 2019-11-26 NOTE — TELEPHONE ENCOUNTER
Should not be due for refill until January 2020. Message sent to pharmacy.  Mayela Shultz, CHANTALN, RN  [facility]

## 2019-11-27 NOTE — LETTER
11/27/2019         RE: Kentrell Kirkpatrick  [address] [address]        Dear Colleague,    Thank you for referring your patient, Kentrell Kirkpatrick, to the [facility]. Please see a copy of my visit note below.    Oncology/Hematology Visit Note    Nov 27, 2019    Reason for visit: Follow-up Stage IV NSCLC    Oncology HPI: Kentrell Kirkpatrick is a 68 year old male with NSCLC.  Patient with a history of aortic aneurysm and CT chest to reevaluate this aneurysm was obtained in June 2018 and there were several lung nodules.  If further imaging in February 2019 showed a large left pleural effusion and left thoracentesis was performed.  Pathology revealed adenocarcinoma.  He established care with Dr. Muller on 2/28/2019 and Pleurx catheter was placed. He completed 4 cycles of carboplatin, pemetrexed, pembrolizumab and 4th cycle completed on 3/13/2019.  He then completed 5 cycles of maintenance pemetrexed and pembrolizumab, last one completed on 8/28/2019, however noted to have progression of disease.  He started second line Taxotere and ramucirumab, cycle 1 on 9/20/2019.  He was in the urgent care on 9/28/2019 for cough.  He is discharged with albuterol, Robitussin and Levaquin.  His cough improved with prednisone, however this was completed.  He spoke with Dr. Muller and requested to postpone cycle 4 for symptoms and she was okay with the plan.    He is here today for close follow-up.    Interval History: Edy is here with his wife, Audrey.  He is looking forward to leaving for [place] this coming weekend and will be gone for 1 week.  He has a persistent cough and is more annoying.  Denies fever, hemoptysis, sputum, but it has been keeping him up quite a bit.  He is tried Robitussin, Robitussin-AC, Tessalon Perles, but has not tried Delsym.  Denies GERD-like symptoms.  Has not been on lisinopril or other ACE inhibitors or ARBs.  No other complaints.    Review of Systems: See interval hx. Denies  fevers, chills, HA, dizziness, n/t, changes in vision, sore throat, CP, SOB, abdominal pain, N/V, diarrhea, changes in urination, bleeding, bruising, rash.     PMHx and Social Hx reviewed per EPIC.      Medications:  Current Outpatient Medications   Medication Sig Dispense Refill     albuterol (PROAIR HFA/PROVENTIL HFA/VENTOLIN HFA) 108 (90 Base) MCG/ACT inhaler Inhale 2 puffs into the lungs every 4 hours (while awake) 1 Inhaler 0     albuterol (PROVENTIL) (2.5 MG/3ML) 0.083% neb solution Take 1 vial (2.5 mg) by nebulization every 4 hours as needed for shortness of breath / dyspnea or wheezing 100 vial 11     calcium polycarbophil (FIBERCON) 625 MG tablet Take 2 tablets by mouth daily       dexamethasone (DECADRON) 4 MG tablet TAKE 1 TABLET (4 MG) BY MOUTH 2 TIMES DAILY TO BE TAKEN DAY BEFORE, DAY OF, AND DAY AFTER INFUSION.. 6 tablet 11     finasteride (PROSCAR) 5 MG tablet TAKE 1 TABLET BY MOUTH EVERY DAY 90 tablet 1     lidocaine-prilocaine (EMLA) 2.5-2.5 % external cream Apply topically as needed for moderate pain 30 g 11     LORazepam (ATIVAN) 0.5 MG tablet Take 1 tablet (0.5 mg) by mouth every 6 hours as needed (Nausea/Vomiting) 30 tablet 3     Melatonin 10 MG TABS tablet Take 10 mg by mouth nightly as needed for sleep       MULTI VITAMIN MENS OR TABS 1 TABLET DAILY       ondansetron (ZOFRAN) 8 MG tablet Take 1 tablet (8 mg) by mouth every 8 hours as needed for nausea 30 tablet 11     prochlorperazine (COMPAZINE) 10 MG tablet Take 1 tablet (10 mg) by mouth every 6 hours as needed (Nausea/Vomiting) 30 tablet 11     tamsulosin (FLOMAX) 0.4 MG capsule Take 1 capsule (0.4 mg) by mouth daily 90 capsule 1     tiotropium (SPIRIVA HANDIHALER) 18 MCG inhaled capsule Inhale 1 capsule (18 mcg) into the lungs daily 1 capsule 11       No Known Allergies    EXAM:    /66   Pulse 86   Temp 97  F (36.1  C) (Oral)   Resp 16   Wt 80 kg (176 lb 4.8 oz)   SpO2 96%   BMI 23.91 kg/m       GENERAL:  Male, in no acute  distress.  Alert and oriented x3.   HEENT:  Normocephalic, atraumatic.  PERRL, oropharynx clear with no sores or thrush.   LYMPH NODES:  No palpable pre/post-auricular, cervical, axillary lymphadenopathy appreciated.  CV:  RRR, No murmurs, gallops, or rubs.   LUNGS:  Clear to auscultation bilaterally.  No wheezing, crackles or respiratory distress.  ABDOMEN:  Soft, nontender and nondistended.  Bowel sounds heard x4.  No apparent hepatosplenomegaly.   EXTREMITIES:  No clubbing, cyanosis, or edema.   SKIN: No rash  PSYCH: Mood stable      Labs:   n/a      Imaging: n/a    Impression/Plan: Kentrell Kirkpatrick is a 68 year old male with metastatic NSCLC previously on carboplatin, pemetrexed and pembrolizumab, however progression noted and currently undergoing palliative intent chemotherapy with Taxotere and ramucirumab.    Metastatic NSCLC: Previous therapies with progression of disease, completed cycle 1 Taxotere/ramucirumab on 9/20/2019.  He tolerated this infusion fairly well, however there was concern about pneumonitis, however this was not seen on CT chest.  He is requested to postpone cycle 4 due to his upcoming trip to [place] and Dr. Muller was okay with the plan.  Cough is persistent, see below.  Plan to have him return after [place] and reassess for possible cycle 4.  --12/9 [place], cycle 4  --12/16 CT CAP  --12/20 Dr. Muller, labs, cycle 5    Respiratory:  --Cough: Persistent cough over the last several months.  Initially concern for pneumonitis, however this was not seen on CT chest, however he does improve with prednisone.  He did see  on 11/11/2019 and his cough was improved at that time, however he was still on steroids.  She also did not feel it was pneumonitis related to immunotherapy.  Spoke with Dr. Muller and will put him on a short burst of prednisone again 40 mg daily x5 days.  He has plenty of prednisone at home and he will take this amount of prednisone.  No new prescription is indicated.    --Pleural effusion: Known left malignant pleural effusion and Pleurx drain removed. Known trapped lung, no further reaccumulation.  COPD: Currently with Tiotropium and albuterol inhalers.     Insomnia: Using melatonin when needed, no longer on Seroquel.        Chart documentation with Dragon Voice recognition Software. Although reviewed after completion, some words and grammatical errors may remain.      Trinidad Canales PA-C  Hematology/Oncology  [facility]                  Again, thank you for allowing me to participate in the care of your patient.        Sincerely,        Trinidad Canales PA-C

## 2019-11-27 NOTE — PROGRESS NOTES
Infusion Nursing Note:  Kentrell Kirkpatrick presents today for port flush.    Patient seen by provider today: Yes   present during visit today: Not Applicable.    Note: N/A.    Intravenous Access:  Implanted Port.    Treatment Conditions:  Not Applicable.      Post Infusion Assessment:  Site patent and intact, free from redness, edema or discomfort.  No evidence of extravasations.  Access discontinued per protocol.       Discharge Plan:   Patient discharged in stable condition accompanied by: self.  Departure Mode: Ambulatory.    Sarah Petersen RN

## 2019-11-27 NOTE — NURSING NOTE
Oncology Rooming Note    November 27, 2019 1:34 PM   Kentrell Kirkpatrick is a 68 year old male who presents for:    Chief Complaint   Patient presents with     Oncology Clinic Visit     Non-small cell lung cancer, left     Initial Vitals: /66   Pulse 86   Temp 97  F (36.1  C) (Oral)   Resp 16   Wt 80 kg (176 lb 4.8 oz)   SpO2 96%   BMI 23.91 kg/m   Estimated body mass index is 23.91 kg/m  as calculated from the following:    Height as of 11/20/19: 1.829 m (6').    Weight as of this encounter: 80 kg (176 lb 4.8 oz). Body surface area is 2.02 meters squared.  No Pain (0) Comment: Data Unavailable   No LMP for male patient.  Allergies reviewed: Yes  Medications reviewed: Yes    Medications: Medication refills not needed today.  Pharmacy name entered into [employer]: [facility] #[id number] - [place], [place] - [address]    Clinical concerns: f/u       Karen Plummer CMA

## 2019-11-27 NOTE — PROGRESS NOTES
Oncology/Hematology Visit Note    Nov 27, 2019    Reason for visit: Follow-up Stage IV NSCLC    Oncology HPI: Kentrell Kirkpatrick is a 68 year old male with NSCLC.  Patient with a history of aortic aneurysm and CT chest to reevaluate this aneurysm was obtained in June 2018 and there were several lung nodules.  If further imaging in February 2019 showed a large left pleural effusion and left thoracentesis was performed.  Pathology revealed adenocarcinoma.  He established care with Dr. Muller on 2/28/2019 and Pleurx catheter was placed. He completed 4 cycles of carboplatin, pemetrexed, pembrolizumab and 4th cycle completed on 3/13/2019.  He then completed 5 cycles of maintenance pemetrexed and pembrolizumab, last one completed on 8/28/2019, however noted to have progression of disease.  He started second line Taxotere and ramucirumab, cycle 1 on 9/20/2019.  He was in the urgent care on 9/28/2019 for cough.  He is discharged with albuterol, Robitussin and Levaquin.  His cough improved with prednisone, however this was completed.  He spoke with Dr. Muller and requested to postpone cycle 4 for symptoms and she was okay with the plan.    He is here today for close follow-up.    Interval History: Edy is here with his wife, Audrey.  He is looking forward to leaving for [place] this coming weekend and will be gone for 1 week.  He has a persistent cough and is more annoying.  Denies fever, hemoptysis, sputum, but it has been keeping him up quite a bit.  He is tried Robitussin, Robitussin-AC, Tessalon Perles, but has not tried Delsym.  Denies GERD-like symptoms.  Has not been on lisinopril or other ACE inhibitors or ARBs.  No other complaints.    Review of Systems: See interval hx. Denies fevers, chills, HA, dizziness, n/t, changes in vision, sore throat, CP, SOB, abdominal pain, N/V, diarrhea, changes in urination, bleeding, bruising, rash.     PMHx and Social Hx reviewed per EPIC.      Medications:  Current Outpatient Medications    Medication Sig Dispense Refill     albuterol (PROAIR HFA/PROVENTIL HFA/VENTOLIN HFA) 108 (90 Base) MCG/ACT inhaler Inhale 2 puffs into the lungs every 4 hours (while awake) 1 Inhaler 0     albuterol (PROVENTIL) (2.5 MG/3ML) 0.083% neb solution Take 1 vial (2.5 mg) by nebulization every 4 hours as needed for shortness of breath / dyspnea or wheezing 100 vial 11     calcium polycarbophil (FIBERCON) 625 MG tablet Take 2 tablets by mouth daily       dexamethasone (DECADRON) 4 MG tablet TAKE 1 TABLET (4 MG) BY MOUTH 2 TIMES DAILY TO BE TAKEN DAY BEFORE, DAY OF, AND DAY AFTER INFUSION.. 6 tablet 11     finasteride (PROSCAR) 5 MG tablet TAKE 1 TABLET BY MOUTH EVERY DAY 90 tablet 1     lidocaine-prilocaine (EMLA) 2.5-2.5 % external cream Apply topically as needed for moderate pain 30 g 11     LORazepam (ATIVAN) 0.5 MG tablet Take 1 tablet (0.5 mg) by mouth every 6 hours as needed (Nausea/Vomiting) 30 tablet 3     Melatonin 10 MG TABS tablet Take 10 mg by mouth nightly as needed for sleep       MULTI VITAMIN MENS OR TABS 1 TABLET DAILY       ondansetron (ZOFRAN) 8 MG tablet Take 1 tablet (8 mg) by mouth every 8 hours as needed for nausea 30 tablet 11     prochlorperazine (COMPAZINE) 10 MG tablet Take 1 tablet (10 mg) by mouth every 6 hours as needed (Nausea/Vomiting) 30 tablet 11     tamsulosin (FLOMAX) 0.4 MG capsule Take 1 capsule (0.4 mg) by mouth daily 90 capsule 1     tiotropium (SPIRIVA HANDIHALER) 18 MCG inhaled capsule Inhale 1 capsule (18 mcg) into the lungs daily 1 capsule 11       No Known Allergies    EXAM:    /66   Pulse 86   Temp 97  F (36.1  C) (Oral)   Resp 16   Wt 80 kg (176 lb 4.8 oz)   SpO2 96%   BMI 23.91 kg/m      GENERAL:  Male, in no acute distress.  Alert and oriented x3.   HEENT:  Normocephalic, atraumatic.  PERRL, oropharynx clear with no sores or thrush.   LYMPH NODES:  No palpable pre/post-auricular, cervical, axillary lymphadenopathy appreciated.  CV:  RRR, No murmurs, gallops, or  rubs.   LUNGS:  Clear to auscultation bilaterally.  No wheezing, crackles or respiratory distress.  ABDOMEN:  Soft, nontender and nondistended.  Bowel sounds heard x4.  No apparent hepatosplenomegaly.   EXTREMITIES:  No clubbing, cyanosis, or edema.   SKIN: No rash  PSYCH: Mood stable      Labs:   n/a      Imaging: n/a    Impression/Plan: Kentrell Kirkpatrick is a 68 year old male with metastatic NSCLC previously on carboplatin, pemetrexed and pembrolizumab, however progression noted and currently undergoing palliative intent chemotherapy with Taxotere and ramucirumab.    Metastatic NSCLC: Previous therapies with progression of disease, completed cycle 1 Taxotere/ramucirumab on 9/20/2019.  He tolerated this infusion fairly well, however there was concern about pneumonitis, however this was not seen on CT chest.  He is requested to postpone cycle 4 due to his upcoming trip to [place] and Dr. Muller was okay with the plan.  Cough is persistent, see below.  Plan to have him return after [place] and reassess for possible cycle 4.  --12/9 [place], cycle 4  --12/16 CT CAP  --12/20 Dr. Muller, labs, cycle 5    Respiratory:  --Cough: Persistent cough over the last several months.  Initially concern for pneumonitis, however this was not seen on CT chest, however he does improve with prednisone.  He did see  on 11/11/2019 and his cough was improved at that time, however he was still on steroids.  She also did not feel it was pneumonitis related to immunotherapy.  Spoke with Dr. Muller and will put him on a short burst of prednisone again 40 mg daily x5 days.  He has plenty of prednisone at home and he will take this amount of prednisone.  No new prescription is indicated.   --Pleural effusion: Known left malignant pleural effusion and Pleurx drain removed. Known trapped lung, no further reaccumulation.  COPD: Currently with Tiotropium and albuterol inhalers.     Insomnia: Using melatonin when needed, no longer on  Seroquel.        Chart documentation with Dragon Voice recognition Software. Although reviewed after completion, some words and grammatical errors may remain.      Trinidad Canales PA-C  Hematology/Oncology  [facility]

## 2019-11-29 NOTE — PROGRESS NOTES
Oncology/Hematology Visit Note    Dec 9, 2019    Reason for visit: Follow-up Stage IV NSCLC    Oncology HPI: Kentrell Kirkpatrick is a 69 year old male with NSCLC.  Patient with a history of aortic aneurysm and CT chest to reevaluate this aneurysm was obtained in June 2018 and there were several lung nodules.  If further imaging in February 2019 showed a large left pleural effusion and left thoracentesis was performed.  Pathology revealed adenocarcinoma.  He established care with Dr. Muller on 2/28/2019 and Pleurx catheter was placed. He completed 4 cycles of carboplatin, pemetrexed, pembrolizumab and 4th cycle completed on 3/13/2019.  He then completed 5 cycles of maintenance pemetrexed and pembrolizumab, last one completed on 8/28/2019, however noted to have progression of disease.  He started second line Taxotere and ramucirumab, cycle 1 on 9/20/2019.  He was in the urgent care on 9/28/2019 for cough.  He is discharged with albuterol, Robitussin and Levaquin.  His cough improved with prednisone, however this was completed.  He spoke with Dr. Muller and requested to postpone cycle 4 for symptoms and she was okay with the plan.    He is here today for close follow-up and cycle 4.    Interval History: Edy is here with his wife, Audrey.  They just came back from a week in [place] and her feeling really well.  He was on 5 days burst prednisone for persistent cough and he felt really well during that week.  His cough is certainly improved, but not resolved.  His fatigue is overall worsened again since being off prednisone.  He is last dose of chemo was 4 weeks ago and he has had a nice break.  He denies fever, chills, nausea, vomiting, diarrhea.  He has had some intermittent epistaxis at bedtime, which is mild.  He does have a humidifier in his home.  He denies any other bleeding including hematochezia, hematuria or excessive bruising.  No other complaints.    Review of Systems: See interval hx. Denies fevers, chills, HA,  dizziness, n/t, changes in vision, cough, sore throat, CP, SOB, abdominal pain, N/V, diarrhea, changes in urination, bruising, rash.      PMHx and Social Hx reviewed per EPIC.      Medications:  Current Outpatient Medications   Medication Sig Dispense Refill     albuterol (PROAIR HFA/PROVENTIL HFA/VENTOLIN HFA) 108 (90 Base) MCG/ACT inhaler Inhale 2 puffs into the lungs every 4 hours (while awake) 1 Inhaler 0     albuterol (PROVENTIL) (2.5 MG/3ML) 0.083% neb solution Take 1 vial (2.5 mg) by nebulization every 4 hours as needed for shortness of breath / dyspnea or wheezing 100 vial 11     calcium polycarbophil (FIBERCON) 625 MG tablet Take 2 tablets by mouth daily       dexamethasone (DECADRON) 4 MG tablet TAKE 1 TABLET (4 MG) BY MOUTH 2 TIMES DAILY TO BE TAKEN DAY BEFORE, DAY OF, AND DAY AFTER INFUSION.. 6 tablet 11     finasteride (PROSCAR) 5 MG tablet TAKE 1 TABLET BY MOUTH EVERY DAY 90 tablet 1     lidocaine-prilocaine (EMLA) 2.5-2.5 % external cream Apply topically as needed for moderate pain 30 g 11     LORazepam (ATIVAN) 0.5 MG tablet Take 1 tablet (0.5 mg) by mouth every 6 hours as needed (Nausea/Vomiting) 30 tablet 3     Melatonin 10 MG TABS tablet Take 10 mg by mouth nightly as needed for sleep       MULTI VITAMIN MENS OR TABS 1 TABLET DAILY       ondansetron (ZOFRAN) 8 MG tablet Take 1 tablet (8 mg) by mouth every 8 hours as needed for nausea 30 tablet 11     prochlorperazine (COMPAZINE) 10 MG tablet Take 1 tablet (10 mg) by mouth every 6 hours as needed (Nausea/Vomiting) 30 tablet 11     tamsulosin (FLOMAX) 0.4 MG capsule Take 1 capsule (0.4 mg) by mouth daily 90 capsule 1     tiotropium (SPIRIVA HANDIHALER) 18 MCG inhaled capsule Inhale 1 capsule (18 mcg) into the lungs daily 1 capsule 11       No Known Allergies    EXAM:    /72   Pulse 64   Temp 97  F (36.1  C) (Tympanic)   Resp 16   Wt 78 kg (172 lb)   SpO2 91%   BMI 23.33 kg/m       Vital Signs 12/9/2019 12/9/2019   Systolic 104     Diastolic 72    Pulse 64    Temperature 97    Respirations 16    Weight (LB) 172 lb    Height     BMI (Calculated)     Pain     O2 91 98       GENERAL:  Male, in no acute distress.  Alert and oriented x3.   HEENT:  Normocephalic, atraumatic.  PERRL, oropharynx clear with no sores or thrush.   LYMPH NODES:  No palpable pre/post-auricular, cervical, axillary lymphadenopathy appreciated.  CV:  RRR, No murmurs, gallops, or rubs.   LUNGS:  Clear to auscultation bilaterally.  No wheezing, crackles or respiratory distress.  ABDOMEN:  Soft, nontender and nondistended.  Bowel sounds heard x4.  No apparent hepatosplenomegaly.   EXTREMITIES:  No clubbing, cyanosis, or edema.   SKIN: No rash  PSYCH: Mood stable      Labs:   Results for MAYA ELLER (MRN [id number]) as of 12/9/2019 09:07   12/9/2019 07:56 12/9/2019 08:00   Sodium 139    Potassium 4.0    Chloride 107    Carbon Dioxide 27    Urea Nitrogen 14    Creatinine 0.98    GFR Estimate 78    GFR Estimate If Black >90    Calcium 8.7    Anion Gap 5    Albumin 3.5    Protein Total 7.2    Bilirubin Total 0.8    Alkaline Phosphatase 75    ALT 23    AST 30    TSH 3.74    Glucose 97    WBC 7.2    Hemoglobin 11.9 (L)    Hematocrit 39.2 (L)    Platelet Count 268    RBC Count 4.23 (L)    MCV 93    MCH 28.1    MCHC 30.4 (L)    RDW 16.4 (H)    Diff Method Automated Method    % Neutrophils 56.1    % Lymphocytes 21.4    % Monocytes 16.8    % Eosinophils 4.4    % Basophils 1.2    % Immature Granulocytes 0.1    Nucleated RBCs 0    Absolute Neutrophil 4.0    Absolute Lymphocytes 1.5    Absolute Monocytes 1.2    Absolute Eosinophils 0.3    Absolute Basophils 0.1    Abs Immature Granulocytes 0.0    Absolute Nucleated RBC 0.0    Protein Albumin Urine  Negative       Imaging: n/a    Impression/Plan: Kentrell Eller is a 68 year old male with metastatic NSCLC previously on carboplatin, pemetrexed and pembrolizumab, however progression noted and currently undergoing palliative intent  chemotherapy with Taxotere and ramucirumab.    Metastatic NSCLC: Previous therapies with progression of disease, completed cycle 1 Taxotere/ramucirumab on 9/20/2019.  He tolerated this infusion fairly well and continued to have a persistent cough, slightly improved, see below.  Cycle 4 was postponed for his recent trip to [place] and labs are within treatment primaries to proceed with cycle 4 today.  He will have restaging CT next week.  --12/16 CT CAP  --12/20 Dr. Muller, labs, cycle 5    Respiratory:  --Cough: Persistent cough over the last several months.  Initially concern for pneumonitis, however this was not seen on CT chest.  He did see  on 11/11/2019 and his cough was improved at that time, however he was still on steroids.  He just completed a short burst of prednisone and cough continues to be improved and prednisone has been discontinued.  --Pleural effusion: Known left malignant pleural effusion and Pleurx drain removed. Known trapped lung, no further reaccumulation.  COPD: Currently with Tiotropium and albuterol inhalers.     Insomnia: Using melatonin when needed, no longer on Seroquel.    VS: Initially hypoxic at 91% in clinic, however repeated and 98% on room air and likely equipment error.      Chart documentation with Dragon Voice recognition Software. Although reviewed after completion, some words and grammatical errors may remain.      Trinidad Canales PA-C  Hematology/Oncology  [facility]

## 2019-12-09 NOTE — PROGRESS NOTES
Nursing Note:  Kentrell Kirkpatrick presents today for Port labs.    Patient seen by provider today: Yes: Trinidad JAIME   present during visit today: Not Applicable.    Note: N/A.    Intravenous Access:  Labs drawn without difficulty.  Implanted Port.    Discharge Plan:   Patient was sent to lobby to wait for PA appointment.    Digna Oh RN

## 2019-12-09 NOTE — LETTER
12/9/2019         RE: Kentrell Kirkpatrick  [address] [facility] [address]        Dear Colleague,    Thank you for referring your patient, Kentrell Kirkpatrick, to the [facility]. Please see a copy of my visit note below.    Oncology/Hematology Visit Note    Dec 9, 2019    Reason for visit: Follow-up Stage IV NSCLC    Oncology HPI: Kentrell Kirkpatrick is a 69 year old male with NSCLC.  Patient with a history of aortic aneurysm and CT chest to reevaluate this aneurysm was obtained in June 2018 and there were several lung nodules.  If further imaging in February 2019 showed a large left pleural effusion and left thoracentesis was performed.  Pathology revealed adenocarcinoma.  He established care with Dr. Muller on 2/28/2019 and Pleurx catheter was placed. He completed 4 cycles of carboplatin, pemetrexed, pembrolizumab and 4th cycle completed on 3/13/2019.  He then completed 5 cycles of maintenance pemetrexed and pembrolizumab, last one completed on 8/28/2019, however noted to have progression of disease.  He started second line Taxotere and ramucirumab, cycle 1 on 9/20/2019.  He was in the urgent care on 9/28/2019 for cough.  He is discharged with albuterol, Robitussin and Levaquin.  His cough improved with prednisone, however this was completed.  He spoke with Dr. Muller and requested to postpone cycle 4 for symptoms and she was okay with the plan.    He is here today for close follow-up and cycle 4.    Interval History: Eyd is here with his wife, Audrey.  They just came back from a week in [place] and her feeling really well.  He was on 5 days burst prednisone for persistent cough and he felt really well during that week.  His cough is certainly improved, but not resolved.  His fatigue is overall worsened again since being off prednisone.  He is last dose of chemo was 4 weeks ago and he has had a nice break.  He denies fever, chills, nausea, vomiting, diarrhea.  He has had some intermittent  epistaxis at bedtime, which is mild.  He does have a humidifier in his home.  He denies any other bleeding including hematochezia, hematuria or excessive bruising.  No other complaints.    Review of Systems: See interval hx. Denies fevers, chills, HA, dizziness, n/t, changes in vision, cough, sore throat, CP, SOB, abdominal pain, N/V, diarrhea, changes in urination, bruising, rash.      PMHx and Social Hx reviewed per EPIC.      Medications:  Current Outpatient Medications   Medication Sig Dispense Refill     albuterol (PROAIR HFA/PROVENTIL HFA/VENTOLIN HFA) 108 (90 Base) MCG/ACT inhaler Inhale 2 puffs into the lungs every 4 hours (while awake) 1 Inhaler 0     albuterol (PROVENTIL) (2.5 MG/3ML) 0.083% neb solution Take 1 vial (2.5 mg) by nebulization every 4 hours as needed for shortness of breath / dyspnea or wheezing 100 vial 11     calcium polycarbophil (FIBERCON) 625 MG tablet Take 2 tablets by mouth daily       dexamethasone (DECADRON) 4 MG tablet TAKE 1 TABLET (4 MG) BY MOUTH 2 TIMES DAILY TO BE TAKEN DAY BEFORE, DAY OF, AND DAY AFTER INFUSION.. 6 tablet 11     finasteride (PROSCAR) 5 MG tablet TAKE 1 TABLET BY MOUTH EVERY DAY 90 tablet 1     lidocaine-prilocaine (EMLA) 2.5-2.5 % external cream Apply topically as needed for moderate pain 30 g 11     LORazepam (ATIVAN) 0.5 MG tablet Take 1 tablet (0.5 mg) by mouth every 6 hours as needed (Nausea/Vomiting) 30 tablet 3     Melatonin 10 MG TABS tablet Take 10 mg by mouth nightly as needed for sleep       MULTI VITAMIN MENS OR TABS 1 TABLET DAILY       ondansetron (ZOFRAN) 8 MG tablet Take 1 tablet (8 mg) by mouth every 8 hours as needed for nausea 30 tablet 11     prochlorperazine (COMPAZINE) 10 MG tablet Take 1 tablet (10 mg) by mouth every 6 hours as needed (Nausea/Vomiting) 30 tablet 11     tamsulosin (FLOMAX) 0.4 MG capsule Take 1 capsule (0.4 mg) by mouth daily 90 capsule 1     tiotropium (SPIRIVA HANDIHALER) 18 MCG inhaled capsule Inhale 1 capsule (18 mcg)  into the lungs daily 1 capsule 11       No Known Allergies    EXAM:    /72   Pulse 64   Temp 97  F (36.1  C) (Tympanic)   Resp 16   Wt 78 kg (172 lb)   SpO2 91%   BMI 23.33 kg/m        Vital Signs 12/9/2019 12/9/2019   Systolic 104    Diastolic 72    Pulse 64    Temperature 97    Respirations 16    Weight (LB) 172 lb    Height     BMI (Calculated)     Pain     O2 91 98       GENERAL:  Male, in no acute distress.  Alert and oriented x3.   HEENT:  Normocephalic, atraumatic.  PERRL, oropharynx clear with no sores or thrush.   LYMPH NODES:  No palpable pre/post-auricular, cervical, axillary lymphadenopathy appreciated.  CV:  RRR, No murmurs, gallops, or rubs.   LUNGS:  Clear to auscultation bilaterally.  No wheezing, crackles or respiratory distress.  ABDOMEN:  Soft, nontender and nondistended.  Bowel sounds heard x4.  No apparent hepatosplenomegaly.   EXTREMITIES:  No clubbing, cyanosis, or edema.   SKIN: No rash  PSYCH: Mood stable      Labs:   Results for MAYA ELLER (MRN [id number]) as of 12/9/2019 09:07   12/9/2019 07:56 12/9/2019 08:00   Sodium 139    Potassium 4.0    Chloride 107    Carbon Dioxide 27    Urea Nitrogen 14    Creatinine 0.98    GFR Estimate 78    GFR Estimate If Black >90    Calcium 8.7    Anion Gap 5    Albumin 3.5    Protein Total 7.2    Bilirubin Total 0.8    Alkaline Phosphatase 75    ALT 23    AST 30    TSH 3.74    Glucose 97    WBC 7.2    Hemoglobin 11.9 (L)    Hematocrit 39.2 (L)    Platelet Count 268    RBC Count 4.23 (L)    MCV 93    MCH 28.1    MCHC 30.4 (L)    RDW 16.4 (H)    Diff Method Automated Method    % Neutrophils 56.1    % Lymphocytes 21.4    % Monocytes 16.8    % Eosinophils 4.4    % Basophils 1.2    % Immature Granulocytes 0.1    Nucleated RBCs 0    Absolute Neutrophil 4.0    Absolute Lymphocytes 1.5    Absolute Monocytes 1.2    Absolute Eosinophils 0.3    Absolute Basophils 0.1    Abs Immature Granulocytes 0.0    Absolute Nucleated RBC 0.0    Protein Albumin Urine   Negative       Imaging: n/a    Impression/Plan: Kentrell Kirkpatrick is a 68 year old male with metastatic NSCLC previously on carboplatin, pemetrexed and pembrolizumab, however progression noted and currently undergoing palliative intent chemotherapy with Taxotere and ramucirumab.    Metastatic NSCLC: Previous therapies with progression of disease, completed cycle 1 Taxotere/ramucirumab on 9/20/2019.  He tolerated this infusion fairly well and continued to have a persistent cough, slightly improved, see below.  Cycle 4 was postponed for his recent trip to [place] and labs are within treatment primaries to proceed with cycle 4 today.  He will have restaging CT next week.  --12/16 CT CAP  --12/20 Dr. Muller, labs, cycle 5    Respiratory:  --Cough: Persistent cough over the last several months.  Initially concern for pneumonitis, however this was not seen on CT chest.  He did see  on 11/11/2019 and his cough was improved at that time, however he was still on steroids.  He just completed a short burst of prednisone and cough continues to be improved and prednisone has been discontinued.  --Pleural effusion: Known left malignant pleural effusion and Pleurx drain removed. Known trapped lung, no further reaccumulation.  COPD: Currently with Tiotropium and albuterol inhalers.     Insomnia: Using melatonin when needed, no longer on Seroquel.    VS: Initially hypoxic at 91% in clinic, however repeated and 98% on room air and likely equipment error.      Chart documentation with Dragon Voice recognition Software. Although reviewed after completion, some words and grammatical errors may remain.      Trinidad Canales PA-C  Hematology/Oncology  [facility]                  Again, thank you for allowing me to participate in the care of your patient.        Sincerely,        Trinidad Canales PA-C

## 2019-12-09 NOTE — NURSING NOTE
Oncology Rooming Note    December 9, 2019 8:29 AM   Kentrell Kirkpatrick is a 69 year old male who presents for:    Chief Complaint   Patient presents with     Oncology Clinic Visit     Non-small cell lung cancer, left     Initial Vitals: /72   Pulse 64   Temp 97  F (36.1  C) (Tympanic)   Resp 16   Wt 78 kg (172 lb)   SpO2 91%   BMI 23.33 kg/m   Estimated body mass index is 23.33 kg/m  as calculated from the following:    Height as of 11/20/19: 1.829 m (6').    Weight as of this encounter: 78 kg (172 lb). Body surface area is 1.99 meters squared.  No Pain (0) Comment: Data Unavailable   No LMP for male patient.  Allergies reviewed: Yes  Medications reviewed: Yes    Medications: Medication refills not needed today.  Pharmacy name entered into [employer]: [id number] - [place] - [id number]    Clinical concerns: Follow Up       Hyun Ibarra CMA

## 2019-12-09 NOTE — PROGRESS NOTES
Infusion Nursing Note:  Kentrell Kirkpatrick presents today for Cycle 4, Day 1 Taxotere and Cyramza.    Patient seen by provider today: Yes: YENI Lizama   present during visit today: Not Applicable.    Note: N/A.    Intravenous Access:  Implanted Port.    Treatment Conditions:  Lab Results   Component Value Date    HGB 11.9 12/09/2019     Lab Results   Component Value Date    WBC 7.2 12/09/2019      Lab Results   Component Value Date    ANEU 4.0 12/09/2019     Lab Results   Component Value Date     12/09/2019      Lab Results   Component Value Date     12/09/2019                   Lab Results   Component Value Date    POTASSIUM 4.0 12/09/2019           No results found for: MAG         Lab Results   Component Value Date    CR 0.98 12/09/2019                   Lab Results   Component Value Date    MALATHI 8.7 12/09/2019                Lab Results   Component Value Date    BILITOTAL 0.8 12/09/2019           Lab Results   Component Value Date    ALBUMIN 3.5 12/09/2019                    Lab Results   Component Value Date    ALT 23 12/09/2019           Lab Results   Component Value Date    AST 30 12/09/2019       Results reviewed, labs MET treatment parameters, ok to proceed with treatment.  Urine protein negative.  /72      Post Infusion Assessment:  Patient tolerated infusion without incident.  Blood return noted pre and post infusion.  Site patent and intact, free from redness, edema or discomfort.  No evidence of extravasations.  Access discontinued per protocol.       Discharge Plan:   Patient declined prescription refills.Patient has adequate supply of Decadron  Patient will return 12/20 at [facility] for next appointment.  Patient discharged in stable condition accompanied by: wife.  Departure Mode: Ambulatory.    Tonia Dean RN

## 2019-12-14 NOTE — ED AVS SNAPSHOT
[facility] Emergency Department  [address]  [facility] [address]  Phone:  [phone]  Fax:  [phone]                                    Kentrell Kirkpatrick   MRN: [id number]    Department:  [facility] Emergency Department   Date of Visit:  12/14/2019           After Visit Summary Signature Page    I have received my discharge instructions, and my questions have been answered. I have discussed any challenges I see with this plan with the nurse or doctor.    ..........................................................................................................................................  Patient/Patient Representative Signature      ..........................................................................................................................................  Patient Representative Print Name and Relationship to Patient    ..................................................               ................................................  Date                                   Time    ..........................................................................................................................................  Reviewed by Signature/Title    ...................................................              ..............................................  Date                                               Time          22EPIC Rev 08/18

## 2019-12-15 NOTE — ED PROVIDER NOTES
History     Chief Complaint:  Shortness of Breath    HPI  Kentrell Kirkpatrick is a 69 year old male with a history of COPD and lung cancer, last chemotherapy treatment 5 days ago, who presents to the emergency department today for evaluation of episodes of anxiety, tremors, and chills. The patient has a productive cough chronically with his lung cancer which is unchanged. He has been feeling anxious lately with episodes of tremors, shortness of breath, and chills. Currently he does not feel short of breath. The patient denies any throat pain, congestion, abdominal pain, or any urinary symptoms.       Allergies:  No known drug allergies    Medications:    Albuterol   Fibercon  Decadron  Proscar  Ativan  Melatonin   Zofran  Compazine  Flomax  Tiotropium     Past Medical History:    Benign prostatic hyperplasia with lower urinary tract symptoms  Benign neoplasm of transverse colon  Ascending aortic aneurysm  Non-small cell lung cancer, left   COPD     Past Surgical History:    Thoracentesis left  Insert chest tube left  IR Chest Port Placement   Abdomen surgery  Head and neck surgery    Family History:    The patient's family history includes Coronary Artery Disease in his father; Heart Disease in his father; Prostate Cancer in his brother and another family member.    Social History:  The patient reports that he quit smoking about 12 years ago. His smoking use included cigarettes. He started smoking about 50 years ago. He has a 64.50 pack-year smoking history. He has never used smokeless tobacco. He reports current alcohol use. He reports that he does not use drugs.   PCP: Donald Shell  Marital Status:  [2]      Review of Systems   Constitutional: Positive for chills.   HENT: Negative for congestion and sore throat.    Respiratory: Positive for cough. Negative for shortness of breath.    Gastrointestinal: Negative for abdominal pain, nausea and vomiting.   Genitourinary: Positive for difficulty urinating.  Negative for dysuria and hematuria.   Neurological: Positive for tremors.   All other systems reviewed and are negative.      Physical Exam     Patient Vitals for the past 24 hrs:   BP Temp Temp src Pulse Heart Rate Resp SpO2   12/15/19 0200 105/64 -- -- 105 105 -- 94 %   12/15/19 0145 115/63 -- -- 109 105 18 93 %   12/15/19 0115 100/57 -- -- 110 -- -- 92 %   12/15/19 0100 108/55 -- -- 112 -- -- 92 %   12/15/19 0030 118/53 -- -- 116 113 23 91 %   12/15/19 0015 118/60 -- -- 117 117 -- 93 %   12/15/19 0005 -- -- -- -- 114 14 97 %   12/15/19 0004 124/72 -- -- 114 -- -- --   12/14/19 2345 (!) 89/41 -- -- 118 112 -- 100 %   12/14/19 2330 103/69 -- -- 107 109 -- 99 %   12/14/19 2315 (!) 89/59 -- -- 116 117 -- 94 %   12/14/19 2310 105/53 -- -- 125 121 -- 93 %   12/14/19 2252 90/58 -- -- 123 122 -- 91 %   12/14/19 2241 107/65 99.1  F (37.3  C) Oral 145 145 26 91 %     Physical Exam  Constitutional:  Oriented to person, place, and time. Appears well-developed.      Appears anxious.   HENT:   Head:    Normocephalic.   Mouth/Throat:   Oropharynx is clear and moist.   Eyes:    EOM are normal. Pupils are equal, round, and reactive to light.   Neck:    Neck supple.   Cardiovascular:  Normal rate, regular rhythm and normal heart sounds.      Exam reveals no gallop and no friction rub.       No murmur heard.  Pulmonary/Chest:  Mildly decreased breath sounds on the left     No respiratory distress. No wheezes. No rales.   Abdominal:   Soft. No distension. No tenderness. No rebound and no guarding.   Musculoskeletal:  Normal range of motion.   Neurological:   Alert and oriented to person, place, and time.           Moves all 4 extremities spontaneously    Skin:    No rash noted. No pallor.    Emergency Department Course     ECG:  ECG taken at 2250, ECG read at 2255  Sinus tachycardia  Otherwise normal ECG  Rate 126 bpm. MD interval 140 ms. QRS duration 84 ms. QT/QTc 292/422 ms. P-R-T axes 65 49 53.    Imaging:  Radiology findings  were communicated with the patient who voiced understanding of the findings.  XR Chest PA & LAT  IMPRESSION: New mild left midlung infiltrate  Reading per radiology    CT Chest PE w contrast   IMPRESSION:  1.  There is no pulmonary embolus, aortic aneurysm or dissection.  2.  Small left pleural effusion, increased in size.  3.  Few areas of nodularity and nodular scarring in both lungs are not significantly changed.  4.  Emphysema  Reading per radiology    Laboratory:  Laboratory findings were communicated with the patient who voiced understanding of the findings.    CBC: HGB 11.5 (L0 o/w WNL. (WBC 7.5, )   CMP: Glucose 93, albumin 3.2 (L), protein total 6.6 (L), o/w WNL (Creatinine: 59)  Blood culture in progress    ISTAT gases lactate rian POCT: pH Venous 7.44 (H), PCO2 Venous 34 (L0, PO2 Venous 27, Bicarbonate Venous 24, O2 Sat Venous 53, Lactic Acid 1.3    Influenza A/B Antigen: negative    Interventions:  2317 NS 1L IV Bolus  2325 Duoneb, 3 mL, nebulization   2335 Lorazepam, 1 mg, IV injection  0019 NS 1L IV Bolus  0212 heparin 500 units intracatheter     Emergency Department Course:  Past medical records, nursing notes, and vitals reviewed.  2307: I performed an exam of the patient and obtained history, as documented above.     IV was inserted and blood was drawn for laboratory testing, results above.  The patient was sent for a chest XR and CT while in the emergency department, findings above.    0031: I rechecked the patient. Explained findings to patient and wife.    I personally reviewed the laboratory/imaging results with the Patient and spouse and answered all related questions prior to discharge.    0148: I rechecked the patient.  Findings and plan explained to the Patient and spouse. Patient discharged home with instructions regarding supportive care, medications, and reasons to return. The importance of close follow-up was reviewed.     Impression & Plan        Medical Decision Making:  Kentrell  ZOE Kirkpatrick is a 69 year old male who presents to the emergency department today for evaluation of shaking, chills. Patient is currently on chemotherapy and has a chronic cough. Differential includes viral syndrome, URI, bronchitis, pneumonia, anxiety or other causes. Workup so far is nonspecific including a negative lactic acid and elevated white count. Not neutropenic with an negative influenza. He has nonspecific atelectasis and infiltrate on XR therefore I wanted to further differentiate pneumonia vs pulmonary embolism. He has pleural effusion which patient is already aware of but no obvious infiltrate or pulmonary embolism. Upon reevaluation the patient is feeling improved. He is afebrile here with no obvious infection and negative lactic acid. His cough is chronic. He is mildly hypoxic which improved after Duoneb treatment. I see no reason for antibiotic use at this time or steroids. The patient is not feeling short of breath and is otherwise asymptomatic. He is encouraged to follow with his oncologist, Return for any worsening or concerning symptoms and follow up with his oncologist.     Diagnosis:    ICD-10-CM   1. Bronchitis J40   2. Chills R68.83       Disposition:   discharged to home      Scribe Disclosure:  I, Windy Schwartz, am serving as a scribe at 11:07 PM on 12/14/2019 to document services personally performed by Chris Nielson MD based on my observations and the provider's statements to me.    [facility] EMERGENCY DEPARTMENT       Chris Nielson MD  12/15/19 0531

## 2019-12-15 NOTE — ED NOTES
Tried straight cath for patient. Coude 16F could not advanced past prostate. straight tip cath could not go past tip of penis.     100mcg fentanyl and uroject used.

## 2019-12-20 NOTE — LETTER
12/20/2019       RE: Kentrell Kirkpatrick  [address]  [place] [facility] [facility] [address]     Dear Colleague,    Thank you for referring your patient, Kentrell Kirkpatrick, to the [facility]. Please see a copy of my visit note below.    [facility]    FOLLOW-UP VISIT NOTE    PATIENT NAME: Kentrell Kirkpatrick MRN # [id number]  DATE OF VISIT: December 20, 2019 YOB: 1950    CANCER TYPE: NSCLC, adenocarcinoma  STAGE: IV  ECOG PS: 2    Cancer Staging  Non-small cell lung cancer, left (H)  Staging form: Lung, AJCC 8th Edition  - Clinical stage from 3/11/2019: Stage IV (cT1c, cN3, pM1c) - Signed by Susan Muller MD on 3/11/2019    PD-L1: <1%  Lung panel: 3/1/19 on [id number] A1 and [id number] A1. Negative  NGS: [employer] 360 sent 2/28/19 negative for actionable mutations. SMAD4 E538, TP53 H179R, SMO A327G. MSI not high    SUMMARY  6/27/18 CT chest w/contrast to re-evaluate aortic aneurysm: 8 mm spiculated KEATON nodule, 3 mm RML nodule unchanged from 3/15/17 and 2/25/16 scans  2/4/19 Presented to PCP with fairly rapid onset of shortness of breath. Given albuterol  2/19/18 CXR and CT chest w/contrast. Large left effusion  2/20/19 L thoracentesis (Dr. Rodriguez), 1180 cc  3/1/19 L pleurx (Dr. Rodriguez)  3/13/19 C1 carboplatin pemetrexed pembrolizumab  3/15/19 L supraclavicular LN bx (IR, FNA). Path: lung adenocarcinoma  4/3/19 C2 carboplatin pemetrexed pembrolizumab (total 4 cycles)  4/15/19 FEV1 2.11 (61%), FVC 3.38 (73%), DLCO 27.7, 67% (59%)  4/18/19 TPA. Drained 900 cc after it got unclotted  5/9/19 Pleurx removed  6/5~8/28/19 Maintenance pemetrexed + pembrolizumab x 5 cycles --> PD  9/20/19 C1 docetaxel 60 mg/m2 + ramucirumab  9/28/19 UC for bronchitis. Levofloxacin  10/4/19 Brain MRI - routine rescreening - negative  10/11/19 C2 docetaxel + ramucirumab. Had extravasation  10/17/19 Port  10/23/19 Recurrent cough. Saw PCP. Azithromycin  10/29/19 Prednisone 40 mg once,  then 20 mg daily x 5 days, then 10 mg daily x 5 days for acute bronchitis  12/9/19 C4 docetaxel ramucirumab delayed 1 week due to URI and trip   12/12/19 ED for bronchitis. Had some chills prior to going to the ED    SUBJECTIVE  Mr. Kirkpatrick returns today for follow up of metastatic lung adenocarcinoma after 4 cycles of docetaxel + ramucirumab.     Tongue, upper lip on left side got sore x 5 days  Feels achy all over  Diarrhea x 5 days after C4 chemo - resolved in the last couple of days. O/w ok. Some cramping on D1 but no blood and no further cramping  Sleeping a lot during the day. Still has insomnia at night.  Still quite winded when walking up stairs - perhaps a bit worse than before  More tired overall.   No numbness/tingling    PAST MEDICAL HISTORY  Lung adenocarcinoma as above  L5 disk herniation. Epidural injection 5/22/18. Improved dramatically with chiropracter in the past.   BPH  Ascending aortic aneurysm  SKYLER not on CPAP    CURRENT OUTPATIENT MEDICATIONS  Current Outpatient Medications   Medication Sig Dispense Refill     albuterol (PROAIR HFA/PROVENTIL HFA/VENTOLIN HFA) 108 (90 Base) MCG/ACT inhaler Inhale 2 puffs into the lungs every 4 hours (while awake) 1 Inhaler 0     albuterol (PROVENTIL) (2.5 MG/3ML) 0.083% neb solution Take 1 vial (2.5 mg) by nebulization every 4 hours as needed for shortness of breath / dyspnea or wheezing 100 vial 11     calcium polycarbophil (FIBERCON) 625 MG tablet Take 2 tablets by mouth daily       dexamethasone (DECADRON) 4 MG tablet TAKE 1 TABLET (4 MG) BY MOUTH 2 TIMES DAILY TO BE TAKEN DAY BEFORE, DAY OF, AND DAY AFTER INFUSION.. 6 tablet 11     finasteride (PROSCAR) 5 MG tablet TAKE 1 TABLET BY MOUTH EVERY DAY 90 tablet 1     lidocaine-prilocaine (EMLA) 2.5-2.5 % external cream Apply topically as needed for moderate pain 30 g 11     LORazepam (ATIVAN) 0.5 MG tablet Take 1 tablet (0.5 mg) by mouth every 6 hours as needed (Nausea/Vomiting) 30 tablet 3     Melatonin 10 MG  TABS tablet Take 10 mg by mouth nightly as needed for sleep       MULTI VITAMIN MENS OR TABS 1 TABLET DAILY       ondansetron (ZOFRAN) 8 MG tablet Take 1 tablet (8 mg) by mouth every 8 hours as needed for nausea 30 tablet 11     prochlorperazine (COMPAZINE) 10 MG tablet Take 1 tablet (10 mg) by mouth every 6 hours as needed (Nausea/Vomiting) 30 tablet 11     tamsulosin (FLOMAX) 0.4 MG capsule Take 1 capsule (0.4 mg) by mouth daily 90 capsule 1     tiotropium (SPIRIVA HANDIHALER) 18 MCG inhaled capsule Inhale 1 capsule (18 mcg) into the lungs daily 1 capsule 11     ALLERGIES  No Known Allergies    REVIEW OF SYSTEMS  As above in the HPI, o/w complete 12-point ROS was negative.    PHYSICAL EXAM  BP 99/65 (BP Location: Right arm, Patient Position: Sitting, Cuff Size: Adult Regular)   Pulse 85   Temp 97.6  F (36.4  C) (Oral)   Resp 20   Wt 78.9 kg (174 lb)   SpO2 92%   BMI 23.60 kg/m     Wt Readings from Last 3 Encounters:   12/09/19 78 kg (172 lb)   12/09/19 78 kg (172 lb)   11/27/19 80 kg (176 lb 4.8 oz)     GEN: NAD  HEENT: EOMI, no icterus, injection or pallor. Oropharynx clear.  LUNGS: clear bilaterally albeit decreased throughout  CV: regular, no murmurs, rubs, or gallops  ABDOMEN: soft, non-tender, non-distended, normal bowel sounds  EXT: warm, well perfused, no edema  NEURO: alert  SKIN: no rashes    LABORATORY AND IMAGING STUDIES  Results for MAYA ELLER (MRN [id number]) as of 12/23/2019 11:40   12/20/2019 10:48   Sodium 140   Potassium 4.0   Chloride 109   Carbon Dioxide 28   Urea Nitrogen 13   Creatinine 0.89   GFR Estimate 86   GFR Estimate If Black >90   Calcium 8.6   Anion Gap 4   Albumin 3.1 (L)   Protein Total 6.7 (L)   Bilirubin Total 0.7   Alkaline Phosphatase 72   ALT 70   AST 45   Bilirubin Direct 0.2   Glucose 96   WBC 3.5 (L)   Hemoglobin 10.6 (L)   Hematocrit 32.8 (L)   Platelet Count 204   RBC Count 3.73 (L)   MCV 88   MCH 28.4   MCHC 32.3   RDW 16.6 (H)   Diff Method Automated Method   %  Neutrophils 53.4   % Lymphocytes 27.9   % Monocytes 16.4   % Eosinophils 1.1   % Basophils 0.6   % Immature Granulocytes 0.6   Nucleated RBCs 0   Absolute Neutrophil 1.9   Absolute Lymphocytes 1.0   Absolute Monocytes 0.6   Absolute Eosinophils 0.0   Absolute Basophils 0.0   Abs Immature Granulocytes 0.0   Absolute Nucleated RBC 0.0     CT Chest/Abdomen/Pelvis w Contrast  Narrative: CT CHEST/ABDOMEN/PELVIS WITH CONTRAST  12/16/2019 1:44 PM    HISTORY: Restaging lung cancer. Non-small cell lung cancer, left (H).    TECHNIQUE: CT scan obtained of the chest, abdomen, and pelvis with  oral and IV contrast. 84mL Isovue-370 IV injected. Radiation dose for  this scan was reduced using automated exposure control, adjustment of  the mA and/or kV according to patient size, or iterative  reconstruction technique.    COMPARISON:  CT chest 12/15/2019. CT chest abdomen and pelvis  10/21/2019.    FINDINGS:  Chest: Right chest port venous catheter tip at the cavoatrial region.  Adenopathy again identified. For example, anterior mediastinal  adenopathy is 3.5 x 1.3 cm, previously 4.3 x 1.6 cm series 2 image 32.  There are other adjacent lymph nodes that do not show substantial  change at the anterior left hilar region. No newly enlarged lymph  nodes seen within the chest. There is some irregular soft tissue  abutting the descending thoracic aorta. Approximate thickness is 1.3  cm, similar to the prior exam. There is small left pleural fluid,  stable.    Nodularity along the left major fissure again noted at several  locations. Previously noted nodularity at the left major fissure is  0.8 cm. Stable series 5 image 169. There are other areas of nodularity  that appear stable at the periphery of the left lung as well, mostly  at the left upper lobe but also involving the superior segment left  lower lobe, and medial left lower lobe base. Lingular nodular  consolidation versus focal atelectasis is stable measuring 2.8 cm  series 5 image  223. A few small nodules are stable along the right  minor fissure region.    Abdomen/pelvis: Postcontrast appearance of the liver shows several  small nodular hypodensities in the liver. One of these at the left  hepatic dome continues to measure 1.2 cm, stable series 2 image 59.  There are several additional lesions that do not show conspicuous  change but are somewhat better visualized on this examination. The  gallbladder is partially contracted. The adrenals, spleen, pancreas,  and kidneys do not show any new abnormalities. Stable nodular  thickening of the left adrenal measuring 2 x 1.5 cm series 2 image 60.  Vascular calcifications. No acute bowel abnormality identified. No new  enlarging lymph nodes seen in the abdomen or pelvis.    No destructive-appearing bony lesion is seen in the chest, abdomen, or  pelvis.  Impression: IMPRESSION:  1. Slightly smaller anterior mediastinal adenopathy. Other lymph nodes  in the chest appear stable.  2. Nodularity along the left pleura and fissures remain indeterminate  but appear stable. Some soft tissue surrounding the descending  thoracic aorta on the left is stable and there is an associated small  stable left pleural effusion.  3. Stable multiple small hypodense nodules of the liver suggesting  metastatic disease.  4. Stable left adrenal nodule that is indeterminant.    MAUREEN IBARRA MD     Imaging was personally reviewed     ASSESSMENT AND PLAN  NSCLC, adenocarcinoma: SD after 4 cycles of docetaxel + ramucirumab. On my personal review of the images, some of the mediastinal SUNNY has gotten clearly smaller since before starting the current regimen. Edy overall remains discouraged that this cancer will not go away, but after talking about it again today, he clearly understands that this is incurable and treatment can go on indefinitely short of the usual things that make us want to stop or change. That being said, his QOL right now is not great. He's been having more COPD  flares. I doubt this represents any kind of pneumonitis as his CT has no infiltrate to suggest that. See below. Will skip infusion today and give him a couple more weeks off. Will try dose-reducing docetaxel to 50 mg/m2 to see if that helps with fatigue and dysgeusia. Can also consider a little more prolonged dex taper to avoid post-dex crash.     Mild KIRBY: Creatinine 12/14 1.23. Resolved today.     Cough, acute bronchitis flares, COPD: Does have very steroid responsive flares. Saw Dr. Paige before, but was doing well at their last visit so she didn't make any changes. Will add Dulera inhaler to tiotropium. Continue albuterol prn. Recommended Pulmonary Rehab, which he was initially reluctant but then open to. Referral placed. Added morphine to be taken once in a great while prn only with friends. To try at home first to make sure he doesn't get sleepy. Counseled on avoiding constipation.     Mouth sores: Might be mucositis from either drug. If not starting to improve, will get swab to make sure it's not HSV.     Dysgeusia and anorexia: Hopefully food will taste a little better with a couple of more weeks off.     SKYLER, fatigue, insomnia: Used to use a CPAP but stopped after it didn't fit right and got frustrated with the medical supply store. Discussed potential implications on QOL. Has long been resistant to a sleep medicine consult, but is now open to seeing them again; discussed that there are many kinds of CPAP masks now, and that need to make sure he doesn't require titration again.     Allodynia: Purely sensory. Still present but better than before.    L malignant pleural effusion: Some trapped lung, no reaccumulation thus far after pleurx removed - has not been a problem for a while now.     Contrast reaction: Hasn't been taking methylpred actually and has been fine.    Access: Got port. Has script for emla cream, not using    Social: Plans to travel to the [place] in [place] next summer      A total of 40 minutes was spent with the patient, >50% of which was spent in counseling and coordination of care.    Susan Muller MD    Hematology, Oncology and Transplantation

## 2019-12-20 NOTE — NURSING NOTE
Oncology Rooming Note    December 20, 2019 10:54 AM   Kentrell Kirkpatrick is a 69 year old male who presents for:    Chief Complaint   Patient presents with     Oncology Clinic Visit     [facility] RETURN; LUNG CANCER     Port Draw     labs drawn via port by rn. vs taken      Initial Vitals: BP 99/65 (BP Location: Right arm, Patient Position: Sitting, Cuff Size: Adult Regular)   Pulse 85   Temp 97.6  F (36.4  C) (Oral)   Resp 20   Ht 1.829 m (6')   Wt 78.9 kg (174 lb)   SpO2 92%   BMI 23.60 kg/m   Estimated body mass index is 23.6 kg/m  as calculated from the following:    Height as of this encounter: 1.829 m (6').    Weight as of this encounter: 78.9 kg (174 lb). Body surface area is 2 meters squared.  No Pain (0) Comment: Data Unavailable   No LMP for male patient.  Allergies reviewed: Yes  Medications reviewed: Yes    Medications: MEDICATION REFILLS NEEDED TODAY. Provider was notified.  Pharmacy name entered into [employer]: [id number] - [place], [place] - [address]    Clinical concerns: Refill on dexamethasone.  Dr. Muller was notified.      Maria Del Carmen Villaseñor, [facility]

## 2019-12-20 NOTE — NURSING NOTE
"Chief Complaint   Patient presents with     Oncology Clinic Visit     [facility] RETURN; LUNG CANCER     Port Draw     labs drawn via port by rn. vs taken      Port accessed by RN with 20 G 3/4\" gripper needle, labs drawn from port in lab. Flushed with saline and heparin. VS taken. Pt checked into next appointment.   Fabiola Elder, RN     "

## 2019-12-20 NOTE — PROGRESS NOTES
[facility]  [facility] CANCER CLINIC    FOLLOW-UP VISIT NOTE    PATIENT NAME: Kentrell Kirkpatrick MRN # [id number]  DATE OF VISIT: December 20, 2019 YOB: 1950    CANCER TYPE: NSCLC, adenocarcinoma  STAGE: IV  ECOG PS: 2    Cancer Staging  Non-small cell lung cancer, left (H)  Staging form: Lung, AJCC 8th Edition  - Clinical stage from 3/11/2019: Stage IV (cT1c, cN3, pM1c) - Signed by Susan Muller MD on 3/11/2019    PD-L1: <1%  Lung panel: 3/1/19 on [id number] A1 and [id number] A1. Negative  NGS: [employer] 360 sent 2/28/19 negative for actionable mutations. SMAD4 E538, TP53 H179R, SMO A327G. MSI not high    SUMMARY  6/27/18 CT chest w/contrast to re-evaluate aortic aneurysm: 8 mm spiculated KEATON nodule, 3 mm RML nodule unchanged from 3/15/17 and 2/25/16 scans  2/4/19 Presented to PCP with fairly rapid onset of shortness of breath. Given albuterol  2/19/18 CXR and CT chest w/contrast. Large left effusion  2/20/19 L thoracentesis (Dr. Rodriguez), 1180 cc  3/1/19 L pleurx (Dr. Rodriguez)  3/13/19 C1 carboplatin pemetrexed pembrolizumab  3/15/19 L supraclavicular LN bx (IR, FNA). Path: lung adenocarcinoma  4/3/19 C2 carboplatin pemetrexed pembrolizumab (total 4 cycles)  4/15/19 FEV1 2.11 (61%), FVC 3.38 (73%), DLCO 27.7, 67% (59%)  4/18/19 TPA. Drained 900 cc after it got unclotted  5/9/19 Pleurx removed  6/5~8/28/19 Maintenance pemetrexed + pembrolizumab x 5 cycles --> PD  9/20/19 C1 docetaxel 60 mg/m2 + ramucirumab  9/28/19 UC for bronchitis. Levofloxacin  10/4/19 Brain MRI - routine rescreening - negative  10/11/19 C2 docetaxel + ramucirumab. Had extravasation  10/17/19 Port  10/23/19 Recurrent cough. Saw PCP. Azithromycin  10/29/19 Prednisone 40 mg once, then 20 mg daily x 5 days, then 10 mg daily x 5 days for acute bronchitis  12/9/19 C4 docetaxel ramucirumab delayed 1 week due to URI and trip   12/12/19 ED for bronchitis. Had some chills prior to going to the ED    SUBJECTIVE  Mr. Kirkpatrick returns  today for follow up of metastatic lung adenocarcinoma after 4 cycles of docetaxel + ramucirumab.     Tongue, upper lip on left side got sore x 5 days  Feels achy all over  Diarrhea x 5 days after C4 chemo - resolved in the last couple of days. O/w ok. Some cramping on D1 but no blood and no further cramping  Sleeping a lot during the day. Still has insomnia at night.  Still quite winded when walking up stairs - perhaps a bit worse than before  More tired overall.   No numbness/tingling    PAST MEDICAL HISTORY  Lung adenocarcinoma as above  L5 disk herniation. Epidural injection 5/22/18. Improved dramatically with chiropracter in the past.   BPH  Ascending aortic aneurysm  SKYLER not on CPAP    CURRENT OUTPATIENT MEDICATIONS  Current Outpatient Medications   Medication Sig Dispense Refill     albuterol (PROAIR HFA/PROVENTIL HFA/VENTOLIN HFA) 108 (90 Base) MCG/ACT inhaler Inhale 2 puffs into the lungs every 4 hours (while awake) 1 Inhaler 0     albuterol (PROVENTIL) (2.5 MG/3ML) 0.083% neb solution Take 1 vial (2.5 mg) by nebulization every 4 hours as needed for shortness of breath / dyspnea or wheezing 100 vial 11     calcium polycarbophil (FIBERCON) 625 MG tablet Take 2 tablets by mouth daily       dexamethasone (DECADRON) 4 MG tablet TAKE 1 TABLET (4 MG) BY MOUTH 2 TIMES DAILY TO BE TAKEN DAY BEFORE, DAY OF, AND DAY AFTER INFUSION.. 6 tablet 11     finasteride (PROSCAR) 5 MG tablet TAKE 1 TABLET BY MOUTH EVERY DAY 90 tablet 1     lidocaine-prilocaine (EMLA) 2.5-2.5 % external cream Apply topically as needed for moderate pain 30 g 11     LORazepam (ATIVAN) 0.5 MG tablet Take 1 tablet (0.5 mg) by mouth every 6 hours as needed (Nausea/Vomiting) 30 tablet 3     Melatonin 10 MG TABS tablet Take 10 mg by mouth nightly as needed for sleep       MULTI VITAMIN MENS OR TABS 1 TABLET DAILY       ondansetron (ZOFRAN) 8 MG tablet Take 1 tablet (8 mg) by mouth every 8 hours as needed for nausea 30 tablet 11     prochlorperazine  (COMPAZINE) 10 MG tablet Take 1 tablet (10 mg) by mouth every 6 hours as needed (Nausea/Vomiting) 30 tablet 11     tamsulosin (FLOMAX) 0.4 MG capsule Take 1 capsule (0.4 mg) by mouth daily 90 capsule 1     tiotropium (SPIRIVA HANDIHALER) 18 MCG inhaled capsule Inhale 1 capsule (18 mcg) into the lungs daily 1 capsule 11     ALLERGIES  No Known Allergies    REVIEW OF SYSTEMS  As above in the HPI, o/w complete 12-point ROS was negative.    PHYSICAL EXAM  BP 99/65 (BP Location: Right arm, Patient Position: Sitting, Cuff Size: Adult Regular)   Pulse 85   Temp 97.6  F (36.4  C) (Oral)   Resp 20   Wt 78.9 kg (174 lb)   SpO2 92%   BMI 23.60 kg/m    Wt Readings from Last 3 Encounters:   12/09/19 78 kg (172 lb)   12/09/19 78 kg (172 lb)   11/27/19 80 kg (176 lb 4.8 oz)     GEN: NAD  HEENT: EOMI, no icterus, injection or pallor. Oropharynx clear.  LUNGS: clear bilaterally albeit decreased throughout  CV: regular, no murmurs, rubs, or gallops  ABDOMEN: soft, non-tender, non-distended, normal bowel sounds  EXT: warm, well perfused, no edema  NEURO: alert  SKIN: no rashes    LABORATORY AND IMAGING STUDIES  Results for MAYA ELLER (MRN [id number]) as of 12/23/2019 11:40   12/20/2019 10:48   Sodium 140   Potassium 4.0   Chloride 109   Carbon Dioxide 28   Urea Nitrogen 13   Creatinine 0.89   GFR Estimate 86   GFR Estimate If Black >90   Calcium 8.6   Anion Gap 4   Albumin 3.1 (L)   Protein Total 6.7 (L)   Bilirubin Total 0.7   Alkaline Phosphatase 72   ALT 70   AST 45   Bilirubin Direct 0.2   Glucose 96   WBC 3.5 (L)   Hemoglobin 10.6 (L)   Hematocrit 32.8 (L)   Platelet Count 204   RBC Count 3.73 (L)   MCV 88   MCH 28.4   MCHC 32.3   RDW 16.6 (H)   Diff Method Automated Method   % Neutrophils 53.4   % Lymphocytes 27.9   % Monocytes 16.4   % Eosinophils 1.1   % Basophils 0.6   % Immature Granulocytes 0.6   Nucleated RBCs 0   Absolute Neutrophil 1.9   Absolute Lymphocytes 1.0   Absolute Monocytes 0.6   Absolute Eosinophils  0.0   Absolute Basophils 0.0   Abs Immature Granulocytes 0.0   Absolute Nucleated RBC 0.0     CT Chest/Abdomen/Pelvis w Contrast  Narrative: CT CHEST/ABDOMEN/PELVIS WITH CONTRAST  12/16/2019 1:44 PM    HISTORY: Restaging lung cancer. Non-small cell lung cancer, left (H).    TECHNIQUE: CT scan obtained of the chest, abdomen, and pelvis with  oral and IV contrast. 84mL [id number]-[id number] IV injected. Radiation dose for  this scan was reduced using automated exposure control, adjustment of  the mA and/or kV according to patient size, or iterative  reconstruction technique.    COMPARISON:  CT chest 12/15/2019. CT chest abdomen and pelvis  10/21/2019.    FINDINGS:  Chest: Right chest port venous catheter tip at the cavoatrial region.  Adenopathy again identified. For example, anterior mediastinal  adenopathy is 3.5 x 1.3 cm, previously 4.3 x 1.6 cm series 2 image 32.  There are other adjacent lymph nodes that do not show substantial  change at the anterior left hilar region. No newly enlarged lymph  nodes seen within the chest. There is some irregular soft tissue  abutting the descending thoracic aorta. Approximate thickness is 1.3  cm, similar to the prior exam. There is small left pleural fluid,  stable.    Nodularity along the left major fissure again noted at several  locations. Previously noted nodularity at the left major fissure is  0.8 cm. Stable series 5 image 169. There are other areas of nodularity  that appear stable at the periphery of the left lung as well, mostly  at the left upper lobe but also involving the superior segment left  lower lobe, and medial left lower lobe base. Lingular nodular  consolidation versus focal atelectasis is stable measuring 2.8 cm  series 5 image 223. A few small nodules are stable along the right  minor fissure region.    Abdomen/pelvis: Postcontrast appearance of the liver shows several  small nodular hypodensities in the liver. One of these at the left  hepatic dome continues to  measure 1.2 cm, stable series 2 image 59.  There are several additional lesions that do not show conspicuous  change but are somewhat better visualized on this examination. The  gallbladder is partially contracted. The adrenals, spleen, pancreas,  and kidneys do not show any new abnormalities. Stable nodular  thickening of the left adrenal measuring 2 x 1.5 cm series 2 image 60.  Vascular calcifications. No acute bowel abnormality identified. No new  enlarging lymph nodes seen in the abdomen or pelvis.    No destructive-appearing bony lesion is seen in the chest, abdomen, or  pelvis.  Impression: IMPRESSION:  1. Slightly smaller anterior mediastinal adenopathy. Other lymph nodes  in the chest appear stable.  2. Nodularity along the left pleura and fissures remain indeterminate  but appear stable. Some soft tissue surrounding the descending  thoracic aorta on the left is stable and there is an associated small  stable left pleural effusion.  3. Stable multiple small hypodense nodules of the liver suggesting  metastatic disease.  4. Stable left adrenal nodule that is indeterminant.    MAUREEN IBARRA MD     Imaging was personally reviewed     ASSESSMENT AND PLAN  NSCLC, adenocarcinoma: SD after 4 cycles of docetaxel + ramucirumab. On my personal review of the images, some of the mediastinal SUNNY has gotten clearly smaller since before starting the current regimen. Edy overall remains discouraged that this cancer will not go away, but after talking about it again today, he clearly understands that this is incurable and treatment can go on indefinitely short of the usual things that make us want to stop or change. That being said, his QOL right now is not great. He's been having more COPD flares. I doubt this represents any kind of pneumonitis as his CT has no infiltrate to suggest that. See below. Will skip infusion today and give him a couple more weeks off. Will try dose-reducing docetaxel to 50 mg/m2 to see if that helps  with fatigue and dysgeusia. Can also consider a little more prolonged dex taper to avoid post-dex crash.     Mild KIRBY: Creatinine 12/14 1.23. Resolved today.     Cough, acute bronchitis flares, COPD: Does have very steroid responsive flares. Saw Dr. Paige before, but was doing well at their last visit so she didn't make any changes. Will add Dulera inhaler to tiotropium. Continue albuterol prn. Recommended Pulmonary Rehab, which he was initially reluctant but then open to. Referral placed. Added morphine to be taken once in a great while prn only with friends. To try at home first to make sure he doesn't get sleepy. Counseled on avoiding constipation.     Mouth sores: Might be mucositis from either drug. If not starting to improve, will get swab to make sure it's not HSV.     Dysgeusia and anorexia: Hopefully food will taste a little better with a couple of more weeks off.     SKYLER, fatigue, insomnia: Used to use a CPAP but stopped after it didn't fit right and got frustrated with the medical supply store. Discussed potential implications on QOL. Has long been resistant to a sleep medicine consult, but is now open to seeing them again; discussed that there are many kinds of CPAP masks now, and that need to make sure he doesn't require titration again.     Allodynia: Purely sensory. Still present but better than before.    L malignant pleural effusion: Some trapped lung, no reaccumulation thus far after pleurx removed - has not been a problem for a while now.     Contrast reaction: Hasn't been taking methylpred actually and has been fine.    Access: Got port. Has script for emla cream, not using    Social: Plans to travel to the [place] in [place] next summer     A total of 40 minutes was spent with the patient, >50% of which was spent in counseling and coordination of care.    Susan Muller MD    Hematology, Oncology and Transplantation

## 2019-12-20 NOTE — NURSING NOTE
"          [facility] CARDIAC SPECIALTY CARE: [phone]                                              INTERAGENCY TRANSFER FORM - LAB / IMAGING / EKG / EMG RESULTS   2017                    Hospital Admission Date: 2017  RICARDO DORMAN   : 1943  Sex: Female        Attending Provider: Santi Terrazas MD     Allergies:  Sulfa Drugs    Infection:  None   Service:  HOSPITALIST    Ht:  1.651 m (5' 5\")   Wt:  82.1 kg (181 lb)   Admission Wt:  88.2 kg (194 lb 7.1 oz)    BMI:  30.12 kg/m 2   BSA:  1.94 m 2            Patient PCP Information     Provider PCP Type    Arielle Leger MD General         Lab Results - 3 Days      Creatinine [[id number]]  Resulted: 17, Result status: Final result    Ordering provider: Heather Ferrera MD  17 0000 Resulting lab: [facility]    Specimen Information    Type Source Collected On   Blood  17          Components       Value Reference Range Flag Lab   Creatinine 0.71 0.52 - 1.04 mg/dL  FrStHsLb   GFR Estimate 80 >60 mL/min/1.7m2  FrStHsLb   Comment:  Non  GFR Calc   GFR Estimate If Black >90 >60 mL/min/1.7m2  FrStHsLb   Comment:   GFR Calc            Magnesium [[id number]]  Resulted: 17, Result status: Final result    Ordering provider: Heather Ferrera MD  17 0000 Resulting lab: [facility]    Specimen Information    Type Source Collected On   Blood  17          Components       Value Reference Range Flag Lab   Magnesium 1.9 1.6 - 2.3 mg/dL  FrStHsLb            Phosphorus [[id number]]  Resulted: 17, Result status: Final result    Ordering provider: Heather Ferrera MD  17 Resulting lab: [facility]    Specimen Information    Type Source Collected On   Blood  17          Components       Value Reference Range Flag Lab   Phosphorus 3.7 2.5 - 4.5 mg/dL  FrStHsLb            Hepatic panel " Port de-accessed per provider request.  Patient tolerated with no incident.     See Flowsheets.     Maria Del Carmen Villaseñor, CMA     [[id number]] (Abnormal)  Resulted: 11/13/17 0556, Result status: Final result    Ordering provider: Heather Ferrera MD  11/13/17 0000 Resulting lab: [facility]    Specimen Information    Type Source Collected On   Blood  11/13/17 0525          Components       Value Reference Range Flag Lab   Bilirubin Direct 0.5 0.0 - 0.2 mg/dL H FrStHsLb   Bilirubin Total 1.1 0.2 - 1.3 mg/dL  FrStHsLb   Albumin 2.5 3.4 - 5.0 g/dL L FrStHsLb   Protein Total 7.2 6.8 - 8.8 g/dL  FrStHsLb   Alkaline Phosphatase 256 40 - 150 U/L H FrStHsLb    0 - 50 U/L H FrStHsLb    0 - 45 U/L H FrStHsLb            Potassium [[id number]]  Resulted: 11/13/17 0556, Result status: Final result    Ordering provider: Heather Ferrera MD  11/13/17 0000 Resulting lab: [facility]    Specimen Information    Type Source Collected On   Blood  11/13/17 0525          Components       Value Reference Range Flag Lab   Potassium 3.8 3.4 - 5.3 mmol/L  FrStHsLb            INR [[id number]] (Abnormal)  Resulted: 11/13/17 0553, Result status: Final result    Ordering provider: Heather Ferrera MD  11/13/17 0000 Resulting lab: [facility]    Specimen Information    Type Source Collected On   Blood  11/13/17 0525          Components       Value Reference Range Flag Lab   INR 2.57 0.86 - 1.14 H FrStHsLb            Potassium [[id number]]  Resulted: 11/12/17 1604, Result status: Final result    Ordering provider: Heather Ferrera MD  11/12/17 1540 Resulting lab: [facility]    Specimen Information    Type Source Collected On   Blood  11/12/17 1545          Components       Value Reference Range Flag Lab   Potassium 3.7 3.4 - 5.3 mmol/L  FrStHsLb            Potassium [[id number]]  Resulted: 11/12/17 1307, Result status: Final result    Ordering provider: Heather Ferrera MD  11/12/17 0848 Resulting lab: [facility]    Specimen Information    Type Source Collected On   Blood  11/12/17 1213           Components       Value Reference Range Flag Lab   Potassium 3.6 3.4 - 5.3 mmol/L  FrStHsLb            Magnesium [[id number]]  Resulted: 11/12/17 0619, Result status: Final result    Ordering provider: Heather Ferrera MD  11/12/17 0000 Resulting lab: [facility]    Specimen Information    Type Source Collected On   Blood  11/12/17 0530          Components       Value Reference Range Flag Lab   Magnesium 1.8 1.6 - 2.3 mg/dL  FrStHsLb            Phosphorus [[id number]]  Resulted: 11/12/17 0619, Result status: Final result    Ordering provider: Heather Ferrera MD  11/12/17 0000 Resulting lab: [facility]    Specimen Information    Type Source Collected On   Blood  11/12/17 0530          Components       Value Reference Range Flag Lab   Phosphorus 4.2 2.5 - 4.5 mg/dL  FrStHsLb            Basic metabolic panel [[id number]] (Abnormal)  Resulted: 11/12/17 0619, Result status: Final result    Ordering provider: Heather Ferrera MD  11/12/17 0000 Resulting lab: [facility]    Specimen Information    Type Source Collected On   Blood  11/12/17 0530          Components       Value Reference Range Flag Lab   Sodium 138 133 - 144 mmol/L  FrStHsLb   Potassium 3.2 3.4 - 5.3 mmol/L L FrStHsLb   Chloride 97 94 - 109 mmol/L  FrStHsLb   Carbon Dioxide 35 20 - 32 mmol/L H FrStHsLb   Anion Gap 6 3 - 14 mmol/L  FrStHsLb   Glucose 94 70 - 99 mg/dL  FrStHsLb   Urea Nitrogen 12 7 - 30 mg/dL  FrStHsLb   Creatinine 0.82 0.52 - 1.04 mg/dL  FrStHsLb   GFR Estimate 68 >60 mL/min/1.7m2  FrStHsLb   Comment:  Non  GFR Calc   GFR Estimate If Black 82 >60 mL/min/1.7m2  FrStHsLb   Comment:  African American GFR Calc   Calcium 8.3 8.5 - 10.1 mg/dL L FrStHsLb            Hepatic panel [[id number]] (Abnormal)  Resulted: 11/12/17 0619, Result status: Final result    Ordering provider: Heather Ferrera MD  11/12/17 0000 Resulting lab: [facility]    Specimen Information    Type  Source Collected On   Blood  11/12/17 0530          Components       Value Reference Range Flag Lab   Bilirubin Direct 0.5 0.0 - 0.2 mg/dL H FrStHsLb   Bilirubin Total 1.1 0.2 - 1.3 mg/dL  FrStHsLb   Albumin 2.3 3.4 - 5.0 g/dL L FrStHsLb   Protein Total 6.7 6.8 - 8.8 g/dL L FrStHsLb   Alkaline Phosphatase 251 40 - 150 U/L H FrStHsLb    0 - 50 U/L H FrStHsLb    0 - 45 U/L H FrStHsLb            INR [[id number]] (Abnormal)  Resulted: 11/12/17 0612, Result status: Final result    Ordering provider: Heather Ferrera MD  11/12/17 0000 Resulting lab: [facility]    Specimen Information    Type Source Collected On   Blood  11/12/17 0530          Components       Value Reference Range Flag Lab   INR 2.62 0.86 - 1.14 H FrStHsLb            Blood culture [[id number]]  Resulted: 11/12/17 0002, Result status: Final result    Ordering provider: Cami Bucio MD  11/05/17 2318 Resulting lab: [facility]    Specimen Information    Type Source Collected On   Blood Central Line 11/06/17 0139   Comment:  Distal          Components       Value Reference Range Flag Lab   Specimen Description Blood Distal      Special Requests Aerobic and anaerobic bottles received   FrStHsLb   Culture Micro No growth   226            Blood culture [[id number]]  Resulted: 11/12/17 0002, Result status: Final result    Ordering provider: Cami Bucio MD  11/05/17 2318 Resulting lab: [facility]    Specimen Information    Type Source Collected On   Blood Arm, Right 11/05/17 2315   Comment:  Right Arm          Components       Value Reference Range Flag Lab   Specimen Description Blood Right Arm      Special Requests Aerobic and anaerobic bottles received   FrStHsLb   Culture Micro No growth   226            Platelet count [[id number]]  Resulted: 11/11/17 1239, Result status: Final result    Ordering provider: Santi Terrazas MD  11/11/17 1003 Resulting lab: [facility]     Specimen Information    Type Source Collected On   Blood  11/11/17 1212          Components       Value Reference Range Flag Lab   Platelet Count 202 150 - 450 10e9/L  FrStHsLb            Hepatitis C RNA quantitative [[id number]]  Resulted: 11/11/17 0805, Result status: Final result    Ordering provider: Moy Cruz MD  11/08/17 0500 Resulting lab: [facility]    Specimen Information    Type Source Collected On     11/08/17 0500          Components       Value Reference Range Flag Lab   HCV RNA Quant IU/ml HCV RNA Not Detected HCVND^HCV RNA Not Detected [IU]/mL  75   Comment:         The KATHY AmpliPrep/KATHY TaqMan HCV Test is an FDA-approved in vitro nucleic   acid amplification test for the quantitation of HCV RNA in human plasma (ETDA   plasma) or serum using the KATHY AmpliPrep Instrument for automated viral   nucleic acid extraction and the KATHY TaqMan Analyzer or [place] Emergency Registry TaqMan for   automated Real Time PCR amplification and detection of the viral nucleic acid   target.  Titer results are reported in International Units/mL (IU/mL) using the 1st WHO   International standard for HCV for Nucleic Acid Amplification based assays.     Log of HCV RNA Qt Not Calculated <1.2 Log IU/mL  75            Phosphorus [[id number]]  Resulted: 11/11/17 0554, Result status: Final result    Ordering provider: Heather Ferrera MD  11/11/17 0000 Resulting lab: [facility]    Specimen Information    Type Source Collected On   Blood  11/11/17 0525          Components       Value Reference Range Flag Lab   Phosphorus 3.7 2.5 - 4.5 mg/dL  FrStHsLb            Magnesium [[id number]]  Resulted: 11/11/17 0554, Result status: Final result    Ordering provider: Heather Ferrera MD  11/11/17 0000 Resulting lab: [facility]    Specimen Information    Type Source Collected On   Blood  11/11/17 0525          Components       Value Reference Range Flag Lab   Magnesium 2.0 1.6 -  2.3 mg/dL  FrStHsLb            Basic metabolic panel [[id number]] (Abnormal)  Resulted: 11/11/17 0554, Result status: Final result    Ordering provider: Heather Ferrera MD  11/11/17 0000 Resulting lab: [facility]    Specimen Information    Type Source Collected On   Blood  11/11/17 0525          Components       Value Reference Range Flag Lab   Sodium 137 133 - 144 mmol/L  FrStHsLb   Potassium 3.4 3.4 - 5.3 mmol/L  FrStHsLb   Chloride 99 94 - 109 mmol/L  FrStHsLb   Carbon Dioxide 34 20 - 32 mmol/L H FrStHsLb   Anion Gap 4 3 - 14 mmol/L  FrStHsLb   Glucose 111 70 - 99 mg/dL H FrStHsLb   Urea Nitrogen 10 7 - 30 mg/dL  FrStHsLb   Creatinine 0.69 0.52 - 1.04 mg/dL  FrStHsLb   GFR Estimate 82 >60 mL/min/1.7m2  FrStHsLb   Comment:  Non  GFR Calc   GFR Estimate If Black >90 >60 mL/min/1.7m2  FrStHsLb   Comment:  African American GFR Calc   Calcium 8.6 8.5 - 10.1 mg/dL  FrStHsLb            INR [[id number]] (Abnormal)  Resulted: 11/11/17 0552, Result status: Final result    Ordering provider: Heather Ferrera MD  11/11/17 0000 Resulting lab: [facility]    Specimen Information    Type Source Collected On   Blood  11/11/17 0525          Components       Value Reference Range Flag Lab   INR 2.29 0.86 - 1.14 H FrStHsLb            INR [[id number]] (Abnormal)  Resulted: 11/10/17 0633, Result status: Final result    Ordering provider: Heather Ferrera MD  11/10/17 0000 Resulting lab: [facility]    Specimen Information    Type Source Collected On   Blood  11/10/17 0554          Components       Value Reference Range Flag Lab   INR 1.78 0.86 - 1.14 H FrStHsLb            Comprehensive metabolic panel [[id number]] (Abnormal)  Resulted: 11/10/17 0631, Result status: Final result    Ordering provider: Heather Ferrera MD  11/10/17 0000 Resulting lab: [facility]    Specimen Information    Type Source Collected On   Blood  11/10/17 0554          Components        Value Reference Range Flag Lab   Sodium 137 133 - 144 mmol/L  FrStHsLb   Potassium 3.4 3.4 - 5.3 mmol/L  FrStHsLb   Chloride 99 94 - 109 mmol/L  FrStHsLb   Carbon Dioxide 30 20 - 32 mmol/L  FrStHsLb   Anion Gap 8 3 - 14 mmol/L  FrStHsLb   Glucose 112 70 - 99 mg/dL H FrStHsLb   Urea Nitrogen 7 7 - 30 mg/dL  FrStHsLb   Creatinine 0.66 0.52 - 1.04 mg/dL  FrStHsLb   GFR Estimate 87 >60 mL/min/1.7m2  FrStHsLb   Comment:  Non  GFR Calc   GFR Estimate If Black >90 >60 mL/min/1.7m2  FrStHsLb   Comment:  African American GFR Calc   Calcium 8.1 8.5 - 10.1 mg/dL L FrStHsLb   Bilirubin Total 1.7 0.2 - 1.3 mg/dL H FrStHsLb   Albumin 2.1 3.4 - 5.0 g/dL L FrStHsLb   Protein Total 6.7 6.8 - 8.8 g/dL L FrStHsLb   Alkaline Phosphatase 215 40 - 150 U/L H FrStHsLb    0 - 50 U/L HH FrStHsLb   Comment:  Previous critical documented    0 - 45 U/L HH FrStHsLb   Comment:  Previous critical documented            Magnesium [[id number]] (Abnormal)  Resulted: 11/10/17 0627, Result status: Final result    Ordering provider: Heather Ferrera MD  11/10/17 0000 Resulting lab: [facility]    Specimen Information    Type Source Collected On   Blood  11/10/17 0554          Components       Value Reference Range Flag Lab   Magnesium 1.5 1.6 - 2.3 mg/dL L FrStHsLb            Phosphorus [[id number]]  Resulted: 11/10/17 0627, Result status: Final result    Ordering provider: Heather Ferrera MD  11/10/17 0000 Resulting lab: [facility]    Specimen Information    Type Source Collected On   Blood  11/10/17 0554          Components       Value Reference Range Flag Lab   Phosphorus 2.6 2.5 - 4.5 mg/dL  FrStHsLb            Testing Performed By     Lab - Abbreviation Name Director Address Valid Date Range    14 - FrStHsLb [facility] Unknown [address] [place] [address] 05/08/15 1057 - Present    75 - Unknown [facility] Unknown [address]  "[facility] [address] 01/15/15 1019 - Present    226 - Unknown [facility] Unknown [address] [place] [address] 12/19/14 0955 - Present            Unresulted Labs (24h ago through future)    Start       Ordered    11/10/17 0600  Magnesium  DAILY,   Routine      11/09/17 0646    11/10/17 0600  Phosphorus  DAILY,   Routine      11/09/17 0646    11/10/17 0600  INR  (warfarin (COUMADIN) Pharmacy Consult-INITIAL ORDER)  DAILY,   Routine      11/09/17 0646    Unscheduled  Magnesium  (Magnesium Replacement - Adult - \"High\" - Replacement for all levels less than or equal to 2 mg/dL)  CONDITIONAL (SPECIFY),   Routine     Comments:  Obtain Magnesium Level for these conditions:  *IF no magnesium result within 24 hrs before initiation of order set, draw magnesium level with next lab collect.    *2 HOURS AFTER last magnesium replacement dose when magnesium replacement given for level less than 1.6  *Next morning after magnesium dose.     Repeat Magnesium Replacement if necessary.    11/07/17 0249    Unscheduled  Phosphorus  (POTASSIUM Phosphate - \"Standard\" - Replacement for levels less than or equal to 2.4 mg/dL )  CONDITIONAL (SPECIFY),   Routine     Comments:  Obtain Phosphorus Level for these conditions:  *IF no phosphorus result within 24 hrs before initiation of order set, draw phosphorus level with next lab collect.    *2 HOURS AFTER last phosphorus replacement dose for levels less than 2.0.  *Next morning after phosphorus dose.     Repeat Phosphorus Replacement if necessary.    11/07/17 0249    Unscheduled  Potassium  (Potassium Replacement - \"High\" - Replacement for all levels less than 4.1 mmol/L - UU,UR,UA,RH,SH,PH,WY )  CONDITIONAL (SPECIFY),   Routine     Comments:  Obtain Potassium Level for these conditions:  *IF no potassium result within 24 hrs before initiation of order set, draw potassium level with next lab collect.    *2 HOURS AFTER last IV potassium replacement dose and 4 hours " "after an oral replacement dose when potassium replacement given for level less than 3.4.  *Next morning after potassium dose.     Repeat Potassium Replacement if necessary.    17 0249    Unscheduled  Potassium  (Potassium Replacement - \"Standard\" - For K levels less than 3.4 mmol/L - UU,UR,UA,RH,SH,PH,WY )  CONDITIONAL (SPECIFY),   Routine     Comments:  Obtain Potassium Level for these conditions:  *IF no potassium result within 24 hours before initiation of order set, draw potassium level with next lab collect.    *2 HOURS AFTER last IV potassium replacement dose and 4 hours after an oral replacement dose.  *Next morning after potassium dose.     Repeat Potassium Replacement if necessary.    17 1301         ECG/EMG Results      Echo Limited with Lumason [[id number]]  Resulted: 1757, Result status: Edited Result - FINAL    Ordering provider: Grace Terrazas MD  17 0450 Resulted by: Babatunde Blood MD    Performed: 17 - 17 09 Resulting lab: [facility]    Narrative:       [id number]  ECH  [id number]  [id number]^ZEINAB^GRACE^DEWAYNE           [facility]  Echocardiography Laboratory  [address] [place] [address]        Name: RICARDO DORMAN  MRN: [id number]  : 1943  Study Date: 2017 08:57 AM  Age: 74 yrs  Gender: Female  Patient Location: [facility]  Reason For Study: CHF  Ordering Physician: GRACE TERRAZAS  Referring Physician: RANDALL CAMARILLO  Performed By: Bella Tracey RDCS     BSA: 2.0 m2  Height: 65 in  Weight: 200 lb  HR: 108  BP: 89/58 mmHg  _____________________________________________________________________________  __        Procedure  Limited Echo Adult. Contrast Lumason.  _____________________________________________________________________________  __        Interpretation Summary     Limited study.     Left ventricular systolic function is moderate to severely reduced.There is  mod-severe global hypokinesia of the left " ventricle.LVEF by biplane 31%  The right ventricle is not well visualized.Moderately decreased right  ventricular systolic function  There is mild to moderate (1-2+) tricuspid regurgitation.  Pulmonary hypertension- RVSP 38 mm hg +RA.  The IVC is dilated and fails to change with respiration, suggesting elevated  central venous pressure.     Compared to echo dated 10/21/2017 no significant changes.     _____________________________________________________________________________  __        Left Ventricle  The left ventricle is normal in size. There is normal left ventricular wall  thickness. Left ventricular systolic function is moderate to severely reduced.  LVEF by biplane 31%. There is mod-severe global hypokinesia of the left  ventricle.     Right Ventricle  Moderately decreased right ventricular systolic function. The right ventricle  is not well visualized.     Atria  The left atrium is mildly dilated. The right atrium is mildly dilated. There  is no color Doppler evidence of an atrial shunt.     Mitral Valve  There is mild mitral annular calcification. There is mild (1+) mitral  regurgitation.        Tricuspid Valve  There is mild to moderate (1-2+) tricuspid regurgitation. The right  ventricular systolic pressure is approximated at 38.3 mmHg plus the right  atrial pressure. Pulmonary hypertension.     Aortic Valve  The aortic valve is trileaflet with aortic valve sclerosis. No aortic  regurgitation is present. No aortic stenosis is present.     Vessels  The aortic root is normal size. Normal size ascending aorta. The IVC is  dilated and fails to change with respiration, suggesting elevated central  venous pressure.     Pericardium  There is no pericardial effusion.     Rhythm  The rhythm was atrial fibrillation.     _____________________________________________________________________________  __  MMode/2D Measurements & Calculations  IVSd: 0.79 cm  LVIDd: 4.5 cm  LVIDs: 3.8 cm  LVPWd: 0.82 cm  FS: 16.4  %  EDV(Teich): 94.1 ml  ESV(Teich): 61.6 ml  LV mass(C)d: 116.6 grams  LV mass(C)dI: 58.9 grams/m2     Ao root diam: 3.2 cm  LA dimension: 4.5 cm  asc Aorta Diam: 3.3 cm  LA/Ao: 1.4  LA Volume (BP): 49.6 ml  LA Volume Index (BP): 25.1 ml/m2  RWT: 0.36        Doppler Measurements & Calculations  PA acc time: 0.06 sec  TR max cortney: 309.5 cm/sec  TR max P.3 mmHg              _____________________________________________________________________________  __           Report approved by: Meme Burciaga 2017 12:13 PM       1    Type Source Collected On     17 0857          View Image (below)        Echocardiogram Complete [[id number]]  Resulted: 17 09, Result status: In process    Ordering provider: Santi Terrazas MD  17 0450 Performed: 17 0933 - 17 0933    Resulting lab: [facility]                   Encounter-Level Documents:     There are no encounter-level documents.      Order-Level Documents:     There are no order-level documents.

## 2020-01-01 ENCOUNTER — OFFICE VISIT (OUTPATIENT)
Dept: RADIATION ONCOLOGY | Facility: CLINIC | Age: 70
End: 2020-01-01
Attending: RADIOLOGY
Payer: COMMERCIAL

## 2020-01-01 ENCOUNTER — HOSPITAL ENCOUNTER (OUTPATIENT)
Dept: CARDIAC REHAB | Facility: CLINIC | Age: 70
End: 2020-02-24
Attending: INTERNAL MEDICINE
Payer: COMMERCIAL

## 2020-01-01 ENCOUNTER — INFUSION THERAPY VISIT (OUTPATIENT)
Dept: INFUSION THERAPY | Facility: CLINIC | Age: 70
End: 2020-01-01
Attending: INTERNAL MEDICINE
Payer: COMMERCIAL

## 2020-01-01 ENCOUNTER — TELEPHONE (OUTPATIENT)
Dept: FAMILY MEDICINE | Facility: CLINIC | Age: 70
End: 2020-01-01

## 2020-01-01 ENCOUNTER — HOSPITAL ENCOUNTER (OUTPATIENT)
Facility: CLINIC | Age: 70
Setting detail: SPECIMEN
Discharge: HOME | End: 2020-06-29
Attending: INTERNAL MEDICINE | Admitting: INTERNAL MEDICINE
Payer: COMMERCIAL

## 2020-01-01 ENCOUNTER — OFFICE VISIT (OUTPATIENT)
Dept: RADIATION ONCOLOGY | Facility: CLINIC | Age: 70
End: 2020-01-01
Attending: NEUROLOGICAL SURGERY
Payer: COMMERCIAL

## 2020-01-01 ENCOUNTER — INFUSION THERAPY VISIT (OUTPATIENT)
Dept: INFUSION THERAPY | Facility: CLINIC | Age: 70
End: 2020-01-01
Attending: PHYSICIAN ASSISTANT
Payer: COMMERCIAL

## 2020-01-01 ENCOUNTER — PATIENT OUTREACH (OUTPATIENT)
Dept: ONCOLOGY | Facility: CLINIC | Age: 70
End: 2020-01-01

## 2020-01-01 ENCOUNTER — APPOINTMENT (OUTPATIENT)
Dept: LAB | Facility: CLINIC | Age: 70
End: 2020-01-01
Attending: INTERNAL MEDICINE
Payer: COMMERCIAL

## 2020-01-01 ENCOUNTER — ANCILLARY PROCEDURE (OUTPATIENT)
Dept: MRI IMAGING | Facility: CLINIC | Age: 70
End: 2020-01-01
Attending: INTERNAL MEDICINE
Payer: COMMERCIAL

## 2020-01-01 ENCOUNTER — RESEARCH ENCOUNTER (OUTPATIENT)
Dept: ONCOLOGY | Facility: CLINIC | Age: 70
End: 2020-01-01

## 2020-01-01 ENCOUNTER — ONCOLOGY VISIT (OUTPATIENT)
Dept: ONCOLOGY | Facility: CLINIC | Age: 70
End: 2020-01-01
Attending: INTERNAL MEDICINE
Payer: COMMERCIAL

## 2020-01-01 ENCOUNTER — CARE COORDINATION (OUTPATIENT)
Dept: CARDIOLOGY | Facility: CLINIC | Age: 70
End: 2020-01-01

## 2020-01-01 ENCOUNTER — HOSPITAL ENCOUNTER (OUTPATIENT)
Dept: ULTRASOUND IMAGING | Facility: CLINIC | Age: 70
Discharge: HOME | End: 2020-09-29
Attending: INTERNAL MEDICINE | Admitting: INTERNAL MEDICINE
Payer: COMMERCIAL

## 2020-01-01 ENCOUNTER — HOSPITAL ENCOUNTER (OUTPATIENT)
Dept: MEDSURG UNIT | Facility: CLINIC | Age: 70
End: 2020-09-28
Attending: RADIOLOGY
Payer: COMMERCIAL

## 2020-01-01 ENCOUNTER — HOSPITAL ENCOUNTER (OUTPATIENT)
Dept: CARDIAC REHAB | Facility: CLINIC | Age: 70
End: 2020-01-14
Attending: INTERNAL MEDICINE
Payer: COMMERCIAL

## 2020-01-01 ENCOUNTER — HOSPITAL ENCOUNTER (OUTPATIENT)
Facility: CLINIC | Age: 70
Setting detail: SPECIMEN
End: 2020-01-24
Attending: PHYSICIAN ASSISTANT
Payer: COMMERCIAL

## 2020-01-01 ENCOUNTER — ANCILLARY PROCEDURE (OUTPATIENT)
Dept: GENERAL RADIOLOGY | Facility: CLINIC | Age: 70
End: 2020-01-01
Attending: NURSE PRACTITIONER
Payer: COMMERCIAL

## 2020-01-01 ENCOUNTER — HOSPITAL ENCOUNTER (OUTPATIENT)
Dept: NUCLEAR MEDICINE | Facility: CLINIC | Age: 70
Setting detail: NUCLEAR MEDICINE
End: 2020-06-11
Attending: INTERNAL MEDICINE
Payer: COMMERCIAL

## 2020-01-01 ENCOUNTER — HOSPITAL ENCOUNTER (OUTPATIENT)
Dept: LAB | Facility: CLINIC | Age: 70
End: 2020-09-06
Attending: INTERNAL MEDICINE
Payer: COMMERCIAL

## 2020-01-01 ENCOUNTER — TELEPHONE (OUTPATIENT)
Dept: ONCOLOGY | Facility: CLINIC | Age: 70
End: 2020-01-01

## 2020-01-01 ENCOUNTER — ANCILLARY PROCEDURE (OUTPATIENT)
Dept: CT IMAGING | Facility: CLINIC | Age: 70
End: 2020-01-01
Attending: INTERNAL MEDICINE
Payer: COMMERCIAL

## 2020-01-01 ENCOUNTER — HOSPITAL ENCOUNTER (EMERGENCY)
Facility: CLINIC | Age: 70
Discharge: HOME | End: 2020-05-30
Attending: EMERGENCY MEDICINE | Admitting: EMERGENCY MEDICINE
Payer: COMMERCIAL

## 2020-01-01 ENCOUNTER — HOSPITAL ENCOUNTER (OUTPATIENT)
Facility: CLINIC | Age: 70
Setting detail: SPECIMEN
Discharge: HOME | End: 2020-06-10
Attending: PHYSICIAN ASSISTANT
Payer: COMMERCIAL

## 2020-01-01 ENCOUNTER — HOME INFUSION (PRE-WILLOW HOME INFUSION) (OUTPATIENT)
Dept: PHARMACY | Facility: CLINIC | Age: 70
End: 2020-01-01

## 2020-01-01 ENCOUNTER — HOSPITAL ENCOUNTER (OUTPATIENT)
Dept: MRI IMAGING | Facility: CLINIC | Age: 70
End: 2020-09-28
Attending: RADIOLOGY
Payer: COMMERCIAL

## 2020-01-01 ENCOUNTER — HOSPITAL ENCOUNTER (OUTPATIENT)
Dept: CARDIAC REHAB | Facility: CLINIC | Age: 70
End: 2020-03-05
Attending: INTERNAL MEDICINE
Payer: COMMERCIAL

## 2020-01-01 ENCOUNTER — MYC MEDICAL ADVICE (OUTPATIENT)
Dept: ONCOLOGY | Facility: CLINIC | Age: 70
End: 2020-01-01

## 2020-01-01 ENCOUNTER — APPOINTMENT (OUTPATIENT)
Dept: CT IMAGING | Facility: CLINIC | Age: 70
End: 2020-01-01
Attending: EMERGENCY MEDICINE
Payer: COMMERCIAL

## 2020-01-01 ENCOUNTER — DOCUMENTATION ONLY (OUTPATIENT)
Dept: ONCOLOGY | Facility: CLINIC | Age: 70
End: 2020-01-01

## 2020-01-01 ENCOUNTER — HOSPITAL ENCOUNTER (OUTPATIENT)
Facility: CLINIC | Age: 70
Setting detail: SPECIMEN
Discharge: HOME | End: 2020-04-22
Attending: INTERNAL MEDICINE | Admitting: NURSE PRACTITIONER
Payer: COMMERCIAL

## 2020-01-01 ENCOUNTER — HOSPITAL ENCOUNTER (OUTPATIENT)
Dept: PHYSICAL THERAPY | Facility: CLINIC | Age: 70
Setting detail: THERAPIES SERIES
End: 2020-06-25
Attending: INTERNAL MEDICINE
Payer: COMMERCIAL

## 2020-01-01 ENCOUNTER — HOSPITAL ENCOUNTER (OUTPATIENT)
Dept: CT IMAGING | Facility: CLINIC | Age: 70
Discharge: HOME | End: 2020-02-12
Attending: INTERNAL MEDICINE | Admitting: INTERNAL MEDICINE
Payer: COMMERCIAL

## 2020-01-01 ENCOUNTER — HOSPITAL ENCOUNTER (OUTPATIENT)
Facility: CLINIC | Age: 70
Setting detail: SPECIMEN
Discharge: HOME | End: 2020-01-24
Attending: PHYSICIAN ASSISTANT | Admitting: PHYSICIAN ASSISTANT
Payer: COMMERCIAL

## 2020-01-01 ENCOUNTER — NURSE TRIAGE (OUTPATIENT)
Dept: NURSING | Facility: CLINIC | Age: 70
End: 2020-01-01

## 2020-01-01 ENCOUNTER — OFFICE VISIT (OUTPATIENT)
Dept: SLEEP MEDICINE | Facility: CLINIC | Age: 70
End: 2020-01-01
Payer: COMMERCIAL

## 2020-01-01 ENCOUNTER — HOSPITAL ENCOUNTER (OUTPATIENT)
Dept: CARDIAC REHAB | Facility: CLINIC | Age: 70
End: 2020-02-18
Attending: INTERNAL MEDICINE
Payer: COMMERCIAL

## 2020-01-01 ENCOUNTER — HOSPITAL ENCOUNTER (OUTPATIENT)
Dept: CARDIAC REHAB | Facility: CLINIC | Age: 70
End: 2020-03-17
Attending: INTERNAL MEDICINE
Payer: COMMERCIAL

## 2020-01-01 ENCOUNTER — HOSPITAL ENCOUNTER (OUTPATIENT)
Facility: CLINIC | Age: 70
Setting detail: SPECIMEN
Discharge: HOME | End: 2020-03-09
Attending: INTERNAL MEDICINE | Admitting: INTERNAL MEDICINE
Payer: COMMERCIAL

## 2020-01-01 ENCOUNTER — HOSPITAL ENCOUNTER (OUTPATIENT)
Dept: CT IMAGING | Facility: CLINIC | Age: 70
Discharge: HOME | End: 2020-05-06
Attending: INTERNAL MEDICINE | Admitting: INTERNAL MEDICINE
Payer: COMMERCIAL

## 2020-01-01 ENCOUNTER — E-VISIT (OUTPATIENT)
Dept: FAMILY MEDICINE | Facility: CLINIC | Age: 70
End: 2020-01-01
Payer: COMMERCIAL

## 2020-01-01 ENCOUNTER — HOSPITAL ENCOUNTER (OUTPATIENT)
Dept: CARDIAC REHAB | Facility: CLINIC | Age: 70
End: 2020-02-27
Attending: INTERNAL MEDICINE
Payer: COMMERCIAL

## 2020-01-01 ENCOUNTER — HOSPITAL ENCOUNTER (OUTPATIENT)
Facility: CLINIC | Age: 70
Setting detail: OBSERVATION
Discharge: HOME | End: 2020-08-27
Attending: EMERGENCY MEDICINE | Admitting: INTERNAL MEDICINE
Payer: COMMERCIAL

## 2020-01-01 ENCOUNTER — OFFICE VISIT (OUTPATIENT)
Dept: CARDIOLOGY | Facility: CLINIC | Age: 70
End: 2020-01-01
Attending: INTERNAL MEDICINE
Payer: COMMERCIAL

## 2020-01-01 ENCOUNTER — HOSPITAL ENCOUNTER (OUTPATIENT)
Facility: CLINIC | Age: 70
Setting detail: SPECIMEN
Discharge: HOME | End: 2020-04-29
Attending: INTERNAL MEDICINE | Admitting: NURSE PRACTITIONER
Payer: COMMERCIAL

## 2020-01-01 ENCOUNTER — TELEPHONE (OUTPATIENT)
Dept: RADIATION ONCOLOGY | Facility: CLINIC | Age: 70
End: 2020-01-01

## 2020-01-01 ENCOUNTER — DOCUMENTATION ONLY (OUTPATIENT)
Dept: OTHER | Facility: CLINIC | Age: 70
End: 2020-01-01

## 2020-01-01 ENCOUNTER — HOSPITAL ENCOUNTER (OUTPATIENT)
Dept: CARDIAC REHAB | Facility: CLINIC | Age: 70
End: 2020-01-28
Attending: INTERNAL MEDICINE
Payer: COMMERCIAL

## 2020-01-01 ENCOUNTER — HOSPITAL ENCOUNTER (OUTPATIENT)
Dept: CT IMAGING | Facility: CLINIC | Age: 70
End: 2020-06-11
Attending: INTERNAL MEDICINE
Payer: COMMERCIAL

## 2020-01-01 ENCOUNTER — HOSPITAL ENCOUNTER (OUTPATIENT)
Facility: CLINIC | Age: 70
Setting detail: SPECIMEN
Discharge: HOME | End: 2020-08-06
Attending: INTERNAL MEDICINE | Admitting: NURSE PRACTITIONER
Payer: COMMERCIAL

## 2020-01-01 ENCOUNTER — ONCOLOGY VISIT (OUTPATIENT)
Dept: ONCOLOGY | Facility: CLINIC | Age: 70
End: 2020-01-01
Attending: PHYSICIAN ASSISTANT
Payer: COMMERCIAL

## 2020-01-01 ENCOUNTER — HOSPITAL ENCOUNTER (OUTPATIENT)
Dept: MRI IMAGING | Facility: CLINIC | Age: 70
Discharge: HOME | End: 2020-05-24
Attending: INTERNAL MEDICINE | Admitting: INTERNAL MEDICINE
Payer: COMMERCIAL

## 2020-01-01 ENCOUNTER — MYC MEDICAL ADVICE (OUTPATIENT)
Dept: FAMILY MEDICINE | Facility: CLINIC | Age: 70
End: 2020-01-01

## 2020-01-01 ENCOUNTER — HOSPITAL ENCOUNTER (OUTPATIENT)
Facility: CLINIC | Age: 70
Setting detail: SPECIMEN
Discharge: HOME | End: 2020-04-08
Attending: INTERNAL MEDICINE | Admitting: INTERNAL MEDICINE
Payer: COMMERCIAL

## 2020-01-01 ENCOUNTER — HOSPITAL ENCOUNTER (OUTPATIENT)
Dept: CARDIOLOGY | Facility: CLINIC | Age: 70
Discharge: HOME | End: 2020-09-29
Attending: INTERNAL MEDICINE | Admitting: INTERNAL MEDICINE
Payer: COMMERCIAL

## 2020-01-01 ENCOUNTER — HOSPITAL ENCOUNTER (OUTPATIENT)
Dept: CARDIAC REHAB | Facility: CLINIC | Age: 70
End: 2020-01-30
Attending: INTERNAL MEDICINE
Payer: COMMERCIAL

## 2020-01-01 ENCOUNTER — HOSPITAL ENCOUNTER (OUTPATIENT)
Dept: CARDIAC REHAB | Facility: CLINIC | Age: 70
End: 2020-02-13
Attending: INTERNAL MEDICINE
Payer: COMMERCIAL

## 2020-01-01 ENCOUNTER — VIRTUAL VISIT (OUTPATIENT)
Dept: ONCOLOGY | Facility: CLINIC | Age: 70
End: 2020-01-01
Attending: NURSE PRACTITIONER
Payer: COMMERCIAL

## 2020-01-01 ENCOUNTER — HOSPITAL ENCOUNTER (OUTPATIENT)
Dept: MRI IMAGING | Facility: CLINIC | Age: 70
Discharge: HOME | End: 2020-05-06
Attending: INTERNAL MEDICINE | Admitting: INTERNAL MEDICINE
Payer: COMMERCIAL

## 2020-01-01 ENCOUNTER — OFFICE VISIT (OUTPATIENT)
Dept: PALLIATIVE CARE | Facility: CLINIC | Age: 70
End: 2020-01-01
Attending: INTERNAL MEDICINE
Payer: COMMERCIAL

## 2020-01-01 ENCOUNTER — HOSPITAL ENCOUNTER (OUTPATIENT)
Dept: CARDIAC REHAB | Facility: CLINIC | Age: 70
End: 2020-02-11
Attending: INTERNAL MEDICINE
Payer: COMMERCIAL

## 2020-01-01 ENCOUNTER — HOSPITAL ENCOUNTER (OUTPATIENT)
Dept: CARDIAC REHAB | Facility: CLINIC | Age: 70
End: 2020-03-12
Attending: INTERNAL MEDICINE
Payer: COMMERCIAL

## 2020-01-01 ENCOUNTER — HOSPITAL ENCOUNTER (OUTPATIENT)
Dept: LAB | Facility: CLINIC | Age: 70
Discharge: HOME | End: 2020-09-05
Attending: INTERNAL MEDICINE | Admitting: NURSE PRACTITIONER
Payer: COMMERCIAL

## 2020-01-01 ENCOUNTER — HOSPITAL ENCOUNTER (OUTPATIENT)
Facility: CLINIC | Age: 70
Setting detail: SPECIMEN
Discharge: HOME | End: 2020-05-29
Attending: PHYSICIAN ASSISTANT | Admitting: PHYSICIAN ASSISTANT
Payer: COMMERCIAL

## 2020-01-01 ENCOUNTER — HOSPITAL ENCOUNTER (OUTPATIENT)
Dept: MRI IMAGING | Facility: CLINIC | Age: 70
End: 2020-06-11
Attending: INTERNAL MEDICINE
Payer: COMMERCIAL

## 2020-01-01 ENCOUNTER — HOSPITAL ENCOUNTER (OUTPATIENT)
Dept: CT IMAGING | Facility: CLINIC | Age: 70
End: 2020-03-31
Attending: INTERNAL MEDICINE
Payer: COMMERCIAL

## 2020-01-01 ENCOUNTER — VIRTUAL VISIT (OUTPATIENT)
Dept: ONCOLOGY | Facility: CLINIC | Age: 70
End: 2020-01-01
Payer: COMMERCIAL

## 2020-01-01 ENCOUNTER — HOSPITAL ENCOUNTER (OUTPATIENT)
Dept: NUCLEAR MEDICINE | Facility: CLINIC | Age: 70
Setting detail: NUCLEAR MEDICINE
End: 2020-03-31
Attending: INTERNAL MEDICINE
Payer: COMMERCIAL

## 2020-01-01 ENCOUNTER — ONCOLOGY VISIT (OUTPATIENT)
Dept: RADIATION ONCOLOGY | Facility: CLINIC | Age: 70
End: 2020-01-01

## 2020-01-01 ENCOUNTER — HOSPITAL ENCOUNTER (OUTPATIENT)
Dept: CARDIOLOGY | Facility: CLINIC | Age: 70
End: 2020-06-11
Attending: INTERNAL MEDICINE
Payer: COMMERCIAL

## 2020-01-01 ENCOUNTER — HOSPITAL ENCOUNTER (OUTPATIENT)
Facility: CLINIC | Age: 70
Setting detail: SPECIMEN
Discharge: HOME | End: 2020-01-03
Attending: INTERNAL MEDICINE | Admitting: INTERNAL MEDICINE
Payer: COMMERCIAL

## 2020-01-01 ENCOUNTER — HOSPITAL ENCOUNTER (OUTPATIENT)
Dept: CARDIAC REHAB | Facility: CLINIC | Age: 70
End: 2020-03-03
Attending: INTERNAL MEDICINE
Payer: COMMERCIAL

## 2020-01-01 ENCOUNTER — HOSPITAL ENCOUNTER (OUTPATIENT)
Dept: CARDIAC REHAB | Facility: CLINIC | Age: 70
End: 2020-03-10
Attending: INTERNAL MEDICINE
Payer: COMMERCIAL

## 2020-01-01 ENCOUNTER — HOSPITAL ENCOUNTER (OUTPATIENT)
Facility: CLINIC | Age: 70
Setting detail: SPECIMEN
Discharge: HOME | End: 2020-02-17
Attending: INTERNAL MEDICINE | Admitting: INTERNAL MEDICINE
Payer: COMMERCIAL

## 2020-01-01 ENCOUNTER — HOSPITAL ENCOUNTER (EMERGENCY)
Facility: CLINIC | Age: 70
Discharge: HOME | End: 2020-07-12
Attending: EMERGENCY MEDICINE | Admitting: EMERGENCY MEDICINE
Payer: COMMERCIAL

## 2020-01-01 ENCOUNTER — HOSPITAL ENCOUNTER (OUTPATIENT)
Dept: ULTRASOUND IMAGING | Facility: CLINIC | Age: 70
Discharge: HOME | End: 2020-07-01
Attending: INTERNAL MEDICINE | Admitting: INTERNAL MEDICINE
Payer: COMMERCIAL

## 2020-01-01 ENCOUNTER — OFFICE VISIT (OUTPATIENT)
Dept: FAMILY MEDICINE | Facility: CLINIC | Age: 70
End: 2020-01-01
Payer: COMMERCIAL

## 2020-01-01 VITALS
HEART RATE: 81 BPM | RESPIRATION RATE: 18 BRPM | HEIGHT: 73 IN | BODY MASS INDEX: 23.19 KG/M2 | SYSTOLIC BLOOD PRESSURE: 129 MMHG | DIASTOLIC BLOOD PRESSURE: 75 MMHG | WEIGHT: 175 LBS | TEMPERATURE: 97.8 F | OXYGEN SATURATION: 96 %

## 2020-01-01 VITALS
HEART RATE: 79 BPM | OXYGEN SATURATION: 98 % | DIASTOLIC BLOOD PRESSURE: 64 MMHG | SYSTOLIC BLOOD PRESSURE: 102 MMHG | TEMPERATURE: 95.5 F | BODY MASS INDEX: 24.11 KG/M2 | WEIGHT: 178 LBS | HEIGHT: 72 IN

## 2020-01-01 VITALS
RESPIRATION RATE: 18 BRPM | DIASTOLIC BLOOD PRESSURE: 80 MMHG | SYSTOLIC BLOOD PRESSURE: 132 MMHG | WEIGHT: 180.4 LBS | OXYGEN SATURATION: 96 % | TEMPERATURE: 97.4 F | BODY MASS INDEX: 24.46 KG/M2 | HEART RATE: 85 BPM

## 2020-01-01 VITALS
OXYGEN SATURATION: 95 % | HEART RATE: 91 BPM | WEIGHT: 181 LBS | DIASTOLIC BLOOD PRESSURE: 59 MMHG | BODY MASS INDEX: 24.54 KG/M2 | SYSTOLIC BLOOD PRESSURE: 127 MMHG | RESPIRATION RATE: 18 BRPM | RESPIRATION RATE: 20 BRPM | HEART RATE: 100 BPM | SYSTOLIC BLOOD PRESSURE: 95 MMHG | DIASTOLIC BLOOD PRESSURE: 76 MMHG | OXYGEN SATURATION: 95 % | TEMPERATURE: 97.5 F

## 2020-01-01 VITALS
RESPIRATION RATE: 16 BRPM | SYSTOLIC BLOOD PRESSURE: 116 MMHG | DIASTOLIC BLOOD PRESSURE: 72 MMHG | OXYGEN SATURATION: 98 % | BODY MASS INDEX: 22.02 KG/M2 | WEIGHT: 166.9 LBS | TEMPERATURE: 97.6 F | HEART RATE: 58 BPM

## 2020-01-01 VITALS
BODY MASS INDEX: 21.06 KG/M2 | HEART RATE: 95 BPM | DIASTOLIC BLOOD PRESSURE: 73 MMHG | SYSTOLIC BLOOD PRESSURE: 115 MMHG | WEIGHT: 159.6 LBS | OXYGEN SATURATION: 97 % | RESPIRATION RATE: 16 BRPM | TEMPERATURE: 98.1 F

## 2020-01-01 VITALS
WEIGHT: 175 LBS | OXYGEN SATURATION: 96 % | HEIGHT: 72 IN | BODY MASS INDEX: 23.7 KG/M2 | SYSTOLIC BLOOD PRESSURE: 129 MMHG | HEART RATE: 86 BPM | DIASTOLIC BLOOD PRESSURE: 71 MMHG

## 2020-01-01 VITALS
OXYGEN SATURATION: 98 % | SYSTOLIC BLOOD PRESSURE: 116 MMHG | RESPIRATION RATE: 16 BRPM | TEMPERATURE: 98 F | DIASTOLIC BLOOD PRESSURE: 76 MMHG | HEART RATE: 91 BPM

## 2020-01-01 VITALS
SYSTOLIC BLOOD PRESSURE: 124 MMHG | RESPIRATION RATE: 20 BRPM | HEART RATE: 94 BPM | HEIGHT: 73 IN | WEIGHT: 170 LBS | OXYGEN SATURATION: 98 % | BODY MASS INDEX: 22.53 KG/M2 | DIASTOLIC BLOOD PRESSURE: 67 MMHG

## 2020-01-01 VITALS
SYSTOLIC BLOOD PRESSURE: 122 MMHG | RESPIRATION RATE: 16 BRPM | HEART RATE: 74 BPM | WEIGHT: 178 LBS | TEMPERATURE: 96.8 F | BODY MASS INDEX: 24.14 KG/M2 | DIASTOLIC BLOOD PRESSURE: 74 MMHG | OXYGEN SATURATION: 97 %

## 2020-01-01 VITALS
SYSTOLIC BLOOD PRESSURE: 110 MMHG | RESPIRATION RATE: 16 BRPM | DIASTOLIC BLOOD PRESSURE: 72 MMHG | TEMPERATURE: 96.4 F | BODY MASS INDEX: 19.78 KG/M2 | WEIGHT: 149.9 LBS | OXYGEN SATURATION: 96 % | HEART RATE: 92 BPM

## 2020-01-01 VITALS
OXYGEN SATURATION: 95 % | RESPIRATION RATE: 16 BRPM | BODY MASS INDEX: 21.32 KG/M2 | SYSTOLIC BLOOD PRESSURE: 127 MMHG | DIASTOLIC BLOOD PRESSURE: 80 MMHG | TEMPERATURE: 97.4 F | HEART RATE: 88 BPM | WEIGHT: 161.6 LBS

## 2020-01-01 VITALS
HEIGHT: 73 IN | OXYGEN SATURATION: 99 % | BODY MASS INDEX: 19.81 KG/M2 | SYSTOLIC BLOOD PRESSURE: 97 MMHG | HEART RATE: 92 BPM | DIASTOLIC BLOOD PRESSURE: 64 MMHG | RESPIRATION RATE: 18 BRPM | WEIGHT: 149.5 LBS

## 2020-01-01 VITALS
BODY MASS INDEX: 19.99 KG/M2 | TEMPERATURE: 97.7 F | WEIGHT: 151.5 LBS | SYSTOLIC BLOOD PRESSURE: 116 MMHG | DIASTOLIC BLOOD PRESSURE: 81 MMHG | RESPIRATION RATE: 18 BRPM | OXYGEN SATURATION: 96 % | HEART RATE: 94 BPM

## 2020-01-01 VITALS
HEART RATE: 98 BPM | SYSTOLIC BLOOD PRESSURE: 104 MMHG | DIASTOLIC BLOOD PRESSURE: 73 MMHG | BODY MASS INDEX: 20.05 KG/M2 | WEIGHT: 151.3 LBS | HEIGHT: 73 IN

## 2020-01-01 VITALS
SYSTOLIC BLOOD PRESSURE: 108 MMHG | TEMPERATURE: 98 F | DIASTOLIC BLOOD PRESSURE: 70 MMHG | RESPIRATION RATE: 20 BRPM | HEART RATE: 89 BPM | OXYGEN SATURATION: 95 % | HEIGHT: 73 IN | WEIGHT: 153.3 LBS | BODY MASS INDEX: 20.32 KG/M2

## 2020-01-01 VITALS
OXYGEN SATURATION: 94 % | TEMPERATURE: 98.6 F | HEART RATE: 87 BPM | SYSTOLIC BLOOD PRESSURE: 104 MMHG | DIASTOLIC BLOOD PRESSURE: 66 MMHG | RESPIRATION RATE: 18 BRPM

## 2020-01-01 VITALS
TEMPERATURE: 97.7 F | DIASTOLIC BLOOD PRESSURE: 70 MMHG | HEART RATE: 94 BPM | WEIGHT: 163.7 LBS | OXYGEN SATURATION: 98 % | SYSTOLIC BLOOD PRESSURE: 124 MMHG | RESPIRATION RATE: 16 BRPM | BODY MASS INDEX: 21.6 KG/M2

## 2020-01-01 VITALS
SYSTOLIC BLOOD PRESSURE: 119 MMHG | WEIGHT: 176.4 LBS | TEMPERATURE: 97.5 F | BODY MASS INDEX: 23.89 KG/M2 | HEART RATE: 76 BPM | HEIGHT: 72 IN | RESPIRATION RATE: 15 BRPM | OXYGEN SATURATION: 97 % | DIASTOLIC BLOOD PRESSURE: 75 MMHG

## 2020-01-01 VITALS
TEMPERATURE: 98.1 F | BODY MASS INDEX: 21.11 KG/M2 | RESPIRATION RATE: 16 BRPM | DIASTOLIC BLOOD PRESSURE: 75 MMHG | WEIGHT: 160 LBS | HEART RATE: 71 BPM | SYSTOLIC BLOOD PRESSURE: 136 MMHG | OXYGEN SATURATION: 99 %

## 2020-01-01 VITALS
DIASTOLIC BLOOD PRESSURE: 57 MMHG | OXYGEN SATURATION: 97 % | BODY MASS INDEX: 19.47 KG/M2 | HEART RATE: 108 BPM | SYSTOLIC BLOOD PRESSURE: 91 MMHG | WEIGHT: 146.9 LBS | TEMPERATURE: 97.2 F | RESPIRATION RATE: 16 BRPM | HEIGHT: 73 IN

## 2020-01-01 VITALS
DIASTOLIC BLOOD PRESSURE: 66 MMHG | BODY MASS INDEX: 23.96 KG/M2 | TEMPERATURE: 96.5 F | OXYGEN SATURATION: 97 % | HEART RATE: 96 BPM | SYSTOLIC BLOOD PRESSURE: 110 MMHG | WEIGHT: 176.7 LBS

## 2020-01-01 VITALS
SYSTOLIC BLOOD PRESSURE: 104 MMHG | TEMPERATURE: 97.7 F | RESPIRATION RATE: 20 BRPM | DIASTOLIC BLOOD PRESSURE: 69 MMHG | HEART RATE: 89 BPM | OXYGEN SATURATION: 96 %

## 2020-01-01 VITALS
HEIGHT: 73 IN | TEMPERATURE: 97 F | RESPIRATION RATE: 16 BRPM | WEIGHT: 150.6 LBS | SYSTOLIC BLOOD PRESSURE: 104 MMHG | BODY MASS INDEX: 19.96 KG/M2 | HEART RATE: 90 BPM | DIASTOLIC BLOOD PRESSURE: 68 MMHG | OXYGEN SATURATION: 98 %

## 2020-01-01 VITALS
BODY MASS INDEX: 24.36 KG/M2 | SYSTOLIC BLOOD PRESSURE: 117 MMHG | RESPIRATION RATE: 16 BRPM | DIASTOLIC BLOOD PRESSURE: 77 MMHG | HEART RATE: 76 BPM | TEMPERATURE: 97 F | WEIGHT: 179.7 LBS | OXYGEN SATURATION: 97 %

## 2020-01-01 VITALS
TEMPERATURE: 97.7 F | SYSTOLIC BLOOD PRESSURE: 140 MMHG | DIASTOLIC BLOOD PRESSURE: 88 MMHG | OXYGEN SATURATION: 98 % | WEIGHT: 178.5 LBS | BODY MASS INDEX: 24.2 KG/M2 | RESPIRATION RATE: 16 BRPM

## 2020-01-01 VITALS
DIASTOLIC BLOOD PRESSURE: 79 MMHG | WEIGHT: 155.7 LBS | SYSTOLIC BLOOD PRESSURE: 116 MMHG | OXYGEN SATURATION: 97 % | HEART RATE: 88 BPM | RESPIRATION RATE: 18 BRPM | TEMPERATURE: 97.4 F | BODY MASS INDEX: 20.54 KG/M2

## 2020-01-01 VITALS
OXYGEN SATURATION: 99 % | HEART RATE: 91 BPM | TEMPERATURE: 97.9 F | SYSTOLIC BLOOD PRESSURE: 144 MMHG | RESPIRATION RATE: 16 BRPM | DIASTOLIC BLOOD PRESSURE: 79 MMHG

## 2020-01-01 VITALS
DIASTOLIC BLOOD PRESSURE: 79 MMHG | OXYGEN SATURATION: 97 % | TEMPERATURE: 98.1 F | RESPIRATION RATE: 18 BRPM | HEART RATE: 81 BPM | SYSTOLIC BLOOD PRESSURE: 122 MMHG

## 2020-01-01 VITALS
HEART RATE: 86 BPM | WEIGHT: 172 LBS | DIASTOLIC BLOOD PRESSURE: 84 MMHG | SYSTOLIC BLOOD PRESSURE: 126 MMHG | OXYGEN SATURATION: 98 % | BODY MASS INDEX: 22.69 KG/M2 | RESPIRATION RATE: 16 BRPM | TEMPERATURE: 98 F

## 2020-01-01 VITALS
RESPIRATION RATE: 16 BRPM | HEART RATE: 94 BPM | OXYGEN SATURATION: 97 % | DIASTOLIC BLOOD PRESSURE: 71 MMHG | TEMPERATURE: 96.8 F | SYSTOLIC BLOOD PRESSURE: 122 MMHG

## 2020-01-01 VITALS — BODY MASS INDEX: 19.53 KG/M2 | WEIGHT: 148 LBS

## 2020-01-01 DIAGNOSIS — Z13.29 SCREENING FOR HYPOTHYROIDISM: ICD-10-CM

## 2020-01-01 DIAGNOSIS — C34.92 NON-SMALL CELL LUNG CANCER, LEFT (H): ICD-10-CM

## 2020-01-01 DIAGNOSIS — D70.1 CHEMOTHERAPY-INDUCED NEUTROPENIA (H): ICD-10-CM

## 2020-01-01 DIAGNOSIS — R74.8 ELEVATED AMYLASE: ICD-10-CM

## 2020-01-01 DIAGNOSIS — I42.7 CARDIOMYOPATHY DUE TO DRUG AND EXTERNAL AGENT (H): ICD-10-CM

## 2020-01-01 DIAGNOSIS — J32.4 CHRONIC PANSINUSITIS: ICD-10-CM

## 2020-01-01 DIAGNOSIS — C34.92 PRIMARY LUNG ADENOCARCINOMA, LEFT (H): ICD-10-CM

## 2020-01-01 DIAGNOSIS — T45.1X5A CHEMOTHERAPY-INDUCED NEUTROPENIA (H): ICD-10-CM

## 2020-01-01 DIAGNOSIS — R79.89 ELEVATED SERUM CREATININE: ICD-10-CM

## 2020-01-01 DIAGNOSIS — Z13.29 SCREENING FOR HYPOTHYROIDISM: Primary | ICD-10-CM

## 2020-01-01 DIAGNOSIS — C34.92 PRIMARY LUNG ADENOCARCINOMA, LEFT (H): Primary | ICD-10-CM

## 2020-01-01 DIAGNOSIS — R80.9 PROTEINURIA, UNSPECIFIED TYPE: ICD-10-CM

## 2020-01-01 DIAGNOSIS — C79.51 BONE METASTASIS: ICD-10-CM

## 2020-01-01 DIAGNOSIS — I26.99 ACUTE PULMONARY EMBOLISM WITHOUT ACUTE COR PULMONALE, UNSPECIFIED PULMONARY EMBOLISM TYPE (H): Primary | ICD-10-CM

## 2020-01-01 DIAGNOSIS — Z00.6 EXAMINATION OF PARTICIPANT OR CONTROL IN CLINICAL RESEARCH: ICD-10-CM

## 2020-01-01 DIAGNOSIS — Z13.6 ENCOUNTER FOR SCREENING FOR CARDIOVASCULAR DISORDERS: ICD-10-CM

## 2020-01-01 DIAGNOSIS — C79.31 METASTASIS TO BRAIN (H): Primary | ICD-10-CM

## 2020-01-01 DIAGNOSIS — R10.32 ABDOMINAL PAIN, LEFT LOWER QUADRANT: ICD-10-CM

## 2020-01-01 DIAGNOSIS — C34.92 NON-SMALL CELL LUNG CANCER, LEFT (H): Primary | ICD-10-CM

## 2020-01-01 DIAGNOSIS — N40.1 BENIGN NODULAR PROSTATIC HYPERPLASIA WITH LOWER URINARY TRACT SYMPTOMS: ICD-10-CM

## 2020-01-01 DIAGNOSIS — Z23 NEED FOR PROPHYLACTIC VACCINATION AND INOCULATION AGAINST INFLUENZA: ICD-10-CM

## 2020-01-01 DIAGNOSIS — Z79.01 LONG TERM (CURRENT) USE OF ANTICOAGULANTS: ICD-10-CM

## 2020-01-01 DIAGNOSIS — J90 PLEURAL EFFUSION, LEFT: ICD-10-CM

## 2020-01-01 DIAGNOSIS — J44.9 CHRONIC OBSTRUCTIVE PULMONARY DISEASE, UNSPECIFIED COPD TYPE (H): ICD-10-CM

## 2020-01-01 DIAGNOSIS — G89.3 CANCER RELATED PAIN: ICD-10-CM

## 2020-01-01 DIAGNOSIS — R06.02 SHORTNESS OF BREATH: ICD-10-CM

## 2020-01-01 DIAGNOSIS — J32.4 CHRONIC PANSINUSITIS: Primary | ICD-10-CM

## 2020-01-01 DIAGNOSIS — I27.9 PULMONARY HEART DISEASE, UNSPECIFIED (H): ICD-10-CM

## 2020-01-01 DIAGNOSIS — Z13.9 SCREENING FOR CONDITION: ICD-10-CM

## 2020-01-01 DIAGNOSIS — R93.1 DECREASED CARDIAC EJECTION FRACTION: ICD-10-CM

## 2020-01-01 DIAGNOSIS — D70.1 CHEMOTHERAPY-INDUCED NEUTROPENIA (H): Primary | ICD-10-CM

## 2020-01-01 DIAGNOSIS — T45.1X5A CHEMOTHERAPY-INDUCED NEUTROPENIA (H): Primary | ICD-10-CM

## 2020-01-01 DIAGNOSIS — C34.12 PRIMARY ADENOCARCINOMA OF UPPER LOBE OF LEFT LUNG (H): Primary | ICD-10-CM

## 2020-01-01 DIAGNOSIS — R19.7 DIARRHEA, UNSPECIFIED TYPE: ICD-10-CM

## 2020-01-01 DIAGNOSIS — G47.33 OSA (OBSTRUCTIVE SLEEP APNEA): Primary | ICD-10-CM

## 2020-01-01 DIAGNOSIS — J96.01 ACUTE RESPIRATORY FAILURE WITH HYPOXIA (H): ICD-10-CM

## 2020-01-01 DIAGNOSIS — R06.02 SOB (SHORTNESS OF BREATH): ICD-10-CM

## 2020-01-01 DIAGNOSIS — M25.512 ACUTE PAIN OF LEFT SHOULDER: ICD-10-CM

## 2020-01-01 DIAGNOSIS — E83.51 HYPOCALCEMIA: ICD-10-CM

## 2020-01-01 DIAGNOSIS — J34.9 SINUS PROBLEM: Primary | ICD-10-CM

## 2020-01-01 DIAGNOSIS — M25.512 ACUTE PAIN OF LEFT SHOULDER: Primary | ICD-10-CM

## 2020-01-01 DIAGNOSIS — C34.32 MALIGNANT NEOPLASM OF LOWER LOBE OF LEFT LUNG (H): Primary | ICD-10-CM

## 2020-01-01 DIAGNOSIS — F41.9 ANXIETY: ICD-10-CM

## 2020-01-01 DIAGNOSIS — Z71.89 ADVANCED DIRECTIVES, COUNSELING/DISCUSSION: Chronic | ICD-10-CM

## 2020-01-01 DIAGNOSIS — J18.9 PNEUMONIA OF LEFT LOWER LOBE DUE TO INFECTIOUS ORGANISM: ICD-10-CM

## 2020-01-01 DIAGNOSIS — R07.81 PLEURITIC CHEST PAIN: ICD-10-CM

## 2020-01-01 DIAGNOSIS — D72.829 LEUKOCYTOSIS, UNSPECIFIED TYPE: ICD-10-CM

## 2020-01-01 DIAGNOSIS — E80.6 HYPERBILIRUBINEMIA: ICD-10-CM

## 2020-01-01 DIAGNOSIS — G47.00 INSOMNIA, UNSPECIFIED TYPE: ICD-10-CM

## 2020-01-01 DIAGNOSIS — R10.9 ABDOMINAL PAIN, UNSPECIFIED ABDOMINAL LOCATION: ICD-10-CM

## 2020-01-01 DIAGNOSIS — C34.90 LUNG CANCER (H): Primary | ICD-10-CM

## 2020-01-01 DIAGNOSIS — N28.9 RENAL INSUFFICIENCY: ICD-10-CM

## 2020-01-01 DIAGNOSIS — R09.81 NASAL CONGESTION: Primary | ICD-10-CM

## 2020-01-01 DIAGNOSIS — C79.31 METASTASIS TO BRAIN (H): ICD-10-CM

## 2020-01-01 DIAGNOSIS — J44.1 COPD EXACERBATION (H): Primary | ICD-10-CM

## 2020-01-01 DIAGNOSIS — C78.7 LIVER METASTASIS: ICD-10-CM

## 2020-01-01 DIAGNOSIS — R07.89 CHEST WALL PAIN: ICD-10-CM

## 2020-01-01 DIAGNOSIS — I26.94 MULTIPLE SUBSEGMENTAL PULMONARY EMBOLI WITHOUT ACUTE COR PULMONALE (H): ICD-10-CM

## 2020-01-01 DIAGNOSIS — J98.4 ACUTE PNEUMONITIS: ICD-10-CM

## 2020-01-01 DIAGNOSIS — E83.42 HYPOMAGNESEMIA: ICD-10-CM

## 2020-01-01 DIAGNOSIS — C79.31 BRAIN METASTASIS: Primary | ICD-10-CM

## 2020-01-01 DIAGNOSIS — Z20.822 COVID-19 RULED OUT: Primary | ICD-10-CM

## 2020-01-01 DIAGNOSIS — C34.90 LUNG CANCER (H): ICD-10-CM

## 2020-01-01 DIAGNOSIS — C79.51 BONE METASTASIS: Primary | ICD-10-CM

## 2020-01-01 LAB
ALBUMIN SERPL-MCNC: 2.7 G/DL (ref 3.4–5)
ALBUMIN SERPL-MCNC: 2.8 G/DL (ref 3.4–5)
ALBUMIN SERPL-MCNC: 2.9 G/DL (ref 3.4–5)
ALBUMIN SERPL-MCNC: 3 G/DL (ref 3.4–5)
ALBUMIN SERPL-MCNC: 3.1 G/DL (ref 3.4–5)
ALBUMIN SERPL-MCNC: 3.1 G/DL (ref 3.4–5)
ALBUMIN SERPL-MCNC: 3.2 G/DL (ref 3.4–5)
ALBUMIN SERPL-MCNC: 3.4 G/DL (ref 3.4–5)
ALBUMIN SERPL-MCNC: 3.5 G/DL (ref 3.4–5)
ALBUMIN SERPL-MCNC: 3.5 G/DL (ref 3.4–5)
ALBUMIN SERPL-MCNC: 3.6 G/DL (ref 3.4–5)
ALBUMIN SERPL-MCNC: 3.7 G/DL (ref 3.4–5)
ALBUMIN SERPL-MCNC: 3.8 G/DL (ref 3.4–5)
ALBUMIN SERPL-MCNC: 3.9 G/DL (ref 3.4–5)
ALBUMIN SERPL-MCNC: NORMAL G/DL (ref 3.4–5)
ALBUMIN UR-MCNC: 30 MG/DL
ALBUMIN UR-MCNC: NEGATIVE MG/DL
ALP SERPL-CCNC: 100 U/L (ref 40–150)
ALP SERPL-CCNC: 101 U/L (ref 40–150)
ALP SERPL-CCNC: 110 U/L (ref 40–150)
ALP SERPL-CCNC: 110 U/L (ref 40–150)
ALP SERPL-CCNC: 117 U/L (ref 40–150)
ALP SERPL-CCNC: 125 U/L (ref 40–150)
ALP SERPL-CCNC: 130 U/L (ref 40–150)
ALP SERPL-CCNC: 132 U/L (ref 40–150)
ALP SERPL-CCNC: 135 U/L (ref 40–150)
ALP SERPL-CCNC: 147 U/L (ref 40–150)
ALP SERPL-CCNC: 151 U/L (ref 40–150)
ALP SERPL-CCNC: 154 U/L (ref 40–150)
ALP SERPL-CCNC: 325 U/L (ref 40–150)
ALP SERPL-CCNC: 59 U/L (ref 40–150)
ALP SERPL-CCNC: 66 U/L (ref 40–150)
ALP SERPL-CCNC: 70 U/L (ref 40–150)
ALP SERPL-CCNC: 70 U/L (ref 40–150)
ALP SERPL-CCNC: 73 U/L (ref 40–150)
ALP SERPL-CCNC: 73 U/L (ref 40–150)
ALP SERPL-CCNC: 75 U/L (ref 40–150)
ALP SERPL-CCNC: 82 U/L (ref 40–150)
ALP SERPL-CCNC: 89 U/L (ref 40–150)
ALP SERPL-CCNC: NORMAL U/L (ref 40–150)
ALT SERPL W P-5'-P-CCNC: 17 U/L (ref 0–70)
ALT SERPL W P-5'-P-CCNC: 19 U/L (ref 0–70)
ALT SERPL W P-5'-P-CCNC: 21 U/L (ref 0–70)
ALT SERPL W P-5'-P-CCNC: 22 U/L (ref 0–70)
ALT SERPL W P-5'-P-CCNC: 25 U/L (ref 0–70)
ALT SERPL W P-5'-P-CCNC: 26 U/L (ref 0–70)
ALT SERPL W P-5'-P-CCNC: 26 U/L (ref 0–70)
ALT SERPL W P-5'-P-CCNC: 27 U/L (ref 0–70)
ALT SERPL W P-5'-P-CCNC: 31 U/L (ref 0–70)
ALT SERPL W P-5'-P-CCNC: 33 U/L (ref 0–70)
ALT SERPL W P-5'-P-CCNC: 39 U/L (ref 0–70)
ALT SERPL W P-5'-P-CCNC: 42 U/L (ref 0–70)
ALT SERPL W P-5'-P-CCNC: 46 U/L (ref 0–70)
ALT SERPL W P-5'-P-CCNC: 47 U/L (ref 0–70)
ALT SERPL W P-5'-P-CCNC: 58 U/L (ref 0–70)
ALT SERPL W P-5'-P-CCNC: 62 U/L (ref 0–70)
ALT SERPL W P-5'-P-CCNC: 74 U/L (ref 0–70)
ALT SERPL W P-5'-P-CCNC: 75 U/L (ref 0–70)
ALT SERPL W P-5'-P-CCNC: NORMAL U/L (ref 0–70)
AMORPH CRY #/AREA URNS HPF: ABNORMAL /HPF
AMYLASE SERPL-CCNC: 1157 U/L (ref 30–110)
AMYLASE SERPL-CCNC: 3084 U/L (ref 30–110)
AMYLASE SERPL-CCNC: 484 U/L (ref 30–110)
AMYLASE SERPL-CCNC: 486 U/L (ref 30–110)
AMYLASE SERPL-CCNC: 500 U/L (ref 30–110)
AMYLASE SERPL-CCNC: 523 U/L (ref 30–110)
AMYLASE SERPL-CCNC: 805 U/L (ref 30–110)
AMYLASE SERPL-CCNC: 811 U/L (ref 30–110)
AMYLASE SERPL-CCNC: NORMAL U/L (ref 30–110)
ANION GAP SERPL CALCULATED.3IONS-SCNC: 1 MMOL/L (ref 3–14)
ANION GAP SERPL CALCULATED.3IONS-SCNC: 3 MMOL/L (ref 3–14)
ANION GAP SERPL CALCULATED.3IONS-SCNC: 4 MMOL/L (ref 3–14)
ANION GAP SERPL CALCULATED.3IONS-SCNC: 5 MMOL/L (ref 3–14)
ANION GAP SERPL CALCULATED.3IONS-SCNC: 6 MMOL/L (ref 3–14)
ANION GAP SERPL CALCULATED.3IONS-SCNC: 7 MMOL/L (ref 3–14)
ANION GAP SERPL CALCULATED.3IONS-SCNC: 7 MMOL/L (ref 3–14)
ANION GAP SERPL CALCULATED.3IONS-SCNC: 8 MMOL/L (ref 3–14)
ANION GAP SERPL CALCULATED.3IONS-SCNC: 9 MMOL/L (ref 3–14)
ANION GAP SERPL CALCULATED.3IONS-SCNC: NORMAL MMOL/L (ref 6–17)
APPEARANCE UR: ABNORMAL
APPEARANCE UR: CLEAR
APTT PPP: 38 SEC (ref 22–37)
APTT PPP: 38 SEC (ref 22–37)
APTT PPP: 39 SEC (ref 22–37)
AST SERPL W P-5'-P-CCNC: 18 U/L (ref 0–45)
AST SERPL W P-5'-P-CCNC: 20 U/L (ref 0–45)
AST SERPL W P-5'-P-CCNC: 22 U/L (ref 0–45)
AST SERPL W P-5'-P-CCNC: 23 U/L (ref 0–45)
AST SERPL W P-5'-P-CCNC: 24 U/L (ref 0–45)
AST SERPL W P-5'-P-CCNC: 25 U/L (ref 0–45)
AST SERPL W P-5'-P-CCNC: 27 U/L (ref 0–45)
AST SERPL W P-5'-P-CCNC: 27 U/L (ref 0–45)
AST SERPL W P-5'-P-CCNC: 28 U/L (ref 0–45)
AST SERPL W P-5'-P-CCNC: 28 U/L (ref 0–45)
AST SERPL W P-5'-P-CCNC: 30 U/L (ref 0–45)
AST SERPL W P-5'-P-CCNC: 32 U/L (ref 0–45)
AST SERPL W P-5'-P-CCNC: 33 U/L (ref 0–45)
AST SERPL W P-5'-P-CCNC: 37 U/L (ref 0–45)
AST SERPL W P-5'-P-CCNC: 44 U/L (ref 0–45)
AST SERPL W P-5'-P-CCNC: 46 U/L (ref 0–45)
AST SERPL W P-5'-P-CCNC: 49 U/L (ref 0–45)
AST SERPL W P-5'-P-CCNC: 50 U/L (ref 0–45)
AST SERPL W P-5'-P-CCNC: 51 U/L (ref 0–45)
AST SERPL W P-5'-P-CCNC: 68 U/L (ref 0–45)
AST SERPL W P-5'-P-CCNC: NORMAL U/L (ref 0–45)
BACTERIA SPEC CULT: NO GROWTH
BASOPHILS # BLD AUTO: 0 10E9/L (ref 0–0.2)
BASOPHILS # BLD AUTO: 0.1 10E9/L (ref 0–0.2)
BASOPHILS # BLD AUTO: NORMAL 10E9/L (ref 0–0.2)
BASOPHILS NFR BLD AUTO: 0.1 %
BASOPHILS NFR BLD AUTO: 0.2 %
BASOPHILS NFR BLD AUTO: 0.2 %
BASOPHILS NFR BLD AUTO: 0.3 %
BASOPHILS NFR BLD AUTO: 0.4 %
BASOPHILS NFR BLD AUTO: 0.5 %
BASOPHILS NFR BLD AUTO: 0.6 %
BASOPHILS NFR BLD AUTO: 0.7 %
BASOPHILS NFR BLD AUTO: 0.9 %
BASOPHILS NFR BLD AUTO: NORMAL %
BILIRUB DIRECT SERPL-MCNC: 0.1 MG/DL (ref 0–0.2)
BILIRUB DIRECT SERPL-MCNC: 0.4 MG/DL (ref 0–0.2)
BILIRUB SERPL-MCNC: 0.4 MG/DL (ref 0.2–1.3)
BILIRUB SERPL-MCNC: 0.5 MG/DL (ref 0.2–1.3)
BILIRUB SERPL-MCNC: 0.6 MG/DL (ref 0.2–1.3)
BILIRUB SERPL-MCNC: 0.6 MG/DL (ref 0.2–1.3)
BILIRUB SERPL-MCNC: 0.7 MG/DL (ref 0.2–1.3)
BILIRUB SERPL-MCNC: 0.7 MG/DL (ref 0.2–1.3)
BILIRUB SERPL-MCNC: 0.8 MG/DL (ref 0.2–1.3)
BILIRUB SERPL-MCNC: 0.9 MG/DL (ref 0.2–1.3)
BILIRUB SERPL-MCNC: 1 MG/DL (ref 0.2–1.3)
BILIRUB SERPL-MCNC: 1.1 MG/DL (ref 0.2–1.3)
BILIRUB SERPL-MCNC: 1.2 MG/DL (ref 0.2–1.3)
BILIRUB SERPL-MCNC: 1.3 MG/DL (ref 0.2–1.3)
BILIRUB SERPL-MCNC: 1.8 MG/DL (ref 0.2–1.3)
BILIRUB SERPL-MCNC: NORMAL MG/DL (ref 0.2–1.3)
BILIRUB UR QL STRIP: NEGATIVE
BUN SERPL-MCNC: 12 MG/DL (ref 7–30)
BUN SERPL-MCNC: 13 MG/DL (ref 7–30)
BUN SERPL-MCNC: 14 MG/DL (ref 7–30)
BUN SERPL-MCNC: 16 MG/DL (ref 7–30)
BUN SERPL-MCNC: 17 MG/DL (ref 7–30)
BUN SERPL-MCNC: 18 MG/DL (ref 7–30)
BUN SERPL-MCNC: 19 MG/DL (ref 7–30)
BUN SERPL-MCNC: 20 MG/DL (ref 7–30)
BUN SERPL-MCNC: 20 MG/DL (ref 7–30)
BUN SERPL-MCNC: 21 MG/DL (ref 7–30)
BUN SERPL-MCNC: NORMAL MG/DL (ref 7–30)
C COLI+JEJUNI+LARI FUSA STL QL NAA+PROBE: NOT DETECTED
C DIFF TOX B STL QL: NEGATIVE
CALCIUM SERPL-MCNC: 7.9 MG/DL (ref 8.5–10.1)
CALCIUM SERPL-MCNC: 8 MG/DL (ref 8.5–10.1)
CALCIUM SERPL-MCNC: 8.1 MG/DL (ref 8.5–10.1)
CALCIUM SERPL-MCNC: 8.2 MG/DL (ref 8.5–10.1)
CALCIUM SERPL-MCNC: 8.2 MG/DL (ref 8.5–10.1)
CALCIUM SERPL-MCNC: 8.3 MG/DL (ref 8.5–10.1)
CALCIUM SERPL-MCNC: 8.4 MG/DL (ref 8.5–10.1)
CALCIUM SERPL-MCNC: 8.5 MG/DL (ref 8.5–10.1)
CALCIUM SERPL-MCNC: 8.6 MG/DL (ref 8.5–10.1)
CALCIUM SERPL-MCNC: 8.7 MG/DL (ref 8.5–10.1)
CALCIUM SERPL-MCNC: 8.7 MG/DL (ref 8.5–10.1)
CALCIUM SERPL-MCNC: 8.9 MG/DL (ref 8.5–10.1)
CALCIUM SERPL-MCNC: 9 MG/DL (ref 8.5–10.1)
CALCIUM SERPL-MCNC: 9 MG/DL (ref 8.5–10.1)
CALCIUM SERPL-MCNC: 9.1 MG/DL (ref 8.5–10.1)
CALCIUM SERPL-MCNC: 9.2 MG/DL (ref 8.5–10.1)
CALCIUM SERPL-MCNC: 9.2 MG/DL (ref 8.5–10.1)
CALCIUM SERPL-MCNC: 9.3 MG/DL (ref 8.5–10.1)
CALCIUM SERPL-MCNC: 9.4 MG/DL (ref 8.5–10.1)
CALCIUM SERPL-MCNC: NORMAL MG/DL (ref 8.5–10.1)
CHLORIDE SERPL-SCNC: 100 MMOL/L (ref 94–109)
CHLORIDE SERPL-SCNC: 101 MMOL/L (ref 94–109)
CHLORIDE SERPL-SCNC: 102 MMOL/L (ref 94–109)
CHLORIDE SERPL-SCNC: 103 MMOL/L (ref 94–109)
CHLORIDE SERPL-SCNC: 105 MMOL/L (ref 94–109)
CHLORIDE SERPL-SCNC: 106 MMOL/L (ref 94–109)
CHLORIDE SERPL-SCNC: 107 MMOL/L (ref 94–109)
CHLORIDE SERPL-SCNC: 108 MMOL/L (ref 94–109)
CHLORIDE SERPL-SCNC: NORMAL MMOL/L (ref 94–109)
CO2 SERPL-SCNC: 22 MMOL/L (ref 20–32)
CO2 SERPL-SCNC: 23 MMOL/L (ref 20–32)
CO2 SERPL-SCNC: 24 MMOL/L (ref 20–32)
CO2 SERPL-SCNC: 24 MMOL/L (ref 20–32)
CO2 SERPL-SCNC: 25 MMOL/L (ref 20–32)
CO2 SERPL-SCNC: 26 MMOL/L (ref 20–32)
CO2 SERPL-SCNC: 27 MMOL/L (ref 20–32)
CO2 SERPL-SCNC: 28 MMOL/L (ref 20–32)
CO2 SERPL-SCNC: 29 MMOL/L (ref 20–32)
CO2 SERPL-SCNC: 30 MMOL/L (ref 20–32)
CO2 SERPL-SCNC: 30 MMOL/L (ref 20–32)
CO2 SERPL-SCNC: NORMAL MMOL/L (ref 20–32)
COLOR UR AUTO: ABNORMAL
COLOR UR AUTO: YELLOW
CREAT BLD-MCNC: 1.1 MG/DL (ref 0.66–1.25)
CREAT BLD-MCNC: 1.1 MG/DL (ref 0.66–1.25)
CREAT SERPL-MCNC: 0.85 MG/DL (ref 0.66–1.25)
CREAT SERPL-MCNC: 0.87 MG/DL (ref 0.66–1.25)
CREAT SERPL-MCNC: 0.92 MG/DL (ref 0.66–1.25)
CREAT SERPL-MCNC: 0.92 MG/DL (ref 0.66–1.25)
CREAT SERPL-MCNC: 0.93 MG/DL (ref 0.66–1.25)
CREAT SERPL-MCNC: 0.96 MG/DL (ref 0.66–1.25)
CREAT SERPL-MCNC: 0.98 MG/DL (ref 0.66–1.25)
CREAT SERPL-MCNC: 0.98 MG/DL (ref 0.66–1.25)
CREAT SERPL-MCNC: 1 MG/DL (ref 0.66–1.25)
CREAT SERPL-MCNC: 1.01 MG/DL (ref 0.66–1.25)
CREAT SERPL-MCNC: 1.02 MG/DL (ref 0.66–1.25)
CREAT SERPL-MCNC: 1.03 MG/DL (ref 0.66–1.25)
CREAT SERPL-MCNC: 1.03 MG/DL (ref 0.66–1.25)
CREAT SERPL-MCNC: 1.04 MG/DL (ref 0.66–1.25)
CREAT SERPL-MCNC: 1.07 MG/DL (ref 0.66–1.25)
CREAT SERPL-MCNC: 1.1 MG/DL (ref 0.66–1.25)
CREAT SERPL-MCNC: 1.13 MG/DL (ref 0.66–1.25)
CREAT SERPL-MCNC: 1.15 MG/DL (ref 0.66–1.25)
CREAT SERPL-MCNC: 1.26 MG/DL (ref 0.66–1.25)
CREAT SERPL-MCNC: 1.33 MG/DL (ref 0.66–1.25)
CREAT SERPL-MCNC: NORMAL MG/DL (ref 0.66–1.25)
DIFFERENTIAL METHOD BLD: ABNORMAL
DIFFERENTIAL METHOD BLD: NORMAL
EC STX1 GENE STL QL NAA+PROBE: NOT DETECTED
EC STX2 GENE STL QL NAA+PROBE: NOT DETECTED
ENTERIC PATHOGEN COMMENT: NORMAL
EOSINOPHIL # BLD AUTO: 0 10E9/L (ref 0–0.7)
EOSINOPHIL # BLD AUTO: 0.1 10E9/L (ref 0–0.7)
EOSINOPHIL # BLD AUTO: 0.2 10E9/L (ref 0–0.7)
EOSINOPHIL # BLD AUTO: 0.3 10E9/L (ref 0–0.7)
EOSINOPHIL # BLD AUTO: 0.4 10E9/L (ref 0–0.7)
EOSINOPHIL # BLD AUTO: 0.4 10E9/L (ref 0–0.7)
EOSINOPHIL # BLD AUTO: NORMAL 10E9/L (ref 0–0.7)
EOSINOPHIL NFR BLD AUTO: 0 %
EOSINOPHIL NFR BLD AUTO: 0.1 %
EOSINOPHIL NFR BLD AUTO: 0.2 %
EOSINOPHIL NFR BLD AUTO: 0.3 %
EOSINOPHIL NFR BLD AUTO: 0.8 %
EOSINOPHIL NFR BLD AUTO: 1.2 %
EOSINOPHIL NFR BLD AUTO: 1.3 %
EOSINOPHIL NFR BLD AUTO: 1.4 %
EOSINOPHIL NFR BLD AUTO: 1.6 %
EOSINOPHIL NFR BLD AUTO: 2.1 %
EOSINOPHIL NFR BLD AUTO: 2.4 %
EOSINOPHIL NFR BLD AUTO: 2.5 %
EOSINOPHIL NFR BLD AUTO: 2.7 %
EOSINOPHIL NFR BLD AUTO: 2.8 %
EOSINOPHIL NFR BLD AUTO: 3.1 %
EOSINOPHIL NFR BLD AUTO: 3.1 %
EOSINOPHIL NFR BLD AUTO: 3.5 %
EOSINOPHIL NFR BLD AUTO: 3.5 %
EOSINOPHIL NFR BLD AUTO: 4 %
EOSINOPHIL NFR BLD AUTO: 4.7 %
EOSINOPHIL NFR BLD AUTO: NORMAL %
ERYTHROCYTE [DISTWIDTH] IN BLOOD BY AUTOMATED COUNT: 15.6 % (ref 10–15)
ERYTHROCYTE [DISTWIDTH] IN BLOOD BY AUTOMATED COUNT: 15.9 % (ref 10–15)
ERYTHROCYTE [DISTWIDTH] IN BLOOD BY AUTOMATED COUNT: 16.1 % (ref 10–15)
ERYTHROCYTE [DISTWIDTH] IN BLOOD BY AUTOMATED COUNT: 16.6 % (ref 10–15)
ERYTHROCYTE [DISTWIDTH] IN BLOOD BY AUTOMATED COUNT: 17.2 % (ref 10–15)
ERYTHROCYTE [DISTWIDTH] IN BLOOD BY AUTOMATED COUNT: 17.3 % (ref 10–15)
ERYTHROCYTE [DISTWIDTH] IN BLOOD BY AUTOMATED COUNT: 17.3 % (ref 10–15)
ERYTHROCYTE [DISTWIDTH] IN BLOOD BY AUTOMATED COUNT: 17.4 % (ref 10–15)
ERYTHROCYTE [DISTWIDTH] IN BLOOD BY AUTOMATED COUNT: 17.5 % (ref 10–15)
ERYTHROCYTE [DISTWIDTH] IN BLOOD BY AUTOMATED COUNT: 17.7 % (ref 10–15)
ERYTHROCYTE [DISTWIDTH] IN BLOOD BY AUTOMATED COUNT: 17.8 % (ref 10–15)
ERYTHROCYTE [DISTWIDTH] IN BLOOD BY AUTOMATED COUNT: 17.8 % (ref 10–15)
ERYTHROCYTE [DISTWIDTH] IN BLOOD BY AUTOMATED COUNT: 18.1 % (ref 10–15)
ERYTHROCYTE [DISTWIDTH] IN BLOOD BY AUTOMATED COUNT: 18.2 % (ref 10–15)
ERYTHROCYTE [DISTWIDTH] IN BLOOD BY AUTOMATED COUNT: 18.3 % (ref 10–15)
ERYTHROCYTE [DISTWIDTH] IN BLOOD BY AUTOMATED COUNT: 18.4 % (ref 10–15)
ERYTHROCYTE [DISTWIDTH] IN BLOOD BY AUTOMATED COUNT: 18.5 % (ref 10–15)
ERYTHROCYTE [DISTWIDTH] IN BLOOD BY AUTOMATED COUNT: 19.9 % (ref 10–15)
ERYTHROCYTE [DISTWIDTH] IN BLOOD BY AUTOMATED COUNT: 20.4 % (ref 10–15)
ERYTHROCYTE [DISTWIDTH] IN BLOOD BY AUTOMATED COUNT: 20.5 % (ref 10–15)
ERYTHROCYTE [DISTWIDTH] IN BLOOD BY AUTOMATED COUNT: 20.5 % (ref 10–15)
ERYTHROCYTE [DISTWIDTH] IN BLOOD BY AUTOMATED COUNT: 20.9 % (ref 10–15)
ERYTHROCYTE [DISTWIDTH] IN BLOOD BY AUTOMATED COUNT: NORMAL % (ref 10–15)
GFR SERPL CREATININE-BSD FRML MDRD: 54 ML/MIN/{1.73_M2}
GFR SERPL CREATININE-BSD FRML MDRD: 57 ML/MIN/{1.73_M2}
GFR SERPL CREATININE-BSD FRML MDRD: 64 ML/MIN/{1.73_M2}
GFR SERPL CREATININE-BSD FRML MDRD: 65 ML/MIN/{1.73_M2}
GFR SERPL CREATININE-BSD FRML MDRD: 66 ML/MIN/{1.73_M2}
GFR SERPL CREATININE-BSD FRML MDRD: 66 ML/MIN/{1.73_M2}
GFR SERPL CREATININE-BSD FRML MDRD: 67 ML/MIN/{1.73_M2}
GFR SERPL CREATININE-BSD FRML MDRD: 70 ML/MIN/{1.73_M2}
GFR SERPL CREATININE-BSD FRML MDRD: 72 ML/MIN/{1.73_M2}
GFR SERPL CREATININE-BSD FRML MDRD: 73 ML/MIN/{1.73_M2}
GFR SERPL CREATININE-BSD FRML MDRD: 73 ML/MIN/{1.73_M2}
GFR SERPL CREATININE-BSD FRML MDRD: 74 ML/MIN/{1.73_M2}
GFR SERPL CREATININE-BSD FRML MDRD: 75 ML/MIN/{1.73_M2}
GFR SERPL CREATININE-BSD FRML MDRD: 76 ML/MIN/{1.73_M2}
GFR SERPL CREATININE-BSD FRML MDRD: 77 ML/MIN/{1.73_M2}
GFR SERPL CREATININE-BSD FRML MDRD: 77 ML/MIN/{1.73_M2}
GFR SERPL CREATININE-BSD FRML MDRD: 79 ML/MIN/{1.73_M2}
GFR SERPL CREATININE-BSD FRML MDRD: 79 ML/MIN/{1.73_M2}
GFR SERPL CREATININE-BSD FRML MDRD: 80 ML/MIN/{1.73_M2}
GFR SERPL CREATININE-BSD FRML MDRD: 80 ML/MIN/{1.73_M2}
GFR SERPL CREATININE-BSD FRML MDRD: 83 ML/MIN/{1.73_M2}
GFR SERPL CREATININE-BSD FRML MDRD: 83 ML/MIN/{1.73_M2}
GFR SERPL CREATININE-BSD FRML MDRD: 84 ML/MIN/{1.73_M2}
GFR SERPL CREATININE-BSD FRML MDRD: 87 ML/MIN/{1.73_M2}
GFR SERPL CREATININE-BSD FRML MDRD: 88 ML/MIN/{1.73_M2}
GFR SERPL CREATININE-BSD FRML MDRD: NORMAL ML/MIN/{1.73_M2}
GLUCOSE SERPL-MCNC: 100 MG/DL (ref 70–99)
GLUCOSE SERPL-MCNC: 100 MG/DL (ref 70–99)
GLUCOSE SERPL-MCNC: 105 MG/DL (ref 70–99)
GLUCOSE SERPL-MCNC: 106 MG/DL (ref 70–99)
GLUCOSE SERPL-MCNC: 108 MG/DL (ref 70–99)
GLUCOSE SERPL-MCNC: 114 MG/DL (ref 70–99)
GLUCOSE SERPL-MCNC: 117 MG/DL (ref 70–99)
GLUCOSE SERPL-MCNC: 125 MG/DL (ref 70–99)
GLUCOSE SERPL-MCNC: 140 MG/DL (ref 70–99)
GLUCOSE SERPL-MCNC: 166 MG/DL (ref 70–99)
GLUCOSE SERPL-MCNC: 76 MG/DL (ref 70–99)
GLUCOSE SERPL-MCNC: 78 MG/DL (ref 70–99)
GLUCOSE SERPL-MCNC: 78 MG/DL (ref 70–99)
GLUCOSE SERPL-MCNC: 80 MG/DL (ref 70–99)
GLUCOSE SERPL-MCNC: 82 MG/DL (ref 70–99)
GLUCOSE SERPL-MCNC: 87 MG/DL (ref 70–99)
GLUCOSE SERPL-MCNC: 92 MG/DL (ref 70–99)
GLUCOSE SERPL-MCNC: 93 MG/DL (ref 70–99)
GLUCOSE SERPL-MCNC: 96 MG/DL (ref 70–99)
GLUCOSE SERPL-MCNC: 99 MG/DL (ref 70–99)
GLUCOSE SERPL-MCNC: 99 MG/DL (ref 70–99)
GLUCOSE SERPL-MCNC: NORMAL MG/DL (ref 70–99)
GLUCOSE UR STRIP-MCNC: NEGATIVE MG/DL
HCT VFR BLD AUTO: 34.7 % (ref 40–53)
HCT VFR BLD AUTO: 35.9 % (ref 40–53)
HCT VFR BLD AUTO: 36.1 % (ref 40–53)
HCT VFR BLD AUTO: 37.6 % (ref 40–53)
HCT VFR BLD AUTO: 38.4 % (ref 40–53)
HCT VFR BLD AUTO: 38.5 % (ref 40–53)
HCT VFR BLD AUTO: 38.7 % (ref 40–53)
HCT VFR BLD AUTO: 38.8 % (ref 40–53)
HCT VFR BLD AUTO: 38.9 % (ref 40–53)
HCT VFR BLD AUTO: 39 % (ref 40–53)
HCT VFR BLD AUTO: 39.2 % (ref 40–53)
HCT VFR BLD AUTO: 39.4 % (ref 40–53)
HCT VFR BLD AUTO: 39.6 % (ref 40–53)
HCT VFR BLD AUTO: 39.7 % (ref 40–53)
HCT VFR BLD AUTO: 39.8 % (ref 40–53)
HCT VFR BLD AUTO: 40 % (ref 40–53)
HCT VFR BLD AUTO: 40 % (ref 40–53)
HCT VFR BLD AUTO: 40.5 % (ref 40–53)
HCT VFR BLD AUTO: 40.7 % (ref 40–53)
HCT VFR BLD AUTO: 41.4 % (ref 40–53)
HCT VFR BLD AUTO: 41.8 % (ref 40–53)
HCT VFR BLD AUTO: 41.8 % (ref 40–53)
HCT VFR BLD AUTO: 42.1 % (ref 40–53)
HCT VFR BLD AUTO: 47.6 % (ref 40–53)
HCT VFR BLD AUTO: NORMAL % (ref 40–53)
HGB BLD-MCNC: 10.6 G/DL (ref 13.3–17.7)
HGB BLD-MCNC: 11 G/DL (ref 13.3–17.7)
HGB BLD-MCNC: 11.4 G/DL (ref 13.3–17.7)
HGB BLD-MCNC: 11.6 G/DL (ref 13.3–17.7)
HGB BLD-MCNC: 11.8 G/DL (ref 13.3–17.7)
HGB BLD-MCNC: 11.8 G/DL (ref 13.3–17.7)
HGB BLD-MCNC: 11.9 G/DL (ref 13.3–17.7)
HGB BLD-MCNC: 12 G/DL (ref 13.3–17.7)
HGB BLD-MCNC: 12.2 G/DL (ref 13.3–17.7)
HGB BLD-MCNC: 12.5 G/DL (ref 13.3–17.7)
HGB BLD-MCNC: 12.6 G/DL (ref 13.3–17.7)
HGB BLD-MCNC: 12.6 G/DL (ref 13.3–17.7)
HGB BLD-MCNC: 12.8 G/DL (ref 13.3–17.7)
HGB BLD-MCNC: 12.8 G/DL (ref 13.3–17.7)
HGB BLD-MCNC: 12.9 G/DL (ref 13.3–17.7)
HGB BLD-MCNC: 13 G/DL (ref 13.3–17.7)
HGB BLD-MCNC: 13.1 G/DL (ref 13.3–17.7)
HGB BLD-MCNC: 13.2 G/DL (ref 13.3–17.7)
HGB BLD-MCNC: 13.3 G/DL (ref 13.3–17.7)
HGB BLD-MCNC: 15 G/DL (ref 13.3–17.7)
HGB BLD-MCNC: NORMAL G/DL (ref 13.3–17.7)
HGB UR QL STRIP: NEGATIVE
IMM GRANULOCYTES # BLD: 0 10E9/L (ref 0–0.4)
IMM GRANULOCYTES # BLD: 0.1 10E9/L (ref 0–0.4)
IMM GRANULOCYTES # BLD: NORMAL 10E9/L (ref 0–0.4)
IMM GRANULOCYTES NFR BLD: 0.2 %
IMM GRANULOCYTES NFR BLD: 0.2 %
IMM GRANULOCYTES NFR BLD: 0.3 %
IMM GRANULOCYTES NFR BLD: 0.4 %
IMM GRANULOCYTES NFR BLD: 0.5 %
IMM GRANULOCYTES NFR BLD: 0.6 %
IMM GRANULOCYTES NFR BLD: 0.9 %
IMM GRANULOCYTES NFR BLD: 2.4 %
IMM GRANULOCYTES NFR BLD: NORMAL %
INR PPP: 1.14 (ref 0.86–1.14)
INR PPP: 1.17 (ref 0.86–1.14)
INR PPP: 1.4 (ref 0.86–1.14)
INTERPRETATION ECG - MUSE: NORMAL
KETONES UR STRIP-MCNC: NEGATIVE MG/DL
LABORATORY COMMENT REPORT: NORMAL
LACTATE BLD-SCNC: 0.6 MMOL/L (ref 0.7–2)
LACTATE BLD-SCNC: 1.3 MMOL/L (ref 0.7–2)
LDH SERPL L TO P-CCNC: 1700 U/L (ref 85–227)
LEUKOCYTE ESTERASE UR QL STRIP: ABNORMAL
LEUKOCYTE ESTERASE UR QL STRIP: NEGATIVE
LIPASE SERPL-CCNC: 23 U/L (ref 73–393)
LIPASE SERPL-CCNC: 28 U/L (ref 73–393)
LIPASE SERPL-CCNC: 32 U/L (ref 73–393)
LIPASE SERPL-CCNC: 35 U/L (ref 73–393)
LIPASE SERPL-CCNC: 38 U/L (ref 73–393)
LIPASE SERPL-CCNC: 40 U/L (ref 73–393)
LIPASE SERPL-CCNC: 41 U/L (ref 73–393)
LIPASE SERPL-CCNC: 50 U/L (ref 73–393)
LIPASE SERPL-CCNC: 59 U/L (ref 73–393)
LIPASE SERPL-CCNC: 76 U/L (ref 73–393)
LIPASE SERPL-CCNC: NORMAL U/L (ref 73–393)
LYMPHOCYTES # BLD AUTO: 0.3 10E9/L (ref 0.8–5.3)
LYMPHOCYTES # BLD AUTO: 0.7 10E9/L (ref 0.8–5.3)
LYMPHOCYTES # BLD AUTO: 0.8 10E9/L (ref 0.8–5.3)
LYMPHOCYTES # BLD AUTO: 0.9 10E9/L (ref 0.8–5.3)
LYMPHOCYTES # BLD AUTO: 1.1 10E9/L (ref 0.8–5.3)
LYMPHOCYTES # BLD AUTO: 1.1 10E9/L (ref 0.8–5.3)
LYMPHOCYTES # BLD AUTO: 1.2 10E9/L (ref 0.8–5.3)
LYMPHOCYTES # BLD AUTO: 1.3 10E9/L (ref 0.8–5.3)
LYMPHOCYTES # BLD AUTO: 1.4 10E9/L (ref 0.8–5.3)
LYMPHOCYTES # BLD AUTO: 1.6 10E9/L (ref 0.8–5.3)
LYMPHOCYTES # BLD AUTO: 2 10E9/L (ref 0.8–5.3)
LYMPHOCYTES # BLD AUTO: NORMAL 10E9/L (ref 0.8–5.3)
LYMPHOCYTES NFR BLD AUTO: 10.8 %
LYMPHOCYTES NFR BLD AUTO: 10.9 %
LYMPHOCYTES NFR BLD AUTO: 12 %
LYMPHOCYTES NFR BLD AUTO: 13 %
LYMPHOCYTES NFR BLD AUTO: 13 %
LYMPHOCYTES NFR BLD AUTO: 14.9 %
LYMPHOCYTES NFR BLD AUTO: 15 %
LYMPHOCYTES NFR BLD AUTO: 15.2 %
LYMPHOCYTES NFR BLD AUTO: 15.5 %
LYMPHOCYTES NFR BLD AUTO: 15.7 %
LYMPHOCYTES NFR BLD AUTO: 15.7 %
LYMPHOCYTES NFR BLD AUTO: 16.1 %
LYMPHOCYTES NFR BLD AUTO: 17.5 %
LYMPHOCYTES NFR BLD AUTO: 33.6 %
LYMPHOCYTES NFR BLD AUTO: 43.3 %
LYMPHOCYTES NFR BLD AUTO: 5 %
LYMPHOCYTES NFR BLD AUTO: 6.2 %
LYMPHOCYTES NFR BLD AUTO: 6.2 %
LYMPHOCYTES NFR BLD AUTO: 7.2 %
LYMPHOCYTES NFR BLD AUTO: 7.3 %
LYMPHOCYTES NFR BLD AUTO: 7.7 %
LYMPHOCYTES NFR BLD AUTO: 8.7 %
LYMPHOCYTES NFR BLD AUTO: 9.5 %
LYMPHOCYTES NFR BLD AUTO: 9.8 %
LYMPHOCYTES NFR BLD AUTO: NORMAL %
MAGNESIUM SERPL-MCNC: 2.1 MG/DL (ref 1.6–2.3)
MAGNESIUM SERPL-MCNC: 2.1 MG/DL (ref 1.6–2.3)
MAGNESIUM SERPL-MCNC: 2.2 MG/DL (ref 1.6–2.3)
MAGNESIUM SERPL-MCNC: 2.3 MG/DL (ref 1.6–2.3)
MAGNESIUM SERPL-MCNC: 2.4 MG/DL (ref 1.6–2.3)
MAGNESIUM SERPL-MCNC: 2.4 MG/DL (ref 1.6–2.3)
MAGNESIUM SERPL-MCNC: 2.6 MG/DL (ref 1.6–2.3)
MAGNESIUM SERPL-MCNC: 2.7 MG/DL (ref 1.6–2.3)
MAGNESIUM SERPL-MCNC: NORMAL MG/DL (ref 1.6–2.3)
MCH RBC QN AUTO: 25.3 PG (ref 26.5–33)
MCH RBC QN AUTO: 25.3 PG (ref 26.5–33)
MCH RBC QN AUTO: 25.5 PG (ref 26.5–33)
MCH RBC QN AUTO: 25.6 PG (ref 26.5–33)
MCH RBC QN AUTO: 25.7 PG (ref 26.5–33)
MCH RBC QN AUTO: 25.8 PG (ref 26.5–33)
MCH RBC QN AUTO: 26.1 PG (ref 26.5–33)
MCH RBC QN AUTO: 26.2 PG (ref 26.5–33)
MCH RBC QN AUTO: 26.3 PG (ref 26.5–33)
MCH RBC QN AUTO: 26.3 PG (ref 26.5–33)
MCH RBC QN AUTO: 27.2 PG (ref 26.5–33)
MCH RBC QN AUTO: 27.8 PG (ref 26.5–33)
MCH RBC QN AUTO: 28.1 PG (ref 26.5–33)
MCH RBC QN AUTO: 28.3 PG (ref 26.5–33)
MCH RBC QN AUTO: 28.4 PG (ref 26.5–33)
MCH RBC QN AUTO: 28.6 PG (ref 26.5–33)
MCH RBC QN AUTO: 28.7 PG (ref 26.5–33)
MCH RBC QN AUTO: 28.8 PG (ref 26.5–33)
MCH RBC QN AUTO: 29 PG (ref 26.5–33)
MCH RBC QN AUTO: 29 PG (ref 26.5–33)
MCH RBC QN AUTO: 29.3 PG (ref 26.5–33)
MCH RBC QN AUTO: 29.5 PG (ref 26.5–33)
MCH RBC QN AUTO: NORMAL PG (ref 26.5–33)
MCHC RBC AUTO-ENTMCNC: 30 G/DL (ref 31.5–36.5)
MCHC RBC AUTO-ENTMCNC: 30.1 G/DL (ref 31.5–36.5)
MCHC RBC AUTO-ENTMCNC: 30.3 G/DL (ref 31.5–36.5)
MCHC RBC AUTO-ENTMCNC: 30.5 G/DL (ref 31.5–36.5)
MCHC RBC AUTO-ENTMCNC: 30.7 G/DL (ref 31.5–36.5)
MCHC RBC AUTO-ENTMCNC: 30.9 G/DL (ref 31.5–36.5)
MCHC RBC AUTO-ENTMCNC: 31.1 G/DL (ref 31.5–36.5)
MCHC RBC AUTO-ENTMCNC: 31.3 G/DL (ref 31.5–36.5)
MCHC RBC AUTO-ENTMCNC: 31.4 G/DL (ref 31.5–36.5)
MCHC RBC AUTO-ENTMCNC: 31.5 G/DL (ref 31.5–36.5)
MCHC RBC AUTO-ENTMCNC: 31.5 G/DL (ref 31.5–36.5)
MCHC RBC AUTO-ENTMCNC: 31.6 G/DL (ref 31.5–36.5)
MCHC RBC AUTO-ENTMCNC: 31.7 G/DL (ref 31.5–36.5)
MCHC RBC AUTO-ENTMCNC: 31.8 G/DL (ref 31.5–36.5)
MCHC RBC AUTO-ENTMCNC: 32.5 G/DL (ref 31.5–36.5)
MCHC RBC AUTO-ENTMCNC: 32.8 G/DL (ref 31.5–36.5)
MCHC RBC AUTO-ENTMCNC: NORMAL G/DL (ref 31.5–36.5)
MCV RBC AUTO: 82 FL (ref 78–100)
MCV RBC AUTO: 83 FL (ref 78–100)
MCV RBC AUTO: 83 FL (ref 78–100)
MCV RBC AUTO: 85 FL (ref 78–100)
MCV RBC AUTO: 86 FL (ref 78–100)
MCV RBC AUTO: 86 FL (ref 78–100)
MCV RBC AUTO: 88 FL (ref 78–100)
MCV RBC AUTO: 90 FL (ref 78–100)
MCV RBC AUTO: 91 FL (ref 78–100)
MCV RBC AUTO: 92 FL (ref 78–100)
MCV RBC AUTO: 93 FL (ref 78–100)
MCV RBC AUTO: NORMAL FL (ref 78–100)
MONOCYTES # BLD AUTO: 0 10E9/L (ref 0–1.3)
MONOCYTES # BLD AUTO: 0.2 10E9/L (ref 0–1.3)
MONOCYTES # BLD AUTO: 0.2 10E9/L (ref 0–1.3)
MONOCYTES # BLD AUTO: 0.3 10E9/L (ref 0–1.3)
MONOCYTES # BLD AUTO: 0.3 10E9/L (ref 0–1.3)
MONOCYTES # BLD AUTO: 0.5 10E9/L (ref 0–1.3)
MONOCYTES # BLD AUTO: 0.6 10E9/L (ref 0–1.3)
MONOCYTES # BLD AUTO: 0.6 10E9/L (ref 0–1.3)
MONOCYTES # BLD AUTO: 0.7 10E9/L (ref 0–1.3)
MONOCYTES # BLD AUTO: 0.8 10E9/L (ref 0–1.3)
MONOCYTES # BLD AUTO: 0.8 10E9/L (ref 0–1.3)
MONOCYTES # BLD AUTO: 0.9 10E9/L (ref 0–1.3)
MONOCYTES # BLD AUTO: 1 10E9/L (ref 0–1.3)
MONOCYTES # BLD AUTO: 1 10E9/L (ref 0–1.3)
MONOCYTES # BLD AUTO: 1.1 10E9/L (ref 0–1.3)
MONOCYTES # BLD AUTO: 1.4 10E9/L (ref 0–1.3)
MONOCYTES # BLD AUTO: 1.6 10E9/L (ref 0–1.3)
MONOCYTES # BLD AUTO: 1.8 10E9/L (ref 0–1.3)
MONOCYTES # BLD AUTO: NORMAL 10E9/L (ref 0–1.3)
MONOCYTES NFR BLD AUTO: 0.8 %
MONOCYTES NFR BLD AUTO: 10.2 %
MONOCYTES NFR BLD AUTO: 10.5 %
MONOCYTES NFR BLD AUTO: 13.4 %
MONOCYTES NFR BLD AUTO: 13.6 %
MONOCYTES NFR BLD AUTO: 13.9 %
MONOCYTES NFR BLD AUTO: 4.5 %
MONOCYTES NFR BLD AUTO: 4.7 %
MONOCYTES NFR BLD AUTO: 6 %
MONOCYTES NFR BLD AUTO: 6.6 %
MONOCYTES NFR BLD AUTO: 7.2 %
MONOCYTES NFR BLD AUTO: 7.3 %
MONOCYTES NFR BLD AUTO: 7.4 %
MONOCYTES NFR BLD AUTO: 7.7 %
MONOCYTES NFR BLD AUTO: 7.9 %
MONOCYTES NFR BLD AUTO: 7.9 %
MONOCYTES NFR BLD AUTO: 8 %
MONOCYTES NFR BLD AUTO: 8.2 %
MONOCYTES NFR BLD AUTO: 8.3 %
MONOCYTES NFR BLD AUTO: 9.2 %
MONOCYTES NFR BLD AUTO: 9.2 %
MONOCYTES NFR BLD AUTO: 9.5 %
MONOCYTES NFR BLD AUTO: 9.8 %
MONOCYTES NFR BLD AUTO: 9.8 %
MONOCYTES NFR BLD AUTO: NORMAL %
MUCOUS THREADS #/AREA URNS LPF: PRESENT /LPF
NEUTROPHILS # BLD AUTO: 1.4 10E9/L (ref 1.6–8.3)
NEUTROPHILS # BLD AUTO: 10.1 10E9/L (ref 1.6–8.3)
NEUTROPHILS # BLD AUTO: 11.2 10E9/L (ref 1.6–8.3)
NEUTROPHILS # BLD AUTO: 13 10E9/L (ref 1.6–8.3)
NEUTROPHILS # BLD AUTO: 13.8 10E9/L (ref 1.6–8.3)
NEUTROPHILS # BLD AUTO: 2.3 10E9/L (ref 1.6–8.3)
NEUTROPHILS # BLD AUTO: 4.6 10E9/L (ref 1.6–8.3)
NEUTROPHILS # BLD AUTO: 4.7 10E9/L (ref 1.6–8.3)
NEUTROPHILS # BLD AUTO: 4.8 10E9/L (ref 1.6–8.3)
NEUTROPHILS # BLD AUTO: 5.4 10E9/L (ref 1.6–8.3)
NEUTROPHILS # BLD AUTO: 5.5 10E9/L (ref 1.6–8.3)
NEUTROPHILS # BLD AUTO: 5.6 10E9/L (ref 1.6–8.3)
NEUTROPHILS # BLD AUTO: 5.7 10E9/L (ref 1.6–8.3)
NEUTROPHILS # BLD AUTO: 5.7 10E9/L (ref 1.6–8.3)
NEUTROPHILS # BLD AUTO: 6 10E9/L (ref 1.6–8.3)
NEUTROPHILS # BLD AUTO: 6.2 10E9/L (ref 1.6–8.3)
NEUTROPHILS # BLD AUTO: 6.6 10E9/L (ref 1.6–8.3)
NEUTROPHILS # BLD AUTO: 6.7 10E9/L (ref 1.6–8.3)
NEUTROPHILS # BLD AUTO: 6.7 10E9/L (ref 1.6–8.3)
NEUTROPHILS # BLD AUTO: 7.7 10E9/L (ref 1.6–8.3)
NEUTROPHILS # BLD AUTO: 7.8 10E9/L (ref 1.6–8.3)
NEUTROPHILS # BLD AUTO: 9.1 10E9/L (ref 1.6–8.3)
NEUTROPHILS # BLD AUTO: NORMAL 10E9/L (ref 1.6–8.3)
NEUTROPHILS NFR BLD AUTO: 46.5 %
NEUTROPHILS NFR BLD AUTO: 60.5 %
NEUTROPHILS NFR BLD AUTO: 66.5 %
NEUTROPHILS NFR BLD AUTO: 69.4 %
NEUTROPHILS NFR BLD AUTO: 71.5 %
NEUTROPHILS NFR BLD AUTO: 71.6 %
NEUTROPHILS NFR BLD AUTO: 72.6 %
NEUTROPHILS NFR BLD AUTO: 73.6 %
NEUTROPHILS NFR BLD AUTO: 74 %
NEUTROPHILS NFR BLD AUTO: 74.6 %
NEUTROPHILS NFR BLD AUTO: 74.7 %
NEUTROPHILS NFR BLD AUTO: 75.3 %
NEUTROPHILS NFR BLD AUTO: 76.1 %
NEUTROPHILS NFR BLD AUTO: 76.3 %
NEUTROPHILS NFR BLD AUTO: 76.5 %
NEUTROPHILS NFR BLD AUTO: 78.5 %
NEUTROPHILS NFR BLD AUTO: 78.9 %
NEUTROPHILS NFR BLD AUTO: 79.3 %
NEUTROPHILS NFR BLD AUTO: 80.1 %
NEUTROPHILS NFR BLD AUTO: 83 %
NEUTROPHILS NFR BLD AUTO: 83.7 %
NEUTROPHILS NFR BLD AUTO: 84 %
NEUTROPHILS NFR BLD AUTO: 84.5 %
NEUTROPHILS NFR BLD AUTO: 92.6 %
NEUTROPHILS NFR BLD AUTO: NORMAL %
NITRATE UR QL: NEGATIVE
NOROV GI+II ORF1-ORF2 JNC STL QL NAA+PR: NOT DETECTED
NRBC # BLD AUTO: 0 10*3/UL
NRBC # BLD AUTO: NORMAL 10*3/UL
NRBC BLD AUTO-RTO: 0 /100
NRBC BLD AUTO-RTO: NORMAL /100
NT-PROBNP SERPL-MCNC: 59 PG/ML (ref 0–900)
NT-PROBNP SERPL-MCNC: 69 PG/ML (ref 0–900)
PH UR STRIP: 6 PH (ref 5–7)
PH UR STRIP: 6.5 PH (ref 5–7)
PH UR STRIP: 6.5 PH (ref 5–7)
PH UR STRIP: 7 PH (ref 5–7)
PH UR STRIP: 7.5 PH (ref 5–7)
PLATELET # BLD AUTO: 173 10E9/L (ref 150–450)
PLATELET # BLD AUTO: 189 10E9/L (ref 150–450)
PLATELET # BLD AUTO: 191 10E9/L (ref 150–450)
PLATELET # BLD AUTO: 212 10E9/L (ref 150–450)
PLATELET # BLD AUTO: 217 10E9/L (ref 150–450)
PLATELET # BLD AUTO: 259 10E9/L (ref 150–450)
PLATELET # BLD AUTO: 270 10E9/L (ref 150–450)
PLATELET # BLD AUTO: 283 10E9/L (ref 150–450)
PLATELET # BLD AUTO: 287 10E9/L (ref 150–450)
PLATELET # BLD AUTO: 288 10E9/L (ref 150–450)
PLATELET # BLD AUTO: 294 10E9/L (ref 150–450)
PLATELET # BLD AUTO: 303 10E9/L (ref 150–450)
PLATELET # BLD AUTO: 312 10E9/L (ref 150–450)
PLATELET # BLD AUTO: 315 10E9/L (ref 150–450)
PLATELET # BLD AUTO: 317 10E9/L (ref 150–450)
PLATELET # BLD AUTO: 320 10E9/L (ref 150–450)
PLATELET # BLD AUTO: 324 10E9/L (ref 150–450)
PLATELET # BLD AUTO: 342 10E9/L (ref 150–450)
PLATELET # BLD AUTO: 347 10E9/L (ref 150–450)
PLATELET # BLD AUTO: 347 10E9/L (ref 150–450)
PLATELET # BLD AUTO: 382 10E9/L (ref 150–450)
PLATELET # BLD AUTO: 402 10E9/L (ref 150–450)
PLATELET # BLD AUTO: 420 10E9/L (ref 150–450)
PLATELET # BLD AUTO: 499 10E9/L (ref 150–450)
PLATELET # BLD AUTO: NORMAL 10E9/L (ref 150–450)
POTASSIUM SERPL-SCNC: 3.5 MMOL/L (ref 3.4–5.3)
POTASSIUM SERPL-SCNC: 3.6 MMOL/L (ref 3.4–5.3)
POTASSIUM SERPL-SCNC: 3.8 MMOL/L (ref 3.4–5.3)
POTASSIUM SERPL-SCNC: 3.9 MMOL/L (ref 3.4–5.3)
POTASSIUM SERPL-SCNC: 3.9 MMOL/L (ref 3.4–5.3)
POTASSIUM SERPL-SCNC: 4 MMOL/L (ref 3.4–5.3)
POTASSIUM SERPL-SCNC: 4 MMOL/L (ref 3.4–5.3)
POTASSIUM SERPL-SCNC: 4.1 MMOL/L (ref 3.4–5.3)
POTASSIUM SERPL-SCNC: 4.2 MMOL/L (ref 3.4–5.3)
POTASSIUM SERPL-SCNC: 4.3 MMOL/L (ref 3.4–5.3)
POTASSIUM SERPL-SCNC: 4.3 MMOL/L (ref 3.4–5.3)
POTASSIUM SERPL-SCNC: 4.4 MMOL/L (ref 3.4–5.3)
POTASSIUM SERPL-SCNC: 4.4 MMOL/L (ref 3.4–5.3)
POTASSIUM SERPL-SCNC: 4.5 MMOL/L (ref 3.4–5.3)
POTASSIUM SERPL-SCNC: NORMAL MMOL/L (ref 3.4–5.3)
PROT SERPL-MCNC: 6.2 G/DL (ref 6.8–8.8)
PROT SERPL-MCNC: 6.7 G/DL (ref 6.8–8.8)
PROT SERPL-MCNC: 6.8 G/DL (ref 6.8–8.8)
PROT SERPL-MCNC: 7 G/DL (ref 6.8–8.8)
PROT SERPL-MCNC: 7 G/DL (ref 6.8–8.8)
PROT SERPL-MCNC: 7.1 G/DL (ref 6.8–8.8)
PROT SERPL-MCNC: 7.2 G/DL (ref 6.8–8.8)
PROT SERPL-MCNC: 7.2 G/DL (ref 6.8–8.8)
PROT SERPL-MCNC: 7.3 G/DL (ref 6.8–8.8)
PROT SERPL-MCNC: 7.4 G/DL (ref 6.8–8.8)
PROT SERPL-MCNC: 7.6 G/DL (ref 6.8–8.8)
PROT SERPL-MCNC: 7.7 G/DL (ref 6.8–8.8)
PROT SERPL-MCNC: NORMAL G/DL (ref 6.8–8.8)
RADIOLOGIST FLAGS: ABNORMAL
RBC # BLD AUTO: 3.74 10E12/L (ref 4.4–5.9)
RBC # BLD AUTO: 3.95 10E12/L (ref 4.4–5.9)
RBC # BLD AUTO: 4.06 10E12/L (ref 4.4–5.9)
RBC # BLD AUTO: 4.13 10E12/L (ref 4.4–5.9)
RBC # BLD AUTO: 4.2 10E12/L (ref 4.4–5.9)
RBC # BLD AUTO: 4.31 10E12/L (ref 4.4–5.9)
RBC # BLD AUTO: 4.34 10E12/L (ref 4.4–5.9)
RBC # BLD AUTO: 4.38 10E12/L (ref 4.4–5.9)
RBC # BLD AUTO: 4.44 10E12/L (ref 4.4–5.9)
RBC # BLD AUTO: 4.47 10E12/L (ref 4.4–5.9)
RBC # BLD AUTO: 4.53 10E12/L (ref 4.4–5.9)
RBC # BLD AUTO: 4.58 10E12/L (ref 4.4–5.9)
RBC # BLD AUTO: 4.6 10E12/L (ref 4.4–5.9)
RBC # BLD AUTO: 4.64 10E12/L (ref 4.4–5.9)
RBC # BLD AUTO: 4.65 10E12/L (ref 4.4–5.9)
RBC # BLD AUTO: 4.67 10E12/L (ref 4.4–5.9)
RBC # BLD AUTO: 4.69 10E12/L (ref 4.4–5.9)
RBC # BLD AUTO: 4.71 10E12/L (ref 4.4–5.9)
RBC # BLD AUTO: 4.75 10E12/L (ref 4.4–5.9)
RBC # BLD AUTO: 4.91 10E12/L (ref 4.4–5.9)
RBC # BLD AUTO: 4.91 10E12/L (ref 4.4–5.9)
RBC # BLD AUTO: 4.92 10E12/L (ref 4.4–5.9)
RBC # BLD AUTO: 5.05 10E12/L (ref 4.4–5.9)
RBC # BLD AUTO: 5.82 10E12/L (ref 4.4–5.9)
RBC # BLD AUTO: NORMAL 10E12/L (ref 4.4–5.9)
RBC #/AREA URNS AUTO: 0 /HPF (ref 0–2)
RBC #/AREA URNS AUTO: 0 /HPF (ref 0–2)
RBC #/AREA URNS AUTO: 1 /HPF (ref 0–2)
RBC #/AREA URNS AUTO: <1 /HPF (ref 0–2)
RBC #/AREA URNS AUTO: <1 /HPF (ref 0–2)
RVA NSP5 STL QL NAA+PROBE: NOT DETECTED
SALMONELLA SP RPOD STL QL NAA+PROBE: NOT DETECTED
SARS-COV-2 RNA SPEC QL NAA+PROBE: NEGATIVE
SARS-COV-2 RNA SPEC QL NAA+PROBE: NORMAL
SARS-COV-2 RNA SPEC QL NAA+PROBE: NOT DETECTED
SHIGELLA SP+EIEC IPAH STL QL NAA+PROBE: NOT DETECTED
SODIUM SERPL-SCNC: 133 MMOL/L (ref 133–144)
SODIUM SERPL-SCNC: 133 MMOL/L (ref 133–144)
SODIUM SERPL-SCNC: 134 MMOL/L (ref 133–144)
SODIUM SERPL-SCNC: 134 MMOL/L (ref 133–144)
SODIUM SERPL-SCNC: 135 MMOL/L (ref 133–144)
SODIUM SERPL-SCNC: 136 MMOL/L (ref 133–144)
SODIUM SERPL-SCNC: 137 MMOL/L (ref 133–144)
SODIUM SERPL-SCNC: 138 MMOL/L (ref 133–144)
SODIUM SERPL-SCNC: 139 MMOL/L (ref 133–144)
SODIUM SERPL-SCNC: NORMAL MMOL/L (ref 133–144)
SOURCE: ABNORMAL
SP GR UR STRIP: 1 (ref 1–1.03)
SP GR UR STRIP: 1.01 (ref 1–1.03)
SP GR UR STRIP: 1.01 (ref 1–1.03)
SP GR UR STRIP: 1.02 (ref 1–1.03)
SP GR UR STRIP: 1.02 (ref 1–1.03)
SPECIMEN SOURCE: NORMAL
SQUAMOUS #/AREA URNS AUTO: <1 /HPF (ref 0–1)
T4 FREE SERPL-MCNC: 1.19 NG/DL (ref 0.76–1.46)
T4 FREE SERPL-MCNC: 1.21 NG/DL (ref 0.76–1.46)
T4 FREE SERPL-MCNC: 1.22 NG/DL (ref 0.76–1.46)
T4 FREE SERPL-MCNC: 1.25 NG/DL (ref 0.76–1.46)
T4 FREE SERPL-MCNC: 1.29 NG/DL (ref 0.76–1.46)
T4 FREE SERPL-MCNC: 1.34 NG/DL (ref 0.76–1.46)
T4 FREE SERPL-MCNC: 1.5 NG/DL (ref 0.76–1.46)
T4 FREE SERPL-MCNC: NORMAL NG/DL (ref 0.76–1.46)
TROPONIN I SERPL-MCNC: <0.015 UG/L (ref 0–0.04)
TSH SERPL DL<=0.005 MIU/L-ACNC: 1.78 MU/L (ref 0.4–4)
TSH SERPL DL<=0.005 MIU/L-ACNC: 1.98 MU/L (ref 0.4–4)
TSH SERPL DL<=0.005 MIU/L-ACNC: 3.32 MU/L (ref 0.4–4)
TSH SERPL DL<=0.005 MIU/L-ACNC: 3.4 MU/L (ref 0.4–4)
TSH SERPL DL<=0.005 MIU/L-ACNC: 5.13 MU/L (ref 0.4–4)
TSH SERPL DL<=0.005 MIU/L-ACNC: 5.99 MU/L (ref 0.4–4)
TSH SERPL DL<=0.005 MIU/L-ACNC: 6.03 MU/L (ref 0.4–4)
TSH SERPL DL<=0.005 MIU/L-ACNC: 6.69 MU/L (ref 0.4–4)
TSH SERPL DL<=0.005 MIU/L-ACNC: NORMAL MU/L (ref 0.4–4)
URATE SERPL-MCNC: 4.9 MG/DL (ref 3.5–7.2)
URATE SERPL-MCNC: 5.2 MG/DL (ref 3.5–7.2)
URATE SERPL-MCNC: 5.5 MG/DL (ref 3.5–7.2)
URATE SERPL-MCNC: 5.6 MG/DL (ref 3.5–7.2)
URATE SERPL-MCNC: 5.8 MG/DL (ref 3.5–7.2)
URATE SERPL-MCNC: 5.8 MG/DL (ref 3.5–7.2)
URATE SERPL-MCNC: 5.9 MG/DL (ref 3.5–7.2)
URATE SERPL-MCNC: NORMAL MG/DL (ref 3.5–7.2)
UROBILINOGEN UR STRIP-MCNC: 0 MG/DL (ref 0–2)
UROBILINOGEN UR STRIP-MCNC: 0 MG/DL (ref 0–2)
UROBILINOGEN UR STRIP-MCNC: NORMAL MG/DL (ref 0–2)
V CHOL+PARA RFBL+TRKH+TNAA STL QL NAA+PR: NOT DETECTED
WBC # BLD AUTO: 10.4 10E9/L (ref 4–11)
WBC # BLD AUTO: 12.1 10E9/L (ref 4–11)
WBC # BLD AUTO: 13.2 10E9/L (ref 4–11)
WBC # BLD AUTO: 13.5 10E9/L (ref 4–11)
WBC # BLD AUTO: 15.5 10E9/L (ref 4–11)
WBC # BLD AUTO: 16.6 10E9/L (ref 4–11)
WBC # BLD AUTO: 2.9 10E9/L (ref 4–11)
WBC # BLD AUTO: 3.8 10E9/L (ref 4–11)
WBC # BLD AUTO: 5 10E9/L (ref 4–11)
WBC # BLD AUTO: 5.7 10E9/L (ref 4–11)
WBC # BLD AUTO: 6.2 10E9/L (ref 4–11)
WBC # BLD AUTO: 6.8 10E9/L (ref 4–11)
WBC # BLD AUTO: 7.4 10E9/L (ref 4–11)
WBC # BLD AUTO: 7.5 10E9/L (ref 4–11)
WBC # BLD AUTO: 7.9 10E9/L (ref 4–11)
WBC # BLD AUTO: 8.1 10E9/L (ref 4–11)
WBC # BLD AUTO: 8.3 10E9/L (ref 4–11)
WBC # BLD AUTO: 8.3 10E9/L (ref 4–11)
WBC # BLD AUTO: 8.5 10E9/L (ref 4–11)
WBC # BLD AUTO: 8.8 10E9/L (ref 4–11)
WBC # BLD AUTO: 8.9 10E9/L (ref 4–11)
WBC # BLD AUTO: 8.9 10E9/L (ref 4–11)
WBC # BLD AUTO: 9.2 10E9/L (ref 4–11)
WBC # BLD AUTO: 9.7 10E9/L (ref 4–11)
WBC # BLD AUTO: NORMAL 10E9/L (ref 4–11)
WBC #/AREA URNS AUTO: 1 /HPF (ref 0–5)
WBC #/AREA URNS AUTO: 2 /HPF (ref 0–5)
WBC #/AREA URNS AUTO: 2 /HPF (ref 0–5)
WBC #/AREA URNS AUTO: <1 /HPF (ref 0–5)
WBC #/AREA URNS AUTO: <1 /HPF (ref 0–5)
Y ENTERO RECN STL QL NAA+PROBE: NOT DETECTED

## 2020-01-01 PROCEDURE — 25000128 H RX IP 250 OP 636: Mod: ZF | Performed by: INTERNAL MEDICINE

## 2020-01-01 PROCEDURE — 77336 RADIATION PHYSICS CONSULT: CPT | Performed by: RADIOLOGY

## 2020-01-01 PROCEDURE — 87040 BLOOD CULTURE FOR BACTERIA: CPT | Performed by: EMERGENCY MEDICINE

## 2020-01-01 PROCEDURE — 85025 COMPLETE CBC W/AUTO DIFF WBC: CPT | Performed by: INTERNAL MEDICINE

## 2020-01-01 PROCEDURE — 84484 ASSAY OF TROPONIN QUANT: CPT

## 2020-01-01 PROCEDURE — G0424 PULMONARY REHAB W EXER: HCPCS

## 2020-01-01 PROCEDURE — 25800030 ZZH RX IP 258 OP 636: Performed by: NURSE PRACTITIONER

## 2020-01-01 PROCEDURE — 80053 COMPREHEN METABOLIC PANEL: CPT | Performed by: NURSE PRACTITIONER

## 2020-01-01 PROCEDURE — 85730 THROMBOPLASTIN TIME PARTIAL: CPT | Mod: GZ | Performed by: INTERNAL MEDICINE

## 2020-01-01 PROCEDURE — 40000244 ZZH STATISTIC VISIT PULM REHAB

## 2020-01-01 PROCEDURE — 25000132 ZZH RX MED GY IP 250 OP 250 PS 637: Performed by: EMERGENCY MEDICINE

## 2020-01-01 PROCEDURE — 82150 ASSAY OF AMYLASE: CPT | Performed by: INTERNAL MEDICINE

## 2020-01-01 PROCEDURE — 82565 ASSAY OF CREATININE: CPT

## 2020-01-01 PROCEDURE — 81003 URINALYSIS AUTO W/O SCOPE: CPT | Performed by: INTERNAL MEDICINE

## 2020-01-01 PROCEDURE — 76770 US EXAM ABDO BACK WALL COMP: CPT

## 2020-01-01 PROCEDURE — 93005 ELECTROCARDIOGRAM TRACING: CPT | Mod: ZF

## 2020-01-01 PROCEDURE — 96413 CHEMO IV INFUSION 1 HR: CPT

## 2020-01-01 PROCEDURE — 36415 COLL VENOUS BLD VENIPUNCTURE: CPT

## 2020-01-01 PROCEDURE — 25000128 H RX IP 250 OP 636: Performed by: EMERGENCY MEDICINE

## 2020-01-01 PROCEDURE — 25800030 ZZH RX IP 258 OP 636: Mod: ZF

## 2020-01-01 PROCEDURE — 80053 COMPREHEN METABOLIC PANEL: CPT | Performed by: EMERGENCY MEDICINE

## 2020-01-01 PROCEDURE — G0463 HOSPITAL OUTPT CLINIC VISIT: HCPCS | Mod: 25 | Performed by: RADIOLOGY

## 2020-01-01 PROCEDURE — 80053 COMPREHEN METABOLIC PANEL: CPT | Performed by: INTERNAL MEDICINE

## 2020-01-01 PROCEDURE — 85610 PROTHROMBIN TIME: CPT | Mod: GZ | Performed by: INTERNAL MEDICINE

## 2020-01-01 PROCEDURE — 25000128 H RX IP 250 OP 636: Performed by: INTERNAL MEDICINE

## 2020-01-01 PROCEDURE — 82150 ASSAY OF AMYLASE: CPT | Performed by: NURSE PRACTITIONER

## 2020-01-01 PROCEDURE — 77370 RADIATION PHYSICS CONSULT: CPT | Performed by: RADIOLOGY

## 2020-01-01 PROCEDURE — 99214 OFFICE O/P EST MOD 30 MIN: CPT | Mod: ZP | Performed by: INTERNAL MEDICINE

## 2020-01-01 PROCEDURE — 40000114 ZZH STATISTIC NO CHARGE CLINIC VISIT

## 2020-01-01 PROCEDURE — 99214 OFFICE O/P EST MOD 30 MIN: CPT | Performed by: PHYSICIAN ASSISTANT

## 2020-01-01 PROCEDURE — 84439 ASSAY OF FREE THYROXINE: CPT | Performed by: NURSE PRACTITIONER

## 2020-01-01 PROCEDURE — 36591 DRAW BLOOD OFF VENOUS DEVICE: CPT

## 2020-01-01 PROCEDURE — 83615 LACTATE (LD) (LDH) ENZYME: CPT | Performed by: INTERNAL MEDICINE

## 2020-01-01 PROCEDURE — 71260 CT THORAX DX C+: CPT

## 2020-01-01 PROCEDURE — 99285 EMERGENCY DEPT VISIT HI MDM: CPT | Mod: 25 | Performed by: EMERGENCY MEDICINE

## 2020-01-01 PROCEDURE — 25000125 ZZHC RX 250: Performed by: INTERNAL MEDICINE

## 2020-01-01 PROCEDURE — 25000128 H RX IP 250 OP 636: Performed by: NURSE PRACTITIONER

## 2020-01-01 PROCEDURE — G0463 HOSPITAL OUTPT CLINIC VISIT: HCPCS

## 2020-01-01 PROCEDURE — 84439 ASSAY OF FREE THYROXINE: CPT | Performed by: INTERNAL MEDICINE

## 2020-01-01 PROCEDURE — 85025 COMPLETE CBC W/AUTO DIFF WBC: CPT | Performed by: NURSE PRACTITIONER

## 2020-01-01 PROCEDURE — G0463 HOSPITAL OUTPT CLINIC VISIT: HCPCS | Mod: ZF

## 2020-01-01 PROCEDURE — 25800030 ZZH RX IP 258 OP 636: Performed by: PHYSICIAN ASSISTANT

## 2020-01-01 PROCEDURE — 77336 RADIATION PHYSICS CONSULT: CPT | Mod: XU | Performed by: RADIOLOGY

## 2020-01-01 PROCEDURE — 83690 ASSAY OF LIPASE: CPT | Performed by: INTERNAL MEDICINE

## 2020-01-01 PROCEDURE — 99285 EMERGENCY DEPT VISIT HI MDM: CPT | Mod: 25

## 2020-01-01 PROCEDURE — 77334 RADIATION TREATMENT AID(S): CPT | Performed by: RADIOLOGY

## 2020-01-01 PROCEDURE — 25000128 H RX IP 250 OP 636: Performed by: PHYSICIAN ASSISTANT

## 2020-01-01 PROCEDURE — 93005 ELECTROCARDIOGRAM TRACING: CPT

## 2020-01-01 PROCEDURE — 83735 ASSAY OF MAGNESIUM: CPT | Performed by: INTERNAL MEDICINE

## 2020-01-01 PROCEDURE — 40000557 ZZH STATISTIC PORT-A-CATH ACCESS/FLUSHING

## 2020-01-01 PROCEDURE — 93321 DOPPLER ECHO F-UP/LMTD STD: CPT | Mod: 26 | Performed by: INTERNAL MEDICINE

## 2020-01-01 PROCEDURE — 84443 ASSAY THYROID STIM HORMONE: CPT | Mod: GZ | Performed by: INTERNAL MEDICINE

## 2020-01-01 PROCEDURE — 84550 ASSAY OF BLOOD/URIC ACID: CPT | Performed by: INTERNAL MEDICINE

## 2020-01-01 PROCEDURE — 85025 COMPLETE CBC W/AUTO DIFF WBC: CPT

## 2020-01-01 PROCEDURE — 96417 CHEMO IV INFUS EACH ADDL SEQ: CPT

## 2020-01-01 PROCEDURE — 25800030 ZZH RX IP 258 OP 636: Performed by: INTERNAL MEDICINE

## 2020-01-01 PROCEDURE — 83690 ASSAY OF LIPASE: CPT | Performed by: NURSE PRACTITIONER

## 2020-01-01 PROCEDURE — 99215 OFFICE O/P EST HI 40 MIN: CPT | Mod: ZP | Performed by: INTERNAL MEDICINE

## 2020-01-01 PROCEDURE — 25000125 ZZHC RX 250: Mod: ZF | Performed by: RADIOLOGY

## 2020-01-01 PROCEDURE — 84550 ASSAY OF BLOOD/URIC ACID: CPT | Performed by: NURSE PRACTITIONER

## 2020-01-01 PROCEDURE — 93010 ELECTROCARDIOGRAM REPORT: CPT | Mod: ZP | Performed by: INTERNAL MEDICINE

## 2020-01-01 PROCEDURE — A9585 GADOBUTROL INJECTION: HCPCS | Performed by: RADIOLOGY

## 2020-01-01 PROCEDURE — 71275 CT ANGIOGRAPHY CHEST: CPT

## 2020-01-01 PROCEDURE — A9503 TC99M MEDRONATE: HCPCS | Performed by: INTERNAL MEDICINE

## 2020-01-01 PROCEDURE — 25500064 ZZH RX 255 OP 636: Performed by: INTERNAL MEDICINE

## 2020-01-01 PROCEDURE — 40000275 ZZH STATISTIC RCP TIME EA 10 MIN

## 2020-01-01 PROCEDURE — 83880 ASSAY OF NATRIURETIC PEPTIDE: CPT | Performed by: EMERGENCY MEDICINE

## 2020-01-01 PROCEDURE — 77290 THER RAD SIMULAJ FIELD CPLX: CPT | Performed by: RADIOLOGY

## 2020-01-01 PROCEDURE — 99284 EMERGENCY DEPT VISIT MOD MDM: CPT | Mod: Z6 | Performed by: EMERGENCY MEDICINE

## 2020-01-01 PROCEDURE — 97802 MEDICAL NUTRITION INDIV IN: CPT | Mod: 95,GZ | Performed by: DIETITIAN, REGISTERED

## 2020-01-01 PROCEDURE — 93970 EXTREMITY STUDY: CPT

## 2020-01-01 PROCEDURE — 25800030 ZZH RX IP 258 OP 636: Mod: ZF | Performed by: INTERNAL MEDICINE

## 2020-01-01 PROCEDURE — 87493 C DIFF AMPLIFIED PROBE: CPT | Performed by: NURSE PRACTITIONER

## 2020-01-01 PROCEDURE — 82248 BILIRUBIN DIRECT: CPT | Performed by: INTERNAL MEDICINE

## 2020-01-01 PROCEDURE — G0463 HOSPITAL OUTPT CLINIC VISIT: HCPCS | Mod: 25

## 2020-01-01 PROCEDURE — 93306 TTE W/DOPPLER COMPLETE: CPT

## 2020-01-01 PROCEDURE — 25000132 ZZH RX MED GY IP 250 OP 250 PS 637: Performed by: INTERNAL MEDICINE

## 2020-01-01 PROCEDURE — 84443 ASSAY THYROID STIM HORMONE: CPT | Performed by: INTERNAL MEDICINE

## 2020-01-01 PROCEDURE — 96375 TX/PRO/DX INJ NEW DRUG ADDON: CPT

## 2020-01-01 PROCEDURE — 99207 ZZC CDG-CODE CATEGORY CHANGED: CPT | Performed by: INTERNAL MEDICINE

## 2020-01-01 PROCEDURE — A9585 GADOBUTROL INJECTION: HCPCS | Performed by: INTERNAL MEDICINE

## 2020-01-01 PROCEDURE — 85025 COMPLETE CBC W/AUTO DIFF WBC: CPT | Performed by: PHYSICIAN ASSISTANT

## 2020-01-01 PROCEDURE — 81003 URINALYSIS AUTO W/O SCOPE: CPT | Performed by: NURSE PRACTITIONER

## 2020-01-01 PROCEDURE — 99213 OFFICE O/P EST LOW 20 MIN: CPT | Performed by: NURSE PRACTITIONER

## 2020-01-01 PROCEDURE — 85610 PROTHROMBIN TIME: CPT | Performed by: INTERNAL MEDICINE

## 2020-01-01 PROCEDURE — 999N001193 HC VIDEO/TELEPHONE VISIT; NO CHARGE

## 2020-01-01 PROCEDURE — 83690 ASSAY OF LIPASE: CPT | Performed by: EMERGENCY MEDICINE

## 2020-01-01 PROCEDURE — 77295 3-D RADIOTHERAPY PLAN: CPT | Performed by: RADIOLOGY

## 2020-01-01 PROCEDURE — 99204 OFFICE O/P NEW MOD 45 MIN: CPT | Performed by: INTERNAL MEDICINE

## 2020-01-01 PROCEDURE — 85025 COMPLETE CBC W/AUTO DIFF WBC: CPT | Performed by: EMERGENCY MEDICINE

## 2020-01-01 PROCEDURE — 70552 MRI BRAIN STEM W/DYE: CPT

## 2020-01-01 PROCEDURE — 40000557 NM BONE SCAN WHOLE BODY

## 2020-01-01 PROCEDURE — 93308 TTE F-UP OR LMTD: CPT | Mod: 26 | Performed by: INTERNAL MEDICINE

## 2020-01-01 PROCEDURE — 40000172 ZZH STATISTIC PROCEDURE PREP ONLY

## 2020-01-01 PROCEDURE — 25500064 ZZH RX 255 OP 636: Performed by: RADIOLOGY

## 2020-01-01 PROCEDURE — 97140 MANUAL THERAPY 1/> REGIONS: CPT | Mod: GP | Performed by: PHYSICAL THERAPIST

## 2020-01-01 PROCEDURE — 25000132 ZZH RX MED GY IP 250 OP 250 PS 637: Mod: ZF | Performed by: RADIOLOGY

## 2020-01-01 PROCEDURE — 83735 ASSAY OF MAGNESIUM: CPT | Performed by: NURSE PRACTITIONER

## 2020-01-01 PROCEDURE — 99214 OFFICE O/P EST MOD 30 MIN: CPT | Mod: 95 | Performed by: NURSE PRACTITIONER

## 2020-01-01 PROCEDURE — 84484 ASSAY OF TROPONIN QUANT: CPT | Performed by: EMERGENCY MEDICINE

## 2020-01-01 PROCEDURE — 73221 MRI JOINT UPR EXTREM W/O DYE: CPT | Mod: LT

## 2020-01-01 PROCEDURE — 85730 THROMBOPLASTIN TIME PARTIAL: CPT | Performed by: INTERNAL MEDICINE

## 2020-01-01 PROCEDURE — 80053 COMPREHEN METABOLIC PANEL: CPT | Performed by: PHYSICIAN ASSISTANT

## 2020-01-01 PROCEDURE — 97110 THERAPEUTIC EXERCISES: CPT | Mod: GP | Performed by: PHYSICAL THERAPIST

## 2020-01-01 PROCEDURE — 87506 IADNA-DNA/RNA PROBE TQ 6-11: CPT | Performed by: NURSE PRACTITIONER

## 2020-01-01 PROCEDURE — 77300 RADIATION THERAPY DOSE PLAN: CPT | Performed by: RADIOLOGY

## 2020-01-01 PROCEDURE — 70553 MRI BRAIN STEM W/O & W/DYE: CPT

## 2020-01-01 PROCEDURE — 99214 OFFICE O/P EST MOD 30 MIN: CPT | Mod: TEL | Performed by: INTERNAL MEDICINE

## 2020-01-01 PROCEDURE — 40000274 ZZH STATISTIC RCP CONSULT EA 30 MIN

## 2020-01-01 PROCEDURE — 83605 ASSAY OF LACTIC ACID: CPT | Performed by: EMERGENCY MEDICINE

## 2020-01-01 PROCEDURE — 99497 ADVNCD CARE PLAN 30 MIN: CPT | Performed by: INTERNAL MEDICINE

## 2020-01-01 PROCEDURE — 77371 SRS MULTISOURCE: CPT | Performed by: RADIOLOGY

## 2020-01-01 PROCEDURE — 99214 OFFICE O/P EST MOD 30 MIN: CPT | Performed by: PEDIATRICS

## 2020-01-01 PROCEDURE — 81001 URINALYSIS AUTO W/SCOPE: CPT | Performed by: EMERGENCY MEDICINE

## 2020-01-01 PROCEDURE — 12000000 ZZH R&B MED SURG/OB

## 2020-01-01 PROCEDURE — U0003 INFECTIOUS AGENT DETECTION BY NUCLEIC ACID (DNA OR RNA); SEVERE ACUTE RESPIRATORY SYNDROME CORONAVIRUS 2 (SARS-COV-2) (CORONAVIRUS DISEASE [COVID-19]), AMPLIFIED PROBE TECHNIQUE, MAKING USE OF HIGH THROUGHPUT TECHNOLOGIES AS DESCRIBED BY CMS-2020-01-R: HCPCS | Performed by: EMERGENCY MEDICINE

## 2020-01-01 PROCEDURE — G0463 HOSPITAL OUTPT CLINIC VISIT: HCPCS | Mod: ZF,25

## 2020-01-01 PROCEDURE — 25000128 H RX IP 250 OP 636

## 2020-01-01 PROCEDURE — 96523 IRRIG DRUG DELIVERY DEVICE: CPT

## 2020-01-01 PROCEDURE — 96367 TX/PROPH/DG ADDL SEQ IV INF: CPT

## 2020-01-01 PROCEDURE — 77470 SPECIAL RADIATION TREATMENT: CPT | Mod: GT | Performed by: RADIOLOGY

## 2020-01-01 PROCEDURE — 77412 RADIATION TX DELIVERY LVL 3: CPT | Performed by: RADIOLOGY

## 2020-01-01 PROCEDURE — 94640 AIRWAY INHALATION TREATMENT: CPT

## 2020-01-01 PROCEDURE — 74177 CT ABD & PELVIS W/CONTRAST: CPT

## 2020-01-01 PROCEDURE — 99207 ZZC NO CHARGE NURSE ONLY: CPT | Mod: ZP

## 2020-01-01 PROCEDURE — 96374 THER/PROPH/DIAG INJ IV PUSH: CPT | Mod: 59

## 2020-01-01 PROCEDURE — 81001 URINALYSIS AUTO W/SCOPE: CPT | Performed by: INTERNAL MEDICINE

## 2020-01-01 PROCEDURE — 84443 ASSAY THYROID STIM HORMONE: CPT | Performed by: NURSE PRACTITIONER

## 2020-01-01 PROCEDURE — 34300033 ZZH RX 343: Performed by: INTERNAL MEDICINE

## 2020-01-01 PROCEDURE — 97161 PT EVAL LOW COMPLEX 20 MIN: CPT | Mod: GP | Performed by: PHYSICAL THERAPIST

## 2020-01-01 PROCEDURE — 99215 OFFICE O/P EST HI 40 MIN: CPT | Mod: 95 | Performed by: INTERNAL MEDICINE

## 2020-01-01 PROCEDURE — 25000128 H RX IP 250 OP 636: Mod: ZF

## 2020-01-01 PROCEDURE — 99421 OL DIG E/M SVC 5-10 MIN: CPT | Performed by: FAMILY MEDICINE

## 2020-01-01 PROCEDURE — 25000128 H RX IP 250 OP 636: Mod: ZF | Performed by: NURSE PRACTITIONER

## 2020-01-01 PROCEDURE — 99214 OFFICE O/P EST MOD 30 MIN: CPT | Mod: ZP | Performed by: NURSE PRACTITIONER

## 2020-01-01 PROCEDURE — 93010 ELECTROCARDIOGRAM REPORT: CPT | Mod: 76 | Performed by: INTERNAL MEDICINE

## 2020-01-01 PROCEDURE — 80048 BASIC METABOLIC PNL TOTAL CA: CPT | Performed by: INTERNAL MEDICINE

## 2020-01-01 PROCEDURE — 93306 TTE W/DOPPLER COMPLETE: CPT | Mod: 26 | Performed by: INTERNAL MEDICINE

## 2020-01-01 PROCEDURE — 40001009 ZZH VIDEO/TELEPHONE VISIT; NO CHARGE

## 2020-01-01 PROCEDURE — G0008 ADMIN INFLUENZA VIRUS VAC: HCPCS

## 2020-01-01 PROCEDURE — 25000131 ZZH RX MED GY IP 250 OP 636 PS 637: Performed by: INTERNAL MEDICINE

## 2020-01-01 PROCEDURE — 25800030 ZZH RX IP 258 OP 636: Performed by: EMERGENCY MEDICINE

## 2020-01-01 PROCEDURE — 99214 OFFICE O/P EST MOD 30 MIN: CPT | Mod: 95 | Performed by: INTERNAL MEDICINE

## 2020-01-01 PROCEDURE — 40000264 ECHOCARDIOGRAM LIMITED

## 2020-01-01 PROCEDURE — 80076 HEPATIC FUNCTION PANEL: CPT | Performed by: PHYSICIAN ASSISTANT

## 2020-01-01 PROCEDURE — 83690 ASSAY OF LIPASE: CPT

## 2020-01-01 PROCEDURE — 99217 ZZC OBSERVATION CARE DISCHARGE: CPT | Performed by: INTERNAL MEDICINE

## 2020-01-01 PROCEDURE — 80053 COMPREHEN METABOLIC PANEL: CPT

## 2020-01-01 PROCEDURE — 99214 OFFICE O/P EST MOD 30 MIN: CPT | Performed by: INTERNAL MEDICINE

## 2020-01-01 PROCEDURE — C9803 HOPD COVID-19 SPEC COLLECT: HCPCS

## 2020-01-01 PROCEDURE — 93325 DOPPLER ECHO COLOR FLOW MAPG: CPT | Mod: 26 | Performed by: INTERNAL MEDICINE

## 2020-01-01 PROCEDURE — 81003 URINALYSIS AUTO W/O SCOPE: CPT | Performed by: PHYSICIAN ASSISTANT

## 2020-01-01 PROCEDURE — 36592 COLLECT BLOOD FROM PICC: CPT | Performed by: INTERNAL MEDICINE

## 2020-01-01 PROCEDURE — 25000132 ZZH RX MED GY IP 250 OP 250 PS 637: Performed by: PHYSICIAN ASSISTANT

## 2020-01-01 PROCEDURE — 25000128 H RX IP 250 OP 636: Performed by: STUDENT IN AN ORGANIZED HEALTH CARE EDUCATION/TRAINING PROGRAM

## 2020-01-01 PROCEDURE — 77470 SPECIAL RADIATION TREATMENT: CPT | Performed by: RADIOLOGY

## 2020-01-01 PROCEDURE — 25000128 H RX IP 250 OP 636: Mod: ZF | Performed by: RADIOLOGY

## 2020-01-01 PROCEDURE — 80048 BASIC METABOLIC PNL TOTAL CA: CPT | Performed by: EMERGENCY MEDICINE

## 2020-01-01 PROCEDURE — 78306 BONE IMAGING WHOLE BODY: CPT

## 2020-01-01 PROCEDURE — G0378 HOSPITAL OBSERVATION PER HR: HCPCS

## 2020-01-01 PROCEDURE — 77280 THER RAD SIMULAJ FIELD SMPL: CPT | Performed by: RADIOLOGY

## 2020-01-01 PROCEDURE — 96374 THER/PROPH/DIAG INJ IV PUSH: CPT | Mod: 59 | Performed by: EMERGENCY MEDICINE

## 2020-01-01 PROCEDURE — 81001 URINALYSIS AUTO W/SCOPE: CPT | Performed by: PHYSICIAN ASSISTANT

## 2020-01-01 PROCEDURE — 25800030 ZZH RX IP 258 OP 636: Mod: ZF | Performed by: NURSE PRACTITIONER

## 2020-01-01 PROCEDURE — 99220 ZZC INITIAL OBSERVATION CARE,LEVL III: CPT | Performed by: INTERNAL MEDICINE

## 2020-01-01 RX ORDER — HEPARIN SODIUM (PORCINE) LOCK FLUSH IV SOLN 100 UNIT/ML 100 UNIT/ML
5 SOLUTION INTRAVENOUS
Status: CANCELLED | OUTPATIENT
Start: 2020-01-01

## 2020-01-01 RX ORDER — DIPHENHYDRAMINE HYDROCHLORIDE 50 MG/ML
50 INJECTION INTRAMUSCULAR; INTRAVENOUS
Status: CANCELLED
Start: 2020-01-01

## 2020-01-01 RX ORDER — HEPARIN SODIUM,PORCINE 10 UNIT/ML
5 VIAL (ML) INTRAVENOUS
Status: CANCELLED | OUTPATIENT
Start: 2020-01-01

## 2020-01-01 RX ORDER — HEPARIN SODIUM (PORCINE) LOCK FLUSH IV SOLN 100 UNIT/ML 100 UNIT/ML
100 SOLUTION INTRAVENOUS ONCE
Status: DISCONTINUED | OUTPATIENT
Start: 2020-01-01 | End: 2020-01-01 | Stop reason: HOSPADM

## 2020-01-01 RX ORDER — MEPERIDINE HYDROCHLORIDE 25 MG/ML
25 INJECTION INTRAMUSCULAR; INTRAVENOUS; SUBCUTANEOUS EVERY 30 MIN PRN
Status: CANCELLED | OUTPATIENT
Start: 2020-01-01

## 2020-01-01 RX ORDER — ONDANSETRON 2 MG/ML
4 INJECTION INTRAMUSCULAR; INTRAVENOUS
Status: DISCONTINUED | OUTPATIENT
Start: 2020-01-01 | End: 2020-01-01 | Stop reason: HOSPADM

## 2020-01-01 RX ORDER — DIPHENHYDRAMINE HCL 25 MG
50 CAPSULE ORAL ONCE
Status: COMPLETED | OUTPATIENT
Start: 2020-01-01 | End: 2020-01-01

## 2020-01-01 RX ORDER — MIRTAZAPINE 7.5 MG/1
7.5 TABLET, FILM COATED ORAL AT BEDTIME
Qty: 30 TABLET | Refills: 3 | Status: SHIPPED | OUTPATIENT
Start: 2020-01-01 | End: 2020-01-01 | Stop reason: DRUGHIGH

## 2020-01-01 RX ORDER — HEPARIN SODIUM (PORCINE) LOCK FLUSH IV SOLN 100 UNIT/ML 100 UNIT/ML
SOLUTION INTRAVENOUS
Status: COMPLETED
Start: 2020-01-01 | End: 2020-01-01

## 2020-01-01 RX ORDER — SODIUM CHLORIDE 9 MG/ML
1000 INJECTION, SOLUTION INTRAVENOUS CONTINUOUS
Status: DISCONTINUED | OUTPATIENT
Start: 2020-01-01 | End: 2020-01-01 | Stop reason: HOSPADM

## 2020-01-01 RX ORDER — HEPARIN SODIUM (PORCINE) LOCK FLUSH IV SOLN 100 UNIT/ML 100 UNIT/ML
5 SOLUTION INTRAVENOUS EVERY 8 HOURS
Status: DISCONTINUED | OUTPATIENT
Start: 2020-01-01 | End: 2020-01-01 | Stop reason: HOSPADM

## 2020-01-01 RX ORDER — HEPARIN SODIUM (PORCINE) LOCK FLUSH IV SOLN 100 UNIT/ML 100 UNIT/ML
5 SOLUTION INTRAVENOUS ONCE
Status: COMPLETED | OUTPATIENT
Start: 2020-01-01 | End: 2020-01-01

## 2020-01-01 RX ORDER — SODIUM CHLORIDE 9 MG/ML
1000 INJECTION, SOLUTION INTRAVENOUS CONTINUOUS PRN
Status: CANCELLED
Start: 2020-01-01

## 2020-01-01 RX ORDER — EPINEPHRINE 0.3 MG/.3ML
0.3 INJECTION SUBCUTANEOUS EVERY 5 MIN PRN
Status: CANCELLED | OUTPATIENT
Start: 2020-01-01

## 2020-01-01 RX ORDER — ALBUTEROL SULFATE 90 UG/1
1-2 AEROSOL, METERED RESPIRATORY (INHALATION)
Status: CANCELLED
Start: 2020-01-01

## 2020-01-01 RX ORDER — HYDROCODONE BITARTRATE AND ACETAMINOPHEN 5; 325 MG/1; MG/1
1-2 TABLET ORAL EVERY 6 HOURS PRN
Status: DISCONTINUED | OUTPATIENT
Start: 2020-01-01 | End: 2020-01-01 | Stop reason: HOSPADM

## 2020-01-01 RX ORDER — MIRTAZAPINE 15 MG/1
15 TABLET, FILM COATED ORAL AT BEDTIME
Qty: 30 TABLET | Refills: 1 | Status: SHIPPED | OUTPATIENT
Start: 2020-01-01 | End: 2020-01-01

## 2020-01-01 RX ORDER — MIRTAZAPINE 7.5 MG/1
7.5 TABLET, FILM COATED ORAL AT BEDTIME
Status: DISCONTINUED | OUTPATIENT
Start: 2020-01-01 | End: 2020-01-01 | Stop reason: HOSPADM

## 2020-01-01 RX ORDER — MORPHINE SULFATE 15 MG/1
15 TABLET, FILM COATED, EXTENDED RELEASE ORAL 2 TIMES DAILY
Qty: 60 TABLET | Refills: 0 | Status: SHIPPED | OUTPATIENT
Start: 2020-01-01

## 2020-01-01 RX ORDER — METHYLPREDNISOLONE SODIUM SUCCINATE 125 MG/2ML
125 INJECTION, POWDER, LYOPHILIZED, FOR SOLUTION INTRAMUSCULAR; INTRAVENOUS
Status: CANCELLED
Start: 2020-01-01

## 2020-01-01 RX ORDER — ALBUTEROL SULFATE 0.83 MG/ML
2.5 SOLUTION RESPIRATORY (INHALATION) EVERY 4 HOURS PRN
Status: DISCONTINUED | OUTPATIENT
Start: 2020-01-01 | End: 2020-01-01 | Stop reason: HOSPADM

## 2020-01-01 RX ORDER — LIDOCAINE 40 MG/G
CREAM TOPICAL
Status: DISCONTINUED | OUTPATIENT
Start: 2020-01-01 | End: 2020-01-01 | Stop reason: HOSPADM

## 2020-01-01 RX ORDER — IOPAMIDOL 755 MG/ML
107 INJECTION, SOLUTION INTRAVASCULAR ONCE
Status: COMPLETED | OUTPATIENT
Start: 2020-01-01 | End: 2020-01-01

## 2020-01-01 RX ORDER — ONDANSETRON 4 MG/1
8 TABLET, ORALLY DISINTEGRATING ORAL EVERY 6 HOURS PRN
Status: DISCONTINUED | OUTPATIENT
Start: 2020-01-01 | End: 2020-01-01 | Stop reason: HOSPADM

## 2020-01-01 RX ORDER — DEXAMETHASONE 4 MG/1
TABLET ORAL
Qty: 6 TABLET | Refills: 11 | Status: SHIPPED | OUTPATIENT
Start: 2020-01-01 | End: 2020-01-01

## 2020-01-01 RX ORDER — HEPARIN SODIUM (PORCINE) LOCK FLUSH IV SOLN 100 UNIT/ML 100 UNIT/ML
5 SOLUTION INTRAVENOUS
Status: DISCONTINUED | OUTPATIENT
Start: 2020-01-01 | End: 2020-01-01 | Stop reason: HOSPADM

## 2020-01-01 RX ORDER — ALBUTEROL SULFATE 0.83 MG/ML
2.5 SOLUTION RESPIRATORY (INHALATION)
Status: CANCELLED | OUTPATIENT
Start: 2020-01-01

## 2020-01-01 RX ORDER — EPINEPHRINE 1 MG/ML
0.3 INJECTION, SOLUTION INTRAMUSCULAR; SUBCUTANEOUS EVERY 5 MIN PRN
Status: CANCELLED | OUTPATIENT
Start: 2020-01-01

## 2020-01-01 RX ORDER — METHYLPREDNISOLONE SODIUM SUCCINATE 125 MG/2ML
125 INJECTION, POWDER, LYOPHILIZED, FOR SOLUTION INTRAMUSCULAR; INTRAVENOUS ONCE
Status: COMPLETED | OUTPATIENT
Start: 2020-01-01 | End: 2020-01-01

## 2020-01-01 RX ORDER — FENTANYL CITRATE 50 UG/ML
INJECTION, SOLUTION INTRAMUSCULAR; INTRAVENOUS
Status: DISCONTINUED
Start: 2020-01-01 | End: 2020-01-01 | Stop reason: HOSPADM

## 2020-01-01 RX ORDER — NALOXONE HYDROCHLORIDE 0.4 MG/ML
.1-.4 INJECTION, SOLUTION INTRAMUSCULAR; INTRAVENOUS; SUBCUTANEOUS
Status: CANCELLED | OUTPATIENT
Start: 2020-01-01

## 2020-01-01 RX ORDER — FINASTERIDE 5 MG/1
5 TABLET, FILM COATED ORAL DAILY
Status: DISCONTINUED | OUTPATIENT
Start: 2020-01-01 | End: 2020-01-01 | Stop reason: HOSPADM

## 2020-01-01 RX ORDER — LORAZEPAM 0.5 MG/1
.5-1 TABLET ORAL
Status: COMPLETED | OUTPATIENT
Start: 2020-01-01 | End: 2020-01-01

## 2020-01-01 RX ORDER — LORAZEPAM 2 MG/ML
0.5 INJECTION INTRAMUSCULAR EVERY 4 HOURS PRN
Status: CANCELLED
Start: 2020-01-01

## 2020-01-01 RX ORDER — HEPARIN SODIUM (PORCINE) LOCK FLUSH IV SOLN 100 UNIT/ML 100 UNIT/ML
5 SOLUTION INTRAVENOUS
Status: COMPLETED | OUTPATIENT
Start: 2020-01-01 | End: 2020-01-01

## 2020-01-01 RX ORDER — HEPARIN SODIUM,PORCINE 10 UNIT/ML
5-10 VIAL (ML) INTRAVENOUS
Status: DISCONTINUED | OUTPATIENT
Start: 2020-01-01 | End: 2020-01-01 | Stop reason: HOSPADM

## 2020-01-01 RX ORDER — HYDROMORPHONE HYDROCHLORIDE 1 MG/ML
1 INJECTION, SOLUTION INTRAMUSCULAR; INTRAVENOUS; SUBCUTANEOUS
Status: DISCONTINUED | OUTPATIENT
Start: 2020-01-01 | End: 2020-01-01 | Stop reason: HOSPADM

## 2020-01-01 RX ORDER — ONDANSETRON 2 MG/ML
4 INJECTION INTRAMUSCULAR; INTRAVENOUS EVERY 6 HOURS PRN
Status: DISCONTINUED | OUTPATIENT
Start: 2020-01-01 | End: 2020-01-01 | Stop reason: HOSPADM

## 2020-01-01 RX ORDER — PREDNISONE 20 MG/1
40 TABLET ORAL DAILY
Qty: 12 TABLET | Refills: 0 | Status: SHIPPED | OUTPATIENT
Start: 2020-01-01 | End: 2020-01-01

## 2020-01-01 RX ORDER — FLUTICASONE PROPIONATE 50 MCG
2 SPRAY, SUSPENSION (ML) NASAL DAILY
Qty: 16 G | Refills: 0 | Status: SHIPPED | OUTPATIENT
Start: 2020-01-01 | End: 2020-01-01

## 2020-01-01 RX ORDER — LORAZEPAM 0.5 MG/1
TABLET ORAL
Qty: 15 TABLET | Refills: 0 | Status: SHIPPED | OUTPATIENT
Start: 2020-01-01 | End: 2020-01-01

## 2020-01-01 RX ORDER — OXYCODONE HYDROCHLORIDE 5 MG/1
5 TABLET ORAL EVERY 4 HOURS PRN
Qty: 100 TABLET | Refills: 0 | Status: SHIPPED | OUTPATIENT
Start: 2020-01-01

## 2020-01-01 RX ORDER — MORPHINE SULFATE 15 MG/1
15 TABLET, FILM COATED, EXTENDED RELEASE ORAL 2 TIMES DAILY PRN
Status: DISCONTINUED | OUTPATIENT
Start: 2020-01-01 | End: 2020-01-01 | Stop reason: HOSPADM

## 2020-01-01 RX ORDER — HEPARIN SODIUM (PORCINE) LOCK FLUSH IV SOLN 100 UNIT/ML 100 UNIT/ML
500 SOLUTION INTRAVENOUS ONCE
Status: COMPLETED | OUTPATIENT
Start: 2020-01-01 | End: 2020-01-01

## 2020-01-01 RX ORDER — IOPAMIDOL 755 MG/ML
111 INJECTION, SOLUTION INTRAVASCULAR ONCE
Status: COMPLETED | OUTPATIENT
Start: 2020-01-01 | End: 2020-01-01

## 2020-01-01 RX ORDER — ALBUTEROL SULFATE 90 UG/1
6 AEROSOL, METERED RESPIRATORY (INHALATION) ONCE
Status: COMPLETED | OUTPATIENT
Start: 2020-01-01 | End: 2020-01-01

## 2020-01-01 RX ORDER — MORPHINE SULFATE 15 MG/1
15 TABLET, FILM COATED, EXTENDED RELEASE ORAL EVERY 12 HOURS
Qty: 60 TABLET | Refills: 0 | Status: SHIPPED | OUTPATIENT
Start: 2020-01-01 | End: 2020-01-01

## 2020-01-01 RX ORDER — HEPARIN SODIUM,PORCINE 10 UNIT/ML
5 VIAL (ML) INTRAVENOUS
Status: DISCONTINUED | OUTPATIENT
Start: 2020-01-01 | End: 2020-01-01 | Stop reason: HOSPADM

## 2020-01-01 RX ORDER — GADOBUTROL 604.72 MG/ML
10 INJECTION INTRAVENOUS ONCE
Status: COMPLETED | OUTPATIENT
Start: 2020-01-01 | End: 2020-01-01

## 2020-01-01 RX ORDER — TC 99M MEDRONATE 20 MG/10ML
25 INJECTION, POWDER, LYOPHILIZED, FOR SOLUTION INTRAVENOUS ONCE
Status: COMPLETED | OUTPATIENT
Start: 2020-01-01 | End: 2020-01-01

## 2020-01-01 RX ORDER — LORAZEPAM 1 MG/1
1 TABLET ORAL EVERY 6 HOURS PRN
Status: DISCONTINUED | OUTPATIENT
Start: 2020-01-01 | End: 2020-01-01 | Stop reason: HOSPADM

## 2020-01-01 RX ORDER — IOPAMIDOL 755 MG/ML
500 INJECTION, SOLUTION INTRAVASCULAR ONCE
Status: COMPLETED | OUTPATIENT
Start: 2020-01-01 | End: 2020-01-01

## 2020-01-01 RX ORDER — LORAZEPAM 0.5 MG/1
0.5 TABLET ORAL EVERY 6 HOURS PRN
Qty: 30 TABLET | Refills: 0 | Status: SHIPPED | OUTPATIENT
Start: 2020-01-01 | End: 2020-01-01

## 2020-01-01 RX ORDER — ONDANSETRON 2 MG/ML
4 INJECTION INTRAMUSCULAR; INTRAVENOUS EVERY 30 MIN PRN
Status: DISCONTINUED | OUTPATIENT
Start: 2020-01-01 | End: 2020-01-01 | Stop reason: HOSPADM

## 2020-01-01 RX ORDER — HEPARIN SODIUM,PORCINE 10 UNIT/ML
5-10 VIAL (ML) INTRAVENOUS EVERY 24 HOURS
Status: DISCONTINUED | OUTPATIENT
Start: 2020-01-01 | End: 2020-01-01 | Stop reason: HOSPADM

## 2020-01-01 RX ORDER — FENTANYL CITRATE 50 UG/ML
50 INJECTION, SOLUTION INTRAMUSCULAR; INTRAVENOUS ONCE
Status: COMPLETED | OUTPATIENT
Start: 2020-01-01 | End: 2020-01-01

## 2020-01-01 RX ORDER — LORAZEPAM 0.5 MG/1
.5-1 TABLET ORAL
Status: DISCONTINUED | OUTPATIENT
Start: 2020-01-01 | End: 2020-01-01 | Stop reason: HOSPADM

## 2020-01-01 RX ORDER — MORPHINE SULFATE 15 MG/1
15 TABLET, FILM COATED, EXTENDED RELEASE ORAL AT BEDTIME
Qty: 30 TABLET | Refills: 0 | Status: SHIPPED | OUTPATIENT
Start: 2020-01-01 | End: 2020-01-01

## 2020-01-01 RX ORDER — MIRTAZAPINE 15 MG/1
15 TABLET, FILM COATED ORAL AT BEDTIME
Qty: 30 TABLET | Refills: 1 | Status: SHIPPED | OUTPATIENT
Start: 2020-01-01

## 2020-01-01 RX ORDER — GADOBUTROL 604.72 MG/ML
7 INJECTION INTRAVENOUS ONCE
Status: COMPLETED | OUTPATIENT
Start: 2020-01-01 | End: 2020-01-01

## 2020-01-01 RX ORDER — DEXAMETHASONE 4 MG/1
4 TABLET ORAL 2 TIMES DAILY WITH MEALS
Qty: 60 TABLET | Refills: 11 | Status: SHIPPED | OUTPATIENT
Start: 2020-01-01

## 2020-01-01 RX ORDER — TC 99M MEDRONATE 20 MG/10ML
20-30 INJECTION, POWDER, LYOPHILIZED, FOR SOLUTION INTRAVENOUS ONCE
Status: COMPLETED | OUTPATIENT
Start: 2020-01-01 | End: 2020-01-01

## 2020-01-01 RX ORDER — GADOBUTROL 604.72 MG/ML
6 INJECTION INTRAVENOUS ONCE
Status: COMPLETED | OUTPATIENT
Start: 2020-01-01 | End: 2020-01-01

## 2020-01-01 RX ORDER — LORAZEPAM 1 MG/1
1 TABLET ORAL EVERY 6 HOURS PRN
Qty: 30 TABLET | Refills: 0 | Status: SHIPPED | OUTPATIENT
Start: 2020-01-01

## 2020-01-01 RX ORDER — METOPROLOL SUCCINATE 25 MG/1
25 TABLET, EXTENDED RELEASE ORAL DAILY
Qty: 60 TABLET | Refills: 6 | Status: SHIPPED | OUTPATIENT
Start: 2020-01-01

## 2020-01-01 RX ORDER — DIPHENHYDRAMINE HCL 25 MG
50 CAPSULE ORAL ONCE
Status: CANCELLED
Start: 2020-01-01

## 2020-01-01 RX ORDER — CALCIUM POLYCARBOPHIL 625 MG 625 MG/1
1250 TABLET ORAL DAILY
Status: DISCONTINUED | OUTPATIENT
Start: 2020-01-01 | End: 2020-01-01 | Stop reason: HOSPADM

## 2020-01-01 RX ORDER — ONDANSETRON 4 MG/1
4 TABLET, ORALLY DISINTEGRATING ORAL EVERY 6 HOURS PRN
Status: DISCONTINUED | OUTPATIENT
Start: 2020-01-01 | End: 2020-01-01 | Stop reason: HOSPADM

## 2020-01-01 RX ORDER — IOPAMIDOL 755 MG/ML
105 INJECTION, SOLUTION INTRAVASCULAR ONCE
Status: COMPLETED | OUTPATIENT
Start: 2020-01-01 | End: 2020-01-01

## 2020-01-01 RX ORDER — GADOBUTROL 604.72 MG/ML
7.5 INJECTION INTRAVENOUS ONCE
Status: COMPLETED | OUTPATIENT
Start: 2020-01-01 | End: 2020-01-01

## 2020-01-01 RX ORDER — ALBUTEROL SULFATE 90 UG/1
2 AEROSOL, METERED RESPIRATORY (INHALATION) 4 TIMES DAILY
Status: DISCONTINUED | OUTPATIENT
Start: 2020-01-01 | End: 2020-01-01 | Stop reason: HOSPADM

## 2020-01-01 RX ORDER — IOPAMIDOL 755 MG/ML
92 INJECTION, SOLUTION INTRAVASCULAR ONCE
Status: COMPLETED | OUTPATIENT
Start: 2020-01-01 | End: 2020-01-01

## 2020-01-01 RX ORDER — LIDOCAINE 40 MG/G
1 CREAM TOPICAL SEE ADMIN INSTRUCTIONS
Status: COMPLETED | OUTPATIENT
Start: 2020-01-01 | End: 2020-01-01

## 2020-01-01 RX ORDER — SENNOSIDES A AND B 8.6 MG/1
1 TABLET, FILM COATED ORAL DAILY
COMMUNITY

## 2020-01-01 RX ORDER — LIDOCAINE 40 MG/G
1 CREAM TOPICAL SEE ADMIN INSTRUCTIONS
Status: DISCONTINUED | OUTPATIENT
Start: 2020-01-01 | End: 2020-01-01 | Stop reason: HOSPADM

## 2020-01-01 RX ORDER — PREDNISONE 20 MG/1
40 TABLET ORAL DAILY
Status: DISCONTINUED | OUTPATIENT
Start: 2020-01-01 | End: 2020-01-01 | Stop reason: HOSPADM

## 2020-01-01 RX ORDER — TIOTROPIUM BROMIDE 18 UG/1
18 CAPSULE ORAL; RESPIRATORY (INHALATION) DAILY
Qty: 1 CAPSULE | Refills: 11 | Status: SHIPPED | OUTPATIENT
Start: 2020-01-01

## 2020-01-01 RX ORDER — OXYCODONE HYDROCHLORIDE 5 MG/1
5 TABLET ORAL EVERY 4 HOURS PRN
Qty: 60 TABLET | Refills: 0 | Status: SHIPPED | OUTPATIENT
Start: 2020-01-01 | End: 2020-01-01

## 2020-01-01 RX ORDER — ACETAMINOPHEN 325 MG/1
650 TABLET ORAL EVERY 4 HOURS PRN
Status: DISCONTINUED | OUTPATIENT
Start: 2020-01-01 | End: 2020-01-01 | Stop reason: HOSPADM

## 2020-01-01 RX ORDER — ALBUTEROL SULFATE 90 UG/1
2 AEROSOL, METERED RESPIRATORY (INHALATION)
Status: DISCONTINUED | OUTPATIENT
Start: 2020-01-01 | End: 2020-01-01

## 2020-01-01 RX ORDER — OXYCODONE HYDROCHLORIDE 5 MG/1
5 TABLET ORAL EVERY 4 HOURS PRN
Status: DISCONTINUED | OUTPATIENT
Start: 2020-01-01 | End: 2020-01-01 | Stop reason: HOSPADM

## 2020-01-01 RX ORDER — MIRTAZAPINE 15 MG/1
TABLET, FILM COATED ORAL
Qty: 30 TABLET | Refills: 1 | OUTPATIENT
Start: 2020-01-01

## 2020-01-01 RX ORDER — HEPARIN SODIUM (PORCINE) LOCK FLUSH IV SOLN 100 UNIT/ML 100 UNIT/ML
500 SOLUTION INTRAVENOUS SEE ADMIN INSTRUCTIONS
Status: DISCONTINUED | OUTPATIENT
Start: 2020-01-01 | End: 2020-01-01 | Stop reason: HOSPADM

## 2020-01-01 RX ORDER — LORAZEPAM 0.5 MG/1
.5-2 TABLET ORAL
Status: COMPLETED | OUTPATIENT
Start: 2020-01-01 | End: 2020-01-01

## 2020-01-01 RX ORDER — OXYCODONE HYDROCHLORIDE 5 MG/1
5 TABLET ORAL EVERY 6 HOURS PRN
Qty: 10 TABLET | Refills: 0 | Status: SHIPPED | OUTPATIENT
Start: 2020-01-01 | End: 2020-01-01

## 2020-01-01 RX ORDER — DEXAMETHASONE 4 MG/1
4 TABLET ORAL 2 TIMES DAILY WITH MEALS
Qty: 30 TABLET | Refills: 11 | Status: SHIPPED | OUTPATIENT
Start: 2020-01-01 | End: 2020-01-01

## 2020-01-01 RX ORDER — CYANOCOBALAMIN 1000 UG/ML
1000 INJECTION, SOLUTION INTRAMUSCULAR; SUBCUTANEOUS ONCE
Status: CANCELLED
Start: 2020-01-01

## 2020-01-01 RX ORDER — OXYCODONE HYDROCHLORIDE 5 MG/1
5 TABLET ORAL EVERY 4 HOURS PRN
Qty: 100 TABLET | Refills: 0 | Status: SHIPPED | OUTPATIENT
Start: 2020-01-01 | End: 2020-01-01

## 2020-01-01 RX ORDER — PREDNISONE 10 MG/1
TABLET ORAL
Qty: 100 TABLET | Refills: 1 | OUTPATIENT
Start: 2020-01-01

## 2020-01-01 RX ORDER — FINASTERIDE 5 MG/1
TABLET, FILM COATED ORAL
Qty: 90 TABLET | Refills: 1 | Status: SHIPPED | OUTPATIENT
Start: 2020-01-01

## 2020-01-01 RX ORDER — DOXYCYCLINE 100 MG/1
100 CAPSULE ORAL 2 TIMES DAILY
Qty: 14 CAPSULE | Refills: 0 | Status: SHIPPED | OUTPATIENT
Start: 2020-01-01 | End: 2020-01-01

## 2020-01-01 RX ORDER — TAMSULOSIN HYDROCHLORIDE 0.4 MG/1
0.4 CAPSULE ORAL DAILY
Status: DISCONTINUED | OUTPATIENT
Start: 2020-01-01 | End: 2020-01-01 | Stop reason: HOSPADM

## 2020-01-01 RX ORDER — IOPAMIDOL 755 MG/ML
68 INJECTION, SOLUTION INTRAVASCULAR ONCE
Status: COMPLETED | OUTPATIENT
Start: 2020-01-01 | End: 2020-01-01

## 2020-01-01 RX ORDER — FINASTERIDE 5 MG/1
TABLET, FILM COATED ORAL
Qty: 90 TABLET | Refills: 1 | Status: SHIPPED | OUTPATIENT
Start: 2020-01-01 | End: 2020-01-01

## 2020-01-01 RX ORDER — TAMSULOSIN HYDROCHLORIDE 0.4 MG/1
CAPSULE ORAL
Qty: 90 CAPSULE | Refills: 1 | Status: SHIPPED | OUTPATIENT
Start: 2020-01-01 | End: 2020-01-01

## 2020-01-01 RX ORDER — NALOXONE HYDROCHLORIDE 0.4 MG/ML
.1-.4 INJECTION, SOLUTION INTRAMUSCULAR; INTRAVENOUS; SUBCUTANEOUS
Status: DISCONTINUED | OUTPATIENT
Start: 2020-01-01 | End: 2020-01-01 | Stop reason: HOSPADM

## 2020-01-01 RX ORDER — MORPHINE SULFATE 15 MG/1
15 TABLET, FILM COATED, EXTENDED RELEASE ORAL 2 TIMES DAILY PRN
COMMUNITY
End: 2020-01-01

## 2020-01-01 RX ADMIN — SODIUM CHLORIDE 800 MG: 9 INJECTION, SOLUTION INTRAVENOUS at 10:35

## 2020-01-01 RX ADMIN — SODIUM CHLORIDE 62 ML: 9 INJECTION, SOLUTION INTRAVENOUS at 13:39

## 2020-01-01 RX ADMIN — SODIUM CHLORIDE 62 ML: 9 INJECTION, SOLUTION INTRAVENOUS at 07:41

## 2020-01-01 RX ADMIN — DOCETAXEL ANHYDROUS 100 MG: 20 INJECTION, SOLUTION INTRAVENOUS at 15:17

## 2020-01-01 RX ADMIN — DEXAMETHASONE SODIUM PHOSPHATE 12 MG: 10 INJECTION, SOLUTION INTRAMUSCULAR; INTRAVENOUS at 14:04

## 2020-01-01 RX ADMIN — SODIUM CHLORIDE, PRESERVATIVE FREE 5 ML: 5 INJECTION INTRAVENOUS at 14:59

## 2020-01-01 RX ADMIN — IOPAMIDOL 105 ML: 755 INJECTION, SOLUTION INTRAVENOUS at 12:34

## 2020-01-01 RX ADMIN — Medication 5 ML: at 16:46

## 2020-01-01 RX ADMIN — LIDOCAINE 1 G: 40 CREAM TOPICAL at 05:55

## 2020-01-01 RX ADMIN — SODIUM CHLORIDE 250 ML: 9 INJECTION, SOLUTION INTRAVENOUS at 13:49

## 2020-01-01 RX ADMIN — SODIUM CHLORIDE 250 ML: 9 INJECTION, SOLUTION INTRAVENOUS at 12:35

## 2020-01-01 RX ADMIN — CALCIUM POLYCARBOPHIL 1250 MG: 625 TABLET, FILM COATED ORAL at 08:47

## 2020-01-01 RX ADMIN — FENTANYL CITRATE 50 MCG: 50 INJECTION, SOLUTION INTRAMUSCULAR; INTRAVENOUS at 17:52

## 2020-01-01 RX ADMIN — MIRTAZAPINE 7.5 MG: 7.5 TABLET, FILM COATED ORAL at 22:48

## 2020-01-01 RX ADMIN — HEPARIN 500 UNITS: 100 SYRINGE at 13:53

## 2020-01-01 RX ADMIN — Medication 5 ML: at 15:03

## 2020-01-01 RX ADMIN — SODIUM CHLORIDE, PRESERVATIVE FREE 5 ML: 5 INJECTION INTRAVENOUS at 15:12

## 2020-01-01 RX ADMIN — GADOBUTROL 7 ML: 604.72 INJECTION INTRAVENOUS at 10:30

## 2020-01-01 RX ADMIN — DEXAMETHASONE SODIUM PHOSPHATE: 10 INJECTION, SOLUTION INTRAMUSCULAR; INTRAVENOUS at 12:36

## 2020-01-01 RX ADMIN — TC 99M MEDRONATE 24.14 MCI.: 20 INJECTION, POWDER, LYOPHILIZED, FOR SOLUTION INTRAVENOUS at 10:35

## 2020-01-01 RX ADMIN — SODIUM CHLORIDE 480 MG: 9 INJECTION, SOLUTION INTRAVENOUS at 16:05

## 2020-01-01 RX ADMIN — HEPARIN SODIUM (PORCINE) LOCK FLUSH IV SOLN 100 UNIT/ML 5 ML: 100 SOLUTION at 13:33

## 2020-01-01 RX ADMIN — LIDOCAINE HYDROCHLORIDE,EPINEPHRINE BITARTRATE 30 ML: 20; .01 INJECTION, SOLUTION INFILTRATION; PERINEURAL at 05:55

## 2020-01-01 RX ADMIN — PREDNISONE 40 MG: 20 TABLET ORAL at 08:48

## 2020-01-01 RX ADMIN — SODIUM CHLORIDE 250 ML: 9 INJECTION, SOLUTION INTRAVENOUS at 16:02

## 2020-01-01 RX ADMIN — METHYLPREDNISOLONE SODIUM SUCCINATE 125 MG: 125 INJECTION, POWDER, FOR SOLUTION INTRAMUSCULAR; INTRAVENOUS at 20:05

## 2020-01-01 RX ADMIN — LORAZEPAM 1 MG: 0.5 TABLET ORAL at 08:18

## 2020-01-01 RX ADMIN — HEPARIN 5 ML: 100 SYRINGE at 10:48

## 2020-01-01 RX ADMIN — SODIUM CHLORIDE 250 ML: 9 INJECTION, SOLUTION INTRAVENOUS at 11:32

## 2020-01-01 RX ADMIN — GADOBUTROL 6 ML: 604.72 INJECTION INTRAVENOUS at 07:40

## 2020-01-01 RX ADMIN — ALBUTEROL SULFATE 2 PUFF: 90 AEROSOL, METERED RESPIRATORY (INHALATION) at 02:22

## 2020-01-01 RX ADMIN — Medication 5 ML: at 04:55

## 2020-01-01 RX ADMIN — DOCETAXEL 100 MG: 20 INJECTION, SOLUTION, CONCENTRATE INTRAVENOUS at 13:49

## 2020-01-01 RX ADMIN — SODIUM CHLORIDE 480 MG: 9 INJECTION, SOLUTION INTRAVENOUS at 12:53

## 2020-01-01 RX ADMIN — IOPAMIDOL 92 ML: 755 INJECTION, SOLUTION INTRAVASCULAR at 12:02

## 2020-01-01 RX ADMIN — LORAZEPAM 2 MG: 0.5 TABLET ORAL at 05:56

## 2020-01-01 RX ADMIN — DEXAMETHASONE SODIUM PHOSPHATE: 10 INJECTION, SOLUTION INTRAMUSCULAR; INTRAVENOUS at 14:15

## 2020-01-01 RX ADMIN — GEMCITABINE 2000 MG: 38 INJECTION, SOLUTION INTRAVENOUS at 14:37

## 2020-01-01 RX ADMIN — ALBUTEROL SULFATE 2 PUFF: 90 INHALANT RESPIRATORY (INHALATION) at 07:38

## 2020-01-01 RX ADMIN — Medication 500 UNITS: at 14:26

## 2020-01-01 RX ADMIN — DOCETAXEL 100 MG: 20 INJECTION, SOLUTION, CONCENTRATE INTRAVENOUS at 09:31

## 2020-01-01 RX ADMIN — GEMCITABINE 2000 MG: 38 INJECTION, SOLUTION INTRAVENOUS at 14:22

## 2020-01-01 RX ADMIN — SODIUM CHLORIDE 250 ML: 9 INJECTION, SOLUTION INTRAVENOUS at 12:53

## 2020-01-01 RX ADMIN — Medication 5 ML: at 11:06

## 2020-01-01 RX ADMIN — HYDROMORPHONE HYDROCHLORIDE 1 MG: 1 INJECTION, SOLUTION INTRAMUSCULAR; INTRAVENOUS; SUBCUTANEOUS at 03:44

## 2020-01-01 RX ADMIN — DOCETAXEL ANHYDROUS 100 MG: 20 INJECTION, SOLUTION INTRAVENOUS at 14:59

## 2020-01-01 RX ADMIN — GEMCITABINE 2000 MG: 38 INJECTION, SOLUTION INTRAVENOUS at 10:57

## 2020-01-01 RX ADMIN — IOPAMIDOL 68 ML: 755 INJECTION, SOLUTION INTRAVENOUS at 18:05

## 2020-01-01 RX ADMIN — LORAZEPAM 0.5 MG: 0.5 TABLET ORAL at 10:16

## 2020-01-01 RX ADMIN — SODIUM CHLORIDE 250 ML: 9 INJECTION, SOLUTION INTRAVENOUS at 13:45

## 2020-01-01 RX ADMIN — GEMCITABINE 2000 MG: 38 INJECTION, SOLUTION INTRAVENOUS at 13:35

## 2020-01-01 RX ADMIN — LORAZEPAM 0.5 MG: 0.5 TABLET ORAL at 09:52

## 2020-01-01 RX ADMIN — SODIUM CHLORIDE 250 ML: 9 INJECTION, SOLUTION INTRAVENOUS at 09:08

## 2020-01-01 RX ADMIN — GADOBUTROL 8 ML: 604.72 INJECTION INTRAVENOUS at 13:12

## 2020-01-01 RX ADMIN — IOPAMIDOL 111 ML: 755 INJECTION, SOLUTION INTRAVENOUS at 10:46

## 2020-01-01 RX ADMIN — SODIUM CHLORIDE 800 MG: 9 INJECTION, SOLUTION INTRAVENOUS at 14:08

## 2020-01-01 RX ADMIN — Medication 5 ML: at 14:07

## 2020-01-01 RX ADMIN — TAMSULOSIN HYDROCHLORIDE 0.4 MG: 0.4 CAPSULE ORAL at 08:47

## 2020-01-01 RX ADMIN — DEXAMETHASONE SODIUM PHOSPHATE 12 MG: 10 INJECTION, SOLUTION INTRAMUSCULAR; INTRAVENOUS at 13:45

## 2020-01-01 RX ADMIN — HUMAN ALBUMIN MICROSPHERES AND PERFLUTREN 6 ML: 10; .22 INJECTION, SOLUTION INTRAVENOUS at 12:15

## 2020-01-01 RX ADMIN — DEXAMETHASONE SODIUM PHOSPHATE: 10 INJECTION, SOLUTION INTRAMUSCULAR; INTRAVENOUS at 13:50

## 2020-01-01 RX ADMIN — IOPAMIDOL 90 ML: 755 INJECTION, SOLUTION INTRAVENOUS at 13:39

## 2020-01-01 RX ADMIN — IOPAMIDOL 88 ML: 755 INJECTION, SOLUTION INTRAVENOUS at 07:41

## 2020-01-01 RX ADMIN — SODIUM CHLORIDE 800 MG: 9 INJECTION, SOLUTION INTRAVENOUS at 12:38

## 2020-01-01 RX ADMIN — DIPHENHYDRAMINE HYDROCHLORIDE 50 MG: 25 CAPSULE ORAL at 12:14

## 2020-01-01 RX ADMIN — HEPARIN SODIUM (PORCINE) LOCK FLUSH IV SOLN 100 UNIT/ML 5 ML: 100 SOLUTION at 11:04

## 2020-01-01 RX ADMIN — DEXAMETHASONE SODIUM PHOSPHATE 12 MG: 10 INJECTION, SOLUTION INTRAMUSCULAR; INTRAVENOUS at 09:10

## 2020-01-01 RX ADMIN — Medication 500 UNITS: at 10:18

## 2020-01-01 RX ADMIN — DEXAMETHASONE SODIUM PHOSPHATE: 10 INJECTION, SOLUTION INTRAMUSCULAR; INTRAVENOUS at 10:36

## 2020-01-01 RX ADMIN — UMECLIDINIUM 1 PUFF: 62.5 AEROSOL, POWDER ORAL at 07:39

## 2020-01-01 RX ADMIN — SODIUM CHLORIDE 1000 ML: 9 INJECTION, SOLUTION INTRAVENOUS at 03:44

## 2020-01-01 RX ADMIN — SODIUM CHLORIDE 45 ML: 9 INJECTION, SOLUTION INTRAVENOUS at 17:30

## 2020-01-01 RX ADMIN — Medication 6 ML: at 12:00

## 2020-01-01 RX ADMIN — HUMAN ALBUMIN MICROSPHERES AND PERFLUTREN 3 ML: 10; .22 INJECTION, SOLUTION INTRAVENOUS at 14:06

## 2020-01-01 RX ADMIN — Medication 500 UNITS: at 12:03

## 2020-01-01 RX ADMIN — IOPAMIDOL 107 ML: 755 INJECTION, SOLUTION INTRAVENOUS at 04:38

## 2020-01-01 RX ADMIN — SODIUM CHLORIDE 480 MG: 9 INJECTION, SOLUTION INTRAVENOUS at 11:32

## 2020-01-01 RX ADMIN — SODIUM CHLORIDE 250 ML: 9 INJECTION, SOLUTION INTRAVENOUS at 12:15

## 2020-01-01 RX ADMIN — SODIUM CHLORIDE, PRESERVATIVE FREE 5 ML: 5 INJECTION INTRAVENOUS at 15:05

## 2020-01-01 RX ADMIN — HEPARIN SODIUM (PORCINE) LOCK FLUSH IV SOLN 100 UNIT/ML 500 UNITS: 100 SOLUTION at 12:57

## 2020-01-01 RX ADMIN — SODIUM CHLORIDE, PRESERVATIVE FREE 5 ML: 5 INJECTION INTRAVENOUS at 14:10

## 2020-01-01 RX ADMIN — ALBUTEROL SULFATE 6 PUFF: 90 AEROSOL, METERED RESPIRATORY (INHALATION) at 20:04

## 2020-01-01 RX ADMIN — DIPHENHYDRAMINE HYDROCHLORIDE 50 MG: 25 CAPSULE ORAL at 14:08

## 2020-01-01 RX ADMIN — IOPAMIDOL 55 ML: 755 INJECTION, SOLUTION INTRAVENOUS at 17:30

## 2020-01-01 RX ADMIN — Medication 5 ML: at 16:00

## 2020-01-01 RX ADMIN — FLUTICASONE FUROATE AND VILANTEROL TRIFENATATE 1 PUFF: 100; 25 POWDER RESPIRATORY (INHALATION) at 07:38

## 2020-01-01 RX ADMIN — TC 99M MEDRONATE 23.2 MCI.: 20 INJECTION, POWDER, LYOPHILIZED, FOR SOLUTION INTRAVENOUS at 11:01

## 2020-01-01 RX ADMIN — DIPHENHYDRAMINE HYDROCHLORIDE 50 MG: 25 CAPSULE ORAL at 13:44

## 2020-01-01 RX ADMIN — Medication 5 ML: at 11:31

## 2020-01-01 RX ADMIN — DIPHENHYDRAMINE HYDROCHLORIDE 50 MG: 25 CAPSULE ORAL at 09:09

## 2020-01-01 RX ADMIN — Medication 5 ML: at 06:17

## 2020-01-01 RX ADMIN — FINASTERIDE 5 MG: 5 TABLET, FILM COATED ORAL at 08:48

## 2020-01-01 RX ADMIN — DEXAMETHASONE SODIUM PHOSPHATE 12 MG: 10 INJECTION, SOLUTION INTRAMUSCULAR; INTRAVENOUS at 12:15

## 2020-01-01 RX ADMIN — SODIUM CHLORIDE 250 ML: 9 INJECTION, SOLUTION INTRAVENOUS at 14:04

## 2020-01-01 RX ADMIN — Medication 5 ML: at 10:09

## 2020-01-01 RX ADMIN — SODIUM CHLORIDE 250 ML: 9 INJECTION, SOLUTION INTRAVENOUS at 10:38

## 2020-01-01 RX ADMIN — Medication 5 ML: at 16:17

## 2020-01-01 RX ADMIN — Medication 5 ML: at 14:18

## 2020-01-01 RX ADMIN — SODIUM CHLORIDE 250 ML: 9 INJECTION, SOLUTION INTRAVENOUS at 14:15

## 2020-01-01 RX ADMIN — SODIUM CHLORIDE 800 MG: 9 INJECTION, SOLUTION INTRAVENOUS at 14:23

## 2020-01-01 RX ADMIN — HEPARIN SODIUM (PORCINE) LOCK FLUSH IV SOLN 100 UNIT/ML 500 UNITS: 100 SOLUTION at 13:53

## 2020-01-01 RX ADMIN — Medication 5 ML: at 11:27

## 2020-01-01 RX ADMIN — HEPARIN SODIUM (PORCINE) LOCK FLUSH IV SOLN 100 UNIT/ML 5 ML: 100 SOLUTION at 11:01

## 2020-01-01 RX ADMIN — ALBUTEROL SULFATE 6 PUFF: 90 AEROSOL, METERED RESPIRATORY (INHALATION) at 16:37

## 2020-01-01 RX ADMIN — Medication 5 ML: at 05:55

## 2020-01-01 RX ADMIN — SODIUM CHLORIDE, PRESERVATIVE FREE 5 ML: 5 INJECTION INTRAVENOUS at 07:56

## 2020-01-01 RX ADMIN — GADOBUTROL 7 ML: 604.72 INJECTION INTRAVENOUS at 14:31

## 2020-01-01 RX ADMIN — HEPARIN SODIUM (PORCINE) LOCK FLUSH IV SOLN 100 UNIT/ML 5 ML: 100 SOLUTION at 08:49

## 2020-01-01 RX ADMIN — Medication 5 ML: at 09:27

## 2020-01-01 RX ADMIN — Medication 5 ML: at 08:52

## 2020-01-01 ASSESSMENT — ENCOUNTER SYMPTOMS
ARTHRALGIAS: 1
COUGH: 0
NAUSEA: 0
SEIZURES: 0
BLURRED VISION: 0
NECK PAIN: 0
HEADACHES: 0
BRUISES/BLEEDS EASILY: 0
CHILLS: 0
TINGLING: 0
COUGH: 0
BACK PAIN: 0
FALLS: 0
DIARRHEA: 0
FEVER: 0
FEVER: 0
NAUSEA: 0
FEVER: 0
FREQUENCY: 0
VOMITING: 0
HEMATURIA: 0
DIZZINESS: 0
DIARRHEA: 0
WEIGHT LOSS: 1
CONSTIPATION: 0
NERVOUS/ANXIOUS: 0
BLOOD IN STOOL: 0
EYE PAIN: 0
ABDOMINAL PAIN: 1
DIAPHORESIS: 0
DOUBLE VISION: 0
SORE THROAT: 0
SHORTNESS OF BREATH: 1
INSOMNIA: 0
DEPRESSION: 0
CHEST TIGHTNESS: 0
COUGH: 0
CONSTIPATION: 0
VOMITING: 0
SHORTNESS OF BREATH: 1

## 2020-01-01 ASSESSMENT — PAIN SCALES - GENERAL
PAINLEVEL: NO PAIN (0)
PAINLEVEL: MILD PAIN (2)
PAINLEVEL: NO PAIN (0)
PAINLEVEL: MODERATE PAIN (4)
PAINLEVEL: MILD PAIN (2)
PAINLEVEL: NO PAIN (0)
PAINLEVEL: MODERATE PAIN (4)
PAINLEVEL: NO PAIN (0)
PAINLEVEL: MILD PAIN (3)
PAINLEVEL: NO PAIN (0)

## 2020-01-01 ASSESSMENT — MIFFLIN-ST. JEOR
SCORE: 1509.24
SCORE: 1598.28
SCORE: 1607.67
SCORE: 1584.99
SCORE: 1497
SCORE: 1494.28
SCORE: 1500.17
SCORE: 1480.21
SCORE: 1591.92
SCORE: 1492.01
SCORE: 1605.56

## 2020-01-01 ASSESSMENT — ACTIVITIES OF DAILY LIVING (ADL)
ADLS_ACUITY_SCORE: 11
ADLS_ACUITY_SCORE: 12
ADLS_ACUITY_SCORE: 11

## 2020-01-03 NOTE — PROGRESS NOTES
Infusion Nursing Note:  Kentrell Kirkpatrick presents today for Cyramza and taxotere  Patient seen by provider today: No   present during visit today: Not Applicable.    Note: N/A.    Intravenous Access:  Lab draw site R chest, Needle type chopra, Gauge 20.  Labs drawn without difficulty.  Implanted Port.    Treatment Conditions:  Lab Results   Component Value Date    HGB 11.8 01/03/2020     Lab Results   Component Value Date    WBC 9.2 01/03/2020      Lab Results   Component Value Date    ANEU 6.6 01/03/2020     Lab Results   Component Value Date     01/03/2020      Lab Results   Component Value Date     01/03/2020                   Lab Results   Component Value Date    POTASSIUM 4.2 01/03/2020           No results found for: MAG         Lab Results   Component Value Date    CR 0.96 01/03/2020                   Lab Results   Component Value Date    MALATHI 9.0 01/03/2020                Lab Results   Component Value Date    BILITOTAL 0.6 01/03/2020           Lab Results   Component Value Date    ALBUMIN 3.9 01/03/2020                    Lab Results   Component Value Date    ALT 33 01/03/2020           Lab Results   Component Value Date    AST 27 01/03/2020       Results reviewed, labs MET treatment parameters, ok to proceed with treatment.      Post Infusion Assessment:  Patient tolerated infusion without incident.  Blood return noted pre and post infusion.  Site patent and intact, free from redness, edema or discomfort.  No evidence of extravasations.  Access discontinued per protocol.       Discharge Plan:   Prescription refills given for Decadron.  Discharge instructions reviewed with: Patient.  AVS to patient via [employer].  Patient will return 1/24 for next appointment.   Patient discharged in stable condition accompanied by: self.  Departure Mode: Ambulatory.    Joyce Camilo RN

## 2020-01-15 NOTE — NURSING NOTE
"Chief Complaint   Patient presents with     RECHECK       Initial /71   Pulse 86   Ht 1.829 m (6' 0.01\")   Wt 79.4 kg (175 lb)   SpO2 96%   BMI 23.73 kg/m   Estimated body mass index is 23.73 kg/m  as calculated from the following:    Height as of this encounter: 1.829 m (6' 0.01\").    Weight as of this encounter: 79.4 kg (175 lb).    Medication Reconciliation: complete        DME: no   Using dental device    ESS 11  "

## 2020-01-15 NOTE — PROGRESS NOTES
"[facility]  Outpatient Sleep Medicine Encounter, Established Patient      Name: Kentrell Kirkpatrick MRN# [id number]   Age: 69 year old YOB: 1950     Date of Visit: January 15, 2020  Primary care provider: Donald Shell         Assessment and Plan:     1. SKYLER (obstructive sleep apnea)  I ordered a home sleep apnea test to be conducted with the oral appliance in place to determine its efficacy.  The patient will work to advance the mandible position to his previous level, which is 12 by his report.  I asked him to sleep in his bed (not in the recliner) on the night of the test.  I will follow-up with him on [facility] for results.    2. Insomnia, unspecified type  Stress reduction is recommended for rumination which may contribute to insomnia.  Further insomnia treatment was deferred until it can be confirmed that sleep disordered breathing is not interfering and sleep initiation or maintenance.    3. Chronic obstructive pulmonary disease, unspecified COPD type (H)  Patients with COPD may experience frequent awakenings, insomnia, non-restorative sleep.  Nighttime coughing, dyspnea, and medications may contribute to sleep disturbance.  Nocturnal hypoxemia may develop in moderate disease and desaturations are predictably more frequent, longer, and more severe during REM compared to NREM.  Hypercapnia may be observed.  \"Overlap syndrome\" refers to the presence of both COPD and SKYLER.  Compared to isolated COPD, overlap syndrome is associated with lower PaO2, higher PaCO2, and higher mean PA pressures.  PAP therapy is indicated for overlap syndrome.           Chief Complaint:   Insomnia         History of Present Illness:     Kentrell Kirkpatrick is a 69 year old male with history of obstructive sleep apnea, non-small cell cancer of the left lung, lymph, and gallbladder, COPD, BPH and back pain who comes to [facility] for follow up.  Patient was diagnosed with sleep " "apnea on 5/15/18 and was prescribed auto-CPAP but he was not able to tolerate the machine and mask and interface.  He reports that he did not have sufficient support in order to acclimate to the therapy.  He was referred to dentist for construction of a custom oral appliance.  He was able to use the oral appliance well, and it resolved his snoring.  He had overnight oximetry testing in December 2018 which showed essentially normal oxyhemoglobin saturations.  He had no difficulty tolerating the oral appliance and no untoward effects from its use.    Approximately 1 year ago he discontinued use of the oral appliance because he perceived that without it his snoring had resolved.  However, over time snoring returned.  He also noted that he began having difficulty with sleep initiation.  This would resolve when he would go to his recliner to sleep in a more sitting up position.      Patient's medical history is notable for a diagnosis of non-small cell lung cancer (left lung) also in his lymph and gallbladder within the last year.  This is being treated with chemotherapy.  Initially his weight was fluctuating but is more stable recently.  He attributes this to eating despite losing his taste for food.  He also has a diagnosis of COPD, unknown severity.    Patient reports that his previous oral appliance was adjusted to a level of \"12\".  He has recently began using it again and has moved it back to its starting position.  He will begin to advance his job back to a level of 12 as tolerated.    PREVIOUS SLEEP STUDIES:  Date: 5/15/18  AHI: 33/hr  Intervention: CPAP  Lowest O2 saturation: 80%            Medications:     Current Outpatient Medications   Medication Sig     albuterol (PROAIR HFA/PROVENTIL HFA/VENTOLIN HFA) 108 (90 Base) MCG/ACT inhaler Inhale 2 puffs into the lungs every 4 hours (while awake)     albuterol (PROVENTIL) (2.5 MG/3ML) 0.083% neb solution Take 1 vial (2.5 mg) by nebulization every 4 hours as needed " for shortness of breath / dyspnea or wheezing     calcium polycarbophil (FIBERCON) 625 MG tablet Take 2 tablets by mouth daily     dexamethasone (DECADRON) 4 MG tablet TAKE 1 TABLET (4 MG) BY MOUTH 2 TIMES DAILY TO BE TAKEN DAY BEFORE, DAY OF, AND DAY AFTER INFUSION..     finasteride (PROSCAR) 5 MG tablet TAKE 1 TABLET BY MOUTH EVERY DAY     lidocaine-prilocaine (EMLA) 2.5-2.5 % external cream Apply topically as needed for moderate pain     LORazepam (ATIVAN) 0.5 MG tablet Take 1 tablet (0.5 mg) by mouth every 6 hours as needed (Nausea/Vomiting)     Melatonin 10 MG TABS tablet Take 10 mg by mouth nightly as needed for sleep     mometasone-formoterol (DULERA) 100-5 MCG/ACT inhaler Inhale 2 puffs into the lungs 2 times daily     morphine (MSIR) 15 MG IR tablet Take 1 tablet (15 mg) by mouth every 4 hours as needed for moderate to severe pain (and cough)     MULTI VITAMIN MENS OR TABS 1 TABLET DAILY     ondansetron (ZOFRAN) 8 MG tablet Take 1 tablet (8 mg) by mouth every 8 hours as needed for nausea     prochlorperazine (COMPAZINE) 10 MG tablet Take 1 tablet (10 mg) by mouth every 6 hours as needed (Nausea/Vomiting)     tamsulosin (FLOMAX) 0.4 MG capsule Take 1 capsule (0.4 mg) by mouth daily     tiotropium (SPIRIVA HANDIHALER) 18 MCG inhaled capsule Inhale 1 capsule (18 mcg) into the lungs daily     No current facility-administered medications for this visit.      Facility-Administered Medications Ordered in Other Visits   Medication     iohexol (OMNIPAQUE) 300 mg/mL injection 10 mL        No Known Allergies         Review of Systems:     Review of systems was negative other than HPI or as commented below.         Past Medical History:     Past Medical History:   Diagnosis Date     Cancer (H) 2/25/19     Colon polyps      COPD (chronic obstructive pulmonary disease) (H)              Past Surgical History:      Past Surgical History:   Procedure Laterality Date     BIOPSY  3/15/19     COLONOSCOPY       COLONOSCOPY N/A  "12/22/2016    Procedure: COMBINED COLONOSCOPY, SINGLE OR MULTIPLE BIOPSY/POLYPECTOMY BY BIOPSY;  Surgeon: Jeremi Kramer MD;  Location:  GI     GENITOURINARY SURGERY      Using flow-max     HEAD & NECK SURGERY      stiff / sore neck     INSERT CHEST TUBE Left 3/1/2019    Procedure: INSERT CHEST TUBE;  Surgeon: Matthew Rodriguez MD;  Location:  GI     INSERT CHEST TUBE N/A 5/9/2019    Procedure: Removal Of Pleurx Catheter;  Surgeon: Matthew Rodriguez MD;  Location:  GI     IR CHEST PORT PLACEMENT > 5 YRS OF AGE  10/17/2019     IR LYMPH NODE BIOPSY  3/15/2019     THORACENTESIS Left 2/20/2019    Procedure: THORACENTESIS;  Surgeon: Matthew Rodriguez MD;  Location:  GI            Physical Examination:   /71   Pulse 86   Ht 1.829 m (6' 0.01\")   Wt 79.4 kg (175 lb)   SpO2 96%   BMI 23.73 kg/m     Constitutional: . Awake, alert, cooperative, in no apparent distress  Neurologic: Alert, oriented, no focal neurological deficit observed  Psychological: Euthymic; affect appropriate  Eyes: No icterus  Gait: Normal  Skin:  No rash, PAP mask impressions, or significant lesions visible.          Roxanna Do MD   1/15/2020   [address] [address] [phone]    Copy to: Donald Shell  "

## 2020-01-23 NOTE — PROGRESS NOTES
Oncology/Hematology Visit Note    Jan 24, 2020    Reason for visit: Follow-up Stage IV NSCLC    Oncology HPI: Kentrell Kirkpatrick is a 69 year old male with NSCLC.  Patient with a history of aortic aneurysm and CT chest to reevaluate this aneurysm was obtained in June 2018 and there were several lung nodules.  If further imaging in February 2019 showed a large left pleural effusion and left thoracentesis was performed.  Pathology revealed adenocarcinoma.  He established care with Dr. Muller on 2/28/2019 and Pleurx catheter was placed. He completed 4 cycles of carboplatin, pemetrexed, pembrolizumab and 4th cycle completed on 3/13/2019.  He then completed 5 cycles of maintenance pemetrexed and pembrolizumab, last one completed on 8/28/2019, however noted to have progression of disease.  He started second line Taxotere and ramucirumab, cycle 1 on 9/20/2019.  He was in the urgent care on 9/28/2019 for cough.  He is discharged with albuterol, Robitussin and Levaquin.  His cough improved with prednisone, however this was completed.  Cycle 4 was deferred due to URI symptoms and [place] trip, completed on 12/9/2019.  Restaging CT on 12/16/2019 stable, but his quality of life was decreased, therefore cycle 5 was delayed to 1/3/2020.     He is here today for Taxotere/ramucirumab cycle 5.     Interval History: Edy is here with his wife, Audrey.  He is doing pretty well today.  Dr. Muller started him on Dulera inhaler about a month ago and it certainly has helped his breathing, but he has noticed some pain in his tongue.  He was using it twice a day and then cut back to only morning.  He has noticed improvement to the tongue, but not relief completely.  He denies tongue sore or mucositis or other oral sores.  He denies fever or chills, nausea, vomiting or diarrhea.  Appetite has been coming and going and his taste has changed occasionally.  No other complaints.    Review of Systems: See interval hx. Denies fevers, chills, HA,  dizziness, n/t, changes in vision, cough, sore throat, CP, SOB, abdominal pain, N/V, diarrhea, changes in urination, bleeding, bruising, rash.       PMHx and Social Hx reviewed per EPIC.      Medications:  Current Outpatient Medications   Medication Sig Dispense Refill     albuterol (PROAIR HFA/PROVENTIL HFA/VENTOLIN HFA) 108 (90 Base) MCG/ACT inhaler Inhale 2 puffs into the lungs every 4 hours (while awake) 1 Inhaler 0     albuterol (PROVENTIL) (2.5 MG/3ML) 0.083% neb solution Take 1 vial (2.5 mg) by nebulization every 4 hours as needed for shortness of breath / dyspnea or wheezing 100 vial 11     calcium polycarbophil (FIBERCON) 625 MG tablet Take 2 tablets by mouth daily       dexamethasone (DECADRON) 4 MG tablet TAKE 1 TABLET (4 MG) BY MOUTH 2 TIMES DAILY TO BE TAKEN DAY BEFORE, DAY OF, AND DAY AFTER INFUSION.. 6 tablet 11     finasteride (PROSCAR) 5 MG tablet TAKE 1 TABLET BY MOUTH EVERY DAY 90 tablet 1     LORazepam (ATIVAN) 0.5 MG tablet Take 1 tablet (0.5 mg) by mouth every 6 hours as needed (Nausea/Vomiting) 30 tablet 3     Melatonin 10 MG TABS tablet Take 10 mg by mouth nightly as needed for sleep       mometasone-formoterol (DULERA) 100-5 MCG/ACT inhaler Inhale 2 puffs into the lungs 2 times daily (Patient taking differently: Inhale 2 puffs into the lungs daily ) 1 Inhaler 11     morphine (MSIR) 15 MG IR tablet Take 1 tablet (15 mg) by mouth every 4 hours as needed for moderate to severe pain (and cough) 30 tablet 0     MULTI VITAMIN MENS OR TABS 1 TABLET DAILY       tamsulosin (FLOMAX) 0.4 MG capsule Take 1 capsule (0.4 mg) by mouth daily 90 capsule 1     tiotropium (SPIRIVA HANDIHALER) 18 MCG inhaled capsule Inhale 1 capsule (18 mcg) into the lungs daily 1 capsule 11     lidocaine-prilocaine (EMLA) 2.5-2.5 % external cream Apply topically as needed for moderate pain 30 g 11     ondansetron (ZOFRAN) 8 MG tablet Take 1 tablet (8 mg) by mouth every 8 hours as needed for nausea (Patient not taking: Reported  on 1/24/2020) 30 tablet 11     prochlorperazine (COMPAZINE) 10 MG tablet Take 1 tablet (10 mg) by mouth every 6 hours as needed (Nausea/Vomiting) 30 tablet 11       No Known Allergies    EXAM:    /74   Pulse 74   Temp 96.8  F (36  C) (Tympanic)   Resp 16   Wt 80.7 kg (178 lb)   SpO2 97%   BMI 24.14 kg/m         GENERAL:  Male, in no acute distress.  Alert and oriented x3.   HEENT:  Normocephalic, atraumatic.  PERRL, oropharynx clear with no sores or thrush. Nio tongue sore.  LYMPH NODES:  No palpable pre/post-auricular, cervical, axillary lymphadenopathy appreciated.  CV:  RRR, No murmurs, gallops, or rubs.   LUNGS:  Clear to auscultation bilaterally.  No wheezing, crackles or respiratory distress.  ABDOMEN:  Soft, nontender and nondistended.  Bowel sounds heard x4.  No apparent hepatosplenomegaly.   EXTREMITIES:  No clubbing, cyanosis, or edema.   SKIN: No rash  PSYCH: Mood stable      Labs:   Results for MAYA ELLER (MRN [id number]) as of 1/24/2020 12:54   1/24/2020 08:03 1/24/2020 08:05   Albumin 3.7    Protein Total 7.3    Bilirubin Total 0.7    Alkaline Phosphatase 59    ALT 26    AST 28    Bilirubin Direct 0.1    WBC 5.7    Hemoglobin 12.5 (L)    Hematocrit 39.6 (L)    Platelet Count 287    RBC Count 4.31 (L)    MCV 92    MCH 29.0    MCHC 31.6    RDW 17.3 (H)    Diff Method Automated Method    % Neutrophils 80.1    % Lymphocytes 13.0    % Monocytes 6.0    % Eosinophils 0.0    % Basophils 0.4    % Immature Granulocytes 0.5    Nucleated RBCs 0    Absolute Neutrophil 4.6    Absolute Lymphocytes 0.7 (L)    Absolute Monocytes 0.3    Absolute Eosinophils 0.0    Absolute Basophils 0.0    Abs Immature Granulocytes 0.0    Absolute Nucleated RBC 0.0    Protein Albumin Urine  Negative       Imaging: n/a    Impression/Plan: Kentrell Eller is a 68 year old male with metastatic NSCLC previously on carboplatin, pemetrexed and pembrolizumab, however progression noted and currently undergoing palliative intent  chemotherapy with Taxotere and ramucirumab.    Metastatic NSCLC: Previous therapies with progression of disease, completed cycle 1 Taxotere/ramucirumab on 9/20/2019.  Cycle 4 was postponed due to [place] trip.  He has been tolerating this regimen fairly well and his cough is significantly improved, see below.  Restaging CT on 12/16/2019 with stable disease.  Labs are within treatment parameters to proceed with cycle 5 today.  --2/12 CT CAP  --2/14 Dr. Muller, labs  --2/17 cycle 6 at [facility]    Respiratory:  --Cough: Significant improvement since starting Dulera inhaler.  He is down to once a day due to tongue symptoms.  --Pleural effusion: Known left malignant pleural effusion and Pleurx drain removed. Known trapped lung, no further reaccumulation.  --COPD: Currently with Tiotropium and albuterol inhalers.     HEENT: Tongue pain, however no sores or mucositis on exam.  We discussed salt and baking soda rinses.  With some improvement.  He will also rinse his mouth and brush his tongue after using Dulera to see if this helps.  We briefly discussed Magic mouthwash, however he would like to hold off on this medication for now.    Insomnia: Using melatonin when needed, no longer on Seroquel.      Chart documentation with Dragon Voice recognition Software. Although reviewed after completion, some words and grammatical errors may remain.      Trinidad Canales PA-C  Hematology/Oncology  [facility]

## 2020-01-24 NOTE — NURSING NOTE
"Oncology Rooming Note    January 24, 2020 8:23 AM   Kentrell Kirkpatrick is a 69 year old male who presents for:    Chief Complaint   Patient presents with     Oncology Clinic Visit     Non-small cell lung cancer, left     Initial Vitals: /74   Pulse 74   Temp 96.8  F (36  C) (Tympanic)   Resp 16   Wt 80.7 kg (178 lb)   SpO2 97%   BMI 24.14 kg/m   Estimated body mass index is 24.14 kg/m  as calculated from the following:    Height as of 1/15/20: 1.829 m (6' 0.01\").    Weight as of this encounter: 80.7 kg (178 lb). Body surface area is 2.02 meters squared.  No Pain (0) Comment: Data Unavailable   No LMP for male patient.  Allergies reviewed: Yes  Medications reviewed: Yes    Medications: Medication refills not needed today.  Pharmacy name entered into [employer]: [id number] - [place], [place] - [address]    Clinical concerns: f/u       Karen Plummer CMA              "

## 2020-01-24 NOTE — PROGRESS NOTES
Infusion Nursing Note:  Kentrell Kirkpatrick presents today for Cycle 6, Day 1 Cyramza and Taxotere.    Patient seen by provider today: Yes: YENI Lizama   present during visit today: Not Applicable.    Note: patient states he has adequate supply of decadron at home.     Intravenous Access:  Implanted Port.  Port accessed in fast track.    Treatment Conditions:  Lab Results   Component Value Date    HGB 12.5 01/24/2020     Lab Results   Component Value Date    WBC 5.7 01/24/2020      Lab Results   Component Value Date    ANEU 4.6 01/24/2020     Lab Results   Component Value Date     01/24/2020      Lab Results   Component Value Date     01/03/2020                   Lab Results   Component Value Date    POTASSIUM 4.2 01/03/2020           No results found for: MAG         Lab Results   Component Value Date    CR 0.96 01/03/2020                   Lab Results   Component Value Date    MALATHI 9.0 01/03/2020                Lab Results   Component Value Date    BILITOTAL 0.7 01/24/2020           Lab Results   Component Value Date    ALBUMIN 3.7 01/24/2020                    Lab Results   Component Value Date    ALT 26 01/24/2020           Lab Results   Component Value Date    AST 28 01/24/2020       Results reviewed, labs MET treatment parameters, ok to proceed with treatment.  Urine protein negative.      Post Infusion Assessment:  Patient tolerated infusion without incident.  Blood return noted pre and post infusion.  Site patent and intact, free from redness, edema or discomfort.  No evidence of extravasations.  Access discontinued per protocol.       Discharge Plan:   Patient declined prescription refills.  Copy of AVS reviewed with patient and/or family.  Patient will return 2/17 for next appointment.  Patient discharged in stable condition accompanied by: wife.  Departure Mode: Ambulatory.    Tonia Dean RN

## 2020-01-24 NOTE — LETTER
1/24/2020         RE: Kentrell Kirkpatrick  [address] [facility] [id number]        Dear Colleague,    Thank you for referring your patient, Kentrell Kirkpatrick, to the [facility]. Please see a copy of my visit note below.    Oncology/Hematology Visit Note    Jan 24, 2020    Reason for visit: Follow-up Stage IV NSCLC    Oncology HPI: Kentrell Kirkpatrick is a 69 year old male with NSCLC.  Patient with a history of aortic aneurysm and CT chest to reevaluate this aneurysm was obtained in June 2018 and there were several lung nodules.  If further imaging in February 2019 showed a large left pleural effusion and left thoracentesis was performed.  Pathology revealed adenocarcinoma.  He established care with Dr. Muller on 2/28/2019 and Pleurx catheter was placed. He completed 4 cycles of carboplatin, pemetrexed, pembrolizumab and 4th cycle completed on 3/13/2019.  He then completed 5 cycles of maintenance pemetrexed and pembrolizumab, last one completed on 8/28/2019, however noted to have progression of disease.  He started second line Taxotere and ramucirumab, cycle 1 on 9/20/2019.  He was in the urgent care on 9/28/2019 for cough.  He is discharged with albuterol, Robitussin and Levaquin.  His cough improved with prednisone, however this was completed.  Cycle 4 was deferred due to URI symptoms and [place] trip, completed on 12/9/2019.  Restaging CT on 12/16/2019 stable, but his quality of life was decreased, therefore cycle 5 was delayed to 1/3/2020.     He is here today for Taxotere/ramucirumab cycle 5.     Interval History: Edy is here with his wife, Audrey.  He is doing pretty well today.  Dr. Muller started him on Dulera inhaler about a month ago and it certainly has helped his breathing, but he has noticed some pain in his tongue.  He was using it twice a day and then cut back to only morning.  He has noticed improvement to the tongue, but not relief completely.  He denies tongue sore or mucositis or  other oral sores.  He denies fever or chills, nausea, vomiting or diarrhea.  Appetite has been coming and going and his taste has changed occasionally.  No other complaints.    Review of Systems: See interval hx. Denies fevers, chills, HA, dizziness, n/t, changes in vision, cough, sore throat, CP, SOB, abdominal pain, N/V, diarrhea, changes in urination, bleeding, bruising, rash.       PMHx and Social Hx reviewed per EPIC.      Medications:  Current Outpatient Medications   Medication Sig Dispense Refill     albuterol (PROAIR HFA/PROVENTIL HFA/VENTOLIN HFA) 108 (90 Base) MCG/ACT inhaler Inhale 2 puffs into the lungs every 4 hours (while awake) 1 Inhaler 0     albuterol (PROVENTIL) (2.5 MG/3ML) 0.083% neb solution Take 1 vial (2.5 mg) by nebulization every 4 hours as needed for shortness of breath / dyspnea or wheezing 100 vial 11     calcium polycarbophil (FIBERCON) 625 MG tablet Take 2 tablets by mouth daily       dexamethasone (DECADRON) 4 MG tablet TAKE 1 TABLET (4 MG) BY MOUTH 2 TIMES DAILY TO BE TAKEN DAY BEFORE, DAY OF, AND DAY AFTER INFUSION.. 6 tablet 11     finasteride (PROSCAR) 5 MG tablet TAKE 1 TABLET BY MOUTH EVERY DAY 90 tablet 1     LORazepam (ATIVAN) 0.5 MG tablet Take 1 tablet (0.5 mg) by mouth every 6 hours as needed (Nausea/Vomiting) 30 tablet 3     Melatonin 10 MG TABS tablet Take 10 mg by mouth nightly as needed for sleep       mometasone-formoterol (DULERA) 100-5 MCG/ACT inhaler Inhale 2 puffs into the lungs 2 times daily (Patient taking differently: Inhale 2 puffs into the lungs daily ) 1 Inhaler 11     morphine (MSIR) 15 MG IR tablet Take 1 tablet (15 mg) by mouth every 4 hours as needed for moderate to severe pain (and cough) 30 tablet 0     MULTI VITAMIN MENS OR TABS 1 TABLET DAILY       tamsulosin (FLOMAX) 0.4 MG capsule Take 1 capsule (0.4 mg) by mouth daily 90 capsule 1     tiotropium (SPIRIVA HANDIHALER) 18 MCG inhaled capsule Inhale 1 capsule (18 mcg) into the lungs daily 1 capsule 11      lidocaine-prilocaine (EMLA) 2.5-2.5 % external cream Apply topically as needed for moderate pain 30 g 11     ondansetron (ZOFRAN) 8 MG tablet Take 1 tablet (8 mg) by mouth every 8 hours as needed for nausea (Patient not taking: Reported on 1/24/2020) 30 tablet 11     prochlorperazine (COMPAZINE) 10 MG tablet Take 1 tablet (10 mg) by mouth every 6 hours as needed (Nausea/Vomiting) 30 tablet 11       No Known Allergies    EXAM:    /74   Pulse 74   Temp 96.8  F (36  C) (Tympanic)   Resp 16   Wt 80.7 kg (178 lb)   SpO2 97%   BMI 24.14 kg/m          GENERAL:  Male, in no acute distress.  Alert and oriented x3.   HEENT:  Normocephalic, atraumatic.  PERRL, oropharynx clear with no sores or thrush. Nio tongue sore.  LYMPH NODES:  No palpable pre/post-auricular, cervical, axillary lymphadenopathy appreciated.  CV:  RRR, No murmurs, gallops, or rubs.   LUNGS:  Clear to auscultation bilaterally.  No wheezing, crackles or respiratory distress.  ABDOMEN:  Soft, nontender and nondistended.  Bowel sounds heard x4.  No apparent hepatosplenomegaly.   EXTREMITIES:  No clubbing, cyanosis, or edema.   SKIN: No rash  PSYCH: Mood stable      Labs:   Results for MAYA ELLER (MRN [id number]) as of 1/24/2020 12:54   1/24/2020 08:03 1/24/2020 08:05   Albumin 3.7    Protein Total 7.3    Bilirubin Total 0.7    Alkaline Phosphatase 59    ALT 26    AST 28    Bilirubin Direct 0.1    WBC 5.7    Hemoglobin 12.5 (L)    Hematocrit 39.6 (L)    Platelet Count 287    RBC Count 4.31 (L)    MCV 92    MCH 29.0    MCHC 31.6    RDW 17.3 (H)    Diff Method Automated Method    % Neutrophils 80.1    % Lymphocytes 13.0    % Monocytes 6.0    % Eosinophils 0.0    % Basophils 0.4    % Immature Granulocytes 0.5    Nucleated RBCs 0    Absolute Neutrophil 4.6    Absolute Lymphocytes 0.7 (L)    Absolute Monocytes 0.3    Absolute Eosinophils 0.0    Absolute Basophils 0.0    Abs Immature Granulocytes 0.0    Absolute Nucleated RBC 0.0    Protein Albumin  Urine  Negative       Imaging: n/a    Impression/Plan: Kentrell Kirkpatrick is a 68 year old male with metastatic NSCLC previously on carboplatin, pemetrexed and pembrolizumab, however progression noted and currently undergoing palliative intent chemotherapy with Taxotere and ramucirumab.    Metastatic NSCLC: Previous therapies with progression of disease, completed cycle 1 Taxotere/ramucirumab on 9/20/2019.  Cycle 4 was postponed due to [place] trip.  He has been tolerating this regimen fairly well and his cough is significantly improved, see below.  Restaging CT on 12/16/2019 with stable disease.  Labs are within treatment parameters to proceed with cycle 5 today.  --2/12 CT CAP  --2/14 Dr. Muller, labs  --2/17 cycle 6 at [facility]    Respiratory:  --Cough: Significant improvement since starting Dulera inhaler.  He is down to once a day due to tongue symptoms.  --Pleural effusion: Known left malignant pleural effusion and Pleurx drain removed. Known trapped lung, no further reaccumulation.  --COPD: Currently with Tiotropium and albuterol inhalers.     HEENT: Tongue pain, however no sores or mucositis on exam.  We discussed salt and baking soda rinses.  With some improvement.  He will also rinse his mouth and brush his tongue after using Dulera to see if this helps.  We briefly discussed Magic mouthwash, however he would like to hold off on this medication for now.    Insomnia: Using melatonin when needed, no longer on Seroquel.      Chart documentation with Dragon Voice recognition Software. Although reviewed after completion, some words and grammatical errors may remain.      Trinidad Canales PA-C  Hematology/Oncology  [facility]                  Again, thank you for allowing me to participate in the care of your patient.        Sincerely,        Trinidad Canales PA-C

## 2020-01-27 NOTE — TELEPHONE ENCOUNTER
Prednisone discontinued by Dr. Paige  11/11. Then restarted as burst 40 mg daily x5 days by Dr. Muller 11/25. Per note 12/9 with Trinidad Canales, prednisone then discontinued. Saw Dr. Muller 12/20 and Trinidad again 1/24 but no mention of restarting prednisone.    Called pt to clarify if he requested refill. Reached pt while he was driving, he will call back once he's off the road.

## 2020-02-03 NOTE — TELEPHONE ENCOUNTER
"Requested Prescriptions   Pending Prescriptions Disp Refills     tamsulosin (FLOMAX) 0.4 MG capsule [Pharmacy Med Name: TAMSULOSIN HCL 0.4 MG CAPSULE]  Last Written Prescription Date:  7/22/2019  Last Fill Quantity: 90 capsule,  # refills: 1   Last office visit: 11/20/2019 with prescribing provider:  Yesika     Future Office Visit:       90 capsule 1     Sig: TAKE 1 CAPSULE BY MOUTH EVERY DAY       Alpha Blockers Passed - 2/1/2020  4:28 PM        Passed - Blood pressure under 140/90 in past 12 months     BP Readings from Last 3 Encounters:   01/24/20 122/74   01/15/20 129/71   01/03/20 116/76                 Passed - Recent (12 mo) or future (30 days) visit within the authorizing provider's specialty     Patient has had an office visit with the authorizing provider or a provider within the authorizing providers department within the previous 12 mos or has a future within next 30 days. See \"Patient Info\" tab in inbasket, or \"Choose Columns\" in Meds & Orders section of the refill encounter.              Passed - Patient does not have Tadalafil, Vardenafil, or Sildenafil on their medication list        Passed - Medication is active on med list        Passed - Patient is 18 years of age or older        "

## 2020-02-04 NOTE — TELEPHONE ENCOUNTER
Prescription approved per [facility] Refill Protocol.  CHANTAL FerraraN, RN  [facility]

## 2020-02-14 NOTE — NURSING NOTE
"Oncology Rooming Note    February 14, 2020 2:26 PM   Kentrell Kirkpatrick is a 69 year old male who presents for:    Chief Complaint   Patient presents with     Oncology Clinic Visit     Return; Lung Ca     Initial Vitals: /75   Pulse 76   Temp 97.5  F (36.4  C) (Oral)   Resp 15   Ht 1.829 m (6' 0.01\")   Wt 80 kg (176 lb 6.4 oz)   SpO2 97%   BMI 23.92 kg/m   Estimated body mass index is 23.92 kg/m  as calculated from the following:    Height as of this encounter: 1.829 m (6' 0.01\").    Weight as of this encounter: 80 kg (176 lb 6.4 oz). Body surface area is 2.02 meters squared.  No Pain (0) Comment: Data Unavailable   No LMP for male patient.  Allergies reviewed: Yes  Medications reviewed: Yes    Medications: Medication refills not needed today.  Pharmacy name entered into [employer]: [id number] - [place] - [id number]    Clinical concerns: No new concerns        Aleida Beckham CMA              "

## 2020-02-14 NOTE — PROGRESS NOTES
[facility]    FOLLOW-UP VISIT NOTE    PATIENT NAME: Kentrell Kirkpatrick MRN # [id number]  DATE OF VISIT: February 14, 2020 YOB: 1950    CANCER TYPE: NSCLC, adenocarcinoma  STAGE: IV  ECOG PS: 1    Cancer Staging  Non-small cell lung cancer, left (H)  Staging form: Lung, AJCC 8th Edition  - Clinical stage from 3/11/2019: Stage IV (cT1c, cN3, pM1c) - Signed by Susan Muller MD on 3/11/2019    PD-L1: <1%  Lung panel: 3/1/19 on [id number] A1 and [id number] A1. Negative  NGS: [employer] 360 sent 2/28/19 negative for actionable mutations. SMAD4 E538, TP53 H179R, SMO A327G. MSI not high    SUMMARY  6/27/18 CT chest w/contrast to re-evaluate aortic aneurysm: 8 mm spiculated KEATON nodule, 3 mm RML nodule unchanged from 3/15/17 and 2/25/16 scans  2/4/19 Presented to PCP with fairly rapid onset of shortness of breath. Given albuterol  2/19/18 CXR and CT chest w/contrast. Large left effusion  2/20/19 L thoracentesis (Dr. Rodriguez), 1180 cc  3/1/19 L pleurx (Dr. Rodriguez)  3/13/19 C1 carboplatin pemetrexed pembrolizumab  3/15/19 L supraclavicular LN bx (IR, FNA). Path: lung adenocarcinoma  4/3/19 C2 carboplatin pemetrexed pembrolizumab (total 4 cycles)  4/15/19 FEV1 2.11 (61%), FVC 3.38 (73%), DLCO 27.7, 67% (59%)  4/18/19 TPA. Drained 900 cc after it got unclotted  5/9/19 Pleurx removed  6/5~8/28/19 Maintenance pemetrexed + pembrolizumab x 5 cycles --> PD  9/20/19 C1 docetaxel 60 mg/m2 + ramucirumab  9/28/19 UC for bronchitis. Levofloxacin  10/4/19 Brain MRI - routine rescreening - negative  10/11/19 C2 docetaxel + ramucirumab. Had extravasation  10/17/19 Port  10/23/19 Recurrent cough. Saw PCP. Azithromycin  10/29/19 Prednisone 40 mg once, then 20 mg daily x 5 days, then 10 mg daily x 5 days for acute bronchitis  12/9/19 C4 docetaxel ramucirumab delayed 1 week due to URI and trip   12/12/19 ED for bronchitis. Had some chills prior to going to the ED  9/20/19~curr Docetaxel +  ramucirumab. C5-6 docetaxel reduced to 50 mg/m2 due to excessive fatigue    SUBJECTIVE  Mr. Kirkpatrick returns today for follow up of metastatic lung adenocarcinoma after 6 cycles of docetaxel + ramucirumab. Fortunately doing quite well. Feels good. Energy and appetite are much improved. Food still doesn't taste like it should, but is much more tolerable. No weight loss. Getting over a cold, so cough increased, but it actually improved too. Strength is better. Still having some trouble sleeping -sometimes wakes up and goes to the kitchen. Bowels ok. No bleeding. No further nosebleeds.  Doing pulmonary rehab - feeling much better. Adding dulera inhaler has been extremely helpful       PAST MEDICAL HISTORY  Lung adenocarcinoma as above  L5 disk herniation. Epidural injection 5/22/18. Improved dramatically with chiropracter in the past.   BPH  Ascending aortic aneurysm  SKYLER not on CPAP    CURRENT OUTPATIENT MEDICATIONS  Current Outpatient Medications   Medication Sig Dispense Refill     albuterol (PROAIR HFA/PROVENTIL HFA/VENTOLIN HFA) 108 (90 Base) MCG/ACT inhaler Inhale 2 puffs into the lungs every 4 hours (while awake) 1 Inhaler 0     albuterol (PROVENTIL) (2.5 MG/3ML) 0.083% neb solution Take 1 vial (2.5 mg) by nebulization every 4 hours as needed for shortness of breath / dyspnea or wheezing 100 vial 11     calcium polycarbophil (FIBERCON) 625 MG tablet Take 2 tablets by mouth daily       dexamethasone (DECADRON) 4 MG tablet TAKE 1 TABLET (4 MG) BY MOUTH 2 TIMES DAILY TO BE TAKEN DAY BEFORE, DAY OF, AND DAY AFTER INFUSION.. 6 tablet 11     finasteride (PROSCAR) 5 MG tablet TAKE 1 TABLET BY MOUTH EVERY DAY 90 tablet 1     LORazepam (ATIVAN) 0.5 MG tablet Take 1 tablet (0.5 mg) by mouth every 6 hours as needed (Nausea/Vomiting) 30 tablet 3     Melatonin 10 MG TABS tablet Take 10 mg by mouth nightly as needed for sleep       mometasone-formoterol (DULERA) 100-5 MCG/ACT inhaler Inhale 2 puffs into the lungs 2 times daily  "(Patient taking differently: Inhale 2 puffs into the lungs daily ) 1 Inhaler 11     morphine (MSIR) 15 MG IR tablet Take 1 tablet (15 mg) by mouth every 4 hours as needed for moderate to severe pain (and cough) 30 tablet 0     MULTI VITAMIN MENS OR TABS 1 TABLET DAILY       tamsulosin (FLOMAX) 0.4 MG capsule TAKE 1 CAPSULE BY MOUTH EVERY DAY 90 capsule 1     tiotropium (SPIRIVA HANDIHALER) 18 MCG inhaled capsule Inhale 1 capsule (18 mcg) into the lungs daily 1 capsule 11     lidocaine-prilocaine (EMLA) 2.5-2.5 % external cream Apply topically as needed for moderate pain (Patient not taking: Reported on 2/14/2020) 30 g 11     ondansetron (ZOFRAN) 8 MG tablet Take 1 tablet (8 mg) by mouth every 8 hours as needed for nausea (Patient not taking: Reported on 1/24/2020) 30 tablet 11     prochlorperazine (COMPAZINE) 10 MG tablet Take 1 tablet (10 mg) by mouth every 6 hours as needed (Nausea/Vomiting) (Patient not taking: Reported on 2/14/2020) 30 tablet 11     ALLERGIES  No Known Allergies    REVIEW OF SYSTEMS  As above in the HPI, o/w complete 12-point ROS was negative.    PHYSICAL EXAM  /75   Pulse 76   Temp 97.5  F (36.4  C) (Oral)   Resp 15   Ht 1.829 m (6' 0.01\")   Wt 80 kg (176 lb 6.4 oz)   SpO2 97%   BMI 23.92 kg/m    Wt Readings from Last 3 Encounters:   02/14/20 80 kg (176 lb 6.4 oz)   01/24/20 80.7 kg (178 lb)   01/15/20 79.4 kg (175 lb)     GEN: NAD  HEENT: EOMI, no icterus, injection or pallor  LUNGS: clear bilaterally. No wheeze. Decreased throughout  EXT:  no edema    LABORATORY AND IMAGING STUDIES  No labs today but labs 1/24 and since last visit with me reviewed.    CT Chest/Abdomen/Pelvis w Contrast  Narrative: CT CHEST/ABDOMEN/PELVIS WITH CONTRAST   2/12/2020 8:09 AM     HISTORY: Left non-small cell lung cancer. Restaging after  chemotherapy.    TECHNIQUE: 88 mL Isovue-370 IV were administered. After contrast  administration, volumetric helical sections were acquired from the  thoracic inlet " to the ischial tuberosities. Coronal images were also  reconstructed. Radiation dose for this scan was reduced using  automated exposure control, adjustment of the mA and/or kV according  to patient size, or iterative reconstruction technique.    COMPARISON: CT of the chest, abdomen, and pelvis performed 12/16/2019.    FINDINGS:    Chest: Emphysematous changes are noted throughout both lungs. The  small left pleural effusion has increased slightly in size. No  pericardial or right pleural effusion. Right Port-A-Cath, with tip in  the low SVC.     A soft tissue mass in the mediastinum along the left anterior aspect  of the main pulmonary artery has increased in size, today measuring  5.2 x 1.7 cm (previously 3.4 x 1.3 cm). Mild nodular soft tissue  thickening about the periphery of the left mid and lower lung and  along the left lung fissures has also increased. For example, a  pleural nodule in the superior segment of the left lower lobe  posteriorly (series 6 image 65) measures 1.4 cm (previously 1 cm).     The esophagus is mildly dilated, and contains an air-fluid level.  Atherosclerotic calcification of the thoracic aorta. Ectasia of the  ascending thoracic aorta measures 4 cm, and is unchanged.    Abdomen and Pelvis: Scattered small indeterminate low density lesions  in both hepatic lobes have increased slightly in size, suspicious for  progression of metastatic disease. For example, a low-density lesion  in the lateral segment of the left hepatic lobe posteriorly (series 6  image 142) measures 1.7 cm (previously 1.3 cm).     The gallbladder, spleen, right adrenal gland, pancreas, and kidneys  are unremarkable. An indeterminate left adrenal nodule is not  significantly changed, measuring 1.6 cm. Mild atherosclerotic  aortoiliac calcification.     No bowel obstruction. Colonic diverticulosis, without convincing  evidence for diverticulitis. Mild prostatic enlargement and central  calcification. Degenerative  changes are noted in the visualized  thoracolumbar spine.  Impression: IMPRESSION:   1. There has been interval enlargement of the mediastinal mass along  the left anterior aspect of the main pulmonary artery, as well as  interval progression of extensive hepatic and left pleural metastases.  2. A small left pleural effusion has increased in size.  3. Indeterminate left adrenal nodule is not significantly changed.  4. Emphysema.          AGUSTIN RAMACHANDRAN MD     Imaging was personally reviewed and I showed them to Edy and his Mandy. The mediastinal mass is marginally bigger, maybe a couple of mm. The pleural disease is perhaps a bit worse. The liver lesion is a little more noticeably worse but still quite modest.      ASSESSMENT AND PLAN  NSCLC, adenocarcinoma: Some progression but still quite modest. Further, the increased mediastinal SUNNY might be reactive to the URI. We talked about this and started talking about potential next steps. I'd really like him to be eligible for the sitravatinib trial. But the most recent therapy has to be an immune checkpoint inhibitor. There may be enough progression after 2 more cycles of docetaxel ramucirumab to warrant change. I'd like to see what kind of immune checkpoint combinations we can get, for example, dropping docetaxel and adding IO, etc. We discussed that insurance coverage would be an issue. But I'll do some behind the scenes work. I'd like to get a PET/CT after 2 more cycles of docetaxel ramucirumab.     Cough: Improved and now a little worse, but I'd like to hold off steroids (he asked)    Shortness of breath: Doing much better with pulmonary rehab.     Mouth sores: No longer an issue.    Dysgeusia and anorexia: Bit better.     SKYLER, fatigue, insomnia: Did not discuss today but will ask next time whether he followed up with sleep medicine.    Allodynia: Purely sensory. Nearly resolved.    L malignant pleural effusion: Some trapped lung, no reaccumulation thus far  after pleurx removed - has not been a problem for a while now.     Contrast reaction: Hasn't been taking methylpred actually and has been fine.    Social: Plans to travel to the [place] in [place] next summer end of July until 8/5 - 2-3 weeks. Will be on a boat.    A total of 30 minutes was spent with the patient, >50% of which was spent in counseling and coordination of care.    Susan Muller MD    Hematology, Oncology and Transplantation

## 2020-02-14 NOTE — LETTER
2/14/2020       RE: Kentrell Kirkpatrick  [address] [facility]  [facility] [address]     Dear Colleague,    Thank you for referring your patient, Kentrell Kirkpatrick, to the [facility]. Please see a copy of my visit note below.    [facility]    FOLLOW-UP VISIT NOTE    PATIENT NAME: Kentrell Kirkpatrick MRN # [id number]  DATE OF VISIT: February 14, 2020 YOB: 1950    CANCER TYPE: NSCLC, adenocarcinoma  STAGE: IV  ECOG PS: 1    Cancer Staging  Non-small cell lung cancer, left (H)  Staging form: Lung, AJCC 8th Edition  - Clinical stage from 3/11/2019: Stage IV (cT1c, cN3, pM1c) - Signed by Susan Muller MD on 3/11/2019    PD-L1: <1%  Lung panel: 3/1/19 on [id number] A1 and [id number] A1. Negative  NGS: [employer] 360 sent 2/28/19 negative for actionable mutations. SMAD4 E538, TP53 H179R, SMO A327G. MSI not high    SUMMARY  6/27/18 CT chest w/contrast to re-evaluate aortic aneurysm: 8 mm spiculated KEATON nodule, 3 mm RML nodule unchanged from 3/15/17 and 2/25/16 scans  2/4/19 Presented to PCP with fairly rapid onset of shortness of breath. Given albuterol  2/19/18 CXR and CT chest w/contrast. Large left effusion  2/20/19 L thoracentesis (Dr. Rodriguez), 1180 cc  3/1/19 L pleurx (Dr. Rodriguez)  3/13/19 C1 carboplatin pemetrexed pembrolizumab  3/15/19 L supraclavicular LN bx (IR, FNA). Path: lung adenocarcinoma  4/3/19 C2 carboplatin pemetrexed pembrolizumab (total 4 cycles)  4/15/19 FEV1 2.11 (61%), FVC 3.38 (73%), DLCO 27.7, 67% (59%)  4/18/19 TPA. Drained 900 cc after it got unclotted  5/9/19 Pleurx removed  6/5~8/28/19 Maintenance pemetrexed + pembrolizumab x 5 cycles --> PD  9/20/19 C1 docetaxel 60 mg/m2 + ramucirumab  9/28/19 UC for bronchitis. Levofloxacin  10/4/19 Brain MRI - routine rescreening - negative  10/11/19 C2 docetaxel + ramucirumab. Had extravasation  10/17/19 Port  10/23/19 Recurrent cough. Saw PCP. Azithromycin  10/29/19 Prednisone 40 mg once, then  20 mg daily x 5 days, then 10 mg daily x 5 days for acute bronchitis  12/9/19 C4 docetaxel ramucirumab delayed 1 week due to URI and trip   12/12/19 ED for bronchitis. Had some chills prior to going to the ED  9/20/19~curr Docetaxel + ramucirumab. C5-6 docetaxel reduced to 50 mg/m2 due to excessive fatigue    SUBJECTIVE  Mr. Kirkpatrick returns today for follow up of metastatic lung adenocarcinoma after 6 cycles of docetaxel + ramucirumab. Fortunately doing quite well. Feels good. Energy and appetite are much improved. Food still doesn't taste like it should, but is much more tolerable. No weight loss. Getting over a cold, so cough increased, but it actually improved too. Strength is better. Still having some trouble sleeping -sometimes wakes up and goes to the kitchen. Bowels ok. No bleeding. No further nosebleeds.  Doing pulmonary rehab - feeling much better. Adding dulera inhaler has been extremely helpful       PAST MEDICAL HISTORY  Lung adenocarcinoma as above  L5 disk herniation. Epidural injection 5/22/18. Improved dramatically with chiropracter in the past.   BPH  Ascending aortic aneurysm  SKYLER not on CPAP    CURRENT OUTPATIENT MEDICATIONS  Current Outpatient Medications   Medication Sig Dispense Refill     albuterol (PROAIR HFA/PROVENTIL HFA/VENTOLIN HFA) 108 (90 Base) MCG/ACT inhaler Inhale 2 puffs into the lungs every 4 hours (while awake) 1 Inhaler 0     albuterol (PROVENTIL) (2.5 MG/3ML) 0.083% neb solution Take 1 vial (2.5 mg) by nebulization every 4 hours as needed for shortness of breath / dyspnea or wheezing 100 vial 11     calcium polycarbophil (FIBERCON) 625 MG tablet Take 2 tablets by mouth daily       dexamethasone (DECADRON) 4 MG tablet TAKE 1 TABLET (4 MG) BY MOUTH 2 TIMES DAILY TO BE TAKEN DAY BEFORE, DAY OF, AND DAY AFTER INFUSION.. 6 tablet 11     finasteride (PROSCAR) 5 MG tablet TAKE 1 TABLET BY MOUTH EVERY DAY 90 tablet 1     LORazepam (ATIVAN) 0.5 MG tablet Take 1 tablet (0.5 mg) by mouth  "every 6 hours as needed (Nausea/Vomiting) 30 tablet 3     Melatonin 10 MG TABS tablet Take 10 mg by mouth nightly as needed for sleep       mometasone-formoterol (DULERA) 100-5 MCG/ACT inhaler Inhale 2 puffs into the lungs 2 times daily (Patient taking differently: Inhale 2 puffs into the lungs daily ) 1 Inhaler 11     morphine (MSIR) 15 MG IR tablet Take 1 tablet (15 mg) by mouth every 4 hours as needed for moderate to severe pain (and cough) 30 tablet 0     MULTI VITAMIN MENS OR TABS 1 TABLET DAILY       tamsulosin (FLOMAX) 0.4 MG capsule TAKE 1 CAPSULE BY MOUTH EVERY DAY 90 capsule 1     tiotropium (SPIRIVA HANDIHALER) 18 MCG inhaled capsule Inhale 1 capsule (18 mcg) into the lungs daily 1 capsule 11     lidocaine-prilocaine (EMLA) 2.5-2.5 % external cream Apply topically as needed for moderate pain (Patient not taking: Reported on 2/14/2020) 30 g 11     ondansetron (ZOFRAN) 8 MG tablet Take 1 tablet (8 mg) by mouth every 8 hours as needed for nausea (Patient not taking: Reported on 1/24/2020) 30 tablet 11     prochlorperazine (COMPAZINE) 10 MG tablet Take 1 tablet (10 mg) by mouth every 6 hours as needed (Nausea/Vomiting) (Patient not taking: Reported on 2/14/2020) 30 tablet 11     ALLERGIES  No Known Allergies    REVIEW OF SYSTEMS  As above in the HPI, o/w complete 12-point ROS was negative.    PHYSICAL EXAM  /75   Pulse 76   Temp 97.5  F (36.4  C) (Oral)   Resp 15   Ht 1.829 m (6' 0.01\")   Wt 80 kg (176 lb 6.4 oz)   SpO2 97%   BMI 23.92 kg/m     Wt Readings from Last 3 Encounters:   02/14/20 80 kg (176 lb 6.4 oz)   01/24/20 80.7 kg (178 lb)   01/15/20 79.4 kg (175 lb)     GEN: NAD  HEENT: EOMI, no icterus, injection or pallor  LUNGS: clear bilaterally. No wheeze. Decreased throughout  EXT:  no edema    LABORATORY AND IMAGING STUDIES  No labs today but labs 1/24 and since last visit with me reviewed.    CT Chest/Abdomen/Pelvis w Contrast  Narrative: CT CHEST/ABDOMEN/PELVIS WITH CONTRAST   2/12/2020 " 8:09 AM     HISTORY: Left non-small cell lung cancer. Restaging after  chemotherapy.    TECHNIQUE: 88 mL Isovue-370 IV were administered. After contrast  administration, volumetric helical sections were acquired from the  thoracic inlet to the ischial tuberosities. Coronal images were also  reconstructed. Radiation dose for this scan was reduced using  automated exposure control, adjustment of the mA and/or kV according  to patient size, or iterative reconstruction technique.    COMPARISON: CT of the chest, abdomen, and pelvis performed 12/16/2019.    FINDINGS:    Chest: Emphysematous changes are noted throughout both lungs. The  small left pleural effusion has increased slightly in size. No  pericardial or right pleural effusion. Right Port-A-Cath, with tip in  the low SVC.     A soft tissue mass in the mediastinum along the left anterior aspect  of the main pulmonary artery has increased in size, today measuring  5.2 x 1.7 cm (previously 3.4 x 1.3 cm). Mild nodular soft tissue  thickening about the periphery of the left mid and lower lung and  along the left lung fissures has also increased. For example, a  pleural nodule in the superior segment of the left lower lobe  posteriorly (series 6 image 65) measures 1.4 cm (previously 1 cm).     The esophagus is mildly dilated, and contains an air-fluid level.  Atherosclerotic calcification of the thoracic aorta. Ectasia of the  ascending thoracic aorta measures 4 cm, and is unchanged.    Abdomen and Pelvis: Scattered small indeterminate low density lesions  in both hepatic lobes have increased slightly in size, suspicious for  progression of metastatic disease. For example, a low-density lesion  in the lateral segment of the left hepatic lobe posteriorly (series 6  image 142) measures 1.7 cm (previously 1.3 cm).     The gallbladder, spleen, right adrenal gland, pancreas, and kidneys  are unremarkable. An indeterminate left adrenal nodule is not  significantly changed,  measuring 1.6 cm. Mild atherosclerotic  aortoiliac calcification.     No bowel obstruction. Colonic diverticulosis, without convincing  evidence for diverticulitis. Mild prostatic enlargement and central  calcification. Degenerative changes are noted in the visualized  thoracolumbar spine.  Impression: IMPRESSION:   1. There has been interval enlargement of the mediastinal mass along  the left anterior aspect of the main pulmonary artery, as well as  interval progression of extensive hepatic and left pleural metastases.  2. A small left pleural effusion has increased in size.  3. Indeterminate left adrenal nodule is not significantly changed.  4. Emphysema.          AGUSTIN RAMACHANDRAN MD     Imaging was personally reviewed and I showed them to Edy and his Mandy. The mediastinal mass is marginally bigger, maybe a couple of mm. The pleural disease is perhaps a bit worse. The liver lesion is a little more noticeably worse but still quite modest.      ASSESSMENT AND PLAN  NSCLC, adenocarcinoma: Some progression but still quite modest. Further, the increased mediastinal SUNNY might be reactive to the URI. We talked about this and started talking about potential next steps. I'd really like him to be eligible for the sitravatinib trial. But the most recent therapy has to be an immune checkpoint inhibitor. There may be enough progression after 2 more cycles of docetaxel ramucirumab to warrant change. I'd like to see what kind of immune checkpoint combinations we can get, for example, dropping docetaxel and adding IO, etc. We discussed that insurance coverage would be an issue. But I'll do some behind the scenes work. I'd like to get a PET/CT after 2 more cycles of docetaxel ramucirumab.     Cough: Improved and now a little worse, but I'd like to hold off steroids (he asked)    Shortness of breath: Doing much better with pulmonary rehab.     Mouth sores: No longer an issue.    Dysgeusia and anorexia: Bit better.     SKYLER,  fatigue, insomnia: Did not discuss today but will ask next time whether he followed up with sleep medicine.    Allodynia: Purely sensory. Nearly resolved.    L malignant pleural effusion: Some trapped lung, no reaccumulation thus far after pleurx removed - has not been a problem for a while now.     Contrast reaction: Hasn't been taking methylpred actually and has been fine.    Social: Plans to travel to the [place] in [place] next summer end of July until 8/5 - 2-3 weeks. Will be on a boat.    A total of 30 minutes was spent with the patient, >50% of which was spent in counseling and coordination of care.    Susan Muller MD    Hematology, Oncology and Transplantation

## 2020-02-17 NOTE — PROGRESS NOTES
Infusion Nursing Note:  Kentrell ENRIQUEZ Paulojose alberto presents today for C7D1 Taxotere and Cyramza.    Patient seen by provider today: No   present during visit today: Not Applicable.    Note: NA    Intravenous Access:  Labs drawn without difficulty.  Implanted Port.    Treatment Conditions:  Lab Results   Component Value Date    HGB 13.3 02/17/2020     Lab Results   Component Value Date    WBC 12.1 02/17/2020      Lab Results   Component Value Date    ANEU 9.1 02/17/2020     Lab Results   Component Value Date     02/17/2020      Results reviewed, labs MET treatment parameters, ok to proceed with treatment.      Post Infusion Assessment:  Patient tolerated infusion without incident.  Blood return noted pre and post infusion.  Site patent and intact, free from redness, edema or discomfort.  No evidence of extravasations.  Access discontinued per protocol.       Discharge Plan:   Discharge instructions reviewed with: Patient.  Patient and/or family verbalized understanding of discharge instructions and all questions answered.  AVS to patient via [employer].  Patient will return 3/9/20 for C8 Taxotere/Cyramza for next appointment.   Patient discharged in stable condition accompanied by: self.  Departure Mode: Ambulatory.    Tonia De La Cruz RN

## 2020-03-03 NOTE — PATIENT INSTRUCTIONS
--Saline nasal spray or a amarilis pot can be helpful in clearing out the sinuses and making them feel better. Also, sleep propped up on an extra pillow to help with drainage.  --Using a humidifier at night will also add moisture to the air and help with symptoms.  --Avoid use of other over the counter medications, ideally we want mucus to drain to prevent further infection from developing.  --Ibuprofen or Tylenol as directed is ok for headache or sinus pressure.     After two weeks let us know how you are doing and we will come up with an alternative plan if not effective.

## 2020-03-03 NOTE — PROGRESS NOTES
"Subjective   Kentrell Kirkpatrick is a 69 year old male who presents to clinic today for the following health issues:    HPI   Acute Illness   Acute illness concerns: Runny nose and watery eyes   Onset: x 3-4 months     Fever: no    Chills/Sweats: no    Headache (location?): no     Sinus Pressure:no    Conjunctivitis:  YES- watery eyes     Ear Pain: no    Rhinorrhea: YES    Congestion: YES    Sore Throat: no      Cough: YES - mild     Wheeze: no    Decreased Appetite: no    Nausea: no    Vomiting: no    Diarrhea:  no    Dysuria/Freq.: no    Fatigue/Achiness: YES- fatigue, pt unable to sleep well due to sinus problem, sleeping up right in a chair     Sick/Strep Exposure: no     Therapies Tried and outcome: Inhaler   Pt reports that he has had continuous runny nose and watery eyes.  Patient has been experiencing the symptoms pretty consistently for greater than 4 months.  Does not have sinus pressure but complains of nasal congestion chronic drooping of nose and eyes.  No known history of seasonal allergies.  There is not been a change to the symptoms with season change.  He finds it difficult to lay back and sleep at night due to chronic swelling in the sinus cavities.  No known postnasal drip.  Slight cough but nothing severe.  Has tried some Claritin for a couple of months which did not seem to help symptoms.  Using inhaler as needed.    Currently on chemotherapy, no known history of other chronic sinus congestion problems.    Reviewed and updated as needed this visit by provider:  Tobacco  Allergies  Meds  Problems  Med Hx  Surg Hx  Fam Hx         Review of Systems   Constitutional, HEENT, cardiovascular, pulmonary, GI, , musculoskeletal, neuro, skin, endocrine and psych systems are negative, except as otherwise noted per HPI.      Objective   /64   Pulse 79   Temp 95.5  F (35.3  C) (Tympanic)   Ht 1.829 m (6' 0.01\")   Wt 80.7 kg (178 lb)   SpO2 98%   BMI 24.13 kg/m   Body mass index is 24.13 " kg/m .  Physical Exam   GENERAL: healthy, alert, well nourished, well hydrated, no distress  Head: Normocephalic, atraumatic.  Eyes: Conjunctiva clear, non icteric. PERRLA.  Ears: External ears normal, lateral tympanic membranes normal, fluid, clear present behind both eardrums.  Nose: Septum midline, nasal mucosa erythematous, inflamed.  Left nasal passage especially narrowed compared to right.  Clear sinus drainage present on exam.  Sinuses nontender to palpation bilaterally   Mouth / Throat: Normal dentition.  No oral lesions. Pharynx erythematous, no exudates, tonsils + 2 bilaterally.  Neck: Supple, anterior cervical lymphadenopathy present, posterior cervical lymphadenopathy not present, trachea midline.  RESP: lungs clear to auscultation - no rales, no rhonchi, no wheezes  CV: regular rates and rhythm, normal S1 S2, no S3 or S4 and no murmur, no click or rub -  MS: extremities- no gross deformities noted, no edema          Assessment & Plan   Kentrell was seen today for sinus problem.    Diagnoses and all orders for this visit:    Chronic pansinusitis  -     amoxicillin-clavulanate (AUGMENTIN) 875-125 MG tablet; Take 1 tablet by mouth 2 times daily for 14 days  -     fluticasone (FLONASE) 50 MCG/ACT nasal spray; Spray 2 sprays into both nostrils daily for 14 days      Given ongoing symptoms we will treat as infection today.  Given 2-week course of Augmentin will have him use Flonase as well.  If remaining symptoms after treatment would consider further allergy treatment, possibly Singulair.  Potential ENT referral given ongoing nature of symptoms if anything is persistent.       See Patient Instructions    Return in about 2 weeks (around 3/17/2020) for If acute symptoms are persistenting or worsening..            MARIA DEL ROSARIO Manrique     [address] [address]  [email or web address]   Office: [phone]

## 2020-03-09 NOTE — PROGRESS NOTES
Infusion Nursing Note:  Kentrell Kirkpatrick presents today for Docetaxol, Cyrimza.    Patient seen by provider today: No   present during visit today: Not Applicable.    Note: N/A.    Intravenous Access:  Lab draw site R hcest, Needle type chopra, Gauge 20.  Labs drawn without difficulty.  Implanted Port.    Treatment Conditions:  Lab Results   Component Value Date    HGB 13.2 03/09/2020     Lab Results   Component Value Date    WBC 7.5 03/09/2020      Lab Results   Component Value Date    ANEU 5.7 03/09/2020     Lab Results   Component Value Date     03/09/2020      Lab Results   Component Value Date     03/09/2020                   Lab Results   Component Value Date    POTASSIUM 3.8 03/09/2020           No results found for: MAG         Lab Results   Component Value Date    CR 0.93 03/09/2020                   Lab Results   Component Value Date    MALATHI 9.0 03/09/2020                Lab Results   Component Value Date    BILITOTAL 0.8 03/09/2020           Lab Results   Component Value Date    ALBUMIN 3.8 03/09/2020                    Lab Results   Component Value Date    ALT 31 03/09/2020           Lab Results   Component Value Date    AST 27 03/09/2020   Urine, protein negative    Results reviewed, labs MET treatment parameters, ok to proceed with treatment.      Post Infusion Assessment:  Patient tolerated infusion without incident.  Port needed repeated flushing  To getgood blood return post chemo.  Blood return noted pre and post infusion.  Site patent and intact, free from redness, edema or discomfort.  No evidence of extravasations.  Access discontinued per protocol.       Discharge Plan:   Discharge instructions reviewed with: Patient and Family.  Patient and/or family verbalized understanding of discharge instructions and all questions answered.  Patient discharged in stable condition accompanied by: self and wife.  Departure Mode: Ambulatory.    Joyce Camilo,  RN

## 2020-03-10 NOTE — TELEPHONE ENCOUNTER
[facility] Prior Authorization Team   Phone: [phone]  Fax: [phone]    PA Initiation    Medication: Cyramza 100mg + 500mg vials  Insurance Company: [employer] [place] - Phone [phone] Fax [phone]  Pharmacy Filling the Rx: [facility] - [place], [place] - [facility] [address]  Filling Pharmacy Phone: [phone]  Filling Pharmacy Fax: [phone]  Start Date: 3/10/2020

## 2020-03-12 NOTE — TELEPHONE ENCOUNTER
Prior Authorization Approval    Authorization Effective Date: 1/1/2020  Authorization Expiration Date: 3/10/2021  Medication: Cyramza 100mg + 500mg vials - APPROVED  Approved Dose/Quantity: 800mg  Reference #: [id number]   Insurance Company: [employer] [place] - Phone [phone] Fax [phone]  Expected CoPay:       CoPay Card Available:      Foundation Assistance Needed:    Which Pharmacy is filling the prescription (Not needed for infusion/clinic administered): [facility] - [place], [place] - [address]  Pharmacy Notified:    Patient Notified:

## 2020-03-31 NOTE — PROGRESS NOTES
Patient's right chest wall port de-accessed in CT. Flushed with heparin as ordered.    Richie Hunt RN

## 2020-04-01 PROBLEM — D70.1 CHEMOTHERAPY-INDUCED NEUTROPENIA (H): Status: ACTIVE | Noted: 2020-01-01

## 2020-04-01 PROBLEM — T45.1X5A CHEMOTHERAPY-INDUCED NEUTROPENIA (H): Status: ACTIVE | Noted: 2020-01-01

## 2020-04-01 NOTE — PATIENT INSTRUCTIONS
[facility] Triage and after hours / weekends / holidays:  [phone]    Please call the triage or after hours line if you experience a temperature greater than or equal to 100.5, shaking chills, have uncontrolled nausea, vomiting and/or diarrhea, dizziness, shortness of breath, chest pain, bleeding, unexplained bruising, or if you have any other new/concerning symptoms, questions or concerns.      If you are having any concerning symptoms or wish to speak to a provider before your next infusion visit, please call your care coordinator or triage to notify them so we can adequately serve you.     If you need a refill on a narcotic prescription or other medication, please call before your infusion appointment.           April 2020 Sunday Monday Tuesday Wednesday Thursday Friday Saturday 1    [facility] LAB DRAW   9:00 AM   (15 min.)    [facility] LAB DRAW   [facility] Lab Draw    TELEPHONE VISIT   9:30 AM   (30 min.)   Susan Muller MD   [facility] ONC INFUSION 240  10:00 AM   (240 min.)    ONCOLOGY INFUSION   [facility] 2     3     4       5     6     7     8    [facility] DRAW   9:00 AM   (15 min.)    [facility] DRAW   [facility] Lab Draw    [facility] ONC INFUSION 240   9:30 AM   (240 min.)    ONCOLOGY INFUSION   [facility] 9     10     11       12     13     14     15     16     17     18       19     20     21     22     23    [facility] DRAW   6:30 AM   (15 min.)    [facility] DRAW   [facility] Lab Draw    TELEPHONE VISIT   7:00 AM   (15 min.)   Oar Guevara APRN CNP   [facility] ONC INFUSION 240   8:00 AM   (240 min.)    ONCOLOGY INFUSION   [facility] 24     25       26     27     28    PULMONARY TREATMENT   9:00 AM   (60 min.)   1, [facility]   [facility] 29     30                           May 2020      [place]  Monday Tuesday Wednesday Thursday Friday Saturday                            1     2       3     4     5     6    CT CHEST/ABDOMEN/PELVIS W   3:40 PM   (20 min.)   UCCT1   [facility] CT    MR BRAIN W   4:00 PM   (45 min.)   [id number]   [facility] MRI 7     8    TELEPHONE VISIT  11:30 AM   (15 min.)   Susan Muller MD   [facility] 9       10     11     12     13     14     15     16       17     18     19     20     21     22     23       24     25     26     27     28     29     30       31                                                  Recent Results (from the past 24 hour(s))   CBC with platelets differential    Collection Time: 04/01/20  9:34 AM   Result Value Ref Range    WBC 6.8 4.0 - 11.0 10e9/L    RBC Count 4.47 4.4 - 5.9 10e12/L    Hemoglobin 13.1 (L) 13.3 - 17.7 g/dL    Hematocrit 40.0 40.0 - 53.0 %    MCV 90 78 - 100 fl    MCH 29.3 26.5 - 33.0 pg    MCHC 32.8 31.5 - 36.5 g/dL    RDW 18.2 (H) 10.0 - 15.0 %    Platelet Count 259 150 - 450 10e9/L    Diff Method Automated Method     % Neutrophils 84.0 %    % Lymphocytes 10.9 %    % Monocytes 4.7 %    % Eosinophils 0.0 %    % Basophils 0.1 %    % Immature Granulocytes 0.3 %    Nucleated RBCs 0 0 /100    Absolute Neutrophil 5.7 1.6 - 8.3 10e9/L    Absolute Lymphocytes 0.7 (L) 0.8 - 5.3 10e9/L    Absolute Monocytes 0.3 0.0 - 1.3 10e9/L    Absolute Eosinophils 0.0 0.0 - 0.7 10e9/L    Absolute Basophils 0.0 0.0 - 0.2 10e9/L    Abs Immature Granulocytes 0.0 0 - 0.4 10e9/L    Absolute Nucleated RBC 0.0    Comprehensive metabolic panel    Collection Time: 04/01/20  9:34 AM   Result Value Ref Range    Sodium 138 133 - 144 mmol/L    Potassium 4.3 3.4 - 5.3 mmol/L    Chloride 108 94 - 109 mmol/L    Carbon Dioxide 25 20 - 32 mmol/L    Anion Gap 5 3 - 14 mmol/L    Glucose 105 (H) 70 - 99 mg/dL    Urea Nitrogen 19 7 - 30 mg/dL    Creatinine 0.96 0.66 - 1.25 mg/dL    GFR Estimate 79 >60 mL/min/[1.73_m2]    GFR Estimate If Black  >90 >60 mL/min/[1.73_m2]    Calcium 9.2 8.5 - 10.1 mg/dL    Bilirubin Total 0.8 0.2 - 1.3 mg/dL    Albumin 3.8 3.4 - 5.0 g/dL    Protein Total 7.4 6.8 - 8.8 g/dL    Alkaline Phosphatase 66 40 - 150 U/L    ALT 25 0 - 70 U/L    AST 24 0 - 45 U/L   Protein qualitative urine    Collection Time: 04/01/20  9:45 AM   Result Value Ref Range    Protein Albumin Urine Negative NEG^Negative mg/dL

## 2020-04-01 NOTE — PROGRESS NOTES
"Kentrell Kirkpatrick is a 69 year old male who is being evaluated via a billable telephone visit.      Please call patient on Mobile phone.     The patient has been notified of following:     \"This telephone visit will be conducted via a call between you and your physician/provider. We have found that certain health care needs can be provided without the need for a physical exam.  This service lets us provide the care you need with a short phone conversation.  If a prescription is necessary we can send it directly to your pharmacy.  If lab work is needed we can place an order for that and you can then stop by our lab to have the test done at a later time.    If during the course of the call the physician/provider feels a telephone visit is not appropriate, you will not be charged for this service.\"     Physician has received verbal consent for a Telephone Visit from the patient? Yes    Kentrell Kirkpatrick complains of    Chief Complaint   Patient presents with     Telephone     LUNG CA       Allergies reviewed: Yes  Medications reviewed: Yes    Medications: Medication refills not needed today.  Pharmacy name entered into [employer]: [id number] - [place], [place] - [address]      Clinical concerns: Eyes and nose constantly running.     Wu Monae LPN, LPN April 1, 2020  9:15 AM     ALLERGIES  Patient has no known allergies.    [facility]    FOLLOW-UP VIRTUAL PHONE VISIT NOTE    PATIENT NAME: Kentrell Kirkpatrick MRN # [id number]  DATE OF VISIT: April 1, 2020 YOB: 1950    CANCER TYPE: NSCLC, adenocarcinoma  STAGE: IV  ECOG PS: 1    Cancer Staging  Non-small cell lung cancer, left (H)  Staging form: Lung, AJCC 8th Edition  - Clinical stage from 3/11/2019: Stage IV (cT1c, cN3, pM1c) - Signed by Susan Muller MD on 3/11/2019    PD-L1: <1%  Lung panel: 3/1/19 on TI45-777 A1 and G10-0648 A1. Negative  NGS: [employer] 360 sent 2/28/19 negative for actionable " mutations. SMAD4 E538, TP53 H179R, SMO A327G. MSI not high    SUMMARY  6/27/18  CT chest w/contrast to re-evaluate aortic aneurysm: 8 mm spiculated KEATON nodule, 3 mm RML nodule unchanged from 3/15/17 and 2/25/16 scans  2/4/19  Presented to PCP with fairly rapid onset of shortness of breath. Given albuterol  2/19/18  CXR and CT chest w/contrast. Large left effusion  2/20/19  L thoracentesis (Dr. Rodriguez), 1180 cc  3/1/19  L pleurx (Dr. Rodriguez)  3/13/19  C1 carboplatin pemetrexed pembrolizumab  3/15/19  L supraclavicular LN bx (IR, FNA). Path: lung adenocarcinoma  4/3/19  C2 carboplatin pemetrexed pembrolizumab (total 4 cycles)  4/15/19  FEV1 2.11 (61%), FVC 3.38 (73%), DLCO 27.7, 67% (59%)  4/18/19  TPA. Drained 900 cc after it got unclotted  5/9/19  Pleurx removed  6/5~8/28/19  Maintenance pemetrexed + pembrolizumab x 5 cycles --> PD  9/20/19  C1 docetaxel 60 mg/m2 + ramucirumab  9/28/19  UC for bronchitis. Levofloxacin  10/4/19  Brain MRI - routine rescreening - negative  10/11/19  C2 docetaxel + ramucirumab. Had extravasation  10/17/19  Port  10/23/19  Recurrent cough. Saw PCP. Azithromycin  10/29/19  Prednisone 40 mg once, then 20 mg daily x 5 days, then 10 mg daily x 5 days for acute bronchitis  12/9/19  C4 docetaxel ramucirumab delayed 1 week due to URI and trip   12/12/19  ED for bronchitis. Had some chills prior to going to the ED  9/20/19~3/9/20  Docetaxel + ramucirumab. C5-6 docetaxel reduced to 50 mg/m2 due to excessive fatigue  3/3/20  Treated for pansinusitis with amox-calv x 14 days and fluticasone nasal spray    SUBJECTIVE  Mr. Kirkpatrick returns today for follow up of metastatic lung adenocarcinoma after 8 cycles of docetaxel + ramucirumab.   Was seen 3/3 for runny nose and watery eyes, given amox-clav and fluticasone nasal spray, which is only lasting 3 hours. Honestly no new exposures. They're housing Pat's brother's dog for about a month, but otherwise no new exposures. Unfortunately having to sleep in a  recliner due to this. Dealing with some family stressors with house guests. But otherwise feeling really good. Has been walking 1 1/2 ~ 3 miles each day. Planning to do some yard work as well. Honestly feeling quite well. Breathing is ok - sometimes notices a twinge of discomfort sometimes. Appetite good. No HA, N/V, diarrhea, constipation, bleeding, leg swellings.     PAST MEDICAL HISTORY  Lung adenocarcinoma as above  L5 disk herniation. Epidural injection 5/22/18. Improved dramatically with chiropracter in the past.   BPH  Ascending aortic aneurysm  SKYLER not on CPAP    CURRENT OUTPATIENT MEDICATIONS  Current Outpatient Medications   Medication Sig Dispense Refill     albuterol (PROAIR HFA/PROVENTIL HFA/VENTOLIN HFA) 108 (90 Base) MCG/ACT inhaler Inhale 2 puffs into the lungs every 4 hours (while awake) 1 Inhaler 0     albuterol (PROVENTIL) (2.5 MG/3ML) 0.083% neb solution Take 1 vial (2.5 mg) by nebulization every 4 hours as needed for shortness of breath / dyspnea or wheezing 100 vial 11     calcium polycarbophil (FIBERCON) 625 MG tablet Take 2 tablets by mouth daily       dexamethasone (DECADRON) 4 MG tablet TAKE 1 TABLET (4 MG) BY MOUTH 2 TIMES DAILY TO BE TAKEN DAY BEFORE, DAY OF, AND DAY AFTER INFUSION.. 6 tablet 11     finasteride (PROSCAR) 5 MG tablet TAKE 1 TABLET BY MOUTH EVERY DAY 90 tablet 1     lidocaine-prilocaine (EMLA) 2.5-2.5 % external cream Apply topically as needed for moderate pain 30 g 11     LORazepam (ATIVAN) 0.5 MG tablet Take 1 tablet (0.5 mg) by mouth every 6 hours as needed (Nausea/Vomiting) 30 tablet 3     Melatonin 10 MG TABS tablet Take 10 mg by mouth nightly as needed for sleep       mometasone-formoterol (DULERA) 100-5 MCG/ACT inhaler Inhale 2 puffs into the lungs 2 times daily (Patient taking differently: Inhale 2 puffs into the lungs daily ) 1 Inhaler 11     morphine (MSIR) 15 MG IR tablet Take 1 tablet (15 mg) by mouth every 4 hours as needed for moderate to severe pain (and  cough) 30 tablet 0     MULTI VITAMIN MENS OR TABS 1 TABLET DAILY       ondansetron (ZOFRAN) 8 MG tablet Take 1 tablet (8 mg) by mouth every 8 hours as needed for nausea 30 tablet 11     prochlorperazine (COMPAZINE) 10 MG tablet Take 1 tablet (10 mg) by mouth every 6 hours as needed (Nausea/Vomiting) 30 tablet 11     tamsulosin (FLOMAX) 0.4 MG capsule TAKE 1 CAPSULE BY MOUTH EVERY DAY 90 capsule 1     tiotropium (SPIRIVA HANDIHALER) 18 MCG inhaled capsule Inhale 1 capsule (18 mcg) into the lungs daily 1 capsule 11     ALLERGIES  No Known Allergies    REVIEW OF SYSTEMS  As above in the HPI, o/w complete 12-point ROS was negative.    PHYSICAL EXAM  /80 (BP Location: Left arm, Patient Position: Chair, Cuff Size: Adult Large)   Pulse 85   Temp 97.4  F (36.3  C) (Oral)   Resp 18   Wt 81.8 kg (180 lb 6.4 oz)   SpO2 96%   BMI 24.46 kg/m      Virtual visit, not examined    LABORATORY AND IMAGING STUDIES  Results for DAPHNIE MAYA ZOE (MRN [id number]) as of 4/1/2020 10:10   4/1/2020 09:34   Sodium 138   Potassium 4.3   Chloride 108   Carbon Dioxide 25   Urea Nitrogen 19   Creatinine 0.96   GFR Estimate 79   GFR Estimate If Black >90   Calcium 9.2   Anion Gap 5   Albumin 3.8   Protein Total 7.4   Bilirubin Total 0.8   Alkaline Phosphatase 66   ALT 25   AST 24   Glucose 105 (H)   WBC 6.8   Hemoglobin 13.1 (L)   Hematocrit 40.0   Platelet Count 259   RBC Count 4.47   MCV 90   MCH 29.3   MCHC 32.8   RDW 18.2 (H)   Diff Method Automated Method   % Neutrophils 84.0   % Lymphocytes 10.9   % Monocytes 4.7   % Eosinophils 0.0   % Basophils 0.1   % Immature Granulocytes 0.3   Nucleated RBCs 0   Absolute Neutrophil 5.7   Absolute Lymphocytes 0.7 (L)   Absolute Monocytes 0.3   Absolute Eosinophils 0.0   Absolute Basophils 0.0   Abs Immature Granulocytes 0.0   Absolute Nucleated RBC 0.0     NM Bone Scan Whole Body  Narrative: EXAMINATION: NM BONE SCAN WHOLE BODY  Whole-body bone scan, 3/31/2020 2:15 PM     HISTORY: lung cancer,  adenocarcinoma. Insurnace will not cover PET/CT.  Evaluate bones; Non-small cell lung cancer, left (H)     ADDITIONAL INFORMATION: none    COMPARISON: 3/31/2020    TECHNIQUE: The patient received 20/4/2014 mCi of Tc-99m MDP  intravenously. Whole body bone images were obtained at 3 hours.    FINDINGS: Degenerative changes of the bilateral shoulders, bases of  thumbs, and knees left more than right.   No suspicious findings to suggest metastasis.   Bilateral kidneys are visualized  Impression: IMPRESSION: No suspicious uptake to suggest bone metastasis.    I have personally reviewed the examination and initial interpretation  and I agree with the findings.    NIGEL ASHBY MD  CT Chest/Abdomen/Pelvis w Contrast  Narrative: EXAMINATION: CT CHEST/ABDOMEN/PELVIS W CONTRAST, 3/31/2020 11:19 AM    TECHNIQUE:  Helical CT images from the thoracic inlet through the  symphysis pubis were obtained  with contrast. Contrast dose: iopamidol  (ISOVUE-370) solution 111 mL    COMPARISON: CT 2/12/2020    HISTORY: Lung adenocarcinoma, restaging. Liver protocol for liver.  Insurnace will not cover a PET/CT; Non-small cell lung cancer, left  (H)    FINDINGS:    Chest: Right chest wall Port-A-Cath with tip terminating in the low  SVC. The heart is normal in size. No pericardial effusion. Ascending  aorta and main pulmonary artery are normal in caliber. No central  pulmonary embolism. Stable size of conglomerate prevascular lymph  nodes collectively measuring 6.0 x 2.4 cm (series 14, image 139). The  esophagus is patulous.    The central tracheobronchial tree is patent. Upper lobe predominant  paraseptal and centrilobular emphysematous changes. Moderate left  pleural effusion is unchanged in size and the prior exam. Multiple  scattered solid pulmonary nodules in both lungs. Several of these have  slightly increased in size since the prior exam, poorly pleural-based  nodule in the superior left lower lobe measuring 1.8 x 1.4 cm (series  8,  image 129), previously 1.3 x 1.4 cm, a 5 mm solid nodule in the  anterior right upper lobe (series 8, image 110), previously measuring  the 4 mm, and a 4 mm solid nodule in the lateral right upper lobe  (series 8, image 110), previously measuring 3 mm. There are several  new pulmonary nodules in both lungs, including a 4 mm solid nodule in  the lateral right lower lobe (series 8, image 220), an 8 mm solid  nodule in the medial left lower lobe (series 8, image 140), and a 5 mm  solid nodule in the medial right lower lobe (series 8, image 248).    Abdomen and pelvis: Redemonstration of numerous hypodense lesions  throughout the liver, several of which have increase in size since the  prior exam, for example a 2.1 x 1.4 cm subcapsular hypodense lesion in  hepatic segment 2 (series 14, image 275), previously measured 1.7 x  1.3 cm. Additional 1.2 cm hypodense lesion in hepatic segment 2  (series 14, image 275), previously measured 1.0 cm. Several other  hypodense lesions in the right hepatic lobe of also increase in size,  for example a subcapsular 1.4 cm hypodense lesion in hepatic segment 7  (series 14, image 260), previously measured 1.2 cm, and a 1.5 cm  hypodense lesion in hepatic segment 6 (series 14, image 394),  previously measured 1.3 cm.    Patent hepatic vasculature. The gallbladder, pancreas, spleen and  right adrenal gland are unremarkable. Stable 2.0 cm nodule in the left  adrenal gland. Normal symmetric enhancement of both kidneys. No  hydronephrosis or hydroureter. No renal or ureteral stones. Urinary  bladder is mildly distended otherwise unremarkable. Prostatomegaly.  Seminal vesicles are unremarkable. There are no abnormally dilated  loops of small or large bowel. Colonic diverticulosis, without  evidence of acute diverticulitis. Atherosclerotic calcifications of  the abdominal aorta, without aneurysmal dilatation. No enlarged  inguinal or intra-abdominal lymph nodes.     Bones and soft tissues:  Multilevel degenerative changes of the spine.  Degenerative changes of the glenohumeral joints, right greater than  left. No acute or suspicious osseous lesion.  Impression: IMPRESSION:   In this patient with a history of small cell lung cancer the left  lun. Numerous hypodense metastatic lesions in the liver, several of  which have increase in size since the prior exam.  2. Multiple new or enlarging pulmonary nodules in both lungs since  prior exam on 3/12/2020.  3. Unchanged mediastinal lymphadenopathy.   4. Stable left adrenal nodule.  4. Moderate left pleural effusion is unchanged in size.    I have personally reviewed the examination and initial interpretation  and I agree with the findings.    BRAYDEN JONES MD       Imaging was personally reviewed     ASSESSMENT AND PLAN  NSCLC, adenocarcinoma, PD-L1 <1%: PD on docetaxel + ramucirumab. Discussed switching to gemcitabine, 1000 mg/m2 D1, 8 q21 days. Also repeat brain MRI with next restaging. Fortunately, PS is excellent and so we'll continue looking for options. We reviewed potential side effects, especially cytopenias, risk of infection, rare TTP and leg swelling. Teaching will be done over the phone.     Nasal congestion: Still having runny nose and trying loratadine. Monitor    Malignant pleural effusion: Had some trapped lung there before, but accumulating a bit more now. Not symptomatic yet. Asked him to let me know if it's starting to cause problems.    Cough: Not an issue right now.     Shortness of breath: Doing much better with pulmonary rehab.     Mouth sores: No longer an issue.    Dysgeusia and anorexia: Resolved    SKYLER, fatigue, insomnia: CPAP. Having some more trouble again due to rhinorrhea.    Allodynia: Purely sensory. Nearly resolved. Not discussed today    Contrast reaction: Hasn't been taking methylpred actually and has been fine.    Social: Plans to travel to the [place] in [place] next summer end of July until  8/5 - 2-3 weeks. Will be on a boat.    A total of 30 minutes was spent on the call, >50% of which was spent in counseling and coordination of care.    Susan Muller MD    Hematology, Oncology and Transplantation

## 2020-04-01 NOTE — PROGRESS NOTES
Infusion Nursing Note:  Kentrell ZOE Kirkpatrick presents today for cycle 1, day 1 gemzar.    Patient seen by provider today: Yes: Virtual visit with Dr Muller   present during visit today: Not Applicable.    Note: Gemzar is new for patient today. RN provided handout from [facility] and reviewed potential side effects of treatment as well as schedule for administration.  Patient verbalized understanding and all questions answered.      Intravenous Access:  Implanted Port.    Treatment Conditions:  Lab Results   Component Value Date    HGB 13.1 04/01/2020     Lab Results   Component Value Date    WBC 6.8 04/01/2020      Lab Results   Component Value Date    ANEU 5.7 04/01/2020     Lab Results   Component Value Date     04/01/2020      Lab Results   Component Value Date     04/01/2020                   Lab Results   Component Value Date    POTASSIUM 4.3 04/01/2020           No results found for: MAG         Lab Results   Component Value Date    CR 0.96 04/01/2020                   Lab Results   Component Value Date    MALATHI 9.2 04/01/2020                Lab Results   Component Value Date    BILITOTAL 0.8 04/01/2020           Lab Results   Component Value Date    ALBUMIN 3.8 04/01/2020                    Lab Results   Component Value Date    ALT 25 04/01/2020           Lab Results   Component Value Date    AST 24 04/01/2020       Results reviewed, labs MET treatment parameters, ok to proceed with treatment.      Post Infusion Assessment:  Patient tolerated infusion without incident.  Blood return noted pre and post infusion.  Site patent and intact, free from redness, edema or discomfort.  No evidence of extravasations.  Access discontinued per protocol.       Discharge Plan:   Patient declined prescription refills.  Discharge instructions reviewed with: Patient.  Patient and/or family verbalized understanding of discharge instructions and all questions answered.  Copy of AVS reviewed with patient  and/or family.  Patient will return 4/8/20 for next appointment.  Patient discharged in stable condition accompanied by: self.  Departure Mode: Ambulatory.  Face to Face time: 0.    Digna Interiano RN

## 2020-04-01 NOTE — NURSING NOTE
Chief Complaint   Patient presents with     Port Draw     urine collected & labs drawn via port by RN in lab     /80 (BP Location: Left arm, Patient Position: Chair, Cuff Size: Adult Large)   Pulse 85   Temp 97.4  F (36.3  C) (Oral)   Resp 18   Wt 81.8 kg (180 lb 6.4 oz)   SpO2 96%   BMI 24.46 kg/m      Port accessed by RN in lab. Labs collected and sent. Pt tolerated well. Line flushed with Normal Saline & Heparin.   Pt checked in for next appointment.    Janine Mullins, RN

## 2020-04-02 NOTE — PROGRESS NOTES
RN Care Coordination Note  Outgoing Call: Placed call to patient to follow up after treatment change. Reviewed side effects related to Gemzar. Patient voiced understanding. States he is feeling well today and has no questions or concerns at this time.       Darleen Ruiz RN, BSN, OCN   RN Care Coordinator   [facility]

## 2020-04-07 NOTE — TELEPHONE ENCOUNTER
"Requested Prescriptions   Pending Prescriptions Disp Refills     finasteride (PROSCAR) 5 MG tablet [Pharmacy Med Name: FINASTERIDE 5 MG TABLET] 30 tablet 5     Sig: TAKE 1 TABLET BY MOUTH EVERY DAY   Last Written Prescription Date:  10/23/19  Last Fill Quantity: 90,  # refills: 1   Last office visit: 3/3/2020 with prescribing provider:  Tran   Future Office Visit:   Next 5 appointments (look out 90 days)    May 08, 2020 11:30 AM CDT  Telephone Visit with Susan Muller MD  [facility] [address] [place] [id number] [phone]             BPH Agents Passed - 4/7/2020 12:14 AM        Passed - Recent (12 mo) or future (30 days) visit within the authorizing provider's department     Patient has had an office visit with the authorizing provider or a provider within the authorizing providers department within the previous 12 mos or has a future within next 30 days. See \"Patient Info\" tab in inbasket, or \"Choose Columns\" in Meds & Orders section of the refill encounter.              Passed - Medication is active on med list        Passed - Patient is 18 years of age or older           Prescription approved per [facility] Refill Protocol.  CHANTAL FerraraN, RN  [facility]    "

## 2020-04-07 NOTE — TELEPHONE ENCOUNTER
Please see [employer] message. Please advise. Thank you.  CHANTAL FerraraN, RN  [facility]

## 2020-04-08 NOTE — TELEPHONE ENCOUNTER
Referral placed to:    [facility]: [facility] [phone]   [email or web address]      Donald Shell DO  4/8/2020 8:13 AM

## 2020-04-08 NOTE — PROGRESS NOTES
Infusion Nursing Note:  Kentrell Kirkpatrick presents today for C1D8 Gemzar.    Patient seen by provider today: No   present during visit today: Not Applicable.    Note: Patient denies any new symptoms or side effects since starting gemzar last week.    Intravenous Access:  Labs drawn without difficulty.  Implanted Port.    Treatment Conditions:  Lab Results   Component Value Date    HGB 12.9 04/08/2020     Lab Results   Component Value Date    WBC 3.8 04/08/2020      Lab Results   Component Value Date    ANEU 2.3 04/08/2020     Lab Results   Component Value Date     04/08/2020      Lab Results   Component Value Date     04/08/2020                   Lab Results   Component Value Date    POTASSIUM 3.9 04/08/2020           No results found for: MAG         Lab Results   Component Value Date    CR 0.92 04/08/2020                   Lab Results   Component Value Date    MALATHI 9.2 04/08/2020                Lab Results   Component Value Date    BILITOTAL 1.1 04/08/2020           Lab Results   Component Value Date    ALBUMIN 3.8 04/08/2020                    Lab Results   Component Value Date    ALT 27 04/08/2020           Lab Results   Component Value Date    AST 23 04/08/2020       Results reviewed, labs MET treatment parameters, ok to proceed with treatment.      Post Infusion Assessment:  Patient tolerated infusion without incident.  Blood return noted pre and post infusion.  Site patent and intact, free from redness, edema or discomfort.  No evidence of extravasations.  Access discontinued per protocol.       Discharge Plan:   Copy of AVS reviewed with patient and/or family.  Patient will return 4/22 for labs and chemo for next appointment.  Patient discharged in stable condition accompanied by: self.  Departure Mode: Ambulatory.    Sarah Petersen RN

## 2020-04-21 NOTE — PROGRESS NOTES
"Kentrell Kirkpatrick is a 69 year old male who is being evaluated via a billable video visit.      The patient has been notified of following:     \"This video visit will be conducted via a call between you and your physician/provider. We have found that certain health care needs can be provided without the need for an in-person physical exam.  This service lets us provide the care you need with a video conversation.  If a prescription is necessary we can send it directly to your pharmacy.  If lab work is needed we can place an order for that and you can then stop by our lab to have the test done at a later time.    Video visits are billed at different rates depending on your insurance coverage.  Please reach out to your insurance provider with any questions.    If during the course of the call the physician/provider feels a video visit is not appropriate, you will not be charged for this service.\"    Patient has given verbal consent for Video visit? Yes    How would you like to obtain your AVS? Ellis    Patient would like the video invitation sent by: Send to e-mail at: [email or web address]             I have reviewed and updated the patient's allergies and medication list.    Concerns:   Patient has no new concerns.      Refills: N/A      BERTHA Yan            Video Start Time: 1529    Additional provider notes: insert own note template here   *See below for note      Video-Visit Details    Type of service:  Video Visit    Video End Time (time video stopped): 1544    Originating Location (pt. Location): Home    Distant Location (provider location):  [facility]     Mode of Communication:  Video Conference via KISHA Fernandez CNP            Reason for Visit: f/u metastatic lung cancer    Oncology HPI:   6/27/18               CT chest w/contrast to re-evaluate aortic aneurysm: 8 mm spiculated KEATON nodule, 3 mm RML nodule unchanged from 3/15/17 and 2/25/16 scans  2/4/19  "                Presented to PCP with fairly rapid onset of shortness of breath. Given albuterol  2/19/18               CXR and CT chest w/contrast. Large left effusion  2/20/19               L thoracentesis (Dr. Rodriguez), 1180 cc  3/1/19                 L pleurx (Dr. Rodriguez)  3/13/19               C1 carboplatin pemetrexed pembrolizumab  3/15/19               L supraclavicular LN bx (IR, FNA). Path: lung adenocarcinoma  4/3/19                 C2 carboplatin pemetrexed pembrolizumab (total 4 cycles)  4/15/19               FEV1 2.11 (61%), FVC 3.38 (73%), DLCO 27.7, 67% (59%)  4/18/19               TPA. Drained 900 cc after it got unclotted  5/9/19                 Pleurx removed  6/5~8/28/19        Maintenance pemetrexed + pembrolizumab x 5 cycles --> PD  9/20/19               C1 docetaxel 60 mg/m2 + ramucirumab  9/28/19               UC for bronchitis. Levofloxacin  10/4/19               Brain MRI - routine rescreening - negative  10/11/19             C2 docetaxel + ramucirumab. Had extravasation  10/17/19             Port  10/23/19             Recurrent cough. Saw PCP. Azithromycin  10/29/19             Prednisone 40 mg once, then 20 mg daily x 5 days, then 10 mg daily x 5 days for acute bronchitis  12/9/19               C4 docetaxel ramucirumab delayed 1 week due to URI and trip   12/12/19             ED for bronchitis. Had some chills prior to going to the ED  9/20/19~3/9/20                Docetaxel + ramucirumab. C5-6 docetaxel reduced to 50 mg/m2 due to excessive fatigue  3/3/20                 Treated for pansinusitis with amox-calv x 14 days and fluticasone nasal spray  4/1/20 C1 Gemcitabine 1 gm/m2 on day 1, 8 of a 21 day cycle    Interval history:   I had a video visit with Edy and his spouse, Audrey today. He has been feeling well. Has tolerated the gemcitabine well. Felt tired for a few days after the first dose, but that improved. No N/V. Bowels are regular. Urination wnl. Had a few days of pain with inspiration  around the scar on the chest, that has now resolved. He and Pat mostly isolate themselves, but have been getting out for daily walks. Appetite is good, weight stable. No rashes, bleeding, bruising. No edema. No fevers/chills. Has chronic rhinnorhea, mostly clear drainage. Has a little blood tinged mucous when he blows the nose.    PAST MEDICAL HISTORY  Lung adenocarcinoma as above  L5 disk herniation. Epidural injection 5/22/18. Improved dramatically with chiropracter in the past.   BPH  Ascending aortic aneurysm  SKYLER not on CPAP    Current Outpatient Medications   Medication Sig Dispense Refill     albuterol (PROAIR HFA/PROVENTIL HFA/VENTOLIN HFA) 108 (90 Base) MCG/ACT inhaler Inhale 2 puffs into the lungs every 4 hours (while awake) 1 Inhaler 0     albuterol (PROVENTIL) (2.5 MG/3ML) 0.083% neb solution Take 1 vial (2.5 mg) by nebulization every 4 hours as needed for shortness of breath / dyspnea or wheezing 100 vial 11     calcium polycarbophil (FIBERCON) 625 MG tablet Take 2 tablets by mouth daily       dexamethasone (DECADRON) 4 MG tablet TAKE 1 TABLET (4 MG) BY MOUTH 2 TIMES DAILY TO BE TAKEN DAY BEFORE, DAY OF, AND DAY AFTER INFUSION.. 6 tablet 11     finasteride (PROSCAR) 5 MG tablet TAKE 1 TABLET BY MOUTH EVERY DAY 90 tablet 1     fluticasone (FLONASE) 50 MCG/ACT nasal spray Spray 2 sprays into both nostrils daily 16 g 0     lidocaine-prilocaine (EMLA) 2.5-2.5 % external cream Apply topically as needed for moderate pain 30 g 11     LORazepam (ATIVAN) 0.5 MG tablet Take 1 tablet (0.5 mg) by mouth every 6 hours as needed (Nausea/Vomiting) 30 tablet 3     Melatonin 10 MG TABS tablet Take 10 mg by mouth nightly as needed for sleep       mometasone-formoterol (DULERA) 100-5 MCG/ACT inhaler Inhale 2 puffs into the lungs 2 times daily (Patient taking differently: Inhale 2 puffs into the lungs daily ) 1 Inhaler 11     morphine (MSIR) 15 MG IR tablet Take 1 tablet (15 mg) by mouth every 4 hours as needed for moderate  "to severe pain (and cough) 30 tablet 0     MULTI VITAMIN MENS OR TABS 1 TABLET DAILY       ondansetron (ZOFRAN) 8 MG tablet Take 1 tablet (8 mg) by mouth every 8 hours as needed for nausea 30 tablet 11     prochlorperazine (COMPAZINE) 10 MG tablet Take 1 tablet (10 mg) by mouth every 6 hours as needed (Nausea/Vomiting) 30 tablet 11     tamsulosin (FLOMAX) 0.4 MG capsule TAKE 1 CAPSULE BY MOUTH EVERY DAY 90 capsule 1     tiotropium (SPIRIVA HANDIHALER) 18 MCG inhaled capsule Inhale 1 capsule (18 mcg) into the lungs daily 1 capsule 11        No Known Allergies      Exam: Video physical exam  General: Patient appears well in no acute distress. Thin body habitus.  Skin: No visualized rash or lesions on visualized skin  Eyes: EOMI, no erythema, sclera icterus or discharge noted  Resp: Appears to be breathing comfortably without accessory muscle usage, speaking in full sentences, no cough  MSK: Appears to have normal range of motion based on visualized movements  Neurologic: No apparent tremors, facial movements symmetric, alert and oriented  The rest of a comprehensive physical examination is deferred due to PHE (public health emergency) video restrictions\"     There were no vitals taken for this visit.  Wt Readings from Last 4 Encounters:   04/01/20 81.8 kg (180 lb 6.4 oz)   03/09/20 82.1 kg (181 lb)   03/03/20 80.7 kg (178 lb)   02/17/20 80.2 kg (176 lb 11.2 oz)         Labs: none    Imaging: none    Impression/plan:     Metastatic adenocarcinoma of the lung, PD-L1 <1%, progressed on docetaxel and ramucirumab  -initiated gemcitabine 1 gm/m2 on day 1 and 8 on 4/1/20  -tolerated the first cycle well. Will have labs prior to scheduled cycle 2 tomorrow. If stable, ok to proceed with cycle 2 with the same dose and schedule. Will request day 8 infusion at [facility]  -will have restaging CT CAP prior to his visit with Dr. Muller in May, prior to cycle 3      Malignant pleural effusion  -overall, feels breathing is stable. Will " monitor for increased chest pain, sob    SKYLER  -on CPAP    Insomnia related to chronic nasal congestion  -better when taking claritin, but still not sleeping well. Improved when he took tylenol PM. He is ok to use this. Also can use the flonase at night to see if that helps the breathing.

## 2020-04-22 NOTE — PROGRESS NOTES
Infusion Nursing Note:  Kentrell Kirkpatrick presents today for C2D1 Gemzar.    Patient seen by provider today: No   present during visit today: Not Applicable.    Note: Patient denies symptoms or side effects.    Intravenous Access:  Labs drawn without difficulty.  Implanted Port.    Treatment Conditions:  Lab Results   Component Value Date    HGB 12.8 04/22/2020     Lab Results   Component Value Date    WBC 6.2 04/22/2020      Lab Results   Component Value Date    ANEU 4.8 04/22/2020     Lab Results   Component Value Date     04/22/2020      Lab Results   Component Value Date     04/22/2020                   Lab Results   Component Value Date    POTASSIUM 4.2 04/22/2020           No results found for: MAG         Lab Results   Component Value Date    CR 0.96 04/22/2020                   Lab Results   Component Value Date    MALATHI 9.1 04/22/2020                Lab Results   Component Value Date    BILITOTAL 0.9 04/22/2020           Lab Results   Component Value Date    ALBUMIN 3.7 04/22/2020                    Lab Results   Component Value Date    ALT 25 04/22/2020           Lab Results   Component Value Date    AST 25 04/22/2020       Results reviewed, labs MET treatment parameters, ok to proceed with treatment.      Post Infusion Assessment:  Patient tolerated infusion without incident.  Blood return noted pre and post infusion.  Site patent and intact, free from redness, edema or discomfort.  No evidence of extravasations.  Access discontinued per protocol.       Discharge Plan:   Copy of AVS reviewed with patient and/or family.  Patient will return 5/8/2020 with Dr. Muller for next appointment.  Patient discharged in stable condition accompanied by: self.  Departure Mode: Ambulatory.    Sarah Petersen RN

## 2020-04-29 NOTE — PROGRESS NOTES
Infusion Nursing Note:  Kentrell Kirkpatrick presents today for C2D8 Gemzar.    Patient seen by provider today: No   present during visit today: Not Applicable.    Note: N/A.    Intravenous Access:  Labs drawn without difficulty.  Implanted Port.    Treatment Conditions:  Lab Results   Component Value Date    HGB 12.2 04/29/2020     Lab Results   Component Value Date    WBC 2.9 04/29/2020      Lab Results   Component Value Date    ANEU 1.4 04/29/2020     Lab Results   Component Value Date     04/29/2020      Lab Results   Component Value Date     04/29/2020                   Lab Results   Component Value Date    POTASSIUM 3.5 04/29/2020           No results found for: MAG         Lab Results   Component Value Date    CR 1.01 04/29/2020                   Lab Results   Component Value Date    MALATHI 9.4 04/29/2020                Lab Results   Component Value Date    BILITOTAL 1.1 04/29/2020           Lab Results   Component Value Date    ALBUMIN 3.7 04/29/2020                    Lab Results   Component Value Date    ALT 25 04/29/2020           Lab Results   Component Value Date    AST 22 04/29/2020       Results reviewed, labs MET treatment parameters, ok to proceed with treatment.      Post Infusion Assessment:  Patient tolerated infusion without incident.  Blood return noted pre and post infusion.  Site patent and intact, free from redness, edema or discomfort.  No evidence of extravasations.  Access discontinued per protocol.       Discharge Plan:   Discharge instructions reviewed with: Patient.  Patient and/or family verbalized understanding of discharge instructions and all questions answered.  Copy of AVS reviewed with patient and/or family.  Patient will return 5/8/2020 for next appointment.  Patient discharged in stable condition accompanied by: self.  Departure Mode: Ambulatory.    Hyun Adame RN

## 2020-05-08 PROBLEM — C34.92 PRIMARY LUNG ADENOCARCINOMA, LEFT (H): Status: ACTIVE | Noted: 2020-01-01

## 2020-05-08 NOTE — PROGRESS NOTES
[facility]  [facility] CANCER CLINIC    FOLLOW-UP VIDEO VISIT NOTE    PATIENT NAME: Kentrell Kirkpatrick MRN # [id number]  DATE OF VISIT: May 8, 2020 YOB: 1950    CANCER TYPE: NSCLC, adenocarcinoma  STAGE: IV  ECOG PS: 1    Cancer Staging  Non-small cell lung cancer, left (H)  Staging form: Lung, AJCC 8th Edition  - Clinical stage from 3/11/2019: Stage IV (cT1c, cN3, pM1c) - Signed by Susan Muller MD on 3/11/2019    PD-L1: <1%  Lung panel: 3/1/19 on [id number] A1 and [id number] A1. Negative  NGS: [employer] 360 sent 2/28/19 negative for actionable mutations. SMAD4 E538, TP53 H179R, SMO A327G. MSI not high    SUMMARY  6/27/18 CT chest w/contrast to re-evaluate aortic aneurysm: 8 mm spiculated KEATON nodule, 3 mm RML nodule unchanged from 3/15/17 and 2/25/16 scans  2/4/19 Presented to PCP with fairly rapid onset of shortness of breath. Given albuterol  2/19/18 CXR and CT chest w/contrast. Large left effusion  2/20/19 L thoracentesis (Dr. Rodriguez), 1180 cc  3/1/19 L pleurx (Dr. Rodriguez)  3/13/19 C1 carboplatin pemetrexed pembrolizumab  3/15/19 L supraclavicular LN bx (IR, FNA). Path: lung adenocarcinoma  4/3/19 C2 carboplatin pemetrexed pembrolizumab (total 4 cycles)  4/15/19 FEV1 2.11 (61%), FVC 3.38 (73%), DLCO 27.7, 67% (59%)  4/18/19 TPA. Drained 900 cc after it got unclotted  5/9/19 Pleurx removed  6/5~8/28/19 Maintenance pemetrexed + pembrolizumab x 5 cycles --> PD  9/20/19~4/29/20 C1-8 docetaxel 60 mg/m2 + ramucirumab. Had extravasation w/C2 10/11/19. C4 docetaxel ramucirumab delayed 1 week 2/2019 due to URI and trip. C5-8 docetaxel reduced to 50 mg/m2 due to excessive fatigue  9/28/19 UC for bronchitis. Levofloxacin  10/4/19 Brain MRI - routine rescreening - negative  10/17/19 Port  10/23/19 Recurrent cough. Saw PCP. Azithromycin  10/29/19 Prednisone 40 mg once, then 20 mg daily x 5 days, then 10 mg daily x 5 days for acute bronchitis   12/12/19 ED for bronchitis. Had some chills prior to going  to the ED  3/3/20 Treated for pansinusitis with amox-calv x 14 days and fluticasone nasal spray  3/31/20 CT CAP and bone scan. Some PD. No bone mets.   5/6/20 Brain MRI. Negative  5/6/20 CT CAP.   4/1/20~4/29/20 C1-2 gemcitabine 1000 mg/m2 D1, 8    SUBJECTIVE  Mr. Kirkpatrick returns today for follow up of metastatic lung adenocarcinoma after 2 cycles of gemcitabine.   Audrey joined us for the visit too. Doing well. Having joint pains - L shoulder, L hip, R knee. Was working on a snow[employer] 4 days ago, started right afterward. Improving just a little bit already. Lying on his back and working overhead. Maybe a little bit more fatigued than usual. Having to work harder to breath when walking up the stairs. No orthopnea, PND, or LE edema. No F/C, N/V, leg swelling, no coughing, no coughing up blood, still has some sinus issues relieved with loratadine. No problems with drinking or eating, no dysphagia. Tried ibuprofen and acetaminophen for the shoulder with some relief but still persistes. Still using dental device for SKYLER. No other new problems. Getting winded more easily if the biggest change.     PAST MEDICAL HISTORY  Lung adenocarcinoma as above  L5 disk herniation. Epidural injection 5/22/18. Improved dramatically with chiropracter in the past.   BPH  Ascending aortic aneurysm  SKYLER not on CPAP, couldn't tolerate, so using dental device    4/15/19 PFT. FEV1 2.11 (61%), FVC 4.58,    CURRENT OUTPATIENT MEDICATIONS  Current Outpatient Medications   Medication Sig Dispense Refill     albuterol (PROAIR HFA/PROVENTIL HFA/VENTOLIN HFA) 108 (90 Base) MCG/ACT inhaler Inhale 2 puffs into the lungs every 4 hours (while awake) 1 Inhaler 0     albuterol (PROVENTIL) (2.5 MG/3ML) 0.083% neb solution Take 1 vial (2.5 mg) by nebulization every 4 hours as needed for shortness of breath / dyspnea or wheezing 100 vial 11     calcium polycarbophil (FIBERCON) 625 MG tablet Take 2 tablets by mouth daily       dexamethasone (DECADRON) 4 MG  tablet TAKE 1 TABLET (4 MG) BY MOUTH 2 TIMES DAILY TO BE TAKEN DAY BEFORE, DAY OF, AND DAY AFTER INFUSION.. 6 tablet 11     finasteride (PROSCAR) 5 MG tablet TAKE 1 TABLET BY MOUTH EVERY DAY 90 tablet 1     fluticasone (FLONASE) 50 MCG/ACT nasal spray Spray 2 sprays into both nostrils daily 16 g 0     lidocaine-prilocaine (EMLA) 2.5-2.5 % external cream Apply topically as needed for moderate pain 30 g 11     LORazepam (ATIVAN) 0.5 MG tablet Take 1 tablet (0.5 mg) by mouth every 6 hours as needed (Nausea/Vomiting) 30 tablet 3     Melatonin 10 MG TABS tablet Take 10 mg by mouth nightly as needed for sleep       mometasone-formoterol (DULERA) 100-5 MCG/ACT inhaler Inhale 2 puffs into the lungs 2 times daily (Patient taking differently: Inhale 2 puffs into the lungs daily ) 1 Inhaler 11     morphine (MSIR) 15 MG IR tablet Take 1 tablet (15 mg) by mouth every 4 hours as needed for moderate to severe pain (and cough) 30 tablet 0     MULTI VITAMIN MENS OR TABS 1 TABLET DAILY       ondansetron (ZOFRAN) 8 MG tablet Take 1 tablet (8 mg) by mouth every 8 hours as needed for nausea 30 tablet 11     prochlorperazine (COMPAZINE) 10 MG tablet Take 1 tablet (10 mg) by mouth every 6 hours as needed (Nausea/Vomiting) 30 tablet 11     tamsulosin (FLOMAX) 0.4 MG capsule TAKE 1 CAPSULE BY MOUTH EVERY DAY 90 capsule 1     tiotropium (SPIRIVA HANDIHALER) 18 MCG inhaled capsule Inhale 1 capsule (18 mcg) into the lungs daily 1 capsule 11     ALLERGIES  No Known Allergies    REVIEW OF SYSTEMS  As above in the HPI, o/w complete 12-point ROS was negative.    PHYSICAL EXAM  Video visit. No vitals  Wt Readings from Last 3 Encounters:   04/22/20 81 kg (178 lb 8 oz)   04/01/20 81.8 kg (180 lb 6.4 oz)   03/09/20 82.1 kg (181 lb)     GEN: NAD  HEENT: EOMI, no icterus, injection or pallor  EXT:  no edema  NEURO: alert  SKIN: no rashes    The rest of a comprehensive physical examination is deferred due to PHE (public health emergency) video visit  restrictions    LABORATORY AND IMAGING STUDIES  Results for MAYA ELLER (MRN [id number]) as of 5/8/2020 16:39   4/29/2020 12:31   Sodium 138   Potassium 3.5   Chloride 107   Carbon Dioxide 23   Urea Nitrogen 17   Creatinine 1.01   GFR Estimate 75   GFR Estimate If Black 87   Calcium 9.4   Anion Gap 8   Albumin 3.7   Protein Total 7.6   Bilirubin Total 1.1   Alkaline Phosphatase 70   ALT 25   AST 22   Glucose 166 (H)   WBC 2.9 (L)   Hemoglobin 12.2 (L)   Hematocrit 38.5 (L)   Platelet Count 420   RBC Count 4.20 (L)   MCV 92   MCH 29.0   MCHC 31.7   RDW 18.3 (H)   Diff Method Automated Method   % Neutrophils 46.5   % Lymphocytes 43.3   % Monocytes 7.2   % Eosinophils 0.3   % Basophils 0.3   % Immature Granulocytes 2.4   Nucleated RBCs 0   Absolute Neutrophil 1.4 (L)   Absolute Lymphocytes 1.3   Absolute Monocytes 0.2   Absolute Eosinophils 0.0   Absolute Basophils 0.0   Abs Immature Granulocytes 0.1   Absolute Nucleated RBC 0.0     No labs specifically for today's visit but all prior labs since last visit with me were reviewed.    CT Chest/Abdomen/Pelvis w Contrast  Narrative: CT CHEST/ABDOMEN/PELVIS WITH CONTRAST May 6, 2020 1:56 PM    CLINICAL HISTORY: Restage NSCLC, on gemcitabine. Non-small cell lung  cancer, left (H).    TECHNIQUE: CT scan of the chest, abdomen, and pelvis was performed  following injection of IV contrast. Multiplanar reformats were  obtained. Dose reduction techniques were used.   CONTRAST: 90mL Isovue-370.    COMPARISON: CT chest, abdomen and pelvis 3/31/2020.    FINDINGS:   LUNGS AND PLEURA: Numerous bilateral pulmonary nodules seen in all  lobes of both lungs again. There are several that are slightly larger  in the short interval. For example, one of the largest nodules is at  the lingula measuring 1.9 cm, previously 1.6 cm series 5 image 215.  Another larger nodule is along the medial right lower lobe that is 0.6  cm, previously 0.3 cm series 5 image 242. There are other examples.  Small  left pleural fluid. Left pleural nodularity again identified.  This is seen diffusely, but blends in medially with adenopathy. The  pleural nodular thickening appears increased in thickening inferiorly  with a craniocaudal thickness of approximately 2 cm, previously 1.3  cm, series 7 image 60.    MEDIASTINUM/AXILLAE: Multifocal adenopathy identified in the  mediastinum and bilateral hilar locations. Anterior mediastinal  conglomerate adenopathy is stable in size measuring 5.9 x 1.9 cm  series 3 image 78. Stable mildly prominent bilateral hilar and other  lymph nodes noted. Stable mild distention of the esophagus. No  axillary adenopathy. Right chest port venous catheter is stable.    HEPATOBILIARY: Multiple left and right hepatic metastasis again  identified. An example at the posterior left hepatic dome is now 2.9 x  2 cm, previously 2.2 x 2 cm series 3 image 143. Another example of  enlargement at the central liver is 2.3 x 1.9 cm, previously 1.5 x 1.1  cm image 167. There are other examples, unremarkable gallbladder.    PANCREAS: Normal.    SPLEEN: Normal.    ADRENAL GLANDS: Stable nodular thickening of the left adrenal that is  1.9 x 1.7 cm series 3 image 159. Right adrenal appears normal.    KIDNEYS/BLADDER: Normal.    BOWEL: No acute abnormality. Colonic diverticula. Prominent stool  throughout the colon.    PELVIC ORGANS: Normal.    ADDITIONAL FINDINGS: Vascular calcifications. No new adenopathy  identified within the abdomen or pelvis.    MUSCULOSKELETAL: Normal.  Impression: IMPRESSION:  1.  Progression of pulmonary, hepatic, and left pleural metastatic  disease.  2.  Prominent adenopathy within the mediastinum and hilar locations  appears stable.  3.  Nodular mass at the left adrenal is indeterminate but stable.  4.  Small left pleural fluid.    MAUREEN IBARRA MD  MR Brain w/o & w Contrast  Narrative: MRI BRAIN WITHOUT AND WITH CONTRAST  5/6/2020 1:22 PM    HISTORY:  restaging - progressive lung  adenocarcinoma; Non-small cell  lung cancer, left (H)     TECHNIQUE:  Multiplanar, multisequence MRI of the brain without and  with 8 mL Gadavist    COMPARISON: Scan dated 10/4/2019.    FINDINGS:     Intracranial contents: The brain parenchyma, ventricles and  subarachnoid spaces appear normal. There is no evidence of hemorrhage,  mass, acute infarct, or anomaly.  There are no gadolinium enhancing  lesions. The arteries at the base of the brain and the dural venous  sinuses appear patent.     Sella:  No significant abnormality accounting for technique.     Orbit: No significant abnormality accounting for technique.      Sinus/mastoids: Mild mucosal thickening is present in both maxillary  sinuses.    Bones/soft tissues: No aggressive osseous lesion involving the  calvarium, skull base, or visualized upper cervical spine.   Impression: IMPRESSION:  No brain metastasis are identified. No change since  10/4/2019.    MAYA HINOJOSA MD     Imaging was personally reviewed. I notice some water in the esophagus. He had been asked to drink a big bottle of water right before the CT     ASSESSMENT AND PLAN  NSCLC, adenocarcinoma, PD-L1 <1%: Progression on gemcitabine. We don't have clinical trials for him here. I will ask colleagues at [place] and around the [facility]. He could also potentially drive to [place]. Will send him a [employer] message to keep him updated of what I find. We also discussed the possibility of repeating a biopsy to get material for sequencing. In terms of more standard options, we might try carbo pemetrexed ivania, pemetrexed ivania, or carbo abraxane ivania. We will have trouble with insurnace coverage for all of these. I don't think single agents are worthwhile, nor combinations such as gem vinorelbine. I think we need novel treatments at this point. We also talked about the option of choosing not to treat the cancer, although his PS is excellent and he's gone through the prior treatments with relatively little trouble.  It may be that we have a spot on our FATE trial soon, but nothing is certain in light of the viral pandemic. Will see if we can get pemetrexed ivania and go from there. Start folic acid, can get B12 with first infusion.     L pleural pain: Secondary to a met there. Pretty mild. Monitor for worsening.     COPD, mildly increased SOB: Doesn't sound anything like an exacerbation. No CHF signs/symptoms and no pericardial effusion on CT, no chest pain/pressure. O2 levels at infusions have been fine. Monitor. Nasal congestion: Still having runny nose and trying loratadine. Monitor    Malignant pleural effusion: Had some trapped lung there before, accumulated a bit more in March, and seems about the same now as 6 weeks ago. Not symptomatic yet.    Cough: Not an issue right now.     SKYLER: Using a nasal device    Contrast reaction: Hasn't been taking methylpred actually and has been fine.    Social: Plans to travel to the [place] in [place] next summer end of July until 8/5 - 2-3 weeks. Will be on a boat. We talked about this at length, especially in light of the viral pandemic. He'll always be at higher risk. Will discuss further at the next visit. He's prepared to not go should the number of cases escalate or if he thinks it's unsafe, etc. We discussed that there will always be risk and that he would likely not survive if he were to get seriously ill from it.     A total of 40 minutes was spent with the patient, >50% of which was spent in counseling and coordination of care.    Susan Muller MD    Hematology, Oncology and Transplantation

## 2020-05-08 NOTE — PROGRESS NOTES
"Kentrell Kirkpatrick is a 69 year old male who is being evaluated via a billable telephone visit.      The patient has been notified of following:     \"This telephone visit will be conducted via a call between you and your physician/provider. We have found that certain health care needs can be provided without the need for a physical exam.  This service lets us provide the care you need with a short phone conversation.  If a prescription is necessary we can send it directly to your pharmacy.  If lab work is needed we can place an order for that and you can then stop by our lab to have the test done at a later time.    Telephone visits are billed at different rates depending on your insurance coverage. During this emergency period, for some insurers they may be billed the same as an in-person visit.  Please reach out to your insurance provider with any questions.    If during the course of the call the physician/provider feels a telephone visit is not appropriate, you will not be charged for this service.\"    Patient has given verbal consent for Telephone visit?  Yes    What phone number would you like to be contacted at? [phone]    How would you like to obtain your AVS? Ellis       I have reviewed and updated the patient's allergies and medication list.    Concerns: Patient has new concerns about joint pain in his left shoulder, buttox and left knee.      Refills: None     Yoni Manajrrez, EMT              "

## 2020-05-12 NOTE — PROGRESS NOTES
Per request below, all documents have been faxed to [facility].    Josefina Walters CMA ([facility])      I'm looking for help faxing a few documents.   3/15/19 - Lung Cancer Panel (found under lab tab)   5/8/20, 4/1/20, 2/14/20 - Visit notes from Dr Muller   4/21/20 - Visit note from Ora       Please fax to   ATTN: Gloria Johnson, RN, BSN   [facility]   Fax: [phone]       Thanks!   Henry

## 2020-05-18 NOTE — TELEPHONE ENCOUNTER
Clinical Trial Discussion Visit Note   Subject name: Kentrell Kirkpatrick     I spoke via telephone with the patient and his wife today to discuss clinical trial [id number]. I sent the patient with a copy of the consent form and all of his trial-related questions were addressed. We reviewed eligibility information, and the enrollment restrictions and approval process situation for this research study during Covid pandemic. I also made it clear that his decision to participate or not participate in this trial will not affect their care, treatment, or future relationship with the [facility].     The patient will continue to consider pursuing enrollment in this study but did not consent at this visit.

## 2020-05-29 NOTE — LETTER
5/29/2020         RE: Kentrell Kirkpatrick  [address] [facility] [id number]        Dear Colleague,    Thank you for referring your patient, Kentrell Kirkpatrick, to the [facility]. Please see a copy of my visit note below.    Oncology/Hematology Visit Note    May 29, 2020    Reason for visit: Follow-up Stage IV NSCLC    Oncology HPI: Kentrell Kirkpatrick is a 69 year old male with NSCLC.  Patient with a history of aortic aneurysm and CT chest to reevaluate this aneurysm was obtained in June 2018 and there were several lung nodules.  If further imaging in February 2019 showed a large left pleural effusion and left thoracentesis was performed.  Pathology revealed adenocarcinoma.  He established care with Dr. Muller on 2/28/2019 and Pleurx catheter was placed. He completed 4 cycles of carboplatin, pemetrexed, pembrolizumab and 4th cycle completed on 3/13/2019.  He then completed 5 cycles of maintenance pemetrexed and pembrolizumab, last one completed on 8/28/2019, however noted to have progression of disease.  He started second line Taxotere and ramucirumab, cycle 1 on 9/20/2019.  He was in the urgent care on 9/28/2019 for cough.  He is discharged with albuterol, Robitussin and Levaquin.  His cough improved with prednisone, however this was completed.  Cycle 4 was deferred due to URI symptoms and [place] trip, completed on 12/9/2019.  Restaging CT on 12/16/2019 stable, but his quality of life was decreased, therefore cycle 5 was delayed to 1/3/2020. Restaging CT on 3/31/2020 with some PD and started single agent gemcitabine on 4/1/2020 and he has completed 2 full cycles.    He is here today as an add-on for abdominal pain.    Interval History: Edy is doing okay today.  He has noticed that he has had intermittent left-sided abdominal pain, side pain and shoulder pain for the last 7 to 8 weeks.  This started about the time he started on gemcitabine chemotherapy, 4/1/2020.  He admits that the abdominal  pain is worse when laying flat and movement, no pain with eating or coughing and his left shoulder is also worse with laying down.  He has been sleeping in a chair which has been more  comfortable for him.  He denies new cough, dysuria, hematuria, kidney stone history, trauma or falls.  Denies testicle swelling or pain or penile discharge.  Denies fever or chills.  Never had any abdominal surgeries.  No other complaints.    Review of Systems: See interval hx. Denies fevers, chills, HA, dizziness, n/t, changes in vision, cough, sore throat, CP, abdominal pain, N/V, diarrhea, changes in urination, bleeding, bruising, rash.       PMHx and Social Hx reviewed per EPIC.      Medications:  Current Outpatient Medications   Medication Sig Dispense Refill     albuterol (PROAIR HFA/PROVENTIL HFA/VENTOLIN HFA) 108 (90 Base) MCG/ACT inhaler Inhale 2 puffs into the lungs every 4 hours (while awake) 1 Inhaler 0     albuterol (PROVENTIL) (2.5 MG/3ML) 0.083% neb solution Take 1 vial (2.5 mg) by nebulization every 4 hours as needed for shortness of breath / dyspnea or wheezing 100 vial 11     calcium polycarbophil (FIBERCON) 625 MG tablet Take 2 tablets by mouth daily       dexamethasone (DECADRON) 4 MG tablet TAKE 1 TABLET (4 MG) BY MOUTH 2 TIMES DAILY TO BE TAKEN DAY BEFORE, DAY OF, AND DAY AFTER INFUSION.. 6 tablet 11     finasteride (PROSCAR) 5 MG tablet TAKE 1 TABLET BY MOUTH EVERY DAY 90 tablet 1     fluticasone (FLONASE) 50 MCG/ACT nasal spray Spray 2 sprays into both nostrils daily 16 g 0     lidocaine-prilocaine (EMLA) 2.5-2.5 % external cream Apply topically as needed for moderate pain 30 g 11     LORazepam (ATIVAN) 0.5 MG tablet Take 1 tablet (0.5 mg) by mouth every 6 hours as needed (Nausea/Vomiting) 30 tablet 3     Melatonin 10 MG TABS tablet Take 10 mg by mouth nightly as needed for sleep       mometasone-formoterol (DULERA) 100-5 MCG/ACT inhaler Inhale 2 puffs into the lungs 2 times daily (Patient taking differently:  Inhale 2 puffs into the lungs daily ) 1 Inhaler 11     morphine (MSIR) 15 MG IR tablet Take 1 tablet (15 mg) by mouth every 4 hours as needed for moderate to severe pain (and cough) 30 tablet 0     MULTI VITAMIN MENS OR TABS 1 TABLET DAILY       ondansetron (ZOFRAN) 8 MG tablet Take 1 tablet (8 mg) by mouth every 8 hours as needed for nausea 30 tablet 11     prochlorperazine (COMPAZINE) 10 MG tablet Take 1 tablet (10 mg) by mouth every 6 hours as needed (Nausea/Vomiting) 30 tablet 11     tamsulosin (FLOMAX) 0.4 MG capsule TAKE 1 CAPSULE BY MOUTH EVERY DAY 90 capsule 1     tiotropium (SPIRIVA HANDIHALER) 18 MCG inhaled capsule Inhale 1 capsule (18 mcg) into the lungs daily 1 capsule 11       No Known Allergies    EXAM:    /77   Pulse 76   Temp 97  F (36.1  C) (Tympanic)   Resp 16   Wt 81.5 kg (179 lb 11.2 oz)   SpO2 97%   BMI 24.36 kg/m        GENERAL:  Male, in no acute distress.  Alert and oriented x3.   HEENT:  Normocephalic, atraumatic.  PERRL, oropharynx clear with no sores or thrush. Nio tongue sore.  LYMPH NODES:  No palpable pre/post-auricular, cervical, axillary lymphadenopathy appreciated.  CV:  RRR, No murmurs, gallops, or rubs.   LUNGS: Decreased breath sounds left lung field.  No wheezing, crackles or respiratory distress.  ABDOMEN:  Soft, tender left lower quadrant and left side.  When palpated on the right side, pain on left side.  No right lower quadrant tenderness, epigastric and negative Rovsings sign.  EXTREMITIES:  No clubbing, cyanosis, or edema.   SKIN: No rash  PSYCH: Mood stable      Labs:   Results for MAYA ELLER (MRN [id number]) as of 5/29/2020 15:37   5/29/2020 14:30   Sodium 137   Potassium 4.3   Chloride 106   Carbon Dioxide 30   Urea Nitrogen 18   Creatinine 1.15   GFR Estimate 64   GFR Estimate If Black 74   Calcium 8.7   Anion Gap 1 (L)   Albumin 3.5   Protein Total 7.4   Bilirubin Total 0.9   Alkaline Phosphatase 101   ALT 21   AST 20   Glucose 100 (H)   WBC 8.1    Hemoglobin 10.6 (L)   Hematocrit 34.7 (L)   Platelet Count 312   RBC Count 3.74 (L)   MCV 93   MCH 28.3   MCHC 30.5 (L)   RDW 17.8 (H)   Diff Method Automated Method   % Neutrophils 66.5   % Lymphocytes 15.5   % Monocytes 13.9   % Eosinophils 2.8   % Basophils 0.9   % Immature Granulocytes 0.4   Nucleated RBCs 0   Absolute Neutrophil 5.4   Absolute Lymphocytes 1.3   Absolute Monocytes 1.1   Absolute Eosinophils 0.2   Absolute Basophils 0.1   Abs Immature Granulocytes 0.0   Absolute Nucleated RBC 0.0   Color Urine Yellow   Appearance Urine Clear   Glucose Urine Negative   Bilirubin Urine Negative   Ketones Urine Negative   Specific Gravity Urine 1.020   pH Urine 6.5   Protein Albumin Urine Negative   Urobilinogen mg/dL Normal   Nitrite Urine Negative   Blood Urine Negative   Leukocyte Esterase Urine Trace (A)   Source Midstream Urine   WBC Urine 2   RBC Urine <1   Squamous Epithelial /HPF Urine <1   Mucous Urine Present (A)       Imaging: n/a    Impression/Plan: Kentrell Kirkpatrick is a 68 year old male with metastatic NSCLC previously on carboplatin, pemetrexed and pembrolizumab,Taxotere and ramucirumab, currently on single agent gemcitabine, however may be transitioning to pemetrexed/Avastin.    Abd pain: LLQ abd tenderness on exam with slight rebound on the right side. Differential diagnosis includes diverticulitis, kidney stone, UTI, pyelonephritis, constipation, others.  Will obtain stat CT abdomen/pelvis, but will also include PE study given his shortness of breath and shoulder pain, see below.    Left shoulder: New in the last 7 to 8 weeks and MRI left shoulder with no acute findings.  Concerned he may have larger pleural effusion on the left side causing pain radiating to the left shoulder.      Metastatic NSCLC: Previous therapies with progression of disease and started single agent gemcitabine on 4/1/2020.  He completed 2 cycles and chemotherapy on hold as he may be a candidate for a new clinical trial at  the [facility].  Currently scheduled to start pemetrexed/Avastin with C1 D1 scheduled on 6/10/2020.  We will need to confirm folic acid at home and he will continue B12 injections.  --6/10 [place], pemetrexed/Avastin cycle 1    Respiratory:  --SOB: Some chronic shortness of breath with his lung cancer, but worsening in the last 7 to 8 weeks since changing chemotherapies.  Decreased breath sounds on the left side and suspect pleural effusion may have developed.  Also risk for PE, therefore will obtain CT with PE study as well.  --Cough: Stable  --Pleural effusion: See above.   --COPD: Currently with Tiotropium and albuterol inhalers.     Insomnia: Using melatonin when needed, no longer on Seroquel.      Chart documentation with Dragon Voice recognition Software. Although reviewed after completion, some words and grammatical errors may remain.      Trinidad Canales PA-C  Hematology/Oncology  [facility]                  Again, thank you for allowing me to participate in the care of your patient.        Sincerely,        Trinidad Canales PA-C

## 2020-05-29 NOTE — PROGRESS NOTES
Nursing Note:  Kentrell Kirkpatrick presents today for labs.    Patient seen by provider today: Yes: YENI Lizama   present during visit today: Not Applicable.    Note: N/A.    Intravenous Access:  Labs drawn without difficulty.  Implanted Port.    Discharge Plan:   Patient was sent to [facility] for PA appointment.    Tonia De La Cruz RN

## 2020-05-29 NOTE — TELEPHONE ENCOUNTER
Diffuse lower abd pain x4-5 days, constant, does not affect appetite, regular bowel movements, radiates to left flank. Increase weakness and RANKIN. Tried  MSIR 15 mg a few times without relief, taking tylenol every 4 hours. Denied fevers, chills, epigastric pain, problems urinating, nausea, vomiting. Gaudencio Guevara APRN CNP.

## 2020-05-29 NOTE — TELEPHONE ENCOUNTER
Per Ora: pt should be seen since abd pain is new, unable to bring into [facility] due to current events today. Recommend video visit with [place] if available and if new imaging needed prior to next infusion.    Spoke with Ruel melgar, [facility] would like to see pt in person today and will contact pt for scheduling.

## 2020-05-29 NOTE — NURSING NOTE
"Oncology Rooming Note    May 29, 2020 2:23 PM   Kentrell Kirkpatrick is a 69 year old male who presents for:    Chief Complaint   Patient presents with     Oncology Clinic Visit     Malignant neoplasm of lower lobe of left lung     Initial Vitals: /77   Pulse 76   Temp 97  F (36.1  C) (Tympanic)   Resp 16   Wt 81.5 kg (179 lb 11.2 oz)   SpO2 97%   BMI 24.36 kg/m   Estimated body mass index is 24.36 kg/m  as calculated from the following:    Height as of 3/3/20: 1.829 m (6' 0.01\").    Weight as of this encounter: 81.5 kg (179 lb 11.2 oz). Body surface area is 2.03 meters squared.  No Pain (0) Comment: Data Unavailable   No LMP for male patient.  Allergies reviewed: Yes  Medications reviewed: Yes    Medications: Medication refills not needed today.  Pharmacy name entered into [employer]: [id number] - [place], [place] - [phone]    Clinical concerns: None [place] Canales was notified.      Hyun Ibarra CMA              "

## 2020-05-29 NOTE — PROGRESS NOTES
Oncology/Hematology Visit Note    May 29, 2020    Reason for visit: Follow-up Stage IV NSCLC    Oncology HPI: Kentrell Kirkpatrick is a 69 year old male with NSCLC.  Patient with a history of aortic aneurysm and CT chest to reevaluate this aneurysm was obtained in June 2018 and there were several lung nodules.  If further imaging in February 2019 showed a large left pleural effusion and left thoracentesis was performed.  Pathology revealed adenocarcinoma.  He established care with Dr. Muller on 2/28/2019 and Pleurx catheter was placed. He completed 4 cycles of carboplatin, pemetrexed, pembrolizumab and 4th cycle completed on 3/13/2019.  He then completed 5 cycles of maintenance pemetrexed and pembrolizumab, last one completed on 8/28/2019, however noted to have progression of disease.  He started second line Taxotere and ramucirumab, cycle 1 on 9/20/2019.  He was in the urgent care on 9/28/2019 for cough.  He is discharged with albuterol, Robitussin and Levaquin.  His cough improved with prednisone, however this was completed.  Cycle 4 was deferred due to URI symptoms and [place] trip, completed on 12/9/2019.  Restaging CT on 12/16/2019 stable, but his quality of life was decreased, therefore cycle 5 was delayed to 1/3/2020. Restaging CT on 3/31/2020 with some PD and started single agent gemcitabine on 4/1/2020 and he has completed 2 full cycles.    He is here today as an add-on for abdominal pain.    Interval History: Edy is doing okay today.  He has noticed that he has had intermittent left-sided abdominal pain, side pain and shoulder pain for the last 7 to 8 weeks.  This started about the time he started on gemcitabine chemotherapy, 4/1/2020.  He admits that the abdominal pain is worse when laying flat and movement, no pain with eating or coughing and his left shoulder is also worse with laying down.  He has been sleeping in a chair which has been more  comfortable for him.  He denies new cough, dysuria, hematuria,  kidney stone history, trauma or falls.  Denies testicle swelling or pain or penile discharge.  Denies fever or chills.  Never had any abdominal surgeries.  No other complaints.    Review of Systems: See interval hx. Denies fevers, chills, HA, dizziness, n/t, changes in vision, cough, sore throat, CP, abdominal pain, N/V, diarrhea, changes in urination, bleeding, bruising, rash.       PMHx and Social Hx reviewed per EPIC.      Medications:  Current Outpatient Medications   Medication Sig Dispense Refill     albuterol (PROAIR HFA/PROVENTIL HFA/VENTOLIN HFA) 108 (90 Base) MCG/ACT inhaler Inhale 2 puffs into the lungs every 4 hours (while awake) 1 Inhaler 0     albuterol (PROVENTIL) (2.5 MG/3ML) 0.083% neb solution Take 1 vial (2.5 mg) by nebulization every 4 hours as needed for shortness of breath / dyspnea or wheezing 100 vial 11     calcium polycarbophil (FIBERCON) 625 MG tablet Take 2 tablets by mouth daily       dexamethasone (DECADRON) 4 MG tablet TAKE 1 TABLET (4 MG) BY MOUTH 2 TIMES DAILY TO BE TAKEN DAY BEFORE, DAY OF, AND DAY AFTER INFUSION.. 6 tablet 11     finasteride (PROSCAR) 5 MG tablet TAKE 1 TABLET BY MOUTH EVERY DAY 90 tablet 1     fluticasone (FLONASE) 50 MCG/ACT nasal spray Spray 2 sprays into both nostrils daily 16 g 0     lidocaine-prilocaine (EMLA) 2.5-2.5 % external cream Apply topically as needed for moderate pain 30 g 11     LORazepam (ATIVAN) 0.5 MG tablet Take 1 tablet (0.5 mg) by mouth every 6 hours as needed (Nausea/Vomiting) 30 tablet 3     Melatonin 10 MG TABS tablet Take 10 mg by mouth nightly as needed for sleep       mometasone-formoterol (DULERA) 100-5 MCG/ACT inhaler Inhale 2 puffs into the lungs 2 times daily (Patient taking differently: Inhale 2 puffs into the lungs daily ) 1 Inhaler 11     morphine (MSIR) 15 MG IR tablet Take 1 tablet (15 mg) by mouth every 4 hours as needed for moderate to severe pain (and cough) 30 tablet 0     MULTI VITAMIN MENS OR TABS 1 TABLET DAILY        ondansetron (ZOFRAN) 8 MG tablet Take 1 tablet (8 mg) by mouth every 8 hours as needed for nausea 30 tablet 11     prochlorperazine (COMPAZINE) 10 MG tablet Take 1 tablet (10 mg) by mouth every 6 hours as needed (Nausea/Vomiting) 30 tablet 11     tamsulosin (FLOMAX) 0.4 MG capsule TAKE 1 CAPSULE BY MOUTH EVERY DAY 90 capsule 1     tiotropium (SPIRIVA HANDIHALER) 18 MCG inhaled capsule Inhale 1 capsule (18 mcg) into the lungs daily 1 capsule 11       No Known Allergies    EXAM:    /77   Pulse 76   Temp 97  F (36.1  C) (Tympanic)   Resp 16   Wt 81.5 kg (179 lb 11.2 oz)   SpO2 97%   BMI 24.36 kg/m        GENERAL:  Male, in no acute distress.  Alert and oriented x3.   HEENT:  Normocephalic, atraumatic.  PERRL, oropharynx clear with no sores or thrush. Nio tongue sore.  LYMPH NODES:  No palpable pre/post-auricular, cervical, axillary lymphadenopathy appreciated.  CV:  RRR, No murmurs, gallops, or rubs.   LUNGS: Decreased breath sounds left lung field.  No wheezing, crackles or respiratory distress.  ABDOMEN:  Soft, tender left lower quadrant and left side.  When palpated on the right side, pain on left side.  No right lower quadrant tenderness, epigastric and negative Rovsings sign.  EXTREMITIES:  No clubbing, cyanosis, or edema.   SKIN: No rash  PSYCH: Mood stable      Labs:   Results for MAYA ELLER (MRN [id number]) as of 5/29/2020 15:37   5/29/2020 14:30   Sodium 137   Potassium 4.3   Chloride 106   Carbon Dioxide 30   Urea Nitrogen 18   Creatinine 1.15   GFR Estimate 64   GFR Estimate If Black 74   Calcium 8.7   Anion Gap 1 (L)   Albumin 3.5   Protein Total 7.4   Bilirubin Total 0.9   Alkaline Phosphatase 101   ALT 21   AST 20   Glucose 100 (H)   WBC 8.1   Hemoglobin 10.6 (L)   Hematocrit 34.7 (L)   Platelet Count 312   RBC Count 3.74 (L)   MCV 93   MCH 28.3   MCHC 30.5 (L)   RDW 17.8 (H)   Diff Method Automated Method   % Neutrophils 66.5   % Lymphocytes 15.5   % Monocytes 13.9   % Eosinophils 2.8   %  Basophils 0.9   % Immature Granulocytes 0.4   Nucleated RBCs 0   Absolute Neutrophil 5.4   Absolute Lymphocytes 1.3   Absolute Monocytes 1.1   Absolute Eosinophils 0.2   Absolute Basophils 0.1   Abs Immature Granulocytes 0.0   Absolute Nucleated RBC 0.0   Color Urine Yellow   Appearance Urine Clear   Glucose Urine Negative   Bilirubin Urine Negative   Ketones Urine Negative   Specific Gravity Urine 1.020   pH Urine 6.5   Protein Albumin Urine Negative   Urobilinogen mg/dL Normal   Nitrite Urine Negative   Blood Urine Negative   Leukocyte Esterase Urine Trace (A)   Source Midstream Urine   WBC Urine 2   RBC Urine <1   Squamous Epithelial /HPF Urine <1   Mucous Urine Present (A)       Imaging: n/a    Impression/Plan: Kentrell Kirkpatrick is a 68 year old male with metastatic NSCLC previously on carboplatin, pemetrexed and pembrolizumab,Taxotere and ramucirumab, currently on single agent gemcitabine, however may be transitioning to pemetrexed/Avastin.    Abd pain: LLQ abd tenderness on exam with slight rebound on the right side. Differential diagnosis includes diverticulitis, kidney stone, UTI, pyelonephritis, constipation, others.  Will obtain stat CT abdomen/pelvis, but will also include PE study given his shortness of breath and shoulder pain, see below.    Left shoulder: New in the last 7 to 8 weeks and MRI left shoulder with no acute findings.  Concerned he may have larger pleural effusion on the left side causing pain radiating to the left shoulder.      Metastatic NSCLC: Previous therapies with progression of disease and started single agent gemcitabine on 4/1/2020.  He completed 2 cycles and chemotherapy on hold as he may be a candidate for a new clinical trial at the [facility].  Currently scheduled to start pemetrexed/Avastin with C1 D1 scheduled on 6/10/2020.  We will need to confirm folic acid at home and he will continue B12 injections.  --6/10 [place], pemetrexed/Avastin cycle  1    Respiratory:  --SOB: Some chronic shortness of breath with his lung cancer, but worsening in the last 7 to 8 weeks since changing chemotherapies.  Decreased breath sounds on the left side and suspect pleural effusion may have developed.  Also risk for PE, therefore will obtain CT with PE study as well.  --Cough: Stable  --Pleural effusion: See above.   --COPD: Currently with Tiotropium and albuterol inhalers.     Insomnia: Using melatonin when needed, no longer on Seroquel.      Chart documentation with Dragon Voice recognition Software. Although reviewed after completion, some words and grammatical errors may remain.      Trin[place] Canales PA-C  Hematology/Oncology  [facility]

## 2020-05-30 NOTE — TELEPHONE ENCOUNTER
Call received from spouse, Mandy.  Consent to communicate verified on chart.    Kentrell is having severe, uncontrolled lower right abdominal pain.    He was seen in the clinic today. He abdominal pain was primarily left sided at that time. He is scheduled for a stat CT scan for tomorrow.    Since this evening, his abdominal pain has been on his lower right side and it is continuous. He has received two doses of oral morphine without any relief.    Per protocol, ER advised.    COVID 19 Nurse Triage Plan/Patient Instructions    Please be aware that novel coronavirus (COVID-19) may be circulating in the community. If you develop symptoms such as fever, cough, or SOB or if you have concerns about the presence of another infection including coronavirus (COVID-19), please contact your health care provider or visit [email or web address].     Disposition/Instructions    Patient to go to ED and follow protocol based instructions. Follow System Ambulatory Workflow for COVID 19.     Bring Your Own Device:  Please also bring your smart device(s) (smart phones, tablets, laptops) and their charging cables for your personal use and to communicate with your care team during your visit.    Thank you for limiting contact with others, wearing a simple mask to cover your cough, practice good hand hygiene habits and accessing our virtual services where possible to limit the spread of this virus.    For more information about COVID19 and options for caring for yourself at home, please visit the CDC website at https://www.cdc.gov/coronavirus/2019-ncov/about/steps-when-sick.html  For more options for care at [facility], please visit our website at [email or web address]    For more information, please use the [place] Department of Health COVID-19 Website: https://www.health.state.[place]/diseases/coronavirus/index.html  [place] Department of Health ([facility]) COVID-19 Hotlines (Interpreters available):      Health  questions: Phone Number: [phone] or 1-728.591.6934 and Hours: 7 a.m. to 7 p.m.    Schools and  questions: Phone Number: [phone] or [phone] and Hours 7 a.m. to 7 p.m.    Precious Riojas RN  [facility] Nurse Advisors      Reason for Disposition    [1] SEVERE pain (e.g., excruciating) AND [2] present > 1 hour    Additional Information    Negative: Shock suspected (e.g., cold/pale/clammy skin, too weak to stand, low BP, rapid pulse)    Negative: Difficult to awaken or acting confused (e.g., disoriented, slurred speech)    Negative: Passed out (i.e., lost consciousness, collapsed and was not responding)    Negative: Sounds like a life-threatening emergency to the triager    Protocols used: ABDOMINAL PAIN - MALE-A-AH

## 2020-05-30 NOTE — ED TRIAGE NOTES
Random pain in shoulder, side, and stomach.  Had a scheduled CT at 10am today, but the sharp, stabbing constant pain.  Normally intermittent pain, but it has not subsided today.

## 2020-05-30 NOTE — ED AVS SNAPSHOT
[facility], [place], Emergency Department  [address] [place] [address]  Phone:  [phone]                                    Kentrell Kirkpatrick   MRN: [id number]    Department:  [facility], Emergency Department   Date of Visit:  5/30/2020           After Visit Summary Signature Page    I have received my discharge instructions, and my questions have been answered. I have discussed any challenges I see with this plan with the nurse or doctor.    ..........................................................................................................................................  Patient/Patient Representative Signature      ..........................................................................................................................................  Patient Representative Print Name and Relationship to Patient    ..................................................               ................................................  Date                                   Time    ..........................................................................................................................................  Reviewed by Signature/Title    ...................................................              ..............................................  Date                                               Time          22EPIC Rev 08/18

## 2020-05-30 NOTE — ED PROVIDER NOTES
ED Provider Note  [facility]      History     Chief Complaint   Patient presents with     Abdominal Pain     HPI  Kentrell Kirkpatrick is a 69 year old male history of stage IV non-small cell lung cancer, COPD, among other medical problems who presents for ongoing pain of his right chest wall right upper abdomen and more recently right shoulder.  Pain has been worsening 4 to 5 weeks but has become much more challenging to tolerate over the last 2 days as well as be nearly unremitting.  This pain however, has been migratory.  He is also experienced pain in the left side as documented on the oncology visit note from yesterday.    No associated fevers or chills.  Has occasional pleurisy with breathing.  No vomiting no diarrhea.  No new trauma.  No sick contacts or respiratory/flulike symptoms      Past Medical History  Past Medical History:   Diagnosis Date     Cancer (H) 2/25/19     Colon polyps      COPD (chronic obstructive pulmonary disease) (H)      Past Surgical History:   Procedure Laterality Date     BIOPSY  3/15/19     COLONOSCOPY       COLONOSCOPY N/A 12/22/2016    Procedure: COMBINED COLONOSCOPY, SINGLE OR MULTIPLE BIOPSY/POLYPECTOMY BY BIOPSY;  Surgeon: Jeremi Kramer MD;  Location:  GI     GENITOURINARY SURGERY      Using flow-max     HEAD & NECK SURGERY      stiff / sore neck     INSERT CHEST TUBE Left 3/1/2019    Procedure: INSERT CHEST TUBE;  Surgeon: Matthew Rodriguez MD;  Location: [facility] GI     INSERT CHEST TUBE N/A 5/9/2019    Procedure: Removal Of Pleurx Catheter;  Surgeon: Matthew Rodriguez MD;  Location: [facility] GI     IR CHEST PORT PLACEMENT > 5 YRS OF AGE  10/17/2019     IR LYMPH NODE BIOPSY  3/15/2019     THORACENTESIS Left 2/20/2019    Procedure: THORACENTESIS;  Surgeon: Matthew Rodriguez MD;  Location: [facility] GI     doxycycline hyclate (VIBRAMYCIN) 100 MG capsule  oxyCODONE (ROXICODONE) 5 MG tablet  albuterol (PROAIR HFA/PROVENTIL HFA/VENTOLIN HFA) 108 (90 Base) MCG/ACT  "inhaler  albuterol (PROVENTIL) (2.5 MG/3ML) 0.083% neb solution  calcium polycarbophil (FIBERCON) 625 MG tablet  dexamethasone (DECADRON) 4 MG tablet  finasteride (PROSCAR) 5 MG tablet  fluticasone (FLONASE) 50 MCG/ACT nasal spray  lidocaine-prilocaine (EMLA) 2.5-2.5 % external cream  LORazepam (ATIVAN) 0.5 MG tablet  Melatonin 10 MG TABS tablet  mometasone-formoterol (DULERA) 100-5 MCG/ACT inhaler  morphine (MSIR) 15 MG IR tablet  MULTI VITAMIN MENS OR TABS  ondansetron (ZOFRAN) 8 MG tablet  prochlorperazine (COMPAZINE) 10 MG tablet  tamsulosin (FLOMAX) 0.4 MG capsule  tiotropium (SPIRIVA HANDIHALER) 18 MCG inhaled capsule      No Known Allergies  Past medical history, past surgical history, medications, and allergies were reviewed with the patient. Additional pertinent items: None    Family History  Family History   Problem Relation Age of Onset     Heart Disease Father      Coronary Artery Disease Father      Prostate Cancer Other      Prostate Cancer Brother      Colon Cancer No family hx of      Family history was reviewed with the patient. Additional pertinent items: None    Social History  Social History     Tobacco Use     Smoking status: Former Smoker     Packs/day: 1.50     Years: 43.00     Pack years: 64.50     Types: Cigarettes     Start date: 1969     Last attempt to quit: 2007     Years since quittin.4     Smokeless tobacco: Never Used   Substance Use Topics     Alcohol use: Yes     Comment: socially     Drug use: No      Social history was reviewed with the patient. Additional pertinent items: None    Review of Systems   10 point review of symptoms was performed and is negative except as noted above.       Physical Exam   BP: (!) 152/88  Pulse: 86  Heart Rate: 91  Temp: 97.8  F (36.6  C)  Resp: 18  Height: 185.4 cm (6' 1\")  Weight: 79.4 kg (175 lb)  SpO2: 98 %  Physical Exam  .GEN: Well appearing, non toxic, cooperative and conversant.   HEENT: The head is normocephalic and atraumatic. " Pupils are equal round and reactive to light. Extraocular motions are intact. There is no facial swelling. The neck is nontender and supple.   CV: Regular rate and rhythm without murmurs rubs or gallops. 2+ radial pulses bilaterally.  Chest: Right lower chest wall tenderness to palpation  PULM: Clear to auscultation bilaterally.  ABD: Soft, right upper quadrant tenderness palpation, nondistended.   EXT: Full range of motion.  No edema.  NEURO: Cranial nerves II through XII are intact and symmetric. Bilateral upper and lower extremities grossly show full range of motion without any focal deficits.   SKIN: No rashes, ecchymosis, or lacerations  PSYCH: Calm and cooperative, interactive.     ED Course      Procedures        Recent Results (from the past 24 hour(s))   CT Chest (PE) Abdomen Pelvis w Contrast    Narrative    EXAM: CT CHEST PE, ABDOMEN AND PELVIS WITH CONTRAST  LOCATION: [facility]  DATE/TIME: 05/30/2020, 4:33 AM    INDICATION: Shortness of breath.  COMPARISON: 05/06/2020.  TECHNIQUE: CT angiogram chest and routine CT abdomen pelvis with IV contrast. Arterial phase through the chest and venous phase through the abdomen and pelvis. 2D and 3D MIP reconstructions were preformed by the CT technologist. Dose reduction techniques   were used.   CONTRAST: Iopamidol (Isovue-370) solution 107 mL.    FINDINGS:  ANGIOGRAM CHEST: Pulmonary arteries are normal caliber and negative for pulmonary emboli. Thoracic aorta is negative for dissection. The heart size is normal.     LUNGS AND PLEURA: There is a small left pleural effusion as well as pleural thickening in the left hemithorax. Scattered small lung nodules bilaterally are overall larger than on the previous exam. There is emphysematous disease at the lung apices. New   left lower lobe infiltrate is likely pneumonia.    MEDIASTINUM/AXILLAE: There is a left anterior mediastinal mass measuring 6.4 x 2.5 cm. A few subcentimeter lymph  nodes.    HEPATOBILIARY: There are multiple liver masses consistent with metastases and overall increased in size in the interval. The portal vein is patent.    PANCREAS: Normal.    SPLEEN: Normal.    ADRENAL GLANDS: 2.5 cm left adrenal gland nodule.    KIDNEYS/BLADDER: Normal.    BOWEL: There are colonic diverticula without acute diverticulitis. No bowel obstruction or inflammation. Moderate amount of stool throughout the colon.    LYMPH NODES: Normal.    PELVIC ORGANS: Normal.    MUSCULOSKELETAL: Degenerative disease in the spine.      Impression    IMPRESSION:  1.  There is no pulmonary embolus, aortic aneurysm or dissection.  2.  Small left pleural effusion. Irregular pleural thickening in the left hemithorax may be metastatic disease.  3.  Multiple lung metastases have increased in interval.  4.  6.4 cm left anterior mediastinal mass.  5.  New left lower lobe consolidation consistent with pneumonia.  6.  Multiple hepatic metastases have increased in the interval.  7.  Left adrenal gland nodule consistent with metastasis.  8.  Colonic diverticula without acute diverticulitis. No bowel obstruction or inflammation.              Results for orders placed or performed during the hospital encounter of 05/30/20   CT Chest (PE) Abdomen Pelvis w Contrast     Status: None    Narrative    EXAM: CT CHEST PE, ABDOMEN AND PELVIS WITH CONTRAST  LOCATION: [facility]  DATE/TIME: 05/30/2020, 4:33 AM    INDICATION: Shortness of breath.  COMPARISON: 05/06/2020.  TECHNIQUE: CT angiogram chest and routine CT abdomen pelvis with IV contrast. Arterial phase through the chest and venous phase through the abdomen and pelvis. 2D and 3D MIP reconstructions were preformed by the CT technologist. Dose reduction techniques   were used.   CONTRAST: Iopamidol (Isovue-370) solution 107 mL.    FINDINGS:  ANGIOGRAM CHEST: Pulmonary arteries are normal caliber and negative for pulmonary emboli. Thoracic aorta is negative for  dissection. The heart size is normal.     LUNGS AND PLEURA: There is a small left pleural effusion as well as pleural thickening in the left hemithorax. Scattered small lung nodules bilaterally are overall larger than on the previous exam. There is emphysematous disease at the lung apices. New   left lower lobe infiltrate is likely pneumonia.    MEDIASTINUM/AXILLAE: There is a left anterior mediastinal mass measuring 6.4 x 2.5 cm. A few subcentimeter lymph nodes.    HEPATOBILIARY: There are multiple liver masses consistent with metastases and overall increased in size in the interval. The portal vein is patent.    PANCREAS: Normal.    SPLEEN: Normal.    ADRENAL GLANDS: 2.5 cm left adrenal gland nodule.    KIDNEYS/BLADDER: Normal.    BOWEL: There are colonic diverticula without acute diverticulitis. No bowel obstruction or inflammation. Moderate amount of stool throughout the colon.    LYMPH NODES: Normal.    PELVIC ORGANS: Normal.    MUSCULOSKELETAL: Degenerative disease in the spine.      Impression    IMPRESSION:  1.  There is no pulmonary embolus, aortic aneurysm or dissection.  2.  Small left pleural effusion. Irregular pleural thickening in the left hemithorax may be metastatic disease.  3.  Multiple lung metastases have increased in interval.  4.  6.4 cm left anterior mediastinal mass.  5.  New left lower lobe consolidation consistent with pneumonia.  6.  Multiple hepatic metastases have increased in the interval.  7.  Left adrenal gland nodule consistent with metastasis.  8.  Colonic diverticula without acute diverticulitis. No bowel obstruction or inflammation.     Lactic acid whole blood     Status: Abnormal   Result Value Ref Range    Lactic Acid 0.6 (L) 0.7 - 2.0 mmol/L   CBC with platelets differential     Status: Abnormal   Result Value Ref Range    WBC 16.6 (H) 4.0 - 11.0 10e9/L    RBC Count 3.95 (L) 4.4 - 5.9 10e12/L    Hemoglobin 11.0 (L) 13.3 - 17.7 g/dL    Hematocrit 36.1 (L) 40.0 - 53.0 %    MCV  91 78 - 100 fl    MCH 27.8 26.5 - 33.0 pg    MCHC 30.5 (L) 31.5 - 36.5 g/dL    RDW 17.7 (H) 10.0 - 15.0 %    Platelet Count 347 150 - 450 10e9/L    Diff Method Automated Method     % Neutrophils 83.0 %    % Lymphocytes 5.0 %    % Monocytes 9.8 %    % Eosinophils 1.3 %    % Basophils 0.5 %    % Immature Granulocytes 0.4 %    Nucleated RBCs 0 0 /100    Absolute Neutrophil 13.8 (H) 1.6 - 8.3 10e9/L    Absolute Lymphocytes 0.8 0.8 - 5.3 10e9/L    Absolute Monocytes 1.6 (H) 0.0 - 1.3 10e9/L    Absolute Eosinophils 0.2 0.0 - 0.7 10e9/L    Absolute Basophils 0.1 0.0 - 0.2 10e9/L    Abs Immature Granulocytes 0.1 0 - 0.4 10e9/L    Absolute Nucleated RBC 0.0    Comprehensive metabolic panel     Status: None   Result Value Ref Range    Sodium 137 133 - 144 mmol/L    Potassium 3.6 3.4 - 5.3 mmol/L    Chloride 105 94 - 109 mmol/L    Carbon Dioxide 25 20 - 32 mmol/L    Anion Gap 8 3 - 14 mmol/L    Glucose 99 70 - 99 mg/dL    Urea Nitrogen 18 7 - 30 mg/dL    Creatinine 1.03 0.66 - 1.25 mg/dL    GFR Estimate 73 >60 mL/min/[1.73_m2]    GFR Estimate If Black 85 >60 mL/min/[1.73_m2]    Calcium 9.4 8.5 - 10.1 mg/dL    Bilirubin Total 1.2 0.2 - 1.3 mg/dL    Albumin 3.6 3.4 - 5.0 g/dL    Protein Total 7.1 6.8 - 8.8 g/dL    Alkaline Phosphatase 89 40 - 150 U/L    ALT 22 0 - 70 U/L    AST 20 0 - 45 U/L   Lipase     Status: Abnormal   Result Value Ref Range    Lipase 35 (L) 73 - 393 U/L   Troponin I     Status: None   Result Value Ref Range    Troponin I ES <0.015 0.000 - 0.045 ug/L     Medications   0.9% sodium chloride BOLUS (0 mLs Intravenous Stopped 5/30/20 0606)     Followed by   sodium chloride 0.9% infusion (has no administration in time range)   ondansetron (ZOFRAN) injection 4 mg (4 mg Intravenous Not Given 5/30/20 2636)   HYDROmorphone (PF) (DILAUDID) injection 1 mg (1 mg Intravenous Given 5/30/20 8758)   sodium chloride (PF) 0.9% PF flush 90 mL (90 mLs Intravenous Given 5/30/20 3841)   iopamidol (ISOVUE-370) solution 107 mL (107  mLs Intravenous Given 5/30/20 6012)        Assessments & Plan (with Medical Decision Making)   73-year-old male with stage IV metastatic non-small cell lung cancer presenting with right-sided chest wall pain and pleurisy, right upper quadrant pain as described.  DDX is broad including PE, ACS, aortic dissection, biliary disease, bowel obstruction, colitis, malignancy and related pleurisy, pneumonia, pneumothorax, among other causes.    Clinically the patient was nontoxic appearing though quite uncomfortable.  Given IV hydration and analgesics as needed with good improvement.  His laboratories were quite unrevealing with a negative troponin, negative lipase and negative LFTs.  WBC was elevated to 16.  CT showed no evidence of emergent process including no PE or dissection, however, did demonstrate worsening malignant disease process including malignant burden in the liver.  Character of his pain is otherwise highly correlated to diaphragmatic irritation so I do wonder whether the malignancy itself is what is causing his discomfort.  CT does show concerning signs for a left basilar pneumonia clinically, uncertain he has this.  However, due to the CT finding and WBC of 16 which is a change, we will treat him empirically with antibiotics for presumed pneumonia.    For pain will prescribe a short course of oxycodone for severe pain  Oncology follow-up for further management and review of his CT including expansion of malignant process  Strict ED return precautions given discussed.    - Patient agrees to our plan and is ready and eager for discharge. Care plan, follow up plan, and reasons to return immediately to the ED were dicussed in detail and summarized as noted in the discharge instructions.      I have reviewed the nursing notes. I have reviewed the findings, diagnosis, plan and need for follow up with the patient.    New Prescriptions    DOXYCYCLINE HYCLATE (VIBRAMYCIN) 100 MG CAPSULE    Take 1 capsule (100 mg) by  mouth 2 times daily for 7 days    OXYCODONE (ROXICODONE) 5 MG TABLET    Take 1 tablet (5 mg) by mouth every 6 hours as needed for severe pain       Final diagnoses:   Chest wall pain   Pleuritic chest pain   Pneumonia of left lower lobe due to infectious organism (H)       --  Eugenio Walton MD   Emergency Medicine   [facility], EMERGENCY DEPARTMENT  5/30/2020     Eugenio Walton MD  05/30/20 0614

## 2020-05-30 NOTE — DISCHARGE INSTRUCTIONS
Return to the emergency department immediately for any chest pain, back pain, shortness of breath, weakness, abdominal pain nausea, vomiting, or any other concerns as given or discussed    Take tylenol  and or ibuprofen as needed for pain  For severe pain you may take oxycodone as prescribed

## 2020-06-01 NOTE — TELEPHONE ENCOUNTER
Called Edy to check up on him after the ED visit.   Having migrating pains, but the abdominal pain over the liver and the R shoulder pain is consistent. The left shoulder pain is intermittent and the left sided pain is gone as of right now (the pain I think is due to pleural mets)    Trying oxycodone with some effect but not lasting the full 6 hours as prescribed, usually a few hours at a time. Not able to do much. Tired. Appetite down.     Moving bowels.     CTA reviewed. Might be having referred pain from inferior liver met. Harder to see because it's a CTA but it's the only one near the capsule    Plan  - lidocaine patches over liver  - oxycodone 5 mg q4h prn - sent new script  - dexamethasone 4 mg daily  - I will see him Wed     As for treatment, still waiting for approval from  on sitravatinib trial. Outside of the trial, we could try pembrolizumab + bevacizumab now that pembro + pemetrexed is denied. Could also try ipi nivo. Will discuss with him Wed.     Susan Muller MD

## 2020-06-01 NOTE — PATIENT INSTRUCTIONS
CT Chest PE/Abdomen/pelvis completed in ED on 5/30/20.  Dr. Susan Muller contacted patient on 6/1/20 (see note).  Labs, Telephone Visit with Trinidad Canales PA-C + chemotherapy.  Renea uKmar, RN, BSN, OCN on 6/1/2020 at 10:10 AM

## 2020-06-03 NOTE — LETTER
June 5, 2020      RE: Kentrell Kirkpatrick  [address]  [place] [address]         To Whom It May Concern,     Please accept this letter as documentation of my recommendation that  and Mrs. Kirkpatrick not go on their planned trip this coming July into August 2020 due to health care concerns from lung cancer and its treatments. I fully support a refund of the expenses to them.    Please do not hesitate to contact me with any questions or concerns. I can be reached at the number above. You may also speak with Henry Ruiz RNCC (Nurse Coordinator).        Sincerely,          Susan Muller M.D.  Associate Professor of Medicine  [facility]

## 2020-06-03 NOTE — LETTER
6/3/2020         RE: Kentrell Kirkpatrick  [address] [facility]  [facility] [address]        Dear Colleague,    Thank you for referring your patient, Kentrell Kirkpatrick, to the [facility]. Please see a copy of my visit note below.    [facility]  [facility]    FOLLOW-UP VISIT NOTE    PATIENT NAME: Kentrell Kirkpatrick MRN # [id number]  DATE OF VISIT: Marline 3, 2020 YOB: 1950    CANCER TYPE: NSCLC, adenocarcinoma  STAGE: IV  ECOG PS: 1    Cancer Staging  Non-small cell lung cancer, left (H)  Staging form: Lung, AJCC 8th Edition  - Clinical stage from 3/11/2019: Stage IV (cT1c, cN3, pM1c) - Signed by Susan Muller MD on 3/11/2019    PD-L1: <1%  Lung panel: 3/1/19 on [id number] A1 and [id number] A1. Negative  NGS: [employer] 360 sent 2/28/19 negative for actionable mutations. SMAD4 E538, TP53 H179R, SMO A327G. MSI not high    SUMMARY  6/27/18 CT chest w/contrast to re-evaluate aortic aneurysm: 8 mm spiculated KEATON nodule, 3 mm RML nodule unchanged from 3/15/17 and 2/25/16 scans  2/4/19 Presented to PCP with fairly rapid onset of shortness of breath. Given albuterol  2/19/18 CXR and CT chest w/contrast. Large left effusion  2/20/19 L thoracentesis (Dr. Rodriguez), 1180 cc  3/1/19 L pleurx (Dr. Rodriguez)  3/13/19 C1 carboplatin pemetrexed pembrolizumab  3/15/19 L supraclavicular LN bx (IR, FNA). Path: lung adenocarcinoma  4/3/19 C2 carboplatin pemetrexed pembrolizumab (total 4 cycles)  4/15/19 FEV1 2.11 (61%), FVC 3.38 (73%), DLCO 27.7, 67% (59%)  4/18/19 TPA. Drained 900 cc after it got unclotted  5/9/19 Pleurx removed  6/5~8/28/19 Maintenance pemetrexed + pembrolizumab x 5 cycles --> PD  9/20/19~4/29/20 C1-8 docetaxel 60 mg/m2 + ramucirumab. Had extravasation w/C2 10/11/19. C4 docetaxel ramucirumab delayed 1 week 2/2019 due to URI and trip. C5-8 docetaxel reduced to 50 mg/m2 due to excessive fatigue  9/28/19 UC for bronchitis. Levofloxacin  10/4/19 Brain MRI - routine  rescreening - negative  10/17/19 Port  10/23/19 Recurrent cough. Saw PCP. Azithromycin  10/29/19 Prednisone 40 mg once, then 20 mg daily x 5 days, then 10 mg daily x 5 days for acute bronchitis   12/12/19 ED for bronchitis. Had some chills prior to going to the ED  3/3/20 Treated for pansinusitis with amox-calv x 14 days and fluticasone nasal spray  3/31/20 CT CAP and bone scan. Some PD. No bone mets.   5/6/20 Brain MRI. Negative  5/6/20 CT CAP.   4/1/20~4/29/20 C1-2 gemcitabine 1000 mg/m2 D1, 8 --> PD  5/30/20 [facility] ED. Doxy, etc      SUBJECTIVE  Mr. Kirkpatrick returns today for follow up of metastatic lung adenocarcinoma. I spoke with him the other day - was having more pain. Please refer to that phone note. I started dexamethasone 4 mg daily, oxycodone 5 mg q4h prn, put lidocaine patch on his liver as well. Pain much better. He actually ended up taking the dex twice daily x 2 days. Pain is much better though. Appetite remains down and breathing still unchanged - gets winded with activity. But no different than when we talked on the phone. No orthopnea or PND, no leg swelling. No HA, N/V, F/C, bleeding, constipation, diarrhea, numbness/tingling.     PAST MEDICAL HISTORY  Lung adenocarcinoma as above  L5 disk herniation. Epidural injection 5/22/18. Improved dramatically with chiropracter in the past.   BPH  Ascending aortic aneurysm  SKYLER not on CPAP, couldn't tolerate, so using dental device    4/15/19 PFT. FEV1 2.11 (61%), FVC 4.58    CURRENT OUTPATIENT MEDICATIONS  Current Outpatient Medications   Medication Sig Dispense Refill     albuterol (PROAIR HFA/PROVENTIL HFA/VENTOLIN HFA) 108 (90 Base) MCG/ACT inhaler Inhale 2 puffs into the lungs every 4 hours (while awake) 1 Inhaler 0     albuterol (PROVENTIL) (2.5 MG/3ML) 0.083% neb solution Take 1 vial (2.5 mg) by nebulization every 4 hours as needed for shortness of breath / dyspnea or wheezing 100 vial 11     calcium polycarbophil (FIBERCON) 625 MG tablet Take 2  tablets by mouth daily       dexamethasone (DECADRON) 4 MG tablet Take 1 tablet (4 mg) by mouth 2 times daily (with meals) 30 tablet 11     dexamethasone (DECADRON) 4 MG tablet TAKE 1 TABLET (4 MG) BY MOUTH 2 TIMES DAILY TO BE TAKEN DAY BEFORE, DAY OF, AND DAY AFTER INFUSION.. 6 tablet 11     doxycycline hyclate (VIBRAMYCIN) 100 MG capsule Take 1 capsule (100 mg) by mouth 2 times daily for 7 days 14 capsule 0     finasteride (PROSCAR) 5 MG tablet TAKE 1 TABLET BY MOUTH EVERY DAY 90 tablet 1     fluticasone (FLONASE) 50 MCG/ACT nasal spray Spray 2 sprays into both nostrils daily 16 g 0     lidocaine-prilocaine (EMLA) 2.5-2.5 % external cream Apply topically as needed for moderate pain 30 g 11     LORazepam (ATIVAN) 0.5 MG tablet Take 1 tablet (0.5 mg) by mouth every 6 hours as needed (Nausea/Vomiting) 30 tablet 3     Melatonin 10 MG TABS tablet Take 10 mg by mouth nightly as needed for sleep       mometasone-formoterol (DULERA) 100-5 MCG/ACT inhaler Inhale 2 puffs into the lungs 2 times daily (Patient taking differently: Inhale 2 puffs into the lungs daily ) 1 Inhaler 11     morphine (MSIR) 15 MG IR tablet Take 1 tablet (15 mg) by mouth every 4 hours as needed for moderate to severe pain (and cough) 30 tablet 0     MULTI VITAMIN MENS OR TABS 1 TABLET DAILY       ondansetron (ZOFRAN) 8 MG tablet Take 1 tablet (8 mg) by mouth every 8 hours as needed for nausea 30 tablet 11     oxyCODONE (ROXICODONE) 5 MG tablet Take 1 tablet (5 mg) by mouth every 4 hours as needed for severe pain 60 tablet 0     oxyCODONE (ROXICODONE) 5 MG tablet Take 1 tablet (5 mg) by mouth every 6 hours as needed for severe pain 10 tablet 0     prochlorperazine (COMPAZINE) 10 MG tablet Take 1 tablet (10 mg) by mouth every 6 hours as needed (Nausea/Vomiting) 30 tablet 11     tamsulosin (FLOMAX) 0.4 MG capsule TAKE 1 CAPSULE BY MOUTH EVERY DAY 90 capsule 1     tiotropium (SPIRIVA HANDIHALER) 18 MCG inhaled capsule Inhale 1 capsule (18 mcg) into the  lungs daily 1 capsule 11     ALLERGIES  No Known Allergies    REVIEW OF SYSTEMS  As above in the HPI, o/w complete 12-point ROS was negative.    PHYSICAL EXAM  Virtual video visit, no vitals  Wt Readings from Last 3 Encounters:   05/30/20 79.4 kg (175 lb)   05/29/20 81.5 kg (179 lb 11.2 oz)   04/22/20 81 kg (178 lb 8 oz)     GEN: NAD    Remainder of exam deferred due to public health emergency and limitations of video visit    LABORATORY AND IMAGING STUDIES  Results for MAYA ELLER (MRN [id number]) as of 6/5/2020 13:35   5/30/2020 03:13   Sodium 137   Potassium 3.6   Chloride 105   Carbon Dioxide 25   Urea Nitrogen 18   Creatinine 1.03   GFR Estimate 73   GFR Estimate If Black 85   Calcium 9.4   Anion Gap 8   Albumin 3.6   Protein Total 7.1   Bilirubin Total 1.2   Alkaline Phosphatase 89   ALT 22   AST 20   Lipase 35 (L)   Troponin I ES <0.015   Glucose 99   WBC 16.6 (H)   Hemoglobin 11.0 (L)   Hematocrit 36.1 (L)   Platelet Count 347   RBC Count 3.95 (L)   MCV 91   MCH 27.8   MCHC 30.5 (L)   RDW 17.7 (H)   Diff Method Automated Method   % Neutrophils 83.0   % Lymphocytes 5.0   % Monocytes 9.8   % Eosinophils 1.3   % Basophils 0.5   % Immature Granulocytes 0.4   Nucleated RBCs 0   Absolute Neutrophil 13.8 (H)   Absolute Lymphocytes 0.8   Absolute Monocytes 1.6 (H)   Absolute Eosinophils 0.2   Absolute Basophils 0.1   Abs Immature Granulocytes 0.1   Absolute Nucleated RBC 0.0     CT Chest (PE) Abdomen Pelvis w Contrast  Narrative: EXAM: CT CHEST PE, ABDOMEN AND PELVIS WITH CONTRAST  LOCATION: [facility]  DATE/TIME: 05/30/2020, 4:33 AM    INDICATION: Shortness of breath.  COMPARISON: 05/06/2020.  TECHNIQUE: CT angiogram chest and routine CT abdomen pelvis with IV contrast. Arterial phase through the chest and venous phase through the abdomen and pelvis. 2D and 3D MIP reconstructions were preformed by the CT technologist. Dose reduction techniques   were used.   CONTRAST: Iopamidol (Isovue-370) solution  107 mL.    FINDINGS:  ANGIOGRAM CHEST: Pulmonary arteries are normal caliber and negative for pulmonary emboli. Thoracic aorta is negative for dissection. The heart size is normal.     LUNGS AND PLEURA: There is a small left pleural effusion as well as pleural thickening in the left hemithorax. Scattered small lung nodules bilaterally are overall larger than on the previous exam. There is emphysematous disease at the lung apices. New   left lower lobe infiltrate is likely pneumonia.    MEDIASTINUM/AXILLAE: There is a left anterior mediastinal mass measuring 6.4 x 2.5 cm. A few subcentimeter lymph nodes.    HEPATOBILIARY: There are multiple liver masses consistent with metastases and overall increased in size in the interval. The portal vein is patent.    PANCREAS: Normal.    SPLEEN: Normal.    ADRENAL GLANDS: 2.5 cm left adrenal gland nodule.    KIDNEYS/BLADDER: Normal.    BOWEL: There are colonic diverticula without acute diverticulitis. No bowel obstruction or inflammation. Moderate amount of stool throughout the colon.    LYMPH NODES: Normal.    PELVIC ORGANS: Normal.    MUSCULOSKELETAL: Degenerative disease in the spine.  Impression: IMPRESSION:  1.  There is no pulmonary embolus, aortic aneurysm or dissection.  2.  Small left pleural effusion. Irregular pleural thickening in the left hemithorax may be metastatic disease.  3.  Multiple lung metastases have increased in interval.  4.  6.4 cm left anterior mediastinal mass.  5.  New left lower lobe consolidation consistent with pneumonia.  6.  Multiple hepatic metastases have increased in the interval.  7.  Left adrenal gland nodule consistent with metastasis.  8.  Colonic diverticula without acute diverticulitis. No bowel obstruction or inflammation.     Imaging was personally reviewed     ASSESSMENT AND PLAN  NSCLC, adenocarcinoma, PD-L1 <1%: We have him teed up for the sitravatinib PK substudy; we just got institutional approval yesterday to enroll him. Will  have to ask about the dex used for palliative purposes for 2 days. ADDENDUM: Ok with sponsor. We reviewed the backup plan we discussed on the phone the other day of pembrolizumab + bevacizumab. The finance specialists are looking into coverage. Pembro + pemetrexed was denied. If pembro ivania is denied, we can try to get pembro from the company and get insurance to cover ivania. This would also be the alternative to study treatment. There aren't slots for the studies that [employer] has, but there might be one in June. But they're P1 studies.   We talked in great detail about the study. We discussed the mechanism of how sitravatinib works, our experience to date. We talked about VEGF inhibition and possible serious, even fatal implications given the tumor by the aorta, although he tolerated ramucirumab just fine before. We talked about the logistics, voluntary nature of participation, reasons to stop, possible toxicities as far as we know. [employer] will take him through the consent process further. We also talked about how he might be found to be ineligible after all after consenting. He remains very interested in participating and wants to move forward. We'll plan to get going as quickly as possible and I'd like to get going on treatment as soon as next week.     R shoulder pain, L pleural pain: L pleural pain resolved when we had talked on the phone on Monday. The R shoulder pain is referred pain from liver mets and is also better after dex and putting a lidocaine patch on his abdomen over the liver. He'll continue lidocaine and oxycodone prn. Moving his bowels. Stopped dex yesterday, understands he cannot take any more due the study.     COPD, increased SOB: Likely due to some COPD and deconditioning. Will have an exam next week. I reviewed his CTA from last week and I don't see anything in particular that would make him short of breath, and it was negative for PE.     Malignant pleural effusion: Had some trapped lung  "there before, accumulated a bit more in March, and seems overall stable.     Cough: Intermittent    SKYLER: Using a nasal device    Contrast reaction: Hasn't been taking methylpred actually and has been fine.    Social: Had planned to travel to the [place] in [place] this summer from the end of July until 8/5, about 2-3 weeks. Unfortunately, he's decided it's best to cancel. I'll write a letter to support this recommendation.     A total of 40 minutes was spent with the patient, >50% of which was spent in counseling and coordination of care.    Susan Muller MD    Hematology, Oncology and Transplantation    Kentrell Kirkpatrick is a 69 year old male who is being evaluated via a billable video visit.      The patient has been notified of following:     \"This video visit will be conducted via a call between you and your physician/provider. We have found that certain health care needs can be provided without the need for an in-person physical exam.  This service lets us provide the care you need with a video conversation.  If a prescription is necessary we can send it directly to your pharmacy.  If lab work is needed we can place an order for that and you can then stop by our lab to have the test done at a later time.  Video visits are billed at different rates depending on your insurance coverage.  Please reach out to your insurance provider with any questions.  If during the course of the call the physician/provider feels a video visit is not appropriate, you will not be charged for this service.\"  Patient has given verbal consent for Video visit? Yes  How would you like to obtain your AVS? Ellis  Patient would like the video invitation sent by: Send to e-mail at: [email or web address]  Will anyone else be joining your video visit? No          Secondary Video Option ([employer]), send text message to:  [phone]  I have reviewed and updated the patient's allergies and medication " list.  Concerns: Patient has no new concerns.  Refills: None  BERTHA Yan  Video-Visit Details    Type of service:  Video Visit  Video Start Time: 12:11 PM  Video End Time: 12:50 pm  Originating Location (pt. Location): Home  Distant Location (provider location):  [facility]   Platform used for Video Visit: Jose Muller MD            Again, thank you for allowing me to participate in the care of your patient.        Sincerely,        Susan Muller MD

## 2020-06-03 NOTE — PROGRESS NOTES
[facility]  [facility] CANCER CLINIC    FOLLOW-UP VISIT NOTE    PATIENT NAME: Kentrell Kirkpatrick MRN # [id number]  DATE OF VISIT: Marline 3, 2020 YOB: 1950    CANCER TYPE: NSCLC, adenocarcinoma  STAGE: IV  ECOG PS: 1    Cancer Staging  Non-small cell lung cancer, left (H)  Staging form: Lung, AJCC 8th Edition  - Clinical stage from 3/11/2019: Stage IV (cT1c, cN3, pM1c) - Signed by Susan Muller MD on 3/11/2019    PD-L1: <1%  Lung panel: 3/1/19 on [id number] A1 and [id number] A1. Negative  NGS: [employer] 360 sent 2/28/19 negative for actionable mutations. SMAD4 E538, TP53 H179R, SMO A327G. MSI not high    SUMMARY  6/27/18 CT chest w/contrast to re-evaluate aortic aneurysm: 8 mm spiculated KEATON nodule, 3 mm RML nodule unchanged from 3/15/17 and 2/25/16 scans  2/4/19 Presented to PCP with fairly rapid onset of shortness of breath. Given albuterol  2/19/18 CXR and CT chest w/contrast. Large left effusion  2/20/19 L thoracentesis (Dr. Rodriguez), 1180 cc  3/1/19 L pleurx (Dr. Rodriguez)  3/13/19 C1 carboplatin pemetrexed pembrolizumab  3/15/19 L supraclavicular LN bx (IR, FNA). Path: lung adenocarcinoma  4/3/19 C2 carboplatin pemetrexed pembrolizumab (total 4 cycles)  4/15/19 FEV1 2.11 (61%), FVC 3.38 (73%), DLCO 27.7, 67% (59%)  4/18/19 TPA. Drained 900 cc after it got unclotted  5/9/19 Pleurx removed  6/5~8/28/19 Maintenance pemetrexed + pembrolizumab x 5 cycles --> PD  9/20/19~4/29/20 C1-8 docetaxel 60 mg/m2 + ramucirumab. Had extravasation w/C2 10/11/19. C4 docetaxel ramucirumab delayed 1 week 2/2019 due to URI and trip. C5-8 docetaxel reduced to 50 mg/m2 due to excessive fatigue  9/28/19 UC for bronchitis. Levofloxacin  10/4/19 Brain MRI - routine rescreening - negative  10/17/19 Port  10/23/19 Recurrent cough. Saw PCP. Azithromycin  10/29/19 Prednisone 40 mg once, then 20 mg daily x 5 days, then 10 mg daily x 5 days for acute bronchitis   12/12/19 ED for bronchitis. Had some chills prior to going to  the ED  3/3/20 Treated for pansinusitis with amox-calv x 14 days and fluticasone nasal spray  3/31/20 CT CAP and bone scan. Some PD. No bone mets.   5/6/20 Brain MRI. Negative  5/6/20 CT CAP.   4/1/20~4/29/20 C1-2 gemcitabine 1000 mg/m2 D1, 8 --> PD  5/30/20 [facility] ED. Doxy, etc      SUBJECTIVE  Mr. Kirkpatrick returns today for follow up of metastatic lung adenocarcinoma. I spoke with him the other day - was having more pain. Please refer to that phone note. I started dexamethasone 4 mg daily, oxycodone 5 mg q4h prn, put lidocaine patch on his liver as well. Pain much better. He actually ended up taking the dex twice daily x 2 days. Pain is much better though. Appetite remains down and breathing still unchanged - gets winded with activity. But no different than when we talked on the phone. No orthopnea or PND, no leg swelling. No HA, N/V, F/C, bleeding, constipation, diarrhea, numbness/tingling.     PAST MEDICAL HISTORY  Lung adenocarcinoma as above  L5 disk herniation. Epidural injection 5/22/18. Improved dramatically with chiropracter in the past.   BPH  Ascending aortic aneurysm  SKYLER not on CPAP, couldn't tolerate, so using dental device    4/15/19 PFT. FEV1 2.11 (61%), FVC 4.58    CURRENT OUTPATIENT MEDICATIONS  Current Outpatient Medications   Medication Sig Dispense Refill     albuterol (PROAIR HFA/PROVENTIL HFA/VENTOLIN HFA) 108 (90 Base) MCG/ACT inhaler Inhale 2 puffs into the lungs every 4 hours (while awake) 1 Inhaler 0     albuterol (PROVENTIL) (2.5 MG/3ML) 0.083% neb solution Take 1 vial (2.5 mg) by nebulization every 4 hours as needed for shortness of breath / dyspnea or wheezing 100 vial 11     calcium polycarbophil (FIBERCON) 625 MG tablet Take 2 tablets by mouth daily       dexamethasone (DECADRON) 4 MG tablet Take 1 tablet (4 mg) by mouth 2 times daily (with meals) 30 tablet 11     dexamethasone (DECADRON) 4 MG tablet TAKE 1 TABLET (4 MG) BY MOUTH 2 TIMES DAILY TO BE TAKEN DAY BEFORE, DAY OF, AND DAY  AFTER INFUSION.. 6 tablet 11     doxycycline hyclate (VIBRAMYCIN) 100 MG capsule Take 1 capsule (100 mg) by mouth 2 times daily for 7 days 14 capsule 0     finasteride (PROSCAR) 5 MG tablet TAKE 1 TABLET BY MOUTH EVERY DAY 90 tablet 1     fluticasone (FLONASE) 50 MCG/ACT nasal spray Spray 2 sprays into both nostrils daily 16 g 0     lidocaine-prilocaine (EMLA) 2.5-2.5 % external cream Apply topically as needed for moderate pain 30 g 11     LORazepam (ATIVAN) 0.5 MG tablet Take 1 tablet (0.5 mg) by mouth every 6 hours as needed (Nausea/Vomiting) 30 tablet 3     Melatonin 10 MG TABS tablet Take 10 mg by mouth nightly as needed for sleep       mometasone-formoterol (DULERA) 100-5 MCG/ACT inhaler Inhale 2 puffs into the lungs 2 times daily (Patient taking differently: Inhale 2 puffs into the lungs daily ) 1 Inhaler 11     morphine (MSIR) 15 MG IR tablet Take 1 tablet (15 mg) by mouth every 4 hours as needed for moderate to severe pain (and cough) 30 tablet 0     MULTI VITAMIN MENS OR TABS 1 TABLET DAILY       ondansetron (ZOFRAN) 8 MG tablet Take 1 tablet (8 mg) by mouth every 8 hours as needed for nausea 30 tablet 11     oxyCODONE (ROXICODONE) 5 MG tablet Take 1 tablet (5 mg) by mouth every 4 hours as needed for severe pain 60 tablet 0     oxyCODONE (ROXICODONE) 5 MG tablet Take 1 tablet (5 mg) by mouth every 6 hours as needed for severe pain 10 tablet 0     prochlorperazine (COMPAZINE) 10 MG tablet Take 1 tablet (10 mg) by mouth every 6 hours as needed (Nausea/Vomiting) 30 tablet 11     tamsulosin (FLOMAX) 0.4 MG capsule TAKE 1 CAPSULE BY MOUTH EVERY DAY 90 capsule 1     tiotropium (SPIRIVA HANDIHALER) 18 MCG inhaled capsule Inhale 1 capsule (18 mcg) into the lungs daily 1 capsule 11     ALLERGIES  No Known Allergies    REVIEW OF SYSTEMS  As above in the HPI, o/w complete 12-point ROS was negative.    PHYSICAL EXAM  Virtual video visit, no vitals  Wt Readings from Last 3 Encounters:   05/30/20 79.4 kg (175 lb)    05/29/20 81.5 kg (179 lb 11.2 oz)   04/22/20 81 kg (178 lb 8 oz)     GEN: NAD    Remainder of exam deferred due to public health emergency and limitations of video visit    LABORATORY AND IMAGING STUDIES  Results for MAYA ELLER (MRN [id number]) as of 6/5/2020 13:35   5/30/2020 03:13   Sodium 137   Potassium 3.6   Chloride 105   Carbon Dioxide 25   Urea Nitrogen 18   Creatinine 1.03   GFR Estimate 73   GFR Estimate If Black 85   Calcium 9.4   Anion Gap 8   Albumin 3.6   Protein Total 7.1   Bilirubin Total 1.2   Alkaline Phosphatase 89   ALT 22   AST 20   Lipase 35 (L)   Troponin I ES <0.015   Glucose 99   WBC 16.6 (H)   Hemoglobin 11.0 (L)   Hematocrit 36.1 (L)   Platelet Count 347   RBC Count 3.95 (L)   MCV 91   MCH 27.8   MCHC 30.5 (L)   RDW 17.7 (H)   Diff Method Automated Method   % Neutrophils 83.0   % Lymphocytes 5.0   % Monocytes 9.8   % Eosinophils 1.3   % Basophils 0.5   % Immature Granulocytes 0.4   Nucleated RBCs 0   Absolute Neutrophil 13.8 (H)   Absolute Lymphocytes 0.8   Absolute Monocytes 1.6 (H)   Absolute Eosinophils 0.2   Absolute Basophils 0.1   Abs Immature Granulocytes 0.1   Absolute Nucleated RBC 0.0     CT Chest (PE) Abdomen Pelvis w Contrast  Narrative: EXAM: CT CHEST PE, ABDOMEN AND PELVIS WITH CONTRAST  LOCATION: [facility]  DATE/TIME: 05/30/2020, 4:33 AM    INDICATION: Shortness of breath.  COMPARISON: 05/06/2020.  TECHNIQUE: CT angiogram chest and routine CT abdomen pelvis with IV contrast. Arterial phase through the chest and venous phase through the abdomen and pelvis. 2D and 3D MIP reconstructions were preformed by the CT technologist. Dose reduction techniques   were used.   CONTRAST: Iopamidol (Isovue-370) solution 107 mL.    FINDINGS:  ANGIOGRAM CHEST: Pulmonary arteries are normal caliber and negative for pulmonary emboli. Thoracic aorta is negative for dissection. The heart size is normal.     LUNGS AND PLEURA: There is a small left pleural effusion as well as  pleural thickening in the left hemithorax. Scattered small lung nodules bilaterally are overall larger than on the previous exam. There is emphysematous disease at the lung apices. New   left lower lobe infiltrate is likely pneumonia.    MEDIASTINUM/AXILLAE: There is a left anterior mediastinal mass measuring 6.4 x 2.5 cm. A few subcentimeter lymph nodes.    HEPATOBILIARY: There are multiple liver masses consistent with metastases and overall increased in size in the interval. The portal vein is patent.    PANCREAS: Normal.    SPLEEN: Normal.    ADRENAL GLANDS: 2.5 cm left adrenal gland nodule.    KIDNEYS/BLADDER: Normal.    BOWEL: There are colonic diverticula without acute diverticulitis. No bowel obstruction or inflammation. Moderate amount of stool throughout the colon.    LYMPH NODES: Normal.    PELVIC ORGANS: Normal.    MUSCULOSKELETAL: Degenerative disease in the spine.  Impression: IMPRESSION:  1.  There is no pulmonary embolus, aortic aneurysm or dissection.  2.  Small left pleural effusion. Irregular pleural thickening in the left hemithorax may be metastatic disease.  3.  Multiple lung metastases have increased in interval.  4.  6.4 cm left anterior mediastinal mass.  5.  New left lower lobe consolidation consistent with pneumonia.  6.  Multiple hepatic metastases have increased in the interval.  7.  Left adrenal gland nodule consistent with metastasis.  8.  Colonic diverticula without acute diverticulitis. No bowel obstruction or inflammation.     Imaging was personally reviewed     ASSESSMENT AND PLAN  NSCLC, adenocarcinoma, PD-L1 <1%: We have him teed up for the sitravatinib PK substudy; we just got institutional approval yesterday to enroll him. Will have to ask about the dex used for palliative purposes for 2 days. ADDENDUM: Ok with sponsor. We reviewed the backup plan we discussed on the phone the other day of pembrolizumab + bevacizumab. The finance specialists are looking into coverage. Pembro +  pemetrexed was denied. If pembro ivania is denied, we can try to get pembro from the company and get insurance to cover ivania. This would also be the alternative to study treatment. There aren't slots for the studies that [employer] has, but there might be one in June. But they're P1 studies.   We talked in great detail about the study. We discussed the mechanism of how sitravatinib works, our experience to date. We talked about VEGF inhibition and possible serious, even fatal implications given the tumor by the aorta, although he tolerated ramucirumab just fine before. We talked about the logistics, voluntary nature of participation, reasons to stop, possible toxicities as far as we know. [employer] will take him through the consent process further. We also talked about how he might be found to be ineligible after all after consenting. He remains very interested in participating and wants to move forward. We'll plan to get going as quickly as possible and I'd like to get going on treatment as soon as next week.     R shoulder pain, L pleural pain: L pleural pain resolved when we had talked on the phone on Monday. The R shoulder pain is referred pain from liver mets and is also better after dex and putting a lidocaine patch on his abdomen over the liver. He'll continue lidocaine and oxycodone prn. Moving his bowels. Stopped dex yesterday, understands he cannot take any more due the study.     COPD, increased SOB: Likely due to some COPD and deconditioning. Will have an exam next week. I reviewed his CTA from last week and I don't see anything in particular that would make him short of breath, and it was negative for PE.     Malignant pleural effusion: Had some trapped lung there before, accumulated a bit more in March, and seems overall stable.     Cough: Intermittent    SKYLER: Using a nasal device    Contrast reaction: Hasn't been taking methylpred actually and has been fine.    Social: Had planned to travel [place] this summer from the end of July until 8/5, about 2-3 weeks. Unfortunately, he's decided it's best to cancel. I'll write a letter to support this recommendation.     A total of 40 minutes was spent with the patient, >50% of which was spent in counseling and coordination of care.    Susan Muller MD    Hematology, Oncology and Transplantation    Kentrell Kirkpatrick is a 69 year old male who is being evaluated via a billable video visit.      The patient has been notified of following:     \"This video visit will be conducted via a call between you and your physician/provider. We have found that certain health care needs can be provided without the need for an in-person physical exam.  This service lets us provide the care you need with a video conversation.  If a prescription is necessary we can send it directly to your pharmacy.  If lab work is needed we can place an order for that and you can then stop by our lab to have the test done at a later time.  Video visits are billed at different rates depending on your insurance coverage.  Please reach out to your insurance provider with any questions.  If during the course of the call the physician/provider feels a video visit is not appropriate, you will not be charged for this service.\"  Patient has given verbal consent for Video visit? Yes  How would you like to obtain your AVS? Ellis  Patient would like the video invitation sent by: Send to e-mail at: [email or web address]  Will anyone else be joining your video visit? No          Secondary Video Option (Dox[employer]ity), send text message to:  [phone]  I have reviewed and updated the patient's allergies and medication list.  Concerns: Patient has no new concerns.  Refills: None  BERTHA Yan  Video-Visit Details    Type of service:  Video Visit  Video Start Time: 12:11 PM  Video End Time: 12:50 pm  Originating Location (pt. Location): Home  Distant Location (provider " location):  [facility] CANCER [facility]   Platform used for Video Visit: Jose Muller MD

## 2020-06-11 NOTE — PROGRESS NOTES
Medical Certificate forms received from patient at 06/11/2020 office visit.  Received forms filled out and signed, has already been sent to patient.     Form faxed to [employer] @ [phone] and sent to scanning.      Successful transmission verified by Right[employer]ax.     Maria Del Carmen Villaseñor, [facility]

## 2020-06-11 NOTE — PROGRESS NOTES
EALTH  [facility]IN[facility]    FOLLOW-UP VISIT NOTE    PATIENT NAME: Kentrell Kirkpatrick MRN # [id number]  DATE OF VISIT: June 11, 2020 YOB: 1950    CANCER TYPE: NSCLC, adenocarcinoma  STAGE: IV  ECOG PS: 1    Cancer Staging  Non-small cell lung cancer, left (H)  Staging form: Lung, AJCC 8th Edition  - Clinical stage from 3/11/2019: Stage IV (cT1c, cN3, pM1c) - Signed by Susan Muller MD on 3/11/2019    PD-L1: <1%  Lung panel: 3/1/19 on [id number] A1 and [id number] A1. Negative  NGS: Guardant 360 sent 2/28/19 negative for actionable mutations. SMAD4 E538, TP53 H179R, SMO A327G. MSI not high    SUMMARY  6/27/18 CT chest w/contrast to re-evaluate aortic aneurysm: 8 mm spiculated KEATON nodule, 3 mm RML nodule unchanged from 3/15/17 and 2/25/16 scans  2/4/19 Presented to PCP with fairly rapid onset of shortness of breath. Given albuterol  2/19/18 CXR and CT chest w/contrast. Large left effusion  2/20/19 L thoracentesis (Dr. Rodriguez), 1180 cc  3/1/19 L pleurx (Dr. Rodriguez)  3/13/19 C1 carboplatin pemetrexed pembrolizumab  3/15/19 L supraclavicular LN bx (IR, FNA). Path: lung adenocarcinoma  4/3/19 C2 carboplatin pemetrexed pembrolizumab (total 4 cycles)  4/15/19 FEV1 2.11 (61%), FVC 3.38 (73%), DLCO 27.7, 67% (59%)  4/18/19 TPA. Drained 900 cc after it got unclotted  5/9/19 Pleurx removed  6/5~8/28/19 Maintenance pemetrexed + pembrolizumab x 5 cycles --> PD  9/20/19~4/29/20 C1-8 docetaxel 60 mg/m2 + ramucirumab. Had extravasation w/C2 10/11/19. C4 docetaxel ramucirumab delayed 1 week 2/2019 due to URI and trip. C5-8 docetaxel reduced to 50 mg/m2 due to excessive fatigue  9/28/19 UC for bronchitis. Levofloxacin  10/4/19 Brain MRI - routine rescreening - negative  10/17/19 Port  10/23/19 Recurrent cough. Saw PCP. Azithromycin  10/29/19 Prednisone 40 mg once, then 20 mg daily x 5 days, then 10 mg daily x 5 days for acute bronchitis   12/12/19 ED for bronchitis. Had some chills prior to going to  the ED  3/3/20 Treated for pansinusitis with amox-calv x 14 days and fluticasone nasal spray  3/31/20 CT CAP and bone scan. Some PD. No bone mets.   5/6/20 Brain MRI. Negative  5/6/20 CT CAP.   4/1/20~4/29/20 C1-2 gemcitabine 1000 mg/m2 D1, 8 --> PD  5/30/20 [facility] ED. Doxy, etc      SUBJECTIVE  Mr. Kirkpatrick returns today for follow up of metastatic lung adenocarcinoma to go through the consent process for the sitravatinib + nivolumab trial. Doing ok. Having sternal chest pain in the bone there, right at the bottom of the sternum, intermittent, fairly sharp when it happens. Tried putting a lidocaine patch on it without much relief, but oxycodone helped. The left shoulder continues to intermittently act up. PT is scheduled to start 6/25. The right shoulder is fine. He continues putting lidocaine patch on his RUQ but is only as needed. Still endorses some shortness of breath, which was the case when we had our video visit last week and is unchanged since then. Gets winded walking up stairs. Uses albuterol neb intermittently, and not using albuterol inhaler. No other new problems.   We had his wife Pat on the phone with us.     PAST MEDICAL HISTORY  Lung adenocarcinoma as above  L5 disk herniation. Epidural injection 5/22/18. Improved dramatically with chiropracter in the past.   BPH  Ascending aortic aneurysm  SKYLER not on CPAP, couldn't tolerate, so using dental device    4/15/19 PFT. FEV1 2.11 (61%), FVC 4.58    CURRENT OUTPATIENT MEDICATIONS  Current Outpatient Medications   Medication Sig Dispense Refill     albuterol (PROAIR HFA/PROVENTIL HFA/VENTOLIN HFA) 108 (90 Base) MCG/ACT inhaler Inhale 2 puffs into the lungs every 4 hours (while awake) 1 Inhaler 0     albuterol (PROVENTIL) (2.5 MG/3ML) 0.083% neb solution Take 1 vial (2.5 mg) by nebulization every 4 hours as needed for shortness of breath / dyspnea or wheezing 100 vial 11     calcium polycarbophil (FIBERCON) 625 MG tablet Take 2 tablets by mouth daily        finasteride (PROSCAR) 5 MG tablet TAKE 1 TABLET BY MOUTH EVERY DAY 90 tablet 1     LORazepam (ATIVAN) 0.5 MG tablet Take 1 tablet (0.5 mg) by mouth every 6 hours as needed (Nausea/Vomiting) 30 tablet 3     Melatonin 10 MG TABS tablet Take 10 mg by mouth nightly as needed for sleep       mometasone-formoterol (DULERA) 100-5 MCG/ACT inhaler Inhale 2 puffs into the lungs 2 times daily (Patient taking differently: Inhale 2 puffs into the lungs daily ) 1 Inhaler 11     MULTI VITAMIN MENS OR TABS 1 TABLET DAILY       ondansetron (ZOFRAN) 8 MG tablet Take 1 tablet (8 mg) by mouth every 8 hours as needed for nausea 30 tablet 11     oxyCODONE (ROXICODONE) 5 MG tablet Take 1 tablet (5 mg) by mouth every 4 hours as needed for severe pain 60 tablet 0     prochlorperazine (COMPAZINE) 10 MG tablet Take 1 tablet (10 mg) by mouth every 6 hours as needed (Nausea/Vomiting) 30 tablet 11     tamsulosin (FLOMAX) 0.4 MG capsule TAKE 1 CAPSULE BY MOUTH EVERY DAY 90 capsule 1     tiotropium (SPIRIVA HANDIHALER) 18 MCG inhaled capsule Inhale 1 capsule (18 mcg) into the lungs daily 1 capsule 11     ALLERGIES  No Known Allergies    REVIEW OF SYSTEMS  As above in the HPI, o/w complete 12-point ROS was negative.    PHYSICAL EXAM  /84   Pulse 86   Temp 98  F (36.7  C) (Oral)   Resp 16   Wt 78 kg (172 lb)   SpO2 98%   BMI 22.69 kg/m    Wt Readings from Last 3 Encounters:   06/11/20 78 kg (172 lb)   05/30/20 79.4 kg (175 lb)   05/29/20 81.5 kg (179 lb 11.2 oz)     GEN: NAD  HEENT: EOMI, no icterus, injection or pallor. Oropharynx clear.  LUNGS: decreased breath sounds on the left, and dullness to percussion. Right is clear.   CV: regular, no murmurs, rubs, or gallops  ABDOMEN: soft, non-tender, non-distended, normal bowel sounds  EXT: warm, well perfused, no edema  NEURO: alert  SKIN: no rashes    LABORATORY AND IMAGING STUDIES  Results for MAYA ELLER (MRN [id number]) as of 6/11/2020 12:32   6/11/2020 10:15 6/11/2020 10:16   Sodium  138    Potassium 3.9    Chloride 105    Carbon Dioxide 26    Urea Nitrogen 21    Creatinine 1.10    GFR Estimate 67    GFR Estimate If Black 78    Calcium 9.3    Anion Gap 8    Magnesium 2.4 (H)    Albumin 3.5    Protein Total 7.6    Bilirubin Total 1.0    Alkaline Phosphatase 100    ALT 19    AST 18    Amylase 484 (H)    Lipase 38 (L)    T4 Free 1.22    TSH 3.32    Uric Acid 5.8    Glucose 80    WBC 8.9    Hemoglobin 11.4 (L)    Hematocrit 35.9 (L)    Platelet Count 303    RBC Count 4.06 (L)    MCV 88    MCH 28.1    MCHC 31.8    RDW 17.2 (H)    Diff Method Automated Method    % Neutrophils 69.4    % Lymphocytes 15.7    % Monocytes 10.5    % Eosinophils 3.5    % Basophils 0.6    % Immature Granulocytes 0.3    Nucleated RBCs 0    Absolute Neutrophil 6.2    Absolute Lymphocytes 1.4    Absolute Monocytes 0.9    Absolute Eosinophils 0.3    Absolute Basophils 0.1    Abs Immature Granulocytes 0.0    Absolute Nucleated RBC 0.0    INR 1.14    PTT 38 (H)    Color Urine  Yellow   Appearance Urine  Clear   Glucose Urine  Negative   Bilirubin Urine  Negative   Ketones Urine  Negative   Specific Gravity Urine  1.009   pH Urine  6.0   Protein Albumin Urine  Negative   Urobilinogen mg/dL  0.0   Nitrite Urine  Negative   Blood Urine  Negative   Leukocyte Esterase Urine  Negative   Source  Midstream Urine   WBC Urine  <1   RBC Urine  0   Mucous Urine  Present (A)      Imaging was personally reviewed     ASSESSMENT AND PLAN  NSCLC, adenocarcinoma, PD-L1 <1%: We went through the informed consent process today. We reviewed the purpose of the trial, voluntary nature of participation, ability to drop out for any reason, reasons for discontinuing study treatment, logistics of study treatment, enrollment into the free-base formulation sub study, disease evaluation every 8 weeks, and potential risks associated with the drugs, provision of tumor bx material to the sponsor, risks, [place] law, mechanism of action of sitravatinib. We discussed  the specific risk of bleeding at length. Our backup plan will be pursuing pembrolizumab + bevacizumab, which insurance will likely decline. We're still waiting to hear back that nivolumab will be covered by insurance for the study. Written informed consent was provided by Edy veras and a copy of the consent form given to him. HIPAA form also signed. See Digna Burton's separate note as well. EKG, TTE, CT CAP, brain MRI, bone scan being done today. Plan is to do the sitravatinib PK study next week and start nivolumab the week after. Restage after 2 cycles (q28 day cycles).     R shoulder pain, L pleural pain, sternal pain, L shoulder pain: L shoulder pain from DJD and flares intermittently. R shoulder pain is referred pain from the liver mets and is controlled with lidocaine patches prn over the liver. L pleural pain from mets, resolved. Sternal pain not clear, will wait for bone scan and CT later today. Continue lidocaine patch, oxycodone prn.     COPD, increased SOB, h/o malignant L pleural effusion:  I suspect the L effusion has reaccumulated based on exam today. Wait for CT and thoracentesis if effusion is large enough. ADDENDUM 6/12/20: More pleural based disease, but loculated effusion is the same.     Cough: Intermittent    SKYLER: Using a nasal device    Contrast reaction: Hasn't been taking methylpred actually and has been fine.    Social: Had planned to travel to the [place] in [place] this summer from the end of July until 8/5, about 2-3 weeks. I support canceling and completed a form for him today. I have already sent a letter to him as well.     ADDENDUM: 6/12/20: Baseline scans reviewed. Brain MRI shows tiny cerebellar met. Discussed with Dr. Zamorano. She can see him and treat Monday. Will move out start date 14 days from Mon.     A total of 50 minutes was spent with the patient, >50% of which was spent in counseling and coordination of care.    Susan Muller MD  Assistant    Hematology, Oncology and Transplantation

## 2020-06-11 NOTE — NURSING NOTE
Chief Complaint   Patient presents with     Port Draw     Labs drawn via PORT by RN in lab. VS taken.        Sam Villalobos RN,

## 2020-06-11 NOTE — LETTER
6/11/2020         RE: Kentrell Kirkpatrick  [address] [facility] [address]  [place] [address]        Dear Colleague,    Thank you for referring your patient, Kentrell Kirkpatrick, to the [facility]. Please see a copy of my visit note below.    EALTH  [facility]    FOLLOW-UP VISIT NOTE    PATIENT NAME: Kentrell Kirkpatrick MRN # [id number]  DATE OF VISIT: June 11, 2020 YOB: 1950    CANCER TYPE: NSCLC, adenocarcinoma  STAGE: IV  ECOG PS: 1    Cancer Staging  Non-small cell lung cancer, left (H)  Staging form: Lung, AJCC 8th Edition  - Clinical stage from 3/11/2019: Stage IV (cT1c, cN3, pM1c) - Signed by Susan Muller MD on 3/11/2019    PD-L1: <1%  Lung panel: 3/1/19 on [id number] A1 and [id number] A1. Negative  NGS: [employer] 360 sent 2/28/19 negative for actionable mutations. SMAD4 E538, TP53 H179R, SMO A327G. MSI not high    SUMMARY  6/27/18 CT chest w/contrast to re-evaluate aortic aneurysm: 8 mm spiculated KEATON nodule, 3 mm RML nodule unchanged from 3/15/17 and 2/25/16 scans  2/4/19 Presented to PCP with fairly rapid onset of shortness of breath. Given albuterol  2/19/18 CXR and CT chest w/contrast. Large left effusion  2/20/19 L thoracentesis (Dr. Rodriguez), 1180 cc  3/1/19 L pleurx (Dr. Rodriguez)  3/13/19 C1 carboplatin pemetrexed pembrolizumab  3/15/19 L supraclavicular LN bx (IR, FNA). Path: lung adenocarcinoma  4/3/19 C2 carboplatin pemetrexed pembrolizumab (total 4 cycles)  4/15/19 FEV1 2.11 (61%), FVC 3.38 (73%), DLCO 27.7, 67% (59%)  4/18/19 TPA. Drained 900 cc after it got unclotted  5/9/19 Pleurx removed  6/5~8/28/19 Maintenance pemetrexed + pembrolizumab x 5 cycles --> PD  9/20/19~4/29/20 C1-8 docetaxel 60 mg/m2 + ramucirumab. Had extravasation w/C2 10/11/19. C4 docetaxel ramucirumab delayed 1 week 2/2019 due to URI and trip. C5-8 docetaxel reduced to 50 mg/m2 due to excessive fatigue  9/28/19 UC for bronchitis. Levofloxacin  10/4/19 Brain MRI - routine  rescreening - negative  10/17/19 Port  10/23/19 Recurrent cough. Saw PCP. Azithromycin  10/29/19 Prednisone 40 mg once, then 20 mg daily x 5 days, then 10 mg daily x 5 days for acute bronchitis   12/12/19 ED for bronchitis. Had some chills prior to going to the ED  3/3/20 Treated for pansinusitis with amox-calv x 14 days and fluticasone nasal spray  3/31/20 CT CAP and bone scan. Some PD. No bone mets.   5/6/20 Brain MRI. Negative  5/6/20 CT CAP.   4/1/20~4/29/20 C1-2 gemcitabine 1000 mg/m2 D1, 8 --> PD  5/30/20 [facility] ED. Doxy, etc      SUBJECTIVE  Mr. Kirkpatrick returns today for follow up of metastatic lung adenocarcinoma to go through the consent process for the sitravatinib + nivolumab trial. Doing ok. Having sternal chest pain in the bone there, right at the bottom of the sternum, intermittent, fairly sharp when it happens. Tried putting a lidocaine patch on it without much relief, but oxycodone helped. The left shoulder continues to intermittently act up. PT is scheduled to start 6/25. The right shoulder is fine. He continues putting lidocaine patch on his RUQ but is only as needed. Still endorses some shortness of breath, which was the case when we had our video visit last week and is unchanged since then. Gets winded walking up stairs. Uses albuterol neb intermittently, and not using albuterol inhaler. No other new problems.   We had his wife Pat on the phone with us.     PAST MEDICAL HISTORY  Lung adenocarcinoma as above  L5 disk herniation. Epidural injection 5/22/18. Improved dramatically with chiropracter in the past.   BPH  Ascending aortic aneurysm  SKYLER not on CPAP, couldn't tolerate, so using dental device    4/15/19 PFT. FEV1 2.11 (61%), FVC 4.58    CURRENT OUTPATIENT MEDICATIONS  Current Outpatient Medications   Medication Sig Dispense Refill     albuterol (PROAIR HFA/PROVENTIL HFA/VENTOLIN HFA) 108 (90 Base) MCG/ACT inhaler Inhale 2 puffs into the lungs every 4 hours (while awake) 1 Inhaler 0      albuterol (PROVENTIL) (2.5 MG/3ML) 0.083% neb solution Take 1 vial (2.5 mg) by nebulization every 4 hours as needed for shortness of breath / dyspnea or wheezing 100 vial 11     calcium polycarbophil (FIBERCON) 625 MG tablet Take 2 tablets by mouth daily       finasteride (PROSCAR) 5 MG tablet TAKE 1 TABLET BY MOUTH EVERY DAY 90 tablet 1     LORazepam (ATIVAN) 0.5 MG tablet Take 1 tablet (0.5 mg) by mouth every 6 hours as needed (Nausea/Vomiting) 30 tablet 3     Melatonin 10 MG TABS tablet Take 10 mg by mouth nightly as needed for sleep       mometasone-formoterol (DULERA) 100-5 MCG/ACT inhaler Inhale 2 puffs into the lungs 2 times daily (Patient taking differently: Inhale 2 puffs into the lungs daily ) 1 Inhaler 11     MULTI VITAMIN MENS OR TABS 1 TABLET DAILY       ondansetron (ZOFRAN) 8 MG tablet Take 1 tablet (8 mg) by mouth every 8 hours as needed for nausea 30 tablet 11     oxyCODONE (ROXICODONE) 5 MG tablet Take 1 tablet (5 mg) by mouth every 4 hours as needed for severe pain 60 tablet 0     prochlorperazine (COMPAZINE) 10 MG tablet Take 1 tablet (10 mg) by mouth every 6 hours as needed (Nausea/Vomiting) 30 tablet 11     tamsulosin (FLOMAX) 0.4 MG capsule TAKE 1 CAPSULE BY MOUTH EVERY DAY 90 capsule 1     tiotropium (SPIRIVA HANDIHALER) 18 MCG inhaled capsule Inhale 1 capsule (18 mcg) into the lungs daily 1 capsule 11     ALLERGIES  No Known Allergies    REVIEW OF SYSTEMS  As above in the HPI, o/w complete 12-point ROS was negative.    PHYSICAL EXAM  /84   Pulse 86   Temp 98  F (36.7  C) (Oral)   Resp 16   Wt 78 kg (172 lb)   SpO2 98%   BMI 22.69 kg/m    Wt Readings from Last 3 Encounters:   06/11/20 78 kg (172 lb)   05/30/20 79.4 kg (175 lb)   05/29/20 81.5 kg (179 lb 11.2 oz)     GEN: NAD  HEENT: EOMI, no icterus, injection or pallor. Oropharynx clear.  LUNGS: decreased breath sounds on the left, and dullness to percussion. Right is clear.   CV: regular, no murmurs, rubs, or gallops  ABDOMEN:  soft, non-tender, non-distended, normal bowel sounds  EXT: warm, well perfused, no edema  NEURO: alert  SKIN: no rashes    LABORATORY AND IMAGING STUDIES  Results for MAYA ELLER (MRN [id number]) as of 6/11/2020 12:32   6/11/2020 10:15 6/11/2020 10:16   Sodium 138    Potassium 3.9    Chloride 105    Carbon Dioxide 26    Urea Nitrogen 21    Creatinine 1.10    GFR Estimate 67    GFR Estimate If Black 78    Calcium 9.3    Anion Gap 8    Magnesium 2.4 (H)    Albumin 3.5    Protein Total 7.6    Bilirubin Total 1.0    Alkaline Phosphatase 100    ALT 19    AST 18    Amylase 484 (H)    Lipase 38 (L)    T4 Free 1.22    TSH 3.32    Uric Acid 5.8    Glucose 80    WBC 8.9    Hemoglobin 11.4 (L)    Hematocrit 35.9 (L)    Platelet Count 303    RBC Count 4.06 (L)    MCV 88    MCH 28.1    MCHC 31.8    RDW 17.2 (H)    Diff Method Automated Method    % Neutrophils 69.4    % Lymphocytes 15.7    % Monocytes 10.5    % Eosinophils 3.5    % Basophils 0.6    % Immature Granulocytes 0.3    Nucleated RBCs 0    Absolute Neutrophil 6.2    Absolute Lymphocytes 1.4    Absolute Monocytes 0.9    Absolute Eosinophils 0.3    Absolute Basophils 0.1    Abs Immature Granulocytes 0.0    Absolute Nucleated RBC 0.0    INR 1.14    PTT 38 (H)    Color Urine  Yellow   Appearance Urine  Clear   Glucose Urine  Negative   Bilirubin Urine  Negative   Ketones Urine  Negative   Specific Gravity Urine  1.009   pH Urine  6.0   Protein Albumin Urine  Negative   Urobilinogen mg/dL  0.0   Nitrite Urine  Negative   Blood Urine  Negative   Leukocyte Esterase Urine  Negative   Source  Midstream Urine   WBC Urine  <1   RBC Urine  0   Mucous Urine  Present (A)      Imaging was personally reviewed     ASSESSMENT AND PLAN  NSCLC, adenocarcinoma, PD-L1 <1%: We went through the informed consent process today. We reviewed the purpose of the trial, voluntary nature of participation, ability to drop out for any reason, reasons for discontinuing study treatment, logistics of  study treatment, enrollment into the free-base formulation sub study, disease evaluation every 8 weeks, and potential risks associated with the drugs, provision of tumor bx material to the sponsor, risks, [place] law, mechanism of action of sitravatinib. We discussed the specific risk of bleeding at length. Our backup plan will be pursuing pembrolizumab + bevacizumab, which insurance will likely decline. We're still waiting to hear back that nivolumab will be covered by insurance for the study. Written informed consent was provided by Edy today and a copy of the consent form given to him. HIPAA form also signed. See Digna Burton's separate note as well. EKG, TTE, CT CAP, brain MRI, bone scan being done today. Plan is to do the sitravatinib PK study next week and start nivolumab the week after. Restage after 2 cycles (q28 day cycles).     R shoulder pain, L pleural pain, sternal pain, L shoulder pain: L shoulder pain from DJD and flares intermittently. R shoulder pain is referred pain from the liver mets and is controlled with lidocaine patches prn over the liver. L pleural pain from mets, resolved. Sternal pain not clear, will wait for bone scan and CT later today. Continue lidocaine patch, oxycodone prn.     COPD, increased SOB, h/o malignant L pleural effusion:  I suspect the L effusion has reaccumulated based on exam today. Wait for CT and thoracentesis if effusion is large enough. ADDENDUM 6/12/20: More pleural based disease, but loculated effusion is the same.     Cough: Intermittent    SKYLER: Using a nasal device    Contrast reaction: Hasn't been taking methylpred actually and has been fine.    Social: Had planned to travel to the [place] in [place] this summer from the end of July until 8/5, about 2-3 weeks. I support canceling and completed a form for him today. I have already sent a letter to him as well.     ADDENDUM: 6/12/20: Baseline scans reviewed. Brain MRI shows tiny cerebellar met.  Discussed with Dr. Zamorano. She can see him and treat Monday. Will move out start date 14 days from Mon.     A total of 50 minutes was spent with the patient, >50% of which was spent in counseling and coordination of care.    Susan Muller MD    Hematology, Oncology and Transplantation    Again, thank you for allowing me to participate in the care of your patient.        Sincerely,        Susan Muller MD

## 2020-06-11 NOTE — NURSING NOTE
EKG done, patient tolerated well.    Patricia Bustillos CMA ([facility])

## 2020-06-11 NOTE — NURSING NOTE
[id number]: Informed Consent Note     The consent form, including purpose, risks and benefits, was reviewed with Kentrell Kirkpatrick, and all questions were answered before he signed the consent form. The patient understands that the study involves an active treatment phase as well as a post-treatment follow up phase.     Present during the discussion were Dr. Yohana Snow, myself. His wife Audrey was present via phone call on speaker for the entire conversation. A copy of the signed form was provided to the patient. No procedures specific to this study were performed prior to the patient signing the consent form.    Consent Version Date: 08-APR-2020  Consent obtained by: Digna Burton and Dr. Muller     Date: 11-JUN-2020  HIPAA authorization signed?: yes  HIPAA authorization version date: 10-OCT-2018    Digna Burton RN    Form 503.03.01 (Version 2)     Effective date: 01AUG2018     Next Review Date: 01AUG2020

## 2020-06-11 NOTE — NURSING NOTE
"Oncology Rooming Note    June 11, 2020 7:41 AM   Kentrell Kirkpatrick is a 69 year old male who presents for:    Chief Complaint   Patient presents with     Oncology Clinic Visit     Return Lung Cancer     Initial Vitals: /84   Pulse 86   Temp 98  F (36.7  C) (Oral)   Resp 16   Wt 78 kg (172 lb)   SpO2 98%   BMI 22.69 kg/m   Estimated body mass index is 22.69 kg/m  as calculated from the following:    Height as of 5/30/20: 1.854 m (6' 1\").    Weight as of this encounter: 78 kg (172 lb). Body surface area is 2 meters squared.  Mild Pain (3) Comment: breast bone   No LMP for male patient.  Allergies reviewed: Yes  Medications reviewed: Yes    Medications: Medication refills not needed today.  Pharmacy name entered into emere: [id number] - [place], [place] - [address]    Clinical concerns: intermittent breast bone pain along with left shoulder pain.        Patricia Bustillos, PADMA              "

## 2020-06-12 NOTE — PROGRESS NOTES
"  HPI    If during the course of the call the physician/provider feels a video visit is not appropriate, you will not be charged for this service.\"    Patient has given verbal consent for Video visit? Yes    Will anyone else be joining your video visit? No        Video-Visit Details    Type of service:  Video Visit    Originating Location (pt. Location): Home    Distant Location (provider location):  [facility]     Platform used for Video Visit: [facility]    INITIAL PATIENT ASSESSMENT    Diagnosis: Lung cancer with ne brain mets    Prior radiation therapy: None    Prior chemotherapy:   Protocol: See pt chart, Dr. Vergara  Facility: [facility]  Dates: May 2020    Prior hormonal therapy:No    Pain Eval:  Current history of pain associated with this visit:   Current: dull and aching  Location: Lt shoulder and ribs  Treatment: Oxycodone BID    Psychosocial  Living arrangements: With wife  Fall Risk: independent  Falls Risk Screening Questions - Consult    1. Have you fallen in the past one week?  No.  2. Have you felt unsteady when walking or standing in the past one week?  No.     referral needs: Not needed    Advanced Directive: No, working   Implantable Cardiac Device? No    Nurse face-to-face time: Level 3:  10 min phone time    Review of Systems   Constitutional: Positive for weight loss (Last week dropped about 10 lbs. eating well). Negative for chills, diaphoresis, fever and malaise/fatigue.   HENT: Negative for ear pain, nosebleeds and sore throat.    Eyes: Negative for blurred vision, double vision and pain.   Respiratory: Positive for shortness of breath (with activity). Negative for cough.    Cardiovascular: Negative for chest pain and leg swelling.   Gastrointestinal: Negative for blood in stool, constipation, diarrhea, nausea and vomiting.   Genitourinary: Negative for frequency, hematuria and urgency.   Musculoskeletal: Positive for joint pain (Lt shoulder and ribs, see pain " note.). Negative for back pain, falls and neck pain.   Skin: Negative for rash.   Neurological: Negative for dizziness, tingling, seizures and headaches.   Endo/Heme/Allergies: Does not bruise/bleed easily.   Psychiatric/Behavioral: Negative for depression. The patient is not nervous/anxious and does not have insomnia.

## 2020-06-12 NOTE — PROGRESS NOTES
"  Kentrell Kirkpatrick is a 69 year old male who is being evaluated via a billable video visit.      The patient has been notified of following:     \"This video visit will be conducted via a call between you and your physician/provider. We have found that certain health care needs can be provided without the need for an in-person physical exam.  This service lets us provide the care you need with a video conversation.  If a prescription is necessary we can send it directly to your pharmacy.  If lab work is needed we can place an order for that and you can then stop by our lab to have the test done at a later time.    Video visits are billed at different rates depending on your insurance coverage.  Please reach out to your insurance provider with any questions.    If during the course of the call the physician/provider feels a video visit is not appropriate, you will not be charged for this service.\"    Patient has given verbal consent for Video visit? Yes    Will anyone else be joining your video visit? No        Video-Visit Details    Type of service:  Video Visit    12 minutes     Originating Location (pt. Location): Home    Distant Location (provider location):  [facility]     Platform used for Video Visit: [employer]    Consultation Note    Name: Kentrell Kirkpatrick MRN: [id number]   : 1950   Date of Service: 2020 Referring: Dr. Muller     Reason for consultation: stage IVB NSCLC with brain metastasis    History of Present Illness   Mr. 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    Regarding his oncologic history, he has a history of multiple lung complaints including incidental findings of.  For instance, he underwent CT chest on 2018 for evaluation of an aortic aneurysm and was found incidentally to have a 8 " mm spiculated left nodule.  On 2/4/2018, he presented to his primary care physician with subacute onset of shortness of breath and was given albuterol.  It was not until chest x-ray and chest CT on 2/19/2019 that showed a large left pleural effusion that further work-up was done.  He underwent thoracentesis on 2/20/2019 with pleural fluid positive for adenocarcinoma ([id number]).     MRI brain 3/7/2019 was negative for metastasis.  PET/CT on 3/8/2019 showed a hypermetabolic lesion in the left lower lobe with surrounding atelectasis along with diffuse left visceral and parietal pleural metastases, metastatic adenopathy involving the left supraclavicular, left internal mammary, left hilar, and bilateral mediastinal chains.  There were also hypermetabolic lesions in the left adrenal gland and right upper lobe.    He was started on systemic therapy with carboplatin, pemetrexed, and pembrolizumab on 3/13/2019 and received a total of 4 cycles.  Left supraclavicular lymph node biopsy on 3/15/2019 ([id number]) showed non-small cell carcinoma, favoring adenocarcinoma of pulmonary origin.    Between June and August 2019 he was continued on maintenance pemetrexed and pembrolizumab for total of 5 cycles when imaging showed progressive disease.  He was therefore started on docetaxel and ramucirbumab on 9/20/2019.     CT CAP and bone scan on 3/31/2020 showed progression of disease without bone metastases.  He was then started on gemcitabine on 4/1/2020 but stopped after 2 cycles due to further progression.      As part of work-up for a clinical trial, he underwent CT CAP on 6/11/2020 that showed progressive disease with increased size of bilateral diffuse lung, left pleural, and retroperitoneal lymph node metasases and worsening of 20 liver metastasis.  There were metastatic mediastinal lymph nodes and his known left adrenal metastasis noted as well.  Bone scan on 6/11/2020 also showed a new left sacral ala metastasis.  MRI brain on  6/11/2020 showed a contrast-enhancing 5 x 6 mm lesion in the left cerebellar hemisphere, visible on prior MRI.    Due to the new finding of intracranial metastasis, he was referred to radiation oncology for consideration of gamma knife stereotactic radiosurgery.  On interview today, he feels well.  He has no neurologic symptoms.      He does have migratory pains in the ribs and shoulder, but a recent bone scan found no evidence of metatatic disease in those sited.       PAST MEDICAL HISTORY  Lung adenocarcinoma as above  L5 disk herniation. Epidural injection 5/22/18. Improved dramatically with chiropracter in the past.   BPH  Ascending aortic aneurysm  SKYLER not on CPAP, couldn't tolerate, so using dental     Past Medical History:    Lung adenocarcinoma as above    L5 disc herniation    BPH    Ascending aortic aneurysm    SKYLER    Chemotherapy History:  As per HPI    Radiation History:  Denies    Pregnant: Not Applicable  Implanted Cardiac Devices: No    Medications:  Current Outpatient Medications   Medication     albuterol (PROAIR HFA/PROVENTIL HFA/VENTOLIN HFA) 108 (90 Base) MCG/ACT inhaler     albuterol (PROVENTIL) (2.5 MG/3ML) 0.083% neb solution     calcium polycarbophil (FIBERCON) 625 MG tablet     finasteride (PROSCAR) 5 MG tablet     LORazepam (ATIVAN) 0.5 MG tablet     Melatonin 10 MG TABS tablet     mometasone-formoterol (DULERA) 100-5 MCG/ACT inhaler     MULTI VITAMIN MENS OR TABS     ondansetron (ZOFRAN) 8 MG tablet     oxyCODONE (ROXICODONE) 5 MG tablet     prochlorperazine (COMPAZINE) 10 MG tablet     tamsulosin (FLOMAX) 0.4 MG capsule     tiotropium (SPIRIVA HANDIHALER) 18 MCG inhaled capsule     No current facility-administered medications for this visit.      Facility-Administered Medications Ordered in Other Visits   Medication     heparin 100 UNIT/ML injection 5 mL     iohexol (OMNIPAQUE) 300 mg/mL injection 10 mL     sodium chloride (PF) 0.9% PF flush 20 mL     Allergies:   No Known  Allergies    Social History:  Social History     Socioeconomic History     Marital status:      Spouse name: Audrey     Number of children: 0     Years of education: Not on file     Highest education level: Not on file   Occupational History     Not on file   Social Needs     Financial resource strain: Not on file     Food insecurity     Worry: Not on file     Inability: Not on file     Transportation needs     Medical: Not on file     Non-medical: Not on file   Tobacco Use     Smoking status: Former Smoker     Packs/day: 1.50     Years: 43.00     Pack years: 64.50     Types: Cigarettes     Start date: 1969     Last attempt to quit: 2007     Years since quittin.4     Smokeless tobacco: Never Used   Substance and Sexual Activity     Alcohol use: Yes     Comment: socially     Drug use: No     Sexual activity: Yes     Partners: Female   Lifestyle     Physical activity     Days per week: Not on file     Minutes per session: Not on file     Stress: Not on file   Relationships     Social connections     Talks on phone: Not on file     Gets together: Not on file     Attends Mosque service: Not on file     Active member of club or organization: Not on file     Attends meetings of clubs or organizations: Not on file     Relationship status: Not on file     Intimate partner violence     Fear of current or ex partner: Not on file     Emotionally abused: Not on file     Physically abused: Not on file     Forced sexual activity: Not on file   Other Topics Concern     Parent/sibling w/ CABG, MI or angioplasty before 65F 55M? Yes     Comment: Father - mid 40's   Social History Narrative     Not on file       Family History:  Family History   Problem Relation Age of Onset     Heart Disease Father      Coronary Artery Disease Father      Prostate Cancer Other      Prostate Cancer Brother      Colon Cancer No family hx of        Review of Systems   A 10-point review of systems was performed. Pertinent findings  are noted in the HPI and nursing note.    Physical Exam   GENERAL: Healthy, alert and no distress  EYES: Eyes grossly normal to inspection.  No discharge or erythema, or obvious scleral/conjunctival abnormalities.  RESP: No audible wheeze, cough, or visible cyanosis.  No visible retractions or increased work of breathing.    SKIN: Visible skin clear. No significant rash, abnormal pigmentation or lesions.  NEURO: Cranial nerves grossly intact.  Mentation and speech appropriate for age.  PSYCH: Mentation appears normal, affect normal/bright, judgement and insight intact, normal speech and appearance well-groomed.    ECOG Status: 0    Imaging/Path/Labs   Imaging: reviewed as per HPI    Path: reviewed    Labs: reviewed    Assessment    Mr. Kirkpatrick is a 69 year old male with metastatic non-small cell lung cancer that has progressed through multiple lines of systemic therapy and recent diagnosis of a single brain metastasis who presents for discussion of stereotactic radiosurgery.  The metastasis is small and he is currently asymptomatic, but intracranial disease control is a requirement for his clinical trial.    Plan   Discussed the management options for his intracranial metastasis including observation port of care, surgical resection, or radiotherapy.  Radiotherapy options include whole brain radiotherapy for gamma knife stereotactic radiosurgery.  Given the small size, asymptomatic nature, and low number (1) of brain metastases, recommended gamma knife stereotactic radiosurgery.  Explained the risks, benefits, and logistics of this treatment.  His questions were answered to his stated satisfaction, and he agreed to proceed with therapy.    He will return for simulation for radiotherapy planning next week.    I was personally present with the resident during the history and exam.  I   discussed the case with the resident and agree with the findings and plan   of care as documented in the resident's note.  As a  requirement of the clinical trial CNS disease must be controlled.   For that reason I would recommend SRS to this small asymptomatic lesion.   The alternative would be to wait to treat it for a few weeks to see if it responds to the next line chemotherapy. My preference would be to have it treated now.     I plan to treat with a MASK and a single fraction of SRS using the gammaknife    Brittney Zamorano   [phone]     CC  Patient Care Team:  Donald Shell DO as PCP - General (Family Practice)  Donald Shell DO as Assigned PCP  Susan Muller MD as Referring Physician (Hematology & Oncology)  Draleen Ruiz RN as Specialty Care Coordinator (Hematology & Oncology)

## 2020-06-12 NOTE — LETTER
"    6/12/2020         RE: Kentrell Kirkpatrick  [address] [facility] [address]        Dear Colleague,    Thank you for referring your patient, Kentrell Kirkpatrick, to the [facility]. Please see a copy of my visit note below.      HPI    If during the course of the call the physician/provider feels a video visit is not appropriate, you will not be charged for this service.\"    Patient has given verbal consent for Video visit? Yes    Will anyone else be joining your video visit? No        Video-Visit Details    Type of service:  Video Visit    Originating Location (pt. Location): Home    Distant Location (provider location):  [facility]     Platform used for Video Visit: [employer]    INITIAL PATIENT ASSESSMENT    Diagnosis: Lung cancer with ne brain mets    Prior radiation therapy: None    Prior chemotherapy:   Protocol: See pt chart, Dr. Vergara  Facility: [facility]  Dates: May 2020    Prior hormonal therapy:No    Pain Eval:  Current history of pain associated with this visit:   Current: dull and aching  Location: Lt shoulder and ribs  Treatment: Oxycodone BID    Psychosocial  Living arrangements: With wife  Fall Risk: independent  Falls Risk Screening Questions - Consult    1. Have you fallen in the past one week?  No.  2. Have you felt unsteady when walking or standing in the past one week?  No.     referral needs: Not needed    Advanced Directive: No, working   Implantable Cardiac Device? No    Nurse face-to-face time: Level 3:  10 min phone time    Review of Systems   Constitutional: Positive for weight loss (Last week dropped about 10 lbs. eating well). Negative for chills, diaphoresis, fever and malaise/fatigue.   HENT: Negative for ear pain, nosebleeds and sore throat.    Eyes: Negative for blurred vision, double vision and pain.   Respiratory: Positive for shortness of breath (with activity). Negative for cough.    Cardiovascular: Negative for chest pain and " "leg swelling.   Gastrointestinal: Negative for blood in stool, constipation, diarrhea, nausea and vomiting.   Genitourinary: Negative for frequency, hematuria and urgency.   Musculoskeletal: Positive for joint pain (Lt shoulder and ribs, see pain note.). Negative for back pain, falls and neck pain.   Skin: Negative for rash.   Neurological: Negative for dizziness, tingling, seizures and headaches.   Endo/Heme/Allergies: Does not bruise/bleed easily.   Psychiatric/Behavioral: Negative for depression. The patient is not nervous/anxious and does not have insomnia.                    Kentrell Kirkpatrick is a 69 year old male who is being evaluated via a billable video visit.      The patient has been notified of following:     \"This video visit will be conducted via a call between you and your physician/provider. We have found that certain health care needs can be provided without the need for an in-person physical exam.  This service lets us provide the care you need with a video conversation.  If a prescription is necessary we can send it directly to your pharmacy.  If lab work is needed we can place an order for that and you can then stop by our lab to have the test done at a later time.    Video visits are billed at different rates depending on your insurance coverage.  Please reach out to your insurance provider with any questions.    If during the course of the call the physician/provider feels a video visit is not appropriate, you will not be charged for this service.\"    Patient has given verbal consent for Video visit? Yes    Will anyone else be joining your video visit? No        Video-Visit Details    Type of service:  Video Visit    12 minutes     Originating Location (pt. Location): Home    Distant Location (provider location):  [facility]     Platform used for Video Visit: [employer]    Consultation Note    Name: Kentrell Kirkpatrick MRN: [id number]   : 1950   Date of Service: 2020 " Referring: Dr. Muller     Reason for consultation: stage IVB NSCLC with brain metastasis    History of Present Illness   Mr. 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    Regarding his oncologic history, he has a history of multiple lung complaints including incidental findings of.  For instance, he underwent CT chest on 6/27/2018 for evaluation of an aortic aneurysm and was found incidentally to have a 8 mm spiculated left nodule.  On 2/4/2018, he presented to his primary care physician with subacute onset of shortness of breath and was given albuterol.  It was not until chest x-ray and chest CT on 2/19/2019 that showed a large left pleural effusion that further work-up was done.  He underwent thoracentesis on 2/20/2019 with pleural fluid positive for adenocarcinoma ([id number]).     MRI brain 3/7/2019 was negative for metastasis.  PET/CT on 3/8/2019 showed a hypermetabolic lesion in the left lower lobe with surrounding atelectasis along with diffuse left visceral and parietal pleural metastases, metastatic adenopathy involving the left supraclavicular, left internal mammary, left hilar, and bilateral mediastinal chains.  There were also hypermetabolic lesions in the left adrenal gland and right upper lobe.    He was started on systemic therapy with carboplatin, pemetrexed, and pembrolizumab on 3/13/2019 and received a total of 4 cycles.  Left supraclavicular lymph node biopsy on 3/15/2019 ([id number]) showed non-small cell carcinoma, favoring adenocarcinoma of pulmonary origin.    Between June and August 2019 he was continued on maintenance pemetrexed and pembrolizumab for total of 5 cycles when imaging showed progressive disease.  He was therefore started on docetaxel and ramucirbumab on 9/20/2019.     CT CAP and bone scan on 3/31/2020  showed progression of disease without bone metastases.  He was then started on gemcitabine on 4/1/2020 but stopped after 2 cycles due to further progression.      As part of work-up for a clinical trial, he underwent CT CAP on 6/11/2020 that showed progressive disease with increased size of bilateral diffuse lung, left pleural, and retroperitoneal lymph node metasases and worsening of 20 liver metastasis.  There were metastatic mediastinal lymph nodes and his known left adrenal metastasis noted as well.  Bone scan on 6/11/2020 also showed a new left sacral ala metastasis.  MRI brain on 6/11/2020 showed a contrast-enhancing 5 x 6 mm lesion in the left cerebellar hemisphere, visible on prior MRI.    Due to the new finding of intracranial metastasis, he was referred to radiation oncology for consideration of gamma knife stereotactic radiosurgery.  On interview today, he feels well.  He has no neurologic symptoms.      He does have migratory pains in the ribs and shoulder, but a recent bone scan found no evidence of metatatic disease in those sited.       PAST MEDICAL HISTORY  Lung adenocarcinoma as above  L5 disk herniation. Epidural injection 5/22/18. Improved dramatically with chiropracter in the past.   BPH  Ascending aortic aneurysm  SKYLER not on CPAP, couldn't tolerate, so using dental     Past Medical History:    Lung adenocarcinoma as above    L5 disc herniation    BPH    Ascending aortic aneurysm    SKYLER    Chemotherapy History:  As per HPI    Radiation History:  Denies    Pregnant: Not Applicable  Implanted Cardiac Devices: No    Medications:  Current Outpatient Medications   Medication     albuterol (PROAIR HFA/PROVENTIL HFA/VENTOLIN HFA) 108 (90 Base) MCG/ACT inhaler     albuterol (PROVENTIL) (2.5 MG/3ML) 0.083% neb solution     calcium polycarbophil (FIBERCON) 625 MG tablet     finasteride (PROSCAR) 5 MG tablet     LORazepam (ATIVAN) 0.5 MG tablet     Melatonin 10 MG TABS tablet     mometasone-formoterol  (DULERA) 100-5 MCG/ACT inhaler     MULTI VITAMIN MENS OR TABS     ondansetron (ZOFRAN) 8 MG tablet     oxyCODONE (ROXICODONE) 5 MG tablet     prochlorperazine (COMPAZINE) 10 MG tablet     tamsulosin (FLOMAX) 0.4 MG capsule     tiotropium (SPIRIVA HANDIHALER) 18 MCG inhaled capsule     No current facility-administered medications for this visit.      Facility-Administered Medications Ordered in Other Visits   Medication     heparin 100 UNIT/ML injection 5 mL     iohexol (OMNIPAQUE) 300 mg/mL injection 10 mL     sodium chloride (PF) 0.9% PF flush 20 mL     Allergies:   No Known Allergies    Social History:  Social History     Socioeconomic History     Marital status:      Spouse name: Audrey     Number of children: 0     Years of education: Not on file     Highest education level: Not on file   Occupational History     Not on file   Social Needs     Financial resource strain: Not on file     Food insecurity     Worry: Not on file     Inability: Not on file     Transportation needs     Medical: Not on file     Non-medical: Not on file   Tobacco Use     Smoking status: Former Smoker     Packs/day: 1.50     Years: 43.00     Pack years: 64.50     Types: Cigarettes     Start date: 1969     Last attempt to quit: 2007     Years since quittin.4     Smokeless tobacco: Never Used   Substance and Sexual Activity     Alcohol use: Yes     Comment: socially     Drug use: No     Sexual activity: Yes     Partners: Female   Lifestyle     Physical activity     Days per week: Not on file     Minutes per session: Not on file     Stress: Not on file   Relationships     Social connections     Talks on phone: Not on file     Gets together: Not on file     Attends Christian service: Not on file     Active member of club or organization: Not on file     Attends meetings of clubs or organizations: Not on file     Relationship status: Not on file     Intimate partner violence     Fear of current or ex partner: Not on file      Emotionally abused: Not on file     Physically abused: Not on file     Forced sexual activity: Not on file   Other Topics Concern     Parent/sibling w/ CABG, MI or angioplasty before 65F 55M? Yes     Comment: Father - mid 40's   Social History Narrative     Not on file       Family History:  Family History   Problem Relation Age of Onset     Heart Disease Father      Coronary Artery Disease Father      Prostate Cancer Other      Prostate Cancer Brother      Colon Cancer No family hx of        Review of Systems   A 10-point review of systems was performed. Pertinent findings are noted in the HPI and nursing note.    Physical Exam   GENERAL: Healthy, alert and no distress  EYES: Eyes grossly normal to inspection.  No discharge or erythema, or obvious scleral/conjunctival abnormalities.  RESP: No audible wheeze, cough, or visible cyanosis.  No visible retractions or increased work of breathing.    SKIN: Visible skin clear. No significant rash, abnormal pigmentation or lesions.  NEURO: Cranial nerves grossly intact.  Mentation and speech appropriate for age.  PSYCH: Mentation appears normal, affect normal/bright, judgement and insight intact, normal speech and appearance well-groomed.    ECOG Status: 0    Imaging/Path/Labs   Imaging: reviewed as per HPI    Path: reviewed    Labs: reviewed    Assessment    Mr. Kirkpatrick is a 69 year old male with metastatic non-small cell lung cancer that has progressed through multiple lines of systemic therapy and recent diagnosis of a single brain metastasis who presents for discussion of stereotactic radiosurgery.  The metastasis is small and he is currently asymptomatic, but intracranial disease control is a requirement for his clinical trial.    Plan   Discussed the management options for his intracranial metastasis including observation port of care, surgical resection, or radiotherapy.  Radiotherapy options include whole brain radiotherapy for gamma knife stereotactic radiosurgery.   Given the small size, asymptomatic nature, and low number (1) of brain metastases, recommended gamma knife stereotactic radiosurgery.  Explained the risks, benefits, and logistics of this treatment.  His questions were answered to his stated satisfaction, and he agreed to proceed with therapy.    He will return for simulation for radiotherapy planning next week.    I was personally present with the resident during the history and exam.  I   discussed the case with the resident and agree with the findings and plan   of care as documented in the resident's note.  As a requirement of the clinical trial CNS disease must be controlled.   For that reason I would recommend SRS to this small asymptomatic lesion.   The alternative would be to wait to treat it for a few weeks to see if it responds to the next line chemotherapy. My preference would be to have it treated now.     I plan to treat with a MASK and a single fraction of SRS using the gammaknife    Brittney SANCHEZ Jaun   [phone]     CC  Patient Care Team:  Donald Shell DO as PCP - General (Family Practice)  Susan Muller MD as Referring Physician (Hematology & Oncology)  Darleen Ruiz RN as Specialty Care Coordinator (Hematology & On

## 2020-06-15 NOTE — PROGRESS NOTES
"[facility], [place]  Radiation Oncology Follow-up Note  Vasyl 15, 2020    Kentrell Kirkpatrick   MRN: [id number]  : 1950     DIAGNOSIS:  NSCLC, clinical IVB     SUBJECTIVE: Mr. Kirkpatrick presents today for consents and simulation for stereotactic radiosurgery. He is feeling well with no new changes. Please see consult note dated 2020 for additional details.     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     20 Gy in a single fraction to the 50% isodose line, will be treated later today    I saw the patient with the resident.  I agree with the resident note and plan of care.      Brittney Zamorano MD        "

## 2020-06-15 NOTE — LETTER
6/15/2020         RE: Kentrell Kirkpatrick  [address] [facility] [place] [address]        Dear Colleague,    Thank you for referring your patient, Kentrell Kirkpatrick, to the [facility], [place], RADIATION ONCOLOGY. Please see a copy of my visit note below.    Gamma Knife Operative Note    MRN: [id number]  Name: Kentrell Kirkpatrick  : 1950    Date of procedure: Marline 15, 2020    Surgeon:  Akin Valdez MD PhD    Pre-operative Diagnosis:   Left cerebellar metastases  Post-operative Diagnosis:  Same    Procedure: Gamma Knife Radiosurgery    Indication: This is a  69 y.o. M with stage IV lung cancer and a  metastasis to the left cerebellum. After case review in the brain tumor conference, the joint recommendation was to treat the patient with radiosurgery. Standard measurements were obtained for coordinate calculation to facilitate SRS delivery.    On the day of the procedure, informed consent was obtained. The treatment is planned on the Gamma plan system based on the MRI taken on the day of the procedure.  Treatment parameters were verified by myself, the treating radiation oncologist, and the physicist. Immobilization was performed using face mask.    The lesion was treated with 20 Gy delivered in a single fraction.  The dose was delivered in a highly conformal fashion. The treatment was performed at the [facility] gamma knife center.     I was present and performed the key portions of this procedure.    Akin Valdez MD PhD

## 2020-06-15 NOTE — LETTER
"    6/15/2020         RE: Kentrell Kirkpatrick  [address] [facility] [address]        Dear Colleague,    Thank you for referring your patient, Kentrell Kirkpatrick, to the [facility], [place], RADIATION ONCOLOGY. Please see a copy of my visit note below.    [facility] [place]  Radiation Oncology Follow-up Note  Vasyl 15, 2020    Kentrell Kirkpatrick   MRN: [id number]  : 1950     DIAGNOSIS:  NSCLC, clinical IVB     SUBJECTIVE: Mr. Kirkpatrick presents today for consents and simulation for stereotactic radiosurgery. He is feeling well with no new changes. Please see consult note dated 2020 for additional details.     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     20 Gy in a single fraction to the 50% isodose line, will be treated later today    I saw the patient with the resident.  I agree with the resident note and plan of care.      Brittney Zamoarno MD      "

## 2020-06-15 NOTE — LETTER
6/15/2020         RE: Kentrell Kirkpatrick  [address] [address] [facility] [place] [address]        Dear Colleague,    Thank you for referring your patient, Kentrell Kirkpatrick, to the [facility], RADIATION ONCOLOGY. Please see a copy of my visit note below.      Name: Kentrell Kirkpatrick  : 1950 Medical Record #: [id number]  Diagnosis: C34.81 Malignant neoplasm of overlapping sites of right bronchus and lung  Date of Treatment: Marline 15, 2020  Referring Physicians: Susan Muller, Tumor Registry    GAMMA KNIFE  PROCEDURE NOTE and TREATMENT SUMMARY    Fraction 1 of 1  Treatment Summary:  Radiation Oncology - Course: 1 Protocol:   Treatment Site Current Dose Modality From To Elapsed Days Fx.  1 lt cerebelum  2,000 cGy [place] 60  6/15/2020  6/15/2020    0      1              DESCRIPTION OF PROCEDURE:    On 6/15/2020 the patient was brought to the [facility] Gamma Knife IconTM suite at [facility] and treated with stereotactic radiotherapy.     The [facility] Gamma Knife IconTM Plan software was used to create a highly conformal dose distribution using the number and size collimators detailed above.     TREATMENT:    The patient was brought to the Gamma Knife suite. A timeout was performed to confirm the correct patient and correct procedure.    Patient was identified by 2 methods.  Site was verified.    The mask was placed and a cone beam CT was done and fused to the previously approved plan.        The physicist and I evaluated the fusion and confirmed the target coverage after auto-registration.      The treatment was delivered using the [facility] Gamma Knife IconTM without complication.      The mask was removed and the patient was discharged home in stable condition.    The patient tolerated the treatment well and had no complications.        Brittney Zamorano MD    CC  Patient Care Team:  Donald Shell DO as PCP - General (Family Practice)  Donald Shell DO as Assigned  PCP  Susan Muller MD as Referring Physician (Hematology & Oncology)  Darleen Ruiz, RN as Specialty Care Coordinator (Hematology & Oncology)  SUSAN MULLER

## 2020-06-16 NOTE — TELEPHONE ENCOUNTER
A call was placed to Edy in follow up to his Gamma Knife treatment on 6/15/20.  Edy denied a headache or any nausea or vomiting.  Edy stated he is tired out as he has not been sleeping well, he sleeps in a chair due to the breathing but then that causes more back pain, so noted the fatigue and back pain are what he is dealing with today.  Edy stated as far as the Gamma Knife was concerned he did not feel he had any untoward side affects from that.

## 2020-06-16 NOTE — PROGRESS NOTES
Name: Kentrell Kirkpatrick  : 1950 Medical Record #: [id number]  Diagnosis: C34.81 Malignant neoplasm of overlapping sites of right bronchus and lung  Date of Treatment: Marline 15, 2020  Referring Physicians: Susan Muller, Tumor Registry    GAMMA KNIFE  PROCEDURE NOTE and TREATMENT SUMMARY    Fraction 1 of 1  Treatment Summary:  Radiation Oncology - Course: 1 Protocol:   Treatment Site Current Dose Modality From To Elapsed Days Fx.  1 lt cerebelum  2,000 cGy [place] 60  6/15/2020  6/15/2020    0      1              DESCRIPTION OF PROCEDURE:    On 6/15/2020 the patient was brought to the [facility] Gamma Knife IconTM suite at [facility] and treated with stereotactic radiotherapy.     The [facility] Gamma Knife IconTM Plan software was used to create a highly conformal dose distribution using the number and size collimators detailed above.     TREATMENT:    The patient was brought to the Gamma Knife suite. A timeout was performed to confirm the correct patient and correct procedure.    Patient was identified by 2 methods.  Site was verified.    The mask was placed and a cone beam CT was done and fused to the previously approved plan.        The physicist and I evaluated the fusion and confirmed the target coverage after auto-registration.      The treatment was delivered using the [facility] Gamma Knife IconTM without complication.      The mask was removed and the patient was discharged home in stable condition.    The patient tolerated the treatment well and had no complications.        Brittney Zamorano MD    CC  Patient Care Team:  Donald Shell DO as PCP - General (Family Practice)  Donald Shell DO as Assigned PCP  Susan Muller MD as Referring Physician (Hematology & Oncology)  Darleen Ruiz RN as Specialty Care Coordinator (Hematology & Oncology)  SUSAN MULLER

## 2020-06-17 NOTE — TELEPHONE ENCOUNTER
Called to patient re symptomatic mychart message regarding ativan rx and abdominal pain. Pt states that the abdominal pain started a few days ago but he feels it is under control.     Will route rx reqest for ativan to Dr Muller

## 2020-06-17 NOTE — PROGRESS NOTES
Gamma Knife Operative Note    MRN: [id number]  Name: Kentrell Kirkpatrick  : 1950    Date of procedure: Marline 15, 2020    Surgeon:  Akin Valdez MD PhD    Pre-operative Diagnosis:   Left cerebellar metastases  Post-operative Diagnosis:  Same    Procedure: Gamma Knife Radiosurgery    Indication: This is a  69 y.o. M with stage IV lung cancer and a  metastasis to the left cerebellum. After case review in the brain tumor conference, the joint recommendation was to treat the patient with radiosurgery. Standard measurements were obtained for coordinate calculation to facilitate SRS delivery.    On the day of the procedure, informed consent was obtained. The treatment is planned on the Gamma plan system based on the MRI taken on the day of the procedure.  Treatment parameters were verified by myself, the treating radiation oncologist, and the physicist. Immobilization was performed using face mask.    The lesion was treated with 20 Gy delivered in a single fraction.  The dose was delivered in a highly conformal fashion. The treatment was performed at the [facility] gamma knife center.     I was present and performed the key portions of this procedure.    Akin Valdez MD PhD

## 2020-06-19 NOTE — TELEPHONE ENCOUNTER
Called Edy to check in after gamma knife Mon. Doing ok. RUQ pain has returned and is taking more oxycodone every 4-5 hours or so. Not tried lidocaine patch. Suggested he resume that and also add diclofenac gel, which he can get at the store over the counter now. Otherwise he's doing ok.     Will send new oxycodone script.     I'm planning to see him Mon 6/29 via video visit and we're planning the PK day 6/30.    Susan Muller MD

## 2020-06-25 NOTE — PROGRESS NOTES
06/25/20 1500   Quick Adds   Type of Visit Initial OP PT Evaluation   General Information   Start of Care Date 06/25/20   Referring Physician Susan Suárez   Orders Evaluate and Treat as Indicated   Additional Orders Cancer rehab OT/PT eval and treat. L shoulder pain, DJD   Order Date 05/27/20   Medical Diagnosis Acute pain of L shoulder,  History of NSCLC   Onset of illness/injury or Date of Surgery 05/27/20  (Date of order)   Precautions/Limitations   (cancer, check labs)   Surgical/Medical history reviewed Yes   Pertinent history of current problem (include personal factors and/or comorbidities that impact the POC) Pt with history of lung cancer dx in Feb 2019. He has undergone chemo therapy and he states that the chemo isn't helping so he will be part of a trial starting next week. He reports that he was doing well when on the maintenance chemo but once that was stopped to allow him to be part of the clinical trial he has felt a decline in his tolerance to exercise - more dyspnea.  He states that shoulder pain started after stopping the chemo and also had sternal pain .  He does not think he had steroids with the chemo treatment - which could account for increased arthritic pain with stopping the meds.  He has a history of L shoulder pain a few years ago which he ewas shown exercises and the pain went away. He has a history of LBP with PT intervention.     Prior level of function comment Independent.  Tolerated walking up to 3 miles a day prior to stopping the chemo.  Worked as pisano/building houses.     Diagnostic Tests MRI   MRI Results Results   MRI results some labrial tears L shoulder joint, arthritic AC joint   Current Community Support Family/friend caregiver   Patient role/Employment history Retired  (did volunteering through [employer] - not recently)   Living environment House[facility]   Home/Community Accessibility Comments no issues.    Current Assistive Devices   (none)   Patient/Family  "Goals Statement States he really isn't sure why he is here today. He has had pulmonary rehab - \"did twice as much as was asked to do\".    General Information Comments sleeping in a recliner with it lying flat due to back pain when lying supine.    Fall Risk Screen   Have you fallen 2 or more times in the past year? No   Have you fallen and had an injury in the past year? No   Is patient a fall risk? No   Abuse Screen (yes response referral indicated)   Feels Unsafe at Home or Work/School no   Feels Threatened by Someone no   Does Anyone Try to Keep You From Having Contact with Others or Doing Things Outside Your Home? no   Physical Signs of Abuse Present no   System Outcome Measures   FACIT Fatigue Subscale (score out of 52). The higher the score, the better the QOL. 18   Pain   Patient currently in pain No   Pain comments shoulder pain comes and goes.  Using an ice pack helps the pain.    Feels kindof sharp when it occurs and indicates to upper traps.     Cognitive Status Examination   Orientation orientation to person, place and time   Level of Consciousness alert   Follows Commands and Answers Questions 100% of the time   Personal Safety and Judgment intact   Memory intact   Integumentary   Integumentary Comments port R chest wall   Posture   Posture Comments forward shoulders and scapula, overuse of upper traps in sitting and also with activity/shoulder motion.  flat lowback with minimal lumbar curve, COM posterior/sitting on ligaments pelvis   Palpation   Palpation muslce tightness L Upper trap and trigger points throughout trap and L cervicals.     Range of Motion (ROM)   ROM Comment Shoulder flexion L 125, R 125, abduction L 130, R 140 limited by postural alignment/GH alignment.  no pain.  IR and ER WNL no pain,   cervical rotation 45 deg R and 55 L, SB 10 degrees B, chin to chest flexion.   Supine shoulder ROM flexion about 160 degrees no pain, shoulder IR/ER no pain, tightness cervical spine expecially mid " cervical with sidebend R and L.    Strength   Strength Comments shoulder flex, abd, empty can 4/5 L and 5/5 R.    Bed Mobility   Bed Mobility Comments IND.  increased back pain with lying supine   Transfer Skills   Transfer Comments ind   Gait   Gait Comments gait without AD, no issues   Balance   Balance Comments denies issues - not specifically tested as pt reports not having problems   Sensory Examination   Sensory Perception Comments denies tingling or numbness into L UE   Modality Interventions   Planned Modality Interventions Comments per therapist discretion   Planned Therapy Interventions   Planned Therapy Interventions transfer training;stretching;strengthening;ROM;joint mobilization   Clinical Impression   Criteria for Skilled Therapeutic Interventions Met yes, treatment indicated   PT Diagnosis L shoulder pain likely secondary to impaired muscle mechanics and posture. Overuse of traps and anterior cervical muscles.    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     Functional limitations due to impairments pain L shoulder/upper quadrant   Clinical Presentation Evolving/Changing   Clinical Presentation Rationale dx of cancer   Clinical Decision Making (Complexity) Low complexity   Therapy Frequency   (recommend 1- 3 sessions in 2 months time. )   Risk & Benefits of therapy have been explained Yes   Patient, Family & other staff in agreement with plan of care   (pt wanting phone f/u rather than schedule another session)   Clinical Impression Comments Pt appears to have pain originating from upper trap, lateral cervical due to posture, overactivity upper trap.   Education regarding possible referred pain to L shoulder from lung cancer, pleural effusions.  However, if alleviated by ice pack likely not secondary to the cancer.    Education Assessment   Preferred Learning Style  Listening;Reading;Demonstration;Pictures/video   Barriers to Learning No barriers   Goal 1   Goal Identifier HEP   Goal Description Pt to be independent in appropriate HEP to improve function and decrease pain L upper quadrant.    Target Date 08/28/20   Goal 2   Goal Identifier Pain   Goal Description Pt to report pain 1 x a week or less and ability to manage pain occuring via postural changes with activities at home.    Target Date 08/28/20   Total Evaluation Time   PT Eval, Low Complexity Minutes (41328) 33

## 2020-06-25 NOTE — PROGRESS NOTES
[facility]        OUTPATIENT PHYSICAL THERAPY FUNCTIONAL EVALUATION  PLAN OF TREATMENT FOR OUTPATIENT REHABILITATION  (COMPLETE FOR INITIAL CLAIMS ONLY)  Patient's Last Name, First Name, M.I.  YOB: 1950  Kentrell Kirkpatrick     Provider's Name   [facility]   Medical Record No.  [id number]     Start of Care Date:  06/25/20   Onset Date:  05/27/20(Date of order)   Type:     _X__PT   ____OT  ____SLP Medical Diagnosis:  Acute L shoulder pain, history of NSCLC     PT Diagnosis:  L shoulder pain likely secondary to impaired muscle mechanics and posture. Overuse of traps and anterior cervical muscles.  Visits from SOC:  1                              __________________________________________________________________________________  Plan of Treatment/Functional Goals:  transfer training, stretching, strengthening, ROM, joint mobilization           GOALS  HEP  Pt to be independent in appropriate HEP to improve function and decrease pain L upper quadrant.   08/28/20    Pain  Pt to report pain 1 x a week or less and ability to manage pain occuring via postural changes with activities at home.   08/28/20                                                                      Therapy Frequency:  (recommend 1- 3 sessions in 2 months time. )   Predicted Duration of Therapy Intervention:  2 months    Jacqui Matthew, PT                                    I CERTIFY THE NEED FOR THESE SERVICES FURNISHED UNDER        THIS PLAN OF TREATMENT AND WHILE UNDER MY CARE     (Physician co-signature of this document indicates review and certification of the therapy plan).                Certification Date From:  06/25/20   Certification Date To:  08/27/20    Referring Provider:  Susan Suárez (Hospitalist)                               Primary Physician to sign cert - Dr. Bennett  Sumaya    Initial Assessment  See Epic Evaluation- Start of Care Date: 06/25/20

## 2020-06-29 NOTE — LETTER
6/29/2020         RE: Kentrell Kirkpatrick  [address]  [place] [address]        Dear Colleague,    Thank you for referring your patient, Kentrell Kirkpatrick, to the [facility]. Please see a copy of my visit note below.    [facility]    FOLLOW-UP VIRTUAL VIDEO VISIT NOTE    PATIENT NAME: Kentrell Kirkpatrick MRN # [id number]  DATE OF VISIT: June 29, 2020 YOB: 1950    CANCER TYPE: NSCLC, adenocarcinoma  STAGE: IV  ECOG PS: 1-2    Cancer Staging  Non-small cell lung cancer, left (H)  Staging form: Lung, AJCC 8th Edition  - Clinical stage from 3/11/2019: Stage IV (cT1c, cN3, pM1c) - Signed by Susan Muller MD on 3/11/2019    PD-L1: <1%  Lung panel: 3/1/19 on [id number] A1 and [id number] A1. Negative  NGS: [employer] 360 sent 2/28/19 negative for actionable mutations. SMAD4 E538, TP53 H179R, SMO A327G. MSI not high    SUMMARY  6/27/18 CT chest w/contrast to re-evaluate aortic aneurysm: 8 mm spiculated KEATON nodule, 3 mm RML nodule unchanged from 3/15/17 and 2/25/16 scans  2/4/19 Presented to PCP with fairly rapid onset of shortness of breath. Given albuterol  2/19/19 CXR and CT chest w/contrast. Large left effusion  2/20/19 L thoracentesis (Dr. Rodriguez), 1180 cc  3/1/19 L pleurx (Dr. Rodriguez)  3/13/19 C1 carboplatin pemetrexed pembrolizumab  3/15/19 L supraclavicular LN bx (IR, FNA). Path: lung adenocarcinoma  4/3/19 C2 carboplatin pemetrexed pembrolizumab (total 4 cycles)  4/15/19 FEV1 2.11 (61%), FVC 3.38 (73%), DLCO 27.7, 67% (59%)  4/18/19 TPA. Drained 900 cc after it got unclotted  5/9/19 Pleurx removed  6/5~8/28/19 Maintenance pemetrexed + pembrolizumab x 5 cycles --> PD  9/20/19~4/29/20 C1-8 docetaxel 60 mg/m2 + ramucirumab. Had extravasation w/C2 10/11/19. C4 docetaxel ramucirumab delayed 1 week 2/2019 due to URI and trip. C5-8 docetaxel reduced to 50 mg/m2 due to excessive fatigue  9/28/19 UC for bronchitis. Levofloxacin  10/4/19 Brain  "MRI - routine rescreening - negative  10/17/19 Port  10/23/19 Recurrent cough. Saw PCP. Azithromycin  10/29/19 Prednisone 40 mg once, then 20 mg daily x 5 days, then 10 mg daily x 5 days for acute bronchitis   12/12/19 ED for bronchitis. Had some chills prior to going to the ED  3/3/20 Treated for pansinusitis with amox-calv x 14 days and fluticasone nasal spray  3/31/20 CT CAP and bone scan. Some PD. No bone mets.   5/6/20 Brain MRI. Negative  5/6/20 CT CAP.   4/1/20~4/29/20 C1-2 gemcitabine 1000 mg/m2 D1, 8 --> PD  5/30/20 [facility] ED. CTA negative for PE. Doxy, etc  6/15/20 Gamma knife x 1 to small L cerebellar met found incidentally at the time of screening for sitra nivo trial    SUBJECTIVE  Mr. Kirkpatrick returns today for follow up of metastatic lung adenocarcinoma prior to sitra PK lead in tomorrow. Main issues are fatigue, nausea and pain. Takes oxycodone only at night before bed when he needs, but has been every night in the last week, with some relief. Still not sleeping well at night. Using diclofenac gel twice daily, lidocaine patch at night. Tried acetaminophen 2-3 times in the last week. Pain is primarily in the RUQ. The lower sternal pain has resolved. Also had some pain \"below the beltline\" a little more toward the middle once last week. Spending a little more time during the day resting, but still able to go to the store without too much difficulty. Gets winded but not much more than a couple of weeks ago. Is quite constipated. Colace, mag citrate Wed and had BM Thurs, but then none since. Took mag citrate again yesterday, \"pericolace\" every day once daily. No BM yet since Thurs. Some nausea, took ondansetron a total of a few times in the last week with relief. Decreased appetite. Food just doesn't taste the same, and has early satiety. Has lost about 10 pounds in the last few weeks. Drinking enough water; has a water bottle next to him most of the time. No HA, vomiting, cough, numbness/tingling or other " new sxs.    PAST MEDICAL HISTORY  Lung adenocarcinoma as above  L5 disk herniation. Epidural injection 5/22/18. Improved dramatically with chiropracter in the past.   BPH  Ascending aortic aneurysm  SKYLER not on CPAP, couldn't tolerate, so using dental device    4/15/19 PFT. FEV1 2.11 (61%), FVC 4.58    CURRENT OUTPATIENT MEDICATIONS  Current Outpatient Medications   Medication Sig Dispense Refill     albuterol (PROAIR HFA/PROVENTIL HFA/VENTOLIN HFA) 108 (90 Base) MCG/ACT inhaler Inhale 2 puffs into the lungs every 4 hours (while awake) 1 Inhaler 0     albuterol (PROVENTIL) (2.5 MG/3ML) 0.083% neb solution Take 1 vial (2.5 mg) by nebulization every 4 hours as needed for shortness of breath / dyspnea or wheezing 100 vial 11     calcium polycarbophil (FIBERCON) 625 MG tablet Take 2 tablets by mouth daily       finasteride (PROSCAR) 5 MG tablet TAKE 1 TABLET BY MOUTH EVERY DAY 90 tablet 1     LORazepam (ATIVAN) 0.5 MG tablet Take 1-2 tabs prior to MRI 15 tablet 0     LORazepam (ATIVAN) 0.5 MG tablet Take 1 tablet (0.5 mg) by mouth every 6 hours as needed (Nausea/Vomiting) 30 tablet 3     Melatonin 10 MG TABS tablet Take 10 mg by mouth nightly as needed for sleep       mometasone-formoterol (DULERA) 100-5 MCG/ACT inhaler Inhale 2 puffs into the lungs 2 times daily (Patient taking differently: Inhale 2 puffs into the lungs daily ) 1 Inhaler 11     morphine (MS CONTIN) 15 MG CR tablet Take 1 tablet (15 mg) by mouth At Bedtime maximum 2 tablet(s) per day 30 tablet 0     MULTI VITAMIN MENS OR TABS 1 TABLET DAILY       ondansetron (ZOFRAN) 8 MG tablet Take 1 tablet (8 mg) by mouth every 8 hours as needed for nausea 30 tablet 11     oxyCODONE (ROXICODONE) 5 MG tablet Take 1 tablet (5 mg) by mouth every 4 hours as needed for severe pain 100 tablet 0     prochlorperazine (COMPAZINE) 10 MG tablet Take 1 tablet (10 mg) by mouth every 6 hours as needed (Nausea/Vomiting) 30 tablet 11     tamsulosin (FLOMAX) 0.4 MG capsule TAKE 1  CAPSULE BY MOUTH EVERY DAY 90 capsule 1     tiotropium (SPIRIVA HANDIHALER) 18 MCG inhaled capsule Inhale 1 capsule (18 mcg) into the lungs daily 1 capsule 11     ALLERGIES  No Known Allergies    REVIEW OF SYSTEMS  As above in the HPI, o/w complete 12-point ROS was negative.    PHYSICAL EXAM  No vitals due to video visit   Wt Readings from Last 3 Encounters:   06/15/20 77.1 kg (170 lb)   06/11/20 78 kg (172 lb)   05/30/20 79.4 kg (175 lb)     GEN: NAD. Looks a little thinner than last month    Remainder of physical exam deferred due to public health emergency and limitations of video visit.     LABORATORY AND IMAGING STUDIES  Results for MAYA ELLER (MRN [id number]) as of 6/29/2020 08:57   6/29/2020 07:55   Sodium 133   Potassium 4.2   Chloride 100   Carbon Dioxide 28   Urea Nitrogen 20   Creatinine 1.33 (H)   GFR Estimate 54 (L)   GFR Estimate If Black 62   Calcium 8.9   Anion Gap 5   Magnesium 2.4 (H)   Albumin 3.2 (L)   Protein Total 7.4   Bilirubin Total 1.8 (H)   Alkaline Phosphatase 110   ALT 17   AST 28   Amylase 805 (H)   Lipase 41 (L)   T4 Free 1.19   TSH 1.98   Uric Acid 5.5   Glucose 99   WBC 13.5 (H)   Hemoglobin 11.8 (L)   Hematocrit 38.9 (L)   Platelet Count 317   RBC Count 4.34 (L)   MCV 90   MCH 27.2   MCHC 30.3 (L)   RDW 16.6 (H)   Diff Method Automated Method   % Neutrophils 74.7   % Lymphocytes 8.7   % Monocytes 13.4   % Eosinophils 2.1   % Basophils 0.5   % Immature Granulocytes 0.6   Nucleated RBCs 0   Absolute Neutrophil 10.1 (H)   Absolute Lymphocytes 1.2   Absolute Monocytes 1.8 (H)   Absolute Eosinophils 0.3   Absolute Basophils 0.1   Abs Immature Granulocytes 0.1   Absolute Nucleated RBC 0.0   INR 1.17 (H)   PTT 38 (H)   Color Urine Light Yellow   Appearance Urine Clear   Glucose Urine Negative   Bilirubin Urine Negative   Ketones Urine Negative   Specific Gravity Urine 1.014   pH Urine 6.5   Protein Albumin Urine Negative   Urobilinogen mg/dL Normal   Nitrite Urine Negative   Blood Urine  Negative   Leukocyte Esterase Urine Negative   Source Midstream Urine   WBC Urine <1   RBC Urine <1   Squamous Epithelial /HPF Urine <1   Mucous Urine Present (A)     No new imaging today     ASSESSMENT AND PLAN  NSCLC, adenocarcinoma, PD-L1 <1%: Repeated screening labs for sitra + nivo study. Lead in PK day is tomorrow. Will be taking the malate salt formulation, so 100 mg daily but will only take the one dose tomorrow and come in for labs for 2 more days thereafter. Will complete screening registration and eligibility checklist today. Despite the elevated lipase, bili and mildly elevated creatinine compared to a few weeks ago, he remains eligible and I think proceeding is reasonable. PS is bordering between 1-2. Will proceed tomorrow. No new questions about the study. Will need to keep a close eye on him. I see him next week prior to nivolumab and will try to stop by tomorrow to see him as well in [facility]. Will ask Henry to call him at the end of the week to review symptom management.     Mild KIRBY: Might be dehydration. UA ok. Renal US today or tomorrow. There was a retroperitoneal node on the last CT and want to make sure it's not causing obstruction.     Liver mets, hyperbilirubinemia: Add direct bilirubin today and RUQ US. Recheck next week. Stop acetaminophen entirely. Reviewed meds, no other hepatotoxins.    Cancer-related pain: Add MSContin 15 mg at bedtime, continue oxycodone prn, increase diclofenac to qid regularly, continue lidocaine patch. Stop acetaminophen as above. Will also remind not to take NSAIDs.     Brain met: Gamma knife 6/15. MRI scheduled 9/25.     Nausea: Might be related to constipation. Doubt it's at all related to the elevated amylase, especially in light of normal lipase. Need to avoid ondansetron and QT prolonging meds as described in Appendix 3 of the protocol.    Opioid induced constipation: Increase senna-S to 2 tab BID from 1 tab daily, try mag citrate again, and if not effective, add  "MOM tomorrow. Will continue senna-S 2 tab BID when current constipation resolves.     Fatigue, insomnia: Multifactorial - pain, oxycodone, insomnia, gamma knife, cancer, decreased po. First step is to try and improve sleeping at night and see how that goes. If still not sleeping, will add melatonin and next step after that would be mirtazipine if allowed on study.     R shoulder pain, L pleural pain, sternal pain, L shoulder pain: Resolved and haven't been a significant issue since our last phone call.     COPD, increased SOB, h/o malignant L pleural effusion:  Unchanged from 2 weeks ago but gets winded more easily compared to a couple of months ago. No recurrent effusion on CT 2 weeks ago despite my physical exam suggesting effusion; rather, scan is c/w progression of pleural-based disease.    Cough: Intermittent but quite minimal.    SKYLER: Using a nasal device    Mild contrast reaction: Hasn't been taking methylpred for quite some time now and has been fine.    A total of 30 minutes was spent with the patient, >50% of which was spent in counseling and coordination of care.    Susan Muller MD    Hematology, Oncology and Transplantation    Kentrell Kirkpatrick is a 69 year old male who is being evaluated via a billable video visit.      The patient has been notified of following:   \"This video visit will be conducted via a call between you and your physician/provider. We have found that certain health care needs can be provided without the need for an in-person physical exam.  This service lets us provide the care you need with a video conversation.  If a prescription is necessary we can send it directly to your pharmacy.  If lab work is needed we can place an order for that and you can then stop by our lab to have the test done at a later time.  Video visits are billed at different rates depending on your insurance coverage.  Please reach out to your insurance provider with any questions.  If during " "the course of the call the physician/provider feels a video visit is not appropriate, you will not be charged for this service.\"  Patient has given verbal consent for Video visit? Yes  Patient needs an Jose Invitation sent to: [email or web address]  How would you like to obtain your AVS? MyChart   Will anyone else be joining your video visit? No     Secondary Video Option (Doximity), send text message to:  [phone]  I have reviewed and updated the patient's allergies and medication list. Patient was asked if they had any patient reported vital signs to present, if yes, please see documented vitals.  Patient was also asked for their current weight and height, if presented, documented in vitals.  Concerns:  10 lb. weight loss recently.  Pt has been experiencing transient pain, in his right side, which he describes as a sharp pain, worsens with a deep breath or movement.   Refills: Ondansetron   BERTHA Tucker    Video-Visit Details  Type of service:  Video Visit  Video Start Time: 9:05 AM  Video End Time: 9:37  Originating Location (pt. Location): Home  Distant Location (provider location):  [facility]   Platform used for Video Visit: Jose Muller MD            Again, thank you for allowing me to participate in the care of your patient.        Sincerely,        Susan Muller MD    "

## 2020-06-29 NOTE — PROGRESS NOTES
RECIST MEASUREMENTS    [facility] [id number]    Sitravatinib nivolumab      TARGET LESION # 1  KEATON mass  2:155   13.9 x 10.2 mm      TARGET LESION #2  RLL mass  2:249  13.9 x 10.4 mm       TARGET LESION # 3  L anterior mediastinal lymphadenopathy mass (will take long dimension)  8:158  64.8 x 24.0 mm      TARGET LESION #4  Liver met # 1  8:337  30.6 x 26.8 mm      TARGET LESION #5  Liver met #2  8:292  26.4 x 20.8 mm        NON TARGET LESIONS    R mediastinal 4R LN  8:109      L pleura  8:265      Lung nodules    L pararenal LN  4:139      Susan Muller MD  [id number]

## 2020-06-29 NOTE — PROGRESS NOTES
[facility]    FOLLOW-UP VIRTUAL VIDEO VISIT NOTE    PATIENT NAME: Kentrell Kirkpatrick MRN # [id number]  DATE OF VISIT: June 29, 2020 YOB: 1950    CANCER TYPE: NSCLC, adenocarcinoma  STAGE: IV  ECOG PS: 1-2    Cancer Staging  Non-small cell lung cancer, left (H)  Staging form: Lung, AJCC 8th Edition  - Clinical stage from 3/11/2019: Stage IV (cT1c, cN3, pM1c) - Signed by Susan Muller MD on 3/11/2019    PD-L1: <1%  Lung panel: 3/1/19 on [id number] A1 and T00-3606 A1. Negative  NGS: [employer] 360 sent 2/28/19 negative for actionable mutations. SMAD4 E538, TP53 H179R, SMO A327G. MSI not high    SUMMARY  6/27/18 CT chest w/contrast to re-evaluate aortic aneurysm: 8 mm spiculated KEATON nodule, 3 mm RML nodule unchanged from 3/15/17 and 2/25/16 scans  2/4/19 Presented to PCP with fairly rapid onset of shortness of breath. Given albuterol  2/19/19 CXR and CT chest w/contrast. Large left effusion  2/20/19 L thoracentesis (Dr. Rodriguez), 1180 cc  3/1/19 L pleurx (Dr. Rodriguez)  3/13/19 C1 carboplatin pemetrexed pembrolizumab  3/15/19 L supraclavicular LN bx (IR, FNA). Path: lung adenocarcinoma  4/3/19 C2 carboplatin pemetrexed pembrolizumab (total 4 cycles)  4/15/19 FEV1 2.11 (61%), FVC 3.38 (73%), DLCO 27.7, 67% (59%)  4/18/19 TPA. Drained 900 cc after it got unclotted  5/9/19 Pleurx removed  6/5~8/28/19 Maintenance pemetrexed + pembrolizumab x 5 cycles --> PD  9/20/19~4/29/20 C1-8 docetaxel 60 mg/m2 + ramucirumab. Had extravasation w/C2 10/11/19. C4 docetaxel ramucirumab delayed 1 week 2/2019 due to URI and trip. C5-8 docetaxel reduced to 50 mg/m2 due to excessive fatigue  9/28/19 UC for bronchitis. Levofloxacin  10/4/19 Brain MRI - routine rescreening - negative  10/17/19 Port  10/23/19 Recurrent cough. Saw PCP. Azithromycin  10/29/19 Prednisone 40 mg once, then 20 mg daily x 5 days, then 10 mg daily x 5 days for acute bronchitis   12/12/19 ED for bronchitis. Had some chills  "prior to going to the ED  3/3/20 Treated for pansinusitis with amox-calv x 14 days and fluticasone nasal spray  3/31/20 CT CAP and bone scan. Some PD. No bone mets.   5/6/20 Brain MRI. Negative  5/6/20 CT CAP.   4/1/20~4/29/20 C1-2 gemcitabine 1000 mg/m2 D1, 8 --> PD  5/30/20 [facility] ED. CTA negative for PE. Doxy, etc  6/15/20 Gamma knife x 1 to small L cerebellar met found incidentally at the time of screening for sitra nivo trial    SUBJECTIVE  Mr. Kirkpatrick returns today for follow up of metastatic lung adenocarcinoma prior to sitra PK lead in tomorrow. Main issues are fatigue, nausea and pain. Takes oxycodone only at night before bed when he needs, but has been every night in the last week, with some relief. Still not sleeping well at night. Using diclofenac gel twice daily, lidocaine patch at night. Tried acetaminophen 2-3 times in the last week. Pain is primarily in the RUQ. The lower sternal pain has resolved. Also had some pain \"below the beltline\" a little more toward the middle once last week. Spending a little more time during the day resting, but still able to go to the store without too much difficulty. Gets winded but not much more than a couple of weeks ago. Is quite constipated. Colace, mag citrate Wed and had BM Thurs, but then none since. Took mag citrate again yesterday, \"pericolace\" every day once daily. No BM yet since Thurs. Some nausea, took ondansetron a total of a few times in the last week with relief. Decreased appetite. Food just doesn't taste the same, and has early satiety. Has lost about 10 pounds in the last few weeks. Drinking enough water; has a water bottle next to him most of the time. No HA, vomiting, cough, numbness/tingling or other new sxs.    PAST MEDICAL HISTORY  Lung adenocarcinoma as above  L5 disk herniation. Epidural injection 5/22/18. Improved dramatically with chiropracter in the past.   BPH  Ascending aortic aneurysm  SKYLER not on CPAP, couldn't tolerate, so using dental " device    4/15/19 PFT. FEV1 2.11 (61%), FVC 4.58    CURRENT OUTPATIENT MEDICATIONS  Current Outpatient Medications   Medication Sig Dispense Refill     albuterol (PROAIR HFA/PROVENTIL HFA/VENTOLIN HFA) 108 (90 Base) MCG/ACT inhaler Inhale 2 puffs into the lungs every 4 hours (while awake) 1 Inhaler 0     albuterol (PROVENTIL) (2.5 MG/3ML) 0.083% neb solution Take 1 vial (2.5 mg) by nebulization every 4 hours as needed for shortness of breath / dyspnea or wheezing 100 vial 11     calcium polycarbophil (FIBERCON) 625 MG tablet Take 2 tablets by mouth daily       finasteride (PROSCAR) 5 MG tablet TAKE 1 TABLET BY MOUTH EVERY DAY 90 tablet 1     LORazepam (ATIVAN) 0.5 MG tablet Take 1-2 tabs prior to MRI 15 tablet 0     LORazepam (ATIVAN) 0.5 MG tablet Take 1 tablet (0.5 mg) by mouth every 6 hours as needed (Nausea/Vomiting) 30 tablet 3     Melatonin 10 MG TABS tablet Take 10 mg by mouth nightly as needed for sleep       mometasone-formoterol (DULERA) 100-5 MCG/ACT inhaler Inhale 2 puffs into the lungs 2 times daily (Patient taking differently: Inhale 2 puffs into the lungs daily ) 1 Inhaler 11     morphine (MS CONTIN) 15 MG CR tablet Take 1 tablet (15 mg) by mouth At Bedtime maximum 2 tablet(s) per day 30 tablet 0     MULTI VITAMIN MENS OR TABS 1 TABLET DAILY       ondansetron (ZOFRAN) 8 MG tablet Take 1 tablet (8 mg) by mouth every 8 hours as needed for nausea 30 tablet 11     oxyCODONE (ROXICODONE) 5 MG tablet Take 1 tablet (5 mg) by mouth every 4 hours as needed for severe pain 100 tablet 0     prochlorperazine (COMPAZINE) 10 MG tablet Take 1 tablet (10 mg) by mouth every 6 hours as needed (Nausea/Vomiting) 30 tablet 11     tamsulosin (FLOMAX) 0.4 MG capsule TAKE 1 CAPSULE BY MOUTH EVERY DAY 90 capsule 1     tiotropium (SPIRIVA HANDIHALER) 18 MCG inhaled capsule Inhale 1 capsule (18 mcg) into the lungs daily 1 capsule 11     ALLERGIES  No Known Allergies    REVIEW OF SYSTEMS  As above in the HPI, o/w complete  12-point ROS was negative.    PHYSICAL EXAM  No vitals due to video visit   Wt Readings from Last 3 Encounters:   06/15/20 77.1 kg (170 lb)   06/11/20 78 kg (172 lb)   05/30/20 79.4 kg (175 lb)     GEN: NAD. Looks a little thinner than last month    Remainder of physical exam deferred due to public health emergency and limitations of video visit.     LABORATORY AND IMAGING STUDIES  Results for MAYA ELLER (MRN [id number]) as of 6/29/2020 08:57   6/29/2020 07:55   Sodium 133   Potassium 4.2   Chloride 100   Carbon Dioxide 28   Urea Nitrogen 20   Creatinine 1.33 (H)   GFR Estimate 54 (L)   GFR Estimate If Black 62   Calcium 8.9   Anion Gap 5   Magnesium 2.4 (H)   Albumin 3.2 (L)   Protein Total 7.4   Bilirubin Total 1.8 (H)   Alkaline Phosphatase 110   ALT 17   AST 28   Amylase 805 (H)   Lipase 41 (L)   T4 Free 1.19   TSH 1.98   Uric Acid 5.5   Glucose 99   WBC 13.5 (H)   Hemoglobin 11.8 (L)   Hematocrit 38.9 (L)   Platelet Count 317   RBC Count 4.34 (L)   MCV 90   MCH 27.2   MCHC 30.3 (L)   RDW 16.6 (H)   Diff Method Automated Method   % Neutrophils 74.7   % Lymphocytes 8.7   % Monocytes 13.4   % Eosinophils 2.1   % Basophils 0.5   % Immature Granulocytes 0.6   Nucleated RBCs 0   Absolute Neutrophil 10.1 (H)   Absolute Lymphocytes 1.2   Absolute Monocytes 1.8 (H)   Absolute Eosinophils 0.3   Absolute Basophils 0.1   Abs Immature Granulocytes 0.1   Absolute Nucleated RBC 0.0   INR 1.17 (H)   PTT 38 (H)   Color Urine Light Yellow   Appearance Urine Clear   Glucose Urine Negative   Bilirubin Urine Negative   Ketones Urine Negative   Specific Gravity Urine 1.014   pH Urine 6.5   Protein Albumin Urine Negative   Urobilinogen mg/dL Normal   Nitrite Urine Negative   Blood Urine Negative   Leukocyte Esterase Urine Negative   Source Midstream Urine   WBC Urine <1   RBC Urine <1   Squamous Epithelial /HPF Urine <1   Mucous Urine Present (A)     No new imaging today     ASSESSMENT AND PLAN  NSCLC, adenocarcinoma, PD-L1 <1%:  Repeated screening labs for sitra + nivo study. Lead in PK day is tomorrow. Will be taking the malate salt formulation, so 100 mg daily but will only take the one dose tomorrow and come in for labs for 2 more days thereafter. Will complete screening registration and eligibility checklist today. Despite the elevated lipase, bili and mildly elevated creatinine compared to a few weeks ago, he remains eligible and I think proceeding is reasonable. PS is bordering between 1-2. Will proceed tomorrow. No new questions about the study. Will need to keep a close eye on him. I see him next week prior to nivolumab and will try to stop by tomorrow to see him as well in [facility]. Will ask Henry to call him at the end of the week to review symptom management.     Mild KIRBY: Might be dehydration. UA ok. Renal US today or tomorrow. There was a retroperitoneal node on the last CT and want to make sure it's not causing obstruction.     Liver mets, hyperbilirubinemia: Add direct bilirubin today and RUQ US. Recheck next week. Stop acetaminophen entirely. Reviewed meds, no other hepatotoxins.    Cancer-related pain: Add MSContin 15 mg at bedtime, continue oxycodone prn, increase diclofenac to qid regularly, continue lidocaine patch. Stop acetaminophen as above. Will also remind not to take NSAIDs.     Brain met: Gamma knife 6/15. MRI scheduled 9/25.     Nausea: Might be related to constipation. Doubt it's at all related to the elevated amylase, especially in light of normal lipase. Need to avoid ondansetron and QT prolonging meds as described in Appendix 3 of the protocol.    Opioid induced constipation: Increase senna-S to 2 tab BID from 1 tab daily, try mag citrate again, and if not effective, add MOM tomorrow. Will continue senna-S 2 tab BID when current constipation resolves.     Fatigue, insomnia: Multifactorial - pain, oxycodone, insomnia, gamma knife, cancer, decreased po. First step is to try and improve sleeping at night and see how  "that goes. If still not sleeping, will add melatonin and next step after that would be mirtazipine if allowed on study.     R shoulder pain, L pleural pain, sternal pain, L shoulder pain: Resolved and haven't been a significant issue since our last phone call.     COPD, increased SOB, h/o malignant L pleural effusion:  Unchanged from 2 weeks ago but gets winded more easily compared to a couple of months ago. No recurrent effusion on CT 2 weeks ago despite my physical exam suggesting effusion; rather, scan is c/w progression of pleural-based disease.    Cough: Intermittent but quite minimal.    SKYLER: Using a nasal device    Mild contrast reaction: Hasn't been taking methylpred for quite some time now and has been fine.    A total of 30 minutes was spent with the patient, >50% of which was spent in counseling and coordination of care.    Susan Muller MD    Hematology, Oncology and Transplantation    Kentrell Kirkpatrick is a 69 year old male who is being evaluated via a billable video visit.      The patient has been notified of following:   \"This video visit will be conducted via a call between you and your physician/provider. We have found that certain health care needs can be provided without the need for an in-person physical exam.  This service lets us provide the care you need with a video conversation.  If a prescription is necessary we can send it directly to your pharmacy.  If lab work is needed we can place an order for that and you can then stop by our lab to have the test done at a later time.  Video visits are billed at different rates depending on your insurance coverage.  Please reach out to your insurance provider with any questions.  If during the course of the call the physician/provider feels a video visit is not appropriate, you will not be charged for this service.\"  Patient has given verbal consent for Video visit? Yes  Patient needs an [employer] medic Invitation sent to: " [email or web address]  How would you like to obtain your AVS? MyChart   Will anyone else be joining your video visit? No     Secondary Video Option ([employer]), send text message to:  [phone]  I have reviewed and updated the patient's allergies and medication list. Patient was asked if they had any patient reported vital signs to present, if yes, please see documented vitals.  Patient was also asked for their current weight and height, if presented, documented in vitals.  Concerns:  10 lb. weight loss recently.  Pt has been experiencing transient pain, in his right side, which he describes as a sharp pain, worsens with a deep breath or movement.   Refills: Ondansetron   BERTHA Tucker    Video-Visit Details  Type of service:  Video Visit  Video Start Time: 9:05 AM  Video End Time: 9:37  Originating Location (pt. Location): Home  Distant Location (provider location):  [facility]   Platform used for Video Visit: Jose Muller MD

## 2020-06-29 NOTE — PROGRESS NOTES
Nursing Note:  Kentrell Kirkpatrick presents today for labs.    Patient seen by provider today: Yes: Virtual Visit following labs   present during visit today: Not Applicable.    Note: N/A.    Intravenous Access:  Labs drawn without difficulty.  Implanted Port.    Discharge Plan:   Patient was discharged to home for virtual visit with Dr Yohana De La Cruz RN

## 2020-06-30 NOTE — NURSING NOTE
[id number]: Study Visit Note   Subject name: Kentrell Kirkpatrick     Visit: PK lead-in day    Did the study visit occur within the appropriate window allowed by the protocol? Yes.     Since the last study visit, He has been doing well.  No new issues or concerns at this time. Patient was given study drug as well as pill diary.  Patient was instructed on how and when to take drug today and then to not take medication again until instructed to by study staff in one week.  Patient instructed to call with any questions or concerns.  Patient voiced understanding.        I have personally interviewed Kentrell Kirkpatrick and reviewed his medical record for adverse events and concomitant medications and these have been recorded on the corresponding logs in Kentrell Kirkpatrick's research file.     Kentrell Kirkpatrick was given the opportunity to ask any trial related questions.    IDS Number: [id number]  Drug name: Sitravatinib  Number of bottles dispensed: 1  40mg   Number of pills dispensed: 30    IDS Number: [id number]  Drug name: Sitravatinib  Number of bottles dispensed: 1  60 mg   Number of pills dispensed: 30      Ade Crowley RN     Pager [phone]

## 2020-07-02 NOTE — PROGRESS NOTES
RN Care Coordination Note  Placed call to patient to check on pain, nausea, constipation. Pain medication increased last week. Unable to reach patient. Left message asking he return call if symptoms were not improved.       Darleen Ruiz RN, BSN, OCN   RN Care Coordinator   [facility]

## 2020-07-03 NOTE — PROGRESS NOTES
RN Care Coordination Note  Patient returned call stating he is feel well. New pain medication is working well and constipation has improved. He has no further concerns at this time.       Darleen Ruiz RN, BSN, OCN   RN Care Coordinator   [facility]

## 2020-07-06 NOTE — TELEPHONE ENCOUNTER
[facility] Prior Authorization Team   Phone: [phone]  Fax: [phone]    PA Initiation    Medication: Opdivo-Rx benefits  Insurance Company: [facility] - Phone [phone] Fax [phone]  Pharmacy Filling the Rx: [facility] - [place], [place] - [address]  Filling Pharmacy Phone: [phone]  Filling Pharmacy Fax: [phone]  Start Date: 7/6/2020

## 2020-07-06 NOTE — TELEPHONE ENCOUNTER
Prior Authorization Approval    Authorization Effective Date: 4/7/2020  Authorization Expiration Date: 7/6/2021  Medication: Opdivo-Rx benefits  Approved Dose/Quantity: ud  Reference #: [id number]   Insurance Company: [employer] [place] - Phone [phone] Fax [phone]  Expected CoPay: $1715.53     CoPay Card Available:      Foundation Assistance Needed:    Which Pharmacy is filling the prescription (Not needed for infusion/clinic administered): [facility] - [place], [place] - [address]  Pharmacy Notified:    Patient Notified:

## 2020-07-07 NOTE — NURSING NOTE
[id number]: Study Visit Note   Subject name: Kentrell Kirkpatrick     Visit: Sitravatinib Lead-in Day 8     Did the study visit occur within the appropriate window allowed by the protocol? yes    If no, why? n/a    Since the last study visit, he has been doing about the same. Yesterday, his care team was informed of an issue with his insurance coverage for nivolumab.While that is being sorted out, he will not receive the nivolumab infusion today, nor will he begin the daily dosing of the sitravatinib. The research team is working with study sponsor to potentially cover the nivolumab, while concurrently infusion finance is working on getting coverage.    I have personally reviewed Kentrell Kirkpatrick's medical record for adverse events and concomitant medications and these have been recorded on the corresponding logs in Kentrell Kirkpatrick's research file.     Kentrell Kirkpatrick was given the opportunity to ask any trial related questions.  Please see provider progress note for physical exam and other clinical information. Labs were reviewed - any significant lab values were addressed and reviewed.    Digna Burton RN

## 2020-07-07 NOTE — PROGRESS NOTES
[facility]  [facility]    FOLLOW-UP IN PERSON VISIT NOTE    PATIENT NAME: Kentrell Kirkpatrick MRN # [id number]  DATE OF VISIT: July 7, 2020 YOB: 1950    CANCER TYPE: NSCLC, adenocarcinoma  STAGE: IV  ECOG PS: 1-2    Cancer Staging  Non-small cell lung cancer, left (H)  Staging form: Lung, AJCC 8th Edition  - Clinical stage from 3/11/2019: Stage IV (cT1c, cN3, pM1c) - Signed by Susan Muller MD on 3/11/2019    PD-L1: <1%  Lung panel: 3/1/19 on [id number] A1 and [id number] A1. Negative  NGS: [employer] sent 2/28/19 negative for actionable mutations. SMAD4 E538, TP53 H179R, SMO A327G. MSI not high    SUMMARY  6/27/18 CT chest w/contrast to re-evaluate aortic aneurysm: 8 mm spiculated KEATON nodule, 3 mm RML nodule unchanged from 3/15/17 and 2/25/16 scans  2/4/19 Presented to PCP with fairly rapid onset of shortness of breath. Given albuterol  2/19/19 CXR and CT chest w/contrast. Large left effusion  2/20/19 L thoracentesis (Dr. Rodriguez), 1180 cc  3/1/19 L pleurx (Dr. Rodriguez)  3/13/19 C1 carboplatin pemetrexed pembrolizumab  3/15/19 L supraclavicular LN bx (IR, FNA). Path: lung adenocarcinoma  4/3/19 C2 carboplatin pemetrexed pembrolizumab (total 4 cycles)  4/15/19 FEV1 2.11 (61%), FVC 3.38 (73%), DLCO 27.7, 67% (59%)  4/18/19 TPA. Drained 900 cc after it got unclotted  5/9/19 Pleurx removed  6/5~8/28/19 Maintenance pemetrexed + pembrolizumab x 5 cycles --> PD  9/20/19~4/29/20 C1-8 docetaxel 60 mg/m2 + ramucirumab. Had extravasation w/C2 10/11/19. C4 docetaxel ramucirumab delayed 1 week 2/2019 due to URI and trip. C5-8 docetaxel reduced to 50 mg/m2 due to excessive fatigue  9/28/19 UC for bronchitis. Levofloxacin  10/4/19 Brain MRI - routine rescreening - negative  10/17/19 Port  10/23/19 Recurrent cough. Saw PCP. Azithromycin  10/29/19 Prednisone 40 mg once, then 20 mg daily x 5 days, then 10 mg daily x 5 days for acute bronchitis   12/12/19 ED for bronchitis. Had some chills prior to  going to the ED  3/3/20 Treated for pansinusitis with amox-calv x 14 days and fluticasone nasal spray  3/31/20 CT CAP and bone scan. Some PD. No bone mets.   5/6/20 Brain MRI. Negative  5/6/20 CT CAP.   4/1/20~4/29/20 C1-2 gemcitabine 1000 mg/m2 D1, 8 --> PD  5/30/20 [facility] ED. CTA negative for PE. Doxy, etc  6/15/20 Gamma knife x 1 to small L cerebellar met found incidentally at the time of screening for sitra nivo trial    SUBJECTIVE  Mr. Kirkpatrick returns today for follow up of metastatic lung adenocarcinoma, with plans to start day 1 of the sitravatinib + nivo trial. However, coverage of nivo remains an issue despite starting work on this about a month ago.   He's doing about the same as last week. No perceivable side effects from the one dose of sitravatinib last week. RUQ pain is pretty well managed. Takes MSContin 15 mg at night and one oxycodone in the morning, sometimes hasn't needed any. Continues to use lidocaine patches intermittently and diclofenac. Appetite still down. Losing muscle, but nothing different compared to a week or two ago. Still gets winded walking up stairs, no change since last week. No new sxs.     PAST MEDICAL HISTORY  Lung adenocarcinoma as above  L5 disk herniation. Epidural injection 5/22/18. Improved dramatically with chiropracter in the past.   BPH  Ascending aortic aneurysm  SKYLER not on CPAP, couldn't tolerate, so using dental device    4/15/19 PFT. FEV1 2.11 (61%), FVC 4.58    CURRENT OUTPATIENT MEDICATIONS  Current Outpatient Medications   Medication Sig Dispense Refill     albuterol (PROAIR HFA/PROVENTIL HFA/VENTOLIN HFA) 108 (90 Base) MCG/ACT inhaler Inhale 2 puffs into the lungs every 4 hours (while awake) 1 Inhaler 0     albuterol (PROVENTIL) (2.5 MG/3ML) 0.083% neb solution Take 1 vial (2.5 mg) by nebulization every 4 hours as needed for shortness of breath / dyspnea or wheezing 100 vial 11     calcium polycarbophil (FIBERCON) 625 MG tablet Take 2 tablets by mouth daily        finasteride (PROSCAR) 5 MG tablet TAKE 1 TABLET BY MOUTH EVERY DAY 90 tablet 1     LORazepam (ATIVAN) 0.5 MG tablet Take 1-2 tabs prior to MRI 15 tablet 0     LORazepam (ATIVAN) 0.5 MG tablet Take 1 tablet (0.5 mg) by mouth every 6 hours as needed (Nausea/Vomiting) 30 tablet 3     Melatonin 10 MG TABS tablet Take 10 mg by mouth nightly as needed for sleep       mometasone-formoterol (DULERA) 100-5 MCG/ACT inhaler Inhale 2 puffs into the lungs 2 times daily (Patient taking differently: Inhale 2 puffs into the lungs daily ) 1 Inhaler 11     morphine (MS CONTIN) 15 MG CR tablet Take 1 tablet (15 mg) by mouth At Bedtime maximum 2 tablet(s) per day 30 tablet 0     MULTI VITAMIN MENS OR TABS 1 TABLET DAILY       ondansetron (ZOFRAN) 8 MG tablet Take 1 tablet (8 mg) by mouth every 8 hours as needed for nausea 30 tablet 11     oxyCODONE (ROXICODONE) 5 MG tablet Take 1 tablet (5 mg) by mouth every 4 hours as needed for severe pain 100 tablet 0     prochlorperazine (COMPAZINE) 10 MG tablet Take 1 tablet (10 mg) by mouth every 6 hours as needed (Nausea/Vomiting) 30 tablet 11     study - sitravatinib malate salt, [id number],, IDS# [id number], 10 mg capsule Take 2 capsules (20 mg) by mouth every morning Take on an empty stomach (at least 2 hours after the previous meal and 1 hour before the next meal) with at least 1 cup of water. Swallow whole, do not chew. Keep medication in bottle dispensed in and keep at room temperature 60 capsule 0     study - sitravatinib malate salt, [id number],, IDS# [id number], 40 mg capsule Take 2 capsules (80 mg) by mouth every morning Take on an empty stomach (at least 2 hours after the previous meal and 1 hour before the next meal) with at least 1 cup of water. Swallow whole, do not chew. Keep medication in bottle dispensed in and keep at room temperature 30 capsule 0     study - sitravatinib malate salt, [id number],, IDS# [id number], 40 mg roller oompaction capsule Take 1 capsule (40 mg) by mouth daily 30 capsule 0      study - sitravatinib malate salt, [id number],, IDS# [id number], 60 mg roller oompaction capsule Take 1 capsule (60 mg) by mouth daily 30 capsule 0     tamsulosin (FLOMAX) 0.4 MG capsule TAKE 1 CAPSULE BY MOUTH EVERY DAY 90 capsule 1     tiotropium (SPIRIVA HANDIHALER) 18 MCG inhaled capsule Inhale 1 capsule (18 mcg) into the lungs daily 1 capsule 11      ALLERGIES  No Known Allergies    REVIEW OF SYSTEMS  As above in the HPI, o/w complete 12-point ROS was negative.    PHYSICAL EXAM  /72   Pulse 58   Temp 97.6  F (36.4  C) (Oral)   Resp 16   Wt 75.7 kg (166 lb 14.4 oz)   SpO2 98%   BMI 22.02 kg/m    Wt Readings from Last 3 Encounters:   07/07/20 75.7 kg (166 lb 14.4 oz)   06/15/20 77.1 kg (170 lb)   06/11/20 78 kg (172 lb)     GEN: NAD  LUNGS: clear bilaterally; somewhat decreased LLL  CV: regular, no murmurs, rubs, or gallops  ABDOMEN: soft, non-tender, non-distended  EXT: warm, no edema  NEURO: alert  SKIN: no rashes    Remainder of physical exam deferred due to public health emergency and limitations of video visit.    LABORATORY AND IMAGING STUDIES  Results for MAYA ELLER (MRN [id number]) as of 7/7/2020 12:57   7/7/2020 11:36   Sodium 138   Potassium 4.1   Chloride 109   Carbon Dioxide 26   Urea Nitrogen 16   Creatinine 1.10   GFR Estimate 67   GFR Estimate If Black 78   Calcium 8.7   Anion Gap 4   Magnesium 2.1   Albumin 3.2 (L)   Protein Total 7.1   Bilirubin Total 0.6   Alkaline Phosphatase 136   ALT 21   AST 19   Amylase 967 (H)   Lipase 43 (L)   T4 Free 1.31   TSH 2.72   Uric Acid 5.4   Glucose 88   WBC 11.6 (H)   Hemoglobin 11.1 (L)   Hematocrit 36.0 (L)   Platelet Count 371   RBC Count 4.15 (L)   MCV 87   MCH 26.7   MCHC 30.8 (L)   RDW 15.9 (H)   Diff Method Automated Method   % Neutrophils 75.8   % Lymphocytes 10.4   % Monocytes 9.4   % Eosinophils 3.2   % Basophils 0.7   % Immature Granulocytes 0.5   Nucleated RBCs 0   Absolute Neutrophil 8.8 (H)   Absolute Lymphocytes 1.2   Absolute Monocytes 1.1  "  Absolute Eosinophils 0.4   Absolute Basophils 0.1   Abs Immature Granulocytes 0.1   Absolute Nucleated RBC 0.0   Protein Albumin Urine Negative       ASSESSMENT AND PLAN  NSCLC, adenocarcinoma, PD-L1 <1%: We have a medical necessity to start treatment today with nivolumab and sitravatinib on clinical trial. Treatment has already been delayed by 2 weeks due to the tiny brain met which required treatment on trial. We are working through insurance coverage, which has been for all intents and purposes denied vs waiver vs \"Rx benefit\". We have confirmation from [facility] that they will cover the cost of today's infusion while we wait for free drug application from [employer]. We'd have to run that by the IRB and [employer] finance folks; that's in process. I would favor starting sitravatinib today as planned and delaying infusion until Thurs, to give us another day or two to sort this out. Unfortunately, we only found out yesterday that the coverage for nivo hadn't been secured despite working on it for a few weeks now and free drug application was delayed as Edy wasn't getting the finance team's phone calls. But delaying further would compromise the patient's health.     ADDENDUM: Pt quoted $50,000 + cost for nivolumab if he were to pay out of pocket, different than the ~$1700 he was quoted if he were to pursue the Rx benefit option, which isn't a good option on many levels. Will not sign waiver and will hold nivolumab until we have this a bit more sorted out. Will still schedule infusion for Thurs. Waiting to hear back from [facility] about starting sitra today as planned.     Cancer-related RUQ pain: From liver mets. Better controlled with the addition of MSContin 15 mg at bedtime and takes oxycodone once in the morning, sometimes none. Also using lidocaine patch and occasional diclofenac gel. Not much in the way of referred pain any more.     KIRBY: Resolved.     Elevated amlylase: Lipase normal and no clinical signs of " pancreatitis    Hyperbilirubinemia: Resolved    Shortness of breath: I think a combination of underlying lung dz (COPD), L pleural-based disease and deconditioning. No pericardial effusion, EF ok, so doubt cardiac etiology. Doubt PE - no hypoxia or other sxs/signs to suggest.     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 Scheduled for repeat MRI at the end of Sept and has appt with Dr. Zamorano afterward.     H/o L malignant pleural effusion: No recurrence.     Opioid induced constipation: Senna-S, MOM vs mag citrate prn. Much better.      Fatigue, insomnia: Fatigue about the same but trouble sleeping better with addition of MSContin.     R shoulder pain, L pleural pain, sternal pain, L shoulder pain: Resolved    Cough: Intermittent, minimal.    SKYLER: Using a nasal device    A total of 30 minutes was spent with the patient, >50% of which was spent in counseling and coordination of care.    Susan Muller MD  Associate Professor of Medicine  Hematology, Oncology and Transplantation

## 2020-07-07 NOTE — LETTER
7/7/2020         RE: Kentrell Kirkpatrick  [address] [place]  [place] [address]        Dear Colleague,    Thank you for referring your patient, Kentrell Kirkpatrick, to the [facility]. Please see a copy of my visit note below.    [facility]    FOLLOW-UP IN PERSON VISIT NOTE    PATIENT NAME: Kentrell Kirkpatrick MRN # [id number]  DATE OF VISIT: July 7, 2020 YOB: 1950    CANCER TYPE: NSCLC, adenocarcinoma  STAGE: IV  ECOG PS: 1-2    Cancer Staging  Non-small cell lung cancer, left (H)  Staging form: Lung, AJCC 8th Edition  - Clinical stage from 3/11/2019: Stage IV (cT1c, cN3, pM1c) - Signed by Susan Muller MD on 3/11/2019    PD-L1: <1%  Lung panel: 3/1/19 on [id number] A1 and [id number] A1. Negative  NGS: [employer] 360 sent 2/28/19 negative for actionable mutations. SMAD4 E538, TP53 H179R, SMO A327G. MSI not high    SUMMARY  6/27/18 CT chest w/contrast to re-evaluate aortic aneurysm: 8 mm spiculated KEATON nodule, 3 mm RML nodule unchanged from 3/15/17 and 2/25/16 scans  2/4/19 Presented to PCP with fairly rapid onset of shortness of breath. Given albuterol  2/19/19 CXR and CT chest w/contrast. Large left effusion  2/20/19 L thoracentesis (Dr. Rodriguez), 1180 cc  3/1/19 L pleurx (Dr. Rodriguez)  3/13/19 C1 carboplatin pemetrexed pembrolizumab  3/15/19 L supraclavicular LN bx (IR, FNA). Path: lung adenocarcinoma  4/3/19 C2 carboplatin pemetrexed pembrolizumab (total 4 cycles)  4/15/19 FEV1 2.11 (61%), FVC 3.38 (73%), DLCO 27.7, 67% (59%)  4/18/19 TPA. Drained 900 cc after it got unclotted  5/9/19 Pleurx removed  6/5~8/28/19 Maintenance pemetrexed + pembrolizumab x 5 cycles --> PD  9/20/19~4/29/20 C1-8 docetaxel 60 mg/m2 + ramucirumab. Had extravasation w/C2 10/11/19. C4 docetaxel ramucirumab delayed 1 week 2/2019 due to URI and trip. C5-8 docetaxel reduced to 50 mg/m2 due to excessive fatigue  9/28/19 UC for bronchitis. Levofloxacin  10/4/19 Brain MRI -  routine rescreening - negative  10/17/19 Port  10/23/19 Recurrent cough. Saw PCP. Azithromycin  10/29/19 Prednisone 40 mg once, then 20 mg daily x 5 days, then 10 mg daily x 5 days for acute bronchitis   12/12/19 ED for bronchitis. Had some chills prior to going to the ED  3/3/20 Treated for pansinusitis with amox-calv x 14 days and fluticasone nasal spray  3/31/20 CT CAP and bone scan. Some PD. No bone mets.   5/6/20 Brain MRI. Negative  5/6/20 CT CAP.   4/1/20~4/29/20 C1-2 gemcitabine 1000 mg/m2 D1, 8 --> PD  5/30/20 [facility] ED. CTA negative for PE. Doxy, etc  6/15/20 Gamma knife x 1 to small L cerebellar met found incidentally at the time of screening for sitra nivo trial    SUBJECTIVE  Mr. Kirkpatrick returns today for follow up of metastatic lung adenocarcinoma, with plans to start day 1 of the sitravatinib + nivo trial. However, coverage of nivo remains an issue despite starting work on this about a month ago.   He's doing about the same as last week. No perceivable side effects from the one dose of sitravatinib last week. RUQ pain is pretty well managed. Takes MSContin 15 mg at night and one oxycodone in the morning, sometimes hasn't needed any. Continues to use lidocaine patches intermittently and diclofenac. Appetite still down. Losing muscle, but nothing different compared to a week or two ago. Still gets winded walking up stairs, no change since last week. No new sxs.     PAST MEDICAL HISTORY  Lung adenocarcinoma as above  L5 disk herniation. Epidural injection 5/22/18. Improved dramatically with chiropracter in the past.   BPH  Ascending aortic aneurysm  SKYLER not on CPAP, couldn't tolerate, so using dental device    4/15/19 PFT. FEV1 2.11 (61%), FVC 4.58    CURRENT OUTPATIENT MEDICATIONS  Current Outpatient Medications   Medication Sig Dispense Refill     albuterol (PROAIR HFA/PROVENTIL HFA/VENTOLIN HFA) 108 (90 Base) MCG/ACT inhaler Inhale 2 puffs into the lungs every 4 hours (while awake) 1 Inhaler 0      albuterol (PROVENTIL) (2.5 MG/3ML) 0.083% neb solution Take 1 vial (2.5 mg) by nebulization every 4 hours as needed for shortness of breath / dyspnea or wheezing 100 vial 11     calcium polycarbophil (FIBERCON) 625 MG tablet Take 2 tablets by mouth daily       finasteride (PROSCAR) 5 MG tablet TAKE 1 TABLET BY MOUTH EVERY DAY 90 tablet 1     LORazepam (ATIVAN) 0.5 MG tablet Take 1-2 tabs prior to MRI 15 tablet 0     LORazepam (ATIVAN) 0.5 MG tablet Take 1 tablet (0.5 mg) by mouth every 6 hours as needed (Nausea/Vomiting) 30 tablet 3     Melatonin 10 MG TABS tablet Take 10 mg by mouth nightly as needed for sleep       mometasone-formoterol (DULERA) 100-5 MCG/ACT inhaler Inhale 2 puffs into the lungs 2 times daily (Patient taking differently: Inhale 2 puffs into the lungs daily ) 1 Inhaler 11     morphine (MS CONTIN) 15 MG CR tablet Take 1 tablet (15 mg) by mouth At Bedtime maximum 2 tablet(s) per day 30 tablet 0     MULTI VITAMIN MENS OR TABS 1 TABLET DAILY       ondansetron (ZOFRAN) 8 MG tablet Take 1 tablet (8 mg) by mouth every 8 hours as needed for nausea 30 tablet 11     oxyCODONE (ROXICODONE) 5 MG tablet Take 1 tablet (5 mg) by mouth every 4 hours as needed for severe pain 100 tablet 0     prochlorperazine (COMPAZINE) 10 MG tablet Take 1 tablet (10 mg) by mouth every 6 hours as needed (Nausea/Vomiting) 30 tablet 11     study - sitravatinib malate salt, [id number],, IDS# [id number], 10 mg capsule Take 2 capsules (20 mg) by mouth every morning Take on an empty stomach (at least 2 hours after the previous meal and 1 hour before the next meal) with at least 1 cup of water. Swallow whole, do not chew. Keep medication in bottle dispensed in and keep at room temperature 60 capsule 0     study - sitravatinib malate salt, [id number],, IDS# 5058, 40 mg capsule Take 2 capsules (80 mg) by mouth every morning Take on an empty stomach (at least 2 hours after the previous meal and 1 hour before the next meal) with at least 1 cup of  water. Swallow whole, do not chew. Keep medication in bottle dispensed in and keep at room temperature 30 capsule 0     study - sitravatinib malate salt, [id number],, IDS# 5058, 40 mg roller oompaction capsule Take 1 capsule (40 mg) by mouth daily 30 capsule 0     study - sitravatinib malate salt, [id number],, IDS# [id number], 60 mg roller oompaction capsule Take 1 capsule (60 mg) by mouth daily 30 capsule 0     tamsulosin (FLOMAX) 0.4 MG capsule TAKE 1 CAPSULE BY MOUTH EVERY DAY 90 capsule 1     tiotropium (SPIRIVA HANDIHALER) 18 MCG inhaled capsule Inhale 1 capsule (18 mcg) into the lungs daily 1 capsule 11      ALLERGIES  No Known Allergies    REVIEW OF SYSTEMS  As above in the HPI, o/w complete 12-point ROS was negative.    PHYSICAL EXAM  /72   Pulse 58   Temp 97.6  F (36.4  C) (Oral)   Resp 16   Wt 75.7 kg (166 lb 14.4 oz)   SpO2 98%   BMI 22.02 kg/m    Wt Readings from Last 3 Encounters:   07/07/20 75.7 kg (166 lb 14.4 oz)   06/15/20 77.1 kg (170 lb)   06/11/20 78 kg (172 lb)     GEN: NAD  LUNGS: clear bilaterally; somewhat decreased LLL  CV: regular, no murmurs, rubs, or gallops  ABDOMEN: soft, non-tender, non-distended  EXT: warm, no edema  NEURO: alert  SKIN: no rashes    Remainder of physical exam deferred due to public health emergency and limitations of video visit.    LABORATORY AND IMAGING STUDIES  Results for MAYA LELER (MRN [id number]) as of 7/7/2020 12:57   7/7/2020 11:36   Sodium 138   Potassium 4.1   Chloride 109   Carbon Dioxide 26   Urea Nitrogen 16   Creatinine 1.10   GFR Estimate 67   GFR Estimate If Black 78   Calcium 8.7   Anion Gap 4   Magnesium 2.1   Albumin 3.2 (L)   Protein Total 7.1   Bilirubin Total 0.6   Alkaline Phosphatase 136   ALT 21   AST 19   Amylase 967 (H)   Lipase 43 (L)   T4 Free 1.31   TSH 2.72   Uric Acid 5.4   Glucose 88   WBC 11.6 (H)   Hemoglobin 11.1 (L)   Hematocrit 36.0 (L)   Platelet Count 371   RBC Count 4.15 (L)   MCV 87   MCH 26.7   MCHC 30.8 (L)   RDW 15.9  "(H)   Diff Method Automated Method   % Neutrophils 75.8   % Lymphocytes 10.4   % Monocytes 9.4   % Eosinophils 3.2   % Basophils 0.7   % Immature Granulocytes 0.5   Nucleated RBCs 0   Absolute Neutrophil 8.8 (H)   Absolute Lymphocytes 1.2   Absolute Monocytes 1.1   Absolute Eosinophils 0.4   Absolute Basophils 0.1   Abs Immature Granulocytes 0.1   Absolute Nucleated RBC 0.0   Protein Albumin Urine Negative       ASSESSMENT AND PLAN  NSCLC, adenocarcinoma, PD-L1 <1%: We have a medical necessity to start treatment today with nivolumab and sitravatinib on clinical trial. Treatment has already been delayed by 2 weeks due to the tiny brain met which required treatment on trial. We are working through insurance coverage, which has been for all intents and purposes denied vs waiver vs \"Rx benefit\". We have confirmation from [facility] that they will cover the cost of today's infusion while we wait for free drug application from [employer]. We'd have to run that by the IRB and [employer] finance folks; that's in process. I would favor starting sitravatinib today as planned and delaying infusion until Thurs, to give us another day or two to sort this out. Unfortunately, we only found out yesterday that the coverage for nivo hadn't been secured despite working on it for a few weeks now and free drug application was delayed as Edy wasn't getting the finance team's phone calls. But delaying further would compromise the patient's health.     ADDENDUM: Pt quoted $50,000 + cost for nivolumab if he were to pay out of pocket, different than the ~$1700 he was quoted if he were to pursue the Rx benefit option, which isn't a good option on many levels. Will not sign waiver and will hold nivolumab until we have this a bit more sorted out. Will still schedule infusion for Thurs. [facility]     Cancer-related RUQ pain: From liver mets. Better controlled with the addition of MSContin 15 mg at " bedtime and takes oxycodone once in the morning, sometimes none. Also using lidocaine patch and occasional diclofenac gel. Not much in the way of referred pain any more.     KIRBY: Resolved.     Elevated amlylase: Lipase normal and no clinical signs of pancreatitis    Hyperbilirubinemia: Resolved    Shortness of breath: I think a combination of underlying lung dz (COPD), L pleural-based disease and deconditioning. No pericardial effusion, EF ok, so doubt cardiac etiology. Doubt PE - no hypoxia or other sxs/signs to suggest.     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 Scheduled for repeat MRI at the end of Sept and has appt with Dr. Zamorano afterward.     H/o L malignant pleural effusion: No recurrence.     Opioid induced constipation: Senna-S, MOM vs mag citrate prn. Much better.      Fatigue, insomnia: Fatigue about the same but trouble sleeping better with addition of MSContin.     R shoulder pain, L pleural pain, sternal pain, L shoulder pain: Resolved    Cough: Intermittent, minimal.    SKYLER: Using a nasal device    A total of 30 minutes was spent with the patient, >50% of which was spent in counseling and coordination of care.    Susan Muller MD  Associate Professor of Medicine  Hematology, Oncology and Transplantation

## 2020-07-07 NOTE — NURSING NOTE
"Oncology Rooming Note    July 7, 2020 11:51 AM   Kentrell Kirkpatrick is a 69 year old male who presents for:    Chief Complaint   Patient presents with     Lab Only     labs drawn via port by RN in lab, saline and heparin locked, vitals completed     RECHECK     Research Chemo visit.     Initial Vitals: /72   Pulse 58   Temp 97.6  F (36.4  C) (Oral)   Resp 16   Wt 75.7 kg (166 lb 14.4 oz)   SpO2 98%   BMI 22.02 kg/m   Estimated body mass index is 22.02 kg/m  as calculated from the following:    Height as of 6/15/20: 1.854 m (6' 1\").    Weight as of this encounter: 75.7 kg (166 lb 14.4 oz). Body surface area is 1.97 meters squared.  No Pain (0) Comment: Data Unavailable   No LMP for male patient.  Allergies reviewed: Yes  Medications reviewed: Yes    Medications: Medication refills not needed today.  Pharmacy name entered into [employer]: [id number] - [place] - [phone]    Clinical concerns: LOPEZ Arce CMA              "

## 2020-07-08 NOTE — NURSING NOTE
Patient is a participant in clinical research trial [id number]. This documentation is in follow-up to research visit dated 07Jul2020.    See detailed notes per Dr. Muller regarding correspondence with sponsor in regard to nivolumab coverage and beginning the sitravatinib daily dosing. Edy was instructed by research nurse and by Dr. Muller today to begin sitravatinib daily dosing today and patient has confirmed understanding.

## 2020-07-10 NOTE — PROGRESS NOTES
ROSIE Muller/Digna Perez RN 7/10/20 @ 1020:  -Insurance will not cover Nivolumab yet. MD will call pt to discuss details in depth. Cancel all labs today to credit pt. No infusion today.    Port was flushed with heparin and discontinued intact. The above information was discussed with patient. Pt frustrated but understood.    Pt discharged in care of self.

## 2020-07-10 NOTE — PROGRESS NOTES
Infusion Nursing Note:  Kentrell Kirkpatrick presents today for C1 D1 Nivolumab (HELD).    Patient seen by provider today: No   present during visit today: Not Applicable.    Note: Patient arrives feeling well. Has been waiting a long time to get insurance approval for Nivolumab/oral study drugs and just received approval in the mail yesterday. However, Dr. Muller called waiting to talk to financial team about coverage as their was still concern. See Digna Mooney RN's note below for further details.    Intravenous Access:  Implanted Port. Port accessed and labs drawn.      Tiera Levine RN

## 2020-07-12 NOTE — ED PROVIDER NOTES
History     Chief Complaint:  Abdominal Pain      HPI   Kentrell Kirkpatrick is a 69 year old male with past medical history of metastatic lung adenocarcinoma currently on infusion therapy who presents to the emergency department for evaluation of abdominal pain.  The patient reports seven hours prior to arrival, at 1000, he developed pain in his right upper abdomen and right rib area. He reports no falls or injuries to be the cause of this pain, and started suddenly. He says breathing worsens the pain. Due to the worsening pain he presented to the emergency department today.  He is currently receiving chemotherapy for his cancer with his oncologist, Dr. Muller. He denies fever, cough, runny nose, chest pain, nausea, vomiting, diarrhea, and constipation.    Allergies:  No Known Drug Allergies     Medications:    Albuterol  Ativan  Proscar  Dulera  Compazine  Senokot  Flomax    Past Medical History:    Primary lung adenocarcinoma  Chemotherapy induced neutropenia  COPD  Colon polyps  Elevated prostate specific antigen    Past Surgical History:    Biopsy  Genitourinary surgery  Head and neck surgery  Insert chest tube x2  IR chest port placement  IR lymph node biopsy  Thoracentesis    Family History:    Heart disease  CAD  Prostate cancer    Social History:  The patient was accompanied to the ED by his neighbor  Smoking Status: Former Smoker  Smokeless Tobacco: Never Used  Alcohol Use: Yes   Marital Status:   [2]       Review of Systems   Constitutional: Negative for fever.   Respiratory: Negative for cough.    Gastrointestinal: Positive for abdominal pain (right upper). Negative for constipation, diarrhea, nausea and vomiting.   Musculoskeletal: Positive for arthralgias (right rib).   All other systems reviewed and are negative.      Physical Exam   First Vitals:  BP: 136/83  Pulse: 90  Heart Rate: 90  Temp: 98.1  F (36.7  C)  Resp: (!) 35  Weight: 72.6 kg (160 lb)  SpO2: 96 %      Physical Exam  Nursing note  and vitals reviewed.  Constitutional: Well nourished. Appears uncomfortable  Eyes: Conjunctiva normal.  Pupils are equal, round, and reactive to light.   ENT: Nose normal. Mucous membranes pink and moist.    Neck: Normal range of motion.  CVS: Normal rate, regular rhythm.  Normal heart sounds.  No murmur.  Pulmonary: Lungs clear to auscultation bilaterally. No wheezes/rales/rhonchi.  GI: Abdomen soft. R. Lateral rib tenderness, RUQ tenderness. No rigidity or guarding.  No CVA tenderness  MSK: No calf tenderness or swelling.  Neuro: Alert. Follows simple commands.  Skin: Skin is warm and dry. No rash noted. R. Chest wall port without overlying erythema/warmth/induration  Psychiatric: Normal affect.     Emergency Department Course     ECG:  Indication: abdominal pain   Completed at 1753.  Read at 1753.   Normal sinus rhythm. Normal ECG.   Rate 94 bpm. AR interval 168. QRS duration 88. QT/QTc 344/430. P-R-T axes 76 1 72.     Imaging:  Radiology findings were communicated with the patient who voiced understanding of the findings.    CT Chest (PE_ Abdomen Pelvis w Contrast  IMPRESSION:   1.  No pulmonary embolism. No acute thoracic or pulmonary abnormality evident.   2.  Progression of pulmonary and hepatic metastases.   3.  No significant change in malignant-appearing left pleural effusion.   Report per radiology      Laboratory:  Laboratory findings were communicated with the patient who voiced understanding of the findings.    CBC: WBC 15.5 (H), HGB 11.9 (L),    CMP: Glucose 100 (H), Creatinine 1.26 (H), GFR Estimate 57 (L), Calcium 8.3 (L), Albumin 3.2 (L), Alk phos 154 (H), AST 49 (H) o/w WNL.    Lactic Acid: 1.3   Lipase: 50 (L)   Troponin (Collected 1737): <0.015   BNP: 69     UA: Yellow and Clear. pH Urine 7.5 (H) o/w WNL      Interventions:  1752 Fentanyl 50 mcg IV    Emergency Department Course:  Nursing notes and vitals reviewed.  1747: I performed an exam of the patient as documented above.    IV was  inserted and blood was drawn for laboratory testing, results above.    The patient was sent for a CT Chest while in the emergency department, results above.       The patient provided a urine sample here in the emergency department. This was sent for laboratory testing, findings above.     1906 Patient rechecked and updated.    1952 Patient rechecked and updated.        Findings and plan explained to the Patient. Patient discharged home with instructions regarding supportive care, medications, and reasons to return. The importance of close follow-up was reviewed.   I personally reviewed the laboratory and imaging results with the Patient and answered all related questions prior to discharge.     Impression & Plan      Medical Decision Making:  Patient is a 69-year-old male presenting for reported abdominal pain.  He is quite uncomfortable on arrival and has reproducible right upper quadrant pain as well as right rib pain.  He is currently on chemotherapy and underwent an extensive work-up during his time in the ED.  He has a noted leukocytosis though no evidence of neutropenia.  I do suspect this is more reactive in the setting of his cancer. No evidence to suggest infectious process today, no UTI/pneumonia.  No evidence of pulmonary embolism, rib fracture or cholecystitis today though CT chest/abdomen does show concerns for progression of metastatic disease.  He reported significant symptom improvement during his time in the ED and remained hemodynamically stable.  His repeat abdominal exam is much improved.  I had an extensive discussion with patient at bedside; I do have concerns today his pain is secondary to worsening metastatic process.  We did discuss palliative care which could be arranged outpatient though patient is requesting to discuss this with his oncologist whom he will contact tomorrow morning.  I did offer additional analgesia for home though patient declining at this time.  He is instructed to  return to the ED for fever, worsening pain or should symptoms change.      Diagnosis:    ICD-10-CM    1. Leukocytosis, unspecified type  D72.829    2. Liver metastasis (H)  C78.7    3. Abdominal pain, unspecified abdominal location  R10.9    4. Renal insufficiency  N28.9        Disposition:  Discharged to home.     Scribe Disclosure:  I, Lourdes Anaya, am serving as a scribe at 5:22 PM on 7/12/2020 to document services personally performed by Sarah Soto DO based on my observations and the provider's statements to me.    Lourdes Anaya  7/12/2020   [facility] EMERGENCY DEPARTMENT       Sarah Soto DO  07/12/20 2048

## 2020-07-12 NOTE — ED TRIAGE NOTES
Pt arrives to the ED due to right sided upper abdominal pain under right rib. Pain began around 1000 and states it has been getting worse since. Denies nausea/vomiting/diarrhea or SOB but states breathing makes it worse. Pt has h/o of lung cancer.

## 2020-07-12 NOTE — ED AVS SNAPSHOT
[facility] Emergency Department  [address]  [facility] [address]  Phone:  [phone]  Fax:  [phone]                                    Kentrell Kirkpatrick   MRN: [id number]    Department:  [facility] Emergency Department   Date of Visit:  7/12/2020           After Visit Summary Signature Page    I have received my discharge instructions, and my questions have been answered. I have discussed any challenges I see with this plan with the nurse or doctor.    ..........................................................................................................................................  Patient/Patient Representative Signature      ..........................................................................................................................................  Patient Representative Print Name and Relationship to Patient    ..................................................               ................................................  Date                                   Time    ..........................................................................................................................................  Reviewed by Signature/Title    ...................................................              ..............................................  Date                                               Time          22EPIC Rev 08/18

## 2020-07-15 NOTE — NURSING NOTE
"Chief Complaint   Patient presents with     Port Draw     port accessed and labs drawn by rn in lab.  vital signs taken.     Port accessed by RN in lab with 20g 3/4\" gripper needle, labs drawn, port flushed with saline and heparin, port de-accessed.  vitals checked.    Renea Christian RN      "

## 2020-07-21 NOTE — PROGRESS NOTES
[facility]    FOLLOW-UP VISIT NOTE    PATIENT NAME: Kentrell Kirkpatrick MRN # [id number]  DATE OF VISIT: July 21, 2020 YOB: 1950    CANCER TYPE: NSCLC, adenocarcinoma  STAGE: IV  ECOG PS: 2    Cancer Staging  Non-small cell lung cancer, left (H)  Staging form: Lung, AJCC 8th Edition  - Clinical stage from 3/11/2019: Stage IV (cT1c, cN3, pM1c) - Signed by Susan Muller MD on 3/11/2019    PD-L1: <1%  Lung panel: 3/1/19 on [id number] A1 and [id number] A1. Negative  NGS: [employer] 360 sent 2/28/19 negative for actionable mutations. SMAD4 E538, TP53 H179R, SMO A327G. MSI not high    SUMMARY  6/27/18 CT chest w/contrast to re-evaluate aortic aneurysm: 8 mm spiculated KEATON nodule, 3 mm RML nodule unchanged from 3/15/17 and 2/25/16 scans  2/4/19 Presented to PCP with fairly rapid onset of shortness of breath. Given albuterol  2/19/19 CXR and CT chest w/contrast. Large left effusion  2/20/19 L thoracentesis (Dr. Rodriguez), 1180 cc  3/1/19 L pleurx (Dr. Rodriguez)  3/13/19 C1 carboplatin pemetrexed pembrolizumab  3/15/19 L supraclavicular LN bx (IR, FNA). Path: lung adenocarcinoma  4/3/19 C2 carboplatin pemetrexed pembrolizumab (total 4 cycles)  4/15/19 FEV1 2.11 (61%), FVC 3.38 (73%), DLCO 27.7, 67% (59%)  4/18/19 TPA. Drained 900 cc after it got unclotted  5/9/19 Pleurx removed  6/5~8/28/19 Maintenance pemetrexed + pembrolizumab x 5 cycles --> PD  9/20/19~4/29/20 C1-8 docetaxel 60 mg/m2 + ramucirumab. Had extravasation w/C2 10/11/19. C4 docetaxel ramucirumab delayed 1 week 2/2019 due to URI and trip. C5-8 docetaxel reduced to 50 mg/m2 due to excessive fatigue  9/28/19 UC for bronchitis. Levofloxacin  10/4/19 Brain MRI - routine rescreening - negative  10/17/19 Port  10/23/19 Recurrent cough. Saw PCP. Azithromycin  10/29/19 Prednisone 40 mg once, then 20 mg daily x 5 days, then 10 mg daily x 5 days for acute bronchitis   12/12/19 ED for bronchitis. Had some chills prior to going to  the ED  3/3/20 Treated for pansinusitis with amox-clav x 14 days and fluticasone nasal spray  3/31/20 CT CAP and bone scan. Some PD. No bone mets.   5/6/20 Brain MRI. Negative  5/6/20 CT CAP.   4/1/20~4/29/20 C1-2 gemcitabine 1000 mg/m2 D1, 8 --> PD  5/30/20 [facility] ED. CTA negative for PE. Doxy, etc  6/15/20 Gamma knife x 1 to small L cerebellar met found incidentally at the time of screening for sitra nivo trial    SUBJECTIVE  Mr. Kirkpatrick returns today for follow up of metastatic lung adenocarcinoma, currently D15 of sitravatinib. We've been having insurance coverage issues and issues with securing nivolumab so that has been delayed. We got notification yesterday that nivolumab is approved. I had him come in today for the D15 visit. He told me he was more short of breath the last few days. However, last night it was better and this morning is about the best he's felt in quite a while. No coughing. No F/C. Pain in the RUQ remains the same. Taking MScontin 15 mg at bedtime and 1-2 oxycodone per day. No change in that since our last visit. Continues to try to go for walks daily. Appetite still marginal but pushing himself to drink 2 Boost per day and still eats three meals per day. No other new problems. No shoulder pain - hasn't returned. Voice is a little more hoarse, but no dysphagia, coughing after eating/drinking. No other new sxs. His wife Pat accompanies him today. No bleeding. Constipation unchanged and managed. No abd pain or N/V.     PAST MEDICAL HISTORY  Lung adenocarcinoma as above  L5 disk herniation. Epidural injection 5/22/18. Improved dramatically with chiropracter in the past.   BPH  Ascending aortic aneurysm  SKYLER not on CPAP, couldn't tolerate, so using dental device    4/15/19 PFT. FEV1 2.11 (61%), FVC 4.58    CURRENT OUTPATIENT MEDICATIONS  Current Outpatient Medications   Medication Sig Dispense Refill     albuterol (PROAIR HFA/PROVENTIL HFA/VENTOLIN HFA) 108 (90 Base) MCG/ACT inhaler Inhale 2  puffs into the lungs every 4 hours (while awake) 1 Inhaler 0     albuterol (PROVENTIL) (2.5 MG/3ML) 0.083% neb solution Take 1 vial (2.5 mg) by nebulization every 4 hours as needed for shortness of breath / dyspnea or wheezing 100 vial 11     calcium polycarbophil (FIBERCON) 625 MG tablet Take 2 tablets by mouth daily       Melatonin 10 MG TABS tablet Take 10 mg by mouth nightly as needed for sleep       mometasone-formoterol (DULERA) 100-5 MCG/ACT inhaler Inhale 2 puffs into the lungs 2 times daily (Patient taking differently: Inhale 2 puffs into the lungs daily ) 1 Inhaler 11     morphine (MS CONTIN) 15 MG CR tablet Take 1 tablet (15 mg) by mouth At Bedtime maximum 2 tablet(s) per day 30 tablet 0     MULTI VITAMIN MENS OR TABS 1 TABLET DAILY       oxyCODONE (ROXICODONE) 5 MG tablet Take 1 tablet (5 mg) by mouth every 4 hours as needed for severe pain 100 tablet 0     senna (SENOKOT) 8.6 MG tablet Take 1 tablet by mouth daily       study - sitravatinib malate salt, [id number],, IDS# [id number], 40 mg capsule Take 2 capsules (80 mg) by mouth every morning Take on an empty stomach (at least 2 hours after the previous meal and 1 hour before the next meal) with at least 1 cup of water. Swallow whole, do not chew. Keep medication in bottle dispensed in and keep at room temperature 30 capsule 0     study - sitravatinib malate salt, [id number],, IDS# [id number], 60 mg roller oompaction capsule Take 1 capsule (60 mg) by mouth daily 30 capsule 0     tamsulosin (FLOMAX) 0.4 MG capsule TAKE 1 CAPSULE BY MOUTH EVERY DAY 90 capsule 1     tiotropium (SPIRIVA HANDIHALER) 18 MCG inhaled capsule Inhale 1 capsule (18 mcg) into the lungs daily 1 capsule 11     finasteride (PROSCAR) 5 MG tablet TAKE 1 TABLET BY MOUTH EVERY DAY (Patient not taking: Reported on 7/10/2020) 90 tablet 1     LORazepam (ATIVAN) 0.5 MG tablet Take 1-2 tabs prior to MRI (Patient not taking: Reported on 7/10/2020) 15 tablet 0     LORazepam (ATIVAN) 0.5 MG tablet Take 1 tablet  (0.5 mg) by mouth every 6 hours as needed (Nausea/Vomiting) (Patient not taking: Reported on 7/10/2020) 30 tablet 3     ondansetron (ZOFRAN) 8 MG tablet Take 1 tablet (8 mg) by mouth every 8 hours as needed for nausea (Patient not taking: Reported on 7/10/2020) 30 tablet 11     prochlorperazine (COMPAZINE) 10 MG tablet Take 1 tablet (10 mg) by mouth every 6 hours as needed (Nausea/Vomiting) (Patient not taking: Reported on 7/10/2020) 30 tablet 11     study - sitravatinib malate salt, [id number],, IDS# [id number], 10 mg capsule Take 2 capsules (20 mg) by mouth every morning Take on an empty stomach (at least 2 hours after the previous meal and 1 hour before the next meal) with at least 1 cup of water. Swallow whole, do not chew. Keep medication in bottle dispensed in and keep at room temperature 60 capsule 0     study - sitravatinib malate salt, [id number],, IDS# [id number], 40 mg roller oompaction capsule Take 1 capsule (40 mg) by mouth daily 30 capsule 0      ALLERGIES  No Known Allergies    REVIEW OF SYSTEMS  As above in the HPI, o/w complete 12-point ROS was negative.    PHYSICAL EXAM  /73 (BP Location: Right arm, Patient Position: Sitting, Cuff Size: Adult Regular)   Pulse 95   Temp 98.1  F (36.7  C) (Tympanic)   Resp 16   Wt 72.4 kg (159 lb 9.6 oz)   SpO2 97%   BMI 21.06 kg/m    Wt Readings from Last 3 Encounters:   07/21/20 72.4 kg (159 lb 9.6 oz)   07/15/20 73.3 kg (161 lb 9.6 oz)   07/12/20 72.6 kg (160 lb)     GEN: NAD. Unchanged from last visit   HEENT: EOMI, no icterus, injection or pallor.  LUNGS: Right clear, left decreased throughout - unchanged from last exam  CV: regular, no murmurs, rubs, or gallops  ABDOMEN: soft, non-tender, non-distended, normal bowel sounds  EXT: warm, no edema  NEURO: alert  SKIN: no rashes    Remainder of physical exam deferred due to public health emergency and limitations of video visit.    LABORATORY AND IMAGING STUDIES  Results for MAYA ELLER (MRN [id number]) as of 7/21/2020  16:40   7/21/2020 14:11   Sodium 134   Potassium 4.2   Chloride 101   Carbon Dioxide 28   Urea Nitrogen 19   Creatinine 1.07   GFR Estimate 70   GFR Estimate If Black 81   Calcium 8.2 (L)   Anion Gap 4   Magnesium 2.6 (H)   Albumin 3.1 (L)   Protein Total 7.0   Bilirubin Total 0.6   Alkaline Phosphatase 130   ALT 39   AST 30   Amylase 486 (H)   Lipase 59 (L)   T4 Free 1.21   TSH 5.99 (H)   Uric Acid 5.9   Glucose 106 (H)   WBC 8.9   Hemoglobin 12.2 (L)   Hematocrit 39.2 (L)   Platelet Count 324   RBC Count 4.64   MCV 85   MCH 26.3 (L)   MCHC 31.1 (L)   RDW 16.1 (H)   Diff Method Automated Method   % Neutrophils 73.6   % Lymphocytes 15.0   % Monocytes 8.0   % Eosinophils 2.4   % Basophils 0.7   % Immature Granulocytes 0.3   Nucleated RBCs 0   Absolute Neutrophil 6.6   Absolute Lymphocytes 1.3   Absolute Monocytes 0.7   Absolute Eosinophils 0.2   Absolute Basophils 0.1   Abs Immature Granulocytes 0.0   Absolute Nucleated RBC 0.0      ASSESSMENT AND PLAN  NSCLC, adenocarcinoma: Had an interim CT 7/12 looking for PE that showed some modest progression compared to the screening scan done in early June, but nothing unexpected considering he started sitra on 7/8. We will be adding nivolumab Thurs, which will be day 17. We are waiting to hear back from Meliza as to how to manage his schedule moving forward. Otherwise he's tolerating sitravatinib without any side effects, really, which is great. Continue. Restaging usually takes place every 8 weeks.     Dyspnea: Likely due to L pleural disease, but fluctuates, so I think there's a component of COPD as well. Much better today, so hopeful it will stay that way. Otherwise continue inhalers.     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 No dysphagia. Discussed referral to Dr. Mason for injection if he wants. He'd like to hold off for now.     Cancer-related pain: Secondary to liver mets. Managed with MSContin 15 mg at bedtime and oxycodone 5 mg 1-2 times per  day, no escalation.     Anemia: Hemoglobin stable.     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 Scheduled for repeat MRI at the end of Sept and has appt with Dr. Zamorano afterward.     Anorexia: Still managing to eat pretty well and has good caloric intake. Albumin 3.1. Offered referral to dietician but he'd rather wait    Subclinical hypothyroidism: TSH mildly elevated but free T4 still on the upper end of normal, so no intervention.     Elevated amylase: Asymptomatic. Lipase ok. Monitor    Hyperbilirubinemia: Resolved    H/o L malignant pleural effusion: No recurrence.     Opioid induced constipation: Senna-S, MOM vs mag citrate prn. Well managed    Fatigue, insomnia: Fatigue about the same but trouble sleeping better with addition of MSContin.     R shoulder pain, L pleural pain, sternal pain, L shoulder pain: Resolved    Cough: Intermittent, minimal.    SKYLER: Using a nasal device    A total of  minutes was spent with the patient, >50% of which was spent in counseling and coordination of care.    Susan Muller MD  Associate Professor of Medicine  Hematology, Oncology and Transplantation

## 2020-07-21 NOTE — LETTER
7/21/2020         RE: Kentrell Kirkpatrick  [address] [facility] [address]        Dear Colleague,    Thank you for referring your patient, Kentrell Kirkpatrick, to the [facility]. Please see a copy of my visit note below.    [facility]    FOLLOW-UP VISIT NOTE    PATIENT NAME: Kentrell Kirkpatrick MRN # [id number]  DATE OF VISIT: July 21, 2020 YOB: 1950    CANCER TYPE: NSCLC, adenocarcinoma  STAGE: IV  ECOG PS: 2    Cancer Staging  Non-small cell lung cancer, left (H)  Staging form: Lung, AJCC 8th Edition  - Clinical stage from 3/11/2019: Stage IV (cT1c, cN3, pM1c) - Signed by Susan Muller MD on 3/11/2019    PD-L1: <1%  Lung panel: 3/1/19 on [id number] A1 and [id number] A1. Negative  NGS: [employer] 360 sent 2/28/19 negative for actionable mutations. SMAD4 E538, TP53 H179R, SMO A327G. MSI not high    SUMMARY  6/27/18 CT chest w/contrast to re-evaluate aortic aneurysm: 8 mm spiculated KEATON nodule, 3 mm RML nodule unchanged from 3/15/17 and 2/25/16 scans  2/4/19 Presented to PCP with fairly rapid onset of shortness of breath. Given albuterol  2/19/19 CXR and CT chest w/contrast. Large left effusion  2/20/19 L thoracentesis (Dr. Rodriguez), 1180 cc  3/1/19 L pleurx (Dr. Rodriguez)  3/13/19 C1 carboplatin pemetrexed pembrolizumab  3/15/19 L supraclavicular LN bx (IR, FNA). Path: lung adenocarcinoma  4/3/19 C2 carboplatin pemetrexed pembrolizumab (total 4 cycles)  4/15/19 FEV1 2.11 (61%), FVC 3.38 (73%), DLCO 27.7, 67% (59%)  4/18/19 TPA. Drained 900 cc after it got unclotted  5/9/19 Pleurx removed  6/5~8/28/19 Maintenance pemetrexed + pembrolizumab x 5 cycles --> PD  9/20/19~4/29/20 C1-8 docetaxel 60 mg/m2 + ramucirumab. Had extravasation w/C2 10/11/19. C4 docetaxel ramucirumab delayed 1 week 2/2019 due to URI and trip. C5-8 docetaxel reduced to 50 mg/m2 due to excessive fatigue  9/28/19 UC for bronchitis. Levofloxacin  10/4/19 Brain MRI - routine  rescreening - negative  10/17/19 Port  10/23/19 Recurrent cough. Saw PCP. Azithromycin  10/29/19 Prednisone 40 mg once, then 20 mg daily x 5 days, then 10 mg daily x 5 days for acute bronchitis   12/12/19 ED for bronchitis. Had some chills prior to going to the ED  3/3/20 Treated for pansinusitis with amox-clav x 14 days and fluticasone nasal spray  3/31/20 CT CAP and bone scan. Some PD. No bone mets.   5/6/20 Brain MRI. Negative  5/6/20 CT CAP.   4/1/20~4/29/20 C1-2 gemcitabine 1000 mg/m2 D1, 8 --> PD  5/30/20 [facility] ED. CTA negative for PE. Doxy, etc  6/15/20 Gamma knife x 1 to small L cerebellar met found incidentally at the time of screening for sitra nivo trial    SUBJECTIVE  Mr. Kirkpatrick returns today for follow up of metastatic lung adenocarcinoma, currently D15 of sitravatinib. We've been having insurance coverage issues and issues with securing nivolumab so that has been delayed. We got notification yesterday that nivolumab is approved. I had him come in today for the D15 visit. He told me he was more short of breath the last few days. However, last night it was better and this morning is about the best he's felt in quite a while. No coughing. No F/C. Pain in the RUQ remains the same. Taking MScontin 15 mg at bedtime and 1-2 oxycodone per day. No change in that since our last visit. Continues to try to go for walks daily. Appetite still marginal but pushing himself to drink 2 Boost per day and still eats three meals per day. No other new problems. No shoulder pain - hasn't returned. Voice is a little more hoarse, but no dysphagia, coughing after eating/drinking. No other new sxs. His wife Pat accompanies him today. No bleeding. Constipation unchanged and managed. No abd pain or N/V.     PAST MEDICAL HISTORY  Lung adenocarcinoma as above  L5 disk herniation. Epidural injection 5/22/18. Improved dramatically with chiropracter in the past.   BPH  Ascending aortic aneurysm  SKYLER not on CPAP, couldn't tolerate, so  using dental device    4/15/19 PFT. FEV1 2.11 (61%), FVC 4.58    CURRENT OUTPATIENT MEDICATIONS  Current Outpatient Medications   Medication Sig Dispense Refill     albuterol (PROAIR HFA/PROVENTIL HFA/VENTOLIN HFA) 108 (90 Base) MCG/ACT inhaler Inhale 2 puffs into the lungs every 4 hours (while awake) 1 Inhaler 0     albuterol (PROVENTIL) (2.5 MG/3ML) 0.083% neb solution Take 1 vial (2.5 mg) by nebulization every 4 hours as needed for shortness of breath / dyspnea or wheezing 100 vial 11     calcium polycarbophil (FIBERCON) 625 MG tablet Take 2 tablets by mouth daily       Melatonin 10 MG TABS tablet Take 10 mg by mouth nightly as needed for sleep       mometasone-formoterol (DULERA) 100-5 MCG/ACT inhaler Inhale 2 puffs into the lungs 2 times daily (Patient taking differently: Inhale 2 puffs into the lungs daily ) 1 Inhaler 11     morphine (MS CONTIN) 15 MG CR tablet Take 1 tablet (15 mg) by mouth At Bedtime maximum 2 tablet(s) per day 30 tablet 0     MULTI VITAMIN MENS OR TABS 1 TABLET DAILY       oxyCODONE (ROXICODONE) 5 MG tablet Take 1 tablet (5 mg) by mouth every 4 hours as needed for severe pain 100 tablet 0     senna (SENOKOT) 8.6 MG tablet Take 1 tablet by mouth daily       study - sitravatinib malate salt, [id number],, IDS# [id number], 40 mg capsule Take 2 capsules (80 mg) by mouth every morning Take on an empty stomach (at least 2 hours after the previous meal and 1 hour before the next meal) with at least 1 cup of water. Swallow whole, do not chew. Keep medication in bottle dispensed in and keep at room temperature 30 capsule 0     study - sitravatinib malate salt, [id number],, IDS# [id number], 60 mg roller oompaction capsule Take 1 capsule (60 mg) by mouth daily 30 capsule 0     tamsulosin (FLOMAX) 0.4 MG capsule TAKE 1 CAPSULE BY MOUTH EVERY DAY 90 capsule 1     tiotropium (SPIRIVA HANDIHALER) 18 MCG inhaled capsule Inhale 1 capsule (18 mcg) into the lungs daily 1 capsule 11     finasteride (PROSCAR) 5 MG tablet TAKE 1  TABLET BY MOUTH EVERY DAY (Patient not taking: Reported on 7/10/2020) 90 tablet 1     LORazepam (ATIVAN) 0.5 MG tablet Take 1-2 tabs prior to MRI (Patient not taking: Reported on 7/10/2020) 15 tablet 0     LORazepam (ATIVAN) 0.5 MG tablet Take 1 tablet (0.5 mg) by mouth every 6 hours as needed (Nausea/Vomiting) (Patient not taking: Reported on 7/10/2020) 30 tablet 3     ondansetron (ZOFRAN) 8 MG tablet Take 1 tablet (8 mg) by mouth every 8 hours as needed for nausea (Patient not taking: Reported on 7/10/2020) 30 tablet 11     prochlorperazine (COMPAZINE) 10 MG tablet Take 1 tablet (10 mg) by mouth every 6 hours as needed (Nausea/Vomiting) (Patient not taking: Reported on 7/10/2020) 30 tablet 11     study - sitravatinib malate salt, [id number],, IDS# [id number], 10 mg capsule Take 2 capsules (20 mg) by mouth every morning Take on an empty stomach (at least 2 hours after the previous meal and 1 hour before the next meal) with at least 1 cup of water. Swallow whole, do not chew. Keep medication in bottle dispensed in and keep at room temperature 60 capsule 0     study - sitravatinib malate salt, [id number],, IDS# [id number], 40 mg roller oompaction capsule Take 1 capsule (40 mg) by mouth daily 30 capsule 0      ALLERGIES  No Known Allergies    REVIEW OF SYSTEMS  As above in the HPI, o/w complete 12-point ROS was negative.    PHYSICAL EXAM  /73 (BP Location: Right arm, Patient Position: Sitting, Cuff Size: Adult Regular)   Pulse 95   Temp 98.1  F (36.7  C) (Tympanic)   Resp 16   Wt 72.4 kg (159 lb 9.6 oz)   SpO2 97%   BMI 21.06 kg/m    Wt Readings from Last 3 Encounters:   07/21/20 72.4 kg (159 lb 9.6 oz)   07/15/20 73.3 kg (161 lb 9.6 oz)   07/12/20 72.6 kg (160 lb)     GEN: NAD. Unchanged from last visit   HEENT: EOMI, no icterus, injection or pallor.  LUNGS: Right clear, left decreased throughout - unchanged from last exam  CV: regular, no murmurs, rubs, or gallops  ABDOMEN: soft, non-tender, non-distended, normal bowel  sounds  EXT: warm, no edema  NEURO: alert  SKIN: no rashes    Remainder of physical exam deferred due to public health emergency and limitations of video visit.    LABORATORY AND IMAGING STUDIES  Results for MAYA ELLER (MRN [id number]) as of 7/21/2020 16:40   7/21/2020 14:11   Sodium 134   Potassium 4.2   Chloride 101   Carbon Dioxide 28   Urea Nitrogen 19   Creatinine 1.07   GFR Estimate 70   GFR Estimate If Black 81   Calcium 8.2 (L)   Anion Gap 4   Magnesium 2.6 (H)   Albumin 3.1 (L)   Protein Total 7.0   Bilirubin Total 0.6   Alkaline Phosphatase 130   ALT 39   AST 30   Amylase 486 (H)   Lipase 59 (L)   T4 Free 1.21   TSH 5.99 (H)   Uric Acid 5.9   Glucose 106 (H)   WBC 8.9   Hemoglobin 12.2 (L)   Hematocrit 39.2 (L)   Platelet Count 324   RBC Count 4.64   MCV 85   MCH 26.3 (L)   MCHC 31.1 (L)   RDW 16.1 (H)   Diff Method Automated Method   % Neutrophils 73.6   % Lymphocytes 15.0   % Monocytes 8.0   % Eosinophils 2.4   % Basophils 0.7   % Immature Granulocytes 0.3   Nucleated RBCs 0   Absolute Neutrophil 6.6   Absolute Lymphocytes 1.3   Absolute Monocytes 0.7   Absolute Eosinophils 0.2   Absolute Basophils 0.1   Abs Immature Granulocytes 0.0   Absolute Nucleated RBC 0.0      ASSESSMENT AND PLAN  NSCLC, adenocarcinoma: Had an interim CT 7/12 looking for PE that showed some modest progression compared to the screening scan done in early June, but nothing unexpected considering he started sitra on 7/8. We will be adding nivolumab Thurs, which will be day 17. We are waiting to hear back from Meliza as to how to manage his schedule moving forward. Otherwise he's tolerating sitravatinib without any side effects, really, which is great. Continue. Restaging usually takes place every 8 weeks.     Dyspnea: Likely due to L pleural disease, but fluctuates, so I think there's a component of COPD as well. Much better today, so hopeful it will stay that way. Otherwise continue inhalers.     Hoarseness: Likely L recurrent  laryngeal nerve involvement. No dysphagia. Discussed referral to Dr. Mason for injection if he wants. He'd like to hold off for now.     Cancer-related pain: Secondary to liver mets. Managed with MSContin 15 mg at bedtime and oxycodone 5 mg 1-2 times per day, no escalation.     Anemia: Hemoglobin stable.     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 Scheduled for repeat MRI at the end of Sept and has appt with Dr. Zamorano afterward.     Anorexia: Still managing to eat pretty well and has good caloric intake. Albumin 3.1. Offered referral to dietician but he'd rather wait    Subclinical hypothyroidism: TSH mildly elevated but free T4 still on the upper end of normal, so no intervention.     Elevated amylase: Asymptomatic. Lipase ok. Monitor    Hyperbilirubinemia: Resolved    H/o L malignant pleural effusion: No recurrence.     Opioid induced constipation: Senna-S, MOM vs mag citrate prn. Well managed    Fatigue, insomnia: Fatigue about the same but trouble sleeping better with addition of MSContin.     R shoulder pain, L pleural pain, sternal pain, L shoulder pain: Resolved    Cough: Intermittent, minimal.    SKYLER: Using a nasal device    A total of  minutes was spent with the patient, >50% of which was spent in counseling and coordination of care.    Susan Muller MD  Associate Professor of Medicine  Hematology, Oncology and Transplantation    Again, thank you for allowing me to participate in the care of your patient.        Sincerely,        Susan Muller MD

## 2020-07-21 NOTE — NURSING NOTE
"Oncology Rooming Note    July 21, 2020 2:20 PM   Kentrell Kirkpatrick is a 69 year old male who presents for:    Chief Complaint   Patient presents with     Port Draw     labs drawn from port by rn.  vs taken     RECHECK     Primary lung adenocarcinoma, left (H)       Initial Vitals: /73 (BP Location: Right arm, Patient Position: Sitting, Cuff Size: Adult Regular)   Pulse 95   Temp 98.1  F (36.7  C) (Tympanic)   Resp 16   Wt 72.4 kg (159 lb 9.6 oz)   SpO2 97%   BMI 21.06 kg/m   Estimated body mass index is 21.06 kg/m  as calculated from the following:    Height as of 6/15/20: 1.854 m (6' 1\").    Weight as of this encounter: 72.4 kg (159 lb 9.6 oz). Body surface area is 1.93 meters squared.  No Pain (0) Comment: Data Unavailable   No LMP for male patient.  Allergies reviewed: Yes  Medications reviewed: Yes    Medications: Medication refills not needed today.  Pharmacy name entered into [facility]: [id number] - [place], [place] - [address]    Clinical concerns: LOPEZ Arce CMA              "

## 2020-07-21 NOTE — NURSING NOTE
"Chief Complaint   Patient presents with     Port Draw     labs drawn from port by rn.  vs taken     Port accessed with 20 gauge 3/4\" gripper needle and labs drawn by rn.  Port flushed with NS and heparin then de-accessed.  Pt tolerated well.  VS taken.  Pt checked in for next appt.    Nayeli Sparks RN      "

## 2020-07-23 NOTE — PROGRESS NOTES
Infusion Nursing Note:  Kentrell Kirkpatrick presents today for cycle 1 day 1 Nivolumab Study [id number] IDS #[id number].    Patient seen by provider today: No   present during visit today: Not Applicable.    Note: Pt arrives feeling overall okay. He continues to have a hoarse voice which he states started after he started the study oral chemo drug. He also continues to haves RANKIN and a decreased appetite as nothing tastes good anymore. He is working hard to get his weight back on and is adding ensure to his meals twice a day.  Patient is receiving Nivolumab for the first time.  Verified that patient recieved written chemotherapy information previously.  Verbally reviewed chemotherapy teaching, side effects, and follow-up schedule with patient. Patient instructed to call triage with any questions or if he experiences a temperature >100.5, shaking chills, uncontrolled nausea/vomiting/diarrhea, dizziness, shortness of breath, bleeding not relieved with pressure, or with any other concerns.     Nivolumab start time: 1605  Nivolumab stop time: 1635    Intravenous Access:  Implanted Port.    Treatment Conditions:  Lab Results   Component Value Date    HGB 12.2 07/21/2020     Lab Results   Component Value Date    WBC 8.9 07/21/2020      Lab Results   Component Value Date    ANEU 6.6 07/21/2020     Lab Results   Component Value Date     07/21/2020      Lab Results   Component Value Date     07/21/2020                   Lab Results   Component Value Date    POTASSIUM 4.2 07/21/2020           Lab Results   Component Value Date    MAG 2.6 07/21/2020            Lab Results   Component Value Date    CR 1.07 07/21/2020                   Lab Results   Component Value Date    MALATHI 8.2 07/21/2020                Lab Results   Component Value Date    BILITOTAL 0.6 07/21/2020           Lab Results   Component Value Date    ALBUMIN 3.1 07/21/2020                    Lab Results   Component Value Date    ALT 39 07/21/2020            Lab Results   Component Value Date    AST 30 07/21/2020     Urine protein: negative  Results reviewed, labs MET treatment parameters, ok to proceed with treatment.      Post Infusion Assessment:  Patient tolerated infusion without incident.  Blood return noted pre and post infusion.  Site patent and intact, free from redness, edema or discomfort.  No evidence of extravasations.  Access discontinued per protocol.       Discharge Plan:   Patient declined prescription refills.  Discharge instructions reviewed with: Patient.  Patient and/or family verbalized understanding of discharge instructions and all questions answered.  AVS to patient via SviralT.  Patient will return 8/6/20 for next appointment.   Patient discharged in stable condition accompanied by: self.  Departure Mode: Ambulatory.    Silvia Crawford RN

## 2020-07-23 NOTE — PATIENT INSTRUCTIONS
Contact Numbers:   [facility] Main Line: [phone]    Call triage to speak with triage if you are experiencing chills and/or temperature greater than or equal to 100.5, uncontrolled nausea/vomiting, diarrhea, constipation, dizziness, shortness of breath, chest pain, bleeding, unexplained bruising, or any new/concerning symptoms, questions/concerns.     If you are having any concerning symptoms or wish to speak to a provider before your next infusion visit, please call your care coordinator or triage to notify them so we can adequately serve you.     If you need a refill on a medication or narcotic prescription, please call triage or your care coordinator before your infusion appointment.                 July 2020 Sunday Monday Tuesday Wednesday Thursday Friday Saturday                  1    US RENAL COMPLETE   2:20 PM   (30 min.)   [id number]   [facility] [place] 2     3     4       5     6     7    [facility] LAB DRAW  10:45 AM   (15 min.)    [facility]    [facility] RETURN  11:00 AM   (30 min.)   Susan Muller MD   [facility] 8     9     10    [facility] ONC INFUSION 60   9:00 AM   (60 min.)    ONCOLOGY INFUSION   [facility] 11       12    Admission   5:16 PM   Sarah Soto DO   [facility] Emergency Department   (Discharge: 7/12/2020)    CT CHEST (PE) ABD PELV W   5:40 PM   (30 min.)   RHCT1   [facility] 13     14     15    [facility] LAB DRAW  11:00 AM   (15 min.)   TREE  [facility] LAB DRAW   [facility] Draw 16     17     18       19     20     21    [facility] LAB DRAW   2:00 PM   (15 min.)    [facility] LAB DRAW   [facility]    [facility] RETURN   2:15 PM   (30 min.)   Susan Muller MD   [facility] 22     23    [facility] ONC INFUSION 60   3:30 PM   (60 min.)    ONCOLOGY INFUSION   [facility] 24     25       26     27     28     29     30      31 August 2020 Sunday Monday Tuesday Wednesday Thursday Friday Saturday                                 1       2     3     4     5     6    [facility]  11:30 AM   (15 min.)   [facility] LAB DRAW   [facility] Lab Draw    [facility] RETURN  11:55 AM   (50 min.)   Ora Guevara APRN CNP   [facility] ONC INFUSION 60   1:00 PM   (60 min.)    ONCOLOGY INFUSION   [facility] 7     8       9     10     11     12     13     14     15       16     17     18     19     20     21     22       23     24     25     26     27     28     29       30     31    CT CHEST ABDOMEN W  11:00 AM   (20 min.)   CT2   [facility] CT                                          Lab Results:  No results found for this or any previous visit (from the past 12 hour(s)).

## 2020-07-23 NOTE — NURSING NOTE
[id number]: Study Visit Note   Subject name: Kentrell Kirkpatrick     Visit: Cycle 1 Day 1 (Nivolumab infusion completed today. Labs and provider visit completed on 21-Jul-2020)    Did the study visit occur within the appropriate window allowed by the protocol? no    If no, why? Cycle 1 Day 1 should be completed 7 days following the Sitravatinib PK Substudy Lead-in date which was completed on 30-Jun-2020. The C1D1 nivolumab was originally scheduled for 07-Jul-2020, but was delayed due to denial of coverage by insurance. As requested by treating MD and approved by study sponsor, the patient started daily dosing of Sitravatinib on 08-Jul-2020 while securing nivolumab coverage was being worked on.    Since the last study visit, he has been doing about the same.     I have personally interviewed Kentrell Kirkpatrick and reviewed his medical record for adverse events and concomitant medications and these have been recorded on the corresponding logs in Kentrell Kirkpatrick's research file.     Kentrell Kirkpatrick was given the opportunity to ask any trial related questions.  Please see provider progress note for physical exam and other clinical information. Labs were reviewed - any significant lab values were addressed and reviewed.      [id number]: Medication Count/IDS Note      Kentrell Kirkpatrick is enrolled on the trial number listed above. The patient presented for his C1D1  visit.   IDS number: [id number]  Drug name: Sitravatinib  Number of bottles returned: 2  Lot number(s): [id number] (60mg) and [id number] (40mg)  Number of pills returned: 26 (13 pills of each strength)      Number of bottles dispensed:2  Lot number(s):[id number] (40 mg) and [id number] (60 mg)  Number of pills dispensed: 60 (30 pills of each strength)    Drug diary returned? yes    Are there any discrepancies between the amount of medication the patient was instructed to take and the amount recorded as taken in the patient s drug diary?  no    If yes, provide amount and reason for  the discrepancy.  n/a  Are there any discrepancies between the amount of medication the patient has recorded as taken in the drug diary and the amount that would be expected to be returned based on the amount recorded as taken?  no    If yes, can the discrepancy be reconciled with the patient? no  If yes, provide details. n/a    Form 504.00.01 (Version 1)     Effective date: 01JUL2018     Next Review Date: 01JUL2020

## 2020-07-24 NOTE — PROGRESS NOTES
PT DISCHARGE NOTE - Contacted pt via phone 7/23/2020.   He reports that he feels he is doing ok and doesn't need to return for PT.      Pt not seen past the evaluation. Please see eval for details on function at that time.   Thank you.

## 2020-07-28 NOTE — TELEPHONE ENCOUNTER
Refill Request    Date of most recent appointment:  7/21/20  Next upcoming appointment:   8/6/20    Medication requested:  morphine (MS CONTIN) 15 MG CR tablet   Quantity:  30  Last fill date:  6/29/20, reports using 1/day, has 1 left.    Medication requested:  LORazepam (ATIVAN) 0.5 MG tablet  Quantity:  30  Last fill date:  6/17/20, reports 1-2x/week will take 2 pills (1 mg) @ HS.     Person requesting refill:  Pt's SO  Notes:  No  access under Pt provider    Prescribing provider(s):  Dr Muller  Refill approved/denied:  Approved    [facility] [address]  Called and relayed information to Pt, who verbalized understanding.

## 2020-08-06 NOTE — PROGRESS NOTES
Infusion Nursing Note:  Kentrell Kirkpatrick presents today for port labs.    Patient seen by provider today: No   present during visit today: Not Applicable.    Note: N/A.    Intravenous Access:  Lab draw site port, Needle type port, Gauge 20 3/4in.  Labs drawn without difficulty.  Implanted Port.    Treatment Conditions:  Not Applicable.      Post Infusion Assessment:  Patient tolerated procedure without incident.  Site patent and intact, free from redness, edema or discomfort.  No evidence of extravasations.  Access discontinued per protocol.       Discharge Plan:   Discharge instructions reviewed with: Patient.  Patient discharged in stable condition accompanied by: self.  Departure Mode: Ambulatory.  Has a f/u visit scheduled for later today.    ROLANDO GALLEGOS RN

## 2020-08-06 NOTE — PROGRESS NOTES
"Kentrell Kirkpatrick is a 69 year old male who is being evaluated via a billable video visit.      The patient has been notified of following:     \"This video visit will be conducted via a call between you and your physician/provider. We have found that certain health care needs can be provided without the need for an in-person physical exam.  This service lets us provide the care you need with a video conversation.  If a prescription is necessary we can send it directly to your pharmacy.  If lab work is needed we can place an order for that and you can then stop by our lab to have the test done at a later time.    Video visits are billed at different rates depending on your insurance coverage.  Please reach out to your insurance provider with any questions.    If during the course of the call the physician/provider feels a video visit is not appropriate, you will not be charged for this service.\"    Patient has given verbal consent for Video visit? Yes    How would you like to obtain your AVS? MyChart    If you are dropped from the video visit, the video invite should be resent to: Send to e-mail at: [email or web address]    Will anyone else be joining your video visit? No         I have reviewed and updated the patient's allergies and medication list.    Concerns: No new concerns.   Refills: Oxycodone.     Vitals - Patient Reported  Weight (Patient Reported): 68.9 kg (152 lb)  Height (Patient Reported): 185.4 cm (6' 1\")  BMI (Based on Pt Reported Ht/Wt): 20.05  Pain Score: Moderate Pain (5)  Pain Loc: Low Back    Maria Del Carmen Holcomb CMA    Video-Visit Details    Type of service:  Video Visit    Video Start Time: 1220  Video End Time: 1255    Originating Location (pt. Location): Home    Distant Location (provider location):  [facility]     Platform used for Video Visit: Jose Brito CNP      August 6, 2020     Reason for Visit: follow up NSCLC    Oncology HPI:      CANCER TYPE: NSCLC, " adenocarcinoma  STAGE: IV  ECOG PS: 2     Cancer Staging  Non-small cell lung cancer, left (H)  Staging form: Lung, AJCC 8th Edition  - Clinical stage from 3/11/2019: Stage IV (cT1c, cN3, pM1c) - Signed by Susan Muller MD on 3/11/2019     PD-L1: <1%  Lung panel: 3/1/19 on [id number] A1 and [id number] A1. Negative  NGS: [employer] 360 sent 2/28/19 negative for actionable mutations. SMAD4 E538, TP53 H179R, SMO A327G. MSI not high     SUMMARY  6/27/18                   CT chest w/contrast to re-evaluate aortic aneurysm: 8 mm spiculated KEATON nodule, 3 mm RML nodule unchanged from 3/15/17 and 2/25/16 scans  2/4/19                     Presented to PCP with fairly rapid onset of shortness of breath. Given albuterol  2/19/19                   CXR and CT chest w/contrast. Large left effusion  2/20/19                   L thoracentesis (Dr. Rodriguez), 1180 cc  3/1/19                     L pleurx (Dr. Rodriguez)  3/13/19                   C1 carboplatin pemetrexed pembrolizumab  3/15/19                   L supraclavicular LN bx (IR, FNA). Path: lung adenocarcinoma  4/3/19                     C2 carboplatin pemetrexed pembrolizumab (total 4 cycles)  4/15/19                   FEV1 2.11 (61%), FVC 3.38 (73%), DLCO 27.7, 67% (59%)  4/18/19                   TPA. Drained 900 cc after it got unclotted  5/9/19                     Pleurx removed  6/5~8/28/19            Maintenance pemetrexed + pembrolizumab x 5 cycles --> PD  9/20/19~4/29/20     C1-8 docetaxel 60 mg/m2 + ramucirumab. Had extravasation w/C2 10/11/19. C4 docetaxel ramucirumab delayed 1 week 2/2019 due to URI and trip. C5-8 docetaxel reduced to 50 mg/m2 due to excessive fatigue  9/28/19                   UC for bronchitis. Levofloxacin  10/4/19                   Brain MRI - routine rescreening - negative  10/17/19                 Port  10/23/19                 Recurrent cough. Saw PCP. Azithromycin  10/29/19                 Prednisone 40 mg once, then 20 mg daily x 5 days, then 10 mg  "daily x 5 days for acute bronchitis          12/12/19                 ED for bronchitis. Had some chills prior to going to the ED  3/3/20                     Treated for pansinusitis with amox-clav x 14 days and fluticasone nasal spray  3/31/20                   CT CAP and bone scan. Some PD. No bone mets.   5/6/20                     Brain MRI. Negative  5/6/20                     CT CAP.   4/1/20~4/29/20       C1-2 gemcitabine 1000 mg/m2 D1, 8 --> PD  5/30/20                   [facility] ED. CTA negative for PE. Doxy, etc  6/15/20                   Gamma knife x 1 to small L cerebellar met found incidentally at the time of screening for sitra nivo trial  ~6/30/20 Started Sitravatinib  7/23/20 First dose nivolumab    Interval history: Edy has not noticed any side effects since his nivolumab infusion. His energy level has gone down a bit since starting the Sitravatinib but he is still able to keep up with activities he enjoys. He has had various \"random\" pains. Most recently the pain is on his left buttock and going up towards his waistline. He did not have any injury or event that precipitated the pain. Walking up a step aggravates it. No swelling or redness near site of pain. Historically, ice voltaren gel and lidocaine patches have helped him but he hasn't tried these yet. No pain shooting down the thigh or numbness/weakness of leg. Another random pain he mentions is the lower part of his front L rib cage. He has been taking the Morphine at night and oxycodone some days as needed. He notices when he takes the pain medication  q4 hours he has relief and absence of pain.    Overall he and his wife note he has lost weight since his diagnosis, more so since starting Sitravatinib. He has poor appetite but is intentional about eating 3 meals a day and 2 ensure. Has gained some weight back by doing so. His breathing has slowly improved since starting this new treatment which he is happy to see. He has less activity " tolerance than he used to prior to diagnosis but he is able to climb the stairs with less SOB these days. Goes for daily walk. No cough or trouble swallowing. Voice is still hoarse but no worse.  Takes 2 senna BID for bowel movement, if hasn't gone in 3 days he drinks milk of magnesia mixed with prune juice which always works. Small bruise to left lower lip, no other bleeding/bruising concerns.     He denies rash, dryness of hands/feet, nausea, mucositis, diarrhea, new cough/sob, fever/chills.      Current Outpatient Medications   Medication Sig Dispense Refill     albuterol (PROAIR HFA/PROVENTIL HFA/VENTOLIN HFA) 108 (90 Base) MCG/ACT inhaler Inhale 2 puffs into the lungs every 4 hours (while awake) 1 Inhaler 0     albuterol (PROVENTIL) (2.5 MG/3ML) 0.083% neb solution Take 1 vial (2.5 mg) by nebulization every 4 hours as needed for shortness of breath / dyspnea or wheezing 100 vial 11     calcium polycarbophil (FIBERCON) 625 MG tablet Take 2 tablets by mouth daily       finasteride (PROSCAR) 5 MG tablet TAKE 1 TABLET BY MOUTH EVERY DAY (Patient not taking: Reported on 7/10/2020) 90 tablet 1     LORazepam (ATIVAN) 0.5 MG tablet Take 1 tablet (0.5 mg) by mouth every 6 hours as needed (Nausea/Vomiting) 30 tablet 0     LORazepam (ATIVAN) 0.5 MG tablet Take 1-2 tabs prior to MRI (Patient not taking: Reported on 7/10/2020) 15 tablet 0     Melatonin 10 MG TABS tablet Take 10 mg by mouth nightly as needed for sleep       mometasone-formoterol (DULERA) 100-5 MCG/ACT inhaler Inhale 2 puffs into the lungs 2 times daily (Patient taking differently: Inhale 2 puffs into the lungs daily ) 1 Inhaler 11     morphine (MS CONTIN) 15 MG CR tablet Take 1 tablet (15 mg) by mouth At Bedtime maximum 2 tablet(s) per day 30 tablet 0     MULTI VITAMIN MENS OR TABS 1 TABLET DAILY       ondansetron (ZOFRAN) 8 MG tablet Take 1 tablet (8 mg) by mouth every 8 hours as needed for nausea (Patient not taking: Reported on 7/10/2020) 30 tablet 11      oxyCODONE (ROXICODONE) 5 MG tablet Take 1 tablet (5 mg) by mouth every 4 hours as needed for severe pain 100 tablet 0     prochlorperazine (COMPAZINE) 10 MG tablet Take 1 tablet (10 mg) by mouth every 6 hours as needed (Nausea/Vomiting) (Patient not taking: Reported on 7/10/2020) 30 tablet 11     senna (SENOKOT) 8.6 MG tablet Take 1 tablet by mouth daily       study - sitravatinib malate salt, [id number],, IDS# [id number], 40 mg capsule Take 2 capsules (80 mg) by mouth every morning Take on an empty stomach (at least 2 hours after the previous meal and 1 hour before the next meal) with at least 1 cup of water. Swallow whole, do not chew. Keep medication in bottle dispensed in and keep at room temperature 30 capsule 0     study - sitravatinib malate salt, [id number],, IDS# [id number], 40 mg roller oompaction capsule Take 1 capsule (40 mg) by mouth daily 30 capsule 0     study - sitravatinib malate salt, [id number],, IDS# [id number], 40 mg roller oompaction capsule Take 1 capsule (40 mg) by mouth daily 30 capsule 0     study - sitravatinib malate salt, [id number],, IDS# [id number], 60 mg roller oompaction capsule Take 1 capsule (60 mg) by mouth daily 30 capsule 0     study - sitravatinib malate salt, [id number],, IDS# [id number], 60 mg roller oompaction capsule Take 1 capsule (60 mg) by mouth daily 30 capsule 0     tamsulosin (FLOMAX) 0.4 MG capsule TAKE 1 CAPSULE BY MOUTH EVERY DAY 90 capsule 1     tiotropium (SPIRIVA HANDIHALER) 18 MCG inhaled capsule Inhale 1 capsule (18 mcg) into the lungs daily 1 capsule 11        No Known Allergies    Objective  No vitals taken at this visit  Wt Readings from Last 4 Encounters:   07/21/20 72.4 kg (159 lb 9.6 oz)   07/15/20 73.3 kg (161 lb 9.6 oz)   07/12/20 72.6 kg (160 lb)   07/10/20 74.3 kg (163 lb 11.2 oz)     Video physical exam  General: Patient appears well in no acute distress. Normal body habitus. Hoarse voice.   Skin: No visualized rash or lesions on visualized skin. Small bruise to left lower corner of  lip.  Eyes: EOMI, no erythema, sclera icterus or discharge noted  Resp: Appears to be breathing comfortably without accessory muscle usage, speaking in full sentences, no cough  MSK: Appears to have normal range of motion based on visualized movements  Neurologic: No apparent tremors, facial movements symmetric  Psych: affect good, alert and oriented    The rest of a comprehensive physical examination is deferred due to PHE (public health emergency) video restrictions    Labs:    8/6/2020 08:52   Sodium 137   Potassium 4.1   Chloride 105   Carbon Dioxide 26   Urea Nitrogen 17   Creatinine 0.98   GFR Estimate 77   GFR Estimate If Black 90   Calcium 8.1 (L)   Anion Gap 6   Albumin 3.2 (L)   Protein Total 6.8   Bilirubin Total 0.8   Alkaline Phosphatase 125   ALT 46   AST 37   Glucose 114 (H)   WBC 7.4   Hemoglobin 12.6 (L)   Hematocrit 41.8   Platelet Count 217   RBC Count 4.92   MCV 85   MCH 25.6 (L)   MCHC 30.1 (L)   RDW 17.5 (H)   Diff Method Automated Method   % Neutrophils 74.0   % Lymphocytes 12.0   % Monocytes 8.3   % Eosinophils 4.7   % Basophils 0.7   % Immature Granulocytes 0.3   Nucleated RBCs 0   Absolute Neutrophil 5.5   Absolute Lymphocytes 0.9   Absolute Monocytes 0.6   Absolute Eosinophils 0.4   Absolute Basophils 0.1   Abs Immature Granulocytes 0.0   Absolute Nucleated RBC 0.0       Imaging: n/a     Impression/plan:   NSCLC, adenocarcinoma: He's tolerating sitravatinib without any notable side effects, besides fatigue, but this has been ongoing throughout his disease. Denies any side effects after his first Nivo infuion 7/23.   -Continue sitravatinib. Follow up scheduled for 8/20/20 to evaluate for next nivo cycle.   -Restaging scheduled for September     Dyspnea: Likely due to L pleural disease. It continues to improve which he is relieved about. There may be a component of COPD as well. Continue inhalers and activity as tolerated.      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 No  dysphagia. He feels like this may have gotten a bit worse since starting the sitravatinib. Dr. Muller has discussed a referral to Dr. Mason for injection if he wants it--this was not specifically discussed today.      Cancer-related pain: Secondary to liver mets. Also with random pains which he has had historically and they spontaneously resolve. Will increase his MSContin from 15 mg at bedtime to 15 mg q 12 hours. He can continue taking oxycodone prn but discussed how we expect he will need less and to monitor for over sedation.     Anemia: Hemoglobin stable.      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 Scheduled for repeat MRI at the end of Sept and has appt with Dr. Zamorano afterward.      Anorexia: Still managing to eat pretty well and has good caloric intake. Albumin 3.2. Has declined a dietician referral in the past. Consider encouraging again in the future if he continues to lose/not maintain weight.      Subclinical hypothyroidism: TSH mildly elevated but free T4 still on the upper end of normal on 7/21, no intervention. Not checked today. Recheck with next nivo infusion      Hx elevated amylase: Asymptomatic, not checked today.     H/o L malignant pleural effusion: No recurrence.      Opioid induced constipation: Senna-S, MOM and prune juice prn. Well managed     Fatigue: Fatigue is about the same. He is still able to be active.     Anxiety: Reports his mind starts racing at night and leads to feeling like he cannot catch his breath. Not an issue every night. He finds 1 mg ativan helpful, 0.5 mg was not effective. He uses it a couple times over a couple week span. Discussed this is okay to continue to do as it offers him relief. Reminded him of watching for over sedation.     Jasmin Brito CNP on 8/6/2020 at 1:35 PM     The patient was seen in conjunction with Jasmin Brito CNP who served as a scribe for today's visit. I have reviewed the note and agree with the above findings and plan. TW

## 2020-08-20 NOTE — PROGRESS NOTES
Infusion Nursing Note:  Kentrell Kirkpatrick presents today for C2D1 Nivolumab.    Patient seen by provider today: Yes: Ora Guevara PA-C   present during visit today: Not Applicable.    Note: Patient reported to clinic today with no new complaints or concerns. Patient declined any interventions for pain during infusion visit.    -Ok per [employer] RN to go ahead with treatment today. Draw labs per orders.     Labs drawn at appropriate times during infusion visit. Other labs will be drawn by lab RN, patient aware.    Intravenous Access:  Peripheral IV placed.  Implanted Port.    Treatment Conditions:  Lab Results   Component Value Date    HGB 12.6 08/20/2020     Lab Results   Component Value Date    WBC 9.7 08/20/2020      Lab Results   Component Value Date    ANEU 7.7 08/20/2020     Lab Results   Component Value Date     08/20/2020      Lab Results   Component Value Date     08/20/2020                   Lab Results   Component Value Date    POTASSIUM 4.1 08/20/2020           Lab Results   Component Value Date    MAG 2.3 08/20/2020            Lab Results   Component Value Date    CR 0.96 08/20/2020                   Lab Results   Component Value Date    MALATHI 8.6 08/20/2020                Lab Results   Component Value Date    BILITOTAL 0.9 08/20/2020           Lab Results   Component Value Date    ALBUMIN 3.1 08/20/2020                    Lab Results   Component Value Date    ALT 47 08/20/2020           Lab Results   Component Value Date    AST 32 08/20/2020       Results reviewed, labs MET treatment parameters, ok to proceed with treatment.      Post Infusion Assessment:  Patient tolerated infusion without incident.  Blood return noted pre and post infusion.  Site patent and intact, free from redness, edema or discomfort.  No evidence of extravasations.  Accesses left in for future lab draws today. Pt aware to have them both removed prior to leaving clinic today.    Discharge Plan:   Patient  declined prescription refills.  Discharge instructions reviewed with: Patient.  Patient and/or family verbalized understanding of discharge instructions and all questions answered.  AVS to patient via [employer].  Patient will return 9/17/2020 for next appointment.   Patient discharged in stable condition accompanied by: self.  Departure Mode: Ambulatory.  Face to Face time: 20 minutes.    Oscar Gray RN

## 2020-08-20 NOTE — NURSING NOTE
[facility] & [facility] released a Research Participant Information sheet on 7/16/2020 requiring researchers to provide patients with this information sheet within 24-hours of the first planned in-person research activity after this date.     Research participant Kentrell Kirkpatrick was provided the information on the Research Participant Information Sheet by Digna Burton RN on August 20, 2020. This document contains information regarding the risks of participating in a research study during the COVID-19 pandemic. Receipt of the information sheet was confirmed by study personnel prior to the visit.    *Note that the [facility] and [facility] only require this information sheet be provided to the participant once as the information it contains is the same regardless of what study(s) the patient is participating in.     The Patient Information Sheet can be found at this link: .[facility]/infosheet    Note: this document was sent via mail on 14-Aug-2020 but had not yet been received by patient, and so was provided in person today.

## 2020-08-20 NOTE — NURSING NOTE
"Oncology Rooming Note    August 20, 2020 8:38 AM   Kentrell Kirkpatrick is a 69 year old male who presents for:    Chief Complaint   Patient presents with     Port Draw     Port labs collected by RN.      Oncology Clinic Visit     Primary lung adenocarcinoma, left (H)     Initial Vitals: /68 (BP Location: Right arm, Patient Position: Sitting)   Pulse 90   Temp 97.7  F (36.5  C) (Oral)   Resp 20   Wt 70.6 kg (155 lb 11.2 oz)   SpO2 96%   BMI 20.54 kg/m   Estimated body mass index is 20.54 kg/m  as calculated from the following:    Height as of 6/15/20: 1.854 m (6' 1\").    Weight as of this encounter: 70.6 kg (155 lb 11.2 oz). Body surface area is 1.91 meters squared.  Moderate Pain (4) Comment: Data Unavailable   No LMP for male patient.  Allergies reviewed: Yes  Medications reviewed: Yes    Medications: Medication refills not needed today.  Pharmacy name entered into [employer]: [id number] - [place] - [address]    Clinical concerns: No new concerns today Ora Guevara was notified.      Myles Rader MA            "

## 2020-08-20 NOTE — PATIENT INSTRUCTIONS
[facility] Main Line: [phone]    Call triage nurse with chills and/or temperature greater than or equal to 100.4, uncontrolled nausea/vomiting, diarrhea, constipation, dizziness, shortness of breath, chest pain, bleeding, unexplained bruising, or any new/concerning symptoms, questions/concerns.   If you are having any concerning symptoms or wish to speak to a provider before your next infusion visit, please call your care coordinator or triage to notify them so we can adequately serve you.   Nurse Triage line:  [phone]    If after hours, weekends, or holidays, call main hospital  and ask for Oncology doctor on call @ [phone]      August 2020 Sunday Monday Tuesday Wednesday Thursday Friday Saturday                                 1       2     3     4     5     6    LAB   8:00 AM   (15 min.)    LAB DRAW 1   [facility]    Outpatient Visit   9:17 AM   Ora Guevara APRN CNP   [facility] Lab    VIDEO VISIT RETURN  11:55 AM   (50 min.)   Ora Guevara APRN CNP   [facility] 7     8       9     10     11     12     13     14     15       16     17     18     19     20    [facility] DRAW   8:00 AM   (15 min.)    [facility] DRAW   [facility] Lab Draw    [facility] RETURN   8:25 AM   (50 min.)   Ora Guevara APRN CNP   [facility] NURSE VISIT   9:20 AM   (10 min.)   Nurse,  Oncology   [facility] ONC INFUSION 60   9:30 AM   (60 min.)    ONCOLOGY INFUSION   [facility]    XR CHEST 2 VIEWS  10:15 AM   (15 min.)   [id number]   [facility] Xray 21 22       23     24     25     26     27     28     29       30     31 September 2020 Sunday Monday Tuesday Wednesday Thursday Friday Saturday             1     2     3     4     5       6     7     8     9     10     11     12        13     14     15    MR BRAIN WWO  10:00 AM   (60 min.)   [id number]   [facility] MRI    ECHO COMPLETE  11:00 AM   (60 min.)   [id number]   [facility]    CT CHEST ABDOMEN PELVIS WWO  12:00 PM   (20 min.)   UCCT2   [facility] CT 16     17    [facility] DRAW   8:00 AM   (15 min.)    [facility] DRAW   [facility] Lab Draw    [facility] RETURN   9:15 AM   (30 min.)   Susan Muller MD   [facility]    [facility] ONC INFUSION 60  10:00 AM   (60 min.)    ONCOLOGY INFUSION   [facility] 18     19       20     21    [facility] RETURN  10:30 AM   (30 min.)   Brittney Zamorano MD   [facility] 22     23     24     25     26       27     28     29     30                                    Lab Results:  Recent Results (from the past 12 hour(s))   CBC with platelets differential    Collection Time: 08/20/20  8:32 AM   Result Value Ref Range    WBC 9.7 4.0 - 11.0 10e9/L    RBC Count 4.91 4.4 - 5.9 10e12/L    Hemoglobin 12.6 (L) 13.3 - 17.7 g/dL    Hematocrit 40.0 40.0 - 53.0 %    MCV 82 78 - 100 fl    MCH 25.7 (L) 26.5 - 33.0 pg    MCHC 31.5 31.5 - 36.5 g/dL    RDW 18.1 (H) 10.0 - 15.0 %    Platelet Count 294 150 - 450 10e9/L    Diff Method Automated Method     % Neutrophils 79.3 %    % Lymphocytes 7.3 %    % Monocytes 9.5 %    % Eosinophils 3.1 %    % Basophils 0.5 %    % Immature Granulocytes 0.3 %    Nucleated RBCs 0 0 /100    Absolute Neutrophil 7.7 1.6 - 8.3 10e9/L    Absolute Lymphocytes 0.7 (L) 0.8 - 5.3 10e9/L    Absolute Monocytes 0.9 0.0 - 1.3 10e9/L    Absolute Eosinophils 0.3 0.0 - 0.7 10e9/L    Absolute Basophils 0.1 0.0 - 0.2 10e9/L    Abs Immature Granulocytes 0.0 0 - 0.4 10e9/L    Absolute Nucleated RBC 0.0    Comprehensive metabolic panel    Collection Time: 08/20/20  8:32 AM   Result Value Ref Range    Sodium 136 133 - 144 mmol/L    Potassium 4.1 3.4 - 5.3 mmol/L    Chloride 103 94 - 109 mmol/L    Carbon Dioxide 28 20 - 32  mmol/L    Anion Gap 5 3 - 14 mmol/L    Glucose 93 70 - 99 mg/dL    Urea Nitrogen 16 7 - 30 mg/dL    Creatinine 0.96 0.66 - 1.25 mg/dL    GFR Estimate 80 >60 mL/min/[1.73_m2]    GFR Estimate If Black >90 >60 mL/min/[1.73_m2]    Calcium 8.6 8.5 - 10.1 mg/dL    Bilirubin Total 0.9 0.2 - 1.3 mg/dL    Albumin 3.1 (L) 3.4 - 5.0 g/dL    Protein Total 7.0 6.8 - 8.8 g/dL    Alkaline Phosphatase 117 40 - 150 U/L    ALT 47 0 - 70 U/L    AST 32 0 - 45 U/L   Magnesium    Collection Time: 08/20/20  8:32 AM   Result Value Ref Range    Magnesium 2.3 1.6 - 2.3 mg/dL   Lipase    Collection Time: 08/20/20  8:32 AM   Result Value Ref Range    Lipase 40 (L) 73 - 393 U/L   Amylase    Collection Time: 08/20/20  8:32 AM   Result Value Ref Range    Amylase 523 (H) 30 - 110 U/L   Uric acid    Collection Time: 08/20/20  8:32 AM   Result Value Ref Range    Uric Acid 5.8 3.5 - 7.2 mg/dL   TSH    Collection Time: 08/20/20  8:32 AM   Result Value Ref Range    TSH 3.40 0.40 - 4.00 mU/L   T4 free    Collection Time: 08/20/20  8:32 AM   Result Value Ref Range    T4 Free 1.34 0.76 - 1.46 ng/dL   Protein qualitative urine    Collection Time: 08/20/20  8:32 AM   Result Value Ref Range    Protein Albumin Urine Negative NEG^Negative mg/dL

## 2020-08-20 NOTE — LETTER
8/20/2020         RE: Kentrell Kirkpatrick  [address] [facility] [address]  [place] [facility] [address]        Dear Colleague,    Thank you for referring your patient, Kentrell Kirkpatrick, to the [facility]. Please see a copy of my visit note below.    August 6, 2020     Reason for Visit: follow up NSCLC    Oncology HPI:   CANCER TYPE: NSCLC, adenocarcinoma  STAGE: IV  ECOG PS: 2     Cancer Staging  Non-small cell lung cancer, left (H)  Staging form: Lung, AJCC 8th Edition  - Clinical stage from 3/11/2019: Stage IV (cT1c, cN3, pM1c) - Signed by Susan Muller MD on 3/11/2019     PD-L1: <1%  Lung panel: 3/1/19 on [id number] A1 and [id number] A1. Negative  NGS: [employer] 360 sent 2/28/19 negative for actionable mutations. SMAD4 E538, TP53 H179R, SMO A327G. MSI not high     SUMMARY  6/27/18                   CT chest w/contrast to re-evaluate aortic aneurysm: 8 mm spiculated KEATON nodule, 3 mm RML nodule unchanged from 3/15/17 and 2/25/16 scans  2/4/19                     Presented to PCP with fairly rapid onset of shortness of breath. Given albuterol  2/19/19                   CXR and CT chest w/contrast. Large left effusion  2/20/19                   L thoracentesis (Dr. Rodriguez), 1180 cc  3/1/19                     L pleurx (Dr. Rodriguez)  3/13/19                   C1 carboplatin pemetrexed pembrolizumab  3/15/19                   L supraclavicular LN bx (IR, FNA). Path: lung adenocarcinoma  4/3/19                     C2 carboplatin pemetrexed pembrolizumab (total 4 cycles)  4/15/19                   FEV1 2.11 (61%), FVC 3.38 (73%), DLCO 27.7, 67% (59%)  4/18/19                   TPA. Drained 900 cc after it got unclotted  5/9/19                     Pleurx removed  6/5~8/28/19            Maintenance pemetrexed + pembrolizumab x 5 cycles --> PD  9/20/19~4/29/20     C1-8 docetaxel 60 mg/m2 + ramucirumab. Had extravasation w/C2 10/11/19. C4 docetaxel ramucirumab delayed 1 week 2/2019 due to URI and trip. C5-8 docetaxel reduced to  "50 mg/m2 due to excessive fatigue  9/28/19                   UC for bronchitis. Levofloxacin  10/4/19                   Brain MRI - routine rescreening - negative  10/17/19                 Port  10/23/19                 Recurrent cough. Saw PCP. Azithromycin  10/29/19                 Prednisone 40 mg once, then 20 mg daily x 5 days, then 10 mg daily x 5 days for acute bronchitis          12/12/19                 ED for bronchitis. Had some chills prior to going to the ED  3/3/20                     Treated for pansinusitis with amox-clav x 14 days and fluticasone nasal spray  3/31/20                   CT CAP and bone scan. Some PD. No bone mets.   5/6/20                     Brain MRI. Negative  5/6/20                     CT CAP.   4/1/20~4/29/20       C1-2 gemcitabine 1000 mg/m2 D1, 8 --> PD  5/30/20                   [facility] ED. CTA negative for PE. Doxy, etc  6/15/20                   Gamma knife x 1 to small L cerebellar met found incidentally at the time of screening for sitra nivo trial  ~6/30/20 Started Sitravatinib  7/23/20 First dose nivolumab    Interval history:   -Pt reports dyspnea at rest and with activity. Reports this is how he first presented and has been present prior to starting the study.  40 pack yr smoking hx, quit in 2005. Has COPD. Reports taking maintenance inhalers as prescribed. When he takes his albuterol helps for about 2 hrs, does not even do once per day, when he thinks of it. Dyspnea is worse when lying down. Denies chest pain, cough, and dizziness    -Reports worsening anxiety and daily panic attacks. Reports he is worried about the cancer. Worse in the evening, paces almost every evening when he feels anxious. Reports the 0.5 mg of lorazepam helps \"a little\". Is interested in daily anti-anxiety medication, increasing lorazepam dose, and therapy.    -Reports decreased appetite and 7 lbs of wt loss in past month. Reports he tries to go get a big mac a few times a week for calories but " "is not gaining wt. Drinks 2-3 ensure per day. Reports food \"doesn't take good\". Is interested in dietician consult.     -Reports 3 days since last BM. Taking 2 senna BID. When its been 3-4 days between BMs he takes a \"cocktail\" of milk of magnesia and prune juice- typically makes him go the next day.     -Reports ongoing fatigue, weakness, and just feeling worn down. Can't do same activities around the house that he used to.     -Reports cancer pain is well controlled on 15 mg MS Contin BID. Has not needed any breakthrough pain medications since last visit. Currently notices pain most in his lower back but has had pains in his bilateral shoulders and \"random places\" that are intermittent.     -Ongoing hoarseness has been stable over past two months. No dysphagia.     -Denies fever, chills, vision and hearing changes, chest pain, abdominal pain, nausea, vomiting, diarrhea, urinary concerns, neuropathy, cough, depressed feelings, new lumps or masses, and unusual bruising or bleeding.     PMH:   Past Medical History:   Diagnosis Date     Cancer (H) 2/25/19     Colon polyps      COPD (chronic obstructive pulmonary disease) (H)      Medications:   Current Outpatient Medications   Medication     albuterol (PROAIR HFA/PROVENTIL HFA/VENTOLIN HFA) 108 (90 Base) MCG/ACT inhaler     albuterol (PROVENTIL) (2.5 MG/3ML) 0.083% neb solution     calcium polycarbophil (FIBERCON) 625 MG tablet     finasteride (PROSCAR) 5 MG tablet     LORazepam (ATIVAN) 0.5 MG tablet     LORazepam (ATIVAN) 0.5 MG tablet     Melatonin 10 MG TABS tablet     mometasone-formoterol (DULERA) 100-5 MCG/ACT inhaler     morphine (MS CONTIN) 15 MG CR tablet     MULTI VITAMIN MENS OR TABS     ondansetron (ZOFRAN) 8 MG tablet     oxyCODONE (ROXICODONE) 5 MG tablet     prochlorperazine (COMPAZINE) 10 MG tablet     senna (SENOKOT) 8.6 MG tablet     study - sitravatinib malate salt, BDCB628,, IDS# [id number], 40 mg capsule     study - sitravatinib malate salt, [id number],, " IDS# [id number], 40 mg roller oompaction capsule     study - sitravatinib malate salt, [id number],, IDS# [id number], 40 mg roller oompaction capsule     study - sitravatinib malate salt, [id number],, IDS# [id number], 60 mg roller oompaction capsule     study - sitravatinib malate salt, [id number],, IDS# [id number], 60 mg roller oompaction capsule     tamsulosin (FLOMAX) 0.4 MG capsule     tiotropium (SPIRIVA HANDIHALER) 18 MCG inhaled capsule     No current facility-administered medications for this visit.      Facility-Administered Medications Ordered in Other Visits   Medication     iohexol (OMNIPAQUE) 300 mg/mL injection 10 mL      No Known Allergies    Objective:  Physical Exam:  General: No acute distress. Cachetic Alert and cooperative   Integumentary: Warm, dry, intact. No rashes, petechiae, or open wounds.   HEENT:    *Head: head is normo-cephalic, symmetric facial features   *Eyes: PERRLA. Conjunctiva clear and sclera white. Eyelids without crusting or lesions   *Nose: no discharge noted    *Neck: Neck supple. Trachea midline.    *Mouth/Throat: Oral mucosa intact without lesions. Pharynx and Tonsils normal. Uvula midline.   Respiratory: Equal chest rise and fall without retractions or abdominal muscle use. Lung sounds diminished on L side. Dullness to percussion in lower lobe on L side.   Cardiovascular: S1S2. Regular rate and rhythm without murmurs or gallops.   Peripheral Vascular: No cyanosis or edema.   Gastrointestinal: Soft, non-tender, not distended. Non-guarding. Normoactive bowel sounds *4 quadrants. No masses or splenomegaly   Musculoskeletal: No joint swelling or deformities. Gait intact.   Neurologic: Alert and Oriented *3. Follow commands. CN 2-12 intact.  strength strong bilaterally.   Psychiatric: Patient is alert, cooperative, and pleasant. Anxious and tearful when discussing diagnosis and hopes he has for the study drugs to give him more time.     Labs:   Results for MAYA ELLER (MRN [id number]) as of 8/20/2020  17:36   Ref. Range 8/20/2020 08:32   Sodium Latest Ref Range: 133 - 144 mmol/L 136   Potassium Latest Ref Range: 3.4 - 5.3 mmol/L 4.1   Chloride Latest Ref Range: 94 - 109 mmol/L 103   Carbon Dioxide Latest Ref Range: 20 - 32 mmol/L 28   Urea Nitrogen Latest Ref Range: 7 - 30 mg/dL 16   Creatinine Latest Ref Range: 0.66 - 1.25 mg/dL 0.96   GFR Estimate Latest Ref Range: >60 mL/min/1.73_m2 80   GFR Estimate If Black Latest Ref Range: >60 mL/min/1.73_m2 >90   Calcium Latest Ref Range: 8.5 - 10.1 mg/dL 8.6   Anion Gap Latest Ref Range: 3 - 14 mmol/L 5   Magnesium Latest Ref Range: 1.6 - 2.3 mg/dL 2.3   Albumin Latest Ref Range: 3.4 - 5.0 g/dL 3.1 (L)   Protein Total Latest Ref Range: 6.8 - 8.8 g/dL 7.0   Bilirubin Total Latest Ref Range: 0.2 - 1.3 mg/dL 0.9   Alkaline Phosphatase Latest Ref Range: 40 - 150 U/L 117   ALT Latest Ref Range: 0 - 70 U/L 47   AST Latest Ref Range: 0 - 45 U/L 32   Amylase Latest Ref Range: 30 - 110 U/L 523 (H)   Lipase Latest Ref Range: 73 - 393 U/L 40 (L)   T4 Free Latest Ref Range: 0.76 - 1.46 ng/dL 1.34   TSH Latest Ref Range: 0.40 - 4.00 mU/L 3.40   Uric Acid Latest Ref Range: 3.5 - 7.2 mg/dL 5.8   Glucose Latest Ref Range: 70 - 99 mg/dL 93   WBC Latest Ref Range: 4.0 - 11.0 10e9/L 9.7   Hemoglobin Latest Ref Range: 13.3 - 17.7 g/dL 12.6 (L)   Hematocrit Latest Ref Range: 40.0 - 53.0 % 40.0   Platelet Count Latest Ref Range: 150 - 450 10e9/L 294   RBC Count Latest Ref Range: 4.4 - 5.9 10e12/L 4.91   MCV Latest Ref Range: 78 - 100 fl 82   MCH Latest Ref Range: 26.5 - 33.0 pg 25.7 (L)   MCHC Latest Ref Range: 31.5 - 36.5 g/dL 31.5   RDW Latest Ref Range: 10.0 - 15.0 % 18.1 (H)   Diff Method Unknown Automated Method   % Neutrophils Latest Units: % 79.3   % Lymphocytes Latest Units: % 7.3   % Monocytes Latest Units: % 9.5   % Eosinophils Latest Units: % 3.1   % Basophils Latest Units: % 0.5   % Immature Granulocytes Latest Units: % 0.3   Nucleated RBCs Latest Ref Range: 0 /100 0   Absolute  Neutrophil Latest Ref Range: 1.6 - 8.3 10e9/L 7.7   Absolute Lymphocytes Latest Ref Range: 0.8 - 5.3 10e9/L 0.7 (L)   Absolute Monocytes Latest Ref Range: 0.0 - 1.3 10e9/L 0.9   Absolute Eosinophils Latest Ref Range: 0.0 - 0.7 10e9/L 0.3   Absolute Basophils Latest Ref Range: 0.0 - 0.2 10e9/L 0.1   Abs Immature Granulocytes Latest Ref Range: 0 - 0.4 10e9/L 0.0   Absolute Nucleated RBC Unknown 0.0        Assessment/Plan:   1.) NSCLC, adenocarcinoma:  -Started Sitravatinib on 6/30/20 and Nivo on 7/23/20. Has tolerated with moderate fatigue and decreased appetite. Continues to have high symptom burden, which I think is more related to his underlying malignancy rather than specific toxicity of treatment (see dyspnea below). OK to continue treatment today. Will f/u as scheduled in September, sooner if having worsening dyspnea.     2.) Dyspnea:   -his dyspnea is worse at night when laying flat. It's not clear to me that it is significantly different than prior to starting treatment. Discussed potential causes to explore if worsening including effusion, study drug can cause pericarditis, pericardial effusions, immunotherapy pneumonitis. He is having more anxiety related to the dyspnea at night, discussed management below. We did obtain an EKG today, showed NSR. We also obtained a CXR to evaluate his effusion  -CXR shows stable pleural effusion. Likely due to L pleural disease. There may be a component of COPD as well. Continue inhalers and activity as tolerated.  Discussed a low threshold to repeat a CT with contrast if breathing should worsen, would evaluate for PE. Could also consider an echo if worsening given possible concerns for pericardial effusion and pericarditis with the study drug.     3.) Hoarseness:   -Stable. Likely L recurrent laryngeal nerve involvement. No dysphagia.      4.) Cancer-related pain:   -Improved. Secondary to liver mets. Also with random pains which he has had historically and they  spontaneously resolve. Will continue his MSContin 15 mg q 12 hours.      5.) Anemia:   -Hemoglobin stable.      6.) Brain met:   -S/p gamma knife 6/22. Scheduled for repeat MRI at the end of Sept and has appt with Dr. Zamorano afterward.      7.) Anorexia:   -Has lost 7 lbs this monitoring. Is open to meeting with dietician- referral placed     8.) Subclinical hypothyroidism:   -history subclinical hypothyroidism, today TSH is 3.40. Will continue to monitor      9.) Hx elevated amylase:  - Stable, asymptomatic     11.) Opioid induced constipation:   -Worsened with increase in MS Contin. Will continue Senna 2 tablets BID. Also encouraged him to take Miralax or his MOM/prune juice cocktail daily instead of waiting for 3-4 days without BM     12.) Fatigue:   -Continues to have moderate fatigue. Encouraged rest    13.) Anxiety:   -Is really struggling with night time anxiety and panic attacks. Explored options including SSRIs (they interact with the Sitravatinib and should be avoided due to risk of Torsades de pointes) Will start Remeron 7.5 mg at HS and increase Ativan to 1 mg Q 6 hr PRN daily for panic attacks in the evening.  Reminded him and wife to watch for over sedation -they declined home narcan. Referral placed for palliative social work and MD to assist in managing anxiety and other side effects pt is having from treatment         KISHA Diamond CNP     The patient was seen in conjunction with Kathya Bower CNP who served as a scribe for today's visit. I have reviewed the note and agree with the above findings and plan. TW      Again, thank you for allowing me to participate in the care of your patient.        Sincerely,        KISHA Cartwright CNP

## 2020-08-20 NOTE — NURSING NOTE
Triplicate EKG completed on pt at 12:20, 12:22, 12:24. Results transmitted to [employer].  Radha Huff MA

## 2020-08-20 NOTE — PROGRESS NOTES
August 6, 2020     Reason for Visit: follow up NSCLC    Oncology HPI:   CANCER TYPE: NSCLC, adenocarcinoma  STAGE: IV  ECOG PS: 2     Cancer Staging  Non-small cell lung cancer, left (H)  Staging form: Lung, AJCC 8th Edition  - Clinical stage from 3/11/2019: Stage IV (cT1c, cN3, pM1c) - Signed by Susan Muller MD on 3/11/2019     PD-L1: <1%  Lung panel: 3/1/19 on CS75-235 A1 and U34-1400 A1. Negative  NGS: [employer] 360 sent 2/28/19 negative for actionable mutations. SMAD4 E538, TP53 H179R, SMO A327G. MSI not high     SUMMARY  6/27/18                   CT chest w/contrast to re-evaluate aortic aneurysm: 8 mm spiculated KEATON nodule, 3 mm RML nodule unchanged from 3/15/17 and 2/25/16 scans  2/4/19                     Presented to PCP with fairly rapid onset of shortness of breath. Given albuterol  2/19/19                   CXR and CT chest w/contrast. Large left effusion  2/20/19                   L thoracentesis (Dr. Rodriguez), 1180 cc  3/1/19                     L pleurx (Dr. Rodriguez)  3/13/19                   C1 carboplatin pemetrexed pembrolizumab  3/15/19                   L supraclavicular LN bx (IR, FNA). Path: lung adenocarcinoma  4/3/19                     C2 carboplatin pemetrexed pembrolizumab (total 4 cycles)  4/15/19                   FEV1 2.11 (61%), FVC 3.38 (73%), DLCO 27.7, 67% (59%)  4/18/19                   TPA. Drained 900 cc after it got unclotted  5/9/19                     Pleurx removed  6/5~8/28/19            Maintenance pemetrexed + pembrolizumab x 5 cycles --> PD  9/20/19~4/29/20     C1-8 docetaxel 60 mg/m2 + ramucirumab. Had extravasation w/C2 10/11/19. C4 docetaxel ramucirumab delayed 1 week 2/2019 due to URI and trip. C5-8 docetaxel reduced to 50 mg/m2 due to excessive fatigue  9/28/19                   UC for bronchitis. Levofloxacin  10/4/19                   Brain MRI - routine rescreening - negative  10/17/19                 Port  10/23/19                 Recurrent cough. Saw PCP.  "Azithromycin  10/29/19                 Prednisone 40 mg once, then 20 mg daily x 5 days, then 10 mg daily x 5 days for acute bronchitis          12/12/19                 ED for bronchitis. Had some chills prior to going to the ED  3/3/20                     Treated for pansinusitis with amox-clav x 14 days and fluticasone nasal spray  3/31/20                   CT CAP and bone scan. Some PD. No bone mets.   5/6/20                     Brain MRI. Negative  5/6/20                     CT CAP.   4/1/20~4/29/20       C1-2 gemcitabine 1000 mg/m2 D1, 8 --> PD  5/30/20                   [facility] ED. CTA negative for PE. Doxy, etc  6/15/20                   Gamma knife x 1 to small L cerebellar met found incidentally at the time of screening for sitra nivo trial  ~6/30/20 Started Sitravatinib  7/23/20 First dose nivolumab    Interval history:   -Pt reports dyspnea at rest and with activity. Reports this is how he first presented and has been present prior to starting the study.  40 pack yr smoking hx, quit in 2005. Has COPD. Reports taking maintenance inhalers as prescribed. When he takes his albuterol helps for about 2 hrs, does not even do once per day, when he thinks of it. Dyspnea is worse when lying down. Denies chest pain, cough, and dizziness    -Reports worsening anxiety and daily panic attacks. Reports he is worried about the cancer. Worse in the evening, paces almost every evening when he feels anxious. Reports the 0.5 mg of lorazepam helps \"a little\". Is interested in daily anti-anxiety medication, increasing lorazepam dose, and therapy.    -Reports decreased appetite and 7 lbs of wt loss in past month. Reports he tries to go get a big mac a few times a week for calories but is not gaining wt. Drinks 2-3 ensure per day. Reports food \"doesn't take good\". Is interested in dietician consult.     -Reports 3 days since last BM. Taking 2 senna BID. When its been 3-4 days between BMs he takes a \"cocktail\" of milk of magnesia " "and prune juice- typically makes him go the next day.     -Reports ongoing fatigue, weakness, and just feeling worn down. Can't do same activities around the house that he used to.     -Reports cancer pain is well controlled on 15 mg MS Contin BID. Has not needed any breakthrough pain medications since last visit. Currently notices pain most in his lower back but has had pains in his bilateral shoulders and \"random places\" that are intermittent.     -Ongoing hoarseness has been stable over past two months. No dysphagia.     -Denies fever, chills, vision and hearing changes, chest pain, abdominal pain, nausea, vomiting, diarrhea, urinary concerns, neuropathy, cough, depressed feelings, new lumps or masses, and unusual bruising or bleeding.     PMH:   Past Medical History:   Diagnosis Date     Cancer (H) 2/25/19     Colon polyps      COPD (chronic obstructive pulmonary disease) (H)      Medications:   Current Outpatient Medications   Medication     albuterol (PROAIR HFA/PROVENTIL HFA/VENTOLIN HFA) 108 (90 Base) MCG/ACT inhaler     albuterol (PROVENTIL) (2.5 MG/3ML) 0.083% neb solution     calcium polycarbophil (FIBERCON) 625 MG tablet     finasteride (PROSCAR) 5 MG tablet     LORazepam (ATIVAN) 0.5 MG tablet     LORazepam (ATIVAN) 0.5 MG tablet     Melatonin 10 MG TABS tablet     mometasone-formoterol (DULERA) 100-5 MCG/ACT inhaler     morphine (MS CONTIN) 15 MG CR tablet     MULTI VITAMIN MENS OR TABS     ondansetron (ZOFRAN) 8 MG tablet     oxyCODONE (ROXICODONE) 5 MG tablet     prochlorperazine (COMPAZINE) 10 MG tablet     senna (SENOKOT) 8.6 MG tablet     study - sitravatinib malate salt, [id number],, IDS# [id number], 40 mg capsule     study - sitravatinib malate salt, [id number],, IDS# [id number], 40 mg roller oompaction capsule     study - sitravatinib malate salt, [id number],, IDS# [id number], 40 mg roller oompaction capsule     study - sitravatinib malate salt, [id number],, IDS# [id number], 60 mg roller oompaction capsule     study - " sitravatinib malate salt, [id number],, IDS# [id number], 60 mg roller oompaction capsule     tamsulosin (FLOMAX) 0.4 MG capsule     tiotropium (SPIRIVA HANDIHALER) 18 MCG inhaled capsule     No current facility-administered medications for this visit.      Facility-Administered Medications Ordered in Other Visits   Medication     iohexol (OMNIPAQUE) 300 mg/mL injection 10 mL      No Known Allergies    Objective:  Physical Exam:  General: No acute distress. Cachetic Alert and cooperative   Integumentary: Warm, dry, intact. No rashes, petechiae, or open wounds.   HEENT:    *Head: head is normo-cephalic, symmetric facial features   *Eyes: PERRLA. Conjunctiva clear and sclera white. Eyelids without crusting or lesions   *Nose: no discharge noted    *Neck: Neck supple. Trachea midline.    *Mouth/Throat: Oral mucosa intact without lesions. Pharynx and Tonsils normal. Uvula midline.   Respiratory: Equal chest rise and fall without retractions or abdominal muscle use. Lung sounds diminished on L side. Dullness to percussion in lower lobe on L side.   Cardiovascular: S1S2. Regular rate and rhythm without murmurs or gallops.   Peripheral Vascular: No cyanosis or edema.   Gastrointestinal: Soft, non-tender, not distended. Non-guarding. Normoactive bowel sounds *4 quadrants. No masses or splenomegaly   Musculoskeletal: No joint swelling or deformities. Gait intact.   Neurologic: Alert and Oriented *3. Follow commands. CN 2-12 intact.  strength strong bilaterally.   Psychiatric: Patient is alert, cooperative, and pleasant. Anxious and tearful when discussing diagnosis and hopes he has for the study drugs to give him more time.     Labs:   Results for MAYA ELLER (MRN [id number]) as of 8/20/2020 17:36   Ref. Range 8/20/2020 08:32   Sodium Latest Ref Range: 133 - 144 mmol/L 136   Potassium Latest Ref Range: 3.4 - 5.3 mmol/L 4.1   Chloride Latest Ref Range: 94 - 109 mmol/L 103   Carbon Dioxide Latest Ref Range: 20 - 32 mmol/L 28   Urea  Nitrogen Latest Ref Range: 7 - 30 mg/dL 16   Creatinine Latest Ref Range: 0.66 - 1.25 mg/dL 0.96   GFR Estimate Latest Ref Range: >60 mL/min/1.73_m2 80   GFR Estimate If Black Latest Ref Range: >60 mL/min/1.73_m2 >90   Calcium Latest Ref Range: 8.5 - 10.1 mg/dL 8.6   Anion Gap Latest Ref Range: 3 - 14 mmol/L 5   Magnesium Latest Ref Range: 1.6 - 2.3 mg/dL 2.3   Albumin Latest Ref Range: 3.4 - 5.0 g/dL 3.1 (L)   Protein Total Latest Ref Range: 6.8 - 8.8 g/dL 7.0   Bilirubin Total Latest Ref Range: 0.2 - 1.3 mg/dL 0.9   Alkaline Phosphatase Latest Ref Range: 40 - 150 U/L 117   ALT Latest Ref Range: 0 - 70 U/L 47   AST Latest Ref Range: 0 - 45 U/L 32   Amylase Latest Ref Range: 30 - 110 U/L 523 (H)   Lipase Latest Ref Range: 73 - 393 U/L 40 (L)   T4 Free Latest Ref Range: 0.76 - 1.46 ng/dL 1.34   TSH Latest Ref Range: 0.40 - 4.00 mU/L 3.40   Uric Acid Latest Ref Range: 3.5 - 7.2 mg/dL 5.8   Glucose Latest Ref Range: 70 - 99 mg/dL 93   WBC Latest Ref Range: 4.0 - 11.0 10e9/L 9.7   Hemoglobin Latest Ref Range: 13.3 - 17.7 g/dL 12.6 (L)   Hematocrit Latest Ref Range: 40.0 - 53.0 % 40.0   Platelet Count Latest Ref Range: 150 - 450 10e9/L 294   RBC Count Latest Ref Range: 4.4 - 5.9 10e12/L 4.91   MCV Latest Ref Range: 78 - 100 fl 82   MCH Latest Ref Range: 26.5 - 33.0 pg 25.7 (L)   MCHC Latest Ref Range: 31.5 - 36.5 g/dL 31.5   RDW Latest Ref Range: 10.0 - 15.0 % 18.1 (H)   Diff Method Unknown Automated Method   % Neutrophils Latest Units: % 79.3   % Lymphocytes Latest Units: % 7.3   % Monocytes Latest Units: % 9.5   % Eosinophils Latest Units: % 3.1   % Basophils Latest Units: % 0.5   % Immature Granulocytes Latest Units: % 0.3   Nucleated RBCs Latest Ref Range: 0 /100 0   Absolute Neutrophil Latest Ref Range: 1.6 - 8.3 10e9/L 7.7   Absolute Lymphocytes Latest Ref Range: 0.8 - 5.3 10e9/L 0.7 (L)   Absolute Monocytes Latest Ref Range: 0.0 - 1.3 10e9/L 0.9   Absolute Eosinophils Latest Ref Range: 0.0 - 0.7 10e9/L 0.3    Absolute Basophils Latest Ref Range: 0.0 - 0.2 10e9/L 0.1   Abs Immature Granulocytes Latest Ref Range: 0 - 0.4 10e9/L 0.0   Absolute Nucleated RBC Unknown 0.0        Assessment/Plan:   1.) NSCLC, adenocarcinoma:  -Started Sitravatinib on 6/30/20 and Nivo on 7/23/20. Has tolerated with moderate fatigue and decreased appetite. Continues to have high symptom burden, which I think is more related to his underlying malignancy rather than specific toxicity of treatment (see dyspnea below). OK to continue treatment today. Will f/u as scheduled in September, sooner if having worsening dyspnea.     2.) Dyspnea:   -his dyspnea is worse at night when laying flat. It's not clear to me that it is significantly different than prior to starting treatment. Discussed potential causes to explore if worsening including effusion, study drug can cause pericarditis, pericardial effusions, immunotherapy pneumonitis. He is having more anxiety related to the dyspnea at night, discussed management below. We did obtain an EKG today, showed NSR. We also obtained a CXR to evaluate his effusion  -CXR shows stable pleural effusion. Likely due to L pleural disease. There may be a component of COPD as well. Continue inhalers and activity as tolerated.  Discussed a low threshold to repeat a CT with contrast if breathing should worsen, would evaluate for PE. Could also consider an echo if worsening given possible concerns for pericardial effusion and pericarditis with the study drug.     3.) Hoarseness:   -Stable. Likely L recurrent laryngeal nerve involvement. No dysphagia.      4.) Cancer-related pain:   -Improved. Secondary to liver mets. Also with random pains which he has had historically and they spontaneously resolve. Will continue his MSContin 15 mg q 12 hours.      5.) Anemia:   -Hemoglobin stable.      6.) Brain met:   -S/p gamma knife 6/22. Scheduled for repeat MRI at the end of Sept and has appt with Dr. Zamorano afterward.      7.)  Anorexia:   -Has lost 7 lbs this monitoring. Is open to meeting with dietician- referral placed     8.) Subclinical hypothyroidism:   -history subclinical hypothyroidism, today TSH is 3.40. Will continue to monitor      9.) Hx elevated amylase:  - Stable, asymptomatic     11.) Opioid induced constipation:   -Worsened with increase in MS Contin. Will continue Senna 2 tablets BID. Also encouraged him to take Miralax or his MOM/prune juice cocktail daily instead of waiting for 3-4 days without BM     12.) Fatigue:   -Continues to have moderate fatigue. Encouraged rest    13.) Anxiety:   -Is really struggling with night time anxiety and panic attacks. Explored options including SSRIs (they interact with the Sitravatinib and should be avoided due to risk of Torsades de pointes) Will start Remeron 7.5 mg at HS and increase Ativan to 1 mg Q 6 hr PRN daily for panic attacks in the evening.  Reminded him and wife to watch for over sedation -they declined home narcan. Referral placed for palliative social work and MD to assist in managing anxiety and other side effects pt is having from treatment         KISHA Diamond CNP     The patient was seen in conjunction with Kathya Bower CNP who served as a scribe for today's visit. I have reviewed the note and agree with the above findings and plan. TW

## 2020-08-20 NOTE — NURSING NOTE
[id number]: Study Visit Note   Subject name: Kentrell Kirkpatrick     Visit: C2D1. Additionally, the C1D15 PK and ECG collections were completed at this visit in effort to minimize contact due to Covid-19 public health emergency by eliminating the C1D15 in-person visit. Note: this patient had originally been scheduled for a virtual provider visit, but requested an in-person visit due to concern over SOB sx.    Did the study visit occur within the appropriate window allowed by the protocol? yes    If no, why? n/a    Since the last study visit, he has not been sleeping well due to anxiety over SOB. Patient reports his SOB is not worse, but more so that his worries about his disease and associated symptoms have increased. Provider exam including CXR and ECG per SOC to evaluate and patient was deemed healthy to continue study treatment today. Ativan dose increased and new rx for mirtazapine per provider.    Triplicate ECGs, VS and pre-dose PK draw completed prior to Sitravatinib dose. Sitravatinib dose taken with a full glass of water from patient's Cycle 1 supply, and on an empty stomach at 1233- prior to nivolumab infusion. Last meal was around 8 am. Patient denies any use of H2 receptors, PPIs, or antacids.    I have personally interviewed Kentrell Kirkpatrick and reviewed his medical record for adverse events and concomitant medications and these have been recorded on the corresponding logs in Kentrell Kirkpatrick's research file.     Kentrell Kirkpatrick was given the opportunity to ask any trial related questions.  Please see provider progress note for physical exam and other clinical information. Labs were reviewed - any significant lab values were addressed and reviewed.      [id number]: Medication Count/IDS Note      Kentrell Kirkpatrick is enrolled on the trial number listed above. The patient presented for his C2D1 day visit.   IDS number: [id number]  Drug name: Sitravatinib  Number of bottles returned: 2  Lot number(s): [id number]  (60mg) and [id number] (40mg)  Number of pills returned: two 60mg caps and two 40mg caps      Number of bottles dispensed: 4 (a 2-cycle supply was dispensed in effort to minimize in-person contacts with research RN due to Covid-19)  Lot number(s):[id number] (60mg, 2 bottles) and [id number] (40mg, 2 bottles)  Number of pills dispensed: sixty 60mg caps and sixty 40mg caps    Drug diary returned? yes    Are there any discrepancies between the amount of medication the patient was instructed to take and the amount recorded as taken in the patient s drug diary?  no    If yes, provide amount and reason for the discrepancy.  n/a  Are there any discrepancies between the amount of medication the patient has recorded as taken in the drug diary and the amount that would be expected to be returned based on the amount recorded as taken? 20 no    If yes, can the discrepancy be reconciled with the patient? n/a  If yes, provide details. n/a  Form 504.00.01 (Version 1)     Effective date: 01JUL2018     Next Review Date: 01JUL2020

## 2020-08-26 PROBLEM — J44.1 COPD EXACERBATION (H): Status: ACTIVE | Noted: 2020-01-01

## 2020-08-26 NOTE — ED PROVIDER NOTES
History     Chief Complaint:  Shortness of Breath    HPI   Kentrell Kirkpatrick is a 69-year-old male with a history of non-small cell lung cancer which was initially diagnosed in June 2018, with known metastases to his liver and brain (s/p Gamma knife on 6/22), currently on Sitravatinib and Nivolumab, presenting to the ED for evaluation of breath.  Per chart review, patient did have a visit with his oncologist on 8/20/2020, reporting dyspnea at rest as well as with exertion.  He does have a 40-pack-year smoking history, quitting in 2005.  With regards to his dyspnea, on review of his oncology note, potential causes were considered including pericarditis, pericardial effusion, immunotherapy pneumonitis, among others.       Patient reports overall he has been feeling fairly well aside from some shortness of breath.  This has been manageable until yesterday evening when his shortness of breath became worse.  His breathing is worsened in the supine position.  He denies any cough nor fever.  He denies any exposure to individuals diagnosed with COVID.  He denies chest pain or chest pressure.  He has no personal history of coronary artery disease.  He denies any previous history of venous thromboembolic disease.  He states that shortness of breath really became worse after beginning his immunotherapy.    Allergies:  No Known Drug Allergies    Medications:    Albuterol  Fibercon  Proscar  Ativan  Remeron  Dulera  Contin  Zofran  Roxicodone  Compazine  Senokot  Stravatinib malate salt  Flomax  Spiriva    Past Medical History:    Cancer  Colon polyps  COPD  Prostatic hyperplasia  Benign neoplasm of transverse colon  Lung cancer  Chemotherapy induced neutropenia    Past Surgical History:    Biopsy  Colonoscopy x2  Genitourinary surgery  Neck/head surgery  Chest tube insertion x2  Chest port placement  Lymph node biopsy  Thoracentesis    Family History:    Father: heart disease, coronary artery disease  Brother: prostate  "cancer    Social History:  Smoking Status: Former  Smokeless Tobacco: Never  Alcohol Use: Yes  Drug Use: No  Marital Status:        Review of Systems   Constitutional: Negative for fever.   Respiratory: Positive for shortness of breath. Negative for cough and chest tightness.    Cardiovascular: Negative for chest pain.   All other systems reviewed and are negative.    Physical Exam   Vitals:  Patient Vitals for the past 24 hrs:   BP Temp Temp src Pulse Resp SpO2 Height Weight   08/26/20 1900 -- -- -- 90 -- 99 % -- --   08/26/20 1845 -- -- -- 100 -- -- -- --   08/26/20 1830 -- -- -- 93 -- -- -- --   08/26/20 1815 -- -- -- 94 -- -- -- --   08/26/20 1800 -- -- -- 95 -- 100 % -- --   08/26/20 1745 119/79 -- -- 96 -- 98 % -- --   08/26/20 1715 -- -- -- 96 -- -- -- --   08/26/20 1700 -- -- -- 93 -- -- -- --   08/26/20 1536 119/74 98  F (36.7  C) Oral 97 18 99 % 1.854 m (6' 1\") 68 kg (150 lb)       Physical Exam  General:              Well-nourished              Speaking in full sentences  Eyes:              Conjunctiva without injection or scleral icterus  ENT:              Moist mucous membranes              Nares patent              Pinnae normal  Neck:              Full ROM              No stiffness appreciated  Resp:              Lungs CTAB aside from decreased sounds to left base              No crackles, wheezing or audible rubs              Good air movement  CV:                    Normal rate, regular rhythm              S1 and S2 present              No murmur, gallop or rub  GI:              BS present              Abdomen soft without distention              Non-tender to light and deep palpation              No guarding or rebound tenderness  Skin:              Warm, dry, well perfused              No rashes or open wounds on exposed skin  MSK:              Moves all extremities              No focal deformities or swelling  Neuro:              Alert              Answers questions appropriately          "     Moves all extremities equally              Gait stable  Psych:              Normal affect, normal mood    Emergency Department Course   ECG:  Indication: shortness of breath   Completed at 1554.  Read at 1559.   Rate 96 bpm. NJ interval 164. QRS duration 96. QT/QTc 362/442. P-R-T axes 68 55 51.  Sinus rhythm    Imaging:  Radiographic findings were communicated with the patient who voiced understanding of the findings.    CT Chest Pulmonary Embolism W Contrast  1.  No evidence for pulmonary embolism.   2.  Slight interval enlargement of numerous pulmonary nodules throughout both lungs.  3.  Unchanged left pleural metastatic disease. Left pleural effusion, however, has increased slightly.  4.  Progressed osseous metastatic disease, left 12th rib.  5.  Unchanged hepatic and left adrenal metastatic disease.  Reading per radiology.    Laboratory:  CBC: HGB 11.6 (L) o/w WNL. (WBC 8.3, )   BMP: Calcium 8.2 (L) o/w AWNL (Creatinine 1.04)    BNP: 59  Troponin (Collected 1610): <0.015    Symptomatic Covid-19 Virus by PCR: Pending    Interventions:  1637 albuterol 6 puffs Inhalation   2004 albuterol 6 puffs Inhalation  2005 Solu-Medrol 125 mg IV    Emergency Department Course:  Past medical records, nursing notes, and vitals reviewed.    1547 I performed an exam of the patient as documented above.     EKG obtained in the ED, see results above.   IV was inserted and blood was drawn for laboratory testing, results above.  The patient was sent for a chest CT while in the emergency department, results above.     1915 I rechecked the patient and discussed the results of his workup thus far.  Will ambulate in room with pulse O2    1920 I spoke with Dr. Ram of oncology service at [facility].    1935 Patient updated.  Reviewed plan with patient's wife.  O2 dropped to 85% on RA.    2016 I spoke with Dr. Lind, hospitalist, who agreed to admit the patient.     Findings and plan explained to the Patient and spouse who  consents to admission. Discussed the patient with Dr. Lind, who will admit the patient to a medical bed for further monitoring, evaluation, and treatment.    I personally reviewed the laboratory results with the Patient and spouse and answered all related questions prior to admission.        Impression & Plan    Covid-19  Kentrell Kirkpatrick was evaluated during a global COVID-19 pandemic, which necessitated consideration that the patient might be at risk for infection with the SARS-CoV-2 virus that causes COVID-19.   Applicable protocols for evaluation were followed during the patient's care.   COVID-19 was considered as part of the patient's evaluation. The plan for testing is:  a test was obtained during this visit.    Medical Decision Making:  Kentrell Kirkpatrick is a 69 yr old M with a PMH significant for NSCLC who presents to the ED for evaluation of SOB.  VS on presentation unremarkable.  Hx, exam and ED course as outlined above.  Suspect patient's SOB to be multifactorial at this time.  Pt is 40 pack year smoking history, and suspect component of underlying COPD.  Pt noted improvement in SOB following albuterol treatment w/ spacer.  May be additional component of left pleural effusion, which has increased slightly compared with prior study.  No evidence of PE or lobar pneumonia.  He notes worsened symptoms in supine position, though no other signs of volume overload to suggest CHF (no vascular congestion on imaging, no LE edema, and normal BNP).  No chest pain or pressure to suggest ACS, and EKG without acute ischemic changes and troponin negative.  Case discussed with oncology regarding possible interaction of current therapy regimen with steroids.  They felt it would be reasonable to treat with steroids given concern for COPD exacerbation, though suggested to avoid long term steroid use as it can diminish efficacy of current regimen.  This was discussed with patient and wife (over telephone), who are both in  agreement with proceeding.  Trial of ambulation in ED reveal SpO2 of 85%.  At rest, SpO2 remains within normal range.  Given desaturation, will plan hospitalization for supportive treatment and respiratory monitoring.  COVID testing obtained prior to admission.  Questions answered of patient prior to admission.    Diagnosis:    ICD-10-CM    1. Shortness of breath  R06.02 Symptomatic COVID-19 Virus (Coronavirus) by PCR     SARS-CoV-2 COVID-19 Virus (Coronavirus) RT-PCR     SARS-CoV-2 COVID-19 Virus (Coronavirus) RT-PCR   2. Acute respiratory failure with hypoxia (H)  J96.01        Disposition:  Admitted to the hospital under the care of Dr. Lind.    I, Sony Azevedo, am serving as a scribe on 8/26/2020 at 3:46 PM to personally document services performed by Luis Osuna MD based on my observations and the provider's statements to me.    Sony Azevedo  8/26/2020   [facility] EMERGENCY DEPARTMENT       Luis Osuna MD  08/27/20 0928

## 2020-08-26 NOTE — TELEPHONE ENCOUNTER
Patient seen in clinic last week by Ora Guevara, KISHA CHAIDEZ, and c/o SOB was discussed at that time and imaging obtained. Notes state for very fine threshold for continued or worsening SOB with repeat CT to r/o PE and possible echo to r/o pericardial effusion or pericarditis. Mandy reports that for the past couple of days, his SOB has steadily increased. Per Ora, patient should go to ER and he is fine to go to [facility], as is patient preference. Writer advised Mandy of this and she will bring him there.

## 2020-08-26 NOTE — ED TRIAGE NOTES
ABCs intact. Pt hx lung cancer with mets to liver and brain. Last chemo Thursday and takes medication daily. Pt c/o SOB starting last night. Denies fever, cough, n/v/d, sore throat, headache or rash.

## 2020-08-27 PROBLEM — R06.02 SOB (SHORTNESS OF BREATH): Status: ACTIVE | Noted: 2020-01-01

## 2020-08-27 NOTE — PLAN OF CARE
To Do:  End of Shift Summary  For vital signs and complete assessments, please see documentation flowsheets.     Pertinent assessments: A&Ox4 with some confusion present. Port flushed and patent. Heparin orders received. Access date 8/25 per patient report. Pending COVID 19 results. Room air, lungs clear. Independent in room.  Major Shift Events:   Treatment Plan: Prednisone, monitor resp    Discharge Readiness: Medically active  Expected Discharge Date: TBD  Discharge Disposition: Home with self care  Barriers/Criteria for discharge: SOB

## 2020-08-27 NOTE — ED NOTES
[facility]  ED Nurse Handoff Report    Kentrell Kirkpatrick is a 69 year old male   ED Chief complaint: Shortness of Breath  . ED Diagnosis:   Final diagnoses:   Shortness of breath   Acute respiratory failure with hypoxia (H)     Allergies: No Known Allergies    Code Status: Full Code  Activity level - Baseline/Home:  Independent. Activity Level - Current:   Independent. Lift room needed: No. Bariatric: No   Needed: No   Isolation: Yes. Infection: Not Applicable  covid pending.     Vital Signs:   Vitals:    08/26/20 1845 08/26/20 1900 08/26/20 1915 08/26/20 1930   BP:    124/79   Pulse: 100 90 93    Resp:       Temp:       TempSrc:       SpO2:  99% 95% 99%   Weight:       Height:           Cardiac Rhythm:  ,      Pain level:    Patient confused: No. Patient Falls Risk: No.   Elimination Status: Has voided   Patient Report - Initial Complaint: *ABCs intact. Pt hx lung cancer with mets to liver and brain. Last chemo Thursday and takes medication daily. Pt c/o SOB starting last night. Denies fever, cough, n/v/d, sore . Focused Assessment: short of breath, hypoxic with ambulation   Tests Performed: labs, xray, ekg. Abnormal Results:   Labs Ordered and Resulted from Time of ED Arrival Up to the Time of Departure from the ED   CBC WITH PLATELETS DIFFERENTIAL - Abnormal; Notable for the following components:       Result Value    Hemoglobin 11.6 (*)     Hematocrit 38.7 (*)     MCH 25.3 (*)     MCHC 30.0 (*)     RDW 18.1 (*)     All other components within normal limits   BASIC METABOLIC PANEL - Abnormal; Notable for the following components:    Calcium 8.2 (*)     All other components within normal limits   TROPONIN I   NT PROBNP INPATIENT   COVID-19 VIRUS (CORONAVIRUS) BY PCR   SARS-COV-2 (COVID-19) VIRUS RT-PCR     CT Chest Pulmonary Embolism w Contrast   Final Result   IMPRESSION:   1.  No evidence for pulmonary embolism.    2.  Slight interval enlargement of numerous pulmonary nodules throughout  both lungs.   3.  Unchanged left pleural metastatic disease. Left pleural effusion, however, has increased slightly.   4.  Progressed osseous metastatic disease, left 12th rib.   5.  Unchanged hepatic and left adrenal metastatic disease.        .   Treatments provided: inhaler and solumedrol  Family Comments: pt updated on phone  OBS brochure/video discussed/provided to patient:  Yes  ED Medications:   Medications   albuterol (PROAIR HFA/PROVENTIL HFA/VENTOLIN HFA) 108 (90 Base) MCG/ACT inhaler 6 puff (6 puffs Inhalation Given 8/26/20 1637)   0.9% sodium chloride BOLUS (45 mLs Intravenous New Bag 8/26/20 1730)   iopamidol (ISOVUE-370) solution 500 mL (55 mLs Intravenous Given 8/26/20 1730)   methylPREDNISolone sodium succinate (solu-MEDROL) injection 125 mg (125 mg Intravenous Given 8/26/20 2005)   albuterol (PROAIR HFA/PROVENTIL HFA/VENTOLIN HFA) 108 (90 Base) MCG/ACT inhaler 6 puff (6 puffs Inhalation Given 8/26/20 2004)     Drips infusing:  No  For the majority of the shift, the patient's behavior Green. Interventions performed were none  .    Sepsis treatment initiated: No       ED Nurse Name/Phone Number: Marcela Tolbert RN,   8:12 PM  RECEIVING UNIT ED HANDOFF REVIEW    Above ED Nurse Handoff Report was reviewed: Yes  Reviewed by: David Whittaker RN on August 26, 2020 at 9:34 PM

## 2020-08-27 NOTE — PROGRESS NOTES
"[facility]     Date: 8/27/20  Admission Dx: COPD exacerbation   Pulmonary History: COPD  Home Nebulizer/MDI Use: Albuterol MDI Q4 w/a, Albuterol neb Q4 prn, Tiotropium QD, and Mometason-formoterol QD  Home Oxygen: None  Acuity Level (RCAT flow sheet): 3  Aerosol Therapy initiated: Albuterol MDI QID / Q4 prn      Pulmonary Hygiene initiated: Deep cough technique      Current Oxygen Requirements: RA  Current SpO2: 94%    Re-evaluation date: 8/30/20    Patient Education: Will continue to do education with patient.       See \"RT Assessments\" flow sheet for patient assessment scoring and Acuity Level Details.             "

## 2020-08-27 NOTE — PLAN OF CARE
End of Shift Summary  For vital signs and complete assessments, please see documentation flowsheets.     Pertinent assessments: A&Ox4, VSS, denies pain, denies SOB, sats RA, LS diminished.   Major Shift Events: Admission  Treatment Plan: Prednisone, monitor resp    Discharge Readiness: Medically active  Expected Discharge Date: TBD  Discharge Disposition: Home with self care  Barriers/Criteria for discharge: SOB

## 2020-08-27 NOTE — DISCHARGE SUMMARY
[facility]  Discharge Summary  Name: Kentrell Kirkpatrick    MRN: [id number]  YOB: 1950    Age: 69 year old  Date of Discharge:  8/27/2020 10:52 AM  Date of Admission: 8/26/2020  Primary Care Provider: Donald Shell  Discharge Physician:  Ekta Goldstein MD  Discharging Service:  Hospitalist      Discharge Diagnoses:  1.  Progressive shortness of breath, likely multifactorial including COPD exacerbation, metastatic lung cancer, chronic pleural effusion  2.  Metastatic primary adenocarcinoma of the lung  3.  BPH     Follow-ups Needed After Discharge   Follow-up with oncology clinic    Unresulted Labs Ordered in the Past 30 Days of this Admission     No orders found from 10/16/2018 to 12/16/2018.        Hospital Course:  Kentrell Kirkpatrick is a 69 year old male patient with past medical history of COPD, prior history of smoking, non-small cell carcinoma the lung with metastasis to the liver and brain on immunotherapy, BPH who was admitted on 8/26/2020 with worsening shortness of breath.      He has history of lung cancer which was diagnosed in June 2018.  Patient is currently on immunotherapy with Sitravatinib and Nivolumab.  He follows with an  [facility] cancer clinic with Dr. Muller.  He states that he has been having progressive shortness of breath over the past month.  His symptoms got worse in the last few days and were not improved by his home inhaler regimen.  He denies fever, cough, chest pain, leg swelling.  He had no recent sick contacts.    In the ER, his vital signs showed temperature 98, pulse 97, blood pressure 190/74, oxygen saturation 99% on room air.  Lab work-up showed normal BMP.  WBC 8.3, hemoglobin 11.6. CT of the chest with contrast is negative for pulmonary embolism. It showed slight interval enlargement of numerous pulmonary nodules throughout both lungs.  There is evidence of pleural metastatic disease unchanged.  There is left pleural effusion which has increased  "slightly.  There is progressive osseous metastatic disease to left 12th rib and also unchanged hepatic and left adrenal metastatic disease.  Patient was given neb treatment and IV Solu-Medrol in the emergency room.  He was admitted to the hospital for further management.     1.  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  The patient was started on prednisone 40 mg daily upon admission to the hospital.  The subsequent morning he felt that his breathing was back to his normal and was requesting to be discharged.  He did not have any hypoxia.  He will be discharged on a prednisone burst.  Recommended that he follow-up with his oncologist in the next week to ensure that his breathing remains stable.  I did not think that he needed a thoracentesis given rapid improvement of his symptoms with prednisone.  This could be considered in the outpatient setting if he has recurrence of his shortness of breath.  COVID-19 test was negative.     2.  Metastatic primary adenocarcinoma of the lung  Patient follows at [facility] with Dr. Muller.    I have recommended that he call his oncology clinic in follow-up in the next week if at all possible to ensure that his breathing remains stable.     3.  BPH: We will resume Flomax     Discharge Disposition:  Discharged to home     Allergies:  No Known Allergies     Condition on Discharge:  Discharge condition: Stable   Discharge vitals: Blood pressure 108/70, pulse 89, temperature 98  F (36.7  C), temperature source Oral, resp. rate 20, height 1.854 m (6' 1\"), weight 69.5 kg (153 lb 4.8 oz), SpO2 95 %.   Code status on discharge: Full Code     History of Illness:  See detailed admission note for full details.    Physical Exam:  Blood pressure 108/70, pulse 89, temperature 98  F (36.7  C), temperature source Oral, resp. rate 20, height 1.854 m (6' 1\"), weight " 69.5 kg (153 lb 4.8 oz), SpO2 95 %.  Wt Readings from Last 1 Encounters:   08/26/20 69.5 kg (153 lb 4.8 oz)     General: Alert, awake, no acute distress.  HEENT: Normocephalic, atraumatic, eyes anicteric and without scleral injection, EOMI, MMM.  Cardiac: RRR, normal S1, S2.  No m/g/r. No LE edema.  Pulmonary: Normal chest rise, normal work of breathing.  Decreased breath sounds in LLL but otherwise no crackles or wheezing.  Abdomen: soft, non-tender, non-distended.  Normoactive BS.  No guarding or rebound tenderness.  Extremities: no deformities.  Warm, well perfused.  Skin: no rashes or lesions noted.  Warm and Dry.  Neuro: No focal deficits noted.  Speech clear.  Coordination and strength grossly normal.  Psych: Appropriate affect. Alert and oriented x3    Procedures other than Imaging:  Phlebotomy     Imaging:  Results for orders placed or performed during the hospital encounter of 08/26/20   CT Chest Pulmonary Embolism w Contrast    Narrative    EXAM: CT CHEST PULMONARY EMBOLISM W CONTRAST  LOCATION: [facility]  DATE/TIME: 8/26/2020 5:22 PM    INDICATION: Shortness of breath history of lung cancer  COMPARISON: 07/12/2020  TECHNIQUE: CT chest pulmonary angiogram during arterial phase injection of IV contrast. Multiplanar reformats and MIP reconstructions were performed. Dose reduction techniques were used.   CONTRAST: 55 mL Isovue-370    FINDINGS:  ANGIOGRAM CHEST: No evidence for pulmonary embolism. Pulmonary arteries normal in caliber. Thoracic aorta normal in caliber. No aortic dissection or other acute abnormality.    HEART: Cardiac chambers within normal limits. No pericardial effusion.    LUNGS AND PLEURA: Widespread pulmonary metastatic disease, increased compared to 07/12/2020. Moderate emphysema in the upper lungs. Small left pleural effusion and associated left lower lobe atelectasis have increased slightly. Left pleural thickening   unchanged.    MEDIASTINUM: Bilateral hilar and lower  mediastinal adenopathy unchanged. Left paramediastinal mass likely pleural-based unchanged in thickness, best seen at the level of the pulmonary artery. Stable right IJ Port-A-Cath.    LIMITED UPPER ABDOMEN: Multiple low-density hepatic lesions, unchanged. Left adrenal metastasis unchanged.    MUSCULOSKELETAL: There are enlargement of destructive lesion within the posterior aspect of the left 12th rib stable lytic lesion within the lateral aspect of the left 10th rib, axial 138.      Impression    IMPRESSION:  1.  No evidence for pulmonary embolism.   2.  Slight interval enlargement of numerous pulmonary nodules throughout both lungs.  3.  Unchanged left pleural metastatic disease. Left pleural effusion, however, has increased slightly.  4.  Progressed osseous metastatic disease, left 12th rib.  5.  Unchanged hepatic and left adrenal metastatic disease.        Consultations:  Consultations This Hospital Stay   HEMATOLOGY & ONCOLOGY IP CONSULT     Recent Lab Results:  Recent Labs   Lab 08/27/20  0455 08/26/20  1610   WBC 5.0 8.3   HGB 12.0* 11.6*   HCT 39.4* 38.7*   MCV 83 85    320     Recent Labs   Lab 08/27/20  0455 08/26/20  1610    135   POTASSIUM 4.2 4.0   CHLORIDE 107 102   CO2 24 29   ANIONGAP 8 4   * 96   BUN 17 19   CR 0.85 1.04   GFRESTIMATED 88 72   GFRESTBLACK >90 83   MALATHI 8.4* 8.2*          Pending Results:    Unresulted Labs Ordered in the Past 30 Days of this Admission     Date and Time Order Name Status Description    8/26/2020 1610 SARS-CoV-2 COVID-19 Virus (Coronavirus) RT-PCR In process            Discharge Instructions and Follow-Up:   Discharge Orders      Reason for your hospital stay    You were hospitalized for shortness of breath.  You were started on Prednisone which has helped your breathing.  You will discharge home with a Prednisone burst.  Please call your Oncology clinic to see if you can be seen sooner to ensure that your breathing remains stable upon discharge  from the hospital.     Follow-up and recommended labs and tests     Follow up with Oncology in clinic.     Activity    Your activity upon discharge: activity as tolerated     Full Code     Diet    Follow this diet upon discharge: Orders Placed This Encounter      Combination Diet Regular Diet Adult     Discharge Medications   Current Discharge Medication List      START taking these medications    Details   predniSONE (DELTASONE) 20 MG tablet Take 2 tablets (40 mg) by mouth daily for 6 days  Qty: 12 tablet, Refills: 0    Associated Diagnoses: COPD exacerbation (H)         CONTINUE these medications which have NOT CHANGED    Details   albuterol (PROAIR HFA/PROVENTIL HFA/VENTOLIN HFA) 108 (90 Base) MCG/ACT inhaler Inhale 2 puffs into the lungs every 4 hours (while awake)  Qty: 1 Inhaler, Refills: 0    Comments: Pharmacy may dispense brand covered by insurance (Proair, or proventil or ventolin or generic albuterol inhaler)  Associated Diagnoses: Cough; Acute bronchitis, unspecified organism      albuterol (PROVENTIL) (2.5 MG/3ML) 0.083% neb solution Take 1 vial (2.5 mg) by nebulization every 4 hours as needed for shortness of breath / dyspnea or wheezing  Qty: 100 vial, Refills: 11    Associated Diagnoses: Panlobular emphysema (H); Chronic obstructive pulmonary disease, unspecified COPD type (H)      calcium polycarbophil (FIBERCON) 625 MG tablet Take 2 tablets by mouth daily      finasteride (PROSCAR) 5 MG tablet TAKE 1 TABLET BY MOUTH EVERY DAY  Qty: 90 tablet, Refills: 1    Associated Diagnoses: Benign nodular prostatic hyperplasia with lower urinary tract symptoms      LORazepam (ATIVAN) 1 MG tablet Take 1 tablet (1 mg) by mouth every 6 hours as needed for anxiety  Qty: 30 tablet, Refills: 0    Associated Diagnoses: Primary lung adenocarcinoma, left (H); Anxiety      Melatonin 10 MG TABS tablet Take 10 mg by mouth nightly as needed for sleep      mirtazapine (REMERON) 7.5 MG tablet Take 1 tablet (7.5 mg) by mouth  At Bedtime  Qty: 30 tablet, Refills: 3    Associated Diagnoses: Primary lung adenocarcinoma, left (H); Anxiety      mometasone-formoterol (DULERA) 100-5 MCG/ACT inhaler Inhale 2 puffs into the lungs daily      morphine (MS CONTIN) 15 MG CR tablet Take 15 mg by mouth 2 times daily as needed for severe pain      NIVOLUMAB IV Inject 480 mg into the vein every 28 days      oxyCODONE (ROXICODONE) 5 MG tablet Take 1 tablet (5 mg) by mouth every 4 hours as needed for severe pain  Qty: 100 tablet, Refills: 0    Associated Diagnoses: Primary lung adenocarcinoma, left (H); Cancer related pain      senna (SENOKOT) 8.6 MG tablet Take 1 tablet by mouth daily      study - sitravatinib malate salt, [id number],, IDS# [id number], 40 mg roller oompaction capsule Take 1 capsule (40 mg) by mouth daily  Qty: 60 capsule, Refills: 0    Associated Diagnoses: Primary lung adenocarcinoma, left (H); Chemotherapy-induced neutropenia (H); Non-small cell lung cancer, left (H)      study - sitravatinib malate salt, [id number],, IDS# [id number], 60 mg roller oompaction capsule Take 1 capsule (60 mg) by mouth daily  Qty: 30 capsule, Refills: 0    Associated Diagnoses: Primary lung adenocarcinoma, left (H); Chemotherapy-induced neutropenia (H); Non-small cell lung cancer, left (H)      tamsulosin (FLOMAX) 0.4 MG capsule TAKE 1 CAPSULE BY MOUTH EVERY DAY  Qty: 90 capsule, Refills: 1    Associated Diagnoses: Benign nodular prostatic hyperplasia with lower urinary tract symptoms      tiotropium (SPIRIVA HANDIHALER) 18 MCG inhaled capsule Inhale 1 capsule (18 mcg) into the lungs daily  Qty: 1 capsule, Refills: 11    Associated Diagnoses: Chronic obstructive pulmonary disease, unspecified COPD type (H)      prochlorperazine (COMPAZINE) 10 MG tablet Take 1 tablet (10 mg) by mouth every 6 hours as needed (Nausea/Vomiting)  Qty: 30 tablet, Refills: 11    Associated Diagnoses: Non-small cell lung cancer, left (H)             Time Spent on this Encounter   I, Ekta Goldstein,  MD, personally saw the patient today and spent greater than 30 minutes discharging this patient.    Ekta Goldstein MD

## 2020-08-27 NOTE — UTILIZATION REVIEW
"  Admission Status; Secondary Review Determination         Under the authority of the Utilization Management Committee, the utilization review process indicated a secondary review on the above patient.  The review outcome is based on review of the medical records, discussions with staff, and applying clinical experience noted on the date of the review.          (x) Observation Status Appropriate - This patient does not meet hospital inpatient criteria and is placed in observation status. If this patient's primary payer is Medicare and was admitted as an inpatient, Condition Code 44 should be used and patient status changed to \"observation\".     RATIONALE FOR DETERMINATION   69 year old male patient with past medical history of COPD, prior history of smoking, non-small cell carcinoma the lung with metastasis to the liver and brain on immunotherapy, BPH, who came to emergency room with complaint of shortness of breath. CT of the chest with contrast is negative for pulmonary embolism. It showed slight interval enlargement of numerous pulmonary nodules throughout both lungs. No hypoxia on RA upper 90% sat.  There is evidence of pleural metastatic disease unchanged.  There is left pleural effusion which has increased slightly. On admit: Expected discharge in 1-2 days. The severity of illness, intensity of service provided, expected LOS and risk for adverse outcome make the care appropriate for further observation; however, doesn't meet criteria for hospital inpatient admission. Dr. Goldstein  notified of this determination.    This document was produced using voice recognition software.      The information on this document is developed by the utilization review team in order for the business office to ensure compliance.  This only denotes the appropriateness of proper admission status and does not reflect the quality of care rendered.         The definitions of Inpatient Status and Observation Status used in making the " determination above are those provided in the CMS Coverage Manual, Chapter 1 and Chapter 6, section 70.4.      Sincerely,     LINDSAY COLLINS MD    System Medical Director  Utilization Management  [facility].

## 2020-08-27 NOTE — PHARMACY-ADMISSION MEDICATION HISTORY
Admission medication history interview status for this patient is complete. See [facility] admission navigator for allergy information, prior to admission medications and immunization status.     Medication history interview done via telephone during Covid-19 pandemic, indicate source(s): Patient  Medication history resources (including written lists, pill bottles, clinic record): Care Everywhere  Pharmacy: [facility] [address] [place]  Changes made to PTA medication list:  Added: none  Deleted: multivitamin, ondansetron, duplicate lorazepam and duplicate sitravatinib  Changed: morphine 15mg CR BID --> BID PRN.     Actions taken by pharmacist (provider contacted, etc): Called pt to verify home med list     Additional medication history information: Pt takes 60mg and 40mg capsules of sitravatinib together in the AM for total dose = 100mg. Pt's wife will bring in home sitravatinib tomorrow morning for inpatient use.     Medication reconciliation/reorder completed by provider prior to medication history?  N   (Y/N)     For patients on insulin therapy: N  (Y/N)        Prior to Admission medications    Medication Sig Last Dose Taking? Auth Provider   albuterol (PROAIR HFA/PROVENTIL HFA/VENTOLIN HFA) 108 (90 Base) MCG/ACT inhaler Inhale 2 puffs into the lungs every 4 hours (while awake) 8/26/2020 at am Yes Eugenio Caruso PA-C   albuterol (PROVENTIL) (2.5 MG/3ML) 0.083% neb solution Take 1 vial (2.5 mg) by nebulization every 4 hours as needed for shortness of breath / dyspnea or wheezing 8/26/2020 at am Yes Susan Muller MD   calcium polycarbophil (FIBERCON) 625 MG tablet Take 2 tablets by mouth daily 8/26/2020 at am Yes Reported, Patient   finasteride (PROSCAR) 5 MG tablet TAKE 1 TABLET BY MOUTH EVERY DAY 8/26/2020 at am Yes Donald Shell, DO   LORazepam (ATIVAN) 1 MG tablet Take 1 tablet (1 mg) by mouth every 6 hours as needed for anxiety 8/26/2020 at am Yes Ora Guevara APRN CNP   Melatonin 10 MG  TABS tablet Take 10 mg by mouth nightly as needed for sleep 8/25/2020 at pm Yes Reported, Patient   mirtazapine (REMERON) 7.5 MG tablet Take 1 tablet (7.5 mg) by mouth At Bedtime 8/25/2020 at Unknown time Yes Ora Guevara APRN CNP   mometasone-formoterol (DULERA) 100-5 MCG/ACT inhaler Inhale 2 puffs into the lungs daily 8/26/2020 at am Yes Unknown, Entered By History   morphine (MS CONTIN) 15 MG CR tablet Take 15 mg by mouth 2 times daily as needed for severe pain 8/24/2020 at pm Yes Unknown, Entered By History   oxyCODONE (ROXICODONE) 5 MG tablet Take 1 tablet (5 mg) by mouth every 4 hours as needed for severe pain 8/26/2020 at am Yes Ora Guevara APRN CNP   senna (SENOKOT) 8.6 MG tablet Take 1 tablet by mouth daily 8/26/2020 at am Yes Reported, Patient   study - sitravatinib malate salt, [id number],, IDS# [id number], 40 mg roller oompaction capsule Take 1 capsule (40 mg) by mouth daily 8/26/2020 at am Yes Ora Guevara APRN CNP   study - sitravatinib malate salt, [id number],, IDS# [id number], 60 mg roller oompaction capsule Take 1 capsule (60 mg) by mouth daily 8/26/2020 at am Yes Susan Muller MD   tamsulosin (FLOMAX) 0.4 MG capsule TAKE 1 CAPSULE BY MOUTH EVERY DAY 8/26/2020 at am Yes Tiera Napoles PA-C   tiotropium (SPIRIVA HANDIHALER) 18 MCG inhaled capsule Inhale 1 capsule (18 mcg) into the lungs daily 8/26/2020 at am Yes Susan Muller MD   prochlorperazine (COMPAZINE) 10 MG tablet Take 1 tablet (10 mg) by mouth every 6 hours as needed (Nausea/Vomiting) More than a month at Unknown time  Susan Muller MD

## 2020-08-27 NOTE — PROGRESS NOTES
OBSERVATION patient END time: 1100  Pt to D/C to home.  Pt provided with d/c instructions, including new medications, when medications were last given, and when to take them again. Pt verbalized understanding of all d/c and f/u instructions.  All questions were answered at this time.  Copy of paperwork sent with pt.  Script sent to personal pharmacy.  Wife to provide transport.  All personal belongings sent with pt (clothing, shoes, phone, wallet, glasses, tablet, cup).

## 2020-08-27 NOTE — H&P
[facility]    History and Physical  Hospitalist       Date of Admission:  8/26/2020  Date of Service (when I saw the patient): 08/26/20    Assessment & Plan   Kentrell Kirkpatrick is a 69 year old male patient with past medical history of COPD, prior history of smoking, non-small cell carcinoma the lung with metastasis to the liver and brain on immunotherapy, BPH, who came to emergency room with complaint of shortness of breath.  He stated that he has history of lung cancer which was diagnosed in June 2018.  Patient is currently on immunotherapy with Sitravatinib and Nivolumab.  He follows with an  [facility] cancer clinic with Dr. Muller.  He states that he has been having progressive shortness of breath over the past month.  His symptoms got worse in the last few days.  He denies fever, cough, chest pain, leg swelling.  He had no recent sick contact  In the ER, his vital signs showed temperature 98, pulse 97, blood pressure 190/74, oxygen saturation 99% on room air.  Lab work-up showed normal BMP.  WBC 8.3, hemoglobin 11.6.  CT of the chest with contrast is negative for pulmonary embolism. It showed slight interval enlargement of numerous pulmonary nodules throughout both lungs.  There is evidence of pleural metastatic disease unchanged.  There is left pleural effusion which has increased slightly.  There is progressive osseous metastatic disease to left 12th rib and also unchanged hepatic and left adrenal metastatic disease.  Patient was given neb treatment and IV Solu-Medrol in the emergency room.  He was admitted to the hospital for further management.    1.  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  Will give neb treatment.  We will put him on prednisone 40 mg daily.  Currently is not requiring oxygen supplement.  Will monitor.  COVID-19 PCR sent.    2.  Metastatic primary  adenocarcinoma of the lung  Patient follows at [facility] clinic with Dr. Muller.    CT of the chest/abdomen/pelvis shows   Given his worsening symptoms, will consult oncology for evaluation and recommendations.    3.  BPH: We will resume Flomax    Will admit patient to inpatient status    DVT Prophylaxis: Pneumatic Compression Devices  Code Status: Full Code    Disposition: Expected discharge in 1-2 days    Soheila Lind MD    Primary Care Physician   Donald Shell    Chief Complaint   Shortness of breath    History is obtained from the patient    History of Present Illness   Kentrell Kirkpatrick is a 69 year old male patient with past medical history of COPD, prior history of smoking, non-small cell carcinoma the lung with metastasis to the liver and brain on immunotherapy, BPH, who came to emergency room with complaint of shortness of breath.  He stated that he has history of lung cancer which was diagnosed in June 2018.  Patient has metastasized to liver and brain.  He underwent radiation treatment on 6/22/2020.  He is currently on Sitravatinib and Nivolumab. Patient stated that he has been having progressive shortness of breath over the past month.  He was seen by his oncologist on 8/20/2020 for progressive shortness of breath.  He is progressive dyspnea was suspected to be secondary to multifactorial including pericardial effusion, pericarditis, immunotherapy induced pneumonitis. He is on neb treatment at home.  He is not on steroids.  Stated that his breathing got worse in the last few days.  Patient denies fever, cough, chest pain, leg swelling.  He denies recent sick contact or any exposure to call with positive patient.  On arrival to emergency room, his vital signs were checked and showed temperature 98, pulse 97, blood pressure 119/74, oxygen saturation 99% on room air.  Laboratory work-up showed sodium 135, potassium 4.0, creatinine 1.04, troponin <0.015, WBC 8.3, hemoglobin 11.6.   COVID-19 PCR sent  CT of the chest with contrast was done and showed no evidence of pulmonary embolism.  There is slight interval enlargement of numerous pulmonary nodules throughout both lungs.  There is evidence of pleural metastatic disease unchanged.  There is left pleural effusion which has increased slightly.  There is progressive osseous metastatic disease to left 12th rib and also unchanged hepatic and left adrenal metastatic disease.  In emergency room, he was given IV Solu-Medrol and albuterol inhaler.  He was admitted to the hospital for further management.    Past Medical History    I have reviewed this patient's medical history and updated it with pertinent information if needed.   Past Medical History:   Diagnosis Date     Cancer (H) 2/25/19     Colon polyps      COPD (chronic obstructive pulmonary disease) (H)        Past Surgical History   I have reviewed this patient's surgical history and updated it with pertinent information if needed.  Past Surgical History:   Procedure Laterality Date     BIOPSY  3/15/19     COLONOSCOPY       COLONOSCOPY N/A 12/22/2016    Procedure: COMBINED COLONOSCOPY, SINGLE OR MULTIPLE BIOPSY/POLYPECTOMY BY BIOPSY;  Surgeon: Jeremi Kramer MD;  Location:  GI     GENITOURINARY SURGERY      Using flow-max     HEAD & NECK SURGERY      stiff / sore neck     INSERT CHEST TUBE Left 3/1/2019    Procedure: INSERT CHEST TUBE;  Surgeon: Matthew Rodriguez MD;  Location: [facility]     INSERT CHEST TUBE N/A 5/9/2019    Procedure: Removal Of Pleurx Catheter;  Surgeon: Matthew Rodriguez MD;  Location: [facility] GI     IR CHEST PORT PLACEMENT > 5 YRS OF AGE  10/17/2019     IR LYMPH NODE BIOPSY  3/15/2019     THORACENTESIS Left 2/20/2019    Procedure: THORACENTESIS;  Surgeon: Matthew Rodriguez MD;  Location: [facility] GI       Prior to Admission Medications   Prior to Admission Medications   Prescriptions Last Dose Informant Patient Reported? Taking?   LORazepam (ATIVAN) 1 MG tablet 8/26/2020 at am   No Yes   Sig: Take 1 tablet (1 mg) by mouth every 6 hours as needed for anxiety   Melatonin 10 MG TABS tablet 8/25/2020 at pm  Yes Yes   Sig: Take 10 mg by mouth nightly as needed for sleep   NIVOLUMAB IV 8/20/2020  Yes Yes   Sig: Inject 480 mg into the vein every 28 days   albuterol (PROAIR HFA/PROVENTIL HFA/VENTOLIN HFA) 108 (90 Base) MCG/ACT inhaler 8/26/2020 at am  No Yes   Sig: Inhale 2 puffs into the lungs every 4 hours (while awake)   albuterol (PROVENTIL) (2.5 MG/3ML) 0.083% neb solution 8/26/2020 at am  No Yes   Sig: Take 1 vial (2.5 mg) by nebulization every 4 hours as needed for shortness of breath / dyspnea or wheezing   calcium polycarbophil (FIBERCON) 625 MG tablet 8/26/2020 at am  Yes Yes   Sig: Take 2 tablets by mouth daily   finasteride (PROSCAR) 5 MG tablet 8/26/2020 at am  No Yes   Sig: TAKE 1 TABLET BY MOUTH EVERY DAY   mirtazapine (REMERON) 7.5 MG tablet 8/25/2020 at Unknown time  No Yes   Sig: Take 1 tablet (7.5 mg) by mouth At Bedtime   mometasone-formoterol (DULERA) 100-5 MCG/ACT inhaler 8/26/2020 at am  Yes Yes   Sig: Inhale 2 puffs into the lungs daily   morphine (MS CONTIN) 15 MG CR tablet 8/24/2020 at pm  Yes Yes   Sig: Take 15 mg by mouth 2 times daily as needed for severe pain   oxyCODONE (ROXICODONE) 5 MG tablet 8/26/2020 at am  No Yes   Sig: Take 1 tablet (5 mg) by mouth every 4 hours as needed for severe pain   prochlorperazine (COMPAZINE) 10 MG tablet More than a month at Unknown time  No No   Sig: Take 1 tablet (10 mg) by mouth every 6 hours as needed (Nausea/Vomiting)   senna (SENOKOT) 8.6 MG tablet 8/26/2020 at am  Yes Yes   Sig: Take 1 tablet by mouth daily   study - sitravatinib malate salt, [id number],, IDS# [id number], 60 mg roller oompaction capsule 8/26/2020 at am  No Yes   Sig: Take 1 capsule (60 mg) by mouth daily   study - sitravatinib malate salt, [id number],, IDS# [id number], 40 mg roller oompaction capsule 8/26/2020 at am  No Yes   Sig: Take 1 capsule (40 mg) by mouth daily    tamsulosin (FLOMAX) 0.4 MG capsule 8/26/2020 at am  No Yes   Sig: TAKE 1 CAPSULE BY MOUTH EVERY DAY   tiotropium (SPIRIVA HANDIHALER) 18 MCG inhaled capsule 8/26/2020 at am  No Yes   Sig: Inhale 1 capsule (18 mcg) into the lungs daily      Facility-Administered Medications: None     Allergies   No Known Allergies    Social History   I have reviewed this patient's social history and updated it with pertinent information if needed. Kentrell Kirkpatrick  reports that he quit smoking about 12 years ago. His smoking use included cigarettes. He started smoking about 51 years ago. He has a 64.50 pack-year smoking history. He has never used smokeless tobacco. He reports current alcohol use. He reports that he does not use drugs.    Family History   I have reviewed this patient's family history and updated it with pertinent information if needed.   Family History   Problem Relation Age of Onset     Heart Disease Father      Coronary Artery Disease Father      Prostate Cancer Other      Prostate Cancer Brother      Colon Cancer No family hx of        Review of Systems   The 10 point Review of Systems is negative other than noted in the HPI or here. Shortness of breath    Physical Exam   Temp: 98  F (36.7  C) Temp src: Oral BP: 124/79 Pulse: 93   Resp: 18 SpO2: 99 % O2 Device: None (Room air)    Vital Signs with Ranges  Temp:  [98  F (36.7  C)] 98  F (36.7  C)  Pulse:  [] 93  Resp:  [18] 18  BP: (119-124)/(74-79) 124/79  SpO2:  [95 %-100 %] 99 %  150 lbs 0 oz    GEN:  Alert, oriented x 3, appears comfortable, NAD.  HEENT:  Normocephalic/atraumatic, no scleral icterus, no nasal discharge, mouth moist.  CV:  Regular rate and rhythm, no murmur or JVD.  S1 + S2 noted, no S3 or S4.  LUNGS:  Clear to auscultation bilaterally without rales/rhonchi/wheezing/retractions.  Symmetric chest rise on inhalation noted.  ABD:  Active bowel sounds, soft, non-tender/non-distended.  No rebound/guarding/rigidity.  EXT:  No edema or cyanosis.   Hands/feet warm to touch with good signs of peripheral perfusion.  No joint synovitis noted.  SKIN:  Dry to touch, no exanthems noted in the visualized areas.  NEURO:  Symmetric muscle strength, sensation to touch grossly intact.  No new focal deficits appreciated.    Data   Data reviewed today:  I personally reviewed  Recent Labs   Lab 08/26/20  1610 08/20/20  0832   WBC 8.3 9.7   HGB 11.6* 12.6*   MCV 85 82    294    136   POTASSIUM 4.0 4.1   CHLORIDE 102 103   CO2 29 28   BUN 19 16   CR 1.04 0.96   ANIONGAP 4 5   MALATHI 8.2* 8.6   GLC 96 93   ALBUMIN  --  3.1*   PROTTOTAL  --  7.0   BILITOTAL  --  0.9   ALKPHOS  --  117   ALT  --  47   AST  --  32   LIPASE  --  40*   TROPI <0.015  --        Recent Results (from the past 24 hour(s))   CT Chest Pulmonary Embolism w Contrast    Narrative    EXAM: CT CHEST PULMONARY EMBOLISM W CONTRAST  LOCATION: [facility]  DATE/TIME: 8/26/2020 5:22 PM    INDICATION: Shortness of breath history of lung cancer  COMPARISON: 07/12/2020  TECHNIQUE: CT chest pulmonary angiogram during arterial phase injection of IV contrast. Multiplanar reformats and MIP reconstructions were performed. Dose reduction techniques were used.   CONTRAST: 55 mL [id number]    FINDINGS:  ANGIOGRAM CHEST: No evidence for pulmonary embolism. Pulmonary arteries normal in caliber. Thoracic aorta normal in caliber. No aortic dissection or other acute abnormality.    HEART: Cardiac chambers within normal limits. No pericardial effusion.    LUNGS AND PLEURA: Widespread pulmonary metastatic disease, increased compared to 07/12/2020. Moderate emphysema in the upper lungs. Small left pleural effusion and associated left lower lobe atelectasis have increased slightly. Left pleural thickening   unchanged.    MEDIASTINUM: Bilateral hilar and lower mediastinal adenopathy unchanged. Left paramediastinal mass likely pleural-based unchanged in thickness, best seen at the level of the pulmonary artery.  Stable right IJ Port-A-Cath.    LIMITED UPPER ABDOMEN: Multiple low-density hepatic lesions, unchanged. Left adrenal metastasis unchanged.    MUSCULOSKELETAL: There are enlargement of destructive lesion within the posterior aspect of the left 12th rib stable lytic lesion within the lateral aspect of the left 10th rib, axial 138.      Impression    IMPRESSION:  1.  No evidence for pulmonary embolism.   2.  Slight interval enlargement of numerous pulmonary nodules throughout both lungs.  3.  Unchanged left pleural metastatic disease. Left pleural effusion, however, has increased slightly.  4.  Progressed osseous metastatic disease, left 12th rib.  5.  Unchanged hepatic and left adrenal metastatic disease.

## 2020-08-28 NOTE — TELEPHONE ENCOUNTER
"Hospital/TCU/ED for chronic condition Discharge Protocol    \"Hi, my name is Arabella Ramesh RN, a registered nurse, and I am calling from [facility].  I am calling to follow up and see how things are going for you after your recent emergency visit/hospital/TCU stay.\"    Tell me how you are doing now that you are home?\" I'm actually pulling up to an appointment now. I am doing okay and I plan on following up next week with my oncologist.       Discharge Instructions    \"Let's review your discharge instructions.  What is/are the follow-up recommendations?  Pt. Response: Patient unable to do at this time--deferred by patient.     \"Has an appointment with your primary care provider been scheduled?\"   No--patient declined he wants to follow up with oncology and those are discharge instructions.     \"When you see the provider, I would recommend that you bring your medications with you.\"    Medications    \"Tell me what changed about your medicines when you discharged?\"    Changes to chronic meds?    Per patient wishes did not review.     \"What questions do you have about your medications?\"    None     New diagnoses of heart failure, COPD, diabetes, or MI?    No              Post Discharge Medication Reconciliation Status: unable to reconcile discharge medications due to pt did not want to discuss at this time. .Patient was going to appointment and was actually getting out of car--he wants to follow up with cardiology and I will respect this. Per brief our conversation I feel he has good understanding of plan and prioritizing of needs.     Was MTM referral placed (*Make sure to put transitions as reason for referral)?   No    Call Summary    \"What questions or concerns do you have about your recent visit and your follow-up care?\"     Can call back at anytime if questions or concerns, or if wants to schedule with PCP.. Advice given: as above    \"If you have questions or things don't continue to improve, we encourage you " "contact us through the main clinic number (give number).  Even if the clinic is not open, triage nurses are available 24/7 to help you.     We would like you to know that our clinic has extended hours (provide information).  We also have urgent care (provide details on closest location and hours/contact info)\"      \"Thank you for your time and take care!\"               "

## 2020-08-28 NOTE — PROGRESS NOTES
Clinic Care Coordination Contact    Situation: Patient chart reviewed by care coordinator.    Background: Recent ED/IP visit. COVID-19 Test performed.     Assessment: COVID-19 testing. Results: Negative.        Plan/Recommendations: Patient meets criteria for [facility] based on current COVID-19 testing status protocol; referral placed by Care Coordinator.  No patient outreach will be made at this time; Care Coordination to remain available should a change in patient status occur or patient needs arise.    Jorge Lopez [facility]  Clinic Care Coordinator  Ph. [phone]  [email or web address]

## 2020-08-28 NOTE — TELEPHONE ENCOUNTER
Pt was DC'd from [facility] on 8/27/2020 after being treated for Shortness Of Breath, Copd Exacerbation (H).  Next scheduled appt with PCP is not scheduled.  Please call patient.  Thank you!  Yudy Jon

## 2020-08-28 NOTE — PROGRESS NOTES
Patient discharged on 8/27/20, please follow up per TCM workflow.    Maria Del Carmen Holcomb, CMA

## 2020-08-31 NOTE — PROGRESS NOTES
"RN Care Coordination Note  Post Hospital Discharge    Placed call to patient to follow-up after recent hospitalization. Patient reports he is feel much better. Denies difficultly with breathing. Patient also reports he has been having difficulty with constipation, which has now resolved after \"a major blow out.\" Patient had no new questions or concerns at this time.         Darleen Ruiz RN, BSN, OCN   RN Care Coordinator   [facility]          "

## 2020-09-04 NOTE — LETTER
9/4/2020     RE: Kentrell Kirkpartick  [address] [facility]  [facility] [address]    Dear Colleague,    Thank you for referring your patient, Kentrell Kirkpatrick, to the [facility]. Please see a copy of my visit note below.    Reason for Visit: f/u NSCLC    Oncology HPI:   CANCER TYPE: NSCLC, adenocarcinoma  STAGE: IV  ECOG PS: 2     Cancer Staging  Non-small cell lung cancer, left (H)  Staging form: Lung, AJCC 8th Edition  - Clinical stage from 3/11/2019: Stage IV (cT1c, cN3, pM1c) - Signed by Susan Muller MD on 3/11/2019     PD-L1: <1%  Lung panel: 3/1/19 on [id number] A1 and [id number] A1. Negative  NGS: [employer] 360 sent 2/28/19 negative for actionable mutations. SMAD4 E538, TP53 H179R, SMO A327G. MSI not high     SUMMARY  6/27/18                   CT chest w/contrast to re-evaluate aortic aneurysm: 8 mm spiculated KEATON nodule, 3 mm RML nodule unchanged from 3/15/17 and 2/25/16 scans  2/4/19                     Presented to PCP with fairly rapid onset of shortness of breath. Given albuterol  2/19/19                   CXR and CT chest w/contrast. Large left effusion  2/20/19                   L thoracentesis (Dr. Rodriguez), 1180 cc  3/1/19                     L pleurx (Dr. Rodriguez)  3/13/19                   C1 carboplatin pemetrexed pembrolizumab  3/15/19                   L supraclavicular LN bx (IR, FNA). Path: lung adenocarcinoma  4/3/19                     C2 carboplatin pemetrexed pembrolizumab (total 4 cycles)  4/15/19                   FEV1 2.11 (61%), FVC 3.38 (73%), DLCO 27.7, 67% (59%)  4/18/19                   TPA. Drained 900 cc after it got unclotted  5/9/19                     Pleurx removed  6/5~8/28/19            Maintenance pemetrexed + pembrolizumab x 5 cycles --> PD  9/20/19~4/29/20     C1-8 docetaxel 60 mg/m2 + ramucirumab. Had extravasation w/C2 10/11/19. C4 docetaxel ramucirumab delayed 1 week 2/2019 due to URI and trip. C5-8 docetaxel reduced to 50 mg/m2 due to excessive  fatigue  9/28/19                   UC for bronchitis. Levofloxacin  10/4/19                   Brain MRI - routine rescreening - negative  10/17/19                 Port  10/23/19                 Recurrent cough. Saw PCP. Azithromycin  10/29/19                 Prednisone 40 mg once, then 20 mg daily x 5 days, then 10 mg daily x 5 days for acute bronchitis          12/12/19                 ED for bronchitis. Had some chills prior to going to the ED  3/3/20                     Treated for pansinusitis with amox-clav x 14 days and fluticasone nasal spray  3/31/20                   CT CAP and bone scan. Some PD. No bone mets.   5/6/20                     Brain MRI. Negative  5/6/20                     CT CAP.   4/1/20~4/29/20       C1-2 gemcitabine 1000 mg/m2 D1, 8 --> PD  5/30/20                   [facility] ED. CTA negative for PE. Doxy, etc  6/15/20                   Gamma knife x 1 to small L cerebellar met found incidentally at the time of screening for sitra nivo trial  ~6/30/20          Started Sitravatinib  7/23/20            First dose nivolumab  8/26-8/27 Admission for COPD exacerbation, treated with prednisone taper 8**    Interval history:   Breathing improved with the steroids. No chest pain currently. L buttock/hip pain persists, but getting better  -feeling increasingly weak and fatigued. Had 2 hard stools on 8/29 and has been having watery stools sice. No abd pain. No fevers/chills. Has difficulty controlling the diarrhea. Using imodium up to 4/day without improvement. No blood in the stool  -drinking fluids ok, but limiting nutrition to avoid diarrhea. Didn't eat much yesterday, had one diarrhea stool today, but is less active than yesterday.   -anxiety a little better with remeron, wife feels he would benefit from a higher dose. No worsening of anxiety on prednisone. Sleeping better  -pain is pretty well controlled. Has some chronic L hip pain.     Current Outpatient Medications   Medication Sig Dispense  Refill     albuterol (PROAIR HFA/PROVENTIL HFA/VENTOLIN HFA) 108 (90 Base) MCG/ACT inhaler Inhale 2 puffs into the lungs every 4 hours (while awake) 1 Inhaler 0     albuterol (PROVENTIL) (2.5 MG/3ML) 0.083% neb solution Take 1 vial (2.5 mg) by nebulization every 4 hours as needed for shortness of breath / dyspnea or wheezing 100 vial 11     calcium polycarbophil (FIBERCON) 625 MG tablet Take 2 tablets by mouth daily       finasteride (PROSCAR) 5 MG tablet TAKE 1 TABLET BY MOUTH EVERY DAY 90 tablet 1     LORazepam (ATIVAN) 1 MG tablet Take 1 tablet (1 mg) by mouth every 6 hours as needed for anxiety 30 tablet 0     Melatonin 10 MG TABS tablet Take 10 mg by mouth nightly as needed for sleep       mirtazapine (REMERON) 7.5 MG tablet Take 1 tablet (7.5 mg) by mouth At Bedtime 30 tablet 3     mometasone-formoterol (DULERA) 100-5 MCG/ACT inhaler Inhale 2 puffs into the lungs daily       morphine (MS CONTIN) 15 MG CR tablet Take 15 mg by mouth 2 times daily as needed for severe pain       NIVOLUMAB IV Inject 480 mg into the vein every 28 days       oxyCODONE (ROXICODONE) 5 MG tablet Take 1 tablet (5 mg) by mouth every 4 hours as needed for severe pain 100 tablet 0     prochlorperazine (COMPAZINE) 10 MG tablet Take 1 tablet (10 mg) by mouth every 6 hours as needed (Nausea/Vomiting) 30 tablet 11     senna (SENOKOT) 8.6 MG tablet Take 1 tablet by mouth daily       study - sitravatinib malate salt, [id number],, IDS# [id number], 40 mg roller oompaction capsule Take 1 capsule (40 mg) by mouth daily 60 capsule 0     study - sitravatinib malate salt, [id number],, IDS# [id number], 60 mg roller oompaction capsule Take 1 capsule (60 mg) by mouth daily 30 capsule 0     tamsulosin (FLOMAX) 0.4 MG capsule TAKE 1 CAPSULE BY MOUTH EVERY DAY 90 capsule 1     tiotropium (SPIRIVA HANDIHALER) 18 MCG inhaled capsule Inhale 1 capsule (18 mcg) into the lungs daily 1 capsule 11        No Known Allergies      Exam: alert, appears anxious, cachectic There were no  vitals taken for this visit.  Wt Readings from Last 4 Encounters:   08/26/20 69.5 kg (153 lb 4.8 oz)   08/20/20 70.6 kg (155 lb 11.2 oz)   07/21/20 72.4 kg (159 lb 9.6 oz)   07/15/20 73.3 kg (161 lb 9.6 oz)     Oropharynx is moist and without lesion. Neck supple and without adenopathy. Lungs:CTA except slightly diminished in the L base. Heart: RRR, no murmur or rub. Abdomen: soft, nontender, BS active, no masses or organomegaly.  Extremities: warm, no edema. Speech is clear. CN wnl. Gait/station wnl. Skin: no rashes or bruising on exposed skin       Labs: Results for MAYA ELLER (MRN [id number]) as of 9/4/2020 17:58   Ref. Range 9/4/2020 10:09   Sodium Latest Ref Range: 133 - 144 mmol/L 134   Potassium Latest Ref Range: 3.4 - 5.3 mmol/L 4.0   Chloride Latest Ref Range: 94 - 109 mmol/L 105   Carbon Dioxide Latest Ref Range: 20 - 32 mmol/L 22   Urea Nitrogen Latest Ref Range: 7 - 30 mg/dL 21   Creatinine Latest Ref Range: 0.66 - 1.25 mg/dL 1.03   GFR Estimate Latest Ref Range: >60 mL/min/1.73_m2 73   GFR Estimate If Black Latest Ref Range: >60 mL/min/1.73_m2 84   Calcium Latest Ref Range: 8.5 - 10.1 mg/dL 8.3 (L)   Anion Gap Latest Ref Range: 3 - 14 mmol/L 6   Magnesium Latest Ref Range: 1.6 - 2.3 mg/dL 2.2   Albumin Latest Ref Range: 3.4 - 5.0 g/dL 3.2 (L)   Protein Total Latest Ref Range: 6.8 - 8.8 g/dL 7.2   Bilirubin Total Latest Ref Range: 0.2 - 1.3 mg/dL 1.3   Alkaline Phosphatase Latest Ref Range: 40 - 150 U/L 110   ALT Latest Ref Range: 0 - 70 U/L 74 (H)   AST Latest Ref Range: 0 - 45 U/L 46 (H)   Glucose Latest Ref Range: 70 - 99 mg/dL 78   WBC Latest Ref Range: 4.0 - 11.0 10e9/L 13.2 (H)   Hemoglobin Latest Ref Range: 13.3 - 17.7 g/dL 15.0   Hematocrit Latest Ref Range: 40.0 - 53.0 % 47.6   Platelet Count Latest Ref Range: 150 - 450 10e9/L 283   RBC Count Latest Ref Range: 4.4 - 5.9 10e12/L 5.82   MCV Latest Ref Range: 78 - 100 fl 82   MCH Latest Ref Range: 26.5 - 33.0 pg 25.8 (L)   MCHC Latest Ref Range:  31.5 - 36.5 g/dL 31.5   RDW Latest Ref Range: 10.0 - 15.0 % 20.5 (H)   Diff Method Unknown Automated Method   % Neutrophils Latest Units: % 84.5   % Lymphocytes Latest Units: % 6.2   % Monocytes Latest Units: % 7.3   % Eosinophils Latest Units: % 1.2   % Basophils Latest Units: % 0.4   % Immature Granulocytes Latest Units: % 0.4   Nucleated RBCs Latest Ref Range: 0 /100 0   Absolute Neutrophil Latest Ref Range: 1.6 - 8.3 10e9/L 11.2 (H)   Absolute Lymphocytes Latest Ref Range: 0.8 - 5.3 10e9/L 0.8   Absolute Monocytes Latest Ref Range: 0.0 - 1.3 10e9/L 1.0   Absolute Eosinophils Latest Ref Range: 0.0 - 0.7 10e9/L 0.2   Absolute Basophils Latest Ref Range: 0.0 - 0.2 10e9/L 0.1   Abs Immature Granulocytes Latest Ref Range: 0 - 0.4 10e9/L 0.1   Absolute Nucleated RBC Unknown 0.0       Imaging: EXAM: CT CHEST PULMONARY EMBOLISM W CONTRAST  LOCATION: [facility]  DATE/TIME: 8/26/2020 5:22 PM     INDICATION: Shortness of breath history of lung cancer  COMPARISON: 07/12/2020  TECHNIQUE: CT chest pulmonary angiogram during arterial phase injection of IV contrast. Multiplanar reformats and MIP reconstructions were performed. Dose reduction techniques were used.   CONTRAST: 55 mL [id number]     FINDINGS:  ANGIOGRAM CHEST: No evidence for pulmonary embolism. Pulmonary arteries normal in caliber. Thoracic aorta normal in caliber. No aortic dissection or other acute abnormality.     HEART: Cardiac chambers within normal limits. No pericardial effusion.     LUNGS AND PLEURA: Widespread pulmonary metastatic disease, increased compared to 07/12/2020. Moderate emphysema in the upper lungs. Small left pleural effusion and associated left lower lobe atelectasis have increased slightly. Left pleural thickening   unchanged.     MEDIASTINUM: Bilateral hilar and lower mediastinal adenopathy unchanged. Left paramediastinal mass likely pleural-based unchanged in thickness, best seen at the level of the pulmonary artery. Stable  right IJ Port-A-Cath.     LIMITED UPPER ABDOMEN: Multiple low-density hepatic lesions, unchanged. Left adrenal metastasis unchanged.     MUSCULOSKELETAL: There are enlargement of destructive lesion within the posterior aspect of the left 12th rib stable lytic lesion within the lateral aspect of the left 10th rib, axial 138.                                                                      IMPRESSION:  1.  No evidence for pulmonary embolism.   2.  Slight interval enlargement of numerous pulmonary nodules throughout both lungs.  3.  Unchanged left pleural metastatic disease. Left pleural effusion, however, has increased slightly.  4.  Progressed osseous metastatic disease, left 12th rib.  5.  Unchanged hepatic and left adrenal metastatic disease.    Impression/plan:      NSCLC, adenocarcinoma:  -Started Sitravatinib on 6/30/20 and Nivo on 7/23/20.   -reviewed his recent hospitalization and today's complaints of diarrhea  -ok to continue sitravatinib for now, see discussion below  -f/u with me next week to follow symptoms/labs  -restaging in a couple weeks as schedule    Grade 1 LFT elevation  -could be r/t immunotherapy  -monitor     Dyspnea/COPD exacerbation  -reviewed his CT obtained while in the hospital, not clear that this was related to pneumonitis, no PE or worsening effusion  -treated for COPD exacerbation with a 6 day prednisone taper with significant improvement    Diarrhea  -known side effect of sitravatinib, could also be immunotherpay related colitis, getting a little better now, only 1 stool today. Reviewed potential for infectious causes as well  -reviewed the study protocol, if diarrhea is clearly related to immunotherpay, would need to hold both drugs and treat for immune therapy related colitis  -if only related to sitravatinib, would can continue treatment as long as there are no electrolyte imbalances or dehydration  -at this time, I'm less favoring the immune therapy related colitis as the  diarrhea started while he was on prednisone for the COPD exacerbation. Will check cultures. If negative, ok to increase imodium to 1-2 after each loose stool, up to 8/day. If diarrhea worsens, should be evaluated for immuno therapy related colitis      Hoarseness:   -Stable. Likely L recurrent laryngeal nerve involvement. No dysphagia.       Cancer-related pain in the chest/hip  -improving, using MS Contin 15 mg q 12 hours  -have again requested palliative care follow-up. Referrals have not been completed from  prior requests      Leukocytosis  -likely s/t recent prednisone, recheck next week     Anemia:   -resolved today.       Brain met:   -S/p gamma knife 6/22. Scheduled for repeat MRI at the end of Sept and has appt with Dr. Zamorano afterward.      Anorexia:   -ongoing weight loss, struggling with getting in enough calories  -should try brat diet with diarrhea. Nutrition consult has been requested at the last visit, will follow-up     Subclinical hypothyroidism:   -history subclinical hypothyroidism,monitor      Hx elevated amylase:  -not checked today, monitor     Opioid induced constipation:   -resolved currently, having diarrhea     Anxiety:   -initiated remeron 7.5 mg at hs last week, seeing some improvement, tolerating well  -remains quite anxious overall. Need to avoid selective serotonin reuptake inhibitor due to interactions with the study drug  -will increase remeron to 15 mg at HS     Again, thank you for allowing me to participate in the care of your patient.      Sincerely,      KISHA Cartwright CNP

## 2020-09-04 NOTE — PROGRESS NOTES
Reason for Visit: f/u NSCLC    Oncology HPI:   CANCER TYPE: NSCLC, adenocarcinoma  STAGE: IV  ECOG PS: 2     Cancer Staging  Non-small cell lung cancer, left (H)  Staging form: Lung, AJCC 8th Edition  - Clinical stage from 3/11/2019: Stage IV (cT1c, cN3, pM1c) - Signed by Susan Muller MD on 3/11/2019     PD-L1: <1%  Lung panel: 3/1/19 on [id number] A1 and [id number] A1. Negative  NGS: [employer] 360 sent 2/28/19 negative for actionable mutations. SMAD4 E538, TP53 H179R, SMO A327G. MSI not high     SUMMARY  6/27/18                   CT chest w/contrast to re-evaluate aortic aneurysm: 8 mm spiculated KEATON nodule, 3 mm RML nodule unchanged from 3/15/17 and 2/25/16 scans  2/4/19                     Presented to PCP with fairly rapid onset of shortness of breath. Given albuterol  2/19/19                   CXR and CT chest w/contrast. Large left effusion  2/20/19                   L thoracentesis (Dr. Rodriguez), 1180 cc  3/1/19                     L pleurx (Dr. Rodriguez)  3/13/19                   C1 carboplatin pemetrexed pembrolizumab  3/15/19                   L supraclavicular LN bx (IR, FNA). Path: lung adenocarcinoma  4/3/19                     C2 carboplatin pemetrexed pembrolizumab (total 4 cycles)  4/15/19                   FEV1 2.11 (61%), FVC 3.38 (73%), DLCO 27.7, 67% (59%)  4/18/19                   TPA. Drained 900 cc after it got unclotted  5/9/19                     Pleurx removed  6/5~8/28/19            Maintenance pemetrexed + pembrolizumab x 5 cycles --> PD  9/20/19~4/29/20     C1-8 docetaxel 60 mg/m2 + ramucirumab. Had extravasation w/C2 10/11/19. C4 docetaxel ramucirumab delayed 1 week 2/2019 due to URI and trip. C5-8 docetaxel reduced to 50 mg/m2 due to excessive fatigue  9/28/19                   UC for bronchitis. Levofloxacin  10/4/19                   Brain MRI - routine rescreening - negative  10/17/19                 Port  10/23/19                 Recurrent cough. Saw PCP.  Azithromycin  10/29/19                 Prednisone 40 mg once, then 20 mg daily x 5 days, then 10 mg daily x 5 days for acute bronchitis          12/12/19                 ED for bronchitis. Had some chills prior to going to the ED  3/3/20                     Treated for pansinusitis with amox-clav x 14 days and fluticasone nasal spray  3/31/20                   CT CAP and bone scan. Some PD. No bone mets.   5/6/20                     Brain MRI. Negative  5/6/20                     CT CAP.   4/1/20~4/29/20       C1-2 gemcitabine 1000 mg/m2 D1, 8 --> PD  5/30/20                   [facility] ED. CTA negative for PE. Doxy, etc  6/15/20                   Gamma knife x 1 to small L cerebellar met found incidentally at the time of screening for sitra nivo trial  ~6/30/20          Started Sitravatinib  7/23/20            First dose nivolumab  8/26-8/27 Admission for COPD exacerbation, treated with prednisone taper 8**    Interval history:   Breathing improved with the steroids. No chest pain currently. L buttock/hip pain persists, but getting better  -feeling increasingly weak and fatigued. Had 2 hard stools on 8/29 and has been having watery stools sice. No abd pain. No fevers/chills. Has difficulty controlling the diarrhea. Using imodium up to 4/day without improvement. No blood in the stool  -drinking fluids ok, but limiting nutrition to avoid diarrhea. Didn't eat much yesterday, had one diarrhea stool today, but is less active than yesterday.   -anxiety a little better with remeron, wife feels he would benefit from a higher dose. No worsening of anxiety on prednisone. Sleeping better  -pain is pretty well controlled. Has some chronic L hip pain.     Current Outpatient Medications   Medication Sig Dispense Refill     albuterol (PROAIR HFA/PROVENTIL HFA/VENTOLIN HFA) 108 (90 Base) MCG/ACT inhaler Inhale 2 puffs into the lungs every 4 hours (while awake) 1 Inhaler 0     albuterol (PROVENTIL) (2.5 MG/3ML) 0.083% neb solution  Take 1 vial (2.5 mg) by nebulization every 4 hours as needed for shortness of breath / dyspnea or wheezing 100 vial 11     calcium polycarbophil (FIBERCON) 625 MG tablet Take 2 tablets by mouth daily       finasteride (PROSCAR) 5 MG tablet TAKE 1 TABLET BY MOUTH EVERY DAY 90 tablet 1     LORazepam (ATIVAN) 1 MG tablet Take 1 tablet (1 mg) by mouth every 6 hours as needed for anxiety 30 tablet 0     Melatonin 10 MG TABS tablet Take 10 mg by mouth nightly as needed for sleep       mirtazapine (REMERON) 7.5 MG tablet Take 1 tablet (7.5 mg) by mouth At Bedtime 30 tablet 3     mometasone-formoterol (DULERA) 100-5 MCG/ACT inhaler Inhale 2 puffs into the lungs daily       morphine (MS CONTIN) 15 MG CR tablet Take 15 mg by mouth 2 times daily as needed for severe pain       NIVOLUMAB IV Inject 480 mg into the vein every 28 days       oxyCODONE (ROXICODONE) 5 MG tablet Take 1 tablet (5 mg) by mouth every 4 hours as needed for severe pain 100 tablet 0     prochlorperazine (COMPAZINE) 10 MG tablet Take 1 tablet (10 mg) by mouth every 6 hours as needed (Nausea/Vomiting) 30 tablet 11     senna (SENOKOT) 8.6 MG tablet Take 1 tablet by mouth daily       study - sitravatinib malate salt, [id number],, IDS# [id number], 40 mg roller oompaction capsule Take 1 capsule (40 mg) by mouth daily 60 capsule 0     study - sitravatinib malate salt, [id number],, IDS# [id number], 60 mg roller oompaction capsule Take 1 capsule (60 mg) by mouth daily 30 capsule 0     tamsulosin (FLOMAX) 0.4 MG capsule TAKE 1 CAPSULE BY MOUTH EVERY DAY 90 capsule 1     tiotropium (SPIRIVA HANDIHALER) 18 MCG inhaled capsule Inhale 1 capsule (18 mcg) into the lungs daily 1 capsule 11        No Known Allergies      Exam: alert, appears anxious, cachectic There were no vitals taken for this visit.  Wt Readings from Last 4 Encounters:   08/26/20 69.5 kg (153 lb 4.8 oz)   08/20/20 70.6 kg (155 lb 11.2 oz)   07/21/20 72.4 kg (159 lb 9.6 oz)   07/15/20 73.3 kg (161 lb 9.6 oz)     Oropharynx  is moist and without lesion. Neck supple and without adenopathy. Lungs:CTA except slightly diminished in the L base. Heart: RRR, no murmur or rub. Abdomen: soft, nontender, BS active, no masses or organomegaly.  Extremities: warm, no edema. Speech is clear. CN wnl. Gait/station wnl. Skin: no rashes or bruising on exposed skin       Labs: Results for MAYA ELLER (MRN [id number]) as of 9/4/2020 17:58   Ref. Range 9/4/2020 10:09   Sodium Latest Ref Range: 133 - 144 mmol/L 134   Potassium Latest Ref Range: 3.4 - 5.3 mmol/L 4.0   Chloride Latest Ref Range: 94 - 109 mmol/L 105   Carbon Dioxide Latest Ref Range: 20 - 32 mmol/L 22   Urea Nitrogen Latest Ref Range: 7 - 30 mg/dL 21   Creatinine Latest Ref Range: 0.66 - 1.25 mg/dL 1.03   GFR Estimate Latest Ref Range: >60 mL/min/1.73_m2 73   GFR Estimate If Black Latest Ref Range: >60 mL/min/1.73_m2 84   Calcium Latest Ref Range: 8.5 - 10.1 mg/dL 8.3 (L)   Anion Gap Latest Ref Range: 3 - 14 mmol/L 6   Magnesium Latest Ref Range: 1.6 - 2.3 mg/dL 2.2   Albumin Latest Ref Range: 3.4 - 5.0 g/dL 3.2 (L)   Protein Total Latest Ref Range: 6.8 - 8.8 g/dL 7.2   Bilirubin Total Latest Ref Range: 0.2 - 1.3 mg/dL 1.3   Alkaline Phosphatase Latest Ref Range: 40 - 150 U/L 110   ALT Latest Ref Range: 0 - 70 U/L 74 (H)   AST Latest Ref Range: 0 - 45 U/L 46 (H)   Glucose Latest Ref Range: 70 - 99 mg/dL 78   WBC Latest Ref Range: 4.0 - 11.0 10e9/L 13.2 (H)   Hemoglobin Latest Ref Range: 13.3 - 17.7 g/dL 15.0   Hematocrit Latest Ref Range: 40.0 - 53.0 % 47.6   Platelet Count Latest Ref Range: 150 - 450 10e9/L 283   RBC Count Latest Ref Range: 4.4 - 5.9 10e12/L 5.82   MCV Latest Ref Range: 78 - 100 fl 82   MCH Latest Ref Range: 26.5 - 33.0 pg 25.8 (L)   MCHC Latest Ref Range: 31.5 - 36.5 g/dL 31.5   RDW Latest Ref Range: 10.0 - 15.0 % 20.5 (H)   Diff Method Unknown Automated Method   % Neutrophils Latest Units: % 84.5   % Lymphocytes Latest Units: % 6.2   % Monocytes Latest Units: % 7.3   %  Eosinophils Latest Units: % 1.2   % Basophils Latest Units: % 0.4   % Immature Granulocytes Latest Units: % 0.4   Nucleated RBCs Latest Ref Range: 0 /100 0   Absolute Neutrophil Latest Ref Range: 1.6 - 8.3 10e9/L 11.2 (H)   Absolute Lymphocytes Latest Ref Range: 0.8 - 5.3 10e9/L 0.8   Absolute Monocytes Latest Ref Range: 0.0 - 1.3 10e9/L 1.0   Absolute Eosinophils Latest Ref Range: 0.0 - 0.7 10e9/L 0.2   Absolute Basophils Latest Ref Range: 0.0 - 0.2 10e9/L 0.1   Abs Immature Granulocytes Latest Ref Range: 0 - 0.4 10e9/L 0.1   Absolute Nucleated RBC Unknown 0.0       Imaging: EXAM: CT CHEST PULMONARY EMBOLISM W CONTRAST  LOCATION: [facility]  DATE/TIME: 8/26/2020 5:22 PM     INDICATION: Shortness of breath history of lung cancer  COMPARISON: 07/12/2020  TECHNIQUE: CT chest pulmonary angiogram during arterial phase injection of IV contrast. Multiplanar reformats and MIP reconstructions were performed. Dose reduction techniques were used.   CONTRAST: 55 mL [id number]     FINDINGS:  ANGIOGRAM CHEST: No evidence for pulmonary embolism. Pulmonary arteries normal in caliber. Thoracic aorta normal in caliber. No aortic dissection or other acute abnormality.     HEART: Cardiac chambers within normal limits. No pericardial effusion.     LUNGS AND PLEURA: Widespread pulmonary metastatic disease, increased compared to 07/12/2020. Moderate emphysema in the upper lungs. Small left pleural effusion and associated left lower lobe atelectasis have increased slightly. Left pleural thickening   unchanged.     MEDIASTINUM: Bilateral hilar and lower mediastinal adenopathy unchanged. Left paramediastinal mass likely pleural-based unchanged in thickness, best seen at the level of the pulmonary artery. Stable right IJ Port-A-Cath.     LIMITED UPPER ABDOMEN: Multiple low-density hepatic lesions, unchanged. Left adrenal metastasis unchanged.     MUSCULOSKELETAL: There are enlargement of destructive lesion within the posterior  aspect of the left 12th rib stable lytic lesion within the lateral aspect of the left 10th rib, axial 138.                                                                      IMPRESSION:  1.  No evidence for pulmonary embolism.   2.  Slight interval enlargement of numerous pulmonary nodules throughout both lungs.  3.  Unchanged left pleural metastatic disease. Left pleural effusion, however, has increased slightly.  4.  Progressed osseous metastatic disease, left 12th rib.  5.  Unchanged hepatic and left adrenal metastatic disease.    Impression/plan:      NSCLC, adenocarcinoma:  -Started Sitravatinib on 6/30/20 and Nivo on 7/23/20.   -reviewed his recent hospitalization and today's complaints of diarrhea  -ok to continue sitravatinib for now, see discussion below  -f/u with me next week to follow symptoms/labs  -restaging in a couple weeks as schedule    Grade 1 LFT elevation  -could be r/t immunotherapy  -monitor     Dyspnea/COPD exacerbation  -reviewed his CT obtained while in the hospital, not clear that this was related to pneumonitis, no PE or worsening effusion  -treated for COPD exacerbation with a 6 day prednisone taper with significant improvement    Diarrhea  -known side effect of sitravatinib, could also be immunotherpay related colitis, getting a little better now, only 1 stool today. Reviewed potential for infectious causes as well  -reviewed the study protocol, if diarrhea is clearly related to immunotherpay, would need to hold both drugs and treat for immune therapy related colitis  -if only related to sitravatinib, would can continue treatment as long as there are no electrolyte imbalances or dehydration  -at this time, I'm less favoring the immune therapy related colitis as the diarrhea started while he was on prednisone for the COPD exacerbation. Will check cultures. If negative, ok to increase imodium to 1-2 after each loose stool, up to 8/day. If diarrhea worsens, should be evaluated for immuno  therapy related colitis      Hoarseness:   -Stable. Likely L recurrent laryngeal nerve involvement. No dysphagia.       Cancer-related pain in the chest/hip  -improving, using MS Contin 15 mg q 12 hours  -have again requested palliative care follow-up. Referrals have not been completed from  prior requests      Leukocytosis  -likely s/t recent prednisone, recheck next week     Anemia:   -resolved today.       Brain met:   -S/p gamma knife 6/22. Scheduled for repeat MRI at the end of Sept and has appt with Dr. Zamorano afterward.      Anorexia:   -ongoing weight loss, struggling with getting in enough calories  -should try brat diet with diarrhea. Nutrition consult has been requested at the last visit, will follow-up     Subclinical hypothyroidism:   -history subclinical hypothyroidism,monitor      Hx elevated amylase:  -not checked today, monitor     Opioid induced constipation:   -resolved currently, having diarrhea     Anxiety:   -initiated remeron 7.5 mg at hs last week, seeing some improvement, tolerating well  -remains quite anxious overall. Need to avoid selective serotonin reuptake inhibitor due to interactions with the study drug  -will increase remeron to 15 mg at HS

## 2020-09-04 NOTE — NURSING NOTE
"Oncology Rooming Note    September 4, 2020 9:35 AM   Kentrell Kirkpatrick is a 69 year old male who presents for:    Chief Complaint   Patient presents with     Oncology Clinic Visit     Return: Non-small cell lung cancer, left      Initial Vitals: BP 91/57 (BP Location: Right arm, Patient Position: Sitting, Cuff Size: Adult Regular)   Pulse 108   Temp 97.2  F (36.2  C) (Tympanic)   Resp 16   Ht 1.854 m (6' 1\")   Wt 66.6 kg (146 lb 14.4 oz)   SpO2 97%   BMI 19.38 kg/m   Estimated body mass index is 19.38 kg/m  as calculated from the following:    Height as of this encounter: 1.854 m (6' 1\").    Weight as of this encounter: 66.6 kg (146 lb 14.4 oz). Body surface area is 1.85 meters squared.  Moderate Pain (4) Comment: Data Unavailable   No LMP for male patient.  Allergies reviewed: Yes  Medications reviewed: Yes    Medications: Medication refills not needed today.  Pharmacy name entered into [employer]: [id number] - [place], [place] - [address]    Clinical concerns: N/A        Radha Huff CMA              "

## 2020-09-11 NOTE — NURSING NOTE
Chief Complaint   Patient presents with     Port Draw     Gripper needle, heparin locked, vitals checked     Mandy Nieto RN on 9/11/2020 at 2:20 PM

## 2020-09-11 NOTE — LETTER
9/11/2020         RE: Kentrell Kirkpatrick  [address]  [place] [address]        Dear Colleague,    Thank you for referring your patient, Kentrell Kirkpatrick, to the [facility]. Please see a copy of my visit note below.    Reason for Visit: f/u NSCLC, diarrhea    Oncology HPI:   CANCER TYPE: NSCLC, adenocarcinoma  STAGE: IV  ECOG PS: 2     Cancer Staging  Non-small cell lung cancer, left (H)  Staging form: Lung, AJCC 8th Edition  - Clinical stage from 3/11/2019: Stage IV (cT1c, cN3, pM1c) - Signed by Susan Muller MD on 3/11/2019     PD-L1: <1%  Lung panel: 3/1/19 on [id number] A1 and [id number] A1. Negative  NGS: [employer] 360 sent 2/28/19 negative for actionable mutations. SMAD4 E538, TP53 H179R, SMO A327G. MSI not high     SUMMARY  6/27/18                   CT chest w/contrast to re-evaluate aortic aneurysm: 8 mm spiculated KEATON nodule, 3 mm RML nodule unchanged from 3/15/17 and 2/25/16 scans  2/4/19                     Presented to PCP with fairly rapid onset of shortness of breath. Given albuterol  2/19/19                   CXR and CT chest w/contrast. Large left effusion  2/20/19                   L thoracentesis (Dr. Rodriguez), 1180 cc  3/1/19                     L pleurx (Dr. Rodriguez)  3/13/19                   C1 carboplatin pemetrexed pembrolizumab  3/15/19                   L supraclavicular LN bx (IR, FNA). Path: lung adenocarcinoma  4/3/19                     C2 carboplatin pemetrexed pembrolizumab (total 4 cycles)  4/15/19                   FEV1 2.11 (61%), FVC 3.38 (73%), DLCO 27.7, 67% (59%)  4/18/19                   TPA. Drained 900 cc after it got unclotted  5/9/19                     Pleurx removed  6/5~8/28/19            Maintenance pemetrexed + pembrolizumab x 5 cycles --> PD  9/20/19~4/29/20     C1-8 docetaxel 60 mg/m2 + ramucirumab. Had extravasation w/C2 10/11/19. C4 docetaxel ramucirumab delayed 1 week 2/2019 due to URI and trip. C5-8 docetaxel reduced to 50 mg/m2 due to  excessive fatigue  9/28/19                   UC for bronchitis. Levofloxacin  10/4/19                   Brain MRI - routine rescreening - negative  10/17/19                 Port  10/23/19                 Recurrent cough. Saw PCP. Azithromycin  10/29/19                 Prednisone 40 mg once, then 20 mg daily x 5 days, then 10 mg daily x 5 days for acute bronchitis          12/12/19                 ED for bronchitis. Had some chills prior to going to the ED  3/3/20                     Treated for pansinusitis with amox-clav x 14 days and fluticasone nasal spray  3/31/20                   CT CAP and bone scan. Some PD. No bone mets.   5/6/20                     Brain MRI. Negative  5/6/20                     CT CAP.   4/1/20~4/29/20       C1-2 gemcitabine 1000 mg/m2 D1, 8 --> PD  5/30/20                   [facility] ED. CTA negative for PE. Doxy, etc  6/15/20                   Gamma knife x 1 to small L cerebellar met found incidentally at the time of screening for sitra nivo trial  ~6/30/20          Started Sitravatinib  7/23/20            First dose nivolumab  8/26-8/27         Admission for COPD exacerbation, treated with prednisone taper  9/4/20  Diarrhea - C. Diff and SSCE negative    Interval history: Edy is here with Pat today. He is feeling so much better this week. Diarrhea started to resolve over the weekend. Has not needed imodium and stools are formed. He is starting to sleep better, have less anxiety, notes some slight improvement in anorexia. Was excited to have gained a couple pounds. Breathing remains improved off of the prednisone taper. No significant cough, chest pain, wheezing. Has some ongoing pain in the L hip/buttocks. Had some numbness and decreased mobility in the leg after prolonged sitting, but that is better now. Pain in the hip has been present for a while, but is getting better. Urination wnl. No new rashes, bleeding, bruising. No headaches, vision changes. No fevers/chills    Current  Outpatient Medications   Medication Sig Dispense Refill     albuterol (PROAIR HFA/PROVENTIL HFA/VENTOLIN HFA) 108 (90 Base) MCG/ACT inhaler Inhale 2 puffs into the lungs every 4 hours (while awake) 1 Inhaler 0     albuterol (PROVENTIL) (2.5 MG/3ML) 0.083% neb solution Take 1 vial (2.5 mg) by nebulization every 4 hours as needed for shortness of breath / dyspnea or wheezing 100 vial 11     calcium polycarbophil (FIBERCON) 625 MG tablet Take 2 tablets by mouth daily       finasteride (PROSCAR) 5 MG tablet TAKE 1 TABLET BY MOUTH EVERY DAY 90 tablet 1     LORazepam (ATIVAN) 1 MG tablet Take 1 tablet (1 mg) by mouth every 6 hours as needed for anxiety 30 tablet 0     Melatonin 10 MG TABS tablet Take 10 mg by mouth nightly as needed for sleep       mirtazapine (REMERON) 15 MG tablet Take 1 tablet (15 mg) by mouth At Bedtime 30 tablet 1     mirtazapine (REMERON) 7.5 MG tablet Take 1 tablet (7.5 mg) by mouth At Bedtime 30 tablet 3     mometasone-formoterol (DULERA) 100-5 MCG/ACT inhaler Inhale 2 puffs into the lungs daily       morphine (MS CONTIN) 15 MG CR tablet Take 15 mg by mouth 2 times daily as needed for severe pain       NIVOLUMAB IV Inject 480 mg into the vein every 28 days       oxyCODONE (ROXICODONE) 5 MG tablet Take 1 tablet (5 mg) by mouth every 4 hours as needed for severe pain 100 tablet 0     prochlorperazine (COMPAZINE) 10 MG tablet Take 1 tablet (10 mg) by mouth every 6 hours as needed (Nausea/Vomiting) 30 tablet 11     senna (SENOKOT) 8.6 MG tablet Take 1 tablet by mouth daily       study - sitravatinib malate salt, [id number],, IDS# [id number], 40 mg roller oompaction capsule Take 1 capsule (40 mg) by mouth daily 60 capsule 0     study - sitravatinib malate salt, [id number],, IDS# [id number], 60 mg roller oompaction capsule Take 1 capsule (60 mg) by mouth daily 30 capsule 0     tamsulosin (FLOMAX) 0.4 MG capsule TAKE 1 CAPSULE BY MOUTH EVERY DAY 90 capsule 1     tiotropium (SPIRIVA HANDIHALER) 18 MCG inhaled capsule  Inhale 1 capsule (18 mcg) into the lungs daily 1 capsule 11        No Known Allergies      Exam: alert, appears slim but otherwise well.  Blood pressure 116/81, pulse 94, resp. rate 18, weight 68.7 kg (151 lb 8 oz), SpO2 96 %.  Wt Readings from Last 4 Encounters:   09/11/20 68.7 kg (151 lb 8 oz)   09/04/20 66.6 kg (146 lb 14.4 oz)   08/26/20 69.5 kg (153 lb 4.8 oz)   08/20/20 70.6 kg (155 lb 11.2 oz)      Oropharynx is moist and without lesion. Neck supple and without adenopathy. Lungs:diminished L base, o/w clear. Heart: RRR, no murmur or rub. Abdomen: soft, nontender, BS active, no masses or organomegaly.  Extremities: warm, no edema. Speech is clear. CN wnl. Gait/station wnl. Skin: no rashes or bruising on exposed skin. Nodes: no neck, supraclavicular or axillae nodes  -BLE strength 4/5 bilaterally, normal sensation to light touch. Patellar reflexes 2+ symmetric      Labs: Results for MAAY ELLER (MRN [id number]) as of 9/11/2020 17:28   Ref. Range 9/11/2020 14:11   Sodium Latest Ref Range: 133 - 144 mmol/L 136   Potassium Latest Ref Range: 3.4 - 5.3 mmol/L 4.4   Chloride Latest Ref Range: 94 - 109 mmol/L 106   Carbon Dioxide Latest Ref Range: 20 - 32 mmol/L 24   Urea Nitrogen Latest Ref Range: 7 - 30 mg/dL 14   Creatinine Latest Ref Range: 0.66 - 1.25 mg/dL 0.98   GFR Estimate Latest Ref Range: >60 mL/min/1.73_m2 77   GFR Estimate If Black Latest Ref Range: >60 mL/min/1.73_m2 89   Calcium Latest Ref Range: 8.5 - 10.1 mg/dL 8.0 (L)   Anion Gap Latest Ref Range: 3 - 14 mmol/L 6   Albumin Latest Ref Range: 3.4 - 5.0 g/dL 3.0 (L)   Protein Total Latest Ref Range: 6.8 - 8.8 g/dL 6.8   Bilirubin Total Latest Ref Range: 0.2 - 1.3 mg/dL 0.7   Alkaline Phosphatase Latest Ref Range: 40 - 150 U/L 132   ALT Latest Ref Range: 0 - 70 U/L 62   AST Latest Ref Range: 0 - 45 U/L 44   Glucose Latest Ref Range: 70 - 99 mg/dL 92   WBC Latest Ref Range: 4.0 - 11.0 10e9/L 8.3   Hemoglobin Latest Ref Range: 13.3 - 17.7 g/dL 12.8 (L)    Hematocrit Latest Ref Range: 40.0 - 53.0 % 40.5   Platelet Count Latest Ref Range: 150 - 450 10e9/L 191   RBC Count Latest Ref Range: 4.4 - 5.9 10e12/L 4.91   MCV Latest Ref Range: 78 - 100 fl 83   MCH Latest Ref Range: 26.5 - 33.0 pg 26.1 (L)   MCHC Latest Ref Range: 31.5 - 36.5 g/dL 31.6   RDW Latest Ref Range: 10.0 - 15.0 % 19.9 (H)   Diff Method Unknown Automated Method   % Neutrophils Latest Units: % 78.9   % Lymphocytes Latest Units: % 10.8   % Monocytes Latest Units: % 7.9   % Eosinophils Latest Units: % 1.4   % Basophils Latest Units: % 0.6   % Immature Granulocytes Latest Units: % 0.4   Nucleated RBCs Latest Ref Range: 0 /100 0   Absolute Neutrophil Latest Ref Range: 1.6 - 8.3 10e9/L 6.6   Absolute Lymphocytes Latest Ref Range: 0.8 - 5.3 10e9/L 0.9   Absolute Monocytes Latest Ref Range: 0.0 - 1.3 10e9/L 0.7   Absolute Eosinophils Latest Ref Range: 0.0 - 0.7 10e9/L 0.1   Absolute Basophils Latest Ref Range: 0.0 - 0.2 10e9/L 0.1   Abs Immature Granulocytes Latest Ref Range: 0 - 0.4 10e9/L 0.0   Absolute Nucleated RBC Unknown 0.0       Imaging: n/a    Impression/plan:    NSCLC, adenocarcinoma:  -Started Sitravatinib on 6/30/20 and Nivo on 7/23/20.   -tolerating treatment better. Last week's toxicities are resolved  -ok to continue the treatment, f/u with Dr. Muller in 1 week as planned with restaging       Dyspnea/COPD exacerbation  -resolved with a 6 day prednisone taper. Breathing stable today    Diarrhea, was grade 1, now resolved  -possibly related to stravatinib. No infectious cause identified  -monitor      Hoarseness:   -Stable. Likely L recurrent laryngeal nerve involvement. No dysphagia.       Cancer-related pain in the chest/hip  -chest pain much better, L hip pain persists, but a little better  - on MS Contin 15 mg q 12 hours  -has palliative care consultation in a couple of weeks      Leukocytosis  -was likely r/t prednisone--resolved      Anemia:   -hgb back to baseline at 12.8.       Brain  met:   -S/p gamma knife 6/22. Scheduled for repeat MRI at the end of Sept and has appt with Dr. Zamorano afterward.      Anorexia:   -improving with increased remeron. Appetite is still low, but a little better. Gained a couple of pounds     Subclinical hypothyroidism:   -history subclinical hypothyroidism,monitor      Hx elevated amylase:  -not checked today, monitor     Anxiety:   -improving, remeron increased from 7.5 to 15 mg last week.  - Need to avoid selective serotonin reuptake inhibitor due to interactions with the study drug        Again, thank you for allowing me to participate in the care of your patient.        Sincerely,        KISHA Cartwright CNP

## 2020-09-11 NOTE — PROGRESS NOTES
Reason for Visit: f/u NSCLC, diarrhea    Oncology HPI:   CANCER TYPE: NSCLC, adenocarcinoma  STAGE: IV  ECOG PS: 2     Cancer Staging  Non-small cell lung cancer, left (H)  Staging form: Lung, AJCC 8th Edition  - Clinical stage from 3/11/2019: Stage IV (cT1c, cN3, pM1c) - Signed by Susan Muller MD on 3/11/2019     PD-L1: <1%  Lung panel: 3/1/19 on [id number] A1 and [id number] A1. Negative  NGS: [employer] 360 sent 2/28/19 negative for actionable mutations. SMAD4 E538, TP53 H179R, SMO A327G. MSI not high     SUMMARY  6/27/18                   CT chest w/contrast to re-evaluate aortic aneurysm: 8 mm spiculated KEATON nodule, 3 mm RML nodule unchanged from 3/15/17 and 2/25/16 scans  2/4/19                     Presented to PCP with fairly rapid onset of shortness of breath. Given albuterol  2/19/19                   CXR and CT chest w/contrast. Large left effusion  2/20/19                   L thoracentesis (Dr. Rodriguez), 1180 cc  3/1/19                     L pleurx (Dr. Rodriguez)  3/13/19                   C1 carboplatin pemetrexed pembrolizumab  3/15/19                   L supraclavicular LN bx (IR, FNA). Path: lung adenocarcinoma  4/3/19                     C2 carboplatin pemetrexed pembrolizumab (total 4 cycles)  4/15/19                   FEV1 2.11 (61%), FVC 3.38 (73%), DLCO 27.7, 67% (59%)  4/18/19                   TPA. Drained 900 cc after it got unclotted  5/9/19                     Pleurx removed  6/5~8/28/19            Maintenance pemetrexed + pembrolizumab x 5 cycles --> PD  9/20/19~4/29/20     C1-8 docetaxel 60 mg/m2 + ramucirumab. Had extravasation w/C2 10/11/19. C4 docetaxel ramucirumab delayed 1 week 2/2019 due to URI and trip. C5-8 docetaxel reduced to 50 mg/m2 due to excessive fatigue  9/28/19                   UC for bronchitis. Levofloxacin  10/4/19                   Brain MRI - routine rescreening - negative  10/17/19                 Port  10/23/19                 Recurrent cough. Saw PCP.  Azithromycin  10/29/19                 Prednisone 40 mg once, then 20 mg daily x 5 days, then 10 mg daily x 5 days for acute bronchitis          12/12/19                 ED for bronchitis. Had some chills prior to going to the ED  3/3/20                     Treated for pansinusitis with amox-clav x 14 days and fluticasone nasal spray  3/31/20                   CT CAP and bone scan. Some PD. No bone mets.   5/6/20                     Brain MRI. Negative  5/6/20                     CT CAP.   4/1/20~4/29/20       C1-2 gemcitabine 1000 mg/m2 D1, 8 --> PD  5/30/20                   [facility] ED. CTA negative for PE. Doxy, etc  6/15/20                   Gamma knife x 1 to small L cerebellar met found incidentally at the time of screening for sitra nivo trial  ~6/30/20          Started Sitravatinib  7/23/20            First dose nivolumab  8/26-8/27         Admission for COPD exacerbation, treated with prednisone taper  9/4/20  Diarrhea - C. Diff and SSCE negative    Interval history: Edy is here with Pat today. He is feeling so much better this week. Diarrhea started to resolve over the weekend. Has not needed imodium and stools are formed. He is starting to sleep better, have less anxiety, notes some slight improvement in anorexia. Was excited to have gained a couple pounds. Breathing remains improved off of the prednisone taper. No significant cough, chest pain, wheezing. Has some ongoing pain in the L hip/buttocks. Had some numbness and decreased mobility in the leg after prolonged sitting, but that is better now. Pain in the hip has been present for a while, but is getting better. Urination wnl. No new rashes, bleeding, bruising. No headaches, vision changes. No fevers/chills    Current Outpatient Medications   Medication Sig Dispense Refill     albuterol (PROAIR HFA/PROVENTIL HFA/VENTOLIN HFA) 108 (90 Base) MCG/ACT inhaler Inhale 2 puffs into the lungs every 4 hours (while awake) 1 Inhaler 0     albuterol (PROVENTIL)  (2.5 MG/3ML) 0.083% neb solution Take 1 vial (2.5 mg) by nebulization every 4 hours as needed for shortness of breath / dyspnea or wheezing 100 vial 11     calcium polycarbophil (FIBERCON) 625 MG tablet Take 2 tablets by mouth daily       finasteride (PROSCAR) 5 MG tablet TAKE 1 TABLET BY MOUTH EVERY DAY 90 tablet 1     LORazepam (ATIVAN) 1 MG tablet Take 1 tablet (1 mg) by mouth every 6 hours as needed for anxiety 30 tablet 0     Melatonin 10 MG TABS tablet Take 10 mg by mouth nightly as needed for sleep       mirtazapine (REMERON) 15 MG tablet Take 1 tablet (15 mg) by mouth At Bedtime 30 tablet 1     mirtazapine (REMERON) 7.5 MG tablet Take 1 tablet (7.5 mg) by mouth At Bedtime 30 tablet 3     mometasone-formoterol (DULERA) 100-5 MCG/ACT inhaler Inhale 2 puffs into the lungs daily       morphine (MS CONTIN) 15 MG CR tablet Take 15 mg by mouth 2 times daily as needed for severe pain       NIVOLUMAB IV Inject 480 mg into the vein every 28 days       oxyCODONE (ROXICODONE) 5 MG tablet Take 1 tablet (5 mg) by mouth every 4 hours as needed for severe pain 100 tablet 0     prochlorperazine (COMPAZINE) 10 MG tablet Take 1 tablet (10 mg) by mouth every 6 hours as needed (Nausea/Vomiting) 30 tablet 11     senna (SENOKOT) 8.6 MG tablet Take 1 tablet by mouth daily       study - sitravatinib malate salt, [id number],, IDS# [id number], 40 mg roller oompaction capsule Take 1 capsule (40 mg) by mouth daily 60 capsule 0     study - sitravatinib malate salt, [id number],, IDS# [id number], 60 mg roller oompaction capsule Take 1 capsule (60 mg) by mouth daily 30 capsule 0     tamsulosin (FLOMAX) 0.4 MG capsule TAKE 1 CAPSULE BY MOUTH EVERY DAY 90 capsule 1     tiotropium (SPIRIVA HANDIHALER) 18 MCG inhaled capsule Inhale 1 capsule (18 mcg) into the lungs daily 1 capsule 11        No Known Allergies      Exam: alert, appears slim but otherwise well.  Blood pressure 116/81, pulse 94, resp. rate 18, weight 68.7 kg (151 lb 8 oz), SpO2 96 %.  Wt Readings  from Last 4 Encounters:   09/11/20 68.7 kg (151 lb 8 oz)   09/04/20 66.6 kg (146 lb 14.4 oz)   08/26/20 69.5 kg (153 lb 4.8 oz)   08/20/20 70.6 kg (155 lb 11.2 oz)      Oropharynx is moist and without lesion. Neck supple and without adenopathy. Lungs:diminished L base, o/w clear. Heart: RRR, no murmur or rub. Abdomen: soft, nontender, BS active, no masses or organomegaly.  Extremities: warm, no edema. Speech is clear. CN wnl. Gait/station wnl. Skin: no rashes or bruising on exposed skin. Nodes: no neck, supraclavicular or axillae nodes  -BLE strength 4/5 bilaterally, normal sensation to light touch. Patellar reflexes 2+ symmetric      Labs: Results for MAYA ELLER (MRN [id number]) as of 9/11/2020 17:28   Ref. Range 9/11/2020 14:11   Sodium Latest Ref Range: 133 - 144 mmol/L 136   Potassium Latest Ref Range: 3.4 - 5.3 mmol/L 4.4   Chloride Latest Ref Range: 94 - 109 mmol/L 106   Carbon Dioxide Latest Ref Range: 20 - 32 mmol/L 24   Urea Nitrogen Latest Ref Range: 7 - 30 mg/dL 14   Creatinine Latest Ref Range: 0.66 - 1.25 mg/dL 0.98   GFR Estimate Latest Ref Range: >60 mL/min/1.73_m2 77   GFR Estimate If Black Latest Ref Range: >60 mL/min/1.73_m2 89   Calcium Latest Ref Range: 8.5 - 10.1 mg/dL 8.0 (L)   Anion Gap Latest Ref Range: 3 - 14 mmol/L 6   Albumin Latest Ref Range: 3.4 - 5.0 g/dL 3.0 (L)   Protein Total Latest Ref Range: 6.8 - 8.8 g/dL 6.8   Bilirubin Total Latest Ref Range: 0.2 - 1.3 mg/dL 0.7   Alkaline Phosphatase Latest Ref Range: 40 - 150 U/L 132   ALT Latest Ref Range: 0 - 70 U/L 62   AST Latest Ref Range: 0 - 45 U/L 44   Glucose Latest Ref Range: 70 - 99 mg/dL 92   WBC Latest Ref Range: 4.0 - 11.0 10e9/L 8.3   Hemoglobin Latest Ref Range: 13.3 - 17.7 g/dL 12.8 (L)   Hematocrit Latest Ref Range: 40.0 - 53.0 % 40.5   Platelet Count Latest Ref Range: 150 - 450 10e9/L 191   RBC Count Latest Ref Range: 4.4 - 5.9 10e12/L 4.91   MCV Latest Ref Range: 78 - 100 fl 83   MCH Latest Ref Range: 26.5 - 33.0 pg 26.1  (L)   MCHC Latest Ref Range: 31.5 - 36.5 g/dL 31.6   RDW Latest Ref Range: 10.0 - 15.0 % 19.9 (H)   Diff Method Unknown Automated Method   % Neutrophils Latest Units: % 78.9   % Lymphocytes Latest Units: % 10.8   % Monocytes Latest Units: % 7.9   % Eosinophils Latest Units: % 1.4   % Basophils Latest Units: % 0.6   % Immature Granulocytes Latest Units: % 0.4   Nucleated RBCs Latest Ref Range: 0 /100 0   Absolute Neutrophil Latest Ref Range: 1.6 - 8.3 10e9/L 6.6   Absolute Lymphocytes Latest Ref Range: 0.8 - 5.3 10e9/L 0.9   Absolute Monocytes Latest Ref Range: 0.0 - 1.3 10e9/L 0.7   Absolute Eosinophils Latest Ref Range: 0.0 - 0.7 10e9/L 0.1   Absolute Basophils Latest Ref Range: 0.0 - 0.2 10e9/L 0.1   Abs Immature Granulocytes Latest Ref Range: 0 - 0.4 10e9/L 0.0   Absolute Nucleated RBC Unknown 0.0       Imaging: n/a    Impression/plan:    NSCLC, adenocarcinoma:  -Started Sitravatinib on 6/30/20 and Nivo on 7/23/20.   -tolerating treatment better. Last week's toxicities are resolved  -ok to continue the treatment, f/u with Dr. Muller in 1 week as planned with restaging       Dyspnea/COPD exacerbation  -resolved with a 6 day prednisone taper. Breathing stable today    Diarrhea, was grade 1, now resolved  -possibly related to stravatinib. No infectious cause identified  -monitor      Hoarseness:   -Stable. Likely L recurrent laryngeal nerve involvement. No dysphagia.       Cancer-related pain in the chest/hip  -chest pain much better, L hip pain persists, but a little better  - on MS Contin 15 mg q 12 hours  -has palliative care consultation in a couple of weeks      Leukocytosis  -was likely r/t prednisone--resolved      Anemia:   -hgb back to baseline at 12.8.       Brain met:   -S/p gamma knife 6/22. Scheduled for repeat MRI at the end of Sept and has appt with Dr. Zamorano afterward.      Anorexia:   -improving with increased remeron. Appetite is still low, but a little better. Gained a couple of  pounds     Subclinical hypothyroidism:   -history subclinical hypothyroidism,monitor      Hx elevated amylase:  -not checked today, monitor     Anxiety:   -improving, remeron increased from 7.5 to 15 mg last week.  - Need to avoid selective serotonin reuptake inhibitor due to interactions with the study drug

## 2020-09-11 NOTE — NURSING NOTE
"Oncology Rooming Note    September 11, 2020 2:45 PM   Kentrell Kirkpatrick is a 69 year old male who presents for:    Chief Complaint   Patient presents with     Port Draw     Gripper needle, heparin locked, vitals checked     Oncology Clinic Visit     Return; Lung Ca     Initial Vitals: /81   Pulse 94   Temp 97.7  F (36.5  C)   Resp 18   Wt 68.7 kg (151 lb 8 oz)   SpO2 96%   BMI 19.99 kg/m   Estimated body mass index is 19.99 kg/m  as calculated from the following:    Height as of 9/4/20: 1.854 m (6' 1\").    Weight as of this encounter: 68.7 kg (151 lb 8 oz). Body surface area is 1.88 meters squared.  No Pain (0) Comment: Data Unavailable   No LMP for male patient.  Allergies reviewed: Yes  Medications reviewed: Yes    Medications: Medication refills not needed today.  Pharmacy name entered into [employer]: [id number] - [place], [place] - [id number]    Clinical concerns: No new concerns.        Maria Del Carmen Holcomb CMA              "

## 2020-09-15 NOTE — DISCHARGE INSTRUCTIONS
MRI Contrast Discharge Instructions    The IV contrast you received today will pass out of your body in your  urine. This will happen in the next 24 hours. You will not feel this process.  Your urine will not change color.    Drink at least 4 extra glasses of water or juice today (unless your doctor  has restricted your fluids). This reduces the stress on your kidneys.  You may take your regular medicines.    If you are on dialysis: It is best to have dialysis today.    If you have a reaction: Most reactions happen right away. If you have  any new symptoms after leaving the hospital (such as hives or swelling),  call your hospital at the correct number below. Or call your family doctor.  If you have breathing distress or wheezing, call 911.    Special instructions: ***    I have read and understand the above information.    Signature:______________________________________ Date:___________    Staff:__________________________________________ Date:___________     Time:__________    [facility] Departments:    ___[place]: [phone]  ___[facility]: [phone]  ___[place]: [phone] ___[facility]: [phone]  ___[facility]: [phone]  ___[facility]: [phone]  ___[phone]  ___[facility]: [phone]  ___[phone]

## 2020-09-15 NOTE — TELEPHONE ENCOUNTER
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 He called Edy but had to leave a . I called Edy with no response, so I called Audrey, his wife. Explained the PE and asked them to start rivaroxaban tonight. Explained dose pack concept with 15 mg bid x 21 days then 20 mg daily. He is asymptomatic - chronic shortness of breath is at baseline and he's been ok otherwise. Discussed risk of bleeding. Asked them to go to ED tonight if he develops worsening shortness of breath.    Susan Muller MD

## 2020-09-16 NOTE — PROGRESS NOTES
RN Care Coordination Note  Placed call to patient to follow-up after discovering small PE on CT yesterday. Patient reports he is feeling well. Denies any chest pain or SOB. Did not start Xarelto yet. Instructed patient to start now. He voiced understanding and had no other questions or concerns.       Darleen Ruiz RN, BSN, OCN   RN Care Coordinator   [facility]

## 2020-09-16 NOTE — TELEPHONE ENCOUNTER
Received call from radiology about possible PE. Called the pt and left a message to call triage line immediatly. I also msziyad Muller, primary oncologist, who later said that she was able to get in touch with his wife.

## 2020-09-17 NOTE — NURSING NOTE
"Oncology Rooming Note    September 17, 2020 9:24 AM   Kentrell Kirkpatrick is a 69 year old male who presents for:    Chief Complaint   Patient presents with     Port Draw     port accessed and labs drawn by rn in lab.  vital signs taken.     Oncology Clinic Visit     Return: Lung CA     Initial Vitals: /68 (BP Location: Right arm, Patient Position: Sitting, Cuff Size: Adult Regular)   Pulse 90   Temp 97  F (36.1  C) (Oral)   Resp 16   Ht 1.854 m (6' 1\")   Wt 68.3 kg (150 lb 9.6 oz)   SpO2 98%   BMI 19.87 kg/m   Estimated body mass index is 19.87 kg/m  as calculated from the following:    Height as of this encounter: 1.854 m (6' 1\").    Weight as of this encounter: 68.3 kg (150 lb 9.6 oz). Body surface area is 1.88 meters squared.  No Pain (0) Comment: Data Unavailable   No LMP for male patient.  Allergies reviewed: Yes  Medications reviewed: Yes    Medications: Medication refills not needed today.  Pharmacy name entered into [email or web address]: [id number] - [place], [place] - [address]    Clinical concerns: N/A       Radha Huff CMA              "

## 2020-09-17 NOTE — NURSING NOTE
[id number]: Study Visit Note   Subject name: Kentrell Kirkpatrick     Visit: C3D1. Research nurse visit completed remotely in effort to minimize in-person contacts during Covid-19 pandemic.    Did the study visit occur within the appropriate window allowed by the protocol? yes    If no, why? n/a    Since the last study visit, he has been doing fair. He has been instructed to hold sitravatinib beginning today; see provider notes for details.     I have personally reviewed Kentrell Kirkpatrick's medical record for adverse events and concomitant medications and these have been recorded on the corresponding logs in Kentrell Kirkpatrick's research file.     Kentrell Kirkpatrick was given the opportunity to ask any trial related questions.  Please see provider progress note for physical exam and other clinical information. Labs were reviewed - any significant lab values were addressed and reviewed.

## 2020-09-17 NOTE — LETTER
9/17/2020         RE: Kentrell Kirkpatrick  [address]  [facility] [address]        Dear Colleague,    Thank you for referring your patient, Kentrell Kirkpatrick, to the [facility]. Please see a copy of my visit note below.    [facility]    FOLLOW-UP VISIT NOTE    PATIENT NAME: Kentrell Kirkpatrick MRN # [id number]  DATE OF VISIT: September 17, 2020 YOB: 1950    CANCER TYPE: NSCLC, adenocarcinoma  STAGE: IV  ECOG PS: 2    Cancer Staging  Non-small cell lung cancer, left (H)  Staging form: Lung, AJCC 8th Edition  - Clinical stage from 3/11/2019: Stage IV (cT1c, cN3, pM1c) - Signed by Susan Muller MD on 3/11/2019    PD-L1: <1%  Lung panel: 3/1/19 on [id number] A1 and [id number] A1. Negative  NGS: Guardant 360 sent 2/28/19 negative for actionable mutations. SMAD4 E538, TP53 H179R, SMO A327G. MSI not high    SUMMARY  6/27/18 CT chest w/contrast to re-evaluate aortic aneurysm: 8 mm spiculated KEATON nodule, 3 mm RML nodule unchanged from 3/15/17 and 2/25/16 scans  2/4/19 Presented to PCP with fairly rapid onset of shortness of breath. Given albuterol  2/19/19 CXR and CT chest w/contrast. Large left effusion  2/20/19 L thoracentesis (Dr. Rodriguez), 1180 cc  3/1/19 L pleurx (Dr. Rodriguez)  3/13/19 C1 carboplatin pemetrexed pembrolizumab  3/15/19 L supraclavicular LN bx (IR, FNA). Path: lung adenocarcinoma  4/3/19 C2 carboplatin pemetrexed pembrolizumab (total 4 cycles)  4/15/19 FEV1 2.11 (61%), FVC 3.38 (73%), DLCO 27.7, 67% (59%)  4/18/19 TPA. Drained 900 cc after it got unclotted  5/9/19 Pleurx removed  6/5~8/28/19 Maintenance pemetrexed + pembrolizumab x 5 cycles --> PD  9/20/19~4/29/20 C1-8 docetaxel 60 mg/m2 + ramucirumab. Had extravasation w/C2 10/11/19. C4 docetaxel ramucirumab delayed 1 week 2/2019 due to URI and trip. C5-8 docetaxel reduced to 50 mg/m2 due to excessive fatigue  9/28/19 UC for bronchitis. Levofloxacin  10/4/19 Brain MRI  - routine rescreening - negative  10/17/19 Port  10/23/19 Recurrent cough. Saw PCP. Azithromycin  10/29/19 Prednisone 40 mg once, then 20 mg daily x 5 days, then 10 mg daily x 5 days for acute bronchitis   12/12/19 ED for bronchitis. Had some chills prior to going to the ED  3/3/20 Treated for pansinusitis with amox-clav x 14 days and fluticasone nasal spray  3/31/20 CT CAP and bone scan. Some PD. No bone mets.   5/6/20 Brain MRI. Negative  5/6/20 CT CAP.   4/1/20~4/29/20 C1-2 gemcitabine 1000 mg/m2 D1, 8 --> PD  5/30/20 [facility]. CTA negative for PE. Doxy, etc  6/11/20 Bone scan. L sacral ala met  6/11/20 CT CAP. Increased innumerable bilateral lung nodules. Anterior mediastinal adenopathy unchanged. Nodular pleural thickening. Increasing liver mets, ~20. L adrenal stable, increasing LN next to L renal artery, small nodular mets abutting posterior L kidney.   6/11/20 Brain MRI. Suspect 5 x 6 mm L cerebellar met  6/15/20 Gamma knife x 1 to small L cerebellar met found incidentally at the time of screening for sitra nivo trial    ~ current Sitravatinib. Held 9/17 due to new small PE.  7/8/20 D1 run in for sitravatinib  7/23/20 D1 nivolumab  8/26~8/27/20 [facility] under obs for increased shortness of breath. Treated for COPD exacerbation with prednisone and nebs. Felt better quickly  9/15/20 CT CAP. Lungs/pleura/nodules stable. Stable mediastinal and hilar disease. RUL segmental PE extending into subsegmental artery. Questionable RLL PE. Numerous new small lesions in the liver, others increased.  Remainder of disease stable. More noticeable sclerotic changes in L sacral ala, highly concerning for mets. Noted to be new, but was present on June 2020 bone scan and prior CT. Stable L 12th rib met. Enlarging portal hepatis LN 0.8-->1.3 cm. Unchanged retroperitoneal adenopathy. Stable L adrenal.   9/15/20 Brain MRI. 3 new tiny mets. 8 mm R thalamus, 3 mm L cerebellum, 4 mm R cerebellum    SUBJECTIVE  Mr. Kirkpatrick returns today  for follow up of metastatic lung adenocarcinoma, on sitravatinib + nivolumab, here with the week 8 restaging studies. Accompanied by Audrey. Incidental PE identified on CT on Tues. I called and had him start rivaroxaban, which he did yesterday morning. Also instructed to hold sitravatinib yesterday per protocol. Today is the first skipped dose - he had already taken a dose yesterday morning. Remains fatigued, but is able to get some stuff done around the house. Some days are better than others. 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 Shortness of breath is the same. Gets winded going up stairs. Hasn't been walking regularly due to fatigue and shortness of breath, but does ok around the house otherwise. Sleeping better with increased dose of mirtazipine, and the better sleep he thinks has translated to less anxiety. Diarrhea resolved after the few days of it last month. No cramping. Now back to BMs every 2-3 days, which is less than usual but he doesn't feel constipated. Appetite is still off. Food not tasting good. Not new. Continues to drink Boost 2 per day in addition to doing his best to eat. Had lost weight until but has managed to put about 5 pounds back on according to his home scale. The weight gain plateaued the last couple of weeks. Mouth is dry but nothing new. No cough, fever, chills, N/V, HA. L hip still painful. Is able to walk but certainly notices it almost all of the time. A little worse than at the time of the last visit with me. No falls. Sometimes shoots down a little bit down the L leg. No numbness/tingling. No bleeding. No leg swelling.     PAST MEDICAL HISTORY  Lung adenocarcinoma as above  L5 disk herniation. Epidural injection 5/22/18. Improved dramatically with chiropracter in the past.   BPH  Ascending aortic aneurysm  SKYLER not on CPAP, couldn't tolerate, so using dental device    4/15/19 PFT. FEV1 2.11  (61%), FVC 4.58    CURRENT OUTPATIENT MEDICATIONS  Current Outpatient Medications   Medication Sig Dispense Refill     albuterol (PROAIR HFA/PROVENTIL HFA/VENTOLIN HFA) 108 (90 Base) MCG/ACT inhaler Inhale 2 puffs into the lungs every 4 hours (while awake) 1 Inhaler 0     albuterol (PROVENTIL) (2.5 MG/3ML) 0.083% neb solution Take 1 vial (2.5 mg) by nebulization every 4 hours as needed for shortness of breath / dyspnea or wheezing 100 vial 11     calcium polycarbophil (FIBERCON) 625 MG tablet Take 2 tablets by mouth daily       finasteride (PROSCAR) 5 MG tablet TAKE 1 TABLET BY MOUTH EVERY DAY 90 tablet 1     LORazepam (ATIVAN) 1 MG tablet Take 1 tablet (1 mg) by mouth every 6 hours as needed for anxiety 30 tablet 0     Melatonin 10 MG TABS tablet Take 10 mg by mouth nightly as needed for sleep       mirtazapine (REMERON) 15 MG tablet Take 1 tablet (15 mg) by mouth At Bedtime 30 tablet 1     mirtazapine (REMERON) 7.5 MG tablet Take 1 tablet (7.5 mg) by mouth At Bedtime 30 tablet 3     mometasone-formoterol (DULERA) 100-5 MCG/ACT inhaler Inhale 2 puffs into the lungs daily       morphine (MS CONTIN) 15 MG CR tablet Take 15 mg by mouth 2 times daily as needed for severe pain       NIVOLUMAB IV Inject 480 mg into the vein every 28 days       oxyCODONE (ROXICODONE) 5 MG tablet Take 1 tablet (5 mg) by mouth every 4 hours as needed for severe pain 100 tablet 0     prochlorperazine (COMPAZINE) 10 MG tablet Take 1 tablet (10 mg) by mouth every 6 hours as needed (Nausea/Vomiting) 30 tablet 11     rivaroxaban ANTICOAGULANT (XARELTO ANTICOAGULANT) 20 MG TABS tablet Take 1 tablet (20 mg) by mouth daily (with dinner) 30 tablet 11     Rivaroxaban ANTICOAGULANT 15 & 20 MG TBPK 15 mg BID for 21 days, then 20 mg daily 1 each 0     senna (SENOKOT) 8.6 MG tablet Take 1 tablet by mouth daily       study - sitravatinib malate salt, [id number],, IDS# [id number], 40 mg roller oompaction capsule Take 1 capsule (40 mg) by mouth daily 60 capsule 0  "    study - sitravatinib malate salt, [id number],, IDS# [id number], 60 mg roller oompaction capsule Take 1 capsule (60 mg) by mouth daily 30 capsule 0     tamsulosin (FLOMAX) 0.4 MG capsule TAKE 1 CAPSULE BY MOUTH EVERY DAY 90 capsule 1     tiotropium (SPIRIVA HANDIHALER) 18 MCG inhaled capsule Inhale 1 capsule (18 mcg) into the lungs daily 1 capsule 11     ALLERGIES  No Known Allergies     REVIEW OF SYSTEMS  As above in the HPI, o/w complete 12-point ROS was negative.    PHYSICAL EXAM  /68 (BP Location: Right arm, Patient Position: Sitting, Cuff Size: Adult Regular)   Pulse 90   Temp 97  F (36.1  C) (Oral)   Resp 16   Ht 1.854 m (6' 1\")   Wt 68.3 kg (150 lb 9.6 oz)   SpO2 98%   BMI 19.87 kg/m    Wt Readings from Last 3 Encounters:   09/17/20 68.3 kg (150 lb 9.6 oz)   09/11/20 68.7 kg (151 lb 8 oz)   09/04/20 66.6 kg (146 lb 14.4 oz)     GEN: NAD  HEENT: EOMI, no icterus, injection or pallor. Oropharynx is clear but MM are dry  LUNGS: clear bilaterally. No wheezing  CV: regular, no murmurs, rubs, or gallops  ABDOMEN: soft, non-tender, non-distended, normal bowel sounds  EXT: warm, no edema  NEURO: alert. , bicep, tricep, hip flexion/extension, knee flexion/extension, ankle extension 5/5 although hip flexion is slightly weaker on the left due to discomfort elicited during the exam  SKIN: no rashes    LABORATORY AND IMAGING STUDIES  Results for MAYA ELLER (MRN [id number]) as of 9/17/2020 09:44   9/17/2020 08:42   Sodium 139   Potassium 4.1   Chloride 106   Carbon Dioxide 26   Urea Nitrogen 12   Creatinine 1.02   GFR Estimate 74   GFR Estimate If Black 85   Calcium 8.3 (L)   Anion Gap 7   Magnesium 2.1   Albumin 2.9 (L)   Protein Total 6.8   Bilirubin Total 0.8   Alkaline Phosphatase 151 (H)   ALT 75 (H)   AST 51 (H)   Amylase 811 (H)   Lipase 32 (L)   T4 Free 1.29   TSH 6.69 (H)   Uric Acid 5.2   Glucose 96   WBC 8.5   Hemoglobin 13.0 (L)   Hematocrit 41.4   Platelet Count 189   RBC Count 5.05   MCV 82 "   MCH 25.7 (L)   MCHC 31.4 (L)   RDW 20.4 (H)   Diff Method Automated Method   % Neutrophils 78.5   % Lymphocytes 9.5   % Monocytes 8.2   % Eosinophils 2.7   % Basophils 0.6   % Immature Granulocytes 0.5   Nucleated RBCs 0   Absolute Neutrophil 6.7   Absolute Lymphocytes 0.8   Absolute Monocytes 0.7   Absolute Eosinophils 0.2   Absolute Basophils 0.1   Abs Immature Granulocytes 0.0   Absolute Nucleated RBC 0.0     CT Chest/Abdomen/Pelvis w Contrast  Narrative: EXAM: CT CHEST/ABDOMEN/PELVIS W CONTRAST, 9/15/2020 12:13 PM    TECHNIQUE:  Helical CT images from the lung bases through the  symphysis pubis were obtained with Isovue 370 92ml intravenous  contrast. Coronal and sagittal reformatted images were generated at a  workstation for further assessment.    HISTORY: tumor response assessment for research study [id number];  Primary lung adenocarcinoma, left (H)    COMPARISON: CT 8/26/2020, 7/12/2020, 6/11/2020, and 5/30/2020    FINDINGS:     CHEST:  Lungs: Innumerable pulmonary masses and nodules are stable to slightly  decreased in size. Representative stable lesions include a 3.0 x 1.6  cm in the posterior left lower lobe, superior segment (series 3 image  152), anterior left upper lobe lesion measuring 1.5 x 0.7 cm (series 3  image 172), nodular thickening along the left major fissure with  thickest portion measuring 1.8 x 1.5 cm (series 3 image 163).  Peripheral airspace nodules throughout the right lung have decreased  in size including 6 mm airspace lesion in the right lower lobe (series  3 image 265, previously 9 mm), 7 mm peribronchial lesion in the right  lower lobe (series 2 image 52, previously 9 mm), and 3 mm nodule in  the posterior peripheral right lower lobe (series 3 image 260,  previously 6 mm). No new airspace consolidation.  Pleura: Small right pleural effusion has decreased. No pneumothorax.  Mediastinum: Right chest wall Port-A-Cath tip is in the low SVC.  Stable mediastinal and hilar disease, for  example soft tissue  thickening adjacent to the main pulmonary artery measuring up to 1.8  cm thick. Mildly enlarged right heart. No pericardial effusion.  Non-aneurysmal thoracic aorta. Pulmonary artery filling defects within  the right upper lobe segmental artery extending into anterior  subsegmental artery. Questionable filling defects in the right lower  lobe lateral segment (series 3 image 242-251). Patulous esophagus  unchanged.    ABDOMEN/PELVIS:  Hepatobiliary: Increased size of hypoenhancing metastasis throughout  the liver with numerous small new smaller lesions including 5.5 x 3.5  cm lesion in hepatic segment 4A) encasing/narrowing the middle hepatic  vein and lesions involving nearly all of segment 2. Hepatic artery  arises from the aorta and is patent. No gallstones or evidence of  acute cholecystitis.  Pancreas: No suspicious pancreatic lesions. Pancreatic duct is not  dilated.  Spleen: Within normal limits.  Adrenals: 2 x 1.8 cm left adrenal nodule is stable since 7/12/2020. No  right adrenal nodules.   Genitourinary: No hydronephrosis or obstructing renal stones.  Moderately distended urinary bladder without significant wall  thickening. Pelvic organs are unremarkable.  Bowel: No abnormally dilated bowel loops. Diverticulosis without  radiographic evidence of diverticulitis.   Peritoneum: Small amount of fluid in the pelvis and around the spleen.  No free air within the abdomen.  Vessels: No infrarenal aortic aneurysm.  Lymph Nodes: Increased periportal haziness which may represent  necrotic lymph nodes. There are enlarging lymph nodes in the desmond  hepatis measuring up to 1.3 cm, previously 0.8 cm. Retroperitoneal  lymphadenopathy is unchanged, measuring up to 1.3 cm short axis.    BONES/SOFT TISSUES:   Destructive lesion in the left 12th rib is unchanged. There is a  developing permeative appearance of the left sacral ala, highly  concerning for metastasis. Mild degenerative changes of  the  thoracolumbar spine.  Impression: IMPRESSION: In this patient with metastatic lung adenocarcinoma:    1. Subsegmental right upper lobe pulmonary emboli and questionable  subsegmental right lower lobe pulmonary emboli. No evidence of  pulmonary hemorrhage/necrosis.    2. Overall mixed treatment response since 7/12/2020.    a. Stable size of numerous scattered pleural-based pulmonary masses.    b. Stable to slightly decreased size of numerous pulmonary nodules  which are presumably metastatic, most notably in the right lung.    c. Stable infiltrative mediastinal disease.    d. Increased size/number of hepatic metastasis. There is increased  narrowing of the middle hepatic vein; hepatic vasculature is otherwise  patent.    e. New metastatic lesion in the left sacral ala. Stable metastatic  lesion in the left 12th rib.    f.  Increased necrotic lymphadenopathy in the abdomen.    [Urgent Result: Pulmonary embolism]    Finding was identified on 9/15/2020 4:15 PM.     Dr. Cowan was contacted by Dr. Gaytan at 9/15/2020 at 5:03 PM and  verbalized understanding of the urgent finding.     I have personally reviewed the examination and initial interpretation  and I agree with the findings.    JOSH RITCHIE MD  MRI Brain w & w/o contrast  Narrative: Provided History: research study [id number]; Primary lung  adenocarcinoma, left (H).  ICD-10: Primary lung adenocarcinoma, left (H)    Comparison: 6/11/2020 and 5/6/2020 brain MR.    Technique: Multiplanar T1-weighted, axial FLAIR, and susceptibility  images were obtained without intravenous contrast. Following  intravenous gadolinium-based contrast administration, axial  T2-weighted, diffusion, and T1-weighted images (in multiple planes)  were obtained.    Additional history from the EMR: 6/2020 gamma knife to left cerebellar  metastasis     Contrast: 7 mL Gadavist    Findings: Multiple new metastatic foci;    4 mm enhancing focus in the left motor strip (series 15  image 25), of  note this is not well visualized in the thin MP RAGE sequence  4 mm enhancing focus in the left precuneus (series 15 image 22), of  note this is not well visualized in the thin MP RAGE sequence    New 8mm right thalamus lesion (Series 14, Image 80).  New 3 mm left cerebellar lesion (series 14, Image 27).   New 4 mm right cerebellar lesion (14 image 37).    Treated lesion in the left cerebellar hemisphere, now measures 7 mm,  previously 5 mm on 2020.    No leptomeningeal enhancement.    There is no mass effect, midline shift, or evidence . The ventricles  are proportionate to the cerebral sulci. Normal major vascular  intracranial flow-voids.    No abnormality of the skull marrow signal. The visualized portions of  paranasal sinuses, and mastoid air cells are relatively clear. The  orbits are grossly unremarkable.  Impression: Impression:    In this patient with history of metastatic lung cancer: Progressive  metastatic brain disease;  1. Increasing size of left treated cerebellar lesion.  2. Multiple new parenchymal lesions as described above.    I have personally reviewed the examination and initial interpretation  and I agree with the findings.    STONE MAGUIRE MD  Echocardiogram Complete  [id number]  [facility]  [id number]  [id number]^SHREYAS^SHAHEED           [facility]  Diagnostic and Treamtent-3rd Floor  [address] [place] [address]     Name: MORRIS ELLER  MRN: [id number]  : 1950  Study Date: 09/15/2020 11:09 AM  Age: 69 yrs  Gender: Male  Patient Location: [facility]  Reason For Study: Encounter for screening for cardiovascular disorders  Ordering Physician: SHAHEED PRITCHETT  Referring Physician: SHAHEED PRITCHETT  Performed By: Lilly Taveras RDCS     BSA: 1.9 m2  Height: 73 in  Weight: 151 lb  HR: 85  BP: 110/64 mmHg  _____________________________________________________________________________  __        Procedure  Echocardiogram with  two-dimensional, color and spectral Doppler performed.  Contrast Optison. Optison (NDC #9755-7744-38) given intravenously. Patient was  given 6 ml mixture of 3 ml Optison and 6 ml saline. 3 ml wasted.  _____________________________________________________________________________  __        Interpretation Summary  Mildly (EF 40-45%) reduced left ventricular function is present. Mild diffuse  hypokinesis is present.  Global right ventricular function is mildly reduced.  No significant valvular dysfunction present.  Pulmonary artery systolic pressure is normal.  Dilation of the inferior vena cava is present with abnormal respiratory  variation in diameter.  No pericardial effusion is present.  _____________________________________________________________________________  __        Left Ventricle  Left ventricular size is normal. Left ventricular wall thickness is normal.  Mildly (EF 40-45%) reduced left ventricular function is present. Grade I or  early diastolic dysfunction. Mild diffuse hypokinesis is present.     Right Ventricle  Borderline right ventricular enlargement. Global right ventricular function is  mildly reduced.     Atria  The right atria appears normal. Cannot assess left atrium.     Mitral Valve  The mitral valve is normal.        Aortic Valve  Trileaflet aortic sclerosis without stenosis.     Tricuspid Valve  The tricuspid valve is normal. Trace tricuspid insufficiency is present.  Estimated pulmonary artery systolic pressure is 18 mmHg plus right atrial  pressure. Pulmonary artery systolic pressure is normal.     Pulmonic Valve  The pulmonic valve is normal.     Vessels  The aorta root is normal. Sinuses of Valsalva 3.6 cm. Ascending aorta 3.9 cm.  Dilation of the inferior vena cava is present with abnormal respiratory  variation in diameter. IVC diameter >2.1 cm collapsing <50% with sniff  suggests a high RA pressure estimated at 15 mmHg or greater.     Pericardium  No pericardial effusion is  present.        Compared to Previous Study  This study was compared with the study from 6.11.20 . LV and RV function are  mildly reduced.  _____________________________________________________________________________  __  MMode/2D Measurements & Calculations  IVSd: 0.62 cm     LVIDd: 3.8 cm  LVIDs: 3.2 cm  LVPWd: 0.76 cm  FS: 15.7 %  LV mass(C)d: 70.8 grams  LV mass(C)dI: 37.1 grams/m2  Ao root diam: 3.7 cm  asc Aorta Diam: 3.8 cm  LVOT diam: 2.4 cm  LVOT area: 4.6 cm2     EF(MOD-bp): 42.5 %  RWT: 0.40        Doppler Measurements & Calculations  MV E max jordi: 45.2 cm/sec  MV A max jordi: 70.3 cm/sec  MV E/A: 0.64  MV dec slope: 214.2 cm/sec2  MV dec time: 0.21 sec  PA acc time: 0.07 sec  E/E' avg: 10.7  Lateral E/e': 10.4  Medial E/e': 10.9        _____________________________________________________________________________  __           Report approved by: Radha Dorado 09/15/2020 12:04 PM        Imaging was personally reviewed. On my review, the larger liver mets look largely stable. I agree that there a number of really small new mets compared to the July 2020 scan, and certainly compared to the June 2020 scan.      ASSESSMENT AND PLAN  Lung adenocarcinoma: Mixed response to sitravatinib + nivolumab. The only real site of progression is the liver, and the new mets there, while numerous, are quite small. We used the June 11 CT as the baseline, but note that treatment did not start until the second week of July 12, and we have another CT July 12, which would be the more relevant baseline to compare here. There's also no clinical evidence to suggest that these are little abscesses. The remainder of the disease is stable, which is remarkable considering the pace of progression prior to starting study treatment. I discussed that in the absence of other issues, specifically the PE and decreased EF, I would recommend continuing for a bit longer and confirming PD vs SD in 4-6 weeks. The protocol allows continuation  "even in light of RECIST defined PD if the patient is clinically benefitting. However, in communication with the sponsor, and in following the protocol, the recommendation with regards to the PE is to hold sitravatinib for 1-2 weeks then resume. We also have the issue of decreased EF. The IB for sitravatinib requires holding - see below. Will reassess for resuming sitravatinib in 2 weeks. Continue nivolumab today.     Left ventricular function systolic dysfunction (CTACAE term): Not grade 3. Not symptomatic. No grade 1 or 2 designations. EF started at 60%, now 40-45% with diffuse hypokinesis. Possibly related to sitravatinib. Unlikely to be related to nivolumab. Hold sitra as above. BP is too low to tolerate meaningful doses of ACEI. Sitra IB on page 84 states \"In cases where LVEF decreases by 20% or more to an LVEF < 50%, the dose of sitravatinib should be interrupted and/or reduced. Permanent discontinuation should be considered for patients requiring acute hospitalization for treatment of congestive heart failure (CHF).\" Asymptomatic. Has chronic shortness of breath that is not worse and certainly doesn't have radiographic or clinical evidence of decompensated CHF. Not tachycardic on my exam today. Repeat TTE in 2 weeks.    Brain mets: New small mets. Dr. Zamorano called me and will discuss retreatment with Edy.     Segmental/subsegmental PE (closest CTCAE 4.03 term thromboembolic event): Found incidentally. Not clear that it was a embolic event - will get LE US. Asymptomatic. Started rivaroxaban. Discussed risk of bleeding from brain mets weighed against the risk of clotting, but that both large PE and intracranial hemorrhage can be life-threatening and catastrophic. Right now the scale tips in favor of anticoagulation. They understand this risk/benefit discussion.     L sacral met: Getting symptomatic enough that I think palliative radiotherapy would be worthwhile. Dr. Zamorano aware, and will discuss with " Edy. Will consider denosumab vs zometa in the near future.     Amylase elevated: Now Grade 4. No clinical evidence of pancreatitis and lipase is unremarkable, as it has been. Had elevated Grade 3 amylase and normal lipase at baseline as well. I do not think this is related to either study drug. ADDENDUM: Differences in CTCAE 4.03 and 5.0 - per 5.0 was only grade 3. Will discuss with sponsor about need to discontinue nivolumab as well. Recheck amylase early next week and monitor for clinical pancreatitis but without any elevation in lipase, this seems unlikely.     Transaminitis, ALT/AST elevated: Secondary to liver mets. I don't think they're related to study drug. Stable.     Diarrhea/constipation: Self-limited and hasn't recurred so not due to study drugs. Back to mild constipation. Not on any stool softeners. Discussed that I think BM every 2-3 days is ok for right now.      Anorexia, dysgeusia: Encouraged increasing Boost and oral intake. Would like to stay away from dex for obvious reasons, and megace due to increased hypercoagulability. Marinol might be an option. Has an appt with Dietician.     Anxiety: Increased mirtazipine helping. Avoiding SSRIs due to restrictions while on study. Now has an appt with Dr. Loera on 9/25. At Edy and Audrey's request, will see if this can be changed to an in-person visit.    Dyspnea: Due to L pleural disease and COPD. Mild COPD exacerbation a few weeks ago. Continue inhalers.     Cancer-related pain: Secondary to liver mets and now L hip pain. Managed with MSContin 15 mg at bedtime and oxycodone 5 mg 1-2 times per day, no escalation. Can consider decreasing after hip radiation.     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 No dysphagia. Discussed referral to Dr. Mason for injection if he wants. Not discussed today and stable.     Anemia: Hemoglobin stable. Microcytic. Check iron studies next visit.    Subclinical hypothyroidism: TSH mildly elevated but free  T4 still on the upper end of normal. No intervention. Continue checking regularly.     H/o L malignant pleural effusion: No recurrence.     SKYLER: Nasal device    A total of 40 minutes was spent with the patient, >50% of which was spent in counseling and coordination of care.    Susan Muller MD  Associate Professor of Medicine  Hematology, Oncology and Transplantation    Again, thank you for allowing me to participate in the care of your patient.        Sincerely,        Susan Muller MD

## 2020-09-17 NOTE — PROGRESS NOTES
Infusion Nursing Note:  Kentrell Kirkpatrick presents today for Day 1 Cycle 3 Nivolumab.    Patient seen by provider today: Yes: Dr. Muller   present during visit today: Not Applicable.    Note: Edy arrives to infusion after his visit with Dr. Muller doing well. No changes in plan of care with nivolumab.     Intravenous Access:  Implanted Port.    Treatment Conditions:  Lab Results   Component Value Date    HGB 13.0 09/17/2020     Lab Results   Component Value Date    WBC 8.5 09/17/2020      Lab Results   Component Value Date    ANEU 6.7 09/17/2020     Lab Results   Component Value Date     09/17/2020      Lab Results   Component Value Date     09/17/2020                   Lab Results   Component Value Date    POTASSIUM 4.1 09/17/2020           Lab Results   Component Value Date    MAG 2.1 09/17/2020            Lab Results   Component Value Date    CR 1.02 09/17/2020                   Lab Results   Component Value Date    MALATHI 8.3 09/17/2020                Lab Results   Component Value Date    BILITOTAL 0.8 09/17/2020           Lab Results   Component Value Date    ALBUMIN 2.9 09/17/2020                    Lab Results   Component Value Date    ALT 75 09/17/2020           Lab Results   Component Value Date    AST 51 09/17/2020       Results reviewed, labs MET treatment parameters, ok to proceed with treatment.    Post Infusion Assessment:  Patient tolerated infusion without incident.  Blood return noted pre and post infusion.  Access discontinued per protocol.     Discharge Plan:   Patient declined prescription refills.  AVS to patient via [employer].  Patient will return 10/14 for next appointment.   Patient discharged in stable condition accompanied by: self.  Departure Mode: Ambulatory.    Lashawn Mckeon RN

## 2020-09-17 NOTE — PROGRESS NOTES
[facility]    FOLLOW-UP VISIT NOTE    PATIENT NAME: Kentrell Kirkpatrick MRN # [id number]  DATE OF VISIT: September 17, 2020 YOB: 1950    CANCER TYPE: NSCLC, adenocarcinoma  STAGE: IV  ECOG PS: 2    Cancer Staging  Non-small cell lung cancer, left (H)  Staging form: Lung, AJCC 8th Edition  - Clinical stage from 3/11/2019: Stage IV (cT1c, cN3, pM1c) - Signed by Susan Muller MD on 3/11/2019    PD-L1: <1%  Lung panel: 3/1/19 on [id number] A1 and [id number] A1. Negative  NGS: Guardant 360 sent 2/28/19 negative for actionable mutations. SMAD4 E538, TP53 H179R, SMO A327G. MSI not high    SUMMARY  6/27/18 CT chest w/contrast to re-evaluate aortic aneurysm: 8 mm spiculated KEATON nodule, 3 mm RML nodule unchanged from 3/15/17 and 2/25/16 scans  2/4/19 Presented to PCP with fairly rapid onset of shortness of breath. Given albuterol  2/19/19 CXR and CT chest w/contrast. Large left effusion  2/20/19 L thoracentesis (Dr. Rodriguez), 1180 cc  3/1/19 L pleurx (Dr. Rodriguez)  3/13/19 C1 carboplatin pemetrexed pembrolizumab  3/15/19 L supraclavicular LN bx (IR, FNA). Path: lung adenocarcinoma  4/3/19 C2 carboplatin pemetrexed pembrolizumab (total 4 cycles)  4/15/19 FEV1 2.11 (61%), FVC 3.38 (73%), DLCO 27.7, 67% (59%)  4/18/19 TPA. Drained 900 cc after it got unclotted  5/9/19 Pleurx removed  6/5~8/28/19 Maintenance pemetrexed + pembrolizumab x 5 cycles --> PD  9/20/19~4/29/20 C1-8 docetaxel 60 mg/m2 + ramucirumab. Had extravasation w/C2 10/11/19. C4 docetaxel ramucirumab delayed 1 week 2/2019 due to URI and trip. C5-8 docetaxel reduced to 50 mg/m2 due to excessive fatigue  9/28/19 UC for bronchitis. Levofloxacin  10/4/19 Brain MRI - routine rescreening - negative  10/17/19 Port  10/23/19 Recurrent cough. Saw PCP. Azithromycin  10/29/19 Prednisone 40 mg once, then 20 mg daily x 5 days, then 10 mg daily x 5 days for acute bronchitis   12/12/19 ED for bronchitis. Had some chills prior  to going to the ED  3/3/20 Treated for pansinusitis with amox-clav x 14 days and fluticasone nasal spray  3/31/20 CT CAP and bone scan. Some PD. No bone mets.   5/6/20 Brain MRI. Negative  5/6/20 CT CAP.   4/1/20~4/29/20 C1-2 gemcitabine 1000 mg/m2 D1, 8 --> PD  5/30/20 [facility]. CTA negative for PE. Doxy, etc  6/11/20 Bone scan. L sacral ala met  6/11/20 CT CAP. Increased innumerable bilateral lung nodules. Anterior mediastinal adenopathy unchanged. Nodular pleural thickening. Increasing liver mets, ~20. L adrenal stable, increasing LN next to L renal artery, small nodular mets abutting posterior L kidney.   6/11/20 Brain MRI. Suspect 5 x 6 mm L cerebellar met  6/15/20 Gamma knife x 1 to small L cerebellar met found incidentally at the time of screening for sitra nivo trial    ~ current Sitravatinib. Held 9/17 due to new small PE.  7/8/20 D1 run in for sitravatinib  7/23/20 D1 nivolumab  8/26~8/27/20 [facility] under obs for increased shortness of breath. Treated for COPD exacerbation with prednisone and nebs. Felt better quickly  9/15/20 CT CAP. Lungs/pleura/nodules stable. Stable mediastinal and hilar disease. RUL segmental PE extending into subsegmental artery. Questionable RLL PE. Numerous new small lesions in the liver, others increased.  Remainder of disease stable. More noticeable sclerotic changes in L sacral ala, highly concerning for mets. Noted to be new, but was present on June 2020 bone scan and prior CT. Stable L 12th rib met. Enlarging portal hepatis LN 0.8-->1.3 cm. Unchanged retroperitoneal adenopathy. Stable L adrenal.   9/15/20 Brain MRI. 3 new tiny mets. 8 mm R thalamus, 3 mm L cerebellum, 4 mm R cerebellum    SUBJECTIVE  Mr. Kirkpatrick returns today for follow up of metastatic lung adenocarcinoma, on sitravatinib + nivolumab, here with the week 8 restaging studies. Accompanied by Pat. Incidental PE identified on CT on Tues. I called and had him start rivaroxaban, which he did yesterday morning. Also  instructed to hold sitravatinib yesterday per protocol. Today is the first skipped dose - he had already taken a dose yesterday morning. Remains fatigued, but is able to get some stuff done around the house. Some days are better than others. 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 Shortness of breath is the same. Gets winded going up stairs. Hasn't been walking regularly due to fatigue and shortness of breath, but does ok around the house otherwise. Sleeping better with increased dose of mirtazipine, and the better sleep he thinks has translated to less anxiety. Diarrhea resolved after the few days of it last month. No cramping. Now back to BMs every 2-3 days, which is less than usual but he doesn't feel constipated. Appetite is still off. Food not tasting good. Not new. Continues to drink Boost 2 per day in addition to doing his best to eat. Had lost weight until but has managed to put about 5 pounds back on according to his home scale. The weight gain plateaued the last couple of weeks. Mouth is dry but nothing new. No cough, fever, chills, N/V, HA. L hip still painful. Is able to walk but certainly notices it almost all of the time. A little worse than at the time of the last visit with me. No falls. Sometimes shoots down a little bit down the L leg. No numbness/tingling. No bleeding. No leg swelling.     PAST MEDICAL HISTORY  Lung adenocarcinoma as above  L5 disk herniation. Epidural injection 5/22/18. Improved dramatically with chiropracter in the past.   BPH  Ascending aortic aneurysm  SKYLER not on CPAP, couldn't tolerate, so using dental device    4/15/19 PFT. FEV1 2.11 (61%), FVC 4.58    CURRENT OUTPATIENT MEDICATIONS  Current Outpatient Medications   Medication Sig Dispense Refill     albuterol (PROAIR HFA/PROVENTIL HFA/VENTOLIN HFA) 108 (90 Base) MCG/ACT inhaler Inhale 2 puffs into the lungs every 4 hours (while awake)  1 Inhaler 0     albuterol (PROVENTIL) (2.5 MG/3ML) 0.083% neb solution Take 1 vial (2.5 mg) by nebulization every 4 hours as needed for shortness of breath / dyspnea or wheezing 100 vial 11     calcium polycarbophil (FIBERCON) 625 MG tablet Take 2 tablets by mouth daily       finasteride (PROSCAR) 5 MG tablet TAKE 1 TABLET BY MOUTH EVERY DAY 90 tablet 1     LORazepam (ATIVAN) 1 MG tablet Take 1 tablet (1 mg) by mouth every 6 hours as needed for anxiety 30 tablet 0     Melatonin 10 MG TABS tablet Take 10 mg by mouth nightly as needed for sleep       mirtazapine (REMERON) 15 MG tablet Take 1 tablet (15 mg) by mouth At Bedtime 30 tablet 1     mirtazapine (REMERON) 7.5 MG tablet Take 1 tablet (7.5 mg) by mouth At Bedtime 30 tablet 3     mometasone-formoterol (DULERA) 100-5 MCG/ACT inhaler Inhale 2 puffs into the lungs daily       morphine (MS CONTIN) 15 MG CR tablet Take 15 mg by mouth 2 times daily as needed for severe pain       NIVOLUMAB IV Inject 480 mg into the vein every 28 days       oxyCODONE (ROXICODONE) 5 MG tablet Take 1 tablet (5 mg) by mouth every 4 hours as needed for severe pain 100 tablet 0     prochlorperazine (COMPAZINE) 10 MG tablet Take 1 tablet (10 mg) by mouth every 6 hours as needed (Nausea/Vomiting) 30 tablet 11     rivaroxaban ANTICOAGULANT (XARELTO ANTICOAGULANT) 20 MG TABS tablet Take 1 tablet (20 mg) by mouth daily (with dinner) 30 tablet 11     Rivaroxaban ANTICOAGULANT 15 & 20 MG TBPK 15 mg BID for 21 days, then 20 mg daily 1 each 0     senna (SENOKOT) 8.6 MG tablet Take 1 tablet by mouth daily       study - sitravatinib malate salt, [id number],, IDS# [id number], 40 mg roller oompaction capsule Take 1 capsule (40 mg) by mouth daily 60 capsule 0     study - sitravatinib malate salt, [id number],, IDS# [id number], 60 mg roller oompaction capsule Take 1 capsule (60 mg) by mouth daily 30 capsule 0     tamsulosin (FLOMAX) 0.4 MG capsule TAKE 1 CAPSULE BY MOUTH EVERY DAY 90 capsule 1     tiotropium (SPIRIVA  "HANDIHALER) 18 MCG inhaled capsule Inhale 1 capsule (18 mcg) into the lungs daily 1 capsule 11     ALLERGIES  No Known Allergies     REVIEW OF SYSTEMS  As above in the HPI, o/w complete 12-point ROS was negative.    PHYSICAL EXAM  /68 (BP Location: Right arm, Patient Position: Sitting, Cuff Size: Adult Regular)   Pulse 90   Temp 97  F (36.1  C) (Oral)   Resp 16   Ht 1.854 m (6' 1\")   Wt 68.3 kg (150 lb 9.6 oz)   SpO2 98%   BMI 19.87 kg/m    Wt Readings from Last 3 Encounters:   09/17/20 68.3 kg (150 lb 9.6 oz)   09/11/20 68.7 kg (151 lb 8 oz)   09/04/20 66.6 kg (146 lb 14.4 oz)     GEN: NAD  HEENT: EOMI, no icterus, injection or pallor. Oropharynx is clear but MM are dry  LUNGS: clear bilaterally. No wheezing  CV: regular, no murmurs, rubs, or gallops  ABDOMEN: soft, non-tender, non-distended, normal bowel sounds  EXT: warm, no edema  NEURO: alert. , bicep, tricep, hip flexion/extension, knee flexion/extension, ankle extension 5/5 although hip flexion is slightly weaker on the left due to discomfort elicited during the exam  SKIN: no rashes    LABORATORY AND IMAGING STUDIES  Results for MAYA ELLER (MRN [id number]) as of 9/17/2020 09:44   9/17/2020 08:42   Sodium 139   Potassium 4.1   Chloride 106   Carbon Dioxide 26   Urea Nitrogen 12   Creatinine 1.02   GFR Estimate 74   GFR Estimate If Black 85   Calcium 8.3 (L)   Anion Gap 7   Magnesium 2.1   Albumin 2.9 (L)   Protein Total 6.8   Bilirubin Total 0.8   Alkaline Phosphatase 151 (H)   ALT 75 (H)   AST 51 (H)   Amylase 811 (H)   Lipase 32 (L)   T4 Free 1.29   TSH 6.69 (H)   Uric Acid 5.2   Glucose 96   WBC 8.5   Hemoglobin 13.0 (L)   Hematocrit 41.4   Platelet Count 189   RBC Count 5.05   MCV 82   MCH 25.7 (L)   MCHC 31.4 (L)   RDW 20.4 (H)   Diff Method Automated Method   % Neutrophils 78.5   % Lymphocytes 9.5   % Monocytes 8.2   % Eosinophils 2.7   % Basophils 0.6   % Immature Granulocytes 0.5   Nucleated RBCs 0   Absolute Neutrophil 6.7 "   Absolute Lymphocytes 0.8   Absolute Monocytes 0.7   Absolute Eosinophils 0.2   Absolute Basophils 0.1   Abs Immature Granulocytes 0.0   Absolute Nucleated RBC 0.0     CT Chest/Abdomen/Pelvis w Contrast  Narrative: EXAM: CT CHEST/ABDOMEN/PELVIS W CONTRAST, 9/15/2020 12:13 PM    TECHNIQUE:  Helical CT images from the lung bases through the  symphysis pubis were obtained with Isovue 370 92ml intravenous  contrast. Coronal and sagittal reformatted images were generated at a  workstation for further assessment.    HISTORY: tumor response assessment for research study [id number];  Primary lung adenocarcinoma, left (H)    COMPARISON: CT 8/26/2020, 7/12/2020, 6/11/2020, and 5/30/2020    FINDINGS:     CHEST:  Lungs: Innumerable pulmonary masses and nodules are stable to slightly  decreased in size. Representative stable lesions include a 3.0 x 1.6  cm in the posterior left lower lobe, superior segment (series 3 image  152), anterior left upper lobe lesion measuring 1.5 x 0.7 cm (series 3  image 172), nodular thickening along the left major fissure with  thickest portion measuring 1.8 x 1.5 cm (series 3 image 163).  Peripheral airspace nodules throughout the right lung have decreased  in size including 6 mm airspace lesion in the right lower lobe (series  3 image 265, previously 9 mm), 7 mm peribronchial lesion in the right  lower lobe (series 2 image 52, previously 9 mm), and 3 mm nodule in  the posterior peripheral right lower lobe (series 3 image 260,  previously 6 mm). No new airspace consolidation.  Pleura: Small right pleural effusion has decreased. No pneumothorax.  Mediastinum: Right chest wall Port-A-Cath tip is in the low SVC.  Stable mediastinal and hilar disease, for example soft tissue  thickening adjacent to the main pulmonary artery measuring up to 1.8  cm thick. Mildly enlarged right heart. No pericardial effusion.  Non-aneurysmal thoracic aorta. Pulmonary artery filling defects within  the right upper lobe  segmental artery extending into anterior  subsegmental artery. Questionable filling defects in the right lower  lobe lateral segment (series 3 image 242-251). Patulous esophagus  unchanged.    ABDOMEN/PELVIS:  Hepatobiliary: Increased size of hypoenhancing metastasis throughout  the liver with numerous small new smaller lesions including 5.5 x 3.5  cm lesion in hepatic segment 4A) encasing/narrowing the middle hepatic  vein and lesions involving nearly all of segment 2. Hepatic artery  arises from the aorta and is patent. No gallstones or evidence of  acute cholecystitis.  Pancreas: No suspicious pancreatic lesions. Pancreatic duct is not  dilated.  Spleen: Within normal limits.  Adrenals: 2 x 1.8 cm left adrenal nodule is stable since 7/12/2020. No  right adrenal nodules.   Genitourinary: No hydronephrosis or obstructing renal stones.  Moderately distended urinary bladder without significant wall  thickening. Pelvic organs are unremarkable.  Bowel: No abnormally dilated bowel loops. Diverticulosis without  radiographic evidence of diverticulitis.   Peritoneum: Small amount of fluid in the pelvis and around the spleen.  No free air within the abdomen.  Vessels: No infrarenal aortic aneurysm.  Lymph Nodes: Increased periportal haziness which may represent  necrotic lymph nodes. There are enlarging lymph nodes in the desmond  hepatis measuring up to 1.3 cm, previously 0.8 cm. Retroperitoneal  lymphadenopathy is unchanged, measuring up to 1.3 cm short axis.    BONES/SOFT TISSUES:   Destructive lesion in the left 12th rib is unchanged. There is a  developing permeative appearance of the left sacral ala, highly  concerning for metastasis. Mild degenerative changes of the  thoracolumbar spine.  Impression: IMPRESSION: In this patient with metastatic lung adenocarcinoma:    1. Subsegmental right upper lobe pulmonary emboli and questionable  subsegmental right lower lobe pulmonary emboli. No evidence of  pulmonary  hemorrhage/necrosis.    2. Overall mixed treatment response since 7/12/2020.    a. Stable size of numerous scattered pleural-based pulmonary masses.    b. Stable to slightly decreased size of numerous pulmonary nodules  which are presumably metastatic, most notably in the right lung.    c. Stable infiltrative mediastinal disease.    d. Increased size/number of hepatic metastasis. There is increased  narrowing of the middle hepatic vein; hepatic vasculature is otherwise  patent.    e. New metastatic lesion in the left sacral ala. Stable metastatic  lesion in the left 12th rib.    f.  Increased necrotic lymphadenopathy in the abdomen.    [Urgent Result: Pulmonary embolism]    Finding was identified on 9/15/2020 4:15 PM.     Dr. Cowan was contacted by Dr. Gaytan at 9/15/2020 at 5:03 PM and  verbalized understanding of the urgent finding.     I have personally reviewed the examination and initial interpretation  and I agree with the findings.    JOSH RITCHIE MD  MRI Brain w & w/o contrast  Narrative: Provided History: research study [id number]; Primary lung  adenocarcinoma, left (H).  ICD-10: Primary lung adenocarcinoma, left (H)    Comparison: 6/11/2020 and 5/6/2020 brain MR.    Technique: Multiplanar T1-weighted, axial FLAIR, and susceptibility  images were obtained without intravenous contrast. Following  intravenous gadolinium-based contrast administration, axial  T2-weighted, diffusion, and T1-weighted images (in multiple planes)  were obtained.    Additional history from the EMR: 6/2020 gamma knife to left cerebellar  metastasis     Contrast: 7 mL Gadavist    Findings: Multiple new metastatic foci;    4 mm enhancing focus in the left motor strip (series 15 image 25), of  note this is not well visualized in the thin MP RAGE sequence  4 mm enhancing focus in the left precuneus (series 15 image 22), of  note this is not well visualized in the thin MP RAGE sequence    New 8mm right thalamus lesion (Series  14, Image 80).  New 3 mm left cerebellar lesion (series 14, Image 27).   New 4 mm right cerebellar lesion (14 image 37).    Treated lesion in the left cerebellar hemisphere, now measures 7 mm,  previously 5 mm on 2020.    No leptomeningeal enhancement.    There is no mass effect, midline shift, or evidence . The ventricles  are proportionate to the cerebral sulci. Normal major vascular  intracranial flow-voids.    No abnormality of the skull marrow signal. The visualized portions of  paranasal sinuses, and mastoid air cells are relatively clear. The  orbits are grossly unremarkable.  Impression: Impression:    In this patient with history of metastatic lung cancer: Progressive  metastatic brain disease;  1. Increasing size of left treated cerebellar lesion.  2. Multiple new parenchymal lesions as described above.    I have personally reviewed the examination and initial interpretation  and I agree with the findings.    STONE MAGUIRE MD  Echocardiogram Complete  [id number]  [id number]  [id number]  [id number]^SHREYAS^SHAHEED           [facility]  Diagnostic and Treamtent-3rd Floor  [address] [place] [id number]     Name: MORRIS ELLER  MRN: [id number]  : 1950  Study Date: 09/15/2020 11:09 AM  Age: 69 yrs  Gender: Male  Patient Location: [facility]  Reason For Study: Encounter for screening for cardiovascular disorders  Ordering Physician: SHAHEED PRITCHETT  Referring Physician: SHAHEED PRITCHETT  Performed By: Lilly Taveras RDCS     BSA: 1.9 m2  Height: 73 in  Weight: 151 lb  HR: 85  BP: 110/64 mmHg  _____________________________________________________________________________  __        Procedure  Echocardiogram with two-dimensional, color and spectral Doppler performed.  Contrast Optison. Optison (NDC #3565-1602-19) given intravenously. Patient was  given 6 ml mixture of 3 ml Optison and 6 ml saline. 3 ml  wasted.  _____________________________________________________________________________  __        Interpretation Summary  Mildly (EF 40-45%) reduced left ventricular function is present. Mild diffuse  hypokinesis is present.  Global right ventricular function is mildly reduced.  No significant valvular dysfunction present.  Pulmonary artery systolic pressure is normal.  Dilation of the inferior vena cava is present with abnormal respiratory  variation in diameter.  No pericardial effusion is present.  _____________________________________________________________________________  __        Left Ventricle  Left ventricular size is normal. Left ventricular wall thickness is normal.  Mildly (EF 40-45%) reduced left ventricular function is present. Grade I or  early diastolic dysfunction. Mild diffuse hypokinesis is present.     Right Ventricle  Borderline right ventricular enlargement. Global right ventricular function is  mildly reduced.     Atria  The right atria appears normal. Cannot assess left atrium.     Mitral Valve  The mitral valve is normal.        Aortic Valve  Trileaflet aortic sclerosis without stenosis.     Tricuspid Valve  The tricuspid valve is normal. Trace tricuspid insufficiency is present.  Estimated pulmonary artery systolic pressure is 18 mmHg plus right atrial  pressure. Pulmonary artery systolic pressure is normal.     Pulmonic Valve  The pulmonic valve is normal.     Vessels  The aorta root is normal. Sinuses of Valsalva 3.6 cm. Ascending aorta 3.9 cm.  Dilation of the inferior vena cava is present with abnormal respiratory  variation in diameter. IVC diameter >2.1 cm collapsing <50% with sniff  suggests a high RA pressure estimated at 15 mmHg or greater.     Pericardium  No pericardial effusion is present.        Compared to Previous Study  This study was compared with the study from 6.11.20 . LV and RV function are  mildly  reduced.  _____________________________________________________________________________  __  MMode/2D Measurements & Calculations  IVSd: 0.62 cm     LVIDd: 3.8 cm  LVIDs: 3.2 cm  LVPWd: 0.76 cm  FS: 15.7 %  LV mass(C)d: 70.8 grams  LV mass(C)dI: 37.1 grams/m2  Ao root diam: 3.7 cm  asc Aorta Diam: 3.8 cm  LVOT diam: 2.4 cm  LVOT area: 4.6 cm2     EF(MOD-bp): 42.5 %  RWT: 0.40        Doppler Measurements & Calculations  MV E max jordi: 45.2 cm/sec  MV A max jordi: 70.3 cm/sec  MV E/A: 0.64  MV dec slope: 214.2 cm/sec2  MV dec time: 0.21 sec  PA acc time: 0.07 sec  E/E' avg: 10.7  Lateral E/e': 10.4  Medial E/e': 10.9        _____________________________________________________________________________  __           Report approved by: Radha Dorado 09/15/2020 12:04 PM        Imaging was personally reviewed. On my review, the larger liver mets look largely stable. I agree that there a number of really small new mets compared to the July 2020 scan, and certainly compared to the June 2020 scan.      ASSESSMENT AND PLAN  Lung adenocarcinoma: Mixed response to sitravatinib + nivolumab. The only real site of progression is the liver, and the new mets there, while numerous, are quite small. We used the June 11 CT as the baseline, but note that treatment did not start until the second week of July 12, and we have another CT July 12, which would be the more relevant baseline to compare here. There's also no clinical evidence to suggest that these are little abscesses. The remainder of the disease is stable, which is remarkable considering the pace of progression prior to starting study treatment. I discussed that in the absence of other issues, specifically the PE and decreased EF, I would recommend continuing for a bit longer and confirming PD vs SD in 4-6 weeks. The protocol allows continuation even in light of RECIST defined PD if the patient is clinically benefitting. However, in communication with the sponsor, and in  "following the protocol, the recommendation with regards to the PE is to hold sitravatinib for 1-2 weeks then resume. We also have the issue of decreased EF. The IB for sitravatinib requires holding - see below. Will reassess for resuming sitravatinib in 2 weeks. Continue nivolumab today.     Left ventricular function systolic dysfunction (CTACAE term): Not grade 3. Not symptomatic. No grade 1 or 2 designations. EF started at 60%, now 40-45% with diffuse hypokinesis. Possibly related to sitravatinib. Unlikely to be related to nivolumab. Hold sitra as above. BP is too low to tolerate meaningful doses of ACEI. Sitra IB on page 84 states \"In cases where LVEF decreases by 20% or more to an LVEF < 50%, the dose of sitravatinib should be interrupted and/or reduced. Permanent discontinuation should be considered for patients requiring acute hospitalization for treatment of congestive heart failure (CHF).\" Asymptomatic. Has chronic shortness of breath that is not worse and certainly doesn't have radiographic or clinical evidence of decompensated CHF. Not tachycardic on my exam today. Repeat TTE in 2 weeks.    Brain mets: New small mets. Dr. Zamorano called me and will discuss retreatment with Edy.     Segmental/subsegmental PE (closest CTCAE 4.03 term thromboembolic event): Found incidentally. Not clear that it was a embolic event - will get LE US. Asymptomatic. Started rivaroxaban. Discussed risk of bleeding from brain mets weighed against the risk of clotting, but that both large PE and intracranial hemorrhage can be life-threatening and catastrophic. Right now the scale tips in favor of anticoagulation. They understand this risk/benefit discussion.     L sacral met: Getting symptomatic enough that I think palliative radiotherapy would be worthwhile. Dr. Zamorano aware, and will discuss with Edy. Will consider denosumab vs zometa in the near future.     Amylase elevated: Now Grade 4. No clinical evidence of pancreatitis " and lipase is unremarkable, as it has been. Had elevated Grade 3 amylase and normal lipase at baseline as well. I do not think this is related to either study drug. ADDENDUM: Differences in CTCAE 4.03 and 5.0 - per 5.0 was only grade 3. Will discuss with sponsor about need to discontinue nivolumab as well. Recheck amylase early next week and monitor for clinical pancreatitis but without any elevation in lipase, this seems unlikely.     Transaminitis, ALT/AST elevated: Secondary to liver mets. I don't think they're related to study drug. Stable.     Diarrhea/constipation: Self-limited and hasn't recurred so not due to study drugs. Back to mild constipation. Not on any stool softeners. Discussed that I think BM every 2-3 days is ok for right now.      Anorexia, dysgeusia: Encouraged increasing Boost and oral intake. Would like to stay away from dex for obvious reasons, and megace due to increased hypercoagulability. Marinol might be an option. Has an appt with Dietician.     Anxiety: Increased mirtazipine helping. Avoiding SSRIs due to restrictions while on study. Now has an appt with Dr. Loera on 9/25. At Edy and Pat's request, will see if this can be changed to an in-person visit.    Dyspnea: Due to L pleural disease and COPD. Mild COPD exacerbation a few weeks ago. Continue inhalers.     Cancer-related pain: Secondary to liver mets and now L hip pain. Managed with MSContin 15 mg at bedtime and oxycodone 5 mg 1-2 times per day, no escalation. Can consider decreasing after hip radiation.     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 No dysphagia. Discussed referral to Dr. Mason for injection if he wants. Not discussed today and stable.     Anemia: Hemoglobin stable. Microcytic. Check iron studies next visit.    Subclinical hypothyroidism: TSH mildly elevated but free T4 still on the upper end of normal. No intervention. Continue checking regularly.     H/o L malignant pleural effusion: No  recurrence.     SKYLER: Nasal device    A total of 40 minutes was spent with the patient, >50% of which was spent in counseling and coordination of care.    Susan Muller MD  Associate Professor of Medicine  Hematology, Oncology and Transplantation

## 2020-09-17 NOTE — NURSING NOTE
"Chief Complaint   Patient presents with     Port Draw     port accessed and labs drawn by rn in lab.  vital signs taken.     Port accessed by RN in lab with 20g 3/4\" gripper needle, labs drawn, port flushed with saline and heparin, vitals checked, pt checked in for next appointment.    Renea Christian RN      "

## 2020-09-18 NOTE — PROGRESS NOTES
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 GK-SRS, Left cerebellum, 2000cGy in 1 fraction, 6/15/2020        INTERVAL SINCE COMPLETION OF RADIATION THERAPY: 3 months      SUBJECTIVE: Mr. Kirkpatrick is a 69 year old gentleman with diffusely metastatic NSCLC. Regarding his oncologic history, a CT chest from 2018 (completed for evaluation of an aortic aneurysm) incidentally discovered a 8 mm spiculated nodule in the patient's left lung. In 2019 he developed subacute SOB, and was found to have a large left pleural effusion. Thoracentesis (on 2019) was positive for adenocarcinoma. Of note, initial staging with MRI brain (3/7/2019) was negative for intracranial metastasis.  PET/CT on 3/8/2019, however, showed a hypermetabolic lesion in the left lower lobe accompanied by diffuse left visceral and parietal pleural disease involvement. There was also metastatic adenopathy involving the left supraclavicular, left internal mammary, left hilar, and bilateral mediastinal chains; as well as hypermetabolic lesions in the left adrenal gland and right upper lobe.     He was started on systemic therapy with carboplatin, pemetrexed, and pembrolizumab, ultimately receiving a total of 4 cycles.  Between  and 2019 he was continued on maintenance pemetrexed and pembrolizumab, for total of 5 cycles, before imaging showed progressive disease.  He was therefore switched to docetaxel and ramucirbumab in 2019. Restaging CT CAP on 3/31/2020, again, showed progression of disease and he was trialed on gemcitabine. He discontinued this therapy on 2020 due to further progression.       As part of work-up for the sitra-nivo Trial, he underwent a CT CAP on 2020. This showed further  "progressive sytemic disease (with increased size of bilateral diffuse lung, left pleural, and retroperitoneal lymph node metasases and worsening of 20 liver metastasis). Bone scan on 6/11/2020 also demonstrated a new left sacral ala metastasis, which was asymptomatic at that time. MRI brain (also from 6/11/2020) showed a contrast-enhancing 5 x 6 mm lesion in the left cerebellar hemisphere. For this isolated brain metastasis, and as the patient desired to be further considered for clinical trial participation, he received GK-SRS with us in late 6/2020 (as described above).    Since we last saw the patient he has continued to follow closely with Dr. Muller's team. He began sitravinib on 6/30/2020, and received his 1st dose of nivolumab on 7/23/2020. Unfortunately he was admitted to the hospital on 8/26/2020 after experiencing worsening RANKIN and SOB, and was treated (with a steroid taper) for a COPD exacerbation.    Restaging studies from 9/15/2020 have revealed a mixed response to his newest form of systemic therapy. The disease in his mediastinum appeared stable, but there were worsening metastases in his liver. More notable sclerotic changes were appreciated in his left sacral ala, and he complained of worsening left hip pain. An incidental PE was also noted on CT, and he was started on rivaroxaban.     Brain MRI from the same day also showed the interval development of 5 new punctate brain metastases, as well as a slight increase in size of the previously irradiated left cerebellar lesion.    Today the patient states that he continues to feel \"pain in my lower left back,\" particularly when he is getting in and out of his car. He denies urinary/bowel dysfunction, headache, vision changes, seizure, SOB, fatigue or pain at any other site.     OBJECTIVE:   GENERAL:  Appears well, in no acute distress. Thin  CV: Normal rate, regular rhythm, good distal perfusion.   RESP: CTAB  Neuro: CNII-XII grossly in tact. No focal " deficits  Extremities: There is TTP of the posterior left sacral ala.    CT-CAP: 9/15/2020      Brain MRI: 9/15/2020  4 mm enhancing focus in the left motor strip (series 15 image 25), of  note this is not well visualized in the thin MP RAGE sequence  4 mm enhancing focus in the left precuneus (series 15 image 22), of  note this is not well visualized in the thin MP RAGE sequence     New 8mm right thalamus lesion (Series 14, Image 80).  New 3 mm left cerebellar lesion (series 14, Image 27).   New 4 mm right cerebellar lesion (14 image 37).    IMPRESSION:  Mr. Kirkpatrick is a 69 year old gentleman with diffusely metastatic NSCLC, progressive on multiple lines of systemic therapy, including most recently sitravinib and nivolumab. He has an increasingly symptomatic left sacral ala lesion, as well as 5 new brain metastases.    RECOMMENDATIONS: We have offered the patient palliative XRT to his sacrum (800cGy in 1 fractions), as well as repeat GK-SRS (frame based) to his new intracranial lesions.    We discussed  single fraction  to the sacrum versus  a more protracted course.  He choose the single fraction regimen.  This could be repeated in the future if need be.    He is agreeableradiosurgery, too. and signed written consent for repeat radiation today.  Because there are 5 lesions and he is claustrophobic, we are offering a FRAME based SRS.   We tentatively are planning this for 9/28 .      He underwent CT simulation of his sacrum   with us this afternoon, and will be scheduled for a GK-SRS date sometime in the coming days.    Anand Andrade MD-PhD PGY-5  Chief Radiation Oncology Resident, [facility]  [phone]    I was personally present with the resident during the history and exam.  I   discussed the case with the resident and agree with the findings and plan   of care as documented in the resident's note.    Brittney Zamorano   [phone]     CC  Patient Care Team:  Donald Shell,  as  PCP - General (Family Practice)  Donald Rudolph, DO as Assigned PCP  Susan Muller MD as Referring Physician (Hematology & Oncology)  Darleen Ruiz RN as Specialty Care Coordinator (Hematology & Oncology)  DONALD RUDOLPH

## 2020-09-21 NOTE — PROGRESS NOTES
FOLLOW-UP VISIT    Patient Name: Kentrell Kirkpatrick      : 1950     Age: 69 year old        ______________________________________________________________________________     Chief Complaint   Patient presents with     Cancer     Pt is here for a follow up:Gamma Knife 06/15/20     Pain  Current history of pain associated with this visit:   Intensity: 10  Current: aching  Location: Left hip  Treatment: MS Contin    Labs  Other Labs: No    Imaging  CT: 09/15/20  MRI: 09/15/20      Other Appointments:     MD Name: Dr Muller Appointment Date: 20   MD Name: Appointment Date:   MD Name: Appointment Date:   Other Appointment Notes:     Residual Radiation side effect: No concerns     Additional Instructions:     Nurse face-to-face time: Level 3:  10 min face to face time

## 2020-09-21 NOTE — LETTER
"  9/21/2020     RE: Kentrell Kirkpatrick  [address]  [place] [address]    Dear Colleague,    Thank you for referring your patient, Kentrell Kirkpatrick, to the [facility]. Please see a copy of my visit note below.    Kentrell Kirkpatrick is a 69 year old male who is being evaluated via a billable telephone visit.      The patient has been notified of following:     \"This telephone visit will be conducted via a call between you and your physician/provider. We have found that certain health care needs can be provided without the need for a physical exam.  This service lets us provide the care you need with a short phone conversation.  If a prescription is necessary we can send it directly to your pharmacy.  If lab work is needed we can place an order for that and you can then stop by our lab to have the test done at a later time.    Telephone visits are billed at different rates depending on your insurance coverage. During this emergency period, for some insurers they may be billed the same as an in-person visit.  Please reach out to your insurance provider with any questions.    If during the course of the call the physician/provider feels a telephone visit is not appropriate, you will not be charged for this service.\"    Patient has given verbal consent for Telephone visit?  Yes    CLINICAL NUTRITION SERVICES - ASSESSMENT NOTE    Kentrell Kirkpatrick 69 year old referred for MNT related to lung cancer    Time Spent: 30 minutes  Visit Type: video  Referring Physician: Ora Guevara CNP 8/20  Pt accompanied by: his wife, Pat    Nutrition Prescription   Recommendations Suggested by Registered Dietitian (RD):   1. 2300kcal (30kcal/kg) daily  2. 80 grams protein (1.2g/kg)  3. 2-3 ONS (home made shakes) daily   Malnutrition: severe in the context of chronic illness      NUTRITION HISTORY  Factors affecting nutrition intake include:decreased appetite - forcing self to eat  Current diet: general  Current " appetite/intake: poor  Chemotherapy: Ramandeep Snow tells me that his intake and weight have been stable, however, he has been forcing himself to eat.    He has been taking 2 Ensure plus daily between meals but has been getting tired of these.    He has also been trying to increase his calories at his lunch meal by eating fast food burgers but does not feel like that his helping either.   He typically has 3 meals/day and take his ensure between meals.      Diet Recall  Breakfast Cheerios with whole milk and berries OR Eggs and sausage with waffle    Lunch [place] burgers OR meat/cheese sandwich from home,  1 cup whole milk   Dinner Meat, potato, veggie OR soups   Snacks minimal   Beverages Water, 2 Ensure Plus     ANTHROPOMETRICS  Height:   Weight:   BMI: 19.9  Weight Status:  Normal BMI - very low range normal  IBW: 184 lbs (81%)  Weight History: down 16 lbs x past 2 1/2 months (10%)  Wt Readings from Last 10 Encounters:   09/17/20 68.3 kg (150 lb 9.6 oz)   09/11/20 68.7 kg (151 lb 8 oz)   09/04/20 66.6 kg (146 lb 14.4 oz)   08/26/20 69.5 kg (153 lb 4.8 oz)   08/20/20 70.6 kg (155 lb 11.2 oz)   07/21/20 72.4 kg (159 lb 9.6 oz)   07/15/20 73.3 kg (161 lb 9.6 oz)   07/12/20 72.6 kg (160 lb)   07/10/20 74.3 kg (163 lb 11.2 oz)   07/07/20 75.7 kg (166 lb 14.4 oz)     Dosing Weight: 68kg    Medications/vitamins/minerals/herbals:   Reviewed    Labs:   Labs reviewed    NUTRITION FOCUSED PHYSICAL ASSESSMENT FOR DIAGNOSING MALNUTRITION:  Not observed due to covid    ASSESSED NUTRITION NEEDS:  Estimated Energy Needs: 2300 kcals (30 Kcal/Kg)  Justification: repletion  Estimated Protein Needs: 80 grams protein (1.2 g pro/Kg)  Justification: Repletion  Estimated Fluid Needs: 2300  mL   Justification: 1ml/kcal    MALNUTRITION:  % Weight Loss:  > 7.5% in 3 months (severe malnutrition)  % Intake:  <75% for >/= 3 months (non-severe malnutrition)  Subcutaneous Fat Loss:  Not observed  Muscle Loss:  Not observed  Fluid  Retention:  Not noted    Malnutrition Diagnosis: Non-Severe malnutrition  In Context of:  Chronic illness or disease    NUTRITION DIAGNOSIS:  Inadequate oral intake related to decreased appetite as evidenced by 10% wt loss x past 2 months    INTERVENTIONS  Provided written & verbal education:   - Discussed ways to maximize and fortify foods with calories and protein via small frequent meals.    - Advised pt to aim for at least 2300kcal and 80g protein daily. Encouraged to have a protein source with each meal.  Reviewed protein sources in detail.   Encouraged to try increasing daily calorie intake by 500kcal/day to help facilitate weight gain.   - Reviewed ONS options (Mass Pro weight harshad, Ensure Enlive, Ensure Plus/Boost Plus, CIB, Benecalorie etc). Encouraged utilizing these ONS in home made shakes/smoothies to prevent flavor fatigue.   - Consider appetite stimulant if PO intake does not improve and continues to force self to eat.     Provided pt with corresponding education materials/handouts on:  High Calorie, High Protein Recipe booklet, Tips to Increase the Calories in Your Diet and Sources of Protein - sent via secure email    Pt verbalize understanding of materials provided during consult.   Patient Understanding: Excellent  Expected Compliance: Excellent    Goals  1.  Aim for 5-6 small frequent meals  2.  Aim for 2300kcal and 80g protein/day  3. Weight gain towards 160 lbs as able      Follow-Up Plans: Pt has RD contact information for questions.      Priyanka Mustafa RDN, LD

## 2020-09-21 NOTE — PROGRESS NOTES
"Kentrell Kirkpatrick is a 69 year old male who is being evaluated via a billable telephone visit.      The patient has been notified of following:     \"This telephone visit will be conducted via a call between you and your physician/provider. We have found that certain health care needs can be provided without the need for a physical exam.  This service lets us provide the care you need with a short phone conversation.  If a prescription is necessary we can send it directly to your pharmacy.  If lab work is needed we can place an order for that and you can then stop by our lab to have the test done at a later time.    Telephone visits are billed at different rates depending on your insurance coverage. During this emergency period, for some insurers they may be billed the same as an in-person visit.  Please reach out to your insurance provider with any questions.    If during the course of the call the physician/provider feels a telephone visit is not appropriate, you will not be charged for this service.\"    Patient has given verbal consent for Telephone visit?  Yes    CLINICAL NUTRITION SERVICES - ASSESSMENT NOTE    Kentrell Kirkpatrick 69 year old referred for MNT related to lung cancer    Time Spent: 30 minutes  Visit Type: video  Referring Physician: Ora Guevara CNP 8/20  Pt accompanied by: his wife, Audrey    Nutrition Prescription   Recommendations Suggested by Registered Dietitian (RD):   1. 2300kcal (30kcal/kg) daily  2. 80 grams protein (1.2g/kg)  3. 2-3 ONS (home made shakes) daily   Malnutrition: severe in the context of chronic illness      NUTRITION HISTORY  Factors affecting nutrition intake include:decreased appetite - forcing self to eat  Current diet: general  Current appetite/intake: poor  Chemotherapy: Nivolumab       Edy tells me that his intake and weight have been stable, however, he has been forcing himself to eat.    He has been taking 2 Ensure plus daily between meals but has been getting tired of " these.    He has also been trying to increase his calories at his lunch meal by eating fast food burgers but does not feel like that his helping either.   He typically has 3 meals/day and take his ensure between meals.      Diet Recall  Breakfast Cheerios with whole milk and berries OR Eggs and sausage with waffle    Lunch [place] burgers OR meat/cheese sandwich from home,  1 cup whole milk   Dinner Meat, potato, veggie OR soups   Snacks minimal   Beverages Water, 2 Ensure Plus     ANTHROPOMETRICS  Height:   Weight:   BMI: 19.9  Weight Status:  Normal BMI - very low range normal  IBW: 184 lbs (81%)  Weight History: down 16 lbs x past 2 1/2 months (10%)  Wt Readings from Last 10 Encounters:   09/17/20 68.3 kg (150 lb 9.6 oz)   09/11/20 68.7 kg (151 lb 8 oz)   09/04/20 66.6 kg (146 lb 14.4 oz)   08/26/20 69.5 kg (153 lb 4.8 oz)   08/20/20 70.6 kg (155 lb 11.2 oz)   07/21/20 72.4 kg (159 lb 9.6 oz)   07/15/20 73.3 kg (161 lb 9.6 oz)   07/12/20 72.6 kg (160 lb)   07/10/20 74.3 kg (163 lb 11.2 oz)   07/07/20 75.7 kg (166 lb 14.4 oz)     Dosing Weight: 68kg    Medications/vitamins/minerals/herbals:   Reviewed    Labs:   Labs reviewed    NUTRITION FOCUSED PHYSICAL ASSESSMENT FOR DIAGNOSING MALNUTRITION:  Not observed due to covid    ASSESSED NUTRITION NEEDS:  Estimated Energy Needs: 2300 kcals (30 Kcal/Kg)  Justification: repletion  Estimated Protein Needs: 80 grams protein (1.2 g pro/Kg)  Justification: Repletion  Estimated Fluid Needs: 2300  mL   Justification: 1ml/kcal    MALNUTRITION:  % Weight Loss:  > 7.5% in 3 months (severe malnutrition)  % Intake:  <75% for >/= 3 months (non-severe malnutrition)  Subcutaneous Fat Loss:  Not observed  Muscle Loss:  Not observed  Fluid Retention:  Not noted    Malnutrition Diagnosis: Non-Severe malnutrition  In Context of:  Chronic illness or disease    NUTRITION DIAGNOSIS:  Inadequate oral intake related to decreased appetite as evidenced by 10% wt loss x past 2  months    INTERVENTIONS  Provided written & verbal education:   - Discussed ways to maximize and fortify foods with calories and protein via small frequent meals.    - Advised pt to aim for at least 2300kcal and 80g protein daily. Encouraged to have a protein source with each meal.  Reviewed protein sources in detail.   Encouraged to try increasing daily calorie intake by 500kcal/day to help facilitate weight gain.   - Reviewed ONS options (Mass Pro weight harshad, Ensure Enlive, Ensure Plus/Boost Plus, CIB, Benecalorie etc). Encouraged utilizing these ONS in home made shakes/smoothies to prevent flavor fatigue.   - Consider appetite stimulant if PO intake does not improve and continues to force self to eat.     Provided pt with corresponding education materials/handouts on:  High Calorie, High Protein Recipe booklet, Tips to Increase the Calories in Your Diet and Sources of Protein - sent via secure email    Pt verbalize understanding of materials provided during consult.   Patient Understanding: Excellent  Expected Compliance: Excellent    Goals  1.  Aim for 5-6 small frequent meals  2.  Aim for 2300kcal and 80g protein/day  3. Weight gain towards 160 lbs as able      Follow-Up Plans: Pt has RD contact information for questions.      Priyanka Mustafa RDN, LD

## 2020-09-21 NOTE — LETTER
2020         RE: Kentrell Kirkpatrick  [address] [facility] [address]        Dear Colleague,    Thank you for referring your patient, Kentrell Kirkpatrick, to the RADIATION ONCOLOGY CLINIC. Please see a copy of my visit note below.    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 GK-SRS, Left cerebellum, 2000cGy in 1 fraction, 6/15/2020        INTERVAL SINCE COMPLETION OF RADIATION THERAPY: 3 months      SUBJECTIVE: Mr. Kirkpatrick is a 69 year old gentleman with diffusely metastatic NSCLC. Regarding his oncologic history, a CT chest from 2018 (completed for evaluation of an aortic aneurysm) incidentally discovered a 8 mm spiculated nodule in the patient's left lung. In 2019 he developed subacute SOB, and was found to have a large left pleural effusion. Thoracentesis (on 2019) was positive for adenocarcinoma. Of note, initial staging with MRI brain (3/7/2019) was negative for intracranial metastasis.  PET/CT on 3/8/2019, however, showed a hypermetabolic lesion in the left lower lobe accompanied by diffuse left visceral and parietal pleural disease involvement. There was also metastatic adenopathy involving the left supraclavicular, left internal mammary, left hilar, and bilateral mediastinal chains; as well as hypermetabolic lesions in the left adrenal gland and right upper lobe.     He was started on systemic therapy with carboplatin, pemetrexed, and pembrolizumab, ultimately receiving a total of 4 cycles.  Between  and 2019 he was continued on maintenance pemetrexed and pembrolizumab, for total of 5 cycles, before imaging showed progressive disease.  He was therefore switched to docetaxel and ramucirbumab in 2019. Restaging CT CAP on 3/31/2020,  "again, showed progression of disease and he was trialed on gemcitabine. He discontinued this therapy on 4/1/2020 due to further progression.       As part of work-up for the sitra-nivo Trial, he underwent a CT CAP on 6/11/2020. This showed further progressive sytemic disease (with increased size of bilateral diffuse lung, left pleural, and retroperitoneal lymph node metasases and worsening of 20 liver metastasis). Bone scan on 6/11/2020 also demonstrated a new left sacral ala metastasis, which was asymptomatic at that time. MRI brain (also from 6/11/2020) showed a contrast-enhancing 5 x 6 mm lesion in the left cerebellar hemisphere. For this isolated brain metastasis, and as the patient desired to be further considered for clinical trial participation, he received GK-SRS with us in late 6/2020 (as described above).    Since we last saw the patient he has continued to follow closely with Dr. Muller's team. He began sitravinib on 6/30/2020, and received his 1st dose of nivolumab on 7/23/2020. Unfortunately he was admitted to the hospital on 8/26/2020 after experiencing worsening RANKIN and SOB, and was treated (with a steroid taper) for a COPD exacerbation.    Restaging studies from 9/15/2020 have revealed a mixed response to his newest form of systemic therapy. The disease in his mediastinum appeared stable, but there were worsening metastases in his liver. More notable sclerotic changes were appreciated in his left sacral ala, and he complained of worsening left hip pain. An incidental PE was also noted on CT, and he was started on rivaroxaban.     Brain MRI from the same day also showed the interval development of 5 new punctate brain metastases, as well as a slight increase in size of the previously irradiated left cerebellar lesion.    Today the patient states that he continues to feel \"pain in my lower left back,\" particularly when he is getting in and out of his car. He denies urinary/bowel dysfunction, headache, " vision changes, seizure, SOB, fatigue or pain at any other site.     OBJECTIVE:   GENERAL:  Appears well, in no acute distress. Thin  CV: Normal rate, regular rhythm, good distal perfusion.   RESP: CTAB  Neuro: CNII-XII grossly in tact. No focal deficits  Extremities: There is TTP of the posterior left sacral ala.    CT-CAP: 9/15/2020      Brain MRI: 9/15/2020  4 mm enhancing focus in the left motor strip (series 15 image 25), of  note this is not well visualized in the thin MP RAGE sequence  4 mm enhancing focus in the left precuneus (series 15 image 22), of  note this is not well visualized in the thin MP RAGE sequence     New 8mm right thalamus lesion (Series 14, Image 80).  New 3 mm left cerebellar lesion (series 14, Image 27).   New 4 mm right cerebellar lesion (14 image 37).    IMPRESSION:  Mr. Kirkpatrick is a 69 year old gentleman with diffusely metastatic NSCLC, progressive on multiple lines of systemic therapy, including most recently sitravinib and nivolumab. He has an increasingly symptomatic left sacral ala lesion, as well as 5 new brain metastases.    RECOMMENDATIONS: We have offered the patient palliative XRT to his sacrum (800cGy in 1 fractions), as well as repeat GK-SRS (frame based) to his new intracranial lesions.    We discussed  single fraction  to the sacrum versus  a more protracted course.  He choose the single fraction regimen.  This could be repeated in the future if need be.    He is agreeableradiosurgery, too. and signed written consent for repeat radiation today.  Because there are 5 lesions and he is claustrophobic, we are offering a FRAME based SRS.   We tentatively are planning this for 9/28 .      He underwent CT simulation of his sacrum   with us this afternoon, and will be scheduled for a GK-SRS date sometime in the coming days.    Anand Andrade MD-PhD PGY-5  Chief Radiation Oncology Resident, [facility]  [phone]    I was personally present with the resident  during the history and exam.  I   discussed the case with the resident and agree with the findings and plan   of care as documented in the resident's note.    Brittney Zamorano   [phone]       Patient Care Team:  Donald Rudolph DO as PCP - General (Family Practice)  Donald Rudolph DO as Assigned PCP  Susan Muller MD as Referring Physician (Hematology & Oncology)  Darleen Ruiz RN as Specialty Care Coordinator (Hematology & Oncology)  DONALD RUDOLHP      FOLLOW-UP VISIT    Patient Name: Kentrell Kirkpatrick      : 1950     Age: 69 year old        ______________________________________________________________________________     Chief Complaint   Patient presents with     Cancer     Pt is here for a follow up:Gamma Knife 06/15/20     Pain  Current history of pain associated with this visit:   Intensity: 4/10  Current: aching  Location: Left hip  Treatment: MS Contin    Labs  Other Labs: No    Imaging  CT: 09/15/20  MRI: 09/15/20      Other Appointments:     MD Name: Dr Muller Appointment Date: 20   MD Name: Appointment Date:   MD Name: Appointment Date:   Other Appointment Notes:     Residual Radiation side effect: No concerns     Additional Instructions:     Nurse face-to-face time: Level 3:  10 min face to face time          Again, thank you for allowing me to participate in the care of your patient.        Sincerely,        Brittney Zamorano MD

## 2020-09-21 NOTE — PROGRESS NOTES
Port a cath, right chest wall SL, accessed with 20 G, 3/4 inch, with good blood return and was able to complete lab draw with 10 ml of blood sample handed it to . Port flushed with 10 ml of normal saline and locked with 3 ml of heparin lock.  Spent 15 minutes with procedure with patient.

## 2020-09-21 NOTE — TELEPHONE ENCOUNTER
Talked to Edy. Labs look about the same, amylase a little higher but absolutely no sxs of pancreatitis, no nausea, GI upset, pain.     Called mostly because hemoglobin had dropped 1 g compared to last week. No bleeding. BMs are the same, had one over the weekend but none today (usual for him). Taking rivaroxaban so asked him to monitor for any signs or symptoms of bleeding or other new sxs and let us know.     Otherwise he's doing ok - same as last week, nothing new, actually having a fairly decent day today it sounds like. His wife Audrey thinks his voice might be a little stronger.     Has appt with Cardiologist tomorrow. I will see him next week with repeat TTE.    Susan Muller MD

## 2020-09-22 NOTE — PROGRESS NOTES
HISTORY:    Kentrell Kirkpatrick is a very pleasant 69-year-old male accompanied by his attentive wife today.  He has a history of non-small cell lung cancer which is metastatic to brain and liver.  He is currently undergoing chemotherapy utilizing Sitravatinib and Nivolumab.  He has had surveillance echo was done.  His echo done in June showed a normal ejection fraction but it September repeat EF showed his ejection fraction had gone from 60 to 65% down to 40 to 45%.  He was asked to see cardiology for this reason.    Kentrell has had very severe dyspnea throughout his 1-1/2-year cortez with cancer.  He is quite clear that his dyspnea has not worsened in the last 3 months.  He remains relatively active.  Yesterday he went golfing and had to walk 3 or 4 blocks and reports that he had to stop twice to catch his breath with that walk.  He has not had any symptoms of PND or orthopnea and he denies any exertional chest, arm, neck, or jaw discomfort.  He also denies strokelike symptoms, orthostasis, palpitations, peripheral edema, or symptoms of claudication.    Kentrell does have a history of COPD contributing to his dyspnea.  He also has some anxiety and cancer related pain managed with narcotics.  In addition he has some hoarseness felt likely secondary to left recurrent laryngeal nerve involvement, no dysphasia.  He has stable anemia and subclinical hypothyroidism.  He also has obstructive sleep apnea.    Edy is an ex-smoker having given up cigarettes many years ago.  He reports that his father had an MI in his 40s but lived to age 86 without other heart problems which of course brings into question his initial diagnosis.  Edy reports that he sleeps in a recliner but has done so (because he sleeps better) ever since his diagnosis and presenting symptoms of dyspnea a year and a half ago.  His LDL is modestly high at 124 and he has no history of diabetes or hypertension.    A CT scan done recently showed evidence for  pulmonary emboli and he was started on rivaroxaban which he continues.      ASSESSMENT/PLAN:    1.  Moderate reduction in ejection fraction, likely secondary to his chemotherapeutic agents.  Nivolumab has a reported incidence of myocarditis of less than 1%, and this is not likely due to that medication.  Sitravatinib is an experimental medication with very little data but is the more likely culprit.  Given his limited options for his metastatic cancer therapy, and his asymptomatic status, I agree with continuing his chemotherapy for now.  We will need to watch his ejection fraction carefully and consider discontinuation if we see any significant additional deterioration of LV function.  In the meantime, we will initiate standard heart failure medications, beginning with metoprolol 25 mg daily.  The patient will check to make sure this is allowed under the study protocol for his experimental medication.  I will enroll him in core clinic for medication titration.  His blood pressure is on the low side and will undoubtedly limit our ability to reach target goals of ACE inhibitor and beta-blocker.    Thank you for inviting me to participate in your patient's care.  Please do not hesitate to call if I can be of further assistance.    Orders Placed This Encounter   Procedures     Follow-Up with [facility]- [facility] Visit     Orders Placed This Encounter   Medications     metoprolol succinate ER (TOPROL XL) 25 MG 24 hr tablet     Sig: Take 1 tablet (25 mg) by mouth daily     Dispense:  60 tablet     Refill:  6     Medications Discontinued During This Encounter   Medication Reason     mirtazapine (REMERON) 7.5 MG tablet Dose adjustment       10 year ASCVD risk: The 10-year ASCVD risk score (Weslymiriam MCGILL Jr., et al., 2013) is: 12%    Values used to calculate the score:      Age: 69 years      Sex: Male      Is Non- : No      Diabetic: No      Tobacco smoker: No      Systolic Blood Pressure: 104 mmHg      Is BP  treated: No      HDL Cholesterol: 57 mg/dL      Total Cholesterol: 195 mg/dL    Encounter Diagnosis   Name Primary?     Decreased cardiac ejection fraction        CURRENT MEDICATIONS:  Current Outpatient Medications   Medication Sig Dispense Refill     albuterol (PROAIR HFA/PROVENTIL HFA/VENTOLIN HFA) 108 (90 Base) MCG/ACT inhaler Inhale 2 puffs into the lungs every 4 hours (while awake) 1 Inhaler 0     albuterol (PROVENTIL) (2.5 MG/3ML) 0.083% neb solution Take 1 vial (2.5 mg) by nebulization every 4 hours as needed for shortness of breath / dyspnea or wheezing 100 vial 11     calcium polycarbophil (FIBERCON) 625 MG tablet Take 2 tablets by mouth daily       finasteride (PROSCAR) 5 MG tablet TAKE 1 TABLET BY MOUTH EVERY DAY 90 tablet 1     LORazepam (ATIVAN) 1 MG tablet Take 1 tablet (1 mg) by mouth every 6 hours as needed for anxiety 30 tablet 0     Melatonin 10 MG TABS tablet Take 10 mg by mouth nightly as needed for sleep       metoprolol succinate ER (TOPROL XL) 25 MG 24 hr tablet Take 1 tablet (25 mg) by mouth daily 60 tablet 6     mirtazapine (REMERON) 15 MG tablet Take 1 tablet (15 mg) by mouth At Bedtime 30 tablet 1     mometasone-formoterol (DULERA) 100-5 MCG/ACT inhaler Inhale 2 puffs into the lungs daily       morphine (MS CONTIN) 15 MG CR tablet Take 1 tablet (15 mg) by mouth 2 times daily 60 tablet 0     NIVOLUMAB IV Inject 480 mg into the vein every 28 days       oxyCODONE (ROXICODONE) 5 MG tablet Take 1 tablet (5 mg) by mouth every 4 hours as needed for severe pain 100 tablet 0     prochlorperazine (COMPAZINE) 10 MG tablet Take 1 tablet (10 mg) by mouth every 6 hours as needed (Nausea/Vomiting) 30 tablet 11     Rivaroxaban ANTICOAGULANT 15 & 20 MG TBPK 15 mg BID for 21 days, then 20 mg daily 1 each 0     senna (SENOKOT) 8.6 MG tablet Take 1 tablet by mouth daily       study - sitravatinib malate salt, [id number],, IDS# [id number], 40 mg roller oompaction capsule Take 1 capsule (40 mg) by mouth daily 60  capsule 0     study - sitravatinib malate salt, [id number],, IDS# [id number], 60 mg roller oompaction capsule Take 1 capsule (60 mg) by mouth daily 30 capsule 0     tamsulosin (FLOMAX) 0.4 MG capsule TAKE 1 CAPSULE BY MOUTH EVERY DAY 90 capsule 1     tiotropium (SPIRIVA HANDIHALER) 18 MCG inhaled capsule Inhale 1 capsule (18 mcg) into the lungs daily 1 capsule 11     rivaroxaban ANTICOAGULANT (XARELTO ANTICOAGULANT) 20 MG TABS tablet Take 1 tablet (20 mg) by mouth daily (with dinner) (Patient not taking: Reported on 9/22/2020) 30 tablet 11       ALLERGIES   No Known Allergies    PAST MEDICAL HISTORY:  Past Medical History:   Diagnosis Date     Cancer (H) 2/25/19     Colon polyps      COPD (chronic obstructive pulmonary disease) (H)        PAST SURGICAL HISTORY:  Past Surgical History:   Procedure Laterality Date     BIOPSY  3/15/19     COLONOSCOPY       COLONOSCOPY N/A 12/22/2016    Procedure: COMBINED COLONOSCOPY, SINGLE OR MULTIPLE BIOPSY/POLYPECTOMY BY BIOPSY;  Surgeon: Jeremi Kramer MD;  Location:  GI     GENITOURINARY SURGERY      Using flow-max     HEAD & NECK SURGERY      stiff / sore neck     INSERT CHEST TUBE Left 3/1/2019    Procedure: INSERT CHEST TUBE;  Surgeon: Matthew Rodriguez MD;  [facility]     INSERT CHEST TUBE N/A 5/9/2019    Procedure: Removal Of Pleurx Catheter;  Surgeon: Matthew Rodriguez MD;  [facility]     IR CHEST PORT PLACEMENT > 5 YRS OF AGE  10/17/2019     IR LYMPH NODE BIOPSY  3/15/2019     THORACENTESIS Left 2/20/2019    Procedure: THORACENTESIS;  Surgeon: Matthew Rodriguez MD;  Location: [facility]       FAMILY HISTORY:  Family History   Problem Relation Age of Onset     Heart Disease Father      Coronary Artery Disease Father      Prostate Cancer Other      Prostate Cancer Brother      Colon Cancer No family hx of        SOCIAL HISTORY:  Social History     Socioeconomic History     Marital status:      Spouse name: Pat     Number of children: 0     Years of education:  "None     Highest education level: None   Occupational History     None   Social Needs     Financial resource strain: None     Food insecurity     Worry: None     Inability: None     Transportation needs     Medical: None     Non-medical: None   Tobacco Use     Smoking status: Former Smoker     Packs/day: 1.50     Years: 43.00     Pack years: 64.50     Types: Cigarettes     Start date: 1969     Last attempt to quit: 2007     Years since quittin.7     Smokeless tobacco: Never Used   Substance and Sexual Activity     Alcohol use: Yes     Comment: socially     Drug use: No     Sexual activity: Yes     Partners: Female   Lifestyle     Physical activity     Days per week: None     Minutes per session: None     Stress: None   Relationships     Social connections     Talks on phone: None     Gets together: None     Attends [employer] service: None     Active member of club or organization: None     Attends meetings of clubs or organizations: None     Relationship status: None     Intimate partner violence     Fear of current or ex partner: None     Emotionally abused: None     Physically abused: None     Forced sexual activity: None   Other Topics Concern     Parent/sibling w/ CABG, MI or angioplasty before 65F 55M? Yes     Comment: Father - mid 40's   Social History Narrative     None       Review of Systems:  Skin:  Negative     Eyes:  Positive for glasses  ENT:  Negative    Respiratory:  Positive for CPAP;sleep apnea;dyspnea on exertion  Cardiovascular:  Negative    Gastroenterology: Positive for heartburn  Genitourinary:  Negative    Musculoskeletal:  Positive for back pain  Neurologic:  Negative    Psychiatric:  Negative    Heme/Lymph/Imm:  Negative    Endocrine:  Negative      Physical Exam:  Vitals: /73 (BP Location: Right arm, Patient Position: Sitting, Cuff Size: Adult Regular)   Pulse 98   Ht 1.854 m (6' 1\")   Wt 68.6 kg (151 lb 4.8 oz)   BMI 19.96 kg/m      Constitutional:  cooperative, " alert and oriented, well developed, well nourished, in no acute distress thin      Skin:  warm and dry to the touch, no apparent skin lesions or masses noted        Head:  normocephalic        Eyes:  sclera white;no xanthalasma;no nystagmus        ENT:  no pallor or cyanosis   masked    Neck:  carotid pulses are full and equal bilaterally, JVP normal, no carotid bruit        Chest:      Normal breath sounds on right, diminished breath sounds left base.  No wheezes    Cardiac: regular rhythm, normal S1/S2, no S3 or S4, apical impulse not displaced, no murmurs, gallops or rubs                  Abdomen:  abdomen soft;BS normoactive        Vascular: pulses full and equal                                      Extremities and Back:  no edema        Neurological:  no gross motor deficits          Recent Lab Results:  LIPID RESULTS:  Lab Results   Component Value Date    CHOL 195 03/13/2018    HDL 57 03/13/2018     (H) 03/13/2018    TRIG 69 03/13/2018    CHOLHDLRATIO 3.2 12/09/2014       LIVER ENZYME RESULTS:  Lab Results   Component Value Date    AST 33 09/21/2020    ALT 46 09/21/2020       CBC RESULTS:  Lab Results   Component Value Date    WBC 8.8 09/21/2020    RBC 4.71 09/21/2020    HGB 12.0 (L) 09/21/2020    HCT 38.8 (L) 09/21/2020    MCV 82 09/21/2020    MCH 25.5 (L) 09/21/2020    MCHC 30.9 (L) 09/21/2020    RDW 20.5 (H) 09/21/2020     09/21/2020       BMP RESULTS:  Lab Results   Component Value Date     09/21/2020    POTASSIUM 3.8 09/21/2020    CHLORIDE 108 09/21/2020    CO2 26 09/21/2020    ANIONGAP 3 09/21/2020    GLC 76 09/21/2020    BUN 13 09/21/2020    CR 0.92 09/21/2020    GFRESTIMATED 83 09/21/2020    GFRESTBLACK >90 09/21/2020    MALATHI 7.9 (L) 09/21/2020        A1C RESULTS:  No results found for: A1C    INR RESULTS:  Lab Results   Component Value Date    INR 1.17 (H) 06/29/2020    INR 1.14 06/11/2020         Roman Marmolejo MD, FACC    CC  Susan Muller MD  [address] [facility]  [address]  [place] [address]

## 2020-09-22 NOTE — LETTER
9/22/2020    Donald Shell, DO  [address]  [place] [address]    RE: Kentrell Kirkpatrick       Dear Colleague,    I had the pleasure of seeing Kentrell Kirkpatrick in the [facility].    HISTORY:    Kentrell Kirkpatrick is a very pleasant 69-year-old male accompanied by his attentive wife today.  He has a history of non-small cell lung cancer which is metastatic to brain and liver.  He is currently undergoing chemotherapy utilizing Sitravatinib and Nivolumab.  He has had surveillance echo was done.  His echo done in June showed a normal ejection fraction but it September repeat EF showed his ejection fraction had gone from 60 to 65% down to 40 to 45%.  He was asked to see cardiology for this reason.    Kentrell has had very severe dyspnea throughout his 1-1/2-year cortez with cancer.  He is quite clear that his dyspnea has not worsened in the last 3 months.  He remains relatively active.  Yesterday he went golfing and had to walk 3 or 4 blocks and reports that he had to stop twice to catch his breath with that walk.  He has not had any symptoms of PND or orthopnea and he denies any exertional chest, arm, neck, or jaw discomfort.  He also denies strokelike symptoms, orthostasis, palpitations, peripheral edema, or symptoms of claudication.    Kentrell does have a history of COPD contributing to his dyspnea.  He also has some anxiety and cancer related pain managed with narcotics.  In addition he has some hoarseness felt likely secondary to left recurrent laryngeal nerve involvement, no dysphasia.  He has stable anemia and subclinical hypothyroidism.  He also has obstructive sleep apnea.    Edy is an ex-smoker having given up cigarettes many years ago.  He reports that his father had an MI in his 40s but lived to age 86 without other heart problems which of course brings into question his initial diagnosis.  Edy reports that he sleeps in a recliner but has done so (because he sleeps better) ever  since his diagnosis and presenting symptoms of dyspnea a year and a half ago.  His LDL is modestly high at 124 and he has no history of diabetes or hypertension.    A CT scan done recently showed evidence for pulmonary emboli and he was started on rivaroxaban which he continues.      ASSESSMENT/PLAN:    1.  Moderate reduction in ejection fraction, likely secondary to his chemotherapeutic agents.  Nivolumab has a reported incidence of myocarditis of less than 1%, and this is not likely due to that medication.  Sitravatinib is an experimental medication with very little data but is the more likely culprit.  Given his limited options for his metastatic cancer therapy, and his asymptomatic status, I agree with continuing his chemotherapy for now.  We will need to watch his ejection fraction carefully and consider discontinuation if we see any significant additional deterioration of LV function.  In the meantime, we will initiate standard heart failure medications, beginning with metoprolol 25 mg daily.  The patient will check to make sure this is allowed under the study protocol for his experimental medication.  I will enroll him in core clinic for medication titration.  His blood pressure is on the low side and will undoubtedly limit our ability to reach target goals of ACE inhibitor and beta-blocker.    Thank you for inviting me to participate in your patient's care.  Please do not hesitate to call if I can be of further assistance.    Orders Placed This Encounter   Procedures     Follow-Up with [facility] Visit     Orders Placed This Encounter   Medications     metoprolol succinate ER (TOPROL XL) 25 MG 24 hr tablet     Sig: Take 1 tablet (25 mg) by mouth daily     Dispense:  60 tablet     Refill:  6     Medications Discontinued During This Encounter   Medication Reason     mirtazapine (REMERON) 7.5 MG tablet Dose adjustment       10 year ASCVD risk: The 10-year ASCVD risk score ([place] ARTEM Jr., et al., 2013) is:  12%    Values used to calculate the score:      Age: 69 years      Sex: Male      Is Non- : No      Diabetic: No      Tobacco smoker: No      Systolic Blood Pressure: 104 mmHg      Is BP treated: No      HDL Cholesterol: 57 mg/dL      Total Cholesterol: 195 mg/dL    Encounter Diagnosis   Name Primary?     Decreased cardiac ejection fraction        CURRENT MEDICATIONS:  Current Outpatient Medications   Medication Sig Dispense Refill     albuterol (PROAIR HFA/PROVENTIL HFA/VENTOLIN HFA) 108 (90 Base) MCG/ACT inhaler Inhale 2 puffs into the lungs every 4 hours (while awake) 1 Inhaler 0     albuterol (PROVENTIL) (2.5 MG/3ML) 0.083% neb solution Take 1 vial (2.5 mg) by nebulization every 4 hours as needed for shortness of breath / dyspnea or wheezing 100 vial 11     calcium polycarbophil (FIBERCON) 625 MG tablet Take 2 tablets by mouth daily       finasteride (PROSCAR) 5 MG tablet TAKE 1 TABLET BY MOUTH EVERY DAY 90 tablet 1     LORazepam (ATIVAN) 1 MG tablet Take 1 tablet (1 mg) by mouth every 6 hours as needed for anxiety 30 tablet 0     Melatonin 10 MG TABS tablet Take 10 mg by mouth nightly as needed for sleep       metoprolol succinate ER (TOPROL XL) 25 MG 24 hr tablet Take 1 tablet (25 mg) by mouth daily 60 tablet 6     mirtazapine (REMERON) 15 MG tablet Take 1 tablet (15 mg) by mouth At Bedtime 30 tablet 1     mometasone-formoterol (DULERA) 100-5 MCG/ACT inhaler Inhale 2 puffs into the lungs daily       morphine (MS CONTIN) 15 MG CR tablet Take 1 tablet (15 mg) by mouth 2 times daily 60 tablet 0     NIVOLUMAB IV Inject 480 mg into the vein every 28 days       oxyCODONE (ROXICODONE) 5 MG tablet Take 1 tablet (5 mg) by mouth every 4 hours as needed for severe pain 100 tablet 0     prochlorperazine (COMPAZINE) 10 MG tablet Take 1 tablet (10 mg) by mouth every 6 hours as needed (Nausea/Vomiting) 30 tablet 11     Rivaroxaban ANTICOAGULANT 15 & 20 MG TBPK 15 mg BID for 21 days, then 20 mg daily 1  each 0     senna (SENOKOT) 8.6 MG tablet Take 1 tablet by mouth daily       study - sitravatinib malate salt, [id number],, IDS# [id number], 40 mg roller oompaction capsule Take 1 capsule (40 mg) by mouth daily 60 capsule 0     study - sitravatinib malate salt, [id number],, IDS# [id number], 60 mg roller oompaction capsule Take 1 capsule (60 mg) by mouth daily 30 capsule 0     tamsulosin (FLOMAX) 0.4 MG capsule TAKE 1 CAPSULE BY MOUTH EVERY DAY 90 capsule 1     tiotropium (SPIRIVA HANDIHALER) 18 MCG inhaled capsule Inhale 1 capsule (18 mcg) into the lungs daily 1 capsule 11     rivaroxaban ANTICOAGULANT (XARELTO ANTICOAGULANT) 20 MG TABS tablet Take 1 tablet (20 mg) by mouth daily (with dinner) (Patient not taking: Reported on 9/22/2020) 30 tablet 11       ALLERGIES   No Known Allergies    PAST MEDICAL HISTORY:  Past Medical History:   Diagnosis Date     Cancer (H) 2/25/19     Colon polyps      COPD (chronic obstructive pulmonary disease) (H)        PAST SURGICAL HISTORY:  Past Surgical History:   Procedure Laterality Date     BIOPSY  3/15/19     COLONOSCOPY       COLONOSCOPY N/A 12/22/2016    Procedure: COMBINED COLONOSCOPY, SINGLE OR MULTIPLE BIOPSY/POLYPECTOMY BY BIOPSY;  Surgeon: Jeremi Kramer MD;  Location:  GI     GENITOURINARY SURGERY      Using flow-max     HEAD & NECK SURGERY      stiff / sore neck     INSERT CHEST TUBE Left 3/1/2019    Procedure: INSERT CHEST TUBE;  Surgeon: Matthew Rodriguez MD;  Location:  GI     INSERT CHEST TUBE N/A 5/9/2019    Procedure: Removal Of Pleurx Catheter;  Surgeon: Matthew Rodriguez MD;  Location:  GI     IR CHEST PORT PLACEMENT > 5 YRS OF AGE  10/17/2019     IR LYMPH NODE BIOPSY  3/15/2019     THORACENTESIS Left 2/20/2019    Procedure: THORACENTESIS;  Surgeon: Matthew Rodriguez MD;  Location:  GI       FAMILY HISTORY:  Family History   Problem Relation Age of Onset     Heart Disease Father      Coronary Artery Disease Father      Prostate Cancer Other      Prostate Cancer  Brother      Colon Cancer No family hx of        SOCIAL HISTORY:  Social History     Socioeconomic History     Marital status:      Spouse name: Pat     Number of children: 0     Years of education: None     Highest education level: None   Occupational History     None   Social Needs     Financial resource strain: None     Food insecurity     Worry: None     Inability: None     Transportation needs     Medical: None     Non-medical: None   Tobacco Use     Smoking status: Former Smoker     Packs/day: 1.50     Years: 43.00     Pack years: 64.50     Types: Cigarettes     Start date: 1969     Last attempt to quit: 2007     Years since quittin.7     Smokeless tobacco: Never Used   Substance and Sexual Activity     Alcohol use: Yes     Comment: socially     Drug use: No     Sexual activity: Yes     Partners: Female   Lifestyle     Physical activity     Days per week: None     Minutes per session: None     Stress: None   Relationships     Social connections     Talks on phone: None     Gets together: None     Attends Congregation service: None     Active member of club or organization: None     Attends meetings of clubs or organizations: None     Relationship status: None     Intimate partner violence     Fear of current or ex partner: None     Emotionally abused: None     Physically abused: None     Forced sexual activity: None   Other Topics Concern     Parent/sibling w/ CABG, MI or angioplasty before 65F 55M? Yes     Comment: Father - mid 40's   Social History Narrative     None       Review of Systems:  Skin:  Negative     Eyes:  Positive for glasses  ENT:  Negative    Respiratory:  Positive for CPAP;sleep apnea;dyspnea on exertion  Cardiovascular:  Negative    Gastroenterology: Positive for heartburn  Genitourinary:  Negative    Musculoskeletal:  Positive for back pain  Neurologic:  Negative    Psychiatric:  Negative    Heme/Lymph/Imm:  Negative    Endocrine:  Negative      Physical Exam:  Vitals:  "/73 (BP Location: Right arm, Patient Position: Sitting, Cuff Size: Adult Regular)   Pulse 98   Ht 1.854 m (6' 1\")   Wt 68.6 kg (151 lb 4.8 oz)   BMI 19.96 kg/m      Constitutional:  cooperative, alert and oriented, well developed, well nourished, in no acute distress thin      Skin:  warm and dry to the touch, no apparent skin lesions or masses noted        Head:  normocephalic        Eyes:  sclera white;no xanthalasma;no nystagmus        ENT:  no pallor or cyanosis   masked    Neck:  carotid pulses are full and equal bilaterally, JVP normal, no carotid bruit        Chest:      Normal breath sounds on right, diminished breath sounds left base.  No wheezes    Cardiac: regular rhythm, normal S1/S2, no S3 or S4, apical impulse not displaced, no murmurs, gallops or rubs                  Abdomen:  abdomen soft;BS normoactive        Vascular: pulses full and equal                                      Extremities and Back:  no edema        Neurological:  no gross motor deficits          Recent Lab Results:  LIPID RESULTS:  Lab Results   Component Value Date    CHOL 195 03/13/2018    HDL 57 03/13/2018     (H) 03/13/2018    TRIG 69 03/13/2018    CHOLHDLRATIO 3.2 12/09/2014       LIVER ENZYME RESULTS:  Lab Results   Component Value Date    AST 33 09/21/2020    ALT 46 09/21/2020       CBC RESULTS:  Lab Results   Component Value Date    WBC 8.8 09/21/2020    RBC 4.71 09/21/2020    HGB 12.0 (L) 09/21/2020    HCT 38.8 (L) 09/21/2020    MCV 82 09/21/2020    MCH 25.5 (L) 09/21/2020    MCHC 30.9 (L) 09/21/2020    RDW 20.5 (H) 09/21/2020     09/21/2020       BMP RESULTS:  Lab Results   Component Value Date     09/21/2020    POTASSIUM 3.8 09/21/2020    CHLORIDE 108 09/21/2020    CO2 26 09/21/2020    ANIONGAP 3 09/21/2020    GLC 76 09/21/2020    BUN 13 09/21/2020    CR 0.92 09/21/2020    GFRESTIMATED 83 09/21/2020    GFRESTBLACK >90 09/21/2020    MALATHI 7.9 (L) 09/21/2020        A1C RESULTS:  No results found " for: A1C    INR RESULTS:  Lab Results   Component Value Date    INR 1.17 (H) 06/29/2020    INR 1.14 06/11/2020         Roman Marmolejo MD, [facility]    CC  Susan Muller MD  [address] [place] [id number]                    Thank you for allowing me to participate in the care of your patient.      Sincerely,     Roman Marmolejo MD     [facility]    cc:   Susan Muller MD  [address] [place] [id number]

## 2020-09-22 NOTE — LETTER
9/22/2020    Donald Shell, DO  [address]  [place] [address]    RE: Kentrell Kirkpatrick       Dear Colleague,    I had the pleasure of seeing Kentrell Kirkpatrick in the [facility].    HISTORY:    Kentrell Kirkpatrick is a very pleasant 69-year-old male accompanied by his attentive wife today.  He has a history of non-small cell lung cancer which is metastatic to brain and liver.  He is currently undergoing chemotherapy utilizing Sitravatinib and Nivolumab.  He has had surveillance echo was done.  His echo done in June showed a normal ejection fraction but it September repeat EF showed his ejection fraction had gone from 60 to 65% down to 40 to 45%.  He was asked to see cardiology for this reason.    Kentrell has had very severe dyspnea throughout his 1-1/2-year cortez with cancer.  He is quite clear that his dyspnea has not worsened in the last 3 months.  He remains relatively active.  Yesterday he went golfing and had to walk 3 or 4 blocks and reports that he had to stop twice to catch his breath with that walk.  He has not had any symptoms of PND or orthopnea and he denies any exertional chest, arm, neck, or jaw discomfort.  He also denies strokelike symptoms, orthostasis, palpitations, peripheral edema, or symptoms of claudication.    Kentrell does have a history of COPD contributing to his dyspnea.  He also has some anxiety and cancer related pain managed with narcotics.  In addition he has some hoarseness felt likely secondary to left recurrent laryngeal nerve involvement, no dysphasia.  He has stable anemia and subclinical hypothyroidism.  He also has obstructive sleep apnea.    Edy is an ex-smoker having given up cigarettes many years ago.  He reports that his father had an MI in his 40s but lived to age 86 without other heart problems which of course brings into question his initial diagnosis.  Edy reports that he sleeps in a recliner but has done so (because he sleeps better) ever  since his diagnosis and presenting symptoms of dyspnea a year and a half ago.  His LDL is modestly high at 124 and he has no history of diabetes or hypertension.    A CT scan done recently showed evidence for pulmonary emboli and he was started on rivaroxaban which he continues.      ASSESSMENT/PLAN:    1.  Moderate reduction in ejection fraction, likely secondary to his chemotherapeutic agents.  Nivolumab has a reported incidence of myocarditis of less than 1%, and this is not likely due to that medication.  Sitravatinib is an experimental medication with very little data but is the more likely culprit.  Given his limited options for his metastatic cancer therapy, and his asymptomatic status, I agree with continuing his chemotherapy for now.  We will need to watch his ejection fraction carefully and consider discontinuation if we see any significant additional deterioration of LV function.  In the meantime, we will initiate standard heart failure medications, beginning with metoprolol 25 mg daily.  The patient will check to make sure this is allowed under the study protocol for his experimental medication.  I will enroll him in core clinic for medication titration.  His blood pressure is on the low side and will undoubtedly limit our ability to reach target goals of ACE inhibitor and beta-blocker.    Thank you for inviting me to participate in your patient's care.  Please do not hesitate to call if I can be of further assistance.    Orders Placed This Encounter   Procedures     Follow-Up with [facility] Visit     Orders Placed This Encounter   Medications     metoprolol succinate ER (TOPROL XL) 25 MG 24 hr tablet     Sig: Take 1 tablet (25 mg) by mouth daily     Dispense:  60 tablet     Refill:  6     Medications Discontinued During This Encounter   Medication Reason     mirtazapine (REMERON) 7.5 MG tablet Dose adjustment       10 year ASCVD risk: The 10-year ASCVD risk score (Ferriday ARTEM Jr., et al., 2013) is:  12%    Values used to calculate the score:      Age: 69 years      Sex: Male      Is Non- : No      Diabetic: No      Tobacco smoker: No      Systolic Blood Pressure: 104 mmHg      Is BP treated: No      HDL Cholesterol: 57 mg/dL      Total Cholesterol: 195 mg/dL    Encounter Diagnosis   Name Primary?     Decreased cardiac ejection fraction        CURRENT MEDICATIONS:  Current Outpatient Medications   Medication Sig Dispense Refill     albuterol (PROAIR HFA/PROVENTIL HFA/VENTOLIN HFA) 108 (90 Base) MCG/ACT inhaler Inhale 2 puffs into the lungs every 4 hours (while awake) 1 Inhaler 0     albuterol (PROVENTIL) (2.5 MG/3ML) 0.083% neb solution Take 1 vial (2.5 mg) by nebulization every 4 hours as needed for shortness of breath / dyspnea or wheezing 100 vial 11     calcium polycarbophil (FIBERCON) 625 MG tablet Take 2 tablets by mouth daily       finasteride (PROSCAR) 5 MG tablet TAKE 1 TABLET BY MOUTH EVERY DAY 90 tablet 1     LORazepam (ATIVAN) 1 MG tablet Take 1 tablet (1 mg) by mouth every 6 hours as needed for anxiety 30 tablet 0     Melatonin 10 MG TABS tablet Take 10 mg by mouth nightly as needed for sleep       metoprolol succinate ER (TOPROL XL) 25 MG 24 hr tablet Take 1 tablet (25 mg) by mouth daily 60 tablet 6     mirtazapine (REMERON) 15 MG tablet Take 1 tablet (15 mg) by mouth At Bedtime 30 tablet 1     mometasone-formoterol (DULERA) 100-5 MCG/ACT inhaler Inhale 2 puffs into the lungs daily       morphine (MS CONTIN) 15 MG CR tablet Take 1 tablet (15 mg) by mouth 2 times daily 60 tablet 0     NIVOLUMAB IV Inject 480 mg into the vein every 28 days       oxyCODONE (ROXICODONE) 5 MG tablet Take 1 tablet (5 mg) by mouth every 4 hours as needed for severe pain 100 tablet 0     prochlorperazine (COMPAZINE) 10 MG tablet Take 1 tablet (10 mg) by mouth every 6 hours as needed (Nausea/Vomiting) 30 tablet 11     Rivaroxaban ANTICOAGULANT 15 & 20 MG TBPK 15 mg BID for 21 days, then 20 mg daily 1  each 0     senna (SENOKOT) 8.6 MG tablet Take 1 tablet by mouth daily       study - sitravatinib malate salt, [id number],, IDS# [id number], 40 mg roller oompaction capsule Take 1 capsule (40 mg) by mouth daily 60 capsule 0     study - sitravatinib malate salt, [id number],, IDS# [id number], 60 mg roller oompaction capsule Take 1 capsule (60 mg) by mouth daily 30 capsule 0     tamsulosin (FLOMAX) 0.4 MG capsule TAKE 1 CAPSULE BY MOUTH EVERY DAY 90 capsule 1     tiotropium (SPIRIVA HANDIHALER) 18 MCG inhaled capsule Inhale 1 capsule (18 mcg) into the lungs daily 1 capsule 11     rivaroxaban ANTICOAGULANT (XARELTO ANTICOAGULANT) 20 MG TABS tablet Take 1 tablet (20 mg) by mouth daily (with dinner) (Patient not taking: Reported on 9/22/2020) 30 tablet 11       ALLERGIES   No Known Allergies    PAST MEDICAL HISTORY:  Past Medical History:   Diagnosis Date     Cancer (H) 2/25/19     Colon polyps      COPD (chronic obstructive pulmonary disease) (H)        PAST SURGICAL HISTORY:  Past Surgical History:   Procedure Laterality Date     BIOPSY  3/15/19     COLONOSCOPY       COLONOSCOPY N/A 12/22/2016    Procedure: COMBINED COLONOSCOPY, SINGLE OR MULTIPLE BIOPSY/POLYPECTOMY BY BIOPSY;  Surgeon: Jeremi Kramer MD;  Location:  GI     GENITOURINARY SURGERY      Using flow-max     HEAD & NECK SURGERY      stiff / sore neck     INSERT CHEST TUBE Left 3/1/2019    Procedure: INSERT CHEST TUBE;  Surgeon: Matthew Rodriguez MD;  Location:  GI     INSERT CHEST TUBE N/A 5/9/2019    Procedure: Removal Of Pleurx Catheter;  Surgeon: Matthew Rodriguez MD;  Location:  GI     IR CHEST PORT PLACEMENT > 5 YRS OF AGE  10/17/2019     IR LYMPH NODE BIOPSY  3/15/2019     THORACENTESIS Left 2/20/2019    Procedure: THORACENTESIS;  Surgeon: Matthew Rodriguez MD;  Location:  GI       FAMILY HISTORY:  Family History   Problem Relation Age of Onset     Heart Disease Father      Coronary Artery Disease Father      Prostate Cancer Other      Prostate Cancer  Brother      Colon Cancer No family hx of        SOCIAL HISTORY:  Social History     Socioeconomic History     Marital status:      Spouse name: Pat     Number of children: 0     Years of education: None     Highest education level: None   Occupational History     None   Social Needs     Financial resource strain: None     Food insecurity     Worry: None     Inability: None     Transportation needs     Medical: None     Non-medical: None   Tobacco Use     Smoking status: Former Smoker     Packs/day: 1.50     Years: 43.00     Pack years: 64.50     Types: Cigarettes     Start date: 1969     Last attempt to quit: 2007     Years since quittin.7     Smokeless tobacco: Never Used   Substance and Sexual Activity     Alcohol use: Yes     Comment: socially     Drug use: No     Sexual activity: Yes     Partners: Female   Lifestyle     Physical activity     Days per week: None     Minutes per session: None     Stress: None   Relationships     Social connections     Talks on phone: None     Gets together: None     Attends Zoroastrian service: None     Active member of club or organization: None     Attends meetings of clubs or organizations: None     Relationship status: None     Intimate partner violence     Fear of current or ex partner: None     Emotionally abused: None     Physically abused: None     Forced sexual activity: None   Other Topics Concern     Parent/sibling w/ CABG, MI or angioplasty before 65F 55M? Yes     Comment: Father - mid 40's   Social History Narrative     None       Review of Systems:  Skin:  Negative     Eyes:  Positive for glasses  ENT:  Negative    Respiratory:  Positive for CPAP;sleep apnea;dyspnea on exertion  Cardiovascular:  Negative    Gastroenterology: Positive for heartburn  Genitourinary:  Negative    Musculoskeletal:  Positive for back pain  Neurologic:  Negative    Psychiatric:  Negative    Heme/Lymph/Imm:  Negative    Endocrine:  Negative      Physical Exam:  Vitals:  "/73 (BP Location: Right arm, Patient Position: Sitting, Cuff Size: Adult Regular)   Pulse 98   Ht 1.854 m (6' 1\")   Wt 68.6 kg (151 lb 4.8 oz)   BMI 19.96 kg/m      Constitutional:  cooperative, alert and oriented, well developed, well nourished, in no acute distress thin      Skin:  warm and dry to the touch, no apparent skin lesions or masses noted        Head:  normocephalic        Eyes:  sclera white;no xanthalasma;no nystagmus        ENT:  no pallor or cyanosis   masked    Neck:  carotid pulses are full and equal bilaterally, JVP normal, no carotid bruit        Chest:      Normal breath sounds on right, diminished breath sounds left base.  No wheezes    Cardiac: regular rhythm, normal S1/S2, no S3 or S4, apical impulse not displaced, no murmurs, gallops or rubs                  Abdomen:  abdomen soft;BS normoactive        Vascular: pulses full and equal                                      Extremities and Back:  no edema        Neurological:  no gross motor deficits          Recent Lab Results:  LIPID RESULTS:  Lab Results   Component Value Date    CHOL 195 03/13/2018    HDL 57 03/13/2018     (H) 03/13/2018    TRIG 69 03/13/2018    CHOLHDLRATIO 3.2 12/09/2014       LIVER ENZYME RESULTS:  Lab Results   Component Value Date    AST 33 09/21/2020    ALT 46 09/21/2020       CBC RESULTS:  Lab Results   Component Value Date    WBC 8.8 09/21/2020    RBC 4.71 09/21/2020    HGB 12.0 (L) 09/21/2020    HCT 38.8 (L) 09/21/2020    MCV 82 09/21/2020    MCH 25.5 (L) 09/21/2020    MCHC 30.9 (L) 09/21/2020    RDW 20.5 (H) 09/21/2020     09/21/2020       BMP RESULTS:  Lab Results   Component Value Date     09/21/2020    POTASSIUM 3.8 09/21/2020    CHLORIDE 108 09/21/2020    CO2 26 09/21/2020    ANIONGAP 3 09/21/2020    GLC 76 09/21/2020    BUN 13 09/21/2020    CR 0.92 09/21/2020    GFRESTIMATED 83 09/21/2020    GFRESTBLACK >90 09/21/2020    MALATHI 7.9 (L) 09/21/2020        A1C RESULTS:  No results found " for: A1C    INR RESULTS:  Lab Results   Component Value Date    INR 1.17 (H) 06/29/2020    INR 1.14 06/11/2020       Thank you for allowing me to participate in the care of your patient.    Sincerely,     Roman Marmolejo MD     [facility]    cc:   Susan Muller MD  [address] [place] [address] [id number]  [place] [address]

## 2020-09-23 NOTE — PROGRESS NOTES
Patient is enrolled in research study [id number]. Received a message from Edy's wife Audrey asking if the beta blocker prescribed by cardiology yesterday is prohibited during research study drug Sitravatinib. After reviewing study protocol- message given to Pat that the metoprolol succinate ER (TOPROL XL) that Dr. Marmolejo prescribed is NOT prohibited during the Sitravatinib/Nivolumab study. Also reiterated to please continue to hold the Sitravatinib for now, until Dr. Muller and the rest of the study team okay restarting.

## 2020-09-25 NOTE — PROGRESS NOTES
"Palliative Care Outpatient Clinic    (This note was transcribed using voice recognition software. While I review and edit the transcription, I may miss errors, and the software sometimes does unexpected capitalizations and formatting that I miss. Please let me know of any serious mistranscriptions and I will addend this note.)    Patient ID:  He has NSCLCA found incidentally KEATON mass on CT 6/2018. 2/2019 large L effusion leading to PleurX. Carbo/pemetrexed/pembro starting 3/2019. 8/2019 change to doxetaxel+ramucirumab due to PD; 4/2020 started gem due to PD; brain mets June 2020 s/p zora knife; sitravatinib, then nivolumab added 8/2020 but scans 9/2020 showing probable progression and new brain mets, new PE.     No HCD    History:    He is with his wife today who supplements the history. He is going to get a single fx EBRT to his sacrum on the L, and do gamma knife next week for his multiple brain mets.    He is not sure why he's here today; I discuss the role of palliative care in the cancer center. He says what he really wants to discuss is what are his odds of being cured and how long he has to live. He expresses displeasure that this has not been communicated to him previously. He observes \"if I knew I only had 6 months to live there'd be some things I'd prioritize, versus if I knew I had, say, 6 years to live.\"    He reports a sig decline the last couple months. Was walking 3 mi a day but has had progressive fatigue, weakness, and RANKIN. 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 Appetite is declining, lost 30# this summer. Spends about half the day resting now.     Mood: grieving his situation. 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     His wife asks him \"what keeps you up at night\" and he says \"how long do I have?\"      SH: Lives with wife. Construction/boats as above. Wife was a nurse/director here at  prior to [employer]. They have family in the area who is supportive    ROS: 10 system ros o/w neg    PE: BP 97/64   Pulse 92   Resp 18   Ht 1.854 m (6' 1\")   Wt 67.8 kg (149 lb 8 oz)   SpO2 99%   BMI 19.72 kg/m     Wt Readings from Last 3 Encounters:   09/22/20 68.6 kg (151 lb 4.8 oz)   09/21/20 67.1 kg (148 lb)   09/17/20 68.3 kg (150 lb 9.6 oz)     Gen alert, comfortable appearing, NAD. Cachectic  Head NCAT.  Eyes anicteric without injection  Face symmetric, eyes conjugate  Mouth pink, moist appearing  Lungs unlabored, no cough, speaking full sentences  Skin no rashes or lesions evident on face/neck  Neuro Face symmetric, eyes conjugate; speech fluent.  Neuropsych exam normal including affect, sensorium, gross memory, thought processes, and fund of knowledge.     Data reviewed:  I reviewed recent labs and imaging, my comments:  Cr 0.9, Alb 2.7  Amylast 1157    CT 9/15 personally reviewed, more numerous liver, probably lung mets    Impression & Recommendations:  70 yo man with metastatic NSCLC, recently progressive systemically and with new brain mets    We spent most of the visit discussing the future.  Considering his prognosis: he has progressive disease including new brain mets, and a marked decline in PS and appetite the last few months. It's unclear to me whether nivolumab has been considered a failure or not, they discuss that with Dr Muller next week. Giving them all the usual caveats, I told him I thought it wise to prepare for the possibility he's in his final 3-6 months and that this is as good as he might feel. Ie, those priorities he mentioned he should 100% get to work on them, and not let this opportunity pass when he has the " aristides to do that as he may not get it again. Also d/w him that if, eg, he ends up responding to nivo or is able to receive further, successful, systemic therapy it could be longer than those 6 months, but 3-6 is what I'd plan for if I was him.  He asked a lot of good Qs about that and was quite engaged; it was an emotional discussion for sure though.    Discussed HCD, he'd want his wife to make decisions, he thinks she knows his thoughts but they plan on talking more. He expresses a general wish if he were unable to care for his body (tranfer, bathroom, etc) he'd want us to let him die and not linger. He had a nephew apparently who had leukemia leaving him in a long term debilitated state for 10 years before dying and the would rather die than go through anything like that.     We covered general hospice education, what hospice is/is not, timing of hospice enrollment, etc.     He seemed appreciative of the discussion  F/u with me anytime      Chart data reviewed today:  Advance care planning:     Family History   Problem Relation Age of Onset     Heart Disease Father      Coronary Artery Disease Father      Prostate Cancer Other      Prostate Cancer Brother      Colon Cancer No family hx of      Past Medical History:   Diagnosis Date     Cancer (H) 2/25/19     Colon polyps      COPD (chronic obstructive pulmonary disease) (H)      Past Surgical History:   Procedure Laterality Date     BIOPSY  3/15/19     COLONOSCOPY       COLONOSCOPY N/A 12/22/2016    Procedure: COMBINED COLONOSCOPY, SINGLE OR MULTIPLE BIOPSY/POLYPECTOMY BY BIOPSY;  Surgeon: Jeremi Kramer MD;  Location:  GI     GENITOURINARY SURGERY      Using flow-max     HEAD & NECK SURGERY      stiff / sore neck     INSERT CHEST TUBE Left 3/1/2019    Procedure: INSERT CHEST TUBE;  Surgeon: Matthew Rodriguez MD;  Location:  GI     INSERT CHEST TUBE N/A 5/9/2019    Procedure: Removal Of Pleurx Catheter;  Surgeon: Matthew Rodriguez MD;  Location:   GI     IR CHEST PORT PLACEMENT > 5 YRS OF AGE  10/17/2019     IR LYMPH NODE BIOPSY  3/15/2019     THORACENTESIS Left 2/20/2019    Procedure: THORACENTESIS;  Surgeon: Matthew Rodriguez MD;  Location: [facility]     No Known Allergies  Medications: I have reviewed the patient's medication profile.  MNPMP:     Thank you for involving us in the patient's care. Well over 30 min discussing ACP as above.   Marbin Loera MD / Palliative Medicine / Text me via [facility] - search for 'Evaristo' - then click the pager icon / My clinic is in the [facility]: [phone] (scheduling); [phone] (triage). Palliative care [facility] outpatient care coordinator is Gita Deshpande RN: [phone]. Palliative After-Hours Answering Service: [phone].

## 2020-09-25 NOTE — LETTER
"9/25/2020     RE: Kentrell Kirkpatrick  [address]  [place] [address]     Dear Colleague,    Thank you for referring your patient, Kentrell Kirkpatrick, to the [facility] at [facility]. Please see a copy of my visit note below.    Palliative Care Outpatient Clinic  (This note was transcribed using voice recognition software. While I review and edit the transcription, I may miss errors, and the software sometimes does unexpected capitalizations and formatting that I miss. Please let me know of any serious mistranscriptions and I will addend this note.)    Patient ID:  He has NSCLCA found incidentally KEATON mass on CT 6/2018. 2/2019 large L effusion leading to PleurX. Carbo/pemetrexed/pembro starting 3/2019. 8/2019 change to doxetaxel+ramucirumab due to PD; 4/2020 started gem due to PD; brain mets June 2020 s/p zora knife; sitravatinib, then nivolumab added 8/2020 but scans 9/2020 showing probable progression and new brain mets, new PE.     No HCD    History:  He is with his wife today who supplements the history. He is going to get a single fx EBRT to his sacrum on the L, and do gamma knife next week for his multiple brain mets.    He is not sure why he's here today; I discuss the role of palliative care in the cancer center. He says what he really wants to discuss is what are his odds of being cured and how long he has to live. He expresses displeasure that this has not been communicated to him previously. He observes \"if I knew I only had 6 months to live there'd be some things I'd prioritize, versus if I knew I had, say, 6 years to live.\"    He reports a sig decline the last couple months. Was walking 3 mi a day but has had progressive fatigue, weakness, and RANKIN. 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 Appetite is " "declining, lost 30# this summer. Spends about half the day resting now.     Mood: grieving his situation. 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     His wife asks him \"what keeps you up at night\" and he says \"how long do I have?\"    SH: Lives with wife. Construction/boats as above. Wife was a nurse/director here at  prior to care home. They have family in the area who is supportive    ROS: 10 system ros o/w neg    PE: BP 97/64   Pulse 92   Resp 18   Ht 1.854 m (6' 1\")   Wt 67.8 kg (149 lb 8 oz)   SpO2 99%   BMI 19.72 kg/m     Wt Readings from Last 3 Encounters:   09/22/20 68.6 kg (151 lb 4.8 oz)   09/21/20 67.1 kg (148 lb)   09/17/20 68.3 kg (150 lb 9.6 oz)     Gen alert, comfortable appearing, NAD. Cachectic  Head NCAT.  Eyes anicteric without injection  Face symmetric, eyes conjugate  Mouth pink, moist appearing  Lungs unlabored, no cough, speaking full sentences  Skin no rashes or lesions evident on face/neck  Neuro Face symmetric, eyes conjugate; speech fluent.  Neuropsych exam normal including affect, sensorium, gross memory, thought processes, and fund of knowledge.     Data reviewed:  I reviewed recent labs and imaging, my comments:  Cr 0.9, Alb 2.7  Amylast 1157    CT 9/15 personally reviewed, more numerous liver, probably lung mets    Impression & Recommendations:  68 yo man with metastatic NSCLC, recently progressive systemically and with new brain mets    We spent most of the visit discussing the future.  Considering his prognosis: he has progressive disease including new brain mets, and a marked decline in PS and appetite the last few months. It's unclear to me whether nivolumab has been considered a failure or not, they discuss that with Dr Muller next week. " Giving them all the usual caveats, I told him I thought it wise to prepare for the possibility he's in his final 3-6 months and that this is as good as he might feel. Ie, those priorities he mentioned he should 100% get to work on them, and not let this opportunity pass when he has the stamina to do that as he may not get it again. Also d/w him that if, eg, he ends up responding to nivo or is able to receive further, successful, systemic therapy it could be longer than those 6 months, but 3-6 is what I'd plan for if I was him.  He asked a lot of good Qs about that and was quite engaged; it was an emotional discussion for sure though.    Discussed HCD, he'd want his wife to make decisions, he thinks she knows his thoughts but they plan on talking more. He expresses a general wish if he were unable to care for his body (tranfer, bathroom, etc) he'd want us to let him die and not linger. He had a nephew apparently who had leukemia leaving him in a long term debilitated state for 10 years before dying and the would rather die than go through anything like that.     We covered general hospice education, what hospice is/is not, timing of hospice enrollment, etc.     He seemed appreciative of the discussion  F/u with me anytime    Chart data reviewed today:  Advance care planning:     Family History   Problem Relation Age of Onset     Heart Disease Father      Coronary Artery Disease Father      Prostate Cancer Other      Prostate Cancer Brother      Colon Cancer No family hx of      Past Medical History:   Diagnosis Date     Cancer (H) 2/25/19     Colon polyps      COPD (chronic obstructive pulmonary disease) (H)      Past Surgical History:   Procedure Laterality Date     BIOPSY  3/15/19     COLONOSCOPY       COLONOSCOPY N/A 12/22/2016    Procedure: COMBINED COLONOSCOPY, SINGLE OR MULTIPLE BIOPSY/POLYPECTOMY BY BIOPSY;  Surgeon: Jeremi Kramer MD;  Location:  GI     GENITOURINARY SURGERY      Using flow-max      HEAD & NECK SURGERY      stiff / sore neck     INSERT CHEST TUBE Left 3/1/2019    Procedure: INSERT CHEST TUBE;  Surgeon: Matthew Rodriguez MD;  Location: [facility]     INSERT CHEST TUBE N/A 5/9/2019    Procedure: Removal Of Pleurx Catheter;  Surgeon: Matthew Rodriguez MD;  Location: [facility]     IR CHEST PORT PLACEMENT > 5 YRS OF AGE  10/17/2019     IR LYMPH NODE BIOPSY  3/15/2019     THORACENTESIS Left 2/20/2019    Procedure: THORACENTESIS;  Surgeon: Matthew Rodriguez MD;  Location: [facility] GI     No Known Allergies  Medications: I have reviewed the patient's medication profile.  MNPMP:     Thank you for involving us in the patient's care. Well over 30 min discussing ACP as above.   Marbin Loera MD / Palliative Medicine / Text me via [employer] - search for 'Evaristo' - then click the pager icon / My clinic is in the [facility]: [phone] (scheduling); [phone] (triage). Palliative care [facility] outpatient care coordinator is Gita Deshpande RN: [phone]. Palliative After-Hours Answering Service: [phone].

## 2020-09-25 NOTE — NURSING NOTE
"Oncology Rooming Note    September 25, 2020 2:58 PM   Kentrell Kirkpatrick is a 69 year old male who presents for:    Chief Complaint   Patient presents with     New Patient     LUNG ADENOCARCINOMA      Initial Vitals: BP 97/64   Pulse 92   Resp 18   Ht 1.854 m (6' 1\")   Wt 67.8 kg (149 lb 8 oz)   SpO2 99%   BMI 19.72 kg/m   Estimated body mass index is 19.72 kg/m  as calculated from the following:    Height as of this encounter: 1.854 m (6' 1\").    Weight as of this encounter: 67.8 kg (149 lb 8 oz). Body surface area is 1.87 meters squared.  Mild Pain (2) Comment: Data Unavailable   No LMP for male patient.  Allergies reviewed: Yes  Medications reviewed: Yes    Medications: Medication refills not needed today.  Pharmacy name entered into [employer]: [id number] - [place] - [address]    Clinical concerns: No new concerns today  Dr Loera  was notified.      Silvia Hamilton            "

## 2020-09-28 NOTE — PROGRESS NOTES
Gamma Knife Operative Note    MRN: [id number]  Name: Kentrell Kirkpatrick  : 1950    Date of procedure: 2020    Surgeon:  Akin Valdez MD PhD  Co-surgeon: Elieser Ramires MD PhD    Pre-operative Diagnosis:   Multiple brain metastases  Post-operative Diagnosis:   Same    Procedure: Gamma Knife Radiosurgery    Indication: This is a 69 y.o. M with stage IV lung cancer and multiple metastasis to the brain. After case review in the brain tumor conference, the joint recommendation was to treat the patient with radiosurgery. Standard measurements were obtained for coordinate calculation to facilitate SRS delivery.    On the day of the procedure, informed consent was obtained. The previous MRI was reviewed. The [employer] stereotactic frame was attached to the patient's cranium using standard technique. With the frame placed, the patient underwent an MRI of the head with contrast. No new additional lesions were identified on this MRI.    The treatment is planned on the Gamma plan system based on the MRI taken on the day of the procedure.  Treatment parameters were verified by myself, the treating radiation oncologist, and the physicist.     The lesion was treated with 20 Gy delivered in a single fraction.  The dose was delivered in a highly conformal fashion. The treatment was performed at the [facility] gamma knife center.     All lesions are < 3 cm in diameter. A total of five lesions were treated.  One of the lesions was in close proximity to the brainstem.    I was present and performed the key portions of this procedure.    Akin Valdez MD PhD

## 2020-09-28 NOTE — LETTER
2020         RE: Kentrell Kirkpatrick  [address] [address] [facility] [place] [address]        Dear Colleague,    Thank you for referring your patient, Kentrell Kirkpatrick, to the [facility], [place], RADIATION ONCOLOGY. Please see a copy of my visit note below.    Gamma Knife Operative Note    MRN: [id number]  Name: Kentrell Kirkpatrick  : 1950    Date of procedure: 2020    Surgeon:  Akin Valdez MD PhD  Co-surgeon: Elieser Ramires MD PhD    Pre-operative Diagnosis:   Multiple brain metastases  Post-operative Diagnosis:   Same    Procedure: Gamma Knife Radiosurgery    Indication: This is a 69 y.o. M with stage IV lung cancer and multiple metastasis to the brain. After case review in the brain tumor conference, the joint recommendation was to treat the patient with radiosurgery. Standard measurements were obtained for coordinate calculation to facilitate SRS delivery.    On the day of the procedure, informed consent was obtained. The previous MRI was reviewed. The Le[employer]ell stereotactic frame was attached to the patient's cranium using standard technique. With the frame placed, the patient underwent an MRI of the head with contrast. No new additional lesions were identified on this MRI.    The treatment is planned on the Gamma plan system based on the MRI taken on the day of the procedure.  Treatment parameters were verified by myself, the treating radiation oncologist, and the physicist.     The lesion was treated with 20 Gy delivered in a single fraction.  The dose was delivered in a highly conformal fashion. The treatment was performed at the [facility] gamma knife center.     All lesions are < 3 cm in diameter. A total of five lesions were treated.  One of the lesions was in close proximity to the brainstem.    I was present and performed the key portions of this procedure.    Akin Valdez MD PhD    Again, thank you for allowing me to participate in the care of your patient.         Sincerely,        Akin Valdez MD

## 2020-09-28 NOTE — PROGRESS NOTES
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  Date of Treatment: 2020  Referring Physicians: Akin Valdez, Tumor Registry    GAMMA KNIFE PROCEDURE NOTE and TREATMENT SUMMARY    Treatment Summary:     Treatment Site Dose Modality Collimators Shots   Left motor 20 Gy to 50% isocenter [place] 60 4mm 1   Left precuneus 20 Gy to 50% isocenter [place] 60 4,8mm 2   RT thalamus 20 Gy to 50% isocenter [place] 60 4,8mm 2   LT Cerebeller 20 Gy to 80% isocenter [place] 60 4mm 1   RT Cerebeller 20 Gy to 45% isocenter [place] 60 4mm 1          DESCRIPTION OF PROCEDURE:  On 2020the patient was brought to the Gamma Knife suite at the [facility].  After sedation and topical anesthetic, the head frame was put on by Brittney Zamorano.      The patient was then taken to the Department of Radiology where a stereotactic brain MRI was performed.  The patient was admitted to the  care area while treatment planning was completed.      These Images were then sent to the [employer] Gamma Knife computer system where a three dimensional stereotactic plan was generated.   The patient was then treated on the Le[employer]ell Gamma Knife.  Treatment involved 5 targets    The [employer] Gamma Knife IconTM Plan software was used to create a highly conformal dose distribution using the number and size collimators detailed above.     TREATMENT:    The patient was brought to the Gamma Knife suite.  The patient was identified by 2 methods. A timeout was performed to confirm the correct patient and correct procedure. The site was identified by an MRI image and treatment planning software, which defined the treatment area.     The frame measurements were re-taken and compared to the original measurements.    The treatment was delivered using the [employer] Gamma Knife IconTM without complication.  The head frame was removed and the  patient was discharged home in stable condition.  The patient tolerated the treatment well and had no complications.    FOLLOW-UP PLANS:  The Gamma Knife Nurse Coordinator will call the patient tomorrow for short-term follow up.  Written discharge instructions were given to the patient. The patient will have a follow up brain MRI in 3 months.      Brittney Zamorano MD    Patient Care Team:  Donald Shell DO as PCP - General (Family Practice)  Donald Shell DO as Assigned PCP  Susan Muller MD as Referring Physician (Hematology & Oncology)  Darleen Ruiz RN as Specialty Care Coordinator (Hematology & Oncology)  FIFI CARDENAS

## 2020-09-28 NOTE — LETTER
2020         RE: Kentrell Kirkpatrick  [address] [facility] [place] [address]        Dear Colleague,    Thank you for referring your patient, Kentrell Kirkpatrick, to the [facility], RADIATION ONCOLOGY. Please see a copy of my visit note below.      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  Date of Treatment: 2020  Referring Physicians: Akin Valdez, Tumor Registry    GAMMA KNIFE PROCEDURE NOTE and TREATMENT SUMMARY    Treatment Summary:     Treatment Site Dose Modality Collimators Shots   Left motor 20 Gy to 50% isocenter [place] 60 4mm 1   Left precuneus 20 Gy to 50% isocenter [place] 60 4,8mm 2   RT thalamus 20 Gy to 50% isocenter [place] 60 4,8mm 2   LT Cerebeller 20 Gy to 80% isocenter [place] 60 4mm 1   RT Cerebeller 20 Gy to 45% isocenter [place] 60 4mm 1          DESCRIPTION OF PROCEDURE:  On 2020the patient was brought to the Gamma Knife suite at the [facility].  After sedation and topical anesthetic, the head frame was put on by Brittney Zamorano.      The patient was then taken to the Department of Radiology where a stereotactic brain MRI was performed.  The patient was admitted to the 2A care area while treatment planning was completed.      These Images were then sent to the [employer] Gamma Knife computer system where a three dimensional stereotactic plan was generated.   The patient was then treated on the Le[employer]ell Gamma Knife.  Treatment involved 5 targets    The [employer] Gamma Knife IconTM Plan software was used to create a highly conformal dose distribution using the number and size collimators detailed above.     TREATMENT:    The patient was brought to the Gamma Knife suite.  The patient was identified by 2 methods. A timeout was performed to confirm the correct patient and correct procedure. The site was identified by an MRI image and treatment planning  software, which defined the treatment area.     The frame measurements were re-taken and compared to the original measurements.    The treatment was delivered using the [employer] Gamma Knife IconTM without complication.  The head frame was removed and the patient was discharged home in stable condition.  The patient tolerated the treatment well and had no complications.    FOLLOW-UP PLANS:  The Gamma Knife Nurse Coordinator will call the patient tomorrow for short-term follow up.  Written discharge instructions were given to the patient. The patient will have a follow up brain MRI in 3 months.      Brittney Zamorano MD    Patient Care Team:  Donald Shell DO as PCP - General (Family Practice)  Susan Muller MD as Referring Physician (Hematology & Oncology)  Darleen Ruiz RN as Specialty Care Coordinator (Hematology & Oncology)  FIFI CARDENAS    Again, thank you for allowing me to participate in the care of your patient.        Sincerely,        Brittney Zamorano MD

## 2020-09-29 NOTE — TELEPHONE ENCOUNTER
A call was placed to Edy in follow up to his Gamma Knife treatment on 9/28/20.  Edy said he slept quite a bit yesterday when he got home and slept well last night but had some wild dreams.  Edy said his wife looked at the pin sites and felt they all looked good, they are a bit tender Edy said but not bad.  Edy denied a headache and said he really felt pretty good today, he felt he tolerated the Gamma Knife well.

## 2020-09-30 NOTE — PROGRESS NOTES
[facility]    FOLLOW-UP VISIT NOTE    PATIENT NAME: Kentrell Kirkpatrick MRN # [id number]  DATE OF VISIT: September 30, 2020 YOB: 1950    CANCER TYPE: NSCLC, adenocarcinoma  STAGE: IV  ECOG PS: 3    Cancer Staging  Non-small cell lung cancer, left (H)  Staging form: Lung, AJCC 8th Edition  - Clinical stage from 3/11/2019: Stage IV (cT1c, cN3, pM1c) - Signed by Susan Muller MD on 3/11/2019    PD-L1: <1%  Lung panel: 3/1/19 on [id number] A1 and [id number] A1. Negative  NGS: [employer]ant 360 sent 2/28/19 negative for actionable mutations. SMAD4 E538, TP53 H179R, SMO A327G. MSI not high    SUMMARY  6/27/18 CT chest w/contrast to re-evaluate aortic aneurysm: 8 mm spiculated KEATON nodule, 3 mm RML nodule unchanged from 3/15/17 and 2/25/16 scans  2/4/19 Presented to PCP with fairly rapid onset of shortness of breath. Given albuterol  2/19/19 CXR and CT chest w/contrast. Large left effusion  2/20/19 L thoracentesis (Dr. Rodriguez), 1180 cc  3/1/19 L pleurx (Dr. Rodriguez)  3/13/19 C1 carboplatin pemetrexed pembrolizumab  3/15/19 L supraclavicular LN bx (IR, FNA). Path: lung adenocarcinoma  4/3/19 C2 carboplatin pemetrexed pembrolizumab (total 4 cycles)  4/15/19 FEV1 2.11 (61%), FVC 3.38 (73%), DLCO 27.7, 67% (59%)  4/18/19 TPA. Drained 900 cc after it got unclotted  5/9/19 Pleurx removed  6/5~8/28/19 Maintenance pemetrexed + pembrolizumab x 5 cycles --> PD  9/20/19~4/29/20 C1-8 docetaxel 60 mg/m2 + ramucirumab. Had extravasation w/C2 10/11/19. C4 docetaxel ramucirumab delayed 1 week 2/2019 due to URI and trip. C5-8 docetaxel reduced to 50 mg/m2 due to excessive fatigue  9/28/19 UC for bronchitis. Levofloxacin  10/4/19 Brain MRI - routine rescreening - negative  10/17/19 Port  10/23/19 Recurrent cough. Saw PCP. Azithromycin  10/29/19 Prednisone 40 mg once, then 20 mg daily x 5 days, then 10 mg daily x 5 days for acute bronchitis   12/12/19 ED for bronchitis. Had some chills prior  to going to the ED  3/3/20 Treated for pansinusitis with amox-clav x 14 days and fluticasone nasal spray  3/31/20 CT CAP and bone scan. Some PD. No bone mets.   5/6/20 Brain MRI. Negative  5/6/20 CT CAP.   4/1/20~4/29/20 C1-2 gemcitabine 1000 mg/m2 D1, 8 --> PD  5/30/20 [facility]. CTA negative for PE. Doxy, etc  6/11/20 Bone scan. L sacral ala met  6/11/20 CT CAP. Increased innumerable bilateral lung nodules. Anterior mediastinal adenopathy unchanged. Nodular pleural thickening. Increasing liver mets, ~20. L adrenal stable, increasing LN next to L renal artery, small nodular mets abutting posterior L kidney.   6/11/20 Brain MRI. Suspect 5 x 6 mm L cerebellar met  6/15/20 Gamma knife x 1 to small L cerebellar met found incidentally at the time of screening for sitra nivo trial    ~ current Sitravatinib. Held 9/17 due to new small PE.  7/8/20 D1 run in for sitravatinib  7/23/20 D1 nivolumab  8/26~8/27/20 [facility] under obs for increased shortness of breath. Treated for COPD exacerbation with prednisone and nebs. Felt better quickly  9/15/20 CT CAP. Lungs/pleura/nodules stable. Stable mediastinal and hilar disease. RUL segmental PE extending into subsegmental artery. Questionable RLL PE. Numerous new small lesions in the liver, others increased.  Remainder of disease stable. More noticeable sclerotic changes in L sacral ala, highly concerning for mets. Noted to be new, but was present on June 2020 bone scan and prior CT. Stable L 12th rib met. Enlarging portal hepatis LN 0.8-->1.3 cm. Unchanged retroperitoneal adenopathy. Stable L adrenal.   9/15/20 Brain MRI. 3 new tiny mets. 8 mm R thalamus, 3 mm L cerebellum, 4 mm R cerebellum  9/15/20 CT CAP. SD in lungs. PD in liver but compared to June 2020 CT, 1 month prior to starting treatment. Stable L adrenal. Larger desmond hepatis nodes. Unchanged retroperitoneal adenopathy. Subsegmental RUL PE. Started rivaroxaban. Sitravatinib held starting 9/16. Sponsor requested we hold  for 1-2 weeks and reassess. Continued nivolumab due to mixed response and clinical benefit  9/15/20 TTE. EF 40-45%, mild diffuse hypokinesis, global RV function mildly reduced. Repeat TTE 9/29/20 EF 45-50%, technically difficult study. Met with Dr. Marmolejo in Cardiology ([place]) 9/22, started Toprol 25 mg daily.  9/29/20 LE US negative for DVT  9/29/20 TTE. EF 45-50%  9/28/20 Gamma knife to small L motor, L precuneus, R thalamus, L cerebellar, R cerebellar mets.   9/30/20 Off study date due to declining PS    SUBJECTIVE  Mr. Kirkpatrick returns today for follow up of metastatic lung adenocarcinoma, on sitravatinib + nivolumab trial. Sitra has been on hold since the PE was discovered 2 weeks ago. He's been more short of breath and more fatigued since last week. Spends most of the day resting or sleeping. Gamma knife went fine, but of course was tough on him. Appetite remains poor and has dysguesia as well. About the same as before. No pain. L hip pain is resolved. Wondering if he can cut down on the MSContin. Not really taking oxycodone. No other new issues.     PAST MEDICAL HISTORY  Lung adenocarcinoma as above  L5 disk herniation. Epidural injection 5/22/18. Improved dramatically with chiropracter in the past.   BPH  Ascending aortic aneurysm  SKYLER not on CPAP, couldn't tolerate, so using dental device    4/15/19 PFT. FEV1 2.11 (61%), FVC 4.58    CURRENT OUTPATIENT MEDICATIONS  Current Outpatient Medications   Medication     albuterol (PROAIR HFA/PROVENTIL HFA/VENTOLIN HFA) 108 (90 Base) MCG/ACT inhaler     albuterol (PROVENTIL) (2.5 MG/3ML) 0.083% neb solution     calcium polycarbophil (FIBERCON) 625 MG tablet     finasteride (PROSCAR) 5 MG tablet     LORazepam (ATIVAN) 1 MG tablet     Melatonin 10 MG TABS tablet     metoprolol succinate ER (TOPROL XL) 25 MG 24 hr tablet     mirtazapine (REMERON) 15 MG tablet     mometasone-formoterol (DULERA) 100-5 MCG/ACT inhaler     morphine (MS CONTIN) 15 MG CR tablet      NIVOLUMAB IV     oxyCODONE (ROXICODONE) 5 MG tablet     prochlorperazine (COMPAZINE) 10 MG tablet     rivaroxaban ANTICOAGULANT (XARELTO ANTICOAGULANT) 20 MG TABS tablet     Rivaroxaban ANTICOAGULANT 15 & 20 MG TBPK     senna (SENOKOT) 8.6 MG tablet     study - sitravatinib malate salt, [id number],, IDS# [id number], 40 mg roller oompaction capsule     study - sitravatinib malate salt, [id number],, IDS# [id number], 60 mg roller oompaction capsule     tamsulosin (FLOMAX) 0.4 MG capsule     tiotropium (SPIRIVA HANDIHALER) 18 MCG inhaled capsule     No current facility-administered medications for this visit.      Facility-Administered Medications Ordered in Other Visits   Medication     iohexol (OMNIPAQUE) 300 mg/mL injection 10 mL     sodium chloride (PF) 0.9% PF flush 10 mL     ALLERGIES  No Known Allergies    REVIEW OF SYSTEMS  As above in the HPI, o/w complete 12-point ROS was negative.    PHYSICAL EXAM  /72   Pulse 92   Temp 96.4  F (35.8  C)   Resp 16   Wt 68 kg (149 lb 14.4 oz)   SpO2 96%   BMI 19.78 kg/m    Wt Readings from Last 3 Encounters:   09/25/20 67.8 kg (149 lb 8 oz)   09/22/20 68.6 kg (151 lb 4.8 oz)   09/21/20 67.1 kg (148 lb)     GEN: NAD, looks fatigued, a little thinner than last visit with me  HEENT: EOMI, no icterus, injection or pallor. Oropharynx clear but mouth is very dry  LUNGS: clear bilaterally, no crackles, wheezes or rhonchi  CV: regular, no murmurs, rubs, or gallops, mildly tachycardic   EXT: warm, well perfused, no edema  NEURO: alert  SKIN: no rashes    LABORATORY AND IMAGING STUDIES  Results for MAYA ELLER (MRN [id number]) as of 9/30/2020 19:19   9/30/2020 18:00 9/30/2020 18:15   WBC 10.4    Hemoglobin 12.0 (L)    Hematocrit 38.4 (L)    Platelet Count 288    RBC Count 4.67    MCV 82    MCH 25.7 (L)    MCHC 31.3 (L)    RDW 20.9 (H)    Diff Method Automated Method    % Neutrophils 74.6    % Lymphocytes 7.7    % Monocytes 13.6    % Eosinophils 2.5    % Basophils 0.7    % Immature  Granulocytes 0.9    Nucleated RBCs 0    Absolute Neutrophil 7.8    Absolute Lymphocytes 0.8    Absolute Monocytes 1.4 (H)    Absolute Eosinophils 0.3    Absolute Basophils 0.1    Abs Immature Granulocytes 0.1    Absolute Nucleated RBC 0.0    INR 1.40 (H)    PTT 39 (H)    Color Urine  Yellow   Appearance Urine  Slightly Cloudy   Glucose Urine  Negative   Bilirubin Urine  Negative   Ketones Urine  Negative   Specific Gravity Urine  1.017   pH Urine  7.0   Protein Albumin Urine  30 (A)   Urobilinogen mg/dL  0.0   Nitrite Urine  Negative   Blood Urine  Negative   Leukocyte Esterase Urine  Negative   Source  Midstream Urine   WBC Urine  1   RBC Urine  1   Squamous Epithelial /HPF Urine  <1   Mucous Urine  Present (A)   Amorphous Crystals  Few (A)     Remainder of labs pending    Echocardiogram Limited  [id number]  [id number]  [id number]  [id number]^SHREYAS^SHAHEED           [facility]  Echocardiography Laboratory  [address] [place] [address]        Name: MORRIS ELLER  MRN: [id number]  : 1950  Study Date: 2020 01:25 PM  Age: 69 yrs  Gender: Male  Patient Location: [facility]  Reason For Study: 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 Optison (NDC #4117-8044) given intravenously. Technically  difficult study. Echo contrast agent used.  _____________________________________________________________________________  __        Interpretation Summary     Mildly decreased left ventricular systolic function  LVEF 45-50% based on biplane 2D tracing.  The right ventricle is not well visualized.  The inferior vena cava is normal.  Technically difficult study.     In comparison to the echo from  9/15/2020 the EF may be have improved (was 40-  45%).  _____________________________________________________________________________  __        Left Ventricle  Mildly decreased left ventricular systolic function. LVEF 45-50% based on  biplane 2D tracing.     Right Ventricle  The right ventricle is not well visualized.     Mitral Valve  There is trace mitral regurgitation.     Vessels  The inferior vena cava is normal.        Pericardium  There is no pericardial effusion.     Rhythm  Sinus rhythm was noted.  _____________________________________________________________________________  __           Doppler Measurements & Calculations  MV E max jordi: 43.9 cm/sec  MV A max jordi: 60.3 cm/sec  MV E/A: 0.73  Medial E/e': 6.2           _____________________________________________________________________________  __           Report approved by: MD Maddie Muñoz 09/29/2020 04:48 PM     US Lower Extremity Venous Duplex Bilateral  Narrative: VENOUS ULTRASOUND BOTH LEGS  9/29/2020 2:13 PM     HISTORY: New pulmonary embolism. Leg swelling.    COMPARISON: None.    FINDINGS:  Examination of the deep veins with graded compression and  color flow Doppler with spectral wave form analysis was performed.   There is no evidence for DVT in the lower extremities.  Impression: IMPRESSION: No evidence of deep venous thrombosis.    MARITZA GRAYSON MD      ASSESSMENT AND PLAN  NSCLC, adeno: Unfortunately, PS has declined further, largely due to PD. I don't think it's realistic to think we'll get back to a point where we can reasonably expect to get back on treatment. The changes in the last couple of weeks has been pretty significant. Further, even though I thought he was clinically benefiting from study treatment, the likelihood of that benefit being prolonged is low. With these concepts in mind, I discussed transitioning our focus solely on helping him feel better, the mainstay of which is to start dexamethasone, which is not allowed  on study. We talked about this at length. I gave him the option to think about it for a while, but he decided he wanted to focus on improving QOL moving forward. End of study procedures completed. EKG ok. Labs drawn. We didn't have kits as the EOT visit wasn't expected and there wasn't time to prepare or bring over kits. We also talked about hospice. I encouraged an informational meeting at home to start and he was open to this. Will arrange. He's not quite ready to make the transition yet. Will see him next Tues via video visit. I also discussed my anticipation of life expectancy to be on the order of a few weeks (short end) to perhaps a couple of months (optimistic). We talked a little bit about increasing fatigue, sleepiness, shortness of breath that will likely require opioids to control, at the expense of alertness, etc.     L hip pain, bone met: Pain resolved after palliative radiotherapy. Decrease MSContin AM dose and if it goes ok, can cut out the PM dose as well after a few days. Continue oxycodone prn.     Fatigue, anorexia, cancer-related pain, etc: He wishes to see Dr. Loera one more time in person. Will request. Not only for symptom management but for ongoing discussions regarding goals, expectations. We did not discuss code status today.     PE: Continue rivaroxaban for now.     Increased dyspnea: Will see if dex helps. COPD+cancer+PE. Might have a bit of lymphangitic spread on the right. Doubt pneumonitis. No further evaluation given goals of care.     Brain mets: S/p gamma knife    Elevated amylase: from cancer.    Decreased ejection fraction: On the border between grade 2 and grade 3 but mostly leaning toward grade 2. Asymptomatic. A little bit improved on Toprol. We may opt to stop the beta blocker next week as it might be contributing to fatigue as well.     Transaminitis, ALT/AST elevated: Secondary to liver mets. I don't think they're related to study drug. Waiting for labs today.     Chronic  constipation: Continue current regimen, no changes today, not actively discussed.     Anxiety: Increased mirtazipine helping. Meeting with Dr. Loera helped immensely. See request for another visit above. Can start selective serotonin reuptake inhibitor now that he'll be off study, if needed.     Hoarseness: Unchanged.     Anemia: Hemoglobin dropped a gram last week. Same today.    RHM: Flu shot today    A total of 40 minutes was spent with the patient, >50% of which was spent in counseling and coordination of care.    Susan Muller MD  Associate Professor of Medicine  Hematology, Oncology and Transplantation

## 2020-09-30 NOTE — NURSING NOTE
"Oncology Rooming Note    September 30, 2020 4:20 PM   Kentrell Kirkpatrick is a 69 year old male who presents for:    Chief Complaint   Patient presents with     Oncology Clinic Visit     Lung Cancer     Initial Vitals: /72   Pulse 92   Temp 96.4  F (35.8  C)   Resp 16   Wt 68 kg (149 lb 14.4 oz)   SpO2 96%   BMI 19.78 kg/m   Estimated body mass index is 19.78 kg/m  as calculated from the following:    Height as of 9/25/20: 1.854 m (6' 1\").    Weight as of this encounter: 68 kg (149 lb 14.4 oz). Body surface area is 1.87 meters squared.  No Pain (0) Comment: Data Unavailable   No LMP for male patient.  Allergies reviewed: Yes  Medications reviewed: Yes    Medications: MEDICATION REFILLS NEEDED TODAY. Provider was notified. Refill request for Mirtazapine  Pharmacy name entered into [employer]: [id number] - [place], [place] - [address]    Clinical concerns: No new concerns today Dr. Muller was notified.      Myles Rader MA            "

## 2020-09-30 NOTE — NURSING NOTE
EKG was performed today.  Order written by Dr. Muller was completed today. Name and  verified with patient.    Maria Del Carmen Holcomb CMA

## 2020-09-30 NOTE — NURSING NOTE
Patient labs drawn in clinic via port. Port accessed by Neva Chun RN. Patient tolerated well. See flow sheet.      Maria Del Carmen Holcomb, CMA

## 2020-09-30 NOTE — NURSING NOTE
Flu shot administered per Dr. Muller. Patient tolerated well. See MAR for details.     Maria Del Carmen Holcomb, CMA

## 2020-09-30 NOTE — LETTER
9/30/2020         RE: Kentrell Kirkpatrick  [address] [facility] [address]  [facility] [address]        Dear Colleague,    Thank you for referring your patient, Kentrell Kirkpatrick, to the [facility]. Please see a copy of my visit note below.    [facility]    FOLLOW-UP VISIT NOTE    PATIENT NAME: Kentrell Kirkpatrick MRN # [id number]  DATE OF VISIT: September 30, 2020 YOB: 1950    CANCER TYPE: NSCLC, adenocarcinoma  STAGE: IV  ECOG PS: 3    Cancer Staging  Non-small cell lung cancer, left (H)  Staging form: Lung, AJCC 8th Edition  - Clinical stage from 3/11/2019: Stage IV (cT1c, cN3, pM1c) - Signed by Susan Muller MD on 3/11/2019    PD-L1: <1%  Lung panel: 3/1/19 on [id number] A1 and [id number] A1. Negative  NGS: Guardant 360 sent 2/28/19 negative for actionable mutations. SMAD4 E538, TP53 H179R, SMO A327G. MSI not high    SUMMARY  6/27/18 CT chest w/contrast to re-evaluate aortic aneurysm: 8 mm spiculated KEATON nodule, 3 mm RML nodule unchanged from 3/15/17 and 2/25/16 scans  2/4/19 Presented to PCP with fairly rapid onset of shortness of breath. Given albuterol  2/19/19 CXR and CT chest w/contrast. Large left effusion  2/20/19 L thoracentesis (Dr. Rodriguez), 1180 cc  3/1/19 L pleurx (Dr. Rodriguez)  3/13/19 C1 carboplatin pemetrexed pembrolizumab  3/15/19 L supraclavicular LN bx (IR, FNA). Path: lung adenocarcinoma  4/3/19 C2 carboplatin pemetrexed pembrolizumab (total 4 cycles)  4/15/19 FEV1 2.11 (61%), FVC 3.38 (73%), DLCO 27.7, 67% (59%)  4/18/19 TPA. Drained 900 cc after it got unclotted  5/9/19 Pleurx removed  6/5~8/28/19 Maintenance pemetrexed + pembrolizumab x 5 cycles --> PD  9/20/19~4/29/20 C1-8 docetaxel 60 mg/m2 + ramucirumab. Had extravasation w/C2 10/11/19. C4 docetaxel ramucirumab delayed 1 week 2/2019 due to URI and trip. C5-8 docetaxel reduced to 50 mg/m2 due to excessive fatigue  9/28/19 UC for bronchitis. Levofloxacin  10/4/19 Brain MRI  - routine rescreening - negative  10/17/19 Port  10/23/19 Recurrent cough. Saw PCP. Azithromycin  10/29/19 Prednisone 40 mg once, then 20 mg daily x 5 days, then 10 mg daily x 5 days for acute bronchitis   12/12/19 ED for bronchitis. Had some chills prior to going to the ED  3/3/20 Treated for pansinusitis with amox-clav x 14 days and fluticasone nasal spray  3/31/20 CT CAP and bone scan. Some PD. No bone mets.   5/6/20 Brain MRI. Negative  5/6/20 CT CAP.   4/1/20~4/29/20 C1-2 gemcitabine 1000 mg/m2 D1, 8 --> PD  5/30/20 [facility] ED. CTA negative for PE. Doxy, etc  6/11/20 Bone scan. L sacral ala met  6/11/20 CT CAP. Increased innumerable bilateral lung nodules. Anterior mediastinal adenopathy unchanged. Nodular pleural thickening. Increasing liver mets, ~20. L adrenal stable, increasing LN next to L renal artery, small nodular mets abutting posterior L kidney.   6/11/20 Brain MRI. Suspect 5 x 6 mm L cerebellar met  6/15/20 Gamma knife x 1 to small L cerebellar met found incidentally at the time of screening for sitra nivo trial    ~ current Sitravatinib. Held 9/17 due to new small PE.  7/8/20 D1 run in for sitravatinib  7/23/20 D1 nivolumab  8/26~8/27/20 [facility] under obs for increased shortness of breath. Treated for COPD exacerbation with prednisone and nebs. Felt better quickly  9/15/20 CT CAP. Lungs/pleura/nodules stable. Stable mediastinal and hilar disease. RUL segmental PE extending into subsegmental artery. Questionable RLL PE. Numerous new small lesions in the liver, others increased.  Remainder of disease stable. More noticeable sclerotic changes in L sacral ala, highly concerning for mets. Noted to be new, but was present on June 2020 bone scan and prior CT. Stable L 12th rib met. Enlarging portal hepatis LN 0.8-->1.3 cm. Unchanged retroperitoneal adenopathy. Stable L adrenal.   9/15/20 Brain MRI. 3 new tiny mets. 8 mm R thalamus, 3 mm L cerebellum, 4 mm R cerebellum  9/15/20 CT CAP. SD in lungs. PD in  liver but compared to June 2020 CT, 1 month prior to starting treatment. Stable L adrenal. Larger desmond hepatis nodes. Unchanged retroperitoneal adenopathy. Subsegmental RUL PE. Started rivaroxaban. Sitravatinib held starting 9/16. Sponsor requested we hold for 1-2 weeks and reassess. Continued nivolumab due to mixed response and clinical benefit  9/15/20 TTE. EF 40-45%, mild diffuse hypokinesis, global RV function mildly reduced. Repeat TTE 9/29/20 EF 45-50%, technically difficult study. Met with Dr. Marmolejo in Cardiology ([place]) 9/22, started Toprol 25 mg daily.  9/29/20 LE US negative for DVT  9/29/20 TTE. EF 45-50%  9/28/20 Gamma knife to small L motor, L precuneus, R thalamus, L cerebellar, R cerebellar mets.   9/30/20 Off study date due to declining PS    SUBJECTIVE  Mr. Kirkpatrick returns today for follow up of metastatic lung adenocarcinoma, on sitravatinib + nivolumab trial. Sitra has been on hold since the PE was discovered 2 weeks ago. He's been more short of breath and more fatigued since last week. Spends most of the day resting or sleeping. Gamma knife went fine, but of course was tough on him. Appetite remains poor and has dysguesia as well. About the same as before. No pain. L hip pain is resolved. Wondering if he can cut down on the MSContin. Not really taking oxycodone. No other new issues.     PAST MEDICAL HISTORY  Lung adenocarcinoma as above  L5 disk herniation. Epidural injection 5/22/18. Improved dramatically with chiropracter in the past.   BPH  Ascending aortic aneurysm  SKYLER not on CPAP, couldn't tolerate, so using dental device    4/15/19 PFT. FEV1 2.11 (61%), FVC 4.58    CURRENT OUTPATIENT MEDICATIONS  Current Outpatient Medications   Medication     albuterol (PROAIR HFA/PROVENTIL HFA/VENTOLIN HFA) 108 (90 Base) MCG/ACT inhaler     albuterol (PROVENTIL) (2.5 MG/3ML) 0.083% neb solution     calcium polycarbophil (FIBERCON) 625 MG tablet     finasteride (PROSCAR) 5 MG tablet     LORazepam  (ATIVAN) 1 MG tablet     Melatonin 10 MG TABS tablet     metoprolol succinate ER (TOPROL XL) 25 MG 24 hr tablet     mirtazapine (REMERON) 15 MG tablet     mometasone-formoterol (DULERA) 100-5 MCG/ACT inhaler     morphine (MS CONTIN) 15 MG CR tablet     NIVOLUMAB IV     oxyCODONE (ROXICODONE) 5 MG tablet     prochlorperazine (COMPAZINE) 10 MG tablet     rivaroxaban ANTICOAGULANT (XARELTO ANTICOAGULANT) 20 MG TABS tablet     Rivaroxaban ANTICOAGULANT 15 & 20 MG TBPK     senna (SENOKOT) 8.6 MG tablet     study - sitravatinib malate salt, [id number],, IDS# [id number], 40 mg roller oompaction capsule     study - sitravatinib malate salt, [id number],, IDS# [id number], 60 mg roller oompaction capsule     tamsulosin (FLOMAX) 0.4 MG capsule     tiotropium (SPIRIVA HANDIHALER) 18 MCG inhaled capsule     No current facility-administered medications for this visit.      Facility-Administered Medications Ordered in Other Visits   Medication     iohexol (OMNIPAQUE) 300 mg/mL injection 10 mL     sodium chloride (PF) 0.9% PF flush 10 mL     ALLERGIES  No Known Allergies    REVIEW OF SYSTEMS  As above in the HPI, o/w complete 12-point ROS was negative.    PHYSICAL EXAM  /72   Pulse 92   Temp 96.4  F (35.8  C)   Resp 16   Wt 68 kg (149 lb 14.4 oz)   SpO2 96%   BMI 19.78 kg/m    Wt Readings from Last 3 Encounters:   09/25/20 67.8 kg (149 lb 8 oz)   09/22/20 68.6 kg (151 lb 4.8 oz)   09/21/20 67.1 kg (148 lb)     GEN: NAD, looks fatigued, a little thinner than last visit with me  HEENT: EOMI, no icterus, injection or pallor. Oropharynx clear but mouth is very dry  LUNGS: clear bilaterally, no crackles, wheezes or rhonchi  CV: regular, no murmurs, rubs, or gallops, mildly tachycardic   EXT: warm, well perfused, no edema  NEURO: alert  SKIN: no rashes    LABORATORY AND IMAGING STUDIES  Results for MAYA ELLER (MRN [id number]) as of 9/30/2020 19:19   9/30/2020 18:00 9/30/2020 18:15   WBC 10.4    Hemoglobin 12.0 (L)    Hematocrit 38.4 (L)     Platelet Count 288    RBC Count 4.67    MCV 82    MCH 25.7 (L)    MCHC 31.3 (L)    RDW 20.9 (H)    Diff Method Automated Method    % Neutrophils 74.6    % Lymphocytes 7.7    % Monocytes 13.6    % Eosinophils 2.5    % Basophils 0.7    % Immature Granulocytes 0.9    Nucleated RBCs 0    Absolute Neutrophil 7.8    Absolute Lymphocytes 0.8    Absolute Monocytes 1.4 (H)    Absolute Eosinophils 0.3    Absolute Basophils 0.1    Abs Immature Granulocytes 0.1    Absolute Nucleated RBC 0.0    INR 1.40 (H)    PTT 39 (H)    Color Urine  Yellow   Appearance Urine  Slightly Cloudy   Glucose Urine  Negative   Bilirubin Urine  Negative   Ketones Urine  Negative   Specific Gravity Urine  1.017   pH Urine  7.0   Protein Albumin Urine  30 (A)   Urobilinogen mg/dL  0.0   Nitrite Urine  Negative   Blood Urine  Negative   Leukocyte Esterase Urine  Negative   Source  Midstream Urine   WBC Urine  1   RBC Urine  1   Squamous Epithelial /HPF Urine  <1   Mucous Urine  Present (A)   Amorphous Crystals  Few (A)     Remainder of labs pending    Echocardiogram Limited  [id number]  [id number]  [id number]  [id number]^SHREYAS^SHAHEED           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 Optison (NDC #[id number]) given intravenously. Technically  difficult study. Echo contrast agent used.  _____________________________________________________________________________  __        Interpretation  Summary     Mildly decreased left ventricular systolic function  LVEF 45-50% based on biplane 2D tracing.  The right ventricle is not well visualized.  The inferior vena cava is normal.  Technically difficult study.     In comparison to the echo from 9/15/2020 the EF may be have improved (was 40-  45%).  _____________________________________________________________________________  __        Left Ventricle  Mildly decreased left ventricular systolic function. LVEF 45-50% based on  biplane 2D tracing.     Right Ventricle  The right ventricle is not well visualized.     Mitral Valve  There is trace mitral regurgitation.     Vessels  The inferior vena cava is normal.        Pericardium  There is no pericardial effusion.     Rhythm  Sinus rhythm was noted.  _____________________________________________________________________________  __           Doppler Measurements & Calculations  MV E max jordi: 43.9 cm/sec  MV A max jordi: 60.3 cm/sec  MV E/A: 0.73  Medial E/e': 6.2           _____________________________________________________________________________  __           Report approved by: MD Maddie Muñoz 09/29/2020 04:48 PM     US Lower Extremity Venous Duplex Bilateral  Narrative: VENOUS ULTRASOUND BOTH LEGS  9/29/2020 2:13 PM     HISTORY: New pulmonary embolism. Leg swelling.    COMPARISON: None.    FINDINGS:  Examination of the deep veins with graded compression and  color flow Doppler with spectral wave form analysis was performed.   There is no evidence for DVT in the lower extremities.  Impression: IMPRESSION: No evidence of deep venous thrombosis.    MARITZA GRAYSON MD      ASSESSMENT AND PLAN  NSCLC, adeno: Unfortunately, PS has declined further, largely due to PD. I don't think it's realistic to think we'll get back to a point where we can reasonably expect to get back on treatment. The changes in the last couple of weeks has been pretty significant. Further, even though I thought he was clinically  benefiting from study treatment, the likelihood of that benefit being prolonged is low. With these concepts in mind, I discussed transitioning our focus solely on helping him feel better, the mainstay of which is to start dexamethasone, which is not allowed on study. We talked about this at length. I gave him the option to think about it for a while, but he decided he wanted to focus on improving QOL moving forward. End of study procedures completed. EKG ok. Labs drawn. We didn't have kits as the EOT visit wasn't expected and there wasn't time to prepare or bring over kits. We also talked about hospice. I encouraged an informational meeting at home to start and he was open to this. Will arrange. He's not quite ready to make the transition yet. Will see him next Tues via video visit. I also discussed my anticipation of life expectancy to be on the order of a few weeks (short end) to perhaps a couple of months (optimistic). We talked a little bit about increasing fatigue, sleepiness, shortness of breath that will likely require opioids to control, at the expense of alertness, etc.     L hip pain, bone met: Pain resolved after palliative radiotherapy. Decrease MSContin AM dose and if it goes ok, can cut out the PM dose as well after a few days. Continue oxycodone prn.     Fatigue, anorexia, cancer-related pain, etc: He wishes to see Dr. Loera one more time in person. Will request. Not only for symptom management but for ongoing discussions regarding goals, expectations. We did not discuss code status today.     PE: Continue rivaroxaban for now.     Increased dyspnea: Will see if dex helps. COPD+cancer+PE. Might have a bit of lymphangitic spread on the right. Doubt pneumonitis. No further evaluation given goals of care.     Brain mets: S/p gamma knife    Elevated amylase: from cancer.    Decreased ejection fraction: On the border between grade 2 and grade 3 but mostly leaning toward grade 2. Asymptomatic. A little  bit improved on Toprol. We may opt to stop the beta blocker next week as it might be contributing to fatigue as well.     Transaminitis, ALT/AST elevated: Secondary to liver mets. I don't think they're related to study drug. Waiting for labs today.     Chronic constipation: Continue current regimen, no changes today, not actively discussed.     Anxiety: Increased mirtazipine helping. Meeting with Dr. Loera helped immensely. See request for another visit above. Can start selective serotonin reuptake inhibitor now that he'll be off study, if needed.     Hoarseness: Unchanged.     Anemia: Hemoglobin dropped a gram last week. Same today.    RHM: Flu shot today    A total of 40 minutes was spent with the patient, >50% of which was spent in counseling and coordination of care.    Susan Muller MD  Associate Professor of Medicine  Hematology, Oncology and Transplantation

## 2020-10-01 NOTE — PROGRESS NOTES
[facility] C.O.R.E[facility]     C.O.R.E. Enrollment Education        I spoke with Pat and Edy today. We discussed all of the following topics:     Heart Failure Symptoms Prior to Admission: No symptoms per Pat. Found he has a low EF 40-45% on 9/15/20 and on 9/29/20 EF 45-50%.     Living Situation: [facility] with his wife     Medication Set -Up: Edy does it himself with his wife, Pat supervising  (Bring to enrollment appointment)     Ability to Read Nutrition Labels: Was able to verbally tell me where the serving size and sodium can be found.      Scale Available at Home: Yes. Weighing daily.       Barriers to C.O.R.E. Clinic Follow-Up Appointments: Wife will be driving him     Learning Style Preference (Visual and/or Listening): Both    Candidate for MyHealth Tracker Enrollment: Yes, if needed       Cardiomyopathy/Heart Failure Education Topics Reviewed:      Sodium Intake: < 2 grams daily (1,000 mg = 1 gram)     Fluid Intake: < 2 liters daily (approximately 34 oz = 1 liter)      Daily Weights: Report  > 2 lb gain in 24 hour period or > 5 lb gain in a 7 day period        Record Weights Daily and bring your log with you to your enrollment appointment: Yes     Symptoms of Heart Failure to report to C.O.R.E. Clinic: SOB/RANKIN, weight gain as listed above,  lower extremity edema, lower abdominal edema, easily fatigued or any other symptoms you may feel is  important to make us aware of.     Notes: Excellent communication during our conversation. Edy had is last Chemotherapy dose 2 weeks ago, but it officially ended 9/30/20. He has been dropping weight quickly during chemo. Explained to Pat that what we are looking for is rapid weight gain in a short period of time, which is an indicator of fluid versus actual healthy weight gain. Briefly reviewed some of his lab results with Pat.       Confirm Education Materials Received: PLEASE PROVIDE @ Enrollment Appointment      HF Stoplight Tool   Guide to  Heart Failure Book   Weight Log   Nutrition Label Examples      [facility] Enrollment Appointment: 10/6/20 @ 0840 with DM Crooks follow-up appointment arranged. Instructed Audrey and Edy to call [facility] RN at [phone] with questions/concerns prior to their enrollment appointment. Specifically instructed them to call [facility]ERMAN.[email or web address] RN with weight gain > 2 lbs in 24 hours, or more than 5lbs in a week, worsening SOB/RANKIN/Edema.        It was a pleasure speaking with Audrey and Edy today.  Please call me with any questions or concerns.         Luna Atlanta, RN   C.O.R.E. Clinic RN Care Coordinator  [facility]  [phone] (Hours [phone] Monday thru Friday)  [facility]: Cardiomyopathy, Optimization, Rehabilitation, Education

## 2020-10-05 NOTE — PROGRESS NOTES
Received VM on CORE RN line from wife stating that Edy is now enrolled in hospice and she wants to cancel upcoming scheduled and future appts.  Order deleted.  Appt cancelled.    Luna Daniel RN BSN   [place], [place]  [facility]. Clinic Care Coordinator  10/05/20, 12:21 PM

## 2020-10-10 NOTE — PROCEDURES
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     Treatment Summary:  Radiation Oncology - Course: 2 Protocol:   Treatment Site Current Dose Modality From To Elapsed Days Fx.  2 sacrum    800 cGy 10x/18x  9/24/2020  2020   1         Dose per Fraction: 800       Total Dose:    800          COMMENTS:      ED visits/hspitalizations:0  Missed treatments:0Acute Toxicity Profile by CTC v5.0: none                  PAIN MANAGEMENT:     he hadprompt pain relief by the time of his GK treatment 4 days later                            FOLLOW UP PLAN:         3 mo                    Staff Physician: Brittney Zamorano M.D.  Physicist:  Chi Stone                 Radiation Oncology [address] [address] [place] [id number] Phone: [phone]

## 2020-10-14 NOTE — PROGRESS NOTES
This is a recent snapshot of the patient's [facility] medical record.  For current drug dose and complete information and questions, call [phone] or In Basket pool, fv home infusion ([id number])  CSN Number:  [id number]

## 2020-10-17 NOTE — PROGRESS NOTES
Notifying Dr Muller and care team that Edy passed away 10/17/20 at 2:05pm.  Peaceful death reported by family and wife was present at TOD.

## 2020-10-19 ENCOUNTER — HOME INFUSION (PRE-WILLOW HOME INFUSION) (OUTPATIENT)
Dept: PHARMACY | Facility: CLINIC | Age: 70
End: 2020-10-19

## 2020-10-21 NOTE — PROGRESS NOTES
This is a recent snapshot of the patient's [facility] medical record.  For current drug dose and complete information and questions, call [phone] or In Basket pool, fv home infusion ([id number])  CSN Number:  [id number]

## 2021-01-04 DIAGNOSIS — C34.92 PRIMARY LUNG ADENOCARCINOMA, LEFT (H): Primary | ICD-10-CM

## 2021-01-14 LAB — PD-L1 BY IMMUNOHISTOCHEMISTRY: NORMAL

## 2022-08-08 NOTE — TELEPHONE ENCOUNTER
"Caller has a Pleur-X he  drains  daily for NSCLC for several month; has had decreasing drainage. Today noted  a short \"red worm\" ( understands that it is a blood clot) in tubing that has  gotten stuck in the fitting and  he cannot dislodge it and  get any suction to catheter  No active bleeding above clot, drainage  remains straw colored  No increase in dyspnea or chest pain  No triage protocol available   Offered suggestions to dislodge it including tapping on clot but patient has tried this  as well as turning suction on and off to \"milk\" it out.  On call provider for [facility] pulmonology Dr. Marrero paged via  to call patient at home @ 5:39 PM   @ [phone]   Soraya Byrne RN  FNA      "
[facility]

## 2022-08-24 NOTE — PROGRESS NOTES
EMERGENCY DEPARTMENT ENCOUNTER    Patient: Rani Valle  MRN: [id number]  : 1942  Date of Evaluation: 2022  ED Provider:  Jen Durbin MD    [address]  Chief Complaint   Patient presents with    Toe Pain     Pt c/o fourth and fifth toe pain on Lt foot NKI. HPI  Rani Valle is a [de-identified] y.o. female who presents with complaints moderate severity, constant throbbing pain at the base of the left little fifth toe with onset yesterday. Denies any known injury and denies any known triggering factors. She reports she is taking her Mobic at home with some improvement. Denies redness or swelling. Denies loss of sensation or focal weakness. Denies pain anywhere else. Denies any other associated symptoms or complaints or concerns. REVIEW OF SYSTEMS    Constitutional: negative for fever, chills  Neurological: negative for HA, focal weakness, loss of sensation  Ophthalmic: negative for vision change  ENT: negative for congestion, rhinorrhea  Cardiovascular: negative for chest pain  Respiratory: negative for SOB, cough  GI: negative for abdominal pain, nausea, vomiting, diarrhea, constipation  : negative for dysuria, hematuria  Musculoskeletal: negative for decreased ROM, joint swelling  Dermatological: negative for rash, wounds  Heme: Negative for bleeding, bruising      PAST MEDICAL HISTORY  Past Medical History:   Diagnosis Date    Colon cancer ([address]     Hypertension     Skin cancer     Sleep apnea     Thyroid disease        CURRENT MEDICATIONS  [unfilled]    ALLERGIES  Allergies   Allergen Reactions    Latex Rash       SURGICAL HISTORY  Past Surgical History:   Procedure Laterality Date    BACK SURGERY      CHOLECYSTECTOMY      COLECTOMY      HYSTERECTOMY (CERVIX STATUS UNKNOWN)      JOINT REPLACEMENT Right        FAMILY HISTORY  History reviewed. No pertinent family history.     SOCIAL HISTORY  Social History     Socioeconomic History    Marital status:      Spouse name: Oncology/Hematology Visit Note    Oct 11, 2019    Reason for visit: Follow-up Stage IV NSCLC    Oncology HPI: Kentrell Kirkpatrick is a 68 year old male with NSCLC.  Patient with a history of aortic aneurysm and CT chest to reevaluate this aneurysm was obtained in June 2018 and there were several lung nodules.  If further imaging in February 2019 showed a large left pleural effusion and left thoracentesis was performed.  Pathology revealed adenocarcinoma.  He established care with Dr. Muller on 2/28/2019 and Pleurx catheter was placed. He completed 4 cycles of carboplatin, pemetrexed, pembrolizumab and for cycle completed on 3/13/2019.  He then completed 5 cycles of maintenance pemetrexed and pembrolizumab, last one completed on 8/28/2019, however noted to have progression of disease.  He started second line Taxotere and ramucirumab, cycle 1 on 9/20/2019.  He was in the urgent care on 9/28/2019 for cough.  He is discharged with albuterol, Robitussin and Levaquin.    He is here today for Taxotere/ramucirumab cycle 2.    Interval History: Edy is here with his wife, Audrey.  He completed 1 cycle of chemotherapy and felt it went pretty well.  He has noticed some memory decline even prior to chemotherapy, however slightly worse.  He denies fever/ chills, headache, vision changes, nausea, vomiting,, lower extremity swelling.  He has no new complaints.    Review of Systems: See interval hx. Denies fevers, chills, HA, dizziness, n/t, changes in vision, cough, sore throat, CP, SOB, abdominal pain, N/V, diarrhea, changes in urination, bleeding, bruising, rash.     PMHx and Social Hx reviewed per EPIC.      Medications:  Current Outpatient Medications   Medication Sig Dispense Refill     albuterol (PROAIR HFA/PROVENTIL HFA/VENTOLIN HFA) 108 (90 Base) MCG/ACT inhaler Inhale 2 puffs into the lungs every 4 hours (while awake) 1 Inhaler 0     albuterol (PROVENTIL) (2.5 MG/3ML) 0.083% neb solution Take 1 vial (2.5 mg) by nebulization  None    Number of children: None    Years of education: None    Highest education level: None   Tobacco Use    Smoking status: Never    Smokeless tobacco: Never   Vaping Use    Vaping Use: Never used   Substance and Sexual Activity    Alcohol use: Not Currently    Drug use: Never         **Past medical, family and social histories, and nursing notes reviewed and verified by me**      PHYSICAL EXAM  VITAL SIGNS:   ED Triage Vitals [08/24/22 1935]   Enc Vitals Group      BP (!) 161/85      Heart Rate 78      Resp 20      Temp 97.5 °F (36.4 °C)      Temp Source Infrared      SpO2 96 %      Weight 185 lb (83.9 kg)      Height 5' 1\" (1.549 m)      Head Circumference       Peak Flow       Pain Score       Pain Loc       Pain Edu? Excl. in [address]? Vitals during ED course were reviewed and are as charted. Constitutional: Minimal distress, Non-toxic appearance  Eyes: Conjunctiva normal, No discharge  HENT: Normocephalic, Atraumatic  Cardiovascular: Regular rate and rhythm, No murmurs, No rubs, No gallops  Pulmonary/Chest: Normal breath sounds, No respiratory distress or accessory muscle use, No wheezing, crackles or rhonchi. Abdomen: Soft, nondistended and nonrigid, No tenderness or peritoneal signs  Extremities: No gross deformities, no edema, no tenderness. Normal range of motion of all toes. No erythema or edema throughout the left foot including the toes. She has 2+ DP and PT pulses with cap refill less than 2 seconds in the toes including in the left fifth toe  Neurologic: Normal motor function, Normal sensory function, No focal deficits  Skin: Warm, Dry, No erythema, No rash, No cyanosis, No mottling  Psychiatric: Alert and oriented x3, Affect normal          RADIOLOGY/PROCEDURES/LABS/MEDICATIONS ADMINISTERED:    I have reviewed and interpreted all of the currently available lab results from this visit (if applicable):  No results found for this visit on 08/24/22.        ABNORMAL LABS:  Labs Reviewed - No every 4 hours as needed for shortness of breath / dyspnea or wheezing 100 vial 11     benzonatate (TESSALON) 200 MG capsule Take 1 capsule (200 mg) by mouth 3 times daily as needed for cough 21 capsule 0     calcium polycarbophil (FIBERCON) 625 MG tablet Take 2 tablets by mouth daily       dexamethasone (DECADRON) 4 MG tablet TAKE 1 TABLET (4 MG) BY MOUTH 2 TIMES DAILY TO BE TAKEN DAY BEFORE, DAY OF, AND DAY AFTER INFUSION.. 6 tablet 11     finasteride (PROSCAR) 5 MG tablet Take 1 tablet (5 mg) by mouth daily 30 tablet 5     LORazepam (ATIVAN) 0.5 MG tablet Take 1 tablet (0.5 mg) by mouth every 6 hours as needed (Nausea/Vomiting) 30 tablet 3     Melatonin 10 MG TABS tablet Take 10 mg by mouth nightly as needed for sleep       MULTI VITAMIN MENS OR TABS 1 TABLET DAILY       ondansetron (ZOFRAN) 8 MG tablet Take 1 tablet (8 mg) by mouth every 8 hours as needed for nausea 30 tablet 11     prochlorperazine (COMPAZINE) 10 MG tablet Take 1 tablet (10 mg) by mouth every 6 hours as needed (Nausea/Vomiting) 30 tablet 11     ranitidine (ZANTAC) 75 MG tablet Take 75 mg by mouth as needed for heartburn       tamsulosin (FLOMAX) 0.4 MG capsule Take 1 capsule (0.4 mg) by mouth daily 90 capsule 1     tiotropium (SPIRIVA HANDIHALER) 18 MCG inhaled capsule Inhale 1 capsule (18 mcg) into the lungs daily 1 capsule 11       No Known Allergies      EXAM:    /79   Pulse 83   Temp 97.4  F (36.3  C) (Oral)   Resp 16   Ht 1.829 m (6')   Wt 80.6 kg (177 lb 11.2 oz)   SpO2 98%   BMI 24.10 kg/m      GENERAL:  Male, in no acute distress.  Alert and oriented x3.   HEENT:  Normocephalic, atraumatic.  PERRL, oropharynx clear with no sores or thrush.   LYMPH NODES:  No palpable pre/post-auricular, cervical, axillary lymphadenopathy appreciated.  CV:  RRR, No murmurs, gallops, or rubs.   LUNGS:  Clear to auscultation bilaterally.   ABDOMEN:  Soft, nontender and nondistended.  Bowel sounds heard x4.  No apparent hepatosplenomegaly.    EXTREMITIES:  No clubbing, cyanosis, or edema.   SKIN: No rash  PSYCH: Mood stable      Labs:   Results for MAYA ELLER (MRN [id number]) as of 10/11/2019 09:32   10/11/2019 08:24 10/11/2019 08:44   Sodium 136    Potassium 4.2    Chloride 106    Carbon Dioxide 25    Urea Nitrogen 16    Creatinine 1.00    GFR Estimate 76    GFR Estimate If Black 88    Calcium 8.8    Anion Gap 5    Albumin 3.5    Protein Total 7.3    Bilirubin Total 0.7    Alkaline Phosphatase 79    ALT 32    AST 28    Glucose 106 (H)    WBC 7.2    Hemoglobin 12.5 (L)    Hematocrit 39.2 (L)    Platelet Count 389    RBC Count 4.21 (L)    MCV 93    MCH 29.7    MCHC 31.9    RDW 15.1 (H)    Diff Method Automated Method    % Neutrophils 78.1    % Lymphocytes 14.7    % Monocytes 6.4    % Eosinophils 0.0    % Basophils 0.4    % Immature Granulocytes 0.4    Nucleated RBCs 0    Absolute Neutrophil 5.6    Absolute Lymphocytes 1.1    Absolute Monocytes 0.5    Absolute Eosinophils 0.0    Absolute Basophils 0.0    Abs Immature Granulocytes 0.0    Absolute Nucleated RBC 0.0    Protein Albumin Urine  Negative       Imaging: n/a    Impression/Plan: Kentrell Eller is a 68 year old male with metastatic NSCLC previously on carboplatin, pemetrexed and pembrolizumab, however progression noted and currently undergoing palliative intent chemotherapy with Taxotere and ramucirumab.    Metastatic NSCLC: Previous therapies with progression of disease, completed cycle 1 Taxotere/ramucirumab on 9/20/2019.  He tolerated this infusion fairly well.  Previous Pleurx catheter has been removed. Labs are within treatment parameters to proceed with cycle 2 today. We reviewed toxicities again today.  We will try to schedule for port this week.  He will call the clinic with any questions or concerns.  --10/21 CT  --10/29 Dr. Muller    Pleural effusion: Known left malignant pleural effusion and Pleurx drain removed. Known trapped lung, no further reaccumulation. No new sx.     COPD:  data to display      IMAGING STUDIES ORDERED:  XR TOE LEFT (MIN 2 VIEWS)  [facility] ORTHOPEDIC SUPPLIES    I have personally viewed the imaging studies. The radiologist interpretation is:   XR TOE LEFT (MIN 2 VIEWS)   Final Result   There is mild cortical irregularity along the plantar aspect of the base of   the left 5th proximal phalanx, indeterminate for an acute process. Correlate   with point tenderness. MEDICATIONS ADMINISTERED:  Medications - No data to display      COURSE & MEDICAL DECISION MAKING  Last vitals: BP (!) 161/85   Pulse 78   Temp 97.5 °F (36.4 °C) (Infrared)   Resp 20   Ht 5' 1\" (1.549 m)   Wt 185 lb (83.9 kg)   SpO2 96%   BMI 34.96 kg/m²     [de-identified]year-old female with left fifth toe pain. There is some cortical irregularity and may be a possible small fracture. There is no clinical evidence to suggest an acute infectious process and otherwise no evidence to suggest acute inflammatory process such as gout. We will provide her with a hard soled open toe shoe and referred to follow-up with a podiatrist.    Additional workup and treatment in the ED as documented above. Patient reassured and will be discharged home. I have explained to the patient in appropriate terminology our work-up in the ED and their diagnosis. I have also given anticipatory guidance and expectant management of their condition as an outpatient as per my custom. The patient was given clear discharge and follow-up instructions including return to the ER immediately for worsening concerns. The patient has been advised to follow-up with their primary care physician and/or referred physician in the next two to three days or sooner if worsening and to return to the ER immediately as above with any concerns. I provided the patient counseling with regard to my customary list of strict return precautions as well as return precautions specific to the cause for today's emergency department visit.  The patient will Currently with Tiotropium and albuterol inhalers. No new sx.    Insomnia: Using melatonin when needed, no longer on Seroquel.    Port: He was previously hesitant for port placement, however he is open to it now.  We will schedule with either Dr. Torres or IR next week.    Preoperative Physical: I have done the necessary history and physical and the laboratory tests. He does not need additional evaluation prior to the planned port placement. He is average risk for intermediate risk procedure. He does not have any HTN, CAD, DM TII that would need optimization. He is not anticoagulated and he has had previous surgeries without anesthesia complications. No history of bleeding/clotting diathesis.       Chart documentation with Dragon Voice recognition Software. Although reviewed after completion, some words and grammatical errors may remain.      Trinidad Canales PA-C  Hematology/Oncology  [facility]

## 2022-09-06 NOTE — PROGRESS NOTES
[facility]    FOLLOW-UP VISIT NOTE    PATIENT NAME: Kentrell Kirkpatrick MRN # [id number]  DATE OF VISIT: September 20, 2019 YOB: 1950    CANCER TYPE: NSCLC, adenocarcinoma  STAGE: IV  ECOG PS: 1    Cancer Staging  Non-small cell lung cancer, left (H)  Staging form: Lung, AJCC 8th Edition  - Clinical stage from 3/11/2019: Stage IV (cT1c, cN3, pM1c) - Signed by Susan Muller MD on 3/11/2019    PD-L1: <1%  Lung panel: 3/1/19 on [id number] A1 and [id number] A1. Negative  NGS: Guardant 360 sent 2/28/19 negative for actionable mutations. SMAD4 E538, TP53 H179R, SMO A327G. MSI not high    SUMMARY  6/27/18 CT chest w/contrast to re-evaluate aortic aneurysm: 8 mm spiculated KEATON nodule, 3 mm RML nodule unchanged from 3/15/17 and 2/25/16 scans  2/4/19 Presented to PCP with fairly rapid onset of shortness of breath. Given albuterol  2/19/18 CXR and CT chest w/contrast. Large left effusion  2/20/19 L thoracentesis (Dr. Rodriguez), 1180 cc  3/1/19 L pleurx (Dr. Rodriguez)  3/13/19 C1 carboplatin pemetrexed pembrolizumab  3/15/19 L supraclavicular LN bx (IR, FNA). Path: lung adenocarcinoma  4/3/19 C2 carboplatin pemetrexed pembrolizumab  4/15/19 FEV1 2.11 (61%), FVC 3.38 (73%), DLCO 27.7, 67% (59%)  4/18/19 TPA. Drained 900 cc after it got unclotted  5/9/19 Pleurx removed  6/5~current Maintenance pemetrexed + pembrolizumab    SUBJECTIVE  Mr. Kirkpatrick returns today for follow up of metastatic lung adenocarcinoma after 5 cycles of maintenance pemetrexed pembrolizumab.     Feeling like starting to get another cold again. A little more short of breath with exertion. No cough,fevers, chills, N/V. Energy is still pretty good. No numbness/tingling. He is having some pain over his left nipple. A few weeks ago when he went to the [facility], he woke up the next morning and both his legs were very very sensitive to touch. No numbness/tingling at that time, no back pain, no trouble walking. Is improving  History and Physical     Gilda Hinkle MRN# [id number]   YOB: 1962 Age: 60 year old      Date of Admission:  9/5/2022  Date of Service:  9/6/2022    Primary care provider: No Ref-Primary, Physician          Assessment and Plan:     Summary of Stay: Gilda Hinkle is a 60 year old female without significant medical hx admitted on 9/5/2022 with obstructing ureteral stone with hydro and +/- UTI    In her usual state of health until yesterday when noticed some mild left sided flank pain.  It then radiated around to the front.  She may be having a tiny discomfort with urinating although would probably have done nothing about it. She even played golf yesterday. The pain was nagging but not terrible.  Then last night woke up about 1030 pm with excruciating flank pain and now with some bladder spasm type pain     In the ER CMP with bun/creat 17/1.2, CBC wnl, UA with small blood large LE 58 wbc, 19 rbc, and CT a/p with Mild to moderate left hydroureteronephrosis due to an obstructing 5 mm stone of the distal left ureter. There is associated mild left perinephric/periureteral inflammatory fat stranding.  She's also noted to have an indeterminate low attenuation lesion in the lateral right hepatic lobe      Problem List:   Obstructing ureteral stone  Perinephric fat stranding  Empiric ceftriaxone, urology contacted from ER and Dr Beth to take to OR for cysto and stone extraction this am.  Will cont with IVF/Tamsulosin/pain and nausea control     ?REYNALDO  No baseline for comparison.  Would recheck in a week      Indeterminate hepatic lesion   Recommend OP dedicated MRI for follow-up     COVID 19 pending   S/p J&J 11/2021    DVT Prophylaxis: Pneumatic Compression Devices  Code Status: Full Code  Functional Status: independent  Dolan: not needed  Access:  PIV            Chief Complaint:     Back/flank pain        History of Present Illness:   Gilda Hinkle is a 60 year old female without significant medical hx  admitted on 9/5/2022 with obstructing ureteral stone with hydro and +/- UTI    In her usual state of health until yesterday when noticed some mild left sided flank pain.  It then radiated around to the front.  She may be having a tiny discomfort with urinating although would probably have done nothing about it. She even played golf yesterday. The pain was nagging but not terrible.  Then last night woke up about 1030 pm with excruciating flank pain and now with some bladder spasm type pain     In the ER CMP with bun/creat 17/1.2, CBC wnl, UA with small blood large LE 58 wbc, 19 rbc, and CT a/p with Mild to moderate left hydroureteronephrosis due to an obstructing 5 mm stone of the distal left ureter. There is associated mild left perinephric/periureteral inflammatory fat stranding.  She's also noted to have an indeterminate low attenuation lesion in the lateral right hepatic lobe      The history is obtained in discussion with the ER provider Dr Moura and the patient with excellent reliability.   is also at the bedside      Epic and Care everywhere were extensively reviewed        Past Medical History:   None- patient reports she is healthy and on no regular medications          Past Surgical History:   C-sxn          Social History:     Social History     Tobacco Use     Smoking status: Never Smoker     Smokeless tobacco: Not on file   Substance Use Topics     Alcohol use: Yes     Comment: 3x/week             Family History:   I have reviewed this patient's family history         Allergies:   No Known Allergies          Medications:     None per patient          Review of Systems:     A Comprehensive greater than 10 system review of systems was carried out.  Pertinent positives and negatives are noted above.  Otherwise negative for contributory information.           Physical Exam:   Blood pressure (!) 168/89, pulse 62, temperature 97.5  F (36.4  C), resp. rate 20, SpO2 96 %.  Exam:    General:  Pleasant nad  some. No rashes anywhere.     PAST MEDICAL HISTORY  Lung adenocarcinoma as above  L5 disk herniation. Epidural injection 5/22/18. Improved dramatically with chiropracter in the past.   BPH  Ascending aortic aneurysm    CURRENT OUTPATIENT MEDICATIONS    ALLERGIES  No Known Allergies    REVIEW OF SYSTEMS  As above in the HPI, o/w complete 12-point ROS was negative.    PHYSICAL EXAM  /71 (BP Location: Right arm, Patient Position: Sitting, Cuff Size: Adult Large)   Pulse 76   Temp 97.3  F (36.3  C) (Oral)   Wt 78.2 kg (172 lb 6.4 oz)   SpO2 95%   BMI 23.38 kg/m    Wt Readings from Last 3 Encounters:   08/28/19 79.6 kg (175 lb 8 oz)   08/13/19 78.4 kg (172 lb 12.8 oz)   08/07/19 78.7 kg (173 lb 6.4 oz)     GEN: NAD  HEENT: EOMI, no icterus, injection or pallor  LUNGS: clear bilaterally  CV: regular, no murmurs, rubs, or gallops  EXT: warm, well perfused, no edema  NEURO: alert  SKIN: no rashes. Nipples look normal.     LABORATORY AND IMAGING STUDIES  Results for MAYA ELLER (MRN [id number]) as of 9/20/2019 13:06   9/20/2019 12:06   Sodium 136   Potassium 4.5   Chloride 104   Carbon Dioxide 24   Urea Nitrogen 16   Creatinine 1.02   GFR Estimate 74   GFR Estimate If Black 86   Calcium 9.8   Anion Gap 7   Albumin 3.9   Protein Total 8.2   Bilirubin Total 0.8   Alkaline Phosphatase 85   ALT 43   AST 24   Glucose 113 (H)   WBC 6.2   Hemoglobin 13.3   Hematocrit 40.4   Platelet Count 373   RBC Count 4.36 (L)   MCV 93   MCH 30.5   MCHC 32.9   RDW 14.9   Diff Method Automated Method   % Neutrophils 86.3   % Lymphocytes 10.0   % Monocytes 2.9   % Eosinophils 0.0   % Basophils 0.2   % Immature Granulocytes 0.6   Nucleated RBCs 0   Absolute Neutrophil 5.3   Absolute Lymphocytes 0.6 (L)   Absolute Monocytes 0.2   Absolute Eosinophils 0.0   Absolute Basophils 0.0   Abs Immature Granulocytes 0.0   Absolute Nucleated RBC 0.0     Results for orders placed or performed during the hospital encounter of 09/19/19   CT Chest  Abdomen w Contrast    Narrative    CT CHEST AND ABDOMEN WITH CONTRAST   9/19/2019 10:46 AM     HISTORY: Non-small cell lung cancer restaging.    TECHNIQUE: 85 mL Isovue-370 IV were administered. After contrast  administration, volumetric helical sections were acquired from the  thoracic inlet to the iliac crests. Coronal images were also  reconstructed. Radiation dose for this scan was reduced using  automated exposure control, adjustment of the mA and/or kV according  to patient size, or iterative reconstruction technique.    COMPARISON: CT of the chest, abdomen, and pelvis performed 8/1/2019.    FINDINGS:    Chest: Mediastinal adenopathy has progressed since 8/1/2019,  consistent with progression of metastatic disease. For example, an  enlarged mediastinal lymph node along the left aspect of the main  pulmonary artery (series 2 image 33) measures 3.2 x 1.8 cm (previously  2.3 x 1.2 cm). A small left pleural effusion is not significantly  changed. No pericardial or right pleural effusion. Emphysematous  changes are noted in both upper lungs. 1.1 cm indeterminate pulmonary  nodule in the left upper lobe posteriorly and medially (series 6 image  77) has increased in size (previously 0.9 cm). An irregular nodular  opacity in the right lower lobe (series 6 image 288) has decreased in  size, and appears more scarlike on today's exam, today measuring 1.3 x  0.6 cm (previously 1.6 x 0.9 cm). Scattered smaller nodular opacities  in both lungs, most prominent along the left major fissure, are  otherwise not significantly changed.    Abdomen: Multiple hypoenhancing liver lesions are new or increased in  size since the previous exam, and are highly suspicious for metastatic  disease. For example, a hypoenhancing liver lesion near the inferior  tip of the right hepatic lobe (series 2 image 73) measures 1.4 cm, and  is new since the previous exam. A 1.9 x 1.7 cm left adrenal nodule has  increased in size (previously 1.6 x 1.3  looks stated age, looks uncomfortable and moving around a lot on the bed  HEENT:  Head nc/at sclera clear PERRL O/P:  Dry mm no posterior pharyngeal erythema or exudate Neck is supple  Lungs: cta b nl effort   CV:  RRR no m/r/g no le edema  Abd:  S/nt/nd no r/g  Neuro:  Cn 2-12 grossly intact and perdomo  Alert and oriented affect appropriate   Skin:  W/d no c/c               Data:     Results for orders placed or performed during the hospital encounter of 09/05/22 (from the past 24 hour(s))   CBC with platelets differential    Narrative    The following orders were created for panel order CBC with platelets differential.  Procedure                               Abnormality         Status                     ---------                               -----------         ------                     CBC with platelets and d...[[id number]]  Normal              Final result               Manual Differential[[id number]]                              Final result                 Please view results for these tests on the individual orders.   Comprehensive metabolic panel   Result Value Ref Range    Sodium 140 136 - 145 mmol/L    Potassium 4.0 3.4 - 5.3 mmol/L    Creatinine 1.22 (H) 0.51 - 0.95 mg/dL    Urea Nitrogen 17.3 8.0 - 23.0 mg/dL    Chloride 105 98 - 107 mmol/L    Carbon Dioxide (CO2) 25 22 - 29 mmol/L    Anion Gap 10 7 - 15 mmol/L    Glucose 98 70 - 99 mg/dL    Calcium 9.5 8.8 - 10.2 mg/dL    Protein Total 7.3 6.4 - 8.3 g/dL    Albumin 4.3 3.5 - 5.2 g/dL    Bilirubin Total 0.4 <=1.2 mg/dL    Alkaline Phosphatase 88 35 - 104 U/L    AST 21 10 - 35 U/L    ALT 18 10 - 35 U/L    GFR Estimate 50 (L) >60 mL/min/1.73m2   Lipase   Result Value Ref Range    Lipase 72 (H) 13 - 60 U/L   CBC with platelets and differential   Result Value Ref Range    WBC Count 9.9 4.0 - 11.0 10e3/uL    RBC Count 4.74 3.80 - 5.20 10e6/uL    Hemoglobin 14.0 11.7 - 15.7 g/dL    Hematocrit 42.3 35.0 - 47.0 %    MCV 89 78 - 100 fL    MCH 29.5 26.5 - 33.0 pg     MCHC 33.1 31.5 - 36.5 g/dL    RDW 12.8 10.0 - 15.0 %    Platelet Count 231 150 - 450 10e3/uL   Manual Differential   Result Value Ref Range    % Neutrophils 57 %    % Lymphocytes 30 %    % Monocytes 10 %    % Eosinophils 3 %    % Basophils 0 %    Absolute Neutrophils 5.6 1.6 - 8.3 10e3/uL    Absolute Lymphocytes 3.0 0.8 - 5.3 10e3/uL    Absolute Monocytes 1.0 0.0 - 1.3 10e3/uL    Absolute Eosinophils 0.3 0.0 - 0.7 10e3/uL    Absolute Basophils 0.0 0.0 - 0.2 10e3/uL    RBC Morphology Confirmed RBC Indices     Platelet Assessment  Automated Count Confirmed. Platelet morphology is normal.     Automated Count Confirmed. Platelet morphology is normal.   UA reflex to Microscopic   Result Value Ref Range    Color Urine Light Yellow Colorless, Straw, Light Yellow, Yellow    Appearance Urine Clear Clear    Glucose Urine Negative Negative mg/dL    Bilirubin Urine Negative Negative    Ketones Urine Negative Negative mg/dL    Specific Gravity Urine 1.018 1.003 - 1.035    Blood Urine Small (A) Negative    pH Urine 6.5 5.0 - 7.0    Protein Albumin Urine Negative Negative mg/dL    Urobilinogen Urine Normal Normal, 2.0 mg/dL    Nitrite Urine Negative Negative    Leukocyte Esterase Urine Large (A) Negative    RBC Urine 19 (H) <=2 /HPF    WBC Urine 58 (H) <=5 /HPF    Squamous Epithelials Urine 1 <=1 /HPF    Mucus Urine Present (A) None Seen /LPF   Abd/pelvis CT no contrast - Stone Protocol    Narrative    EXAM: CT ABDOMEN PELVIS W/O CONTRAST  LOCATION: [facility]  DATE/TIME: 9/6/2022 12:39 AM    INDICATION: Left flank pain.  COMPARISON: None.  TECHNIQUE: CT scan of the abdomen and pelvis was performed without IV contrast. Multiplanar reformats were obtained. Dose reduction techniques were used.  CONTRAST: None.    FINDINGS:      Absence of intravenous contrast limits the sensitivity of this examination for detection of infectious/inflammatory change, post traumatic abnormalities, vascular abnormalities, and  cm), and is suspicious for  metastatic lesion. The liver, spleen, right adrenal gland, pancreas,  and kidneys are otherwise unremarkable. No hydronephrosis. Mild  atherosclerotic aortoiliac calcification. Visualized loops of small  bowel and colon are of normal caliber. No enlarged lymph nodes are  identified in the abdomen. No aggressive-appearing bone lesions.      Impression    IMPRESSION:   1. There has been interval progression of metastatic disease involving  the liver and mediastinal lymph nodes.  2. A 1.1 cm indeterminate left upper lobe pulmonary nodule has  increased in size, also suspicious for metastatic progression.  3. A 1.9 cm left adrenal nodule has also increased in size, and is  suspicious for metastatic progression.  4. A small left pleural effusion is not significantly changed.    AGUSTIN RAMACHANDRAN MD     Imaging was personally reviewed     ASSESSMENT AND PLAN  NSCLC, adenocarcinoma: PD on maintenance pemetrexed pembrolizumab. Unfortunately not eligible for ALT-803 (no NC by RECIST) or the [facility] [employer] chemo + pembro (currently suspended). Sitravatinib + durva is closed for checkpoint inhibitor experienced. Recommended SOC second line docetaxel + ramucirumab, given every 3 weeks. Reviewed potential side effects, particularly but not limited to neuropathy, cytopenias, infection risk, bleeding, clotting, HTN, proteinuria, anaphylactic reaction. Is wanting to proceed; will start today. Will restage with CT CAP after 2 cycles. Will also get brain MRI in the next week or so. Have asked him to see Ora or one of her colleagues in about a week for tolerance check. I will see him after the restaging studies. Ok to stop folic acid.    Allodynia: [place]. Fading. Etiology not clear. No other signs or symptoms to suggest lower back or sacral nerve issue. Resolving, so will continue to monitor. I suppose it's possible it's due to pembro.     L malignant pleural effusion: Some  trapped lung, no reaccumulation thus far after pleurx removed.    Contrast reaction: Methylpred pre meds.    COPD: Tiotropium and albuterol inhaler. Not finding nebs helpful.    Insomnia: Seems to be doing ok. He's still not interested in a sleep medicine consult. Still taking melatonin prn. No longer taking quetiapine.    Access: Might be needing a port soon. He doesn't really want one, but is undergoing more needle sticks each time. Will be asking for vascular access from the get go. Order for port in should he want one.     Taste change: Better.    Social: Plans to travel to the [place] in [place] next summer    A total of 45 minutes was spent with the patient, >50% of which was spent in counseling and coordination of care.    Susan Muller MD    Hematology, Oncology and Transplantation   visceral lesions.    LOWER CHEST: No suspicious abnormality.    HEPATOBILIARY: Normal attenuation and size. Indeterminate low-attenuation lesion within the lateral right hepatic lobe, axial image 40, measuring 1.7 x 2.1 cm.      Gallbladder is normal.    No intrahepatic or intrahepatic biliary ductal dilatation.    PANCREAS: Normal attenuation. No peripancreatic inflammatory fat stranding.    SPLEEN: Normal attenuation. Normal size.    ADRENAL GLANDS: Normal.    KIDNEYS: Mild to moderate left hydroureteronephrosis due to an obstructing 5 mm stone of the distal left ureter. There is associated mild left perinephric/periureteral inflammatory fat stranding.    Additional small nonobstructing left renal stones. No right hydronephrosis or nephroureterolithiasis.    Urinary bladder is unremarkable.    PELVIC ORGANS: No suspicious abnormality.    BOWEL: No evidence of acute gastrointestinal inflammation or obstruction. Normal appendix. Desiccated enteric content within nondilated distal small bowel, suggesting slow transit.    No intraperitoneal free fluid or free air.    LYMPH NODES: No suspicious abdominal or pelvic lymphadenopathy.    VASCULATURE: No abdominal aortic aneurysm. Minimal atherosclerotic calcifications.    MUSCULOSKELETAL: No suspicious abnormality.    OTHER: No additionally significant abnormalities.      Impression    IMPRESSION:   1.  Obstructing 5 mm stone of the distal left ureter, resulting in mild to moderate left hydroureteronephrosis.  2.  Additional small nonobstructing left renal stones.  3.  Indeterminate low-attenuation lesion of the right hepatic lobe, measuring up to 2.1 cm. Nonemergent liver MR imaging follow-up is recommended.
